# Patient Record
Sex: MALE | Race: WHITE | Employment: OTHER | URBAN - METROPOLITAN AREA
[De-identification: names, ages, dates, MRNs, and addresses within clinical notes are randomized per-mention and may not be internally consistent; named-entity substitution may affect disease eponyms.]

---

## 2020-01-19 ENCOUNTER — APPOINTMENT (OUTPATIENT)
Dept: GENERAL RADIOLOGY | Age: 30
End: 2020-01-19
Payer: MEDICAID

## 2020-01-19 ENCOUNTER — APPOINTMENT (OUTPATIENT)
Dept: CT IMAGING | Age: 30
End: 2020-01-19
Payer: MEDICAID

## 2020-01-19 ENCOUNTER — HOSPITAL ENCOUNTER (EMERGENCY)
Age: 30
Discharge: ANOTHER HOSPITAL | End: 2020-01-20
Attending: EMERGENCY MEDICINE
Payer: MEDICAID

## 2020-01-19 LAB
A/G RATIO: 1.3 (ref 1.1–2.2)
ALBUMIN SERPL-MCNC: 4.5 G/DL (ref 3.4–5)
ALP BLD-CCNC: 91 U/L (ref 40–129)
ALT SERPL-CCNC: 69 U/L (ref 10–40)
AMMONIA: 58 UMOL/L (ref 16–60)
AMORPHOUS: ABNORMAL /HPF
AMPHETAMINE SCREEN, URINE: ABNORMAL
ANION GAP SERPL CALCULATED.3IONS-SCNC: 21 MMOL/L (ref 3–16)
ANISOCYTOSIS: ABNORMAL
APPEARANCE CSF: CLEAR
APPEARANCE CSF: CLEAR
AST SERPL-CCNC: 41 U/L (ref 15–37)
BACTERIA: ABNORMAL /HPF
BANDED NEUTROPHILS RELATIVE PERCENT: 1 % (ref 0–7)
BARBITURATE SCREEN URINE: ABNORMAL
BASE EXCESS VENOUS: -13.2 MMOL/L (ref -3–3)
BASOPHILS ABSOLUTE: 0.2 K/UL (ref 0–0.2)
BASOPHILS RELATIVE PERCENT: 1 %
BENZODIAZEPINE SCREEN, URINE: ABNORMAL
BILIRUB SERPL-MCNC: <0.2 MG/DL (ref 0–1)
BILIRUBIN URINE: NEGATIVE
BLOOD, URINE: ABNORMAL
BUN BLDV-MCNC: 14 MG/DL (ref 7–20)
CALCIUM SERPL-MCNC: 9.1 MG/DL (ref 8.3–10.6)
CANNABINOID SCREEN URINE: POSITIVE
CARBOXYHEMOGLOBIN: 8.1 % (ref 0–1.5)
CHLORIDE BLD-SCNC: 99 MMOL/L (ref 99–110)
CLARITY: CLEAR
CLOT EVALUATION CSF: ABNORMAL
CLOT EVALUATION CSF: ABNORMAL
CO2: 17 MMOL/L (ref 21–32)
COCAINE METABOLITE SCREEN URINE: ABNORMAL
COLOR CSF: COLORLESS
COLOR CSF: COLORLESS
COLOR: ABNORMAL
CREAT SERPL-MCNC: 0.7 MG/DL (ref 0.9–1.3)
EKG ATRIAL RATE: 71 BPM
EKG DIAGNOSIS: NORMAL
EKG P AXIS: 60 DEGREES
EKG P-R INTERVAL: 162 MS
EKG Q-T INTERVAL: 402 MS
EKG QRS DURATION: 92 MS
EKG QTC CALCULATION (BAZETT): 436 MS
EKG R AXIS: 73 DEGREES
EKG T AXIS: 79 DEGREES
EKG VENTRICULAR RATE: 71 BPM
EOSINOPHILS ABSOLUTE: 0.8 K/UL (ref 0–0.6)
EOSINOPHILS RELATIVE PERCENT: 4 %
EPITHELIAL CELLS, UA: ABNORMAL /HPF
GFR AFRICAN AMERICAN: >60
GFR NON-AFRICAN AMERICAN: >60
GLOBULIN: 3.5 G/DL
GLUCOSE BLD-MCNC: 113 MG/DL (ref 70–99)
GLUCOSE URINE: NEGATIVE MG/DL
GLUCOSE, CSF: 77 MG/DL (ref 40–80)
HCO3 VENOUS: 15.8 MMOL/L (ref 23–29)
HCT VFR BLD CALC: 47.5 % (ref 40.5–52.5)
HEMATOLOGY PATH CONSULT: YES
HEMOGLOBIN: 15.7 G/DL (ref 13.5–17.5)
HYPOCHROMIA: ABNORMAL
KETONES, URINE: NEGATIVE MG/DL
LACTIC ACID, SEPSIS: 0.9 MMOL/L (ref 0.4–1.9)
LACTIC ACID, SEPSIS: 3.7 MMOL/L (ref 0.4–1.9)
LEUKOCYTE ESTERASE, URINE: NEGATIVE
LYMPHOCYTES ABSOLUTE: 13 K/UL (ref 1–5.1)
LYMPHOCYTES RELATIVE PERCENT: 62 %
Lab: ABNORMAL
MCH RBC QN AUTO: 30.8 PG (ref 26–34)
MCHC RBC AUTO-ENTMCNC: 33.1 G/DL (ref 31–36)
MCV RBC AUTO: 92.9 FL (ref 80–100)
METHADONE SCREEN, URINE: POSITIVE
METHEMOGLOBIN VENOUS: 0.3 %
MICROCYTES: ABNORMAL
MICROSCOPIC EXAMINATION: YES
MONOCYTES ABSOLUTE: 1.7 K/UL (ref 0–1.3)
MONOCYTES RELATIVE PERCENT: 8 %
MUCUS: ABNORMAL /LPF
NEUTROPHILS ABSOLUTE: 5.2 K/UL (ref 1.7–7.7)
NEUTROPHILS RELATIVE PERCENT: 24 %
NITRITE, URINE: NEGATIVE
NO DIFFERENTIAL CSF: ABNORMAL
NO DIFFERENTIAL CSF: ABNORMAL
O2 CONTENT, VEN: 20 VOL %
O2 SAT, VEN: 91 %
O2 THERAPY: ABNORMAL
OPIATE SCREEN URINE: ABNORMAL
OXYCODONE URINE: ABNORMAL
PCO2, VEN: 47.8 MMHG (ref 40–50)
PDW BLD-RTO: 14.2 % (ref 12.4–15.4)
PH UA: 6
PH UA: 6 (ref 5–8)
PH VENOUS: 7.14 (ref 7.35–7.45)
PHENCYCLIDINE SCREEN URINE: ABNORMAL
PLATELET # BLD: 280 K/UL (ref 135–450)
PLATELET SLIDE REVIEW: ADEQUATE
PMV BLD AUTO: 7 FL (ref 5–10.5)
PO2, VEN: 77.9 MMHG (ref 25–40)
POTASSIUM REFLEX MAGNESIUM: 3.9 MMOL/L (ref 3.5–5.1)
PROPOXYPHENE SCREEN: ABNORMAL
PROTEIN CSF: 28 MG/DL (ref 15–45)
PROTEIN UA: 30 MG/DL
RBC # BLD: 5.11 M/UL (ref 4.2–5.9)
RBC CSF: 1 /CUMM
RBC CSF: 2 /CUMM
RBC UA: ABNORMAL /HPF (ref 0–2)
SLIDE REVIEW: ABNORMAL
SMUDGE CELLS: PRESENT
SODIUM BLD-SCNC: 137 MMOL/L (ref 136–145)
SPECIFIC GRAVITY UA: 1.02 (ref 1–1.03)
T4 FREE: 1.1 NG/DL (ref 0.9–1.8)
TCO2 CALC VENOUS: 17 MMOL/L
TOTAL CK: 106 U/L (ref 39–308)
TOTAL PROTEIN: 8 G/DL (ref 6.4–8.2)
TROPONIN: <0.01 NG/ML
TSH REFLEX: 10.25 UIU/ML (ref 0.27–4.2)
TUBE NUMBER CSF: ABNORMAL
TUBE NUMBER CSF: ABNORMAL
URINE REFLEX TO CULTURE: ABNORMAL
URINE TYPE: ABNORMAL
UROBILINOGEN, URINE: 0.2 E.U./DL
WBC # BLD: 20.9 K/UL (ref 4–11)
WBC CSF: 0 /CUMM (ref 0–5)
WBC CSF: 0 /CUMM (ref 0–5)
WBC UA: ABNORMAL /HPF (ref 0–5)

## 2020-01-19 PROCEDURE — 2500000003 HC RX 250 WO HCPCS: Performed by: EMERGENCY MEDICINE

## 2020-01-19 PROCEDURE — 87529 HSV DNA AMP PROBE: CPT

## 2020-01-19 PROCEDURE — 83605 ASSAY OF LACTIC ACID: CPT

## 2020-01-19 PROCEDURE — 96366 THER/PROPH/DIAG IV INF ADDON: CPT

## 2020-01-19 PROCEDURE — 80053 COMPREHEN METABOLIC PANEL: CPT

## 2020-01-19 PROCEDURE — 99285 EMERGENCY DEPT VISIT HI MDM: CPT

## 2020-01-19 PROCEDURE — 70450 CT HEAD/BRAIN W/O DYE: CPT

## 2020-01-19 PROCEDURE — 82550 ASSAY OF CK (CPK): CPT

## 2020-01-19 PROCEDURE — 84443 ASSAY THYROID STIM HORMONE: CPT

## 2020-01-19 PROCEDURE — 93010 ELECTROCARDIOGRAM REPORT: CPT | Performed by: INTERNAL MEDICINE

## 2020-01-19 PROCEDURE — 6360000002 HC RX W HCPCS: Performed by: EMERGENCY MEDICINE

## 2020-01-19 PROCEDURE — 96365 THER/PROPH/DIAG IV INF INIT: CPT

## 2020-01-19 PROCEDURE — 2580000003 HC RX 258: Performed by: EMERGENCY MEDICINE

## 2020-01-19 PROCEDURE — 6360000002 HC RX W HCPCS

## 2020-01-19 PROCEDURE — 96368 THER/DIAG CONCURRENT INF: CPT

## 2020-01-19 PROCEDURE — 84484 ASSAY OF TROPONIN QUANT: CPT

## 2020-01-19 PROCEDURE — 86592 SYPHILIS TEST NON-TREP QUAL: CPT

## 2020-01-19 PROCEDURE — 96367 TX/PROPH/DG ADDL SEQ IV INF: CPT

## 2020-01-19 PROCEDURE — 86618 LYME DISEASE ANTIBODY: CPT

## 2020-01-19 PROCEDURE — 87070 CULTURE OTHR SPECIMN AEROBIC: CPT

## 2020-01-19 PROCEDURE — 96375 TX/PRO/DX INJ NEW DRUG ADDON: CPT

## 2020-01-19 PROCEDURE — 80307 DRUG TEST PRSMV CHEM ANLYZR: CPT

## 2020-01-19 PROCEDURE — 82945 GLUCOSE OTHER FLUID: CPT

## 2020-01-19 PROCEDURE — 87040 BLOOD CULTURE FOR BACTERIA: CPT

## 2020-01-19 PROCEDURE — 85025 COMPLETE CBC W/AUTO DIFF WBC: CPT

## 2020-01-19 PROCEDURE — 84439 ASSAY OF FREE THYROXINE: CPT

## 2020-01-19 PROCEDURE — 89050 BODY FLUID CELL COUNT: CPT

## 2020-01-19 PROCEDURE — 82803 BLOOD GASES ANY COMBINATION: CPT

## 2020-01-19 PROCEDURE — 62270 DX LMBR SPI PNXR: CPT

## 2020-01-19 PROCEDURE — 82140 ASSAY OF AMMONIA: CPT

## 2020-01-19 PROCEDURE — 71045 X-RAY EXAM CHEST 1 VIEW: CPT

## 2020-01-19 PROCEDURE — 86788 WEST NILE VIRUS AB IGM: CPT

## 2020-01-19 PROCEDURE — 81001 URINALYSIS AUTO W/SCOPE: CPT

## 2020-01-19 PROCEDURE — 86789 WEST NILE VIRUS ANTIBODY: CPT

## 2020-01-19 PROCEDURE — 93005 ELECTROCARDIOGRAM TRACING: CPT | Performed by: EMERGENCY MEDICINE

## 2020-01-19 PROCEDURE — 87205 SMEAR GRAM STAIN: CPT

## 2020-01-19 PROCEDURE — 84157 ASSAY OF PROTEIN OTHER: CPT

## 2020-01-19 RX ORDER — DEXTROSE, SODIUM CHLORIDE, AND POTASSIUM CHLORIDE 5; .45; .15 G/100ML; G/100ML; G/100ML
1000 INJECTION INTRAVENOUS CONTINUOUS
Status: DISCONTINUED | OUTPATIENT
Start: 2020-01-19 | End: 2020-01-20 | Stop reason: HOSPADM

## 2020-01-19 RX ORDER — 0.9 % SODIUM CHLORIDE 0.9 %
1000 INTRAVENOUS SOLUTION INTRAVENOUS ONCE
Status: COMPLETED | OUTPATIENT
Start: 2020-01-19 | End: 2020-01-19

## 2020-01-19 RX ORDER — ONDANSETRON 2 MG/ML
INJECTION INTRAMUSCULAR; INTRAVENOUS
Status: DISCONTINUED
Start: 2020-01-19 | End: 2020-01-20 | Stop reason: HOSPADM

## 2020-01-19 RX ORDER — METOCLOPRAMIDE HYDROCHLORIDE 5 MG/ML
10 INJECTION INTRAMUSCULAR; INTRAVENOUS ONCE
Status: COMPLETED | OUTPATIENT
Start: 2020-01-19 | End: 2020-01-19

## 2020-01-19 RX ORDER — DIPHENHYDRAMINE HYDROCHLORIDE 50 MG/ML
25 INJECTION INTRAMUSCULAR; INTRAVENOUS ONCE
Status: DISCONTINUED | OUTPATIENT
Start: 2020-01-19 | End: 2020-01-20 | Stop reason: HOSPADM

## 2020-01-19 RX ORDER — PROMETHAZINE HYDROCHLORIDE 25 MG/ML
12.5 INJECTION, SOLUTION INTRAMUSCULAR; INTRAVENOUS ONCE
Status: COMPLETED | OUTPATIENT
Start: 2020-01-19 | End: 2020-01-19

## 2020-01-19 RX ORDER — DIPHENHYDRAMINE HYDROCHLORIDE 50 MG/ML
25 INJECTION INTRAMUSCULAR; INTRAVENOUS ONCE
Status: COMPLETED | OUTPATIENT
Start: 2020-01-19 | End: 2020-01-19

## 2020-01-19 RX ORDER — MIDAZOLAM HYDROCHLORIDE 5 MG/ML
INJECTION INTRAMUSCULAR; INTRAVENOUS
Status: DISPENSED
Start: 2020-01-19 | End: 2020-01-19

## 2020-01-19 RX ORDER — DIPHENHYDRAMINE HYDROCHLORIDE 50 MG/ML
INJECTION INTRAMUSCULAR; INTRAVENOUS
Status: COMPLETED
Start: 2020-01-19 | End: 2020-01-19

## 2020-01-19 RX ORDER — METOCLOPRAMIDE HYDROCHLORIDE 5 MG/ML
INJECTION INTRAMUSCULAR; INTRAVENOUS
Status: COMPLETED
Start: 2020-01-19 | End: 2020-01-19

## 2020-01-19 RX ORDER — SODIUM CHLORIDE 0.9 % (FLUSH) 0.9 %
10 SYRINGE (ML) INJECTION PRN
Status: DISCONTINUED | OUTPATIENT
Start: 2020-01-19 | End: 2020-01-20 | Stop reason: HOSPADM

## 2020-01-19 RX ORDER — HALOPERIDOL 5 MG/ML
5 INJECTION INTRAMUSCULAR ONCE
Status: DISCONTINUED | OUTPATIENT
Start: 2020-01-19 | End: 2020-01-20 | Stop reason: HOSPADM

## 2020-01-19 RX ORDER — SODIUM CHLORIDE 0.9 % (FLUSH) 0.9 %
10 SYRINGE (ML) INJECTION EVERY 12 HOURS SCHEDULED
Status: DISCONTINUED | OUTPATIENT
Start: 2020-01-19 | End: 2020-01-20 | Stop reason: HOSPADM

## 2020-01-19 RX ORDER — LORAZEPAM 2 MG/ML
INJECTION INTRAMUSCULAR
Status: COMPLETED
Start: 2020-01-19 | End: 2020-01-19

## 2020-01-19 RX ORDER — ONDANSETRON 2 MG/ML
INJECTION INTRAMUSCULAR; INTRAVENOUS
Status: COMPLETED
Start: 2020-01-19 | End: 2020-01-19

## 2020-01-19 RX ADMIN — POTASSIUM CHLORIDE, DEXTROSE MONOHYDRATE AND SODIUM CHLORIDE 1000 ML: 150; 5; 450 INJECTION, SOLUTION INTRAVENOUS at 15:02

## 2020-01-19 RX ADMIN — CEFTRIAXONE 2 G: 2 INJECTION, POWDER, FOR SOLUTION INTRAMUSCULAR; INTRAVENOUS at 12:08

## 2020-01-19 RX ADMIN — DIPHENHYDRAMINE HYDROCHLORIDE 25 MG: 50 INJECTION, SOLUTION INTRAMUSCULAR; INTRAVENOUS at 20:46

## 2020-01-19 RX ADMIN — ONDANSETRON HYDROCHLORIDE 8 MG: 2 INJECTION, SOLUTION INTRAMUSCULAR; INTRAVENOUS at 15:22

## 2020-01-19 RX ADMIN — Medication 10 ML: at 12:19

## 2020-01-19 RX ADMIN — VANCOMYCIN HYDROCHLORIDE 1.5 G: 1 INJECTION, POWDER, LYOPHILIZED, FOR SOLUTION INTRAVENOUS at 12:35

## 2020-01-19 RX ADMIN — LORAZEPAM 4 MG: 2 INJECTION INTRAMUSCULAR; INTRAVENOUS at 09:38

## 2020-01-19 RX ADMIN — PROMETHAZINE HYDROCHLORIDE 12.5 MG: 25 INJECTION INTRAMUSCULAR; INTRAVENOUS at 18:53

## 2020-01-19 RX ADMIN — DIPHENHYDRAMINE HYDROCHLORIDE 25 MG: 50 INJECTION INTRAMUSCULAR; INTRAVENOUS at 20:46

## 2020-01-19 RX ADMIN — LEVETIRACETAM 2500 MG: 100 INJECTION, SOLUTION INTRAVENOUS at 12:14

## 2020-01-19 RX ADMIN — METOCLOPRAMIDE HYDROCHLORIDE 10 MG: 5 INJECTION INTRAMUSCULAR; INTRAVENOUS at 20:46

## 2020-01-19 RX ADMIN — ACYCLOVIR SODIUM 590 MG: 50 INJECTION, SOLUTION INTRAVENOUS at 12:35

## 2020-01-19 RX ADMIN — SODIUM CHLORIDE 1000 ML: 9 INJECTION, SOLUTION INTRAVENOUS at 10:21

## 2020-01-19 RX ADMIN — SODIUM CHLORIDE 1000 ML: 9 INJECTION, SOLUTION INTRAVENOUS at 12:14

## 2020-01-19 RX ADMIN — METOCLOPRAMIDE 10 MG: 5 INJECTION, SOLUTION INTRAMUSCULAR; INTRAVENOUS at 20:46

## 2020-01-19 NOTE — ED NOTES
Patient alert and verbal at this time, oriented to self, unable to appropriately identify family members, year or location. Patient states \"im feeling so much better\". Currently laying in bed, family remains at bedside. Call light is within reach.       Azael Johnson RN  01/19/20 0292

## 2020-01-19 NOTE — ED PROVIDER NOTES
Physical activity:     Days per week: Not on file     Minutes per session: Not on file    Stress: Not on file   Relationships    Social connections:     Talks on phone: Not on file     Gets together: Not on file     Attends Mosque service: Not on file     Active member of club or organization: Not on file     Attends meetings of clubs or organizations: Not on file     Relationship status: Not on file    Intimate partner violence:     Fear of current or ex partner: Not on file     Emotionally abused: Not on file     Physically abused: Not on file     Forced sexual activity: Not on file   Other Topics Concern    Not on file   Social History Narrative    Not on file     Current Facility-Administered Medications   Medication Dose Route Frequency Provider Last Rate Last Dose    midazolam (VERSED) 5 MG/ML injection             vancomycin 1.5 g in dextrose 5% 300 mL IVPB  1,500 mg Intravenous Once Lillian Horvath  mL/hr at 01/19/20 1235 1.5 g at 01/19/20 1235    acyclovir (ZOVIRAX) 590 mg in dextrose 5 % 100 mL IVPB  10 mg/kg Intravenous Once Lillian Horvath  mL/hr at 01/19/20 1235 590 mg at 01/19/20 1235    sodium chloride flush 0.9 % injection 10 mL  10 mL Intravenous 2 times per day Lillian Horvath MD   10 mL at 01/19/20 1219    sodium chloride flush 0.9 % injection 10 mL  10 mL Intravenous PRN Lillian Horvath MD         Current Outpatient Medications   Medication Sig Dispense Refill    buprenorphine-naloxone (SUBOXONE) 8-2 MG FILM SL film Place 1 Film under the tongue daily       No Known Allergies    REVIEW OF SYSTEMS  Unable to obtain ROS due to patient's serious medical condition. PHYSICAL EXAM  Vitals:    01/19/20 1238 01/19/20 1239 01/19/20 1251 01/19/20 1306   BP: (!) 146/91  (!) 146/100 (!) 131/94   Pulse: 108 108 105 104   Resp: 26 22 26 27   Temp:       TempSrc:       SpO2: 100% 100% 99% 100%   Weight:          General appearance: Unresponsive. HENT: Normocephalic. Atraumatic. Mucous membranes are moist.  Neck: Supple. Eyes: Pupils 4mm to 3mm, equal, reactive. Eyes deviated up and alternatively to right and left. Heart/Chest: RRR. No murmurs. Lungs: Respirations unlabored. CTAB. Abdomen: Soft. Non-distended. No masses. No organomegaly. No guarding or rebound. No apparent tenderness. Musculoskeletal: No extremity edema. No deformity. No tenderness in the extremities. All extremities neurovascularly intact. Skin: Warm and dry. No acute rashes. Neurological: Localizes to noxious stimulus. Moves all four extremities. Moans to noxious stimulus. Eyes closed. Psychiatric: Unable to assess given altered    LABS  I have reviewed all labs for this visit.    Results for orders placed or performed during the hospital encounter of 01/19/20   Herpes simplex virus PCR   Result Value Ref Range    HSV Source CSF    CBC Auto Differential   Result Value Ref Range    WBC 20.9 (H) 4.0 - 11.0 K/uL    RBC 5.11 4.20 - 5.90 M/uL    Hemoglobin 15.7 13.5 - 17.5 g/dL    Hematocrit 47.5 40.5 - 52.5 %    MCV 92.9 80.0 - 100.0 fL    MCH 30.8 26.0 - 34.0 pg    MCHC 33.1 31.0 - 36.0 g/dL    RDW 14.2 12.4 - 15.4 %    Platelets 347 514 - 700 K/uL    MPV 7.0 5.0 - 10.5 fL    PLATELET SLIDE REVIEW Adequate     SLIDE REVIEW see below     Path Consult Yes     Neutrophils % 24.0 %    Lymphocytes % 62.0 %    Monocytes % 8.0 %    Eosinophils % 4.0 %    Basophils % 1.0 %    Neutrophils Absolute 5.2 1.7 - 7.7 K/uL    Lymphocytes Absolute 13.0 (H) 1.0 - 5.1 K/uL    Monocytes Absolute 1.7 (H) 0.0 - 1.3 K/uL    Eosinophils Absolute 0.8 (H) 0.0 - 0.6 K/uL    Basophils Absolute 0.2 0.0 - 0.2 K/uL    Bands Relative 1 0 - 7 %    Smudge Cells Present (A)     Anisocytosis Occasional (A)     Microcytes Occasional (A)     Hypochromia Occasional (A)    Comprehensive Metabolic Panel w/ Reflex to MG   Result Value Ref Range    Sodium 137 136 - 145 mmol/L    Potassium reflex Magnesium 3.9 3.5 - 5.1 mmol/L    Chloride 99 99 - 110 mmol/L    CO2 17 (L) 21 - 32 mmol/L    Anion Gap 21 (H) 3 - 16    Glucose 113 (H) 70 - 99 mg/dL    BUN 14 7 - 20 mg/dL    CREATININE 0.7 (L) 0.9 - 1.3 mg/dL    GFR Non-African American >60 >60    GFR African American >60 >60    Calcium 9.1 8.3 - 10.6 mg/dL    Total Protein 8.0 6.4 - 8.2 g/dL    Alb 4.5 3.4 - 5.0 g/dL    Albumin/Globulin Ratio 1.3 1.1 - 2.2    Total Bilirubin <0.2 0.0 - 1.0 mg/dL    Alkaline Phosphatase 91 40 - 129 U/L    ALT 69 (H) 10 - 40 U/L    AST 41 (H) 15 - 37 U/L    Globulin 3.5 g/dL   Troponin   Result Value Ref Range    Troponin <0.01 <0.01 ng/mL   TSH with Reflex   Result Value Ref Range    TSH 10.25 (H) 0.27 - 4.20 uIU/mL   Ammonia   Result Value Ref Range    Ammonia 58 16 - 60 umol/L   Blood gas, venous   Result Value Ref Range    pH, Rico 7.138 (LL) 7.350 - 7.450    pCO2, Rico 47.8 40.0 - 50.0 mmHg    pO2, Rico 77.9 (H) 25.0 - 40.0 mmHg    HCO3, Venous 15.8 (L) 23.0 - 29.0 mmol/L    Base Excess, Rico -13.2 (L) -3.0 - 3.0 mmol/L    O2 Sat, Rico 91 Not Established %    Carboxyhemoglobin 8.1 (H) 0.0 - 1.5 %    MetHgb, Rico 0.3 <1.5 %    TC02 (Calc), Rico 17 Not Established mmol/L    O2 Content, Rico 20 Not Established VOL %    O2 Therapy Unknown    Urinalysis Reflex to Culture   Result Value Ref Range    Color, UA Straw Straw/Yellow    Clarity, UA Clear Clear    Glucose, Ur Negative Negative mg/dL    Bilirubin Urine Negative Negative    Ketones, Urine Negative Negative mg/dL    Specific Gravity, UA 1.025 1.005 - 1.030    Blood, Urine SMALL (A) Negative    pH, UA 6.0 5.0 - 8.0    Protein, UA 30 (A) Negative mg/dL    Urobilinogen, Urine 0.2 <2.0 E.U./dL    Nitrite, Urine Negative Negative    Leukocyte Esterase, Urine Negative Negative    Microscopic Examination YES     Urine Type NotGiven     Urine Reflex to Culture Not Indicated    Urine Drug Screen   Result Value Ref Range    Amphetamine Screen, Urine Neg Negative <1000ng/mL    Barbiturate Screen, Ur Neg Negative <200 ng/mL    Benzodiazepine Screen, Urine Neg Negative <200 ng/mL    Cannabinoid Scrn, Ur POSITIVE (A) Negative <50 ng/mL    Cocaine Metabolite Screen, Urine Neg Negative <300 ng/mL    Opiate Scrn, Ur Neg Negative <300 ng/mL    PCP Screen, Urine Neg Negative <25 ng/mL    Methadone Screen, Urine POSITIVE (A) Negative <300 ng/mL    Propoxyphene Scrn, Ur Neg Negative <300 ng/mL    Oxycodone Urine Neg Negative <100 ng/ml    pH, UA 6.0     Drug Screen Comment: see below    Microscopic Urinalysis   Result Value Ref Range    Mucus, UA Rare (A) /LPF    WBC, UA 3-5 0 - 5 /HPF    RBC, UA 3-5 (A) 0 - 2 /HPF    Epi Cells 5-10 /HPF    Bacteria, UA Rare (A) /HPF    Amorphous, UA Rare (A) /HPF   CK   Result Value Ref Range    Total  39 - 308 U/L   Lactate, Sepsis   Result Value Ref Range    Lactic Acid, Sepsis 3.7 (H) 0.4 - 1.9 mmol/L   Lactate, Sepsis   Result Value Ref Range    Lactic Acid, Sepsis 0.9 0.4 - 1.9 mmol/L   Glucose CSF   Result Value Ref Range    Glucose, CSF 77 40 - 80 mg/dL    Appearance, CSF Clear    Protein CSF   Result Value Ref Range    Protein, CSF 28 15 - 45 mg/dL   CSF cell count with differential   Result Value Ref Range    Color, CSF Colorless Colorless    Tube Number + CELL CT + DIFF-CSF Tube 1     Clot Evaluation CSF see below     WBC, CSF 0 0 - 5 /cumm    RBC, CSF 2 (A) 0 /cumm    No differential CSF see below    CSF cell count with differential   Result Value Ref Range    Color, CSF Colorless Colorless    Appearance, CSF Clear Clear    Tube Number + CELL CT + DIFF-CSF Tube 4     Clot Evaluation CSF see below     WBC, CSF 0 0 - 5 /cumm    RBC, CSF 1 (A) 0 /cumm    No differential CSF see below    EKG 12 Lead   Result Value Ref Range    Ventricular Rate 71 BPM    Atrial Rate 71 BPM    P-R Interval 162 ms    QRS Duration 92 ms    Q-T Interval 402 ms    QTc Calculation (Bazett) 436 ms    P Axis 60 degrees    R Axis 73 degrees    T Axis 79 degrees    Diagnosis       Normal sinus rhythm with sinus arrhythmiaNormal ECGNo previous ECGs available       RADIOLOGY  I have reviewed all radiographic studies for this visit. XR CHEST PORTABLE   Final Result   No acute pulmonary abnormality. CT Head WO Contrast   Final Result   No acute intracranial abnormality. ECG  EKG interpreted by myself. Rate: normal  Rhythm: NSR with sinus arrhythmia  Axis: normal  Intervals: within normal limits  ST Segments: no acute abnormality  T waves: no acute abnormality  Comparison: No prior for comparison  Impression: NSR with sinus arrhythmia       ED COURSE/[facility]  Patient seen and evaluated. Old records reviewed. Labs and imaging reviewed and results discussed with patient.      27 y.o. male presented with altered mental status. ED Course as of Jan 19 1329   Olvin Taveras   4490 This is a 51-year-old male who presents with altered mental status. On arrival, the patient was taken to trauma bay 1. He was noted to be shaking his bilateral upper extremities and clenching his teeth. His eyes were deviated first to the right and then to the left. There was concern for seizure and the patient was administered 4 mg of Ativan intravenously with improvement in these symptoms. With placement of a Wu catheter he was noted to localize to that noxious stimulus. His pupils are 4 mm to 3 mm, equal, and reactive. His respiratory status is intact. No indication for naloxone. Initial blood glucose reassuring. I placed a nasopharyngeal airway in the left nostril with no complication. Patient taken to CT scanner and my wet read of the noncontrast head CT reveals no acute hemorrhage. [JL]   [id number] The patient was initially noted to be hypertensive with systolics in the 030K. This improved to the 150s after administration of Ativan. VBG reveals metabolic acidosis. The patient is tachypneic, likely compensating for this acidosis. [JL]   6155 I note leukocytosis and lactic acidosis.   Leukocytosis nonspecific in the setting of possible time provided today was at least 33 minutes. This excludes seperately billable procedures. Critical care time was provided for altered mental status that required close evaluation and/or intervention with concern for potential patient decompensation. All questions were answered and the patient/family expressed understanding and agreement with the plan. During the patient's ED course, the patient was given:  Medications   midazolam (VERSED) 5 MG/ML injection (  Not Given 1/19/20 1041)   vancomycin 1.5 g in dextrose 5% 300 mL IVPB (1.5 g Intravenous New Bag 1/19/20 1235)   acyclovir (ZOVIRAX) 590 mg in dextrose 5 % 100 mL IVPB (590 mg Intravenous New Bag 1/19/20 1235)   sodium chloride flush 0.9 % injection 10 mL (10 mLs Intravenous Given 1/19/20 1219)   sodium chloride flush 0.9 % injection 10 mL (has no administration in time range)   LORazepam (ATIVAN) 2 MG/ML injection (4 mg  Given 1/19/20 0938)   0.9 % sodium chloride bolus (0 mLs Intravenous Stopped 1/19/20 1240)   0.9 % sodium chloride bolus (0 mLs Intravenous Stopped 1/19/20 1239)   cefTRIAXone (ROCEPHIN) 2 g IVPB in D5W 50ml minibag (0 g Intravenous Stopped 1/19/20 1239)   levETIRAcetam (KEPPRA) 2,500 mg in sodium chloride 0.9 % 100 mL IVPB (0 mg Intravenous Stopped 1/19/20 1239)        CLINICAL IMPRESSION  1. Seizure ([facility] [address]    2. Altered mental status, unspecified altered mental status type    3. Opiate abuse, continuous ([facility] [address]        DISPOSITION   Decision To Transfer 01/19/2020 11:57:51 AM        Zahida Velasquez MD    Note: This chart was created using voice recognition dictation software. Efforts were made by me to ensure accuracy, however some errors may be present due to limitations of this technology and occasionally words are not transcribed correctly.        Zahida Velasquez MD  01/19/20 8673

## 2020-01-20 VITALS
HEART RATE: 89 BPM | RESPIRATION RATE: 13 BRPM | DIASTOLIC BLOOD PRESSURE: 75 MMHG | WEIGHT: 130 LBS | BODY MASS INDEX: 18.65 KG/M2 | SYSTOLIC BLOOD PRESSURE: 118 MMHG | TEMPERATURE: 98.6 F | OXYGEN SATURATION: 98 %

## 2020-01-20 LAB — HEMATOLOGY PATH CONSULT: NORMAL

## 2020-01-20 PROCEDURE — 6370000000 HC RX 637 (ALT 250 FOR IP): Performed by: FAMILY MEDICINE

## 2020-01-20 RX ORDER — METHADONE HYDROCHLORIDE 10 MG/1
20 TABLET ORAL ONCE
Status: COMPLETED | OUTPATIENT
Start: 2020-01-20 | End: 2020-01-20

## 2020-01-20 RX ADMIN — METHADONE HYDROCHLORIDE 20 MG: 10 TABLET ORAL at 01:18

## 2020-01-20 ASSESSMENT — PAIN SCALES - GENERAL: PAINLEVEL_OUTOF10: 6

## 2020-01-20 NOTE — ED NOTES
Report to [facility]. Pt. Alert and VSS upon departure.       Raghu Alberto RN  01/20/20 5623

## 2020-01-20 NOTE — ED PROVIDER NOTES
Emergency Department Encounter  Location: [place] [address] ED    Patient: Flo Mcgowan  MRN: [id number]  : 1990  Date of evaluation: 2020  ED Provider: Lyndsey Alberto MD      Flo Mcgowan was checked out to me by Dr. Alicja Krishna. Please see his/her initial documentation for details of the patient's initial ED presentation, physical exam and completed studies.     In brief, Flo Mcgowan is a 27 y.o. male that presented to the emergency department with altered mental status and new onset seizures    I have reviewed and interpreted all of the currently available lab results and diagnostics from this visit:  Results for orders placed or performed during the hospital encounter of 20   Spinal fluid culture   Result Value Ref Range    Gram Stain Result No WBCs or organisms seen    Herpes simplex virus PCR   Result Value Ref Range    HSV Source CSF    CBC Auto Differential   Result Value Ref Range    WBC 20.9 (H) 4.0 - 11.0 K/uL    RBC 5.11 4.20 - 5.90 M/uL    Hemoglobin 15.7 13.5 - 17.5 g/dL    Hematocrit 47.5 40.5 - 52.5 %    MCV 92.9 80.0 - 100.0 fL    MCH 30.8 26.0 - 34.0 pg    MCHC 33.1 31.0 - 36.0 g/dL    RDW 14.2 12.4 - 15.4 %    Platelets 570 378 - 201 K/uL    MPV 7.0 5.0 - 10.5 fL    PLATELET SLIDE REVIEW Adequate     SLIDE REVIEW see below     Path Consult Yes     Neutrophils % 24.0 %    Lymphocytes % 62.0 %    Monocytes % 8.0 %    Eosinophils % 4.0 %    Basophils % 1.0 %    Neutrophils Absolute 5.2 1.7 - 7.7 K/uL    Lymphocytes Absolute 13.0 (H) 1.0 - 5.1 K/uL    Monocytes Absolute 1.7 (H) 0.0 - 1.3 K/uL    Eosinophils Absolute 0.8 (H) 0.0 - 0.6 K/uL    Basophils Absolute 0.2 0.0 - 0.2 K/uL    Bands Relative 1 0 - 7 %    Smudge Cells Present (A)     Anisocytosis Occasional (A)     Microcytes Occasional (A)     Hypochromia Occasional (A)    Comprehensive Metabolic Panel w/ Reflex to MG   Result Value Ref Range    Sodium 137 136 - 145 mmol/L    Potassium reflex Magnesium 3.9 3.5 - 5.1 ng/mL    Benzodiazepine Screen, Urine Neg Negative <200 ng/mL    Cannabinoid Scrn, Ur POSITIVE (A) Negative <50 ng/mL    Cocaine Metabolite Screen, Urine Neg Negative <300 ng/mL    Opiate Scrn, Ur Neg Negative <300 ng/mL    PCP Screen, Urine Neg Negative <25 ng/mL    Methadone Screen, Urine POSITIVE (A) Negative <300 ng/mL    Propoxyphene Scrn, Ur Neg Negative <300 ng/mL    Oxycodone Urine Neg Negative <100 ng/ml    pH, UA 6.0     Drug Screen Comment: see below    Microscopic Urinalysis   Result Value Ref Range    Mucus, UA Rare (A) /LPF    WBC, UA 3-5 0 - 5 /HPF    RBC, UA 3-5 (A) 0 - 2 /HPF    Epi Cells 5-10 /HPF    Bacteria, UA Rare (A) /HPF    Amorphous, UA Rare (A) /HPF   CK   Result Value Ref Range    Total  39 - 308 U/L   Lactate, Sepsis   Result Value Ref Range    Lactic Acid, Sepsis 3.7 (H) 0.4 - 1.9 mmol/L   Lactate, Sepsis   Result Value Ref Range    Lactic Acid, Sepsis 0.9 0.4 - 1.9 mmol/L   T4, Free   Result Value Ref Range    T4 Free 1.1 0.9 - 1.8 ng/dL   Glucose CSF   Result Value Ref Range    Glucose, CSF 77 40 - 80 mg/dL    Appearance, CSF Clear    Protein CSF   Result Value Ref Range    Protein, CSF 28 15 - 45 mg/dL   CSF cell count with differential   Result Value Ref Range    Color, CSF Colorless Colorless    Tube Number + CELL CT + DIFF-CSF Tube 1     Clot Evaluation CSF see below     WBC, CSF 0 0 - 5 /cumm    RBC, CSF 2 (A) 0 /cumm    No differential CSF see below    CSF cell count with differential   Result Value Ref Range    Color, CSF Colorless Colorless    Appearance, CSF Clear Clear    Tube Number + CELL CT + DIFF-CSF Tube 4     Clot Evaluation CSF see below     WBC, CSF 0 0 - 5 /cumm    RBC, CSF 1 (A) 0 /cumm    No differential CSF see below    EKG 12 Lead   Result Value Ref Range    Ventricular Rate 71 BPM    Atrial Rate 71 BPM    P-R Interval 162 ms    QRS Duration 92 ms    Q-T Interval 402 ms    QTc Calculation (Bazett) 436 ms    P Axis 60 degrees    R Axis 73 degrees    T Axis 79 degrees    Diagnosis       Normal sinus rhythm with sinus arrhythmiaConfirmed by [facility] Kahlil JESUS ([id number]) on 1/19/2020 3:39:13 PM     Ct Head Wo Contrast    Result Date: 1/19/2020  EXAMINATION: CT OF THE HEAD WITHOUT CONTRAST 1/19/2020 9:53 am TECHNIQUE: CT of the head was performed without the administration of intravenous contrast. Dose modulation, iterative reconstruction, and/or weight based adjustment of the mA/kV was utilized to reduce the radiation dose to as low as reasonably achievable. COMPARISON: None. HISTORY: ORDERING SYSTEM PROVIDED HISTORY: AMS TECHNOLOGIST PROVIDED HISTORY: Has a \"code stroke\" or \"stroke alert\" been called? ->No Reason for exam:->AMS Reason for Exam: ams Acuity: Acute Type of Exam: Initial FINDINGS: No acute intracranial hemorrhage. No mass effect. No midline shift. Ventricles and sulci are within normal limits. No acute soft tissue abnormality. No orbital abnormality. Endotracheal tube is incompletely evaluated. No acute skull fracture. Mastoid air cells are clear. Opacity within the left frontal sinus. No acute intracranial abnormality. Xr Chest Portable    Result Date: 1/19/2020  EXAMINATION: ONE X-RAY VIEW OF THE CHEST 1/19/2020 10:43 am COMPARISON: Chest radiograph 09/22/2005. HISTORY: ORDERING SYSTEM PROVIDED HISTORY: AMS TECHNOLOGIST PROVIDED HISTORY: Reason for exam:->AMS Reason for Exam: AMS Acuity: Unknown Type of Exam: Unknown FINDINGS: Single view provided. The mediastinal, cardiac, and hilar silhouettes are normal.  Normal lung volumes with no acute consolidation or effusion. No interstitial edema. No pneumothorax or mass. No free subdiaphragmatic air. The bowel gas pattern is normal.     No acute pulmonary abnormality. Final ED Course and MDM: In brief, Jolynn Willett is a 27 y.o. male whose care was signed out to me by the outgoing provider. In brief,  The patient was brought in by family. He was apparently found on his couch unresponsive. 01/19/20 0945 01/19/20 0938  0.9 % sodium chloride bolus  ONCE      Last MAR action:  Stopped - by Stephania Ballesteros on 01/19/20 at [address] [facility], JUNIOR    01/19/20 0944 01/19/20 0944  LORazepam (ATIVAN) 2 MG/ML injection     Note to Pharmacy:  Jemal Comer: cabinet override    Last MAR action:  Given - by Sarmad Solis on 01/19/20 at 3830     01/19/20 0942 01/19/20 0942  midazolam (VERSED) 5 MG/ML injection     Note to Pharmacy:  Georgette De La Fuente: cabinet override    Last MAR action:  Not Given - by Stephania Ballesteros on 01/19/20 at 1041           Final Impression      1. Seizure ([facility] [address]    2. Altered mental status, unspecified altered mental status type    3.  Opiate abuse, continuous ([facility] [address]        DISPOSITION Decision To Transfer 01/19/2020 11:57:51 AM     (Please note that portions of this note may have been completed with a voice recognition program. Efforts were made to edit the dictations but occasionally words are mis-transcribed.)    Norma Hi MD  [address] MD  01/20/20 9096

## 2020-01-20 NOTE — ED NOTES
1940 Transfer center called with bed assignement,    Pt is going to [facility]     Bed#4420  Report# [phone]    Awaiting Transport at this time.      Kelly Lawson  01/19/20 1947

## 2020-01-20 NOTE — ED NOTES
Patient sleeping comfortably in bed at this time. Patient mother remains at bedside. Will continue to monitor.       Nahun Martinez RN  01/19/20 1957

## 2020-01-21 LAB
B BURGDORFERI AB,CSF: 0.18 LIV
HERPES SIMPLEX VIRUS BY PCR: NOT DETECTED
HSV SOURCE: NORMAL
VDRL CSF SCREEN: NON REACTIVE

## 2020-01-22 LAB
GLUCOSE BLD-MCNC: 125 MG/DL (ref 70–99)
PERFORMED ON: ABNORMAL
WEST NILE IGG, CSF: 0.15 IV
WEST NILE IGM ANTIBODY CSF: 0 IV

## 2020-01-23 LAB
BLOOD CULTURE, ROUTINE: NORMAL
CULTURE, BLOOD 2: NORMAL

## 2020-01-26 LAB
CSF CULTURE: NORMAL
GRAM STAIN RESULT: NORMAL

## 2020-09-23 ENCOUNTER — APPOINTMENT (OUTPATIENT)
Dept: CT IMAGING | Age: 30
End: 2020-09-23
Payer: MEDICARE

## 2020-09-23 ENCOUNTER — HOSPITAL ENCOUNTER (EMERGENCY)
Age: 30
Discharge: ANOTHER HOSPITAL | End: 2020-09-23
Attending: EMERGENCY MEDICINE
Payer: MEDICARE

## 2020-09-23 VITALS
BODY MASS INDEX: 17.5 KG/M2 | RESPIRATION RATE: 17 BRPM | SYSTOLIC BLOOD PRESSURE: 104 MMHG | OXYGEN SATURATION: 94 % | DIASTOLIC BLOOD PRESSURE: 77 MMHG | TEMPERATURE: 99 F | WEIGHT: 125 LBS | HEIGHT: 71 IN | HEART RATE: 99 BPM

## 2020-09-23 LAB
A/G RATIO: 1.6 (ref 1.1–2.2)
ALBUMIN SERPL-MCNC: 4.5 G/DL (ref 3.4–5)
ALP BLD-CCNC: 107 U/L (ref 40–129)
ALT SERPL-CCNC: 32 U/L (ref 10–40)
ANION GAP SERPL CALCULATED.3IONS-SCNC: 24 MMOL/L (ref 3–16)
AST SERPL-CCNC: 34 U/L (ref 15–37)
BANDED NEUTROPHILS RELATIVE PERCENT: 1 % (ref 0–7)
BASOPHILS ABSOLUTE: 0 K/UL (ref 0–0.2)
BASOPHILS RELATIVE PERCENT: 0 %
BILIRUB SERPL-MCNC: <0.2 MG/DL (ref 0–1)
BUN BLDV-MCNC: 12 MG/DL (ref 7–20)
CALCIUM SERPL-MCNC: 9.7 MG/DL (ref 8.3–10.6)
CHLORIDE BLD-SCNC: 100 MMOL/L (ref 99–110)
CO2: 13 MMOL/L (ref 21–32)
CREAT SERPL-MCNC: 1 MG/DL (ref 0.9–1.3)
EKG ATRIAL RATE: 92 BPM
EKG DIAGNOSIS: NORMAL
EKG P AXIS: 58 DEGREES
EKG P-R INTERVAL: 160 MS
EKG Q-T INTERVAL: 376 MS
EKG QRS DURATION: 84 MS
EKG QTC CALCULATION (BAZETT): 464 MS
EKG R AXIS: 53 DEGREES
EKG T AXIS: 81 DEGREES
EKG VENTRICULAR RATE: 92 BPM
EOSINOPHILS ABSOLUTE: 0.5 K/UL (ref 0–0.6)
EOSINOPHILS RELATIVE PERCENT: 3 %
GFR AFRICAN AMERICAN: >60
GFR NON-AFRICAN AMERICAN: >60
GLOBULIN: 2.8 G/DL
GLUCOSE BLD-MCNC: 183 MG/DL (ref 70–99)
GLUCOSE BLD-MCNC: 225 MG/DL (ref 70–99)
HCT VFR BLD CALC: 43.3 % (ref 40.5–52.5)
HEMATOLOGY PATH CONSULT: YES
HEMOGLOBIN: 14.4 G/DL (ref 13.5–17.5)
LACTIC ACID, SEPSIS: 12.6 MMOL/L (ref 0.4–1.9)
LACTIC ACID, SEPSIS: 2.2 MMOL/L (ref 0.4–1.9)
LACTIC ACID: 1.2 MMOL/L (ref 0.4–2)
LYMPHOCYTES ABSOLUTE: 9 K/UL (ref 1–5.1)
LYMPHOCYTES RELATIVE PERCENT: 57 %
MCH RBC QN AUTO: 31.3 PG (ref 26–34)
MCHC RBC AUTO-ENTMCNC: 33.3 G/DL (ref 31–36)
MCV RBC AUTO: 94 FL (ref 80–100)
MONOCYTES ABSOLUTE: 0.3 K/UL (ref 0–1.3)
MONOCYTES RELATIVE PERCENT: 2 %
NEUTROPHILS ABSOLUTE: 6 K/UL (ref 1.7–7.7)
NEUTROPHILS RELATIVE PERCENT: 37 %
PDW BLD-RTO: 13.7 % (ref 12.4–15.4)
PERFORMED ON: ABNORMAL
PLATELET # BLD: 245 K/UL (ref 135–450)
PMV BLD AUTO: 7.8 FL (ref 5–10.5)
POTASSIUM SERPL-SCNC: 4.5 MMOL/L (ref 3.5–5.1)
RBC # BLD: 4.61 M/UL (ref 4.2–5.9)
SLIDE REVIEW: ABNORMAL
SODIUM BLD-SCNC: 137 MMOL/L (ref 136–145)
TOTAL PROTEIN: 7.3 G/DL (ref 6.4–8.2)
TROPONIN: 0.01 NG/ML
WBC # BLD: 15.8 K/UL (ref 4–11)

## 2020-09-23 PROCEDURE — 6360000002 HC RX W HCPCS

## 2020-09-23 PROCEDURE — 96375 TX/PRO/DX INJ NEW DRUG ADDON: CPT

## 2020-09-23 PROCEDURE — 80183 DRUG SCRN QUANT OXCARBAZEPIN: CPT

## 2020-09-23 PROCEDURE — 2580000003 HC RX 258: Performed by: EMERGENCY MEDICINE

## 2020-09-23 PROCEDURE — 6370000000 HC RX 637 (ALT 250 FOR IP): Performed by: EMERGENCY MEDICINE

## 2020-09-23 PROCEDURE — 70486 CT MAXILLOFACIAL W/O DYE: CPT

## 2020-09-23 PROCEDURE — 96374 THER/PROPH/DIAG INJ IV PUSH: CPT

## 2020-09-23 PROCEDURE — 83605 ASSAY OF LACTIC ACID: CPT

## 2020-09-23 PROCEDURE — 93005 ELECTROCARDIOGRAM TRACING: CPT | Performed by: EMERGENCY MEDICINE

## 2020-09-23 PROCEDURE — 72125 CT NECK SPINE W/O DYE: CPT

## 2020-09-23 PROCEDURE — 96376 TX/PRO/DX INJ SAME DRUG ADON: CPT

## 2020-09-23 PROCEDURE — 93010 ELECTROCARDIOGRAM REPORT: CPT | Performed by: INTERNAL MEDICINE

## 2020-09-23 PROCEDURE — 99285 EMERGENCY DEPT VISIT HI MDM: CPT

## 2020-09-23 PROCEDURE — 80053 COMPREHEN METABOLIC PANEL: CPT

## 2020-09-23 PROCEDURE — 85025 COMPLETE CBC W/AUTO DIFF WBC: CPT

## 2020-09-23 PROCEDURE — 84484 ASSAY OF TROPONIN QUANT: CPT

## 2020-09-23 PROCEDURE — 70450 CT HEAD/BRAIN W/O DYE: CPT

## 2020-09-23 PROCEDURE — 6360000002 HC RX W HCPCS: Performed by: EMERGENCY MEDICINE

## 2020-09-23 RX ORDER — ONDANSETRON 2 MG/ML
4 INJECTION INTRAMUSCULAR; INTRAVENOUS ONCE
Status: COMPLETED | OUTPATIENT
Start: 2020-09-23 | End: 2020-09-23

## 2020-09-23 RX ORDER — 0.9 % SODIUM CHLORIDE 0.9 %
1000 INTRAVENOUS SOLUTION INTRAVENOUS ONCE
Status: COMPLETED | OUTPATIENT
Start: 2020-09-23 | End: 2020-09-23

## 2020-09-23 RX ORDER — LORAZEPAM 2 MG/ML
1 INJECTION INTRAMUSCULAR ONCE
Status: COMPLETED | OUTPATIENT
Start: 2020-09-23 | End: 2020-09-23

## 2020-09-23 RX ORDER — MIDAZOLAM HYDROCHLORIDE 1 MG/ML
INJECTION INTRAMUSCULAR; INTRAVENOUS
Status: COMPLETED
Start: 2020-09-23 | End: 2020-09-23

## 2020-09-23 RX ORDER — METHADONE HYDROCHLORIDE 10 MG/ML
105 CONCENTRATE ORAL DAILY
COMMUNITY

## 2020-09-23 RX ORDER — ASPIRIN 81 MG/1
81 TABLET, CHEWABLE ORAL
COMMUNITY
Start: 2020-01-21

## 2020-09-23 RX ORDER — MIDAZOLAM HYDROCHLORIDE 1 MG/ML
2 INJECTION INTRAMUSCULAR; INTRAVENOUS ONCE
Status: COMPLETED | OUTPATIENT
Start: 2020-09-23 | End: 2020-09-23

## 2020-09-23 RX ORDER — OXCARBAZEPINE 300 MG/1
600 TABLET, FILM COATED ORAL 2 TIMES DAILY
COMMUNITY
Start: 2020-08-05 | End: 2021-03-29

## 2020-09-23 RX ORDER — LORAZEPAM 0.5 MG/1
0.5 TABLET ORAL 2 TIMES DAILY
COMMUNITY
Start: 2020-09-17 | End: 2021-03-29

## 2020-09-23 RX ORDER — ACETAMINOPHEN 325 MG/1
650 TABLET ORAL ONCE
Status: COMPLETED | OUTPATIENT
Start: 2020-09-23 | End: 2020-09-23

## 2020-09-23 RX ADMIN — SODIUM CHLORIDE 1000 ML: 9 INJECTION, SOLUTION INTRAVENOUS at 12:06

## 2020-09-23 RX ADMIN — LORAZEPAM 1 MG: 2 INJECTION, SOLUTION INTRAMUSCULAR; INTRAVENOUS at 13:08

## 2020-09-23 RX ADMIN — MIDAZOLAM HYDROCHLORIDE 2 MG: 1 INJECTION INTRAMUSCULAR; INTRAVENOUS at 13:47

## 2020-09-23 RX ADMIN — ONDANSETRON 4 MG: 2 INJECTION INTRAMUSCULAR; INTRAVENOUS at 17:16

## 2020-09-23 RX ADMIN — SODIUM CHLORIDE 1000 ML: 9 INJECTION, SOLUTION INTRAVENOUS at 17:16

## 2020-09-23 RX ADMIN — ACETAMINOPHEN 650 MG: 325 TABLET ORAL at 17:16

## 2020-09-23 RX ADMIN — MIDAZOLAM 2 MG: 1 INJECTION INTRAMUSCULAR; INTRAVENOUS at 13:47

## 2020-09-23 RX ADMIN — ONDANSETRON 4 MG: 2 INJECTION INTRAMUSCULAR; INTRAVENOUS at 13:07

## 2020-09-23 ASSESSMENT — PAIN SCALES - GENERAL: PAINLEVEL_OUTOF10: 10

## 2020-09-23 NOTE — ED NOTES
Patient becoming restless while in CT. Patient beginning to moan and trying to sit up while on CT table. Dr. Kristen Sage notified, see KRISTEL Briones RN  09/23/20 9294

## 2020-09-23 NOTE — ED NOTES
[facility] center @2957, [phone]  Re: transfer to [facility] neurology for seizures, existing pt of  neuro & family preference per   @5448 called [facility] to see if we could admit the pt to the hospitalist/intensivist @, they stated  protocol was for neuro to be re-paged @this time  @1625 called [facility] again for update on  neuro  @1630  ( neuro) returned call & spoke to   @[phone] Sudheer Watts from [facility] bed booking called with a room #4320, call report to [id number]  09/23/20 6541

## 2020-09-23 NOTE — ED NOTES
Patient O2 noted to be 85% on 5L NC. Patient placed on high flow nasal cannula with humidifier at 13L, O2 and is now at 92%. . Patient mother remains at bedside with patient. Patient is resting in bed with eyes closed.       Dre Trivedi RN  09/23/20 0355

## 2020-09-23 NOTE — ED PROVIDER NOTES
[facility]  ED  EMERGENCYDEPARTMENT ENCOUNTER      Pt Name: Ines Koroma  MRN: [id number]  Bell 1990  Date of evaluation: 9/23/2020  Ender Jimenez MD    [address] [place] [address]       Chief Complaint   Patient presents with    Seizures     found down having grand mal seizure upon ems arrival. per ems just had a seizure study this morning. 5mg versed IN at 1038, 2.5mg versed IV at 1057, and 2.5mg IV versed at 1109 per EMS. pt arrived to ED postictal.          HISTORY OF PRESENT ILLNESS   (Location/Symptom, Timing/Onset,Context/Setting, Quality, Duration, Modifying Factors, Severity)  Note limiting factors. Ines Koroma is a 27 y.o. male who presents to the emergency department for seizures. Per mom patient has had seizures since 2019 though he has not really been evaluated until recently for it. Patient had a procedure done to check his seizures last week with a took him off his medications and induced seizure down at [facility]. He was restarted on his medication, today however patient started to have a seizure this morning per witness patient sees back to back 5-6 times. Carlos A Sweeney and hit his face against the furniture. -EMS patient required multiple doses of Versed, 5 mg intranasally, 2.5 mg IV and 2.5 mg IV Versed. Per the report patient was seizing at least 31 minutes. HPI    Nursing Notes were reviewed. REVIEW OF SYSTEMS    (2-9 systems for level 4, 10 or more for level 5)     Review of Systems   Unable to perform ROS: Mental status change       Except as noted above the remainder of the review of systems was reviewedand negative.        PAST MEDICAL HISTORY     Past Medical History:   Diagnosis Date    Drug abuse ([facility]     NSTEMI (non-ST elevated myocardial infarction) ([facility]     Seizures ([facility]          SURGICAL HISTORY       Past Surgical History:   Procedure Laterality Date    HAND SURGERY      RIGHT HAND FINGERS INJURY, PARTIAL AMPUTATIONS         CURRENT MEDICATIONS on file       SCREENINGS             PHYSICAL EXAM    (up to 7 for level 4, 8 ormore for level 5)     ED Triage Vitals   BP Temp Temp src Pulse Resp SpO2 Height Weight   09/23/20 1131 09/23/20 1150 -- 09/23/20 1131 09/23/20 1131 09/23/20 1131 09/23/20 1150 09/23/20 1150   107/70 99 °F (37.2 °C)  88 28 94 % 5' 11\" (1.803 m) 125 lb (56.7 kg)       Physical Exam  Constitutional:       General: He is not in acute distress. Appearance: Normal appearance. He is well-developed. He is not ill-appearing or toxic-appearing. Comments: Sitting in bed comfortably, speaking in full sentences, following verbal commands appropriately. Not in acute distress     HENT:      Head: Normocephalic and atraumatic. Comments: Dried blood around teeth  Eyes:      Conjunctiva/sclera: Conjunctivae normal.      Pupils: Pupils are equal, round, and reactive to light. Neck:      Musculoskeletal: Normal range of motion and neck supple. Cardiovascular:      Rate and Rhythm: Regular rhythm. Tachycardia present. Heart sounds: Normal heart sounds. No murmur. No friction rub. No gallop. Pulmonary:      Effort: Pulmonary effort is normal. No respiratory distress. Breath sounds: Normal breath sounds. No decreased breath sounds, wheezing, rhonchi or rales. Abdominal:      General: Bowel sounds are normal. There is no distension. Palpations: Abdomen is soft. Tenderness: There is no abdominal tenderness. There is no guarding or rebound. Musculoskeletal: Normal range of motion. General: No tenderness or deformity. Skin:     General: Skin is warm and dry. Findings: No rash. Neurological:      Mental Status: He is alert. GCS: GCS eye subscore is 4. GCS verbal subscore is 5. GCS motor subscore is 6. Psychiatric:         Behavior: Behavior is cooperative.          DIAGNOSTIC RESULTS     EKG: All EKG's are interpreted by the Emergency Department Physicianwho either signs or Co-signs this chart in the absence of a cardiologist.    The Ekg interpreted by me shows  normal sinus rhythm with a rate of 92  Axis is   Normal  QTc is  464  Intervals and Durations are unremarkable. ST Segments: nonspecific changes  No significant change from prior EKG dated 1/19/20    RADIOLOGY:   Non-plain film images such as CT, Ultrasound and MRI are read by the radiologist. Plain radiographic images are visualized and preliminarily interpreted by the emergency physician with the below findings:      Interpretation per the Radiologist below, if available at the time of this note:    CT Cervical Spine WO Contrast   Final Result   No acute intracranial abnormality. No acute fracture or subluxation of the cervical spine. No acute facial fractures. CT Head WO Contrast   Final Result   No acute intracranial abnormality. No acute fracture or subluxation of the cervical spine. No acute facial fractures. CT FACIAL BONES WO CONTRAST   Final Result   No acute intracranial abnormality. No acute fracture or subluxation of the cervical spine. No acute facial fractures.                ED BEDSIDE ULTRASOUND:   Performed by ED Physician - none    LABS:  Labs Reviewed   CBC WITH AUTO DIFFERENTIAL - Abnormal; Notable for the following components:       Result Value    WBC 15.8 (*)     Lymphocytes Absolute 9.0 (*)     All other components within normal limits    Narrative:     Performed at:  [address]   Phone [phone]   COMPREHENSIVE METABOLIC PANEL - Abnormal; Notable for the following components:    CO2 13 (*)     Anion Gap 24 (*)     Glucose 225 (*)     All other components within normal limits    Narrative:     Billy Vazquez tel. [phone],  Chemistry results called to and read back by JESSICA Barraza, 09/23/2020  12:20, by Brenda Best  Performed at:  [facility]  [address] [place] [address], [place], [address] [address]   Phone [phone]   LACTATE, SEPSIS - Abnormal; Notable for the following components:    Lactic Acid, Sepsis 12.6 (*)     All other components within normal limits    Narrative:     Elina Beatty tel. [phone],  Chemistry results called to and read back by JESSICA Leyva, 09/23/2020  12:21, by Gaurang Soto  Performed at:  [address],  [place], [address]   Phone [phone]   LACTATE, SEPSIS - Abnormal; Notable for the following components:    Lactic Acid, Sepsis 2.2 (*)     All other components within normal limits    Narrative:     Performed at:  [address],  [place], [address] [address]   Phone [phone]   POCT GLUCOSE - Abnormal; Notable for the following components:    POC Glucose 183 (*)     All other components within normal limits    Narrative:     Performed at:  [address],  [place], [address] [address]   Phone [phone]   TROPONIN    Narrative:     Elina Beatty tel. [phone],  Chemistry results called to and read back by JESSICA Leyva, 09/23/2020  12:20, by Gaurang Soto  Performed at:  [address],  [place], [address] [address]   Phone [phone]   OXCARBAZEPINE LEVEL   LACTIC ACID, PLASMA   URINE DRUG SCREEN   URINALYSIS       All other labs were within normal range ornot returned as of this dictation. EMERGENCY DEPARTMENT COURSE and DIFFERENTIAL DIAGNOSIS/MDM:   Vitals:    Vitals:    09/23/20 1241 09/23/20 1310 09/23/20 1336 09/23/20 1405   BP: 92/63 96/72 98/69 100/71   Pulse: 77 67 77 79   Resp: (!) 32 16 20 20   Temp:       SpO2: 93% 99% 100% 100%   Weight:       Height:             MDM    ED COURSE/MDM    -Barrington Beasley is a 27 y.o. male with a history of an STEMI, seizure disorder, drug abuse who presents ED for multiple seizures.   Per EMS patient had to be given total of 10 mg Versed over a span of 30 minutes before seizure stopped. Mom states that it was witnessed seizure and he had several about 5-6 back-to-back. Upon arrival patient was on 15 L nonrebreather and unresponsive.  -Lab work was significant for elevated anion gap, increased lactic acidosis of 12.6, leukocytosis of 15.8.  1 L IV fluid bolus, repeat lactic was 2.2.  -CT head shows no acute intracranial abnormality. -CT C-spine shows no fracture or subluxation of cervical spine. -CT face shows  no acute facial fractures.  -Plan patient was taken back to the [facility], he did come around however was still not following commands and moving around in the CT he was given 1 mg of Ativan. However he was still moving around and was given a dose of 2 mg Versed. -After talking to mom, she states that she would rather patient be admitted to [facility] as well as neurologist is.   -Consulted  neurology however it took some time to get in touch with the neurology service. Attempted to call hospitalist however they state that the protocol was to called neurologist.    -Neurology service called, spoke to Dr. Kiko Sapp who agreed to except patient, requested that the scans be pushed over to . Asked about his respiratory status, informed him that patient was still on 6 L at the time, we were slowly trying to de-escalate him from the 15 L nonrebreather. Could not assess neurologic status as patient was still sedated. -He finally started to come to awakening to name calling and around 1630, patient open his eyes. He drank some water. He states that he \"feels like shit\" and is nauseous.  -Was given 4 mg of Zofran and taken off supplemental oxygen maintaining airway.  -Plan for admission transferred to  for further workup and treatment discussed with patient and family. Patient is unhappy that he is in the hospital but is agreeable to getting transferred.   Patient and family in agreement with plan and have nofurther

## 2020-09-23 NOTE — ED NOTES
Patient being brought back to room by CT. Radiology stated that they were unable to obtain CT's because patient awoke and was sitting up in bed and would not sit still for CT. Upon arrival back into room patient was leaning onto right side and was not following commands or responding to questions. Dr. Kalyn Lau notified of patients current state and new orders were received. See MAR. Pt now resting in bed with eyes closed. Patients mother remains at bedside. CT notified that patient is ready for scans.       Enriqueta Milligan RN  09/23/20 0497

## 2020-09-23 NOTE — ED NOTES
Patient complaining of headache 10/10 on pain scale. Received VO from  for tylenol 650mg PO.      Elisabet Mcmahon RN  09/23/20 9456

## 2020-09-23 NOTE — ED NOTES
Report called to  and spoke to [place], RN who will be taking over care for patient.  Informed Cindy that [employer] ETA is Michelle Diaz 67., RN  09/23/20 3232

## 2020-09-23 NOTE — ED NOTES
Pt resting comfortably in bed at this time with eyes closed. No distress noted. Called CT dept and informed that pt is ready for CT.       Amber Montelongo RN  09/23/20 1178

## 2020-09-23 NOTE — ED NOTES
Report given to [facility]. Patient left unit @ [phone].      Gurwinder Cox RN  09/23/20 5721

## 2020-09-24 LAB
HEMATOLOGY PATH CONSULT: NORMAL
OXCARBAZEPINE: 5 UG/ML (ref 3–35)

## 2021-01-24 ENCOUNTER — HOSPITAL ENCOUNTER (EMERGENCY)
Age: 31
Discharge: HOME | End: 2021-01-24
Payer: MEDICARE

## 2021-01-24 ENCOUNTER — APPOINTMENT (OUTPATIENT)
Dept: CT IMAGING | Age: 31
End: 2021-01-24
Payer: MEDICARE

## 2021-01-24 ENCOUNTER — APPOINTMENT (OUTPATIENT)
Dept: GENERAL RADIOLOGY | Age: 31
End: 2021-01-24
Payer: MEDICARE

## 2021-01-24 VITALS
SYSTOLIC BLOOD PRESSURE: 136 MMHG | OXYGEN SATURATION: 98 % | TEMPERATURE: 98 F | BODY MASS INDEX: 18.2 KG/M2 | HEIGHT: 71 IN | WEIGHT: 130 LBS | DIASTOLIC BLOOD PRESSURE: 89 MMHG | RESPIRATION RATE: 16 BRPM | HEART RATE: 78 BPM

## 2021-01-24 DIAGNOSIS — J18.9 PNEUMONIA DUE TO ORGANISM: Primary | ICD-10-CM

## 2021-01-24 LAB
A/G RATIO: 1.5 (ref 1.1–2.2)
ALBUMIN SERPL-MCNC: 4.8 G/DL (ref 3.4–5)
ALP BLD-CCNC: 115 U/L (ref 40–129)
ALT SERPL-CCNC: 23 U/L (ref 10–40)
ANION GAP SERPL CALCULATED.3IONS-SCNC: 8 MMOL/L (ref 3–16)
AST SERPL-CCNC: 28 U/L (ref 15–37)
BASOPHILS ABSOLUTE: 0.1 K/UL (ref 0–0.2)
BASOPHILS RELATIVE PERCENT: 0.5 %
BILIRUB SERPL-MCNC: 0.4 MG/DL (ref 0–1)
BUN BLDV-MCNC: 14 MG/DL (ref 7–20)
CALCIUM SERPL-MCNC: 9.1 MG/DL (ref 8.3–10.6)
CHLORIDE BLD-SCNC: 96 MMOL/L (ref 99–110)
CO2: 25 MMOL/L (ref 21–32)
CREAT SERPL-MCNC: 0.8 MG/DL (ref 0.9–1.3)
EKG ATRIAL RATE: 73 BPM
EKG DIAGNOSIS: NORMAL
EKG P AXIS: 61 DEGREES
EKG P-R INTERVAL: 148 MS
EKG Q-T INTERVAL: 388 MS
EKG QRS DURATION: 72 MS
EKG QTC CALCULATION (BAZETT): 427 MS
EKG R AXIS: 71 DEGREES
EKG T AXIS: 64 DEGREES
EKG VENTRICULAR RATE: 73 BPM
EOSINOPHILS ABSOLUTE: 0.2 K/UL (ref 0–0.6)
EOSINOPHILS RELATIVE PERCENT: 0.7 %
GFR AFRICAN AMERICAN: >60
GFR NON-AFRICAN AMERICAN: >60
GLOBULIN: 3.1 G/DL
GLUCOSE BLD-MCNC: 114 MG/DL (ref 70–99)
HCT VFR BLD CALC: 42.3 % (ref 40.5–52.5)
HEMOGLOBIN: 14.3 G/DL (ref 13.5–17.5)
LACTIC ACID: 0.7 MMOL/L (ref 0.4–2)
LYMPHOCYTES ABSOLUTE: 2.8 K/UL (ref 1–5.1)
LYMPHOCYTES RELATIVE PERCENT: 11.3 %
MCH RBC QN AUTO: 30.9 PG (ref 26–34)
MCHC RBC AUTO-ENTMCNC: 33.8 G/DL (ref 31–36)
MCV RBC AUTO: 91.6 FL (ref 80–100)
MONOCYTES ABSOLUTE: 1.7 K/UL (ref 0–1.3)
MONOCYTES RELATIVE PERCENT: 6.9 %
NEUTROPHILS ABSOLUTE: 19.8 K/UL (ref 1.7–7.7)
NEUTROPHILS RELATIVE PERCENT: 80.6 %
PDW BLD-RTO: 13.2 % (ref 12.4–15.4)
PLATELET # BLD: 224 K/UL (ref 135–450)
PMV BLD AUTO: 7.1 FL (ref 5–10.5)
POTASSIUM SERPL-SCNC: 4.5 MMOL/L (ref 3.5–5.1)
RBC # BLD: 4.62 M/UL (ref 4.2–5.9)
SARS-COV-2, NAAT: NOT DETECTED
SODIUM BLD-SCNC: 129 MMOL/L (ref 136–145)
TOTAL PROTEIN: 7.9 G/DL (ref 6.4–8.2)
TROPONIN: <0.01 NG/ML
WBC # BLD: 24.6 K/UL (ref 4–11)

## 2021-01-24 PROCEDURE — 2580000003 HC RX 258: Performed by: NURSE PRACTITIONER

## 2021-01-24 PROCEDURE — 93005 ELECTROCARDIOGRAM TRACING: CPT

## 2021-01-24 PROCEDURE — 6370000000 HC RX 637 (ALT 250 FOR IP): Performed by: NURSE PRACTITIONER

## 2021-01-24 PROCEDURE — U0002 COVID-19 LAB TEST NON-CDC: HCPCS

## 2021-01-24 PROCEDURE — 83605 ASSAY OF LACTIC ACID: CPT

## 2021-01-24 PROCEDURE — 71045 X-RAY EXAM CHEST 1 VIEW: CPT

## 2021-01-24 PROCEDURE — 99284 EMERGENCY DEPT VISIT MOD MDM: CPT

## 2021-01-24 PROCEDURE — 80053 COMPREHEN METABOLIC PANEL: CPT

## 2021-01-24 PROCEDURE — 93010 ELECTROCARDIOGRAM REPORT: CPT | Performed by: INTERNAL MEDICINE

## 2021-01-24 PROCEDURE — 71260 CT THORAX DX C+: CPT

## 2021-01-24 PROCEDURE — 84484 ASSAY OF TROPONIN QUANT: CPT

## 2021-01-24 PROCEDURE — 85025 COMPLETE CBC W/AUTO DIFF WBC: CPT

## 2021-01-24 PROCEDURE — 94640 AIRWAY INHALATION TREATMENT: CPT

## 2021-01-24 PROCEDURE — 6360000004 HC RX CONTRAST MEDICATION: Performed by: NURSE PRACTITIONER

## 2021-01-24 PROCEDURE — 87040 BLOOD CULTURE FOR BACTERIA: CPT

## 2021-01-24 PROCEDURE — 96374 THER/PROPH/DIAG INJ IV PUSH: CPT

## 2021-01-24 PROCEDURE — 6360000002 HC RX W HCPCS: Performed by: NURSE PRACTITIONER

## 2021-01-24 RX ORDER — IBUPROFEN 600 MG/1
600 TABLET ORAL 3 TIMES DAILY PRN
Qty: 30 TABLET | Refills: 0 | Status: ON HOLD | OUTPATIENT
Start: 2021-01-24 | End: 2021-02-27 | Stop reason: HOSPADM

## 2021-01-24 RX ORDER — IPRATROPIUM BROMIDE AND ALBUTEROL SULFATE 2.5; .5 MG/3ML; MG/3ML
1 SOLUTION RESPIRATORY (INHALATION) ONCE
Status: COMPLETED | OUTPATIENT
Start: 2021-01-24 | End: 2021-01-24

## 2021-01-24 RX ORDER — BENZONATATE 100 MG/1
100 CAPSULE ORAL 3 TIMES DAILY PRN
Qty: 30 CAPSULE | Refills: 0 | Status: SHIPPED | OUTPATIENT
Start: 2021-01-24 | End: 2021-01-31

## 2021-01-24 RX ORDER — ALBUTEROL SULFATE 90 UG/1
2 AEROSOL, METERED RESPIRATORY (INHALATION) 4 TIMES DAILY PRN
Qty: 1 INHALER | Refills: 0 | Status: SHIPPED | OUTPATIENT
Start: 2021-01-24 | End: 2021-03-11

## 2021-01-24 RX ORDER — DOXYCYCLINE 100 MG/1
100 TABLET ORAL 2 TIMES DAILY
Qty: 20 TABLET | Refills: 0 | Status: SHIPPED | OUTPATIENT
Start: 2021-01-24 | End: 2021-02-03

## 2021-01-24 RX ORDER — DOXYCYCLINE HYCLATE 100 MG
100 TABLET ORAL ONCE
Status: COMPLETED | OUTPATIENT
Start: 2021-01-24 | End: 2021-01-24

## 2021-01-24 RX ORDER — 0.9 % SODIUM CHLORIDE 0.9 %
1000 INTRAVENOUS SOLUTION INTRAVENOUS ONCE
Status: COMPLETED | OUTPATIENT
Start: 2021-01-24 | End: 2021-01-24

## 2021-01-24 RX ORDER — KETOROLAC TROMETHAMINE 30 MG/ML
30 INJECTION, SOLUTION INTRAMUSCULAR; INTRAVENOUS ONCE
Status: COMPLETED | OUTPATIENT
Start: 2021-01-24 | End: 2021-01-24

## 2021-01-24 RX ADMIN — SODIUM CHLORIDE 1000 ML: 9 INJECTION, SOLUTION INTRAVENOUS at 11:43

## 2021-01-24 RX ADMIN — IOPAMIDOL 75 ML: 755 INJECTION, SOLUTION INTRAVENOUS at 11:05

## 2021-01-24 RX ADMIN — IPRATROPIUM BROMIDE AND ALBUTEROL SULFATE 1 AMPULE: .5; 3 SOLUTION RESPIRATORY (INHALATION) at 12:32

## 2021-01-24 RX ADMIN — KETOROLAC TROMETHAMINE 30 MG: 30 INJECTION, SOLUTION INTRAMUSCULAR at 11:43

## 2021-01-24 RX ADMIN — DOXYCYCLINE HYCLATE 100 MG: 100 TABLET, COATED ORAL at 13:53

## 2021-01-24 ASSESSMENT — PAIN DESCRIPTION - PAIN TYPE: TYPE: ACUTE PAIN

## 2021-01-24 ASSESSMENT — PAIN SCALES - GENERAL
PAINLEVEL_OUTOF10: 4
PAINLEVEL_OUTOF10: 8
PAINLEVEL_OUTOF10: 8

## 2021-01-24 NOTE — ED NOTES
Ambulated pt with portable SpO2 monitor. While resting pt had HR of 73 and SpO2 at 100%. While ambulating pt had HR of 85 and SpO2 at 100%. Pt was able to ambulate effectively without requiring any assistance. Pt did not have any complaints while ambulating.       Robby Leyden  01/24/21 7990

## 2021-01-24 NOTE — ED PROVIDER NOTES
[address] [facility] Emergency Department    CHIEF COMPLAINT  Chest Pain (right sided started yesterday 1500, hx of seizures and mi )      HISTORY OF PRESENT ILLNESS  Sabrina Almaraz is a 32 y.o. male who presents to the ED complaining of right-sided chest wall pain that started yesterday. Patient denies known injury or trauma. Patient reports that he was at rest when chest pain started. Patient reports chest pain has been constant since yesterday. Patient denies associated shortness of breath or pain with inspiration. Patient is a heavy cigarette smoker and reports he \"always has a cough\" this is unchanged. Patient denies hemoptysis. Patient denies headache, fever, chills, body aches, nasal congestion, dizziness, syncope, abdominal pain, nausea, vomiting, diarrhea, urinary complaints, leg swelling, calf pain, palpitations. Patient is a former IV drug user he is currently clean denies any drug or alcohol abuse other than methadone. Patient has a history of seizures and had an NSTEMI due to a seizure. No coronary artery disease or blockage found at that time. Patient reports his seizures are currently under control with medication. Patient denies recent travel, known sick contacts, known COVID-19 exposure. No other complaints, modifying factors or associated symptoms. Nursing notes reviewed. Past Medical History:   Diagnosis Date    Drug abuse ([address]     NSTEMI (non-ST elevated [facility] infarction) [facility]     Seizures ([facility]      Past Surgical History:   Procedure Laterality Date    HAND SURGERY      RIGHT HAND FINGERS INJURY, PARTIAL AMPUTATIONS     History reviewed. No pertinent family history.   Social History     Socioeconomic History    Marital status: Single     Spouse name: Not on file    Number of children: Not on file    Years of education: Not on file    Highest education level: Not on file   Occupational History    Not on file   Social Needs    Financial resource strain: Not on file    Food insecurity     Worry: Not on file     Inability: Not on file    Transportation needs     Medical: Not on file     Non-medical: Not on file   Tobacco Use    Smoking status: Current Every Day Smoker     Packs/day: 2.00     Types: Cigarettes    Smokeless tobacco: Never Used   Substance and Sexual Activity    Alcohol use: No    Drug use: Not Currently     Types: IV, Opiates      Comment: prior heroin user; last use 4 years ago per mom as of 9/23/20    Sexual activity: Not on file   Lifestyle    Physical activity     Days per week: Not on file     Minutes per session: Not on file    Stress: Not on file   Relationships    Social connections     Talks on phone: Not on file     Gets together: Not on file     Attends Orthodoxy service: Not on file     Active member of club or organization: Not on file     Attends meetings of clubs or organizations: Not on file     Relationship status: Not on file    Intimate partner violence     Fear of current or ex partner: Not on file     Emotionally abused: Not on file     Physically abused: Not on file     Forced sexual activity: Not on file   Other Topics Concern    Not on file   Social History Narrative    Not on file     No current facility-administered medications for this encounter. Current Outpatient Medications   Medication Sig Dispense Refill    doxycycline monohydrate (ADOXA) 100 MG tablet Take 1 tablet by mouth 2 times daily for 10 days May substitute another form of Doxycycline if insurance requires.  20 tablet 0    albuterol sulfate HFA (VENTOLIN HFA) 108 (90 Base) MCG/ACT inhaler Inhale 2 puffs into the lungs 4 times daily as needed for Wheezing 1 Inhaler 0    benzonatate (TESSALON) 100 MG capsule Take 1 capsule by mouth 3 times daily as needed for Cough 30 capsule 0    ibuprofen (ADVIL;MOTRIN) 600 MG tablet Take 1 tablet by mouth 3 times daily as needed for Pain 30 tablet 0    OXcarbazepine (TRILEPTAL) 300 MG tablet Take 600 mg by mouth 2 times daily      methadone (DOLOPHINE) 10 MG/ML solution Take 85 mg by mouth daily.  aspirin 81 MG chewable tablet Take 81 mg by mouth daily (with breakfast)      LORazepam (ATIVAN) 0.5 MG tablet Take 0.5 mg by mouth 2 times daily. No Known Allergies    REVIEW OF SYSTEMS  10 systems reviewed, pertinent positives per HPI otherwise noted to be negative    PHYSICAL EXAM  /89   Pulse 78   Temp 98 °F (36.7 °C) (Oral)   Resp 16   Ht 5' 11\" (1.803 m)   Wt 130 lb (59 kg)   SpO2 98%   BMI 18.13 kg/m²   GENERAL APPEARANCE: Awake and alert. Cooperative. No acute distress. Vital signs are stable. Well appearing and non toxic. HEAD: Normocephalic. Atraumatic. EYES: PERRL. EOM's grossly intact. ENT: Mucous membranes are moist.   NECK: Supple. Normal ROM. HEART: RRR. Distal pulses are equal and intact. Cap refill less than 2 seconds. LUNGS: Respirations unlabored. Good air exchange. Speaking comfortably in full sentences. Lung sounds are diminished with rhonchi and wheezing. No stridor or rales. Patient is not requiring supplemental oxygen. CHEST: Nontender  ABDOMEN: Soft. Non-distended. Non-tender. No guarding or rebound. No masses. No organomegaly. No rigidity. Bowel sounds are present. EXTREMITIES: No peripheral edema. Moves all extremities equally. All extremities neurovascularly intact. SKIN: Warm and dry. No acute rashes. NEUROLOGICAL: Alert and oriented. No gross facial drooping. Strength 5/5, sensation intact. PSYCHIATRIC: Normal mood and affect.     SCREENINGS    Xr Chest Portable    Result Date: 1/24/2021  EXAMINATION: ONE XRAY VIEW OF THE CHEST 1/24/2021 10:02 am COMPARISON: 01/19/2020 HISTORY: ORDERING SYSTEM PROVIDED HISTORY: cp TECHNOLOGIST PROVIDED HISTORY: Reason for exam:->cp Reason for Exam: Chest Pain (right sided started yesterday 1500, hx of seizures and mi ) Initial encounter FINDINGS: No focal consolidation, pleural effusion or pneumothorax. The cardiomediastinal silhouette is stable. No overt pulmonary edema. The osseous structures are stable. No acute cardiopulmonary findings. Ct Chest Pulmonary Embolism W Contrast    Result Date: 1/24/2021  EXAMINATION: CTA OF THE CHEST 1/24/2021 10:55 am TECHNIQUE: CTA of the chest was performed after the administration of intravenous contrast.  Multiplanar reformatted images are provided for review. MIP images are provided for review. Dose modulation, iterative reconstruction, and/or weight based adjustment of the mA/kV was utilized to reduce the radiation dose to as low as reasonably achievable. COMPARISON: None HISTORY: ORDERING SYSTEM PROVIDED HISTORY: chest pain, elevated wbc, abnormal lung sounds, history of ivdu currently sober TECHNOLOGIST PROVIDED HISTORY: Reason for exam:->chest pain, elevated wbc, abnormal lung sounds, history of ivdu currently sober Decision Support Exception->Emergency Medical Condition (MA) Reason for Exam: severe rt side chest pain since yesterday Acuity: Acute Type of Exam: Initial Relevant Medical/Surgical History: smoker x 15 yrs FINDINGS: Pulmonary Arteries: Pulmonary arteries are adequately opacified for evaluation. No evidence of intraluminal filling defect to suggest pulmonary embolism. Main pulmonary artery is normal in caliber. Mediastinum: The thoracic aorta is normal in course and caliber. The heart is not enlarged with no pericardial effusion. No pathologic hilar or mediastinal adenopathy. Lungs/pleura: There is a focal infiltrate in the medial aspect of the superior segment of the right lower lobe. No other infiltrate, consolidation or edema. No pleural fluid or pneumothorax. Inspissated secretions in left lower lobe bronchial tree. Upper Abdomen: Limited images of the upper abdomen are unremarkable. Soft Tissues/Bones: No acute bone or soft tissue abnormality.      1. Focal infiltrate in the superior segment right lower lobe medially consistent with pneumonia. 2. No evidence of pulmonary embolic disease. 3. Inspissated secretions in the left lower lobe bronchial tree. RADIOLOGY  Xr Chest Portable    Result Date: 1/24/2021  EXAMINATION: ONE XRAY VIEW OF THE CHEST 1/24/2021 10:02 am COMPARISON: 01/19/2020 HISTORY: ORDERING SYSTEM PROVIDED HISTORY: cp TECHNOLOGIST PROVIDED HISTORY: Reason for exam:->cp Reason for Exam: Chest Pain (right sided started yesterday 1500, hx of seizures and mi ) Initial encounter FINDINGS: No focal consolidation, pleural effusion or pneumothorax. The cardiomediastinal silhouette is stable. No overt pulmonary edema. The osseous structures are stable. No acute cardiopulmonary findings. Ct Chest Pulmonary Embolism W Contrast    Result Date: 1/24/2021  EXAMINATION: CTA OF THE CHEST 1/24/2021 10:55 am TECHNIQUE: CTA of the chest was performed after the administration of intravenous contrast.  Multiplanar reformatted images are provided for review. MIP images are provided for review. Dose modulation, iterative reconstruction, and/or weight based adjustment of the mA/kV was utilized to reduce the radiation dose to as low as reasonably achievable. COMPARISON: None HISTORY: ORDERING SYSTEM PROVIDED HISTORY: chest pain, elevated wbc, abnormal lung sounds, history of ivdu currently sober TECHNOLOGIST PROVIDED HISTORY: Reason for exam:->chest pain, elevated wbc, abnormal lung sounds, history of ivdu currently sober Decision Support Exception->Emergency Medical Condition (MA) Reason for Exam: severe rt side chest pain since yesterday Acuity: Acute Type of Exam: Initial Relevant Medical/Surgical History: smoker x 15 yrs FINDINGS: Pulmonary Arteries: Pulmonary arteries are adequately opacified for evaluation. No evidence of intraluminal filling defect to suggest pulmonary embolism. Main pulmonary artery is normal in caliber. Mediastinum: The thoracic aorta is normal in course and caliber.   The heart is not enlarged with no pericardial effusion. No pathologic hilar or mediastinal adenopathy. Lungs/pleura: There is a focal infiltrate in the medial aspect of the superior segment of the right lower lobe. No other infiltrate, consolidation or edema. No pleural fluid or pneumothorax. Inspissated secretions in left lower lobe bronchial tree. Upper Abdomen: Limited images of the upper abdomen are unremarkable. Soft Tissues/Bones: No acute bone or soft tissue abnormality. 1. Focal infiltrate in the superior segment right lower lobe medially consistent with pneumonia. 2. No evidence of pulmonary embolic disease. 3. Inspissated secretions in the left lower lobe bronchial tree. ED COURSE/MDM  Patient seen and evaluated. Old records reviewed. Diagnostic testing reviewed and results discussed. I have independently evaluated this patient based upon my scope of practice. Supervising physician was in the department for consultation as needed. Terrance Ng presented to the ED today with above noted complaints. Initial emergency department course CBC notable for leukocytosis of 24.6, no bandemia, no anemia. CMP shows a hyponatremia of 129 no other electrolyte abnormality. No acute kidney injury. LFTs unremarkable. Troponin less than 0.01. Lactic acid 0.7. Chest x-ray shows no acute cardiopulmonary findings. EKG interpreted by my attending physician, no ST elevation. CT of the chest is notable for focal infiltrate in the superior segment right lower lobe medially consistent with pneumonia. No evidence of pulmonary embolic disease. COVID-19 negative. Reevaluation patient symptoms improved. Patient ambulatory in the emergency department with an oxygen saturation above 92%. No respiratory distress. Patient initiated on antibiotic therapy for pneumonia. Strict return precautions discussed. Patient agreeable with this plan of care. Patient received toradol for pain, with good relief.      While in ED patient received   Medications   iopamidol (ISOVUE-370) 76 % injection 75 mL (75 mLs Intravenous Given 1/24/21 1105)   ipratropium-albuterol (DUONEB) nebulizer solution 1 ampule (1 ampule Inhalation Given 1/24/21 1232)   0.9 % sodium chloride bolus (0 mLs Intravenous Stopped 1/24/21 1343)   ketorolac (TORADOL) injection 30 mg (30 mg Intravenous Given 1/24/21 1143)   doxycycline hyclate (VIBRA-TABS) tablet 100 mg (100 mg Oral Given 1/24/21 1353)             At this point I do not feel the patient requires further work up and it is reasonable to discharge the patient. A discussion was had with the patient and/or their surrogate regarding diagnosis, diagnostic testing results, treatment/ plan of care, and follow up. There was shared decision-making between myself as well as the patient and/or their surrogate and we are all in agreement with discharge home. There was an opportunity for questions and all questions were answered to the best of my ability and to the satisfaction of the patient and/or patient family. Patient will follow up with pcp for further evaluation/treatment. The patient was given strict return precautions as we discussed symptoms that would necessitate return to the ED. Patient will return to ED for new/worsening symptoms. The patient verbalized their understanding and agreement with the above plan. Please refer to AVS for further details regarding discharge instructions.       Results for orders placed or performed during the hospital encounter of 01/24/21   CBC auto differential   Result Value Ref Range    WBC 24.6 (H) 4.0 - 11.0 K/uL    RBC 4.62 4.20 - 5.90 M/uL    Hemoglobin 14.3 13.5 - 17.5 g/dL    Hematocrit 42.3 40.5 - 52.5 %    MCV 91.6 80.0 - 100.0 fL    MCH 30.9 26.0 - 34.0 pg    MCHC 33.8 31.0 - 36.0 g/dL    RDW 13.2 12.4 - 15.4 %    Platelets 355 039 - 660 K/uL    MPV 7.1 5.0 - 10.5 fL    Neutrophils % 80.6 %    Lymphocytes % 11.3 %    Monocytes % 6.9 %    Eosinophils % 0.7 % Basophils % 0.5 %    Neutrophils Absolute 19.8 (H) 1.7 - 7.7 K/uL    Lymphocytes Absolute 2.8 1.0 - 5.1 K/uL    Monocytes Absolute 1.7 (H) 0.0 - 1.3 K/uL    Eosinophils Absolute 0.2 0.0 - 0.6 K/uL    Basophils Absolute 0.1 0.0 - 0.2 K/uL   Comprehensive metabolic panel   Result Value Ref Range    Sodium 129 (L) 136 - 145 mmol/L    Potassium 4.5 3.5 - 5.1 mmol/L    Chloride 96 (L) 99 - 110 mmol/L    CO2 25 21 - 32 mmol/L    Anion Gap 8 3 - 16    Glucose 114 (H) 70 - 99 mg/dL    BUN 14 7 - 20 mg/dL    CREATININE 0.8 (L) 0.9 - 1.3 mg/dL    GFR Non-African American >60 >60    GFR African American >60 >60    Calcium 9.1 8.3 - 10.6 mg/dL    Total Protein 7.9 6.4 - 8.2 g/dL    Alb 4.8 3.4 - 5.0 g/dL    Albumin/Globulin Ratio 1.5 1.1 - 2.2    Total Bilirubin 0.4 0.0 - 1.0 mg/dL    Alkaline Phosphatase 115 40 - 129 U/L    ALT 23 10 - 40 U/L    AST 28 15 - 37 U/L    Globulin 3.1 g/dL   Troponin   Result Value Ref Range    Troponin <0.01 <0.01 ng/mL   Lactic acid, plasma   Result Value Ref Range    Lactic Acid 0.7 0.4 - 2.0 mmol/L   COVID-19   Result Value Ref Range    SARS-CoV-2, NAAT Not Detected Not Detected   EKG 12 Lead   Result Value Ref Range    Ventricular Rate 73 BPM    Atrial Rate 73 BPM    P-R Interval 148 ms    QRS Duration 72 ms    Q-T Interval 388 ms    QTc Calculation (Bazett) 427 ms    P Axis 61 degrees    R Axis 71 degrees    T Axis 64 degrees    Diagnosis       Normal sinus rhythmPossible Left atrial enlargementBorderline ECGWhen compared with ECG of 23-SEP-2020 11:29,Inverted T waves have replaced nonspecific T wave abnormality in Anterior leadsT wave inversion no longer evident in Lateral leadsConfirmed by Uli Villanueva MD, [address] (1986) on 1/24/2021 2:23:05 PM       I estimate there is LOW risk for PULMONARY EMBOLISM, ACUTE CORONARY SYNDROME, OR THORACIC AORTIC DISSECTION, thus I consider the discharge disposition reasonable.  Mami Kang and I have discussed the diagnosis and risks, and we agree with discharging home to follow-up with their primary doctor. We also discussed returning to the Emergency Department immediately if new or worsening symptoms occur. We have discussed the symptoms which are most concerning (e.g., bloody sputum, fever, worsening pain or shortness of breath, vomiting) that necessitate immediate return. FINAL Impression    1. Pneumonia due to organism        Blood pressure 136/89, pulse 78, temperature 98 °F (36.7 °C), temperature source Oral, resp. rate 16, height 5' 11\" (1.803 m), weight 130 lb (59 kg), SpO2 98 %.mdm    Patient was sent home with a prescription for below medication/s. I did [place] patient on appropriate use of these medication. Discharge Medication List as of 1/24/2021  1:43 PM      START taking these medications    Details   doxycycline monohydrate (ADOXA) 100 MG tablet Take 1 tablet by mouth 2 times daily for 10 days May substitute another form of Doxycycline if insurance requires. , Disp-20 tablet, R-0Print      albuterol sulfate HFA (VENTOLIN HFA) 108 (90 Base) MCG/ACT inhaler Inhale 2 puffs into the lungs 4 times daily as needed for Wheezing, Disp-1 Inhaler, R-0Print      benzonatate (TESSALON) 100 MG capsule Take 1 capsule by mouth 3 times daily as needed for Cough, Disp-30 capsule, R-0Print      ibuprofen (ADVIL;MOTRIN) 600 MG tablet Take 1 tablet by mouth 3 times daily as needed for Pain, Disp-30 tablet, R-0Print                 FOLLOW UP  [facility]) Pre-Services  [address]  ED  [address]          DISPOSITION  Patient was discharged to home in good condition. Comment: Please note this report has been produced using speech recognition software and may contain errors related to that system including errors in grammar, punctuation, and spelling, as well as words and phrases that may be inappropriate.  If there are any questions or concerns please feel free to contact the dictating provider for clarification.             Kika Ramirez, APRN - CNP  01/24/21 7459

## 2021-01-24 NOTE — ED PROVIDER NOTES
I was not asked to see this patient. I was available for consultation if deemed necessary. I was only asked to interpret the EKG. Please see NP Jaida's note for care of the patient while in the emergency department and for final disposition. The Ekg interpreted by me shows  normal sinus rhythm with a rate of 73  Axis is   Normal  QTc is  normal  Intervals and Durations are unremarkable.       ST Segments: no acute change and nonspecific changes  No significant change from prior EKG dated - 9/23/20  No STEMI               Ericka Dumont MD  01/24/21 3623

## 2021-01-28 LAB
BLOOD CULTURE, ROUTINE: NORMAL
CULTURE, BLOOD 2: NORMAL

## 2021-02-21 ENCOUNTER — APPOINTMENT (OUTPATIENT)
Dept: CT IMAGING | Age: 31
DRG: 720 | End: 2021-02-21
Payer: MEDICARE

## 2021-02-21 ENCOUNTER — HOSPITAL ENCOUNTER (INPATIENT)
Age: 31
LOS: 5 days | Discharge: HOME | DRG: 720 | End: 2021-02-27
Attending: EMERGENCY MEDICINE | Admitting: INTERNAL MEDICINE
Payer: MEDICARE

## 2021-02-21 ENCOUNTER — APPOINTMENT (OUTPATIENT)
Dept: GENERAL RADIOLOGY | Age: 31
DRG: 720 | End: 2021-02-21
Payer: MEDICARE

## 2021-02-21 DIAGNOSIS — J96.01 ACUTE RESPIRATORY FAILURE WITH HYPOXIA (HCC): ICD-10-CM

## 2021-02-21 DIAGNOSIS — R41.82 ALTERED MENTAL STATUS, UNSPECIFIED ALTERED MENTAL STATUS TYPE: ICD-10-CM

## 2021-02-21 DIAGNOSIS — A41.9 SEPTICEMIA (HCC): ICD-10-CM

## 2021-02-21 DIAGNOSIS — I44.7 NEW ONSET LEFT BUNDLE BRANCH BLOCK (LBBB): ICD-10-CM

## 2021-02-21 DIAGNOSIS — G40.901 STATUS EPILEPTICUS (HCC): Primary | ICD-10-CM

## 2021-02-21 DIAGNOSIS — J69.0 ASPIRATION PNEUMONIA OF BOTH LUNGS, UNSPECIFIED ASPIRATION PNEUMONIA TYPE, UNSPECIFIED PART OF LUNG (HCC): ICD-10-CM

## 2021-02-21 LAB
A/G RATIO: 1.3 (ref 1.1–2.2)
ALBUMIN SERPL-MCNC: 4.9 G/DL (ref 3.4–5)
ALP BLD-CCNC: 139 U/L (ref 40–129)
ALT SERPL-CCNC: 44 U/L (ref 10–40)
AMPHETAMINE SCREEN, URINE: POSITIVE
ANION GAP SERPL CALCULATED.3IONS-SCNC: 30 MMOL/L (ref 3–16)
APTT: 21.4 SEC (ref 24.2–36.2)
AST SERPL-CCNC: 48 U/L (ref 15–37)
BARBITURATE SCREEN URINE: ABNORMAL
BASE EXCESS ARTERIAL: -13.6 MMOL/L (ref -3–3)
BENZODIAZEPINE SCREEN, URINE: POSITIVE
BILIRUB SERPL-MCNC: <0.2 MG/DL (ref 0–1)
BILIRUBIN URINE: NEGATIVE
BLOOD, URINE: ABNORMAL
BUN BLDV-MCNC: 10 MG/DL (ref 7–20)
CALCIUM SERPL-MCNC: 10.4 MG/DL (ref 8.3–10.6)
CANNABINOID SCREEN URINE: POSITIVE
CARBOXYHEMOGLOBIN ARTERIAL: 5.3 % (ref 0–1.5)
CHLORIDE BLD-SCNC: 93 MMOL/L (ref 99–110)
CLARITY: ABNORMAL
CO2: 12 MMOL/L (ref 21–32)
COCAINE METABOLITE SCREEN URINE: ABNORMAL
COLOR: YELLOW
CREAT SERPL-MCNC: 1 MG/DL (ref 0.9–1.3)
EPITHELIAL CELLS, UA: ABNORMAL /HPF (ref 0–5)
ETHANOL: NORMAL MG/DL (ref 0–0.08)
GFR AFRICAN AMERICAN: >60
GFR NON-AFRICAN AMERICAN: >60
GLOBULIN: 3.8 G/DL
GLUCOSE BLD-MCNC: 216 MG/DL (ref 70–99)
GLUCOSE URINE: NEGATIVE MG/DL
HCO3 ARTERIAL: 15.2 MMOL/L (ref 21–29)
HEMOGLOBIN, ART, EXTENDED: 14.9 G/DL (ref 13.5–17.5)
INR BLD: 1 (ref 0.86–1.14)
KETONES, URINE: NEGATIVE MG/DL
LACTIC ACID, SEPSIS: 18.3 MMOL/L (ref 0.4–1.9)
LEUKOCYTE ESTERASE, URINE: NEGATIVE
Lab: ABNORMAL
METHADONE SCREEN, URINE: POSITIVE
METHEMOGLOBIN ARTERIAL: 0.5 %
MICROSCOPIC EXAMINATION: YES
NITRITE, URINE: NEGATIVE
O2 CONTENT ARTERIAL: 21 ML/DL
O2 SAT, ARTERIAL: 99 %
O2 THERAPY: ABNORMAL
OPIATE SCREEN URINE: POSITIVE
OTHER OBSERVATIONS UA: ABNORMAL
OXYCODONE URINE: ABNORMAL
PCO2 ARTERIAL: 46 MMHG (ref 35–45)
PH ARTERIAL: 7.14 (ref 7.35–7.45)
PH UA: 7
PH UA: 7 (ref 5–8)
PHENCYCLIDINE SCREEN URINE: ABNORMAL
PO2 ARTERIAL: 487.4 MMHG (ref 75–108)
POTASSIUM REFLEX MAGNESIUM: 4.8 MMOL/L (ref 3.5–5.1)
PROPOXYPHENE SCREEN: ABNORMAL
PROTEIN UA: 100 MG/DL
PROTHROMBIN TIME: 11.6 SEC (ref 10–13.2)
RBC UA: ABNORMAL /HPF (ref 0–4)
REASON FOR REJECTION: NORMAL
REJECTED TEST: NORMAL
SODIUM BLD-SCNC: 135 MMOL/L (ref 136–145)
SPECIFIC GRAVITY UA: >=1.03 (ref 1–1.03)
TCO2 ARTERIAL: 16.6 MMOL/L
TOTAL PROTEIN: 8.7 G/DL (ref 6.4–8.2)
TROPONIN: <0.01 NG/ML
URINE REFLEX TO CULTURE: ABNORMAL
URINE TYPE: ABNORMAL
UROBILINOGEN, URINE: 0.2 E.U./DL
WBC UA: ABNORMAL /HPF (ref 0–5)

## 2021-02-21 PROCEDURE — 36556 INSERT NON-TUNNEL CV CATH: CPT

## 2021-02-21 PROCEDURE — 85610 PROTHROMBIN TIME: CPT

## 2021-02-21 PROCEDURE — 82803 BLOOD GASES ANY COMBINATION: CPT

## 2021-02-21 PROCEDURE — 84100 ASSAY OF PHOSPHORUS: CPT

## 2021-02-21 PROCEDURE — 83605 ASSAY OF LACTIC ACID: CPT

## 2021-02-21 PROCEDURE — 81001 URINALYSIS AUTO W/SCOPE: CPT

## 2021-02-21 PROCEDURE — 99291 CRITICAL CARE FIRST HOUR: CPT

## 2021-02-21 PROCEDURE — 96368 THER/DIAG CONCURRENT INF: CPT

## 2021-02-21 PROCEDURE — 31500 INSERT EMERGENCY AIRWAY: CPT

## 2021-02-21 PROCEDURE — 87635 SARS-COV-2 COVID-19 AMP PRB: CPT

## 2021-02-21 PROCEDURE — 71045 X-RAY EXAM CHEST 1 VIEW: CPT

## 2021-02-21 PROCEDURE — 70450 CT HEAD/BRAIN W/O DYE: CPT

## 2021-02-21 PROCEDURE — 80053 COMPREHEN METABOLIC PANEL: CPT

## 2021-02-21 PROCEDURE — 5A1945Z RESPIRATORY VENTILATION, 24-96 CONSECUTIVE HOURS: ICD-10-PCS | Performed by: EMERGENCY MEDICINE

## 2021-02-21 PROCEDURE — 6360000002 HC RX W HCPCS: Performed by: EMERGENCY MEDICINE

## 2021-02-21 PROCEDURE — 82077 ASSAY SPEC XCP UR&BREATH IA: CPT

## 2021-02-21 PROCEDURE — 2500000003 HC RX 250 WO HCPCS: Performed by: EMERGENCY MEDICINE

## 2021-02-21 PROCEDURE — 85730 THROMBOPLASTIN TIME PARTIAL: CPT

## 2021-02-21 PROCEDURE — 83735 ASSAY OF MAGNESIUM: CPT

## 2021-02-21 PROCEDURE — 93005 ELECTROCARDIOGRAM TRACING: CPT | Performed by: EMERGENCY MEDICINE

## 2021-02-21 PROCEDURE — 6360000004 HC RX CONTRAST MEDICATION: Performed by: EMERGENCY MEDICINE

## 2021-02-21 PROCEDURE — 85025 COMPLETE CBC W/AUTO DIFF WBC: CPT

## 2021-02-21 PROCEDURE — 71260 CT THORAX DX C+: CPT

## 2021-02-21 PROCEDURE — 0BH17EZ INSERTION OF ENDOTRACHEAL AIRWAY INTO TRACHEA, VIA NATURAL OR ARTIFICIAL OPENING: ICD-10-PCS | Performed by: EMERGENCY MEDICINE

## 2021-02-21 PROCEDURE — 84484 ASSAY OF TROPONIN QUANT: CPT

## 2021-02-21 PROCEDURE — 80307 DRUG TEST PRSMV CHEM ANLYZR: CPT

## 2021-02-21 PROCEDURE — 87040 BLOOD CULTURE FOR BACTERIA: CPT

## 2021-02-21 PROCEDURE — 2580000003 HC RX 258: Performed by: EMERGENCY MEDICINE

## 2021-02-21 PROCEDURE — 36415 COLL VENOUS BLD VENIPUNCTURE: CPT

## 2021-02-21 PROCEDURE — 96375 TX/PRO/DX INJ NEW DRUG ADDON: CPT

## 2021-02-21 PROCEDURE — 96365 THER/PROPH/DIAG IV INF INIT: CPT

## 2021-02-21 PROCEDURE — 96376 TX/PRO/DX INJ SAME DRUG ADON: CPT

## 2021-02-21 PROCEDURE — 96367 TX/PROPH/DG ADDL SEQ IV INF: CPT

## 2021-02-21 RX ORDER — METOPROLOL TARTRATE 5 MG/5ML
5 INJECTION INTRAVENOUS ONCE
Status: COMPLETED | OUTPATIENT
Start: 2021-02-21 | End: 2021-02-21

## 2021-02-21 RX ORDER — ETOMIDATE 2 MG/ML
20 INJECTION INTRAVENOUS ONCE
Status: COMPLETED | OUTPATIENT
Start: 2021-02-21 | End: 2021-02-21

## 2021-02-21 RX ORDER — FENTANYL CITRATE 50 UG/ML
100 INJECTION, SOLUTION INTRAMUSCULAR; INTRAVENOUS ONCE
Status: COMPLETED | OUTPATIENT
Start: 2021-02-21 | End: 2021-02-21

## 2021-02-21 RX ORDER — NALOXONE HYDROCHLORIDE 1 MG/ML
2 INJECTION INTRAMUSCULAR; INTRAVENOUS; SUBCUTANEOUS ONCE
Status: COMPLETED | OUTPATIENT
Start: 2021-02-21 | End: 2021-02-21

## 2021-02-21 RX ORDER — ROCURONIUM BROMIDE 10 MG/ML
60 INJECTION, SOLUTION INTRAVENOUS ONCE
Status: COMPLETED | OUTPATIENT
Start: 2021-02-21 | End: 2021-02-21

## 2021-02-21 RX ORDER — MIDAZOLAM HYDROCHLORIDE 1 MG/ML
5 INJECTION INTRAMUSCULAR; INTRAVENOUS ONCE
Status: COMPLETED | OUTPATIENT
Start: 2021-02-21 | End: 2021-02-21

## 2021-02-21 RX ORDER — DEXMEDETOMIDINE HYDROCHLORIDE 4 UG/ML
.2-1.4 INJECTION, SOLUTION INTRAVENOUS CONTINUOUS
Status: DISCONTINUED | OUTPATIENT
Start: 2021-02-21 | End: 2021-02-23

## 2021-02-21 RX ORDER — PROPOFOL 10 MG/ML
50 INJECTION, EMULSION INTRAVENOUS ONCE
Status: COMPLETED | OUTPATIENT
Start: 2021-02-21 | End: 2021-02-21

## 2021-02-21 RX ORDER — 0.9 % SODIUM CHLORIDE 0.9 %
30 INTRAVENOUS SOLUTION INTRAVENOUS ONCE
Status: COMPLETED | OUTPATIENT
Start: 2021-02-21 | End: 2021-02-21

## 2021-02-21 RX ORDER — PROPOFOL 10 MG/ML
5-50 INJECTION, EMULSION INTRAVENOUS
Status: DISCONTINUED | OUTPATIENT
Start: 2021-02-21 | End: 2021-02-25

## 2021-02-21 RX ORDER — SUCCINYLCHOLINE CHLORIDE 20 MG/ML
120 INJECTION INTRAMUSCULAR; INTRAVENOUS ONCE
Status: COMPLETED | OUTPATIENT
Start: 2021-02-21 | End: 2021-02-21

## 2021-02-21 RX ADMIN — FENTANYL CITRATE 50 MCG/HR: 50 INJECTION, SOLUTION INTRAMUSCULAR; INTRAVENOUS at 23:18

## 2021-02-21 RX ADMIN — Medication 1.4 MCG/KG/HR: at 23:41

## 2021-02-21 RX ADMIN — IOPAMIDOL 75 ML: 755 INJECTION, SOLUTION INTRAVENOUS at 23:20

## 2021-02-21 RX ADMIN — PROPOFOL 15 MCG/KG/MIN: 10 INJECTION, EMULSION INTRAVENOUS at 21:57

## 2021-02-21 RX ADMIN — FENTANYL CITRATE 100 MCG: 50 INJECTION INTRAMUSCULAR; INTRAVENOUS at 23:21

## 2021-02-21 RX ADMIN — PROPOFOL 50 MG: 10 INJECTION, EMULSION INTRAVENOUS at 22:03

## 2021-02-21 RX ADMIN — SODIUM CHLORIDE 1770 ML: 9 INJECTION, SOLUTION INTRAVENOUS at 22:06

## 2021-02-21 RX ADMIN — METOPROLOL TARTRATE 5 MG: 5 INJECTION INTRAVENOUS at 23:49

## 2021-02-21 RX ADMIN — MIDAZOLAM HYDROCHLORIDE 5 MG: 2 INJECTION, SOLUTION INTRAMUSCULAR; INTRAVENOUS at 22:30

## 2021-02-21 RX ADMIN — ETOMIDATE 20 MG: 2 INJECTION INTRAVENOUS at 21:46

## 2021-02-21 RX ADMIN — LEVETIRACETAM 1000 MG: 100 INJECTION, SOLUTION INTRAVENOUS at 22:39

## 2021-02-21 RX ADMIN — MIDAZOLAM HYDROCHLORIDE 2 MG/HR: 5 INJECTION, SOLUTION INTRAMUSCULAR; INTRAVENOUS at 23:20

## 2021-02-21 RX ADMIN — FENTANYL CITRATE 100 MCG: 50 INJECTION, SOLUTION INTRAMUSCULAR; INTRAVENOUS at 22:16

## 2021-02-21 RX ADMIN — SUCCINYLCHOLINE CHLORIDE 120 MG: 20 INJECTION, SOLUTION INTRAMUSCULAR; INTRAVENOUS at 21:46

## 2021-02-21 RX ADMIN — ROCURONIUM BROMIDE 60 MG: 10 SOLUTION INTRAVENOUS at 22:39

## 2021-02-21 RX ADMIN — NALOXONE HYDROCHLORIDE 2 MG: 1 INJECTION PARENTERAL at 21:46

## 2021-02-21 RX ADMIN — MIDAZOLAM HYDROCHLORIDE 5 MG: 2 INJECTION, SOLUTION INTRAMUSCULAR; INTRAVENOUS at 22:07

## 2021-02-21 RX ADMIN — SODIUM BICARBONATE 50 MEQ: 84 INJECTION INTRAVENOUS at 22:52

## 2021-02-21 RX ADMIN — ROCURONIUM BROMIDE 60 MG: 10 SOLUTION INTRAVENOUS at 23:40

## 2021-02-22 ENCOUNTER — APPOINTMENT (OUTPATIENT)
Dept: GENERAL RADIOLOGY | Age: 31
DRG: 720 | End: 2021-02-22
Payer: MEDICARE

## 2021-02-22 PROBLEM — R56.9 SEIZURE (HCC): Status: ACTIVE | Noted: 2021-02-22

## 2021-02-22 LAB
A/G RATIO: 1.4 (ref 1.1–2.2)
ALBUMIN SERPL-MCNC: 3.7 G/DL (ref 3.4–5)
ALP BLD-CCNC: 106 U/L (ref 40–129)
ALT SERPL-CCNC: 27 U/L (ref 10–40)
ANION GAP SERPL CALCULATED.3IONS-SCNC: 7 MMOL/L (ref 3–16)
APTT: 80.6 SEC (ref 24.2–36.2)
AST SERPL-CCNC: 40 U/L (ref 15–37)
BANDED NEUTROPHILS RELATIVE PERCENT: 3 % (ref 0–7)
BASE EXCESS ARTERIAL: -4 (ref -3–3)
BASE EXCESS ARTERIAL: -8.7 MMOL/L (ref -3–3)
BASOPHILS ABSOLUTE: 0 K/UL (ref 0–0.2)
BASOPHILS ABSOLUTE: 0.2 K/UL (ref 0–0.2)
BASOPHILS RELATIVE PERCENT: 0 %
BASOPHILS RELATIVE PERCENT: 0.5 %
BILIRUB SERPL-MCNC: 0.3 MG/DL (ref 0–1)
BUN BLDV-MCNC: 12 MG/DL (ref 7–20)
CALCIUM IONIZED: 1.07 MMOL/L (ref 1.12–1.32)
CALCIUM SERPL-MCNC: 7.8 MG/DL (ref 8.3–10.6)
CARBOXYHEMOGLOBIN ARTERIAL: 0.5 % (ref 0–1.5)
CHLORIDE BLD-SCNC: 103 MMOL/L (ref 99–110)
CO2: 23 MMOL/L (ref 21–32)
CREAT SERPL-MCNC: 0.7 MG/DL (ref 0.9–1.3)
EKG ATRIAL RATE: 122 BPM
EKG ATRIAL RATE: 128 BPM
EKG ATRIAL RATE: 168 BPM
EKG ATRIAL RATE: 87 BPM
EKG DIAGNOSIS: NORMAL
EKG P AXIS: 43 DEGREES
EKG P AXIS: 53 DEGREES
EKG P AXIS: 69 DEGREES
EKG P AXIS: 79 DEGREES
EKG P-R INTERVAL: 112 MS
EKG P-R INTERVAL: 136 MS
EKG P-R INTERVAL: 156 MS
EKG P-R INTERVAL: 164 MS
EKG Q-T INTERVAL: 256 MS
EKG Q-T INTERVAL: 318 MS
EKG Q-T INTERVAL: 332 MS
EKG Q-T INTERVAL: 368 MS
EKG QRS DURATION: 128 MS
EKG QRS DURATION: 142 MS
EKG QRS DURATION: 88 MS
EKG QRS DURATION: 90 MS
EKG QTC CALCULATION (BAZETT): 427 MS
EKG QTC CALCULATION (BAZETT): 442 MS
EKG QTC CALCULATION (BAZETT): 453 MS
EKG QTC CALCULATION (BAZETT): 484 MS
EKG R AXIS: -59 DEGREES
EKG R AXIS: -65 DEGREES
EKG R AXIS: 63 DEGREES
EKG R AXIS: 79 DEGREES
EKG T AXIS: 100 DEGREES
EKG T AXIS: 109 DEGREES
EKG T AXIS: 63 DEGREES
EKG T AXIS: 92 DEGREES
EKG VENTRICULAR RATE: 122 BPM
EKG VENTRICULAR RATE: 128 BPM
EKG VENTRICULAR RATE: 168 BPM
EKG VENTRICULAR RATE: 87 BPM
EOSINOPHILS ABSOLUTE: 0 K/UL (ref 0–0.6)
EOSINOPHILS ABSOLUTE: 1.6 K/UL (ref 0–0.6)
EOSINOPHILS RELATIVE PERCENT: 0.1 %
EOSINOPHILS RELATIVE PERCENT: 5 %
GFR AFRICAN AMERICAN: >60
GFR NON-AFRICAN AMERICAN: >60
GLOBULIN: 2.7 G/DL
GLUCOSE BLD-MCNC: 101 MG/DL (ref 70–99)
GLUCOSE BLD-MCNC: 146 MG/DL (ref 70–99)
GLUCOSE BLD-MCNC: 182 MG/DL (ref 70–99)
HCO3 ARTERIAL: 18.5 MMOL/L (ref 21–29)
HCO3 ARTERIAL: 22.4 MMOL/L (ref 21–29)
HCT VFR BLD CALC: 42.8 % (ref 40.5–52.5)
HCT VFR BLD CALC: 47.1 % (ref 40.5–52.5)
HEMOGLOBIN, ART, EXTENDED: 15.5 G/DL (ref 13.5–17.5)
HEMOGLOBIN: 13.7 GM/DL (ref 13.5–17.5)
HEMOGLOBIN: 14.2 G/DL (ref 13.5–17.5)
HEMOGLOBIN: 14.7 G/DL (ref 13.5–17.5)
INR BLD: 1.09 (ref 0.86–1.14)
INR BLD: ABNORMAL (ref 0.86–1.14)
L. PNEUMOPHILA SEROGP 1 UR AG: NORMAL
LACTATE: 2.45 MMOL/L (ref 0.4–2)
LACTIC ACID, SEPSIS: 1.5 MMOL/L (ref 0.4–1.9)
LV EF: 23 %
LVEF MODALITY: NORMAL
LYMPHOCYTES ABSOLUTE: 19 K/UL (ref 1–5.1)
LYMPHOCYTES ABSOLUTE: 3.9 K/UL (ref 1–5.1)
LYMPHOCYTES RELATIVE PERCENT: 12.7 %
LYMPHOCYTES RELATIVE PERCENT: 61 %
MAGNESIUM: 2.5 MG/DL (ref 1.8–2.4)
MCH RBC QN AUTO: 30.6 PG (ref 26–34)
MCH RBC QN AUTO: 31.3 PG (ref 26–34)
MCHC RBC AUTO-ENTMCNC: 31.3 G/DL (ref 31–36)
MCHC RBC AUTO-ENTMCNC: 33.1 G/DL (ref 31–36)
MCV RBC AUTO: 100 FL (ref 80–100)
MCV RBC AUTO: 92.6 FL (ref 80–100)
METHEMOGLOBIN ARTERIAL: 0.6 %
MONOCYTES ABSOLUTE: 0.9 K/UL (ref 0–1.3)
MONOCYTES ABSOLUTE: 2.5 K/UL (ref 0–1.3)
MONOCYTES RELATIVE PERCENT: 3 %
MONOCYTES RELATIVE PERCENT: 8.2 %
NEUTROPHILS ABSOLUTE: 24.2 K/UL (ref 1.7–7.7)
NEUTROPHILS ABSOLUTE: 9.7 K/UL (ref 1.7–7.7)
NEUTROPHILS RELATIVE PERCENT: 28 %
NEUTROPHILS RELATIVE PERCENT: 78.5 %
O2 CONTENT ARTERIAL: 21 ML/DL
O2 SAT, ARTERIAL: 93 % (ref 93–100)
O2 SAT, ARTERIAL: 97.8 %
O2 THERAPY: ABNORMAL
PCO2 ARTERIAL: 42.8 MM HG (ref 35–45)
PCO2 ARTERIAL: 44.2 MMHG (ref 35–45)
PDW BLD-RTO: 13.7 % (ref 12.4–15.4)
PDW BLD-RTO: 14.4 % (ref 12.4–15.4)
PERFORMED ON: ABNORMAL
PERFORMED ON: ABNORMAL
PH ARTERIAL: 7.24 (ref 7.35–7.45)
PH ARTERIAL: 7.33 (ref 7.35–7.45)
PHOSPHORUS: 8.2 MG/DL (ref 2.5–4.9)
PLATELET # BLD: 265 K/UL (ref 135–450)
PLATELET # BLD: 268 K/UL (ref 135–450)
PMV BLD AUTO: 7.7 FL (ref 5–10.5)
PMV BLD AUTO: 8.2 FL (ref 5–10.5)
PO2 ARTERIAL: 125.4 MMHG (ref 75–108)
PO2 ARTERIAL: 71 MM HG (ref 75–108)
POC HEMATOCRIT: 40 % (ref 40.5–52.5)
POC POTASSIUM: 3.8 MMOL/L (ref 3.5–5.1)
POC SAMPLE TYPE: ABNORMAL
POC SODIUM: 141 MMOL/L (ref 136–145)
POTASSIUM REFLEX MAGNESIUM: 5.7 MMOL/L (ref 3.5–5.1)
POTASSIUM SERPL-SCNC: 4.6 MMOL/L (ref 3.5–5.1)
PROTHROMBIN TIME: 12.6 SEC (ref 10–13.2)
PROTHROMBIN TIME: ABNORMAL SEC (ref 10–13.2)
RBC # BLD: 4.62 M/UL (ref 4.2–5.9)
RBC # BLD: 4.71 M/UL (ref 4.2–5.9)
REASON FOR REJECTION: NORMAL
REJECTED TEST: NORMAL
SARS-COV-2, NAAT: NOT DETECTED
SODIUM BLD-SCNC: 133 MMOL/L (ref 136–145)
STREP PNEUMONIAE ANTIGEN, URINE: NORMAL
TCO2 ARTERIAL: 19.8 MMOL/L
TCO2 ARTERIAL: 24 MMOL/L
TOTAL CK: 273 U/L (ref 39–308)
TOTAL PROTEIN: 6.4 G/DL (ref 6.4–8.2)
TROPONIN: 0.31 NG/ML
TROPONIN: 0.7 NG/ML
WBC # BLD: 30.9 K/UL (ref 4–11)
WBC # BLD: 31.2 K/UL (ref 4–11)

## 2021-02-22 PROCEDURE — 51702 INSERT TEMP BLADDER CATH: CPT

## 2021-02-22 PROCEDURE — 0BC68ZZ EXTIRPATION OF MATTER FROM RIGHT LOWER LOBE BRONCHUS, VIA NATURAL OR ARTIFICIAL OPENING ENDOSCOPIC: ICD-10-PCS | Performed by: INTERNAL MEDICINE

## 2021-02-22 PROCEDURE — 2580000003 HC RX 258: Performed by: INTERNAL MEDICINE

## 2021-02-22 PROCEDURE — 2000000000 HC ICU R&B

## 2021-02-22 PROCEDURE — 93306 TTE W/DOPPLER COMPLETE: CPT

## 2021-02-22 PROCEDURE — 3609010900 HC BRONCHOSCOPY THERAPUTIC ASPIRATION INITIAL: Performed by: INTERNAL MEDICINE

## 2021-02-22 PROCEDURE — 82550 ASSAY OF CK (CPK): CPT

## 2021-02-22 PROCEDURE — 0BCB8ZZ EXTIRPATION OF MATTER FROM LEFT LOWER LOBE BRONCHUS, VIA NATURAL OR ARTIFICIAL OPENING ENDOSCOPIC: ICD-10-PCS | Performed by: INTERNAL MEDICINE

## 2021-02-22 PROCEDURE — 2580000003 HC RX 258: Performed by: EMERGENCY MEDICINE

## 2021-02-22 PROCEDURE — 36556 INSERT NON-TUNNEL CV CATH: CPT

## 2021-02-22 PROCEDURE — 84132 ASSAY OF SERUM POTASSIUM: CPT

## 2021-02-22 PROCEDURE — 6360000002 HC RX W HCPCS: Performed by: PSYCHIATRY & NEUROLOGY

## 2021-02-22 PROCEDURE — 02HV33Z INSERTION OF INFUSION DEVICE INTO SUPERIOR VENA CAVA, PERCUTANEOUS APPROACH: ICD-10-PCS | Performed by: EMERGENCY MEDICINE

## 2021-02-22 PROCEDURE — 84484 ASSAY OF TROPONIN QUANT: CPT

## 2021-02-22 PROCEDURE — 2500000003 HC RX 250 WO HCPCS: Performed by: INTERNAL MEDICINE

## 2021-02-22 PROCEDURE — 6360000002 HC RX W HCPCS: Performed by: INTERNAL MEDICINE

## 2021-02-22 PROCEDURE — 93005 ELECTROCARDIOGRAM TRACING: CPT | Performed by: INTERNAL MEDICINE

## 2021-02-22 PROCEDURE — 99223 1ST HOSP IP/OBS HIGH 75: CPT | Performed by: PSYCHIATRY & NEUROLOGY

## 2021-02-22 PROCEDURE — 82947 ASSAY GLUCOSE BLOOD QUANT: CPT

## 2021-02-22 PROCEDURE — 99291 CRITICAL CARE FIRST HOUR: CPT | Performed by: INTERNAL MEDICINE

## 2021-02-22 PROCEDURE — 87015 SPECIMEN INFECT AGNT CONCNTJ: CPT

## 2021-02-22 PROCEDURE — 99255 IP/OBS CONSLTJ NEW/EST HI 80: CPT | Performed by: INTERNAL MEDICINE

## 2021-02-22 PROCEDURE — 93010 ELECTROCARDIOGRAM REPORT: CPT | Performed by: INTERNAL MEDICINE

## 2021-02-22 PROCEDURE — 94761 N-INVAS EAR/PLS OXIMETRY MLT: CPT

## 2021-02-22 PROCEDURE — 2580000003 HC RX 258: Performed by: PSYCHIATRY & NEUROLOGY

## 2021-02-22 PROCEDURE — 80053 COMPREHEN METABOLIC PANEL: CPT

## 2021-02-22 PROCEDURE — 87205 SMEAR GRAM STAIN: CPT

## 2021-02-22 PROCEDURE — 82330 ASSAY OF CALCIUM: CPT

## 2021-02-22 PROCEDURE — 6370000000 HC RX 637 (ALT 250 FOR IP): Performed by: INTERNAL MEDICINE

## 2021-02-22 PROCEDURE — 36415 COLL VENOUS BLD VENIPUNCTURE: CPT

## 2021-02-22 PROCEDURE — 94002 VENT MGMT INPAT INIT DAY: CPT

## 2021-02-22 PROCEDURE — 87070 CULTURE OTHR SPECIMN AEROBIC: CPT

## 2021-02-22 PROCEDURE — 88305 TISSUE EXAM BY PATHOLOGIST: CPT

## 2021-02-22 PROCEDURE — 88112 CYTOPATH CELL ENHANCE TECH: CPT

## 2021-02-22 PROCEDURE — 2700000000 HC OXYGEN THERAPY PER DAY

## 2021-02-22 PROCEDURE — 83036 HEMOGLOBIN GLYCOSYLATED A1C: CPT

## 2021-02-22 PROCEDURE — 87040 BLOOD CULTURE FOR BACTERIA: CPT

## 2021-02-22 PROCEDURE — 71045 X-RAY EXAM CHEST 1 VIEW: CPT

## 2021-02-22 PROCEDURE — 6360000002 HC RX W HCPCS: Performed by: NURSE PRACTITIONER

## 2021-02-22 PROCEDURE — 6360000002 HC RX W HCPCS: Performed by: EMERGENCY MEDICINE

## 2021-02-22 PROCEDURE — 31645 BRNCHSC W/THER ASPIR 1ST: CPT | Performed by: INTERNAL MEDICINE

## 2021-02-22 PROCEDURE — 87081 CULTURE SCREEN ONLY: CPT

## 2021-02-22 PROCEDURE — 3609027000 HC BRONCHOSCOPY: Performed by: INTERNAL MEDICINE

## 2021-02-22 PROCEDURE — 85610 PROTHROMBIN TIME: CPT

## 2021-02-22 PROCEDURE — 95822 EEG COMA OR SLEEP ONLY: CPT | Performed by: PSYCHIATRY & NEUROLOGY

## 2021-02-22 PROCEDURE — 36620 INSERTION CATHETER ARTERY: CPT

## 2021-02-22 PROCEDURE — 85025 COMPLETE CBC W/AUTO DIFF WBC: CPT

## 2021-02-22 PROCEDURE — 85730 THROMBOPLASTIN TIME PARTIAL: CPT

## 2021-02-22 PROCEDURE — 82803 BLOOD GASES ANY COMBINATION: CPT

## 2021-02-22 PROCEDURE — 95822 EEG COMA OR SLEEP ONLY: CPT

## 2021-02-22 PROCEDURE — 2709999900 HC NON-CHARGEABLE SUPPLY: Performed by: INTERNAL MEDICINE

## 2021-02-22 PROCEDURE — 83605 ASSAY OF LACTIC ACID: CPT

## 2021-02-22 PROCEDURE — 87206 SMEAR FLUORESCENT/ACID STAI: CPT

## 2021-02-22 PROCEDURE — 85014 HEMATOCRIT: CPT

## 2021-02-22 PROCEDURE — 87116 MYCOBACTERIA CULTURE: CPT

## 2021-02-22 PROCEDURE — 84295 ASSAY OF SERUM SODIUM: CPT

## 2021-02-22 PROCEDURE — 87449 NOS EACH ORGANISM AG IA: CPT

## 2021-02-22 PROCEDURE — 2500000003 HC RX 250 WO HCPCS: Performed by: EMERGENCY MEDICINE

## 2021-02-22 RX ORDER — ACETAMINOPHEN 650 MG/1
650 SUPPOSITORY RECTAL EVERY 6 HOURS PRN
Status: DISCONTINUED | OUTPATIENT
Start: 2021-02-21 | End: 2021-02-27 | Stop reason: HOSPADM

## 2021-02-22 RX ORDER — DEXTROSE MONOHYDRATE 25 G/50ML
12.5 INJECTION, SOLUTION INTRAVENOUS PRN
Status: DISCONTINUED | OUTPATIENT
Start: 2021-02-22 | End: 2021-02-26

## 2021-02-22 RX ORDER — LIDOCAINE HYDROCHLORIDE 20 MG/ML
INJECTION, SOLUTION EPIDURAL; INFILTRATION; INTRACAUDAL; PERINEURAL PRN
Status: DISCONTINUED | OUTPATIENT
Start: 2021-02-22 | End: 2021-02-22 | Stop reason: ALTCHOICE

## 2021-02-22 RX ORDER — PROMETHAZINE HYDROCHLORIDE 25 MG/1
12.5 TABLET ORAL EVERY 6 HOURS PRN
Status: DISCONTINUED | OUTPATIENT
Start: 2021-02-21 | End: 2021-02-27 | Stop reason: HOSPADM

## 2021-02-22 RX ORDER — ACETAMINOPHEN 325 MG/1
650 TABLET ORAL EVERY 6 HOURS PRN
Status: DISCONTINUED | OUTPATIENT
Start: 2021-02-21 | End: 2021-02-27 | Stop reason: HOSPADM

## 2021-02-22 RX ORDER — ONDANSETRON 2 MG/ML
4 INJECTION INTRAMUSCULAR; INTRAVENOUS EVERY 6 HOURS PRN
Status: DISCONTINUED | OUTPATIENT
Start: 2021-02-21 | End: 2021-02-23

## 2021-02-22 RX ORDER — NICOTINE POLACRILEX 4 MG
15 LOZENGE BUCCAL PRN
Status: DISCONTINUED | OUTPATIENT
Start: 2021-02-22 | End: 2021-02-26

## 2021-02-22 RX ORDER — POLYETHYLENE GLYCOL 3350 17 G/17G
17 POWDER, FOR SOLUTION ORAL DAILY PRN
Status: DISCONTINUED | OUTPATIENT
Start: 2021-02-21 | End: 2021-02-27 | Stop reason: HOSPADM

## 2021-02-22 RX ORDER — DOBUTAMINE HYDROCHLORIDE 200 MG/100ML
2.5 INJECTION INTRAVENOUS CONTINUOUS
Status: DISCONTINUED | OUTPATIENT
Start: 2021-02-22 | End: 2021-02-24

## 2021-02-22 RX ORDER — HEPARIN SODIUM 1000 [USP'U]/ML
60 INJECTION, SOLUTION INTRAVENOUS; SUBCUTANEOUS PRN
Status: DISCONTINUED | OUTPATIENT
Start: 2021-02-22 | End: 2021-02-22

## 2021-02-22 RX ORDER — SODIUM CHLORIDE 0.9 % (FLUSH) 0.9 %
10 SYRINGE (ML) INJECTION PRN
Status: DISCONTINUED | OUTPATIENT
Start: 2021-02-21 | End: 2021-02-27 | Stop reason: HOSPADM

## 2021-02-22 RX ORDER — HEPARIN SODIUM 10000 [USP'U]/100ML
690 INJECTION, SOLUTION INTRAVENOUS CONTINUOUS
Status: DISCONTINUED | OUTPATIENT
Start: 2021-02-22 | End: 2021-02-22

## 2021-02-22 RX ORDER — CARBOXYMETHYLCELLULOSE SODIUM 10 MG/ML
1 GEL OPHTHALMIC
Status: DISCONTINUED | OUTPATIENT
Start: 2021-02-22 | End: 2021-02-25

## 2021-02-22 RX ORDER — MAGNESIUM HYDROXIDE 1200 MG/15ML
LIQUID ORAL CONTINUOUS PRN
Status: COMPLETED | OUTPATIENT
Start: 2021-02-22 | End: 2021-02-22

## 2021-02-22 RX ORDER — HEPARIN SODIUM 1000 [USP'U]/ML
60 INJECTION, SOLUTION INTRAVENOUS; SUBCUTANEOUS ONCE
Status: COMPLETED | OUTPATIENT
Start: 2021-02-22 | End: 2021-02-22

## 2021-02-22 RX ORDER — 0.9 % SODIUM CHLORIDE 0.9 %
1000 INTRAVENOUS SOLUTION INTRAVENOUS ONCE
Status: COMPLETED | OUTPATIENT
Start: 2021-02-22 | End: 2021-02-22

## 2021-02-22 RX ORDER — CHLORHEXIDINE GLUCONATE 0.12 MG/ML
15 RINSE ORAL 2 TIMES DAILY
Status: DISCONTINUED | OUTPATIENT
Start: 2021-02-22 | End: 2021-02-25

## 2021-02-22 RX ORDER — SODIUM CHLORIDE 0.9 % (FLUSH) 0.9 %
10 SYRINGE (ML) INJECTION EVERY 12 HOURS SCHEDULED
Status: DISCONTINUED | OUTPATIENT
Start: 2021-02-22 | End: 2021-02-27 | Stop reason: HOSPADM

## 2021-02-22 RX ORDER — HEPARIN SODIUM 1000 [USP'U]/ML
30 INJECTION, SOLUTION INTRAVENOUS; SUBCUTANEOUS PRN
Status: DISCONTINUED | OUTPATIENT
Start: 2021-02-22 | End: 2021-02-22

## 2021-02-22 RX ORDER — DEXTROSE MONOHYDRATE 50 MG/ML
100 INJECTION, SOLUTION INTRAVENOUS PRN
Status: DISCONTINUED | OUTPATIENT
Start: 2021-02-22 | End: 2021-02-26

## 2021-02-22 RX ADMIN — ENOXAPARIN SODIUM 40 MG: 40 INJECTION SUBCUTANEOUS at 14:51

## 2021-02-22 RX ADMIN — DEXTROSE MONOHYDRATE 4 MCG/MIN: 50 INJECTION, SOLUTION INTRAVENOUS at 01:24

## 2021-02-22 RX ADMIN — CEFEPIME HYDROCHLORIDE 2000 MG: 2 INJECTION, POWDER, FOR SOLUTION INTRAVENOUS at 11:29

## 2021-02-22 RX ADMIN — HEPARIN SODIUM 690 UNITS/HR: 10000 INJECTION, SOLUTION INTRAVENOUS at 06:30

## 2021-02-22 RX ADMIN — PROPOFOL 50 MCG/KG/MIN: 10 INJECTION, EMULSION INTRAVENOUS at 21:47

## 2021-02-22 RX ADMIN — VASOPRESSIN 0.02 UNITS/MIN: 20 INJECTION INTRAVENOUS at 02:46

## 2021-02-22 RX ADMIN — SODIUM ZIRCONIUM CYCLOSILICATE 5 G: 5 POWDER, FOR SUSPENSION ORAL at 14:51

## 2021-02-22 RX ADMIN — SODIUM CHLORIDE 1000 ML: 9 INJECTION, SOLUTION INTRAVENOUS at 00:56

## 2021-02-22 RX ADMIN — HEPARIN SODIUM 3440 UNITS: 1000 INJECTION, SOLUTION INTRAVENOUS; SUBCUTANEOUS at 06:39

## 2021-02-22 RX ADMIN — Medication 15 ML: at 20:18

## 2021-02-22 RX ADMIN — FENTANYL CITRATE 200 MCG/HR: 50 INJECTION, SOLUTION INTRAMUSCULAR; INTRAVENOUS at 14:57

## 2021-02-22 RX ADMIN — PROPOFOL 50 MCG/KG/MIN: 10 INJECTION, EMULSION INTRAVENOUS at 15:52

## 2021-02-22 RX ADMIN — FENTANYL CITRATE 200 MCG/HR: 50 INJECTION, SOLUTION INTRAMUSCULAR; INTRAVENOUS at 08:32

## 2021-02-22 RX ADMIN — FAMOTIDINE 20 MG: 10 INJECTION, SOLUTION INTRAVENOUS at 20:19

## 2021-02-22 RX ADMIN — MIDAZOLAM HYDROCHLORIDE 10 MG/HR: 5 INJECTION, SOLUTION INTRAMUSCULAR; INTRAVENOUS at 23:33

## 2021-02-22 RX ADMIN — FENTANYL CITRATE 350 MCG/HR: 50 INJECTION, SOLUTION INTRAMUSCULAR; INTRAVENOUS at 05:44

## 2021-02-22 RX ADMIN — FENTANYL CITRATE 200 MCG/HR: 50 INJECTION, SOLUTION INTRAMUSCULAR; INTRAVENOUS at 19:55

## 2021-02-22 RX ADMIN — CARBOXYMETHYLCELLULOSE SODIUM 1 DROP: 10 GEL OPHTHALMIC at 11:30

## 2021-02-22 RX ADMIN — CARBOXYMETHYLCELLULOSE SODIUM 1 DROP: 10 GEL OPHTHALMIC at 16:44

## 2021-02-22 RX ADMIN — Medication 10 ML: at 10:06

## 2021-02-22 RX ADMIN — FAMOTIDINE 20 MG: 10 INJECTION, SOLUTION INTRAVENOUS at 14:51

## 2021-02-22 RX ADMIN — MIDAZOLAM HYDROCHLORIDE 8 MG/HR: 5 INJECTION, SOLUTION INTRAMUSCULAR; INTRAVENOUS at 10:01

## 2021-02-22 RX ADMIN — FENTANYL CITRATE 350 MCG/HR: 50 INJECTION, SOLUTION INTRAMUSCULAR; INTRAVENOUS at 02:41

## 2021-02-22 RX ADMIN — VASOPRESSIN 0.02 UNITS/MIN: 20 INJECTION INTRAVENOUS at 04:33

## 2021-02-22 RX ADMIN — DOBUTAMINE HYDROCHLORIDE 2.5 MCG/KG/MIN: 200 INJECTION INTRAVENOUS at 11:45

## 2021-02-22 RX ADMIN — LEVETIRACETAM 1000 MG: 100 INJECTION, SOLUTION INTRAVENOUS at 20:30

## 2021-02-22 RX ADMIN — LEVETIRACETAM 500 MG: 100 INJECTION, SOLUTION INTRAVENOUS at 10:30

## 2021-02-22 RX ADMIN — CARBOXYMETHYLCELLULOSE SODIUM 1 DROP: 10 GEL OPHTHALMIC at 19:55

## 2021-02-22 RX ADMIN — PROPOFOL 50 MCG/KG/MIN: 10 INJECTION, EMULSION INTRAVENOUS at 10:02

## 2021-02-22 RX ADMIN — Medication 15 ML: at 14:51

## 2021-02-22 RX ADMIN — Medication 10 ML: at 20:19

## 2021-02-22 RX ADMIN — Medication 1.4 MCG/KG/HR: at 05:02

## 2021-02-22 RX ADMIN — VANCOMYCIN HYDROCHLORIDE 1000 MG: 1 INJECTION, POWDER, LYOPHILIZED, FOR SOLUTION INTRAVENOUS at 01:31

## 2021-02-22 RX ADMIN — PROPOFOL 50 MCG/KG/MIN: 10 INJECTION, EMULSION INTRAVENOUS at 03:56

## 2021-02-22 RX ADMIN — CEFEPIME HYDROCHLORIDE 2000 MG: 2 INJECTION, POWDER, FOR SOLUTION INTRAVENOUS at 01:30

## 2021-02-22 RX ADMIN — Medication 1.3 MCG/KG/HR: at 10:02

## 2021-02-22 RX ADMIN — Medication 0.3 MCG/KG/HR: at 19:55

## 2021-02-22 RX ADMIN — VANCOMYCIN HYDROCHLORIDE 1000 MG: 1 INJECTION, POWDER, LYOPHILIZED, FOR SOLUTION INTRAVENOUS at 12:00

## 2021-02-22 ASSESSMENT — PULMONARY FUNCTION TESTS
PIF_VALUE: 29
PIF_VALUE: 29
PIF_VALUE: 27
PIF_VALUE: 30
PIF_VALUE: 34
PIF_VALUE: 30
PIF_VALUE: 34
PIF_VALUE: 28
PIF_VALUE: 30
PIF_VALUE: 30
PIF_VALUE: 35
PIF_VALUE: 27
PIF_VALUE: 31
PIF_VALUE: 27
PIF_VALUE: 30
PIF_VALUE: 30
PIF_VALUE: 26
PIF_VALUE: 32
PIF_VALUE: 30
PIF_VALUE: 34
PIF_VALUE: 28
PIF_VALUE: 30

## 2021-02-22 ASSESSMENT — PAIN SCALES - GENERAL
PAINLEVEL_OUTOF10: 0
PAINLEVEL_OUTOF10: 0

## 2021-02-22 NOTE — PROCEDURES
Radial Arterial Line Procedure Note              INDICATION: Shock  PROCEDURE :   ATTENDING PHYSICIAN:          CONSENT:     Consent was obtained prior to the procedure. Indications, risks, and benefits were explained at length. PROCEDURE SUMMARY:     A time out was performed. My hands were washed immediately prior to the procedure. I wore a surgical cap, mask with protective eyewear, sterile gown and sterile gloves throughout the procedure. After US guided test was performed to ensure adequate perfusion, the right femoral artery was prepped using chlorhexidine scrub and draped in sterile fashion using a three quarter sheet drape and sterile towels. The pulse was identified and the site was positioned in the usual fashion. Anesthesia was achieved using 1% lidocaine. Using the Arrow Arterial Line Kit, a needle was inserted into the artery. Arterial blood was seen to pulsate in the flash chamber. The internal guidewire was advanced easily into the artery. The catheter was then advanced over the wire and the needle and wire were withdrawn. The catheter was sutured in place. A sterile opsite was placed over the catheter at the insertion site. At the time of procedure completion, the catheter was connected to the cardiac monitor and calibrated. Appropriate waveform and blood pressure tracing was observed.    Estimated blood loss is 10cc

## 2021-02-22 NOTE — PROGRESS NOTES
Comprehensive Nutrition Assessment    Type and Reason for Visit:  Initial    Nutrition Recommendations/Plan:   1. NPO for now. 2. When appropriate to start TF, Recommend order: \"Diet: Tube feed continuous/ NPO\". Initiate Jevity 1.5 (1.5 calorie with fiber formula) at 10 mL/hr and as tolerated, increase by 10 mL/hr q 4 hours until goal of 55 mL/hr is met via N/G  3. IF IVF off, and Na WNL, Recommend 125 mL H20 flush q 4 hours. Monitor IVF infusion, Na labs and need for adjustments in water flush   4. Monitor TF tolerance (abd distention, bowel habits, N/V, cramping)  5. Check TG while on diprivan infusion  6. Monitor nutrition adequacy, pertinent labs, bowel habits, wt changes, and clinical progress      Nutrition Assessment:  New Vent: Pt is a 33 y/o male admitted after seizure in parking lot. Hx of narcotic use. Currently intubated, sedated on precedex, fentanyl, versed and propofol at 17.7 mL/hr (467 kcals from lipids). Hypotensive on levo at 15 mcg. Off vaso. Plan for bronch today. +NG in place. Hold nutrition today per pulm. Malnutrition Assessment:  Malnutrition Status: At risk for malnutrition (Comment)    Context:  Acute Illness       Estimated Daily Nutrient Needs:  Energy (kcal):  0082-8496; Weight Used for Energy Requirements:  Current(57 kg)   Protein (g):   g; Weight Used for Protein Requirements:  Current(1.2-2)        Fluid (ml/day):  1 mL/kcal; Method Used for Fluid Requirements:  1 ml/kcal      Nutrition Related Findings:  NG in place. Labs reviewed: K 5.7. Current Nutrition Therapies:    Diet NPO Effective Now  Current Tube Feeding (TF) Orders:  · Feeding Route: Nasogastric  · Formula: 1.5 Calorie with Fiber  · Schedule: Continuous  · Goal TF & Flush Orders Provides: Jevity 1.5 @ 55 mL/hr x 20 hrs to provide 1100 mL TV, 1650 kcals, 70 g protein, and 836 mL free water. + 125 mL free water flush q 4 hrs for added 750 mL free water.       Anthropometric Measures:  · Height: 5'

## 2021-02-22 NOTE — PROGRESS NOTES
02/22/21 0313   Vent Information   Vent Type 980   Vent Mode AC/VC   Vt Ordered 500 mL   Rate Set 18 bmp   Peak Flow 60 L/min   Pressure Support 0 cmH20   FiO2  100 %   Sensitivity 3   PEEP/CPAP 20   Humidification Source HME   Vent Patient Data   Peak Inspiratory Pressure 27 cmH2O   Mean Airway Pressure 16 cmH20   Rate Measured 24 br/min   Vt Exhaled 568 mL   Minute Volume 12.9 Liters   I:E Ratio 1:1.80   Spontaneous Breathing Trial (SBT) RT Doc   Pulse 92   Breath Sounds   Right Upper Lobe Rhonchi   Right Middle Lobe Diminished   Right Lower Lobe Diminished   Left Upper Lobe Rhonchi   Left Lower Lobe Diminished   Additional Respiratory  Assessments   Resp 27   Alarm Settings   High Pressure Alarm 53 cmH2O   Low Minute Volume Alarm 2 L/min   High Respiratory Rate 47 br/min   Low Exhaled Vt  200 mL   Non-Surgical Airway Endo Tracheal Tube   Placement Date/Time: 02/21/21 2146   Mask Ventilation: Nasal airway  Placed By: In ED  Inserted by: Dr. Yahir Valladares  Insertion attempts: 1  Airway Device: Endo Tracheal Tube  Size: 8  Placement Verified By[de-[email or web address]] Auscultation;Colorimetric EtCO2 device   Secured at 25 cm   Measured From Lips   Secured Location Left   Secured By Commercial tube george   Site Condition Dry

## 2021-02-22 NOTE — PROGRESS NOTES
Hospitalist Progress Note      PCP: No primary care provider on file. Date of Admission: 2/21/2021      Hospital Course:   32 y.o. male who presented to [facility] with past medical history of drug abuse, NSTEMI, seizure disorder, chronic hepatitis C, mood disorder, cigarette smoking presented to the ED for right-sided chest wall pain. Subjective:    Patient seen and examined.   Intubated, sedated    Medications:  Reviewed    Infusion Medications    norepinephrine 20 mcg/min (02/22/21 0805)    vasopressin (Septic Shock) infusion 0.02 Units/min (02/22/21 0643)    heparin (PORCINE) Infusion 690 Units/hr (02/22/21 0630)    propofol 50 mcg/kg/min (02/22/21 0356)    midazolam 8 mg/hr (02/22/21 0200)    fentaNYL (SUBLIMAZE) infusion 350 mcg/hr (02/22/21 0544)    dexmedetomidine 1.4 mcg/kg/hr (02/22/21 0502)     Scheduled Medications    sodium chloride flush  10 mL Intravenous 2 times per day    cefepime  2,000 mg Intravenous Q12H    vancomycin  1,000 mg Intravenous Q12H    famotidine (PEPCID) injection  20 mg Intravenous BID    chlorhexidine  15 mL Mouth/Throat BID    carboxymethylcellulose PF  1 drop Both Eyes 6 times per day    sodium zirconium cyclosilicate  5 g Oral Once    levetiracetam  500 mg Intravenous Q12H     PRN Meds: sodium chloride flush, promethazine **OR** ondansetron, polyethylene glycol, acetaminophen **OR** acetaminophen, perflutren lipid microspheres, heparin (porcine), heparin (porcine)      Intake/Output Summary (Last 24 hours) at 2/22/2021 0955  Last data filed at 2/21/2021 2333  Gross per 24 hour   Intake 2100 ml   Output --   Net 2100 ml       Physical Exam Performed:    /77   Pulse 81   Temp 101.1 °F (38.4 °C) (Bladder)   Resp 18   Ht 5' 11\" (1.803 m)   Wt 126 lb 5.2 oz (57.3 kg)   SpO2 97%   BMI 17.62 kg/m²     General appearance: No apparent distress, sedated  HEENT: Conjunctivae/corneas clear, neck supple w/ full ROM  Respiratory:  Intubated,  Clear to auscultation  Cardiovascular: Regular rate and rhythm  Abdomen: Soft, non-tender, non-distended with normal bowel sounds. Musculoskeletal: No edema bilaterally  Neurologic:  Sedated  Psychiatric: n/a  Peripheral Pulses: +2 palpable, equal bilaterally       Labs:   Recent Labs     02/21/21 2141 02/22/21 0218 02/22/21  0815   WBC 31.2*  --  30.9*   HGB 14.7 13.7 14.2   HCT 47.1  --  42.8     --  265     Recent Labs     02/21/21 2141 02/22/21  0815   * 133*   K 4.8 5.7*   CL 93* 103   CO2 12* 23   BUN 10 12   CREATININE 1.0 0.7*   CALCIUM 10.4 7.8*   PHOS 8.2*  --      Recent Labs     02/21/21 2141 02/22/21  0815   AST 48* 40*   ALT 44* 27   BILITOT <0.2 0.3   ALKPHOS 139* 106     Recent Labs     02/21/21  2253 02/22/21  0700 02/22/21  0815   INR 1.00 NA* 1.09     Recent Labs     02/21/21 2141 02/22/21  0042 02/22/21  0400   CKTOTAL  --  273  --    TROPONINI <0.01 0.31* 0.70*       Urinalysis:      Lab Results   Component Value Date    NITRU Negative 02/21/2021    WBCUA 0-2 02/21/2021    BACTERIA Rare 01/19/2020    RBCUA 11-20 02/21/2021    BLOODU MODERATE 02/21/2021    SPECGRAV >=1.030 02/21/2021    GLUCOSEU Negative 02/21/2021       Radiology:  XR CHEST PORTABLE   Final Result   1. Interval placement of a right IJ central line, tip at the junction of the   SVC and right atrium   2. No evidence of a pneumothorax   3. Marked interval worsening of diffuse airspace disease, which may be on the   basis of aspiration or edema. 4. NG tube in place. Proximal port is within the distal esophagus, proximal   to the GE junction         CT CHEST PULMONARY EMBOLISM W CONTRAST   Final Result   1. No evidence of pulmonary embolism   2. Extensive airspace disease within the lower lobes bilaterally, and   posterior segment of the upper lobes, right greater than left. Given the   clinical history, changes are highly suspicious for aspiration pneumonia.    3. ET tube and NG tube in place   4. 4 mm nodule, left upper lobe. 5. No evidence of a pneumothorax      RECOMMENDATIONS:   Fleischner Society guidelines for follow-up and management of incidentally   detected pulmonary nodules:      Single Solid Nodule:      Nodule size less than 6 mm: In a low-risk patient, no routine follow-up. - Low risk patients include individuals with minimal or absent history of   smoking and other known risk factors. - High risk patients include individuals with a history or smoking or known   risk factors. Reference: Radiology 2017   http://pubs. rsna.org/doi/full/10.1148/radiol. 7374378997         CT Head WO Contrast   Final Result   No acute intracranial abnormality. Chronic pansinusitis         XR CHEST PORTABLE   Final Result   1. No acute cardiopulmonary disease. 2. The endotracheal tube tip is located 6.7 cm above the lelo. 3. Incomplete visualization of the orogastric tube.                  Assessment/Plan:    Active Hospital Problems    Diagnosis    Seizure ([facility] [address] [R56.9]     Seizure, status epilepticus  - intubated, sedated, start on keppra  - EEG - severe diffuse encephalopathy, no epileptiform discharges  - neurology consulted - increase keppra,  if witnessed recurrent seizures, recommends transfer to  for continuous EEG monitoring     Acute hypoxemic, hypercarbic respiratory failure  - intubated  - mgmt per pulmonology     Bilateral Lower lobe aspiration PNA  - on cefepime, vanc  - urine antigens negtive  - COVID19 negative rapid  - bronch today per pulmonology     Spetic shock  - tachycardia/leukocytosis, febrile 101.4  - on pressor support, dobutamine per cardiology  - bl cx pending  - UA negative  - neuro - low threshold for LP if no improvement on leukocytosis or persistent fever  - c/w abx    Lactic acidosis, AGMA  - IVF, monitor - improving     Hyperkalemia - improved s/p lokelma    NSTEMI  - heparin gtt - stopped per cardiology  - trop negative, 0.31, 0.70 - trend  - ECHO - EF 25%, only basal wall segments move well, rest are skinesis consistent with takotsubo syndrome  -  cardiology c/s    Hyperglycemia  - monitor, ssi  - a1c ordered    Abn LFTs  - in setting septic shock   - hepatitis panel ordered  - improving    HARINI nodule - incidental    Hx drug abuse  - UDS + amphetamine, benzodiazepine, cannabinoids, methadone, opitae - he is prescribed methadone and ativan at home    Tobacco abuse    DVT Prophylaxis: lovenox  Diet: Diet NPO Effective Now  Code Status: Full Code      Dispo - in ICU    Racheal Quesada MD

## 2021-02-22 NOTE — PRE SEDATION
Sedation Pre-Procedure Note    Patient Name: Terrance Ng   YOB: 1990  Room/Bed: 0229/0229-01  Medical Record Number: [id number]  Date: 2/22/2021   Time: 11:08 AM       Indication: Acute respiratory failure, possible aspiration pneumonia, pulmonary edema versus ARDS    Consent: I have discussed with the patient and/or the patient representative the indication, alternatives, and the possible risks and/or complications of the planned procedure and the anesthesia methods. The patient and/or patient representative appear to understand and agree to proceed. Vital Signs:   Vitals:    02/22/21 0900   BP: 110/77   Pulse: 81   Resp: 18   Temp:    SpO2:        Past Medical History:   has a past medical history of Drug abuse ([facility] [address] NSTEMI (non-ST elevated myocardial infarction) ([facility] [address] and Seizures ([facility] [address] Past Surgical History:   has a past surgical history that includes Hand surgery.     Medications:   Scheduled Meds:    sodium chloride flush  10 mL Intravenous 2 times per day    cefepime  2,000 mg Intravenous Q12H    vancomycin  1,000 mg Intravenous Q12H    famotidine (PEPCID) injection  20 mg Intravenous BID    chlorhexidine  15 mL Mouth/Throat BID    carboxymethylcellulose PF  1 drop Both Eyes 6 times per day    sodium zirconium cyclosilicate  5 g Oral Once    levetiracetam  500 mg Intravenous Q12H    enoxaparin  40 mg Subcutaneous Daily     Continuous Infusions:    norepinephrine 10 mcg/min (02/22/21 1004)    vasopressin (Septic Shock) infusion Stopped (02/22/21 0835)    DOBUTamine      propofol 50 mcg/kg/min (02/22/21 1002)    midazolam 8 mg/hr (02/22/21 1001)    fentaNYL (SUBLIMAZE) infusion 200 mcg/hr (02/22/21 4406)    dexmedetomidine 1.3 mcg/kg/hr (02/22/21 1002)     PRN Meds: sodium chloride flush, promethazine **OR** ondansetron, polyethylene glycol, acetaminophen **OR** acetaminophen, perflutren lipid microspheres  Home Meds:   Prior to Admission medications    Medication Sig Start Date End Date Taking? Authorizing Provider   albuterol sulfate HFA (VENTOLIN HFA) 108 (90 Base) MCG/ACT inhaler Inhale 2 puffs into the lungs 4 times daily as needed for Wheezing 1/24/21   LEIGHTON Cooper CNP   ibuprofen (ADVIL;MOTRIN) 600 MG tablet Take 1 tablet by mouth 3 times daily as needed for Pain 1/24/21   LEIGHTON Cooper CNP   OXcarbazepine (TRILEPTAL) 300 MG tablet Take 600 mg by mouth 2 times daily 8/5/20   Historical Provider, MD   methadone (DOLOPHINE) 10 MG/ML solution Take 85 mg by mouth daily. Historical Provider, MD   aspirin 81 MG chewable tablet Take 81 mg by mouth daily (with breakfast) 1/21/20   Historical Provider, MD   LORazepam (ATIVAN) 0.5 MG tablet Take 0.5 mg by mouth 2 times daily. 9/17/20   Historical Provider, MD     Coumadin Use Last 7 Days:  no  Antiplatelet drug therapy use last 7 days: no  Other anticoagulant use last 7 days: yes -heparin drip  Additional Medication Information: Medications reviewed     Pre-Sedation Documentation and Exam:   I have personally completed a history, physical exam & review of systems for this patient (see notes).     Mallampati Airway Assessment:  the patient is currently intubated    Prior History of Anesthesia Complications:   none    ASA Classification:  Class 4 - A patient with an incapacitating systemic disease that is a constant threat to life    Sedation/ Anesthesia Plan:   The patient is presently being sedated as he is on mechanical ventilation, doses will be adjusted accordingly as needed    Medications Planned:   midazolam (Versed) intravenously, fentanyl intravenously, propofol intravenously and Precedex    Patient is an appropriate candidate for plan of sedation: Yes, though at increased risk    Electronically signed by Ashwin Blanco MD on 2/22/2021 at 11:08 AM

## 2021-02-22 NOTE — ED NOTES
Pt returned to room 3 from CT. Dr. Shasta Murphy updated patient family-mother, girlfriend and brother via phone call.      Kg Luna RN  02/21/21 2033

## 2021-02-22 NOTE — PROGRESS NOTES
02/21/21 2210   Vent Information   Vent Type 980   Vent Mode AC/VC   Vt Ordered 500 mL   Rate Set 16 bmp   FiO2  100 %   SpO2 100 %   SpO2/FiO2 ratio 100   Sensitivity 3   PEEP/CPAP 5   Humidification Source HME   Vent Patient Data   Peak Inspiratory Pressure 38 cmH2O   Mean Airway Pressure 17 cmH20   Rate Measured 45 br/min   Vt Exhaled 397 mL   Minute Volume 19 Liters   I:E Ratio 2:1   Cough/Sputum   Sputum How Obtained Endotracheal;Oral   Cough Strong;Productive   Sputum Amount Large   Sputum Color Creamy;Clear   Tenacity Thin;Thick   Spontaneous Breathing Trial (SBT) RT Doc   Pulse 160   Breath Sounds   Right Upper Lobe Diminished   Right Middle Lobe Diminished   Right Lower Lobe Diminished   Left Upper Lobe Diminished   Left Lower Lobe Diminished   Additional Respiratory  Assessments   Resp (!) 42   Position Semi-Saldivar's   Oral Care Completed?  Yes   Oral Care Mouth suctioned   Cuff Pressure (cm H2O)   (MOV)   Alarm Settings   High Pressure Alarm 53 cmH2O   Low Minute Volume Alarm 2 L/min   Apnea (secs) 20 secs   High Respiratory Rate 47 br/min   Low Exhaled Vt  200 mL   Patient Observation   Observations pt started to vomit, NG placed at this time    Non-Surgical Airway Endo Tracheal Tube   Placement Date/Time: 02/21/21 2146   Mask Ventilation: Nasal airway  Placed By: In ED  Inserted by: Dr. Umesh Alberto  Insertion attempts: 1  Airway Device: Endo Tracheal Tube  Size: 8  Placement Verified By[de-identified] Auscultation;Colorimetric EtCO2 device   Secured at 25 cm   Measured From [address] By [employer] tube george   Site Condition Dry

## 2021-02-22 NOTE — H&P
Hospital Medicine History & Physical      PCP: No primary care provider on file. Date of Admission: 2/21/2021    Date of Service: Pt seen/examined on 02/22/2021  Pt seen/examined face to face on and admitted as inpatient with expected LOS greater than two midnights due to medical therapy    Chief Complaint:    Chief Complaint   Patient presents with    Seizures     Patient comes into ED via [facility] with c/o by EMS of seizure like activity. EMS states once they were getting him into the truck he started to have tonic clonic like activity and 5mg of intranasal Versed was given. History Of Present Illness:      32 y.o. male who presented to [facility] with past medical history of drug abuse, NSTEMI, seizure disorder, chronic hepatitis C, mood disorder, cigarette smoking presented to the ED for right-sided chest wall pain. Patient was intubated sedated thus could not obtain history. History is obtained  from chart review. Patient started having some right-sided chest pain while started yesterday with no known injury or trauma and the chest pain continued to progress despite being arrest constant without association of shortness of breath pain with inspiration. Patient continues to smoke heavily with chronic cough without hemoptysis headache fever chills body aches dizziness syncope abdominal pain nausea vomiting diarrhea leg swelling palpitations. Patient reports history of epilepsy and drug disorder where he is on methadone in addition to NSTEMI. No Covid sick contacts. Past Medical History:          Diagnosis Date    Drug abuse ([address]     [facility]STEMI (non-ST elevated myocardial infarction) ([facility] [address]     Seizures ([facility] [address]        Past Surgical History:          Procedure Laterality Date    HAND SURGERY      RIGHT HAND FINGERS INJURY, PARTIAL AMPUTATIONS       Medications Prior to Admission:      Prior to Admission medications    Medication Sig Start Date End Date Taking?  Authorizing Provider albuterol sulfate HFA (VENTOLIN HFA) 108 (90 Base) MCG/ACT inhaler Inhale 2 puffs into the lungs 4 times daily as needed for Wheezing 1/24/21   [place], APRN - CNP   ibuprofen (ADVIL;MOTRIN) 600 MG tablet Take 1 tablet by mouth 3 times daily as needed for Pain 1/24/21   [place], APRN - CNP   OXcarbazepine (TRILEPTAL) 300 MG tablet Take 600 mg by mouth 2 times daily 8/5/20   Historical Provider, MD   methadone (DOLOPHINE) 10 MG/ML solution Take 85 mg by mouth daily. Historical Provider, MD   aspirin 81 MG chewable tablet Take 81 mg by mouth daily (with breakfast) 1/21/20   Historical Provider, MD   LORazepam (ATIVAN) 0.5 MG tablet Take 0.5 mg by mouth 2 times daily. 9/17/20   Historical Provider, MD       Allergies:  Patient has no known allergies. Social History:          TOBACCO:   reports that he has been smoking cigarettes. He has been smoking about 2.00 packs per day. He has never used smokeless tobacco.  ETOH:   reports no history of alcohol use. E-Cigarettes/Vaping Use     Questions Responses    E-Cigarette/Vaping Use     Start Date     Passive Exposure     Quit Date     Counseling Given     Comments             Family History:      Reviewed in detail, negative family history of seizures    History reviewed. No pertinent family history. REVIEW OF SYSTEMS:     Unable to obtain patient sedated vented    PHYSICAL EXAM PERFORMED:    /84   Pulse 108   Temp 99.3 °F (37.4 °C) (Core)   Resp 19   Ht 5' 11\" (1.803 m)   Wt 130 lb (59 kg)   SpO2 94%   BMI 18.13 kg/m²     General appearance:  mild acute distress, appears older than stated age  HEENT:   atraumatic, sclera anicteric, Conjunctivae clear. Neck: Supple,Trachea midline, no goiter  Respiratory:minimal accessory muscle usage, Normal respiratory effort.  Clear to auscultation, mechanically vented  Cardiovascular: Tachycardia, capillary refill 2 seconds  Abdomen: Soft, non-tender, non-distended with normal bowel sounds. Musculoskeletal:  No clubbing, cyanosis. trace edema LE bilaterally. Skin: turgor normal.  No new rashes or lesions. Neurologic: Agitated, despite multiple sedation, on the vent dyssynchronous with LE clonus present     labs:     Recent Labs     02/21/21 2141   WBC 31.2*   HGB 14.7   HCT 47.1        Recent Labs     02/21/21 2141   *   K 4.8   CL 93*   CO2 12*   BUN 10   CREATININE 1.0   CALCIUM 10.4   PHOS 8.2*     Recent Labs     02/21/21 2141   AST 48*   ALT 44*   BILITOT <0.2   ALKPHOS 139*     Recent Labs     02/21/21 2253   INR 1.00     Recent Labs     02/21/21 2141   Wilman Dilling <0.01       Urinalysis:      Lab Results   Component Value Date    NITRU Negative 02/21/2021    WBCUA 0-2 02/21/2021    BACTERIA Rare 01/19/2020    RBCUA 11-20 02/21/2021    BLOODU MODERATE 02/21/2021    SPECGRAV >=1.030 02/21/2021    GLUCOSEU Negative 02/21/2021       Radiology:     CXR: I have reviewed the CXR with the following interpretation:   Worsening diffuse airspace disease  EKG:  I have reviewed the EKG with the following interpretation:   Sinus bradycardia with LVH and   XR CHEST PORTABLE   Final Result   1. Interval placement of a right IJ central line, tip at the junction of the   SVC and right atrium   2. No evidence of a pneumothorax   3. Marked interval worsening of diffuse airspace disease, which may be on the   basis of aspiration or edema. 4. NG tube in place. Proximal port is within the distal esophagus, proximal   to the GE junction         CT CHEST PULMONARY EMBOLISM W CONTRAST   Final Result   1. No evidence of pulmonary embolism   2. Extensive airspace disease within the lower lobes bilaterally, and   posterior segment of the upper lobes, right greater than left. Given the   clinical history, changes are highly suspicious for aspiration pneumonia. 3. ET tube and NG tube in place   4. 4 mm nodule, left upper lobe.    5. No evidence of a pneumothorax      RECOMMENDATIONS: Fleischner Society guidelines for follow-up and management of incidentally   detected pulmonary nodules:      Single Solid Nodule:      Nodule size less than 6 mm: In a low-risk patient, no routine follow-up. - Low risk patients include individuals with minimal or absent history of   smoking and other known risk factors. - High risk patients include individuals with a history or smoking or known   risk factors. Reference: Radiology 2017   http://pubs. rsna.org/doi/full/10.1148/radiol. 2078475990         CT Head WO Contrast   Final Result   No acute intracranial abnormality. Chronic pansinusitis         XR CHEST PORTABLE   Final Result   1. No acute cardiopulmonary disease. 2. The endotracheal tube tip is located 6.7 cm above the lelo. 3. Incomplete visualization of the orogastric tube. ASSESSMENT AND PLAN:    Active Hospital Problems    Diagnosis Date Noted    Seizure [facility]) [R56.9] 02/22/2021     Acute encephalopathy: Multifactorial toxic, metabolic  with suspected status epilepticus  Despite maxed on propofol, multiple Versed pushes Precedex patient continues to have clonus agitated continues to move. EEG, neurology consulted    Acute respiratory failure hypoxic:  Secondary to seizure disorder, pneumonia  CTA negative PE  Intubated sedated     Septic shock:  Unclear etiology given drug use tachycardia and leukocytosis  We will obtain 3 blood cultures and echocardiogram to rule out endocarditis. Unresponsive to 30 mL/kg, continues to require Levophed and vasopressin. IV Vanco, cefepime    Pneumonia: likely aspiration  Evident on CT  Broad-spectrum antibiotic initiated  Respiratory culture, MRSA, blood cultures and echo pending    Suspected status epilepticus: patient sedation was gradually continued to work on throughout the night gradually increasing. Currently on Versed, propofol, precedex, and fentanyl drips-patient still breathing over the vent.   EEG, neurology consulted    Severe acidosis:  Secondary to lactic acidosis and sepsis  IVF and recheck    NSTEMI: Unclear etiology at this time  Reported to me in the ED was having narrow complex tachycardia that progressed to wide-complex tachycardia significantly improved to sinus tachycardia. Cardiology was contacted recommended metoprolol. Diet: NPO except meds ordered    DVT Prophylaxis: Heparin drip    Dispo:   Expected LOS greater than two midnights     Abigail Leyva DO     Due to the immediate potential for life-threatening deterioration due to shock, acute respiratory failure asynchronous vent, status epilepticus, severe acidosis, I spent 79 minutes providing critical care. This time is excluding time spent performing procedures.

## 2021-02-22 NOTE — ED NOTES
Spoke with Dr. Gladys Manjarrez about decreasing blood pressure. Orders placed for fluid bolus, Vasopressin and Levophed and okay with stopping Propfol momentarily until blood pressure is adjusted.       Naomi Roach RN  02/22/21 0568

## 2021-02-22 NOTE — CONSULTS
In patient Neurology consult        [facility] Neurology      Celina Rosario MD      Jessica Solis  1990    Date of Service: 2/22/2021    Referring Physician: Nikolas Feldman MD      Reason for the consult and CC: New onset seizure and acute encephalopathy    HPI:   Most of the history was obtained from chart reviewing and discussion with the patient's nurse and his mother as the patient is currently intubated and sedated. The patient is a 32y.o. years old male with history of drug abuse, seizure disorder, hepatitis C who was admitted to the hospital yesterday with chest pain and possible seizure. Symptoms started few hours prior to admission. According to report, he was with his girlfriend when he had a witnessed seizure. Description was generalized tonic-clonic seizure for few minutes. Degree was severe. No aura or warning. ? Focal onset. No other associated symptoms, relieving or aggravating factors when paramedics arrived he had a second seizure and he was given Versed. Patient became obtunded through his way to the ED and he was eventually intubated for airway protection admitted to the ICU. He is currently intubated and sedated. Initial work-up with blood test revealed elevated troponin with hyponatremia and hyperglycemia. CK was normal.  UDS was positive for amphetamine, benzodiazepine, cannabinoids, methadone and opiate. He had significant leukocytosis with white count of 30. LP was not performed in the ED for unclear reason. He is currently on antibiotics. CT head showed no acute findings. No more seizure overnight. He is currently sedated. He is currently sedated on propofol and Versed in addition to Keppra 500 mg twice daily. He is on antibiotics. Other review of system was limited.     Family history: Uncle with history of epilepsy    Past Medical History:   Diagnosis Date    [facility] ([facility] [address]     NSTEMI (non-ST elevated myocardial infarction) ([facility] [address]     Seizures ([facility] [address] Past Surgical History:   Procedure Laterality Date    HAND SURGERY      RIGHT HAND FINGERS INJURY, PARTIAL AMPUTATIONS     Social History     Tobacco Use    Smoking status: Current Every Day Smoker     Packs/day: 2.00     Types: Cigarettes    Smokeless tobacco: Never Used   Substance Use Topics    Alcohol use: No    Drug use: Not Currently     Types: IV, Opiates      Comment: prior heroin user; last use 4 years ago per mom as of 9/23/20     No Known Allergies  Current Facility-Administered Medications   Medication Dose Route Frequency Provider Last Rate Last Admin    sodium chloride flush 0.9 % injection 10 mL  10 mL Intravenous 2 times per day Kirsten Leyva, DO        sodium chloride flush 0.9 % injection 10 mL  10 mL Intravenous PRN Kirsten Leyva, DO        promethazine (PHENERGAN) tablet 12.5 mg  12.5 mg Oral Q6H PRN Vanessad APRIL Leyva, DO        Or    ondansetron (ZOFRAN) injection 4 mg  4 mg Intravenous Q6H PRN Vanessad APRIL Leyva, DO        polyethylene glycol (GLYCOLAX) packet 17 g  17 g Oral Daily PRN Jeimy Leyva, DO        acetaminophen (TYLENOL) tablet 650 mg  650 mg Oral Q6H PRN Vanessad APRIL Leyva, DO        Or    acetaminophen (TYLENOL) suppository 650 mg  650 mg Rectal Q6H PRN Kirsten Leyva, DO        cefepime (MAXIPIME) 2000 mg IVPB minibag  2,000 mg Intravenous Q12H Ahmad APRIL Leyva, DO   Stopped at 02/22/21 0333    perflutren lipid microspheres (DEFINITY) injection 1.65 mg  1.5 mL Intravenous ONCE PRN Abigail Leyva, DO        vancomycin 1000 mg IVPB in 250 mL D5W addavial  1,000 mg Intravenous Q12H Ahmad A Autumn, DO   Stopped at 02/22/21 0432    norepinephrine (LEVOPHED) 16 mg in dextrose 5 % 250 mL infusion  2-100 mcg/min Intravenous Continuous Marahmad A Autumn, DO 18.8 mL/hr at 02/22/21 0805 20 mcg/min at 02/22/21 0805    vasopressin 20 Units in dextrose 5 % 100 mL infusion  0.01-0.03 Units/min Intravenous Continuous Ahmad A Autumn, DO 6 mL/hr at 02/22/21 0643 0.02 Units/min at 02/22/21 5197    heparin (porcine) injection 3,440 Units  60 Units/kg Intravenous PRN Wesly Lucina A Autumn, DO        heparin (porcine) injection 1,720 Units  30 Units/kg Intravenous PRN Ahmad A Autumn, DO        heparin 25,000 unit in sodium chloride 0.45% 250 mL (premix) infusion  690 Units/hr Intravenous Continuous Ahmad A Autumn, DO 6.9 mL/hr at 02/22/21 0630 690 Units/hr at 02/22/21 0630    famotidine (PEPCID) injection 20 mg  20 mg Intravenous BID Martine Pizarro MD        chlorhexidine (PERIDEX) 0.12 % solution 15 mL  15 mL Mouth/Throat BID Martine Pizarro MD        carboxymethylcellulose PF (THERATEARS) 1 % ophthalmic gel 1 drop  1 drop Both Eyes 6 times per day Martine Pizarro MD        sodium zirconium cyclosilicate (LOKELMA) oral suspension 5 g  5 g Oral Once Martine Pizarro MD        levETIRAcetam (KEPPRA) 500 mg in sodium chloride 0.9 % 100 mL IVPB  500 mg Intravenous Q12H Martine Pizarro MD        propofol injection  5-50 mcg/kg/min Intravenous Titrated Presther Verdugo MD 17.7 mL/hr at 02/22/21 0356 50 mcg/kg/min at 02/22/21 0356    midazolam (VERSED) 100 mg in dextrose 5 % 100 mL infusion  1-10 mg/hr Intravenous Continuous Presther Verdugo MD 8 mL/hr at 02/22/21 0200 8 mg/hr at 02/22/21 0200    fentaNYL (SUBLIMAZE) 1,000 mcg in sodium chloride 0.9 % 100 mL infusion  12.5-200 mcg/hr Intravenous Continuous Presther Verdugo MD 35 mL/hr at 02/22/21 0544 350 mcg/hr at 02/22/21 0544    dexmedetomidine (PRECEDEX) 400 mcg in sodium chloride 0.9 % 100 mL infusion  0.2-1.4 mcg/kg/hr Intravenous Continuous Presther Verdugo MD 20.7 mL/hr at 02/22/21 0502 1.4 mcg/kg/hr at 02/22/21 0502       ROS: 10-12 ROS was Limited to obtain secondary to intubation and encephalopathy.   Otherwise rest per HPI     Exam:   Vitals:    02/22/21 0800 02/22/21 0809 02/22/21 0830 02/22/21 0900   BP: (!) 109/94   110/77   Pulse: 80 81  81   Resp: 18 17 18 18   Temp: 101.1 °F (38.4 °C)      TempSrc: Bladder      SpO2:       Weight: Height:         General appearance: intubated and sedated  Eye: No icterus  Fundus: Funduscopic examination could not be performed due to intubation and COVID-19 restrictions. Neck: supple  Cardiovascular: No lower leg edema with good pulsation. No carotid bruit. No abnormal heart sounds  Mental Status: intubated. No motor response to painful stimulation. Unable to assess rest of mental status exam due to intubation and sedation. Cranial Nerves:   Pupil: RRR  No gaze preference  OCR: present  Corneal: No  Cough: Yes  Gag: Yes  Spontaneous breathing: Yes  II: Pupils: equal, round, reactive to light  III,IV,VI: No gaze preference. VII: Face is symmetric   CN from VIII to XII: Cannot be examined due to intubation  Musculoskeletal: no motor response  Tone: normal  Reflexes: diminished but symmetric   Planters: mute  Coordination: Sensation and Gait/Posture: Cannot be examined due to intubation. Data:  LABS:   Lab Results   Component Value Date     02/22/2021    K 5.7 02/22/2021     02/22/2021    CO2 23 02/22/2021    BUN 12 02/22/2021    CREATININE 0.7 02/22/2021    GFRAA >60 02/22/2021    LABGLOM >60 02/22/2021    GLUCOSE 146 02/22/2021    PHOS 8.2 02/21/2021    MG 2.50 02/21/2021    CALCIUM 7.8 02/22/2021     Lab Results   Component Value Date    WBC 30.9 02/22/2021    RBC 4.62 02/22/2021    HGB 14.2 02/22/2021    HCT 42.8 02/22/2021    MCV 92.6 02/22/2021    RDW 13.7 02/22/2021     02/22/2021     Lab Results   Component Value Date    INR 1.09 02/22/2021    PROTIME 12.6 02/22/2021       Neuroimaging were independently reviewed by me  Reviewed notes from different physicians  Reviewed lab and blood testing  Reviewed outside records from . He was seen by  just recently. History of medical refractory epilepsy since 2019. Questionable related to traumatic brain injury. Prior EMU evaluation showed right temporal epileptiform discharges.   He was admitted to  in the past with status epilepticus. Impression:  Acute encephalopathy and breakthrough seizure, severe. Patient has significant medical comorbid illnesses with history of medical refractory epilepsy and status epilepticus in the past.  Significant leukocytosis on admission and LP was not performed for unclear reason. He is now intubated and sedated. He is currently on Keppra in addition to other sedatives. Now with elevated troponin. Routine EEG from today showed diffuse encephalopathy and no evidence of EEG seizures. CNS infection cannot be ruled out or other causes  for sepsis. IVDA  Medical refractory epilepsy  Acute respiratory failure with hypoxia  Sepsis      Recommendation:  Will increase Keppra to 1000 mg twice a day  ID work-up with panculture  Consider COVID-19 PCR  Respiratory support  Low threshold for for LP if if no improvement on leukocytosis or persistent fever in the face medically stable for such procedure. Continue antibiotics  Follow troponin  Follow electrolytes  Blood sugar monitor  Follow CBC  If there is any witnessed clinical seizure or concerns for seizure-like activity, the patient should be transferred to  where he was seen in the past for continuous EEG recording and concern for status epilepticus  Discussed with ICU nurse and his mother  MDM: High complexity due to concern for persistent encephalopathy, status epilepticus, decompensation or even multiorgan failure. Thank you for referring such patient. If you have any questions regarding my consult note, please don't hesitate to call me. Naomi Vazquez MD  [phone]    This dictation was generated by voice recognition computer software.  Although all attempts are made to edit the dictation for accuracy, there may be errors in the  transcription that are not intended

## 2021-02-22 NOTE — PROGRESS NOTES
02/22/21 0221   Vent Information   Vent Type 980   Vent Mode AC/VC   Vt Ordered 500 mL   Rate Set 18 bmp   Peak Flow 60 L/min   Pressure Support 0 cmH20   FiO2  100 %   Sensitivity 3   PEEP/CPAP 20   Vent Patient Data   Peak Inspiratory Pressure 32 cmH2O   Mean Airway Pressure 24 cmH20   Rate Measured 26 br/min   Vt Exhaled 518 mL   Minute Volume 13.3 Liters   I:E Ratio 1:1.50   Cough/Sputum   Sputum How Obtained Endotracheal;Suctioned   Cough Productive   Sputum Amount Large   Sputum Color Frothy;Red; White   Tenacity Frothy   Spontaneous Breathing Trial (SBT) RT Doc   Pulse 105   Additional Respiratory  Assessments   Resp 18   Alarm Settings   High Pressure Alarm 53 cmH2O   Low Minute Volume Alarm 2 L/min   High Respiratory Rate 47 br/min

## 2021-02-22 NOTE — PROCEDURES
Procedure: Bronchoscopy with therapeutic aspiration    Indication: Acute respiratory failure, bilateral diffuse infiltrates with CT showing typical distribution for aspiration pneumonia. Status epilepticus. Procedure details: Consent was obtained from the patient's mother. The patient was placed on the ventilator earlier, FiO2 was turned up to 100%. A timeout was performed. The patient was already on sedation, no supplemental sedation was used. An adapter was attached to the endotracheal tube and a lubricated bronchoscope passed through it. The visualized trachea was normal, the lelo sharp and central.  Mucopurulent plugs were present in the lower lobes bilaterally, however there was no aspirated food particle or purulence noted. There were no endobronchial lesions or anatomical abnormality. Using large volumes of saline, therapeutic aspiration was performed and the airway cleared secretions. At this point, confirming hemostasis and suctioning on the way out, the bronchoscope was gradually withdrawn and the procedure terminated. The patient tolerated the procedure well. Blood loss 0 mL. I discussed findings with the patient's mother. Await results of cultures.

## 2021-02-22 NOTE — PROGRESS NOTES
02/22/21 0809   Vent Information   Vent Type 980   Vent Mode AC/VC   Vt Ordered 500 mL   Rate Set 18 bmp   Peak Flow 60 L/min   Pressure Support 0 cmH20   FiO2  80 %   Sensitivity 3   PEEP/CPAP 15   Humidification Source HME  (changed)   Vent Patient Data   Peak Inspiratory Pressure 29 cmH2O   Mean Airway Pressure 20 cmH20   Rate Measured 18 br/min   Vt Exhaled 561 mL   Minute Volume 9.69 Liters   I:E Ratio 1:2.70   Cough/Sputum   Sputum How Obtained Suctioned;Endotracheal   Cough Non-productive   Spontaneous Breathing Trial (SBT) RT Doc   Pulse 81   Breath Sounds   Right Upper Lobe Rhonchi   Right Middle Lobe Diminished   Right Lower Lobe Diminished   Left Upper Lobe Rhonchi   Left Lower Lobe Diminished   Additional Respiratory  Assessments   Resp 17   Position Semi-Saldivar's   Alarm Settings   High Pressure Alarm 53 cmH2O   Low Minute Volume Alarm 2 L/min   Apnea (secs) 20 secs   High Respiratory Rate 47 br/min   Low Exhaled Vt  200 mL   Non-Surgical Airway Endo Tracheal Tube   Placement Date/Time: 02/21/21 2146   Mask Ventilation: Nasal airway  Placed By: In ED  Inserted by: Dr. Cindy Clark  Insertion attempts: 1  Airway Device: Endo Tracheal Tube  Size: 8  Placement Verified By[de-identified] Auscultation;Colorimetric EtCO2 device   Secured at 25 cm   Measured From [facility] By [employer] tube george   Site Condition Dry   Cuff Pressure 30 cm H2O

## 2021-02-22 NOTE — PROCEDURES
2/22/2021     Patient: Riana Kohler    MR Number: [id number]  YOB: 1990  Date of Visit: 2/22/2021    Clinical History:    The patient is a 32y.o. years old male with acute encephalopathy and recurrent seizures. Medications reviewed. Method: The EEG was performed utilizing the international 10/20 of electrode placements of both referential and bipolar montages. The patient is in coma through out the recording. Findings: The background of the EEG showed generalized diffuse slowing throughout the recording with burst suppression for 3-4 seconds seperated by diffuse slowing which was non rhythmical and symmetric. No EEG seizures, or spike or sharp waves. Impression: This EEG is abnormal. The generalized diffuse slowing is suggestive of severe diffuse encephalopathy. There is no evidence of epileptiform discharges, focal, or lateralizing abnormalities.         Maranda Todd MD      Board certified in clinical neurophysiology

## 2021-02-22 NOTE — CONSULTS
PULMONARY AND CRITICAL CARE INPATIENT CONSULTATION      2/22/2021    Patient Name:  Jessica Solis       1990       Evaluation was requested by Dr. Av Niño regarding ICU admission and ventilator management. HPI: Patient is a 32 y.o. male who was admitted through the ER on 2/21/2021 with diagnoses of status epilepticus, bilateral aspiration pneumonia, septicemia, altered mental status, acute hypoxemic respiratory failure and new onset LBBB. History is obtained from the chart, the patient is sedated and intubated. No family at bedside. The patient is a 32year-old-year-old with prior history of drug abuse, seizure disorder, NSTEMI. The patient was reportedly with his girlfriend in a parking lot when he developed partial complex seizure, similar to his prior seizures and then developed a generalized tonic tonic seizure. EMS was called and patient was postictal.  He then had a second witnessed tonic-clonic seizure and was given Versed 5 mg. He did not return to his baseline and had been unresponsive since then. The patient had hypoxemia, was having low-grade fever. He underwent CT chest with PE protocol that showed bilateral extensive pneumonia and 4 mm left upper lobe nodule, CT head that showed chronic pansinusitis. He was then admitted to the ICU, placed on multiple medications for sedation including Versed and propofol fentanyl and Precedex due to agitation. He had leukocytosis and a temperature of 101 °F.  He developed hypotension and was placed on norepinephrine and vasopressin, vasopressin was weaned off this morning. Due to elevated troponin he was placed on heparin, cardiology consulted. He had lactic acidosis that has since improved. He had mild elevation of CK. The patient was placed on vancomycin and cefepime. His mother later provided history that he had pneumonia and was seen at another facility a couple of weeks ago, was not admitted.   He reportedly is a heavy smoker with chronic cough. Reportedly the patient is on methadone. There were no sick contacts. Invasive Lines:   CVC RIJ 2/22/2021    Art Line right radial 2/22/2021        MV: 2/22/2021    Recent Labs     02/21/21 2215 02/22/21 0218   PHART 7.137* 7.327*   PRP0YYY 46.0* 42.8   PO2ART 487.4* 71.0*       MV Settings:  Vent Mode: AC/VC Rate Set: 18 bmp/Vt Ordered: 500 mL/ [email or web address])    IV:   norepinephrine 20 mcg/min (02/22/21 0251)    vasopressin (Septic Shock) infusion 0.02 Units/min (02/22/21 0643)    cisatracurium (NIMBEX) infusion Stopped (02/22/21 0435)    heparin (PORCINE) Infusion 690 Units/hr (02/22/21 0630)    propofol 50 mcg/kg/min (02/22/21 0356)    midazolam 8 mg/hr (02/22/21 0200)    fentaNYL (SUBLIMAZE) infusion 350 mcg/hr (02/22/21 0544)    dexmedetomidine 1.4 mcg/kg/hr (02/22/21 0502)         ROS: Not obtainable as the patient is sedated and intubated      Patient Active Problem List    Diagnosis Date Noted    Seizure ([facility] [address] 02/22/2021       Past Medical History:   Diagnosis Date    Drug abuse ([facility] [address]     NSTEMI (non-ST elevated myocardial infarction) ([facility] [address]     Seizures ([facility] [address]         Past Surgical History:   Procedure Laterality Date    HAND SURGERY      RIGHT HAND FINGERS INJURY, PARTIAL AMPUTATIONS        History reviewed. No pertinent family history. Social History     Tobacco Use    Smoking status: Current Every Day Smoker     Packs/day: 2.00     Types: Cigarettes    Smokeless tobacco: Never Used   Substance Use Topics    Alcohol use: No        No Known Allergies      Vital Signs:  Blood pressure 115/73, pulse 81, temperature 100.8 °F (38.2 °C), temperature source Core, resp. rate 18, height 5' 11\" (1.803 m), weight 126 lb 5.2 oz (57.3 kg), SpO2 97 %.' on RA  Body mass index is 17.62 kg/m².   CVP:      Intake/Output Summary (Last 24 hours) at 2/22/2021 0738  Last data filed at 2/21/2021 2333  Gross per 24 hour   Intake 2100 ml   Output --   Net 2100 ml         PHYSICAL EXAM:  General: Tall, averagely nourished, sedated and intubated. Well developed, in no respiratory distress. Eyes:  No scleral icterus or periorbital edema. PERRL  Head: Atraumatic, normocephalic. ENMT: # 8  ETT, 25 cm at incisor level, OG/NG intact. No bleeding of lips or gums. Mucosa pink and dry. No ulceration. No oral candidiasis. Neck: No palpable goiter, no lymphadenopathy, no JVD. RIJ CVC. No tracheal deviation. No S/Q emphysema. Lymphadenopathy: No cervical, supraclavicular adenopathy. CV: S1, S2, no murmurs, gallops, clicks or rubs. Pulses palpable and symmetrical, strong. Capillary refills in nail beds are brisk. Respiratory: Clear to auscultation  Chest: Equal rise and fall of chest, mild pectus deformity. GI: Abdomen soft. No organomegaly. Bowel sounds present. : Wu intact. Skin: No rashes, lesions, bruises, induration, discharge. Color, texture, turgor normal.  Musculoskeletal: Supple, no contractures or muscle atrophy. No clubbing or cyanosis of nailbeds. No joint swelling, redness. Edema: None  Neuro: Detailed exam not performed, moves all extremities. Results:  CBC:   Recent Labs     02/21/21 2141 02/22/21  0218   WBC 31.2*  --    HGB 14.7 13.7   HCT 47.1  --    .0  --      --      BMP:   Recent Labs     02/21/21 2141   *   K 4.8   CL 93*   CO2 12*   PHOS 8.2*   BUN 10   CREATININE 1.0       Imaging:  I have reviewed radiology images personally. Ct Head Wo Contrast    Result Date: 2/21/2021  No acute intracranial abnormality. Chronic pansinusitis     Xr Chest Portable    Result Date: 2/22/2021  1. Interval placement of a right IJ central line, tip at the junction of the SVC and right atrium 2. No evidence of a pneumothorax 3. Marked interval worsening of diffuse airspace disease, which may be on the basis of aspiration or edema. 4. NG tube in place.   Proximal port is within the distal esophagus, proximal to the GE junction     Xr Chest Portable    Result Date: 2/21/2021  1. No acute cardiopulmonary disease. 2. The endotracheal tube tip is located 6.7 cm above the lelo. 3. Incomplete visualization of the orogastric tube. Ct Chest Pulmonary Embolism W Contrast    Result Date: 2/21/2021  1. No evidence of pulmonary embolism 2. Extensive airspace disease within the lower lobes bilaterally, and posterior segment of the upper lobes, right greater than left. Given the clinical history, changes are highly suspicious for aspiration pneumonia. 3. ET tube and NG tube in place 4. 4 mm nodule, left upper lobe. 5. No evidence of a pneumothorax RECOMMENDATIONS: Fleischner Society guidelines for follow-up and management of incidentally detected pulmonary nodules: Single Solid Nodule: Nodule size less than 6 mm: In a low-risk patient, no routine follow-up. - Low risk patients include individuals with minimal or absent history of smoking and other known risk factors. - High risk patients include individuals with a history or smoking or known risk factors. Reference: Radiology 2017 http://pubs. rsna.org/doi/full/10.1148/radiol. 2396190351        ASSESSMENT AND PLAN:  Acute respiratory failure, hypoxemic and hypercarbic. PCO2 improved. Pneumonia versus pulmonary edema, possible ARDS. He has had fever, leukocytosis. CT suggests aspiration pneumonia. Recommend diagnostic/therapeutic bronchoscopy. Will obtain consent from his family. On cefepime and vancomycin. Septic shock, elevated lactic acid. Vasopressor requirement has decreased, lactic acid has improved, could be contributed to by the seizure. Continue vasopressors to maintain MAP >84 mmHg  Metabolic acidosis. Probably from lactic acidosis  Seizure. On Keppra, neurology consulted  Hyperglycemia. Sliding scale if persistently high  Abnormal LFT. Could be part of septic shock, he could have hepatitis C  Leukocytosis. Probably due to sepsis, follow profile. Left upper lung nodule.   Probably incidental finding. Past drug abuse, NSTEMI, chronic hepatitis C, mood disorder. Per IM  Smoker. Address once extubated, reportedly he is a heavy smoker  DVT prophylaxis. On heparin infusion  PUD prophylaxis. Pepcid      Echocardiogram: Pending  PFT: None in EMR    I have examined this patient and available medical records, including all the radiographic and all relevant lab data/studies personally on this date and have made the above observations, conclusions and recommendations. Multidisciplinary rounds were completed at the bedside with participation from the intensivist, pharmacy, nursing, nutrition. All labs and imaging studies reviewed, discussed. Due to the immediate potential for life-threatening deterioration due to respiratory failure, status epilepticus, septic shock, I spent 36 minutes providing critical care. This time is excluding time spent performing procedures. Thank you for the consultation. We will continue to follow with you.       Electronically signed by:  Chuck Huerta MD    2/22/2021    7:38 AM.

## 2021-02-22 NOTE — ED NOTES
[facility] [place] @7400  Per Dr.Conine RADHA Mireles @1935       [facility]  02/21/21 3505

## 2021-02-22 NOTE — ED PROVIDER NOTES
 HAND SURGERY      RIGHT HAND FINGERS INJURY, PARTIAL AMPUTATIONS         CURRENT MEDICATIONS       Previous Medications    ALBUTEROL SULFATE HFA (VENTOLIN HFA) 108 (90 BASE) MCG/ACT INHALER    Inhale 2 puffs into the lungs 4 times daily as needed for Wheezing    ASPIRIN 81 MG CHEWABLE TABLET    Take 81 mg by mouth daily (with breakfast)    IBUPROFEN (ADVIL;MOTRIN) 600 MG TABLET    Take 1 tablet by mouth 3 times daily as needed for Pain    LORAZEPAM (ATIVAN) 0.5 MG TABLET    Take 0.5 mg by mouth 2 times daily. METHADONE (DOLOPHINE) 10 MG/ML SOLUTION    Take 85 mg by mouth daily. OXCARBAZEPINE (TRILEPTAL) 300 MG TABLET    Take 600 mg by mouth 2 times daily       ALLERGIES     Patient has no known allergies. FAMILY HISTORY     History reviewed. No pertinent family history.        SOCIAL HISTORY       Social History     Socioeconomic History    Marital status: Single     Spouse name: None    Number of children: None    Years of education: None    Highest education level: None   Occupational History    None   Social Needs    Financial resource strain: None    Food insecurity     Worry: None     Inability: None    Transportation needs     Medical: None     Non-medical: None   Tobacco Use    Smoking status: Current Every Day Smoker     Packs/day: 2.00     Types: Cigarettes    Smokeless tobacco: Never Used   Substance and Sexual Activity    Alcohol use: No    Drug use: Not Currently     Types: IV, Opiates      Comment: prior heroin user; last use 4 years ago per mom as of 9/23/20    Sexual activity: None   Lifestyle    Physical activity     Days per week: None     Minutes per session: None    Stress: None   Relationships    Social connections     Talks on phone: None     Gets together: None     Attends Nondenominational service: None     Active member of club or organization: None     Attends meetings of clubs or organizations: None     Relationship status: None    Intimate partner violence     Fear of current or ex partner: None     Emotionally abused: None     Physically abused: None     Forced sexual activity: None   Other Topics Concern    None   Social History Narrative    None       SCREENINGS               PHYSICAL EXAM    (up to 7 for level 4, 8 or more for level 5)     ED Triage Vitals [02/21/21 2134]   BP Temp Temp src Pulse Resp SpO2 Height Weight   (!) 166/119 -- -- 125 30 (!) 88 % 5' 11\" (1.803 m) 130 lb (59 kg)       Physical Exam    General appearance: Unresponsive  Skin:  Warm. Dry. Eye:  Extraocular movements intact. Pupils are equally round and reactive to light and accommodation. Extraocular motions are intact. Ears, nose, mouth and throat:  Oral mucosa moist,  Neck:  Trachea midline. Heart: Tachycardic but regular  Perfusion:  intact  Respiratory: Increased work of breathing with sonorous respirations and rhonchorous breath sounds throughout. Abdominal:   Non distended. Nontender  Neurological: Unresponsive. Does not follow commands. Does spontaneously move all 4 extremities however.   Musculoskeletal:   Normal ROM, no deformities        DIAGNOSTIC RESULTS       Labs Reviewed   CBC WITH AUTO DIFFERENTIAL - Abnormal; Notable for the following components:       Result Value    WBC 31.2 (*)     All other components within normal limits    Narrative:     CALL  [place]  Marian Bernabe [phone],  Rejected Test Name/Called to:pt,ptt/Dina Erickson RN, 02/21/2021 22:11, by  Dharmesh Hernandez  Performed at:  [address],  [place], [address]   Phone [phone]   COMPREHENSIVE METABOLIC PANEL W/ REFLEX TO MG FOR LOW K - Abnormal; Notable for the following components:    Sodium 135 (*)     Chloride 93 (*)     CO2 12 (*)     Anion Gap 30 (*)     Glucose 216 (*)     Total Protein 8.7 (*)     Alkaline Phosphatase 139 (*)     ALT 44 (*)     AST 48 (*)     All other components within normal limits    Narrative:     CALL  [place]  BENITEZ tel. [phone],  Rejected Test Name/Called to:pt,ptt/Dina Foreman, 02/21/2021 22:11, by  [facility] Lorri Zelaya [phone],  Chemistry results called to and read back by Blas Swanson RN, 02/21/2021  22:25, by [facility]  Performed at:  [address],  [place], [address]   Phone [phone]   URINE RT REFLEX TO CULTURE - Abnormal; Notable for the following components:    Clarity, UA SL CLOUDY (*)     Blood, Urine MODERATE (*)     Protein,  (*)     All other components within normal limits    Narrative:     Performed at:  [address],  [place], [address]   Phone [phone]   [address] - Abnormal; Notable for the following components:    Amphetamine Screen, Urine POSITIVE (*)     Benzodiazepine Screen, Urine POSITIVE (*)     Cannabinoid Scrn, Ur POSITIVE (*)     Opiate Scrn, Ur POSITIVE (*)     Methadone Screen, Urine POSITIVE (*)     All other components within normal limits    Narrative:     Performed at:  [address],  [place], [address]   Phone [phone]   LACTATE, SEPSIS - Abnormal; Notable for the following components:    Lactic Acid, Sepsis 18.3 (*)     All other components within normal limits    Narrative:     George Alvarenga tel. [phone],  Rejected Test Name/Called to:pt,ptt/Dina Kumar RN, 02/21/2021 22:11, by  [facility] tel. [phone],  Chemistry results called to and read back by Blas Swanson RN, 02/21/2021  22:25, by [facility]  Performed at:  [address],  [place], [address]   Phone [phone]   BLOOD GAS, ARTERIAL - Abnormal; Notable for the following components:    pH, Arterial 7.137 (*)     pCO2, Arterial 46.0 (*)     pO2, Arterial 487.4 (*)     HCO3, Arterial 15.2 (*)     Base Excess, Arterial -13.6 (*)     Carboxyhgb, Arterial 5.3 (*)     All other components within normal limits [phone]   PROTIME-INR    Narrative:     Performed at:  [facility]) [facility]  [address] [place] [address],  [place], [address] [place] [address]   Phone [phone]   LACTATE, SEPSIS       Interpretation per the Radiologist below, if obtained/available at the time of this note:    CT CHEST PULMONARY EMBOLISM W CONTRAST   Final Result   1. No evidence of pulmonary embolism   2. Extensive airspace disease within the lower lobes bilaterally, and   posterior segment of the upper lobes, right greater than left. Given the   clinical history, changes are highly suspicious for aspiration pneumonia. 3. ET tube and NG tube in place   4. 4 mm nodule, left upper lobe. 5. No evidence of a pneumothorax      RECOMMENDATIONS:   Fleischner Society guidelines for follow-up and management of incidentally   detected pulmonary nodules:      Single Solid Nodule:      Nodule size less than 6 mm: In a low-risk patient, no routine follow-up. - Low risk patients include individuals with minimal or absent history of   smoking and other known risk factors. - High risk patients include individuals with a history or smoking or known   risk factors. Reference: Radiology 2017   http://pubs. rsna.org/doi/full/10.1148/radiol. 1252443923         CT Head WO Contrast   Final Result   No acute intracranial abnormality. Chronic pansinusitis         XR CHEST PORTABLE   Preliminary Result   1. No acute cardiopulmonary disease. 2. The endotracheal tube tip is located 6.7 cm above the lelo. 3. Incomplete visualization of the orogastric tube. All other labs/imaging were within normal range or not returned as of this dictation.     EMERGENCY DEPARTMENT COURSE and DIFFERENTIAL DIAGNOSIS/MDM:   Vitals:    Vitals:    02/21/21 2246 02/21/21 2300 02/21/21 2311 02/21/21 2316   BP: (!) 171/130  (!) 164/134 (!) 160/125   Pulse: 169 158 163 157   Resp: 18 15 17 (!) 31   SpO2: 95% 99% 97% 97%   Weight:       Height: EKG #1: Sinus tachycardia at a rate of 122 bpm.  Left ventricular hypertrophy with repolarization abnormality seen laterally. EKG #2: Sinus tachycardia rate of 128 bpm with left atrial enlargement left axis deviation left bundle branch block. Left bundle branch block appears new. EKG #3 shows sinus tachycardia rate of 168 bpm with left bundle branch block. Patient presented to the emergency department today after multiple witnessed tonic-clonic seizures. Was not seizing upon arrival but did meet criteria for status epilepticus due to multiple seizures without returning to baseline in between. Received Versed per EMS. Was initially hypoxic with oxygen saturations in the high 80s on room air requiring nasal cannula oxygen. Patient with a very poor shallow respirations and rhonchorous breath sounds. He is on methadone has a history of IV drug abuse and was given a dose of Narcan. Fortunately, patient became more agitated after this but certainly not more awake. He became much more tachycardic much more agitated but was not following commands. He remained hypoxic. While he initially presented with sinus tachycardia and a narrow complex rhythm after receiving Narcan, he converted to a left bundle branch block that was seen to remain in sinus tachycardia. Patient continued to have this wide-complex tachycardia that appeared consistent with sinus tachycardia with a left bundle. I did discuss this with cardiology who recommended giving 1 dose of IV metoprolol. After administration of this the patient converted to a narrow complex sinus rhythm between 90 and 100. Blood pressure held steady patient had no further arrhythmia here. Due to his continued hypoxia with failure to protect his airway I did elect to intubate the patient. This was performed without difficulty.   He remained quite agitated here and restless and required multiple different sedative medications including Versed, fentanyl, propofol, Precedex. These were all fairly ineffective and sedating the patient he did ultimately need to be paralyzed with rocuronium. Patient was found to have metabolic acidosis and lactic acidosis likely due to his seizures. Still has significant leukocytosis which could be due to the seizures but also is concerning for possible underlying infectious source. Chest x-ray was clear. Head CT did show signs of sinusitis. CT angiogram of the chest was performed due to continued hypoxia and ultimately showed likely aspiration pneumonia. Was started on antibiotics appropriate for this. Received IV fluids and treatment for possible underlying sepsis. Central line was placed. Patient was ultimately admitted to the hospital for continued monitoring and treatment. MDM    CONSULTS     IP CONSULT TO CARDIOLOGY  IP CONSULT TO HOSPITALIST    Critical Care:     CRITICAL CARE TIME   Total Critical Care time was 30 minutes, excluding separately reportable procedures. There was a high probability of clinically significant/life threatening deterioration in the patient's condition which required my urgent intervention. This time was spent reviewing the patient chart, interpreting diagnostic/laboratory data, administration of IV antibiotics, management of multiple different sedative medications, transfusion of large-volume IV fluids, consulting with multiple physicians treatment of the patient's status epilepticus      REASSESSMENT          PROCEDURE     Unless otherwise noted below, none     Intubation    Date/Time: 2/22/2021 6:43 AM  Performed by: Irwin Munson MD  Authorized by: Carly Brito MD     Consent:     Consent obtained:  Emergent situation  Pre-procedure details:     Patient status:  Unresponsive    Mallampati score: I    Pretreatment meds: Etomidate.     Paralytics:  Succinylcholine  Procedure details:     Preoxygenation:  Bag valve mask    CPR in progress: no      Intubation method:  Oral Oral intubation technique:  Direct    Laryngoscope blade: Mac 4    Tube size (mm):  8.0    Tube type:  Cuffed    Number of attempts:  1    Ventilation between attempts: no      Cricoid pressure: no      Tube visualized through cords: yes    Placement assessment:     ETT to lip:  25    Tube secured with:  ETT george    Breath sounds:  Equal and absent over the epigastrium    Placement verification: chest rise, condensation, CXR verification, equal breath sounds, ETCO2 detector and tube exhalation      CXR findings:  ETT in proper place  Post-procedure details:     Patient tolerance of procedure: Tolerated well, no immediate complications  Central Line    Date/Time: 2/22/2021 6:46 AM  Performed by: Irwin Munson MD  Authorized by: Carly Brito MD     Consent:     Consent obtained:  Emergent situation  Pre-procedure details:     Hand hygiene: Hand hygiene performed prior to insertion      Sterile barrier technique: All elements of maximal sterile technique followed      Skin preparation:  2% chlorhexidine    Skin preparation agent: Skin preparation agent completely dried prior to procedure    Anesthesia (see MAR for exact dosages): Anesthesia method:  Local infiltration    Local anesthetic:  Lidocaine 1% w/o epi  Procedure details:     Location:  R internal jugular    Patient position:  Trendelenburg    Procedural supplies:  Triple lumen    Catheter size:  7.5 Fr    Landmarks identified: yes      Ultrasound guidance: yes      Sterile ultrasound techniques: Sterile gel and sterile probe covers were used      Number of attempts:  1    Successful placement: yes    Post-procedure details:     Post-procedure:  Dressing applied and line sutured    Assessment:  Blood return through all ports, free fluid flow and no pneumothorax on x-ray    Patient tolerance of procedure: Tolerated well, no immediate complications          FINAL IMPRESSION      1. Status epilepticus ([address]    2. Septicemia ([address]    3.  Aspiration pneumonia of both lungs, unspecified aspiration pneumonia type, unspecified part of lung ([facility]    4. Altered mental status, unspecified altered mental status type    5. Acute respiratory failure with hypoxia (HCC)    6. New onset left bundle branch block (LBBB)            DISPOSITION/PLAN   DISPOSITION Decision To Admit 02/21/2021 11:41:09 PM        PATIENT REFERRED TO:  No follow-up provider specified. DISCHARGE MEDICATIONS:  New Prescriptions    No medications on file     Controlled Substances Monitoring:     No flowsheet data found.     (Please note that portions of this note were completed with a voice recognition program.  Efforts were made to edit the dictations but occasionally words are mis-transcribed.)    Lm Lopez MD (electronically signed)  Attending Emergency Physician            Rashel Martinez MD  02/22/21 7730

## 2021-02-22 NOTE — CONSULTS
Pharmacy to Dose Vancomycin    Dx: Sepsis  Goal trough = 15-20  Pt wt = 59 kg  Recent Labs     02/21/21 2141   CREATININE 1.0     Recent Labs     02/21/21 2141   WBC 31.2*     Vanc 1000 mg q12h  Vancomycin trough: 2/23 1200.   Maria Esther Vann 2/22/2021 12:21 AM

## 2021-02-22 NOTE — ED NOTES
Bed: 03  Expected date:   Expected time:   Means of arrival:   Comments:  [address], RN  02/21/21 0522

## 2021-02-22 NOTE — CONSULTS
Pharmacy to Manage Heparin Infusion per [facility]    Dx: NSTEMI  Pt wt = 57.3 kg  Baseline aPTT = 21.4 sec    Low Dose Heparin Infusion  Heparin 60 units/kg IVP bolus followed by Heparin infusion at 12 units/kg/hr. Recheck aPTT in 6 hours. Goal aPTT = 49-76 seconds.   Freddy Lauren, Pharm D.2/22/2021 5:25 AM

## 2021-02-22 NOTE — PROGRESS NOTES
02/22/21 1613   Vent Information   Vent Type 980   Vent Mode AC/VC   Vt Ordered 500 mL   Rate Set 18 bmp   Peak Flow 60 L/min   Pressure Support 0 cmH20   FiO2  50 %   Sensitivity 3   PEEP/CPAP 15   Vent Patient Data   Peak Inspiratory Pressure 30 cmH2O   Mean Airway Pressure 18 cmH20   Rate Measured 18 br/min   Vt Exhaled 523 mL   Minute Volume 9.38 Liters   I:E Ratio 1:2.70   Cough/Sputum   Sputum How Obtained None   Spontaneous Breathing Trial (SBT) RT Doc   Pulse 87   Breath Sounds   Right Upper Lobe Rhonchi   Right Middle Lobe Diminished   Right Lower Lobe Diminished   Left Upper Lobe Rhonchi   Left Lower Lobe Diminished   Additional Respiratory  Assessments   Resp 18   Alarm Settings   High Pressure Alarm 40 cmH2O   Low Minute Volume Alarm 3 L/min   Apnea (secs) 20 secs   High Respiratory Rate 40 br/min   Low Exhaled Vt  300 mL   Patient Observation   Observations ambu @ bedside   Non-Surgical Airway Endo Tracheal Tube   Placement Date/Time: 02/21/21 2146   Mask Ventilation: Nasal airway  Placed By: In ED  Inserted by: Dr. Jessee Garner  Insertion attempts: 1  Airway Device: Endo Tracheal Tube  Size: 8  Placement Verified By[de-identified] Auscultation;Colorimetric EtCO2 device   Secured at 25 cm   Measured From Lips   Secured Location Left  (moved to the right at this time.)   Secured By Commercial tube george   Site Condition Dry

## 2021-02-22 NOTE — CONSULTS
[facility]  [phone]      Attending Physician: Bita Hernandez MD  Reason for Consultation/Chief Complaint: elevated trops    Subjective   History of Present Illness:  Elizabeth Maldonado is a 32 y.o. patient who presented to the hospital with complaints of Seizure. Pt with  Hx of seizure since 2019 after tree branch fell on head. Pt not able to gvie hx as intubated and sedated. Mom give hx. Followed by  neuro and seizure meds being changed. Was on kepra and then [address] but reporting continued seizures and _ side effects. Moved to [id number] qday and plan to try Xcopri on 2/9/2021 visit. Mom states pt frequently has \"elevated trops\" following seizures      Past Medical History:   has a past medical history of Drug abuse ([facility] NSTEMI (non-ST elevated myocardial infarction) ([facility] [address] [facility] and Seizures ([facility] [address] Surgical History:   has a past surgical history that includes Hand surgery. Social History:   reports that he has been smoking cigarettes. He has been smoking about 2.00 packs per day. He has never used smokeless tobacco. He reports previous drug use. Drugs: IV and Opiates . He reports that he does not drink alcohol. Family History:  family history is not on file. Home Medications:  Were reviewed and are listed in nursing record and/or below  Prior to Admission medications    Medication Sig Start Date End Date Taking? Authorizing Provider   albuterol sulfate HFA (VENTOLIN HFA) 108 (90 Base) MCG/ACT inhaler Inhale 2 puffs into the lungs 4 times daily as needed for Wheezing 1/24/21   LEIGHTON Garcia CNP   ibuprofen (ADVIL;MOTRIN) 600 MG tablet Take 1 tablet by mouth 3 times daily as needed for Pain 1/24/21   LEIGHTON Garcia CNP   OXcarbazepine (TRILEPTAL) 300 MG tablet Take 600 mg by mouth 2 times daily 8/5/20   Historical Provider, MD   methadone (DOLOPHINE) 10 MG/ML solution Take 85 mg by mouth daily.     Historical Provider, MD   aspirin 81 MG chewable tablet Take 81 mg by mouth daily (with breakfast) 1/21/20   Historical Provider, MD   LORazepam (ATIVAN) 0.5 MG tablet Take 0.5 mg by mouth 2 times daily. 9/17/20   Historical Provider, MD        CURRENT Medications:      sodium chloride flush 0.9 % injection 10 mL, 2 times per day      sodium chloride flush 0.9 % injection 10 mL, PRN      promethazine (PHENERGAN) tablet 12.5 mg, Q6H PRN    Or      ondansetron (ZOFRAN) injection 4 mg, Q6H PRN      polyethylene glycol (GLYCOLAX) packet 17 g, Daily PRN      acetaminophen (TYLENOL) tablet 650 mg, Q6H PRN    Or      acetaminophen (TYLENOL) suppository 650 mg, Q6H PRN      cefepime (MAXIPIME) 2000 mg IVPB minibag, Q12H      perflutren lipid microspheres (DEFINITY) injection 1.65 mg, ONCE PRN      vancomycin 1000 mg IVPB in 250 mL D5W addavial, Q12H      norepinephrine (LEVOPHED) 16 mg in dextrose 5 % 250 mL infusion, Continuous      vasopressin 20 Units in dextrose 5 % 100 mL infusion, Continuous      heparin (porcine) injection 3,440 Units, PRN      heparin (porcine) injection 1,720 Units, PRN      heparin 25,000 unit in sodium chloride 0.45% 250 mL (premix) infusion, Continuous      famotidine (PEPCID) injection 20 mg, BID      chlorhexidine (PERIDEX) 0.12 % solution 15 mL, BID      carboxymethylcellulose PF (THERATEARS) 1 % ophthalmic gel 1 drop, 6 times per day      sodium zirconium cyclosilicate (LOKELMA) oral suspension 5 g, Once      levETIRAcetam (KEPPRA) 500 mg in sodium chloride 0.9 % 100 mL IVPB, Q12H      propofol injection, Titrated      midazolam (VERSED) 100 mg in dextrose 5 % 100 mL infusion, Continuous      fentaNYL (SUBLIMAZE) 1,000 mcg in sodium chloride 0.9 % 100 mL infusion, Continuous      dexmedetomidine (PRECEDEX) 400 mcg in sodium chloride 0.9 % 100 mL infusion, Continuous        Allergies:  Patient has no known allergies. Review of Systems:   A 14 point review of symptoms completed.  Pertinent positives identified in the HPI, all other review of symptoms negative as below. Objective   PHYSICAL EXAM:    Vitals:    02/22/21 0900   BP: 110/77   Pulse: 81   Resp: 18   Temp:    SpO2:     Weight: 126 lb 5.2 oz (57.3 kg)         General Appearance:  sedated, cooperative, no distress, appears stated age   Head:  Normocephalic, without obvious abnormality, atraumatic   Eyes:  PERRL, conjunctiva/corneas clear   Nose: Nares normal, no drainage or sinus tenderness   Throat: Lips, mucosa, and tongue normal   Neck: Supple, symmetrical, trachea midline, no adenopathy, thyroid: not enlarged, symmetric, no tenderness/mass/nodules, no carotid bruit or JVD   Lungs:   Clear to auscultation bilaterally, respirations unlabored   Chest Wall:  No deformity or tenderness   Heart:  Regular rate and rhythm, S1, S2 normal, no murmur, rub or gallop   Abdomen:   Soft, non-tender, bowel sounds active all four quadrants,  no masses, no organomegaly   Extremities:  coolers extremities.     Pulses: 2+ and symmetric   Skin: Skin color, texture, turgor normal, no rashes or lesions   Pysch: Normal mood and affect   Neurologic: Normal gross motor and sensory exam.         Labs   CBC:   Lab Results   Component Value Date    WBC 30.9 02/22/2021    RBC 4.62 02/22/2021    HGB 14.2 02/22/2021    HCT 42.8 02/22/2021    MCV 92.6 02/22/2021    RDW 13.7 02/22/2021     02/22/2021     CMP:  Lab Results   Component Value Date     02/22/2021    K 5.7 02/22/2021     02/22/2021    CO2 23 02/22/2021    BUN 12 02/22/2021    CREATININE 0.7 02/22/2021    GFRAA >60 02/22/2021    AGRATIO 1.4 02/22/2021    LABGLOM >60 02/22/2021    GLUCOSE 146 02/22/2021    PROT 6.4 02/22/2021    CALCIUM 7.8 02/22/2021    BILITOT 0.3 02/22/2021    ALKPHOS 106 02/22/2021    AST 40 02/22/2021    ALT 27 02/22/2021     PT/INR:  No results found for: PTINR  HgBA1c:No results found for: LABA1C  Lab Results   Component Value Date    CKTOTAL 273 02/22/2021    TROPONINI 0.70 (Scott Hawley) 02/22/2021 Cardiac Data     Last EK2021 2138 Sinus tach with Tall T waved, no ischemia/NSST changes  2021 2147 likely sinus tach, LBBB with + scarbosa  2021 1021 NSR with diffuse ST elevation c/w early repol but anterior leads can't r/o pericarditis    BEDSIDE ECHO:   LVEF\" ~25%, apical ballooning with preservation of basal segements. C/w Stress cardiomyopathy      Echo: 2020 [facility]  - Left ventricle: The cavity size is normal. Wall thickness is normal. Systolic function was normal.    The estimated ejection fraction was in the range of 55% to 60%. Wall motion was normal; there were    no regional wall motion abnormalities. Left ventricular diastolic function parameters were normal.  - Right ventricle: Systolic function was normal by objective interpretation. TAPSE: 2.7cm. Tricuspid    annular systolic velocity: 70FD/Y.  - Pericardium, extracardiac: There is a left pleural effusion. Stress Test:    Cath:    CARDIAC CTA: 3/10/2020    FINDINGS:  The left ventricle appears normal in size.  The right ventricle appears normal in size.  The atria are normal in size.  The left atrium receives two right and two left pulmonary veins without evidence of stenosis.  The left atrial appendage is chicken wing shaped without evidence of thrombus.  The aorta is normal in size.  The pulmonary artery is incompletely visualized.  The IVC is normal in size.  The coronary sinus is not well visualized. CORONARY ARTERIES:  The coronary arteries have normal origins and proximal courses.  The coronary system is right dominant.  The SA node artery originates from the right coronary artery. LEFT:    The left main artery is a moderate size vessel.  The left main artery has no significant disease.      The left anterior descending artery is a moderate size vessel.  The LAD has no significant disease. Angel[place] is a small, area of shallow bridging in the mid LAD.  There are three small and patent diagonal arteries.      The left circumflex artery is a moderate size vessel.  The LCx has no significant disease.  The first obtuse marginal artery is a moderate size, branching vessel that supplies a significant portion of the lateral wall.  The first obtuse marginal artery has no significant disease.  The second obtuse marginal artery has no significant disease.  The distal LCx is a small vessel that terminates in a small and patent OM. RIGHT:    The right coronary artery is a moderate size vessel.  The RCA has no significant disease.  The PDA is patent without significant disease in the visualized portion.  The PLV branch is small and patent.  The conus artery has an independent origin. PERICARDIUM:    There is no abnormal pericardial calcification. There is no pericardial effusion. Studies:   CXR:    1. Interval placement of a right IJ central line, tip at the junction of the   SVC and right atrium   2. No evidence of a pneumothorax   3. Marked interval worsening of diffuse airspace disease, which may be on the   basis of aspiration or edema. 4. NG tube in place.  Proximal port is within the distal esophagus, proximal   to the GE junction     CTPA  1. No evidence of pulmonary embolism   2. Extensive airspace disease within the lower lobes bilaterally, and   posterior segment of the upper lobes, right greater than left.  Given the   clinical history, changes are highly suspicious for aspiration pneumonia. 3. ET tube and NG tube in place   4. 4 mm nodule, left upper lobe. 5. No evidence of a pneumothorax         Assessment and Plan      1. Elevated trop/NSTEMI  2. Abnormal ECG  3. Acute systolic Cardimyopathy: concerning for Takosubo/Stress cardiomyopathy  4. Hx of tobacco abuse  5. Hx of TBI and subsequent Rt temporal seizures  6. Hep C,   7. Mood disorder  8. IVDU: on methadone    + Amphetamines, benzo, THC, opiates   ? Adderal-like \"phenermaine\"     PLAN  1. STAT ECG for ischemic changes.  Given age/hx,

## 2021-02-23 ENCOUNTER — APPOINTMENT (OUTPATIENT)
Dept: GENERAL RADIOLOGY | Age: 31
DRG: 720 | End: 2021-02-23
Payer: MEDICARE

## 2021-02-23 LAB
A/G RATIO: 1.1 (ref 1.1–2.2)
ALBUMIN SERPL-MCNC: 3.1 G/DL (ref 3.4–5)
ALP BLD-CCNC: 86 U/L (ref 40–129)
ALT SERPL-CCNC: 27 U/L (ref 10–40)
ANION GAP SERPL CALCULATED.3IONS-SCNC: 7 MMOL/L (ref 3–16)
APTT: 32 SEC (ref 24.2–36.2)
AST SERPL-CCNC: 34 U/L (ref 15–37)
BASE EXCESS ARTERIAL: -1.7 MMOL/L (ref -3–3)
BILIRUB SERPL-MCNC: 0.4 MG/DL (ref 0–1)
BUN BLDV-MCNC: 9 MG/DL (ref 7–20)
CALCIUM IONIZED: 0.96 MMOL/L (ref 1.12–1.32)
CALCIUM SERPL-MCNC: 8.3 MG/DL (ref 8.3–10.6)
CARBOXYHEMOGLOBIN ARTERIAL: 0.5 % (ref 0–1.5)
CHLORIDE BLD-SCNC: 102 MMOL/L (ref 99–110)
CO2: 25 MMOL/L (ref 21–32)
CREAT SERPL-MCNC: <0.5 MG/DL (ref 0.9–1.3)
EKG ATRIAL RATE: 81 BPM
EKG DIAGNOSIS: NORMAL
EKG P AXIS: 64 DEGREES
EKG P-R INTERVAL: 138 MS
EKG Q-T INTERVAL: 458 MS
EKG QRS DURATION: 94 MS
EKG QTC CALCULATION (BAZETT): 532 MS
EKG R AXIS: 78 DEGREES
EKG T AXIS: 264 DEGREES
EKG VENTRICULAR RATE: 81 BPM
ESTIMATED AVERAGE GLUCOSE: 105.4 MG/DL
GFR AFRICAN AMERICAN: >60
GFR NON-AFRICAN AMERICAN: >60
GLOBULIN: 2.7 G/DL
GLUCOSE BLD-MCNC: 102 MG/DL (ref 70–99)
GLUCOSE BLD-MCNC: 108 MG/DL (ref 70–99)
GLUCOSE BLD-MCNC: 112 MG/DL (ref 70–99)
GLUCOSE BLD-MCNC: 117 MG/DL (ref 70–99)
GLUCOSE BLD-MCNC: 138 MG/DL (ref 70–99)
GLUCOSE BLD-MCNC: 76 MG/DL (ref 70–99)
HBA1C MFR BLD: 5.3 %
HCO3 ARTERIAL: 23 MMOL/L (ref 21–29)
HCT VFR BLD CALC: 36.3 % (ref 40.5–52.5)
HEMOGLOBIN, ART, EXTENDED: 13.2 G/DL (ref 13.5–17.5)
HEMOGLOBIN: 12.2 G/DL (ref 13.5–17.5)
INR BLD: 1.22 (ref 0.86–1.14)
LACTIC ACID: 0.5 MMOL/L (ref 0.4–2)
MAGNESIUM: 1.8 MG/DL (ref 1.8–2.4)
MCH RBC QN AUTO: 30.9 PG (ref 26–34)
MCHC RBC AUTO-ENTMCNC: 33.7 G/DL (ref 31–36)
MCV RBC AUTO: 91.9 FL (ref 80–100)
METHEMOGLOBIN ARTERIAL: 0.5 %
O2 CONTENT ARTERIAL: 18 ML/DL
O2 SAT, ARTERIAL: 96.8 %
O2 THERAPY: ABNORMAL
PCO2 ARTERIAL: 38.9 MMHG (ref 35–45)
PDW BLD-RTO: 13.8 % (ref 12.4–15.4)
PERFORMED ON: ABNORMAL
PERFORMED ON: NORMAL
PH ARTERIAL: 7.39 (ref 7.35–7.45)
PH VENOUS: 7.41 (ref 7.35–7.45)
PHOSPHORUS: 2.3 MG/DL (ref 2.5–4.9)
PLATELET # BLD: 144 K/UL (ref 135–450)
PMV BLD AUTO: 7.7 FL (ref 5–10.5)
PO2 ARTERIAL: 91.1 MMHG (ref 75–108)
POTASSIUM REFLEX MAGNESIUM: 4 MMOL/L (ref 3.5–5.1)
PROTHROMBIN TIME: 14.2 SEC (ref 10–13.2)
RBC # BLD: 3.95 M/UL (ref 4.2–5.9)
SODIUM BLD-SCNC: 134 MMOL/L (ref 136–145)
TCO2 ARTERIAL: 24.2 MMOL/L
TOTAL CK: 342 U/L (ref 39–308)
TOTAL PROTEIN: 5.8 G/DL (ref 6.4–8.2)
TRIGL SERPL-MCNC: 104 MG/DL (ref 0–150)
TROPONIN: 0.13 NG/ML
WBC # BLD: 14.2 K/UL (ref 4–11)

## 2021-02-23 PROCEDURE — 4A023N7 MEASUREMENT OF CARDIAC SAMPLING AND PRESSURE, LEFT HEART, PERCUTANEOUS APPROACH: ICD-10-PCS | Performed by: INTERNAL MEDICINE

## 2021-02-23 PROCEDURE — 82330 ASSAY OF CALCIUM: CPT

## 2021-02-23 PROCEDURE — 71045 X-RAY EXAM CHEST 1 VIEW: CPT

## 2021-02-23 PROCEDURE — 93010 ELECTROCARDIOGRAM REPORT: CPT | Performed by: INTERNAL MEDICINE

## 2021-02-23 PROCEDURE — 2580000003 HC RX 258: Performed by: INTERNAL MEDICINE

## 2021-02-23 PROCEDURE — 84100 ASSAY OF PHOSPHORUS: CPT

## 2021-02-23 PROCEDURE — 6360000004 HC RX CONTRAST MEDICATION

## 2021-02-23 PROCEDURE — 94003 VENT MGMT INPAT SUBQ DAY: CPT

## 2021-02-23 PROCEDURE — 85610 PROTHROMBIN TIME: CPT

## 2021-02-23 PROCEDURE — 94761 N-INVAS EAR/PLS OXIMETRY MLT: CPT

## 2021-02-23 PROCEDURE — 6360000002 HC RX W HCPCS

## 2021-02-23 PROCEDURE — 84484 ASSAY OF TROPONIN QUANT: CPT

## 2021-02-23 PROCEDURE — 83605 ASSAY OF LACTIC ACID: CPT

## 2021-02-23 PROCEDURE — 82550 ASSAY OF CK (CPK): CPT

## 2021-02-23 PROCEDURE — 93458 L HRT ARTERY/VENTRICLE ANGIO: CPT | Performed by: INTERNAL MEDICINE

## 2021-02-23 PROCEDURE — 99291 CRITICAL CARE FIRST HOUR: CPT | Performed by: NURSE PRACTITIONER

## 2021-02-23 PROCEDURE — 83735 ASSAY OF MAGNESIUM: CPT

## 2021-02-23 PROCEDURE — 80053 COMPREHEN METABOLIC PANEL: CPT

## 2021-02-23 PROCEDURE — 82803 BLOOD GASES ANY COMBINATION: CPT

## 2021-02-23 PROCEDURE — 6360000002 HC RX W HCPCS: Performed by: INTERNAL MEDICINE

## 2021-02-23 PROCEDURE — 85730 THROMBOPLASTIN TIME PARTIAL: CPT

## 2021-02-23 PROCEDURE — 99233 SBSQ HOSP IP/OBS HIGH 50: CPT | Performed by: INTERNAL MEDICINE

## 2021-02-23 PROCEDURE — 99152 MOD SED SAME PHYS/QHP 5/>YRS: CPT | Performed by: INTERNAL MEDICINE

## 2021-02-23 PROCEDURE — 93005 ELECTROCARDIOGRAM TRACING: CPT | Performed by: NURSE PRACTITIONER

## 2021-02-23 PROCEDURE — 6360000002 HC RX W HCPCS: Performed by: EMERGENCY MEDICINE

## 2021-02-23 PROCEDURE — C1769 GUIDE WIRE: HCPCS

## 2021-02-23 PROCEDURE — 2000000000 HC ICU R&B

## 2021-02-23 PROCEDURE — 80074 ACUTE HEPATITIS PANEL: CPT

## 2021-02-23 PROCEDURE — 6360000002 HC RX W HCPCS: Performed by: NURSE PRACTITIONER

## 2021-02-23 PROCEDURE — C1894 INTRO/SHEATH, NON-LASER: HCPCS

## 2021-02-23 PROCEDURE — 2709999900 HC NON-CHARGEABLE SUPPLY

## 2021-02-23 PROCEDURE — 84478 ASSAY OF TRIGLYCERIDES: CPT

## 2021-02-23 PROCEDURE — 6370000000 HC RX 637 (ALT 250 FOR IP): Performed by: INTERNAL MEDICINE

## 2021-02-23 PROCEDURE — 36620 INSERTION CATHETER ARTERY: CPT

## 2021-02-23 PROCEDURE — B2111ZZ FLUOROSCOPY OF MULTIPLE CORONARY ARTERIES USING LOW OSMOLAR CONTRAST: ICD-10-PCS | Performed by: INTERNAL MEDICINE

## 2021-02-23 PROCEDURE — 2500000003 HC RX 250 WO HCPCS

## 2021-02-23 PROCEDURE — 99233 SBSQ HOSP IP/OBS HIGH 50: CPT | Performed by: PSYCHIATRY & NEUROLOGY

## 2021-02-23 PROCEDURE — 2580000003 HC RX 258: Performed by: EMERGENCY MEDICINE

## 2021-02-23 PROCEDURE — 6360000002 HC RX W HCPCS: Performed by: PSYCHIATRY & NEUROLOGY

## 2021-02-23 PROCEDURE — 2500000003 HC RX 250 WO HCPCS: Performed by: INTERNAL MEDICINE

## 2021-02-23 PROCEDURE — 85027 COMPLETE CBC AUTOMATED: CPT

## 2021-02-23 PROCEDURE — 93458 L HRT ARTERY/VENTRICLE ANGIO: CPT

## 2021-02-23 PROCEDURE — 2580000003 HC RX 258: Performed by: PSYCHIATRY & NEUROLOGY

## 2021-02-23 PROCEDURE — 2700000000 HC OXYGEN THERAPY PER DAY

## 2021-02-23 RX ORDER — SODIUM CHLORIDE 0.9 % (FLUSH) 0.9 %
10 SYRINGE (ML) INJECTION PRN
Status: DISCONTINUED | OUTPATIENT
Start: 2021-02-23 | End: 2021-02-24

## 2021-02-23 RX ORDER — LACTOBACILLUS RHAMNOSUS GG 10B CELL
1 CAPSULE ORAL
Status: DISCONTINUED | OUTPATIENT
Start: 2021-02-23 | End: 2021-02-27 | Stop reason: HOSPADM

## 2021-02-23 RX ORDER — METHADONE HYDROCHLORIDE 10 MG/1
40 TABLET ORAL DAILY
Status: DISCONTINUED | OUTPATIENT
Start: 2021-02-23 | End: 2021-02-26

## 2021-02-23 RX ORDER — SODIUM CHLORIDE 0.9 % (FLUSH) 0.9 %
10 SYRINGE (ML) INJECTION EVERY 12 HOURS SCHEDULED
Status: DISCONTINUED | OUTPATIENT
Start: 2021-02-23 | End: 2021-02-24

## 2021-02-23 RX ADMIN — PROPOFOL 50 MCG/KG/MIN: 10 INJECTION, EMULSION INTRAVENOUS at 06:48

## 2021-02-23 RX ADMIN — PROPOFOL 50 MCG/KG/MIN: 10 INJECTION, EMULSION INTRAVENOUS at 02:33

## 2021-02-23 RX ADMIN — MIDAZOLAM HYDROCHLORIDE 10 MG/HR: 5 INJECTION, SOLUTION INTRAMUSCULAR; INTRAVENOUS at 10:48

## 2021-02-23 RX ADMIN — CARBOXYMETHYLCELLULOSE SODIUM 1 DROP: 10 GEL OPHTHALMIC at 07:30

## 2021-02-23 RX ADMIN — Medication 15 ML: at 08:38

## 2021-02-23 RX ADMIN — LEVETIRACETAM 1000 MG: 100 INJECTION, SOLUTION INTRAVENOUS at 08:39

## 2021-02-23 RX ADMIN — AMPICILLIN SODIUM AND SULBACTAM SODIUM 3000 MG: 2; 1 INJECTION, POWDER, FOR SOLUTION INTRAMUSCULAR; INTRAVENOUS at 12:28

## 2021-02-23 RX ADMIN — FAMOTIDINE 20 MG: 10 INJECTION, SOLUTION INTRAVENOUS at 08:44

## 2021-02-23 RX ADMIN — PROPOFOL 50 MCG/KG/MIN: 10 INJECTION, EMULSION INTRAVENOUS at 18:18

## 2021-02-23 RX ADMIN — FENTANYL CITRATE 275 MCG/HR: 50 INJECTION, SOLUTION INTRAMUSCULAR; INTRAVENOUS at 08:07

## 2021-02-23 RX ADMIN — FAMOTIDINE 20 MG: 10 INJECTION, SOLUTION INTRAVENOUS at 20:43

## 2021-02-23 RX ADMIN — CARBOXYMETHYLCELLULOSE SODIUM 1 DROP: 10 GEL OPHTHALMIC at 04:22

## 2021-02-23 RX ADMIN — AMPICILLIN SODIUM AND SULBACTAM SODIUM 3000 MG: 2; 1 INJECTION, POWDER, FOR SOLUTION INTRAMUSCULAR; INTRAVENOUS at 18:11

## 2021-02-23 RX ADMIN — Medication 1 CAPSULE: at 12:30

## 2021-02-23 RX ADMIN — CARBOXYMETHYLCELLULOSE SODIUM 1 DROP: 10 GEL OPHTHALMIC at 18:11

## 2021-02-23 RX ADMIN — METHADONE HYDROCHLORIDE 40 MG: 10 TABLET ORAL at 12:30

## 2021-02-23 RX ADMIN — MIDAZOLAM HYDROCHLORIDE 10 MG/HR: 5 INJECTION, SOLUTION INTRAMUSCULAR; INTRAVENOUS at 19:50

## 2021-02-23 RX ADMIN — FENTANYL CITRATE 150 MCG/HR: 50 INJECTION, SOLUTION INTRAMUSCULAR; INTRAVENOUS at 19:48

## 2021-02-23 RX ADMIN — CARBOXYMETHYLCELLULOSE SODIUM 1 DROP: 10 GEL OPHTHALMIC at 11:37

## 2021-02-23 RX ADMIN — PROPOFOL 50 MCG/KG/MIN: 10 INJECTION, EMULSION INTRAVENOUS at 22:49

## 2021-02-23 RX ADMIN — CARBOXYMETHYLCELLULOSE SODIUM 1 DROP: 10 GEL OPHTHALMIC at 20:36

## 2021-02-23 RX ADMIN — FENTANYL CITRATE 300 MCG/HR: 50 INJECTION, SOLUTION INTRAMUSCULAR; INTRAVENOUS at 04:04

## 2021-02-23 RX ADMIN — Medication 10 ML: at 08:44

## 2021-02-23 RX ADMIN — VANCOMYCIN HYDROCHLORIDE 1000 MG: 1 INJECTION, POWDER, LYOPHILIZED, FOR SOLUTION INTRAVENOUS at 00:31

## 2021-02-23 RX ADMIN — Medication 15 ML: at 20:36

## 2021-02-23 RX ADMIN — FENTANYL CITRATE 150 MCG/HR: 50 INJECTION, SOLUTION INTRAMUSCULAR; INTRAVENOUS at 13:27

## 2021-02-23 RX ADMIN — FENTANYL CITRATE 225 MCG/HR: 50 INJECTION, SOLUTION INTRAMUSCULAR; INTRAVENOUS at 00:28

## 2021-02-23 RX ADMIN — AMPICILLIN SODIUM AND SULBACTAM SODIUM 3000 MG: 2; 1 INJECTION, POWDER, FOR SOLUTION INTRAMUSCULAR; INTRAVENOUS at 21:36

## 2021-02-23 RX ADMIN — CARBOXYMETHYLCELLULOSE SODIUM 1 DROP: 10 GEL OPHTHALMIC at 00:30

## 2021-02-23 RX ADMIN — ENOXAPARIN SODIUM 40 MG: 40 INJECTION SUBCUTANEOUS at 08:44

## 2021-02-23 RX ADMIN — PROPOFOL 50 MCG/KG/MIN: 10 INJECTION, EMULSION INTRAVENOUS at 13:20

## 2021-02-23 RX ADMIN — LEVETIRACETAM 1000 MG: 100 INJECTION, SOLUTION INTRAVENOUS at 20:46

## 2021-02-23 RX ADMIN — CEFEPIME HYDROCHLORIDE 2000 MG: 2 INJECTION, POWDER, FOR SOLUTION INTRAVENOUS at 00:32

## 2021-02-23 ASSESSMENT — PULMONARY FUNCTION TESTS
PIF_VALUE: 24
PIF_VALUE: 29
PIF_VALUE: 25
PIF_VALUE: 21
PIF_VALUE: 20
PIF_VALUE: 16
PIF_VALUE: 26
PIF_VALUE: 19
PIF_VALUE: 24
PIF_VALUE: 18
PIF_VALUE: 40
PIF_VALUE: 24
PIF_VALUE: 24
PIF_VALUE: 23
PIF_VALUE: 23

## 2021-02-23 ASSESSMENT — PAIN SCALES - GENERAL: PAINLEVEL_OUTOF10: 0

## 2021-02-23 NOTE — PROGRESS NOTES
Eloy Lucero  Neurology Follow-up  [facility] Neurology    Date of Service: 2/23/2021    Subjective:   CC: Follow up today regarding: New onset seizure and acute encephalopathy. Events noted. Chart and lab reviewed. The patient is intubated and sedated. Off sedation he gets very agitated according to his nurse. EEG showed diffuse slowing. Low-grade fever but no high-grade fever. White count today is 14. No other new symptoms. Other review of system was unremarkable.       ROS: limited due to intubation      Family history: Noncontributory    Past Medical History:   Diagnosis Date    Drug abuse ([facility] [address]     NSTEMI (non-ST elevated myocardial infarction) ([facility] [address] [facility]     Seizures ([address]      Current Facility-Administered Medications   Medication Dose Route Frequency Provider Last Rate Last Admin    insulin lispro (HUMALOG) injection vial 0-6 Units  0-6 Units Subcutaneous Q4H Nila Santos MD   Stopped at 02/23/21 0746    methadone (DOLOPHINE) tablet 40 mg  40 mg Per NG tube Daily Nila Santos MD   40 mg at 02/23/21 1230    ampicillin-sulbactam (UNASYN) 3000 mg ivpb minibag  3,000 mg Intravenous Q6H Nila Santos MD   Stopped at 02/23/21 1303    lactobacillus (CULTURELLE) capsule 1 capsule  1 capsule Per NG tube Daily with breakfast Nila Santos MD   1 capsule at 02/23/21 1230    sodium chloride flush 0.9 % injection 10 mL  10 mL Intravenous 2 times per day Verdis Leyden A Autumn, DO   10 mL at 02/23/21 0844    sodium chloride flush 0.9 % injection 10 mL  10 mL Intravenous PRN Verdis Leyden A Autumn, DO        promethazine (PHENERGAN) tablet 12.5 mg  12.5 mg Oral Q6H PRN Ahmad A Autumn, DO        Or    ondansetron (ZOFRAN) injection 4 mg  4 mg Intravenous Q6H PRN Ahmad A Autumn, DO        polyethylene glycol (GLYCOLAX) packet 17 g  17 g Oral Daily PRN Leobardo Patton Autumn, DO        acetaminophen (TYLENOL) tablet 650 mg  650 mg Oral Q6H PRN Ahmad A Autumn, DO        Or    acetaminophen (TYLENOL) suppository 650 mg 650 mg Rectal Q6H PRN Planobety Leyva, DO        perflutren lipid microspheres (DEFINITY) injection 1.65 mg  1.5 mL Intravenous ONCE PRN Abigail Leyva DO        norepinephrine (LEVOPHED) 16 mg in dextrose 5 % 250 mL infusion  2-100 mcg/min Intravenous Continuous Ahmad APRIL Leyva DO   Stopped at 02/23/21 0920    famotidine (PEPCID) injection 20 mg  20 mg Intravenous BID Janneth Buchanan MD   20 mg at 02/23/21 0844    chlorhexidine (PERIDEX) 0.12 % solution 15 mL  15 mL Mouth/Throat BID Janneth Buchanan MD   15 mL at 02/23/21 0838    carboxymethylcellulose PF (THERATEARS) 1 % ophthalmic gel 1 drop  1 drop Both Eyes 6 times per day Janneth Buchanan MD   1 drop at 02/23/21 1137    enoxaparin (LOVENOX) injection 40 mg  40 mg Subcutaneous Daily LEIGHTON Jhaveri CNP   40 mg at 02/23/21 0844    DOBUTamine (DOBUTREX) 500 mg in dextrose 5 % 250 mL infusion  2.5 mcg/kg/min Intravenous Continuous Rea LEIGHTON Lee CNP 4.3 mL/hr at 02/22/21 1145 2.5 mcg/kg/min at 02/22/21 1145    levETIRAcetam (KEPPRA) 1,000 mg in sodium chloride 0.9 % 100 mL IVPB  1,000 mg Intravenous Q12H Efrain Motta MD   Stopped at 02/23/21 0855    glucose (GLUTOSE) 40 % oral gel 15 g  15 g Oral PRN Jono Baird MD        dextrose 50 % IV solution  12.5 g Intravenous PRN Jono Baird MD        glucagon (rDNA) injection 1 mg  1 mg Intramuscular PRN Jono Baird MD        dextrose 5 % solution  100 mL/hr Intravenous PRN Jono Baird MD        propofol injection  5-50 mcg/kg/min Intravenous Titrated Camilo Palmer MD 17.7 mL/hr at 02/23/21 1320 50 mcg/kg/min at 02/23/21 1320    midazolam (VERSED) 100 mg in dextrose 5 % 100 mL infusion  1-10 mg/hr Intravenous Continuous Camilo Palmer MD 10 mL/hr at 02/22/21 2333 10 mg/hr at 02/22/21 2330    fentaNYL (SUBLIMAZE) 1,000 mcg in sodium chloride 0.9 % 100 mL infusion  12.5-200 mcg/hr Intravenous Continuous Belén Oscar MD 15 mL/hr at 02/23/21 3540 150 mcg/hr at 02/23/21 1327     No Known Allergies   reports that he has been smoking cigarettes. He has been smoking about 2.00 packs per day. He has never used smokeless tobacco. He reports previous drug use. Drugs: IV and Opiates . He reports that he does not drink alcohol. Objective:  Exam:  Constitutional:   Vitals:    02/23/21 0927 02/23/21 1000 02/23/21 1026 02/23/21 1100   BP:       Pulse: 80 78 79 77   Resp: 18 18 16 15   Temp:       TempSrc:       SpO2:  96%  94%   Weight:       Height:           General appearance: intubated and sedated  Eye: No icterus. Neck: supple  Cardiovascular:    No lower leg edema with good pulsation. Mental Status: intubated  Cranial Nerves:   Pupil: RRR  No gaze preference  OCR: present  Corneal: No  Cough: Yes  Gag: Yes  Spontaneous breathing: Yes  II: Pupils: equal, round, reactive to light  III,IV,VI: No gaze preference. VII: Face is symmetric   CN from VIII to XII: Cannot be tested due to intubation. Musculoskeletal: no motor response  Tone: normal  Reflexes: diminished but symmetric   Planters: mute  Coordination: sensation and Gait/Posture: Cannot be tested due to intubation. Data:  LABS:   Lab Results   Component Value Date     02/23/2021    K 4.0 02/23/2021     02/23/2021    CO2 25 02/23/2021    BUN 9 02/23/2021    CREATININE <0.5 02/23/2021    GFRAA >60 02/23/2021    LABGLOM >60 02/23/2021    GLUCOSE 112 02/23/2021    PHOS 2.3 02/23/2021    MG 1.80 02/23/2021    CALCIUM 8.3 02/23/2021     Lab Results   Component Value Date    WBC 14.2 02/23/2021    RBC 3.95 02/23/2021    HGB 12.2 02/23/2021    HCT 36.3 02/23/2021    MCV 91.9 02/23/2021    RDW 13.8 02/23/2021     02/23/2021     Lab Results   Component Value Date    INR 1.22 (H) 02/23/2021    PROTIME 14.2 (H) 02/23/2021       Neuroimaging and labs were independently reviewed by me  Reviewed notes from different physicians.     Impression:  Acute encephalopathy and breakthrough

## 2021-02-23 NOTE — PROGRESS NOTES
02/23/21 0037   Vent Information   Vent Type 980   Vent Mode AC/VC   Vt Ordered 500 mL   Rate Set 18 bmp   Peak Flow 50 L/min   Pressure Support 0 cmH20   FiO2  40 %   Sensitivity 3   PEEP/CPAP 12   Humidification Source HME   Vent Patient Data   Peak Inspiratory Pressure 23 cmH2O   Mean Airway Pressure 16 cmH20   Rate Measured 19 br/min   Vt Exhaled 518 mL   Minute Volume 10 Liters   I:E Ratio 1:2.00   Cough/Sputum   Sputum How Obtained Endotracheal;Suctioned   Cough Strong;Non-productive   Sputum Amount None   Sputum Color None   Tenacity None   Spontaneous Breathing Trial (SBT) RT Doc   Pulse 75   Breath Sounds   Right Upper Lobe Clear   Right Middle Lobe Clear   Right Lower Lobe Diminished   Left Upper Lobe Clear   Left Lower Lobe Diminished   Additional Respiratory  Assessments   Resp 18   Position Semi-Saldivar's   Subglottic Suction Done?  Yes   Alarm Settings   High Pressure Alarm 40 cmH2O   Low Minute Volume Alarm 2 L/min   Apnea (secs) 20 secs   High Respiratory Rate 40 br/min   Low Exhaled Vt  200 mL   Patient Observation   Observations ambu bag at bedside    Non-Surgical Airway Endo Tracheal Tube   Placement Date/Time: 02/21/21 2146   Mask Ventilation: Nasal airway  Placed By: In ED  Inserted by: Dr. Jaylyn Jiang  Insertion attempts: 1  Airway Device: Endo Tracheal Tube  Size: 8  Placement Verified By[de-identified] Auscultation;Colorimetric EtCO2 device   Secured at 24 cm   Measured From [address] By [employer] tube george   Site Condition Dry

## 2021-02-23 NOTE — PROGRESS NOTES
Dr. Maritza Jung changed the RR on the vent from 18 to 14 and states when pt starts breathing 18 or 19 to place on SBT at 8/5. Will implement and continue to closely monitor, call light within reach.

## 2021-02-23 NOTE — FLOWSHEET NOTE
Bedside report given by Mecca Sotelo RN, Drips verified and Skin assessment completed. Increased agitation and Overnight MD increased Fentanyl Max dose to 300mcg/hr. Shift assessment completed and documented; see flowsheets for details and vent settings. Pt resting in bed, VSS, Lines checked and verified, alarms on and audible. Repositioned patient, sacral Mepilex in place, call light within reach, will continue to closely monitor. Recent Labs     02/23/21  0410   WBC 14.2*   HGB 12.2*   HCT 36.3*   MCV 91.9        Recent Labs     02/23/21  0410   *   K 4.0      CO2 25   GLUCOSE 112*   PHOS 2.3*   MG 1.80   BUN 9   CREATININE <0.5*   CALCIUM 8.3   LABGLOM >60   GFRAA >60     [place] Francisca 2/23/2021 8:23 AM  CrCl cannot be calculated (This lab value cannot be used to calculate CrCl because it is not a number: <0.5). DVT Prophylaxis: enoxaparin (Lovenox) 40 mg SQ Once daily.     GI Prophylaxis: famotidine (Pepcid); per order

## 2021-02-23 NOTE — PROGRESS NOTES
Called patient's mother Erick Hill at home number [phone]  DIscussed EKG with Dr. Víctor Oliva, can not rule out CAD  Discussed plan of care and EKG changes with patient's mother. REcommending cardiac cath to rule out obstructive CAD. Discussed risk vs benefit with POA and agreeable to proceed. Discussed with crit care team  Discussed with nursing     Cath lab notified of add on case.    Nazario Campoverde CNP, 2/23/2021, 2:44 PM

## 2021-02-23 NOTE — PROGRESS NOTES
02/22/21 1912   Vent Information   Skin Assessment Clean, dry, & intact   Vent Type 980   Vent Mode AC/VC   Vt Ordered 500 mL   Rate Set 18 bmp   Peak Flow 60 L/min   Pressure Support 0 cmH20   FiO2  50 %   SpO2 100 %   SpO2/FiO2 ratio 200   Sensitivity 3   PEEP/CPAP 15   Vent Patient Data   Peak Inspiratory Pressure 30 cmH2O   Mean Airway Pressure 18 cmH20   Rate Measured 18 br/min   Vt Exhaled 521 mL   Minute Volume 9.3 Liters   I:E Ratio 1:2.70   Cough/Sputum   Sputum How Obtained None   Spontaneous Breathing Trial (SBT) RT Doc   Pulse 89   Breath Sounds   Right Upper Lobe Diminished   Right Middle Lobe Diminished   Right Lower Lobe Diminished   Left Upper Lobe Diminished   Left Lower Lobe Diminished   Additional Respiratory  Assessments   Resp 18   Alarm Settings   High Pressure Alarm 40 cmH2O   Low Minute Volume Alarm 3 L/min   Apnea (secs) 20 secs   High Respiratory Rate 40 br/min   Low Exhaled Vt  200 mL   Non-Surgical Airway Endo Tracheal Tube   Placement Date/Time: 02/21/21 2146   Mask Ventilation: Nasal airway  Placed By: In ED  Inserted by: Dr. Edson Vaughn  Insertion attempts: 1  Airway Device: Endo Tracheal Tube  Size: 8  Placement Verified By[de-identified] Auscultation;Colorimetric EtCO2 device   Secured at 25 cm   Measured From [facility] By [employer] tube george   Site Condition Dry   Cuff Pressure 30 cm H2O

## 2021-02-23 NOTE — FLOWSHEET NOTE
Bedside report given by Juan David LOPEZ, Drips verified and Skin assessment completed. Shift assessment completed and documented; see flowsheets for details and vent settings. Pt resting in bed, VSS, Lines checked and verified, alarms on and audible. Repositioned patient, sacral Mepilex in place, call light within reach, will continue to closely monitor. Recent Labs     02/22/21  0815   WBC 30.9*   HGB 14.2   HCT 42.8   MCV 92.6        Recent Labs     02/21/21  2141 02/22/21  0815 02/22/21  1749   * 133*  --    K 4.8 5.7* 4.6   CL 93* 103  --    CO2 12* 23  --    GLUCOSE 216* 146*  --    PHOS 8.2*  --   --    MG 2.50*  --   --    BUN 10 12  --    CREATININE 1.0 0.7*  --    CALCIUM 10.4 7.8*  --    LABGLOM >60 >60  --    GFRAA >60 >60  --      Jose J Bustos 2/22/2021 7:37 PM  Estimated Creatinine Clearance: 124 mL/min (A) (based on SCr of 0.7 mg/dL (L)).     DVT Prophylaxis: unfractionated heparin (Porcine) infusion,  See MAR    GI Prophylaxis: famotidine (Pepcid); per order

## 2021-02-23 NOTE — PLAN OF CARE
Problem: Non-Violent Restraints  Goal: Removal from restraints as soon as assessed to be safe  Outcome: Ongoing  Goal: No harm/injury to patient while restraints in use  Outcome: Ongoing  Goal: Patient's dignity will be maintained  Outcome: Ongoing

## 2021-02-23 NOTE — PROGRESS NOTES
02/23/21 1701   Vent Information   Skin Assessment Clean, dry, & intact   Vent Type 980   Vent Mode AC/VC   Vt Ordered 500 mL   Rate Set 14 bmp   Peak Flow 60 L/min   FiO2  100 %  (pt in cath lab)   SpO2 100 %   SpO2/FiO2 ratio 100   Sensitivity 3   PEEP/CPAP 5   Humidification Source HME   Vent Patient Data   Peak Inspiratory Pressure 40 cmH2O   Mean Airway Pressure 14 cmH20   Rate Measured 26 br/min   Vt Exhaled 624 mL   Minute Volume 11.3 Liters   I:E Ratio 1:2.3   Patient Transport   Time spent transporting 60-75   Transport ventillation type Other  (980 vent)   Transport from ICU   Transport destination Other  (cath lab)   Transport destination ICU   Emergency equipment included Yes   Cough/Sputum   Sputum How Obtained Endotracheal;Suctioned   Cough Productive   Sputum Amount Large   Sputum Color Cloudy   Tenacity Thick   Spontaneous Breathing Trial (SBT) RT Doc   Pulse 86   Breath Sounds   Right Upper Lobe Rhonchi   Right Middle Lobe Diminished   Right Lower Lobe Diminished   Left Upper Lobe Rhonchi   Left Lower Lobe Diminished   Additional Respiratory  Assessments   Resp 14   Position Supine   Alarm Settings   High Pressure Alarm 50 cmH2O   Low Minute Volume Alarm 2 L/min   High Respiratory Rate 40 br/min   Low Exhaled Vt  200 mL   Patient Observation   Observations pt transported to cath lab on 980 vent, pt increased to 100% for procedure   Non-Surgical Airway Endo Tracheal Tube   Placement Date/Time: 02/21/21 2146   Mask Ventilation: Nasal airway  Placed By: In ED  Inserted by: Dr. Jaylyn Jiang  Insertion attempts: 1  Airway Device: Endo Tracheal Tube  Size: 8  Placement Verified By[de-identified] Auscultation;Colorimetric EtCO2 device   Secured at 24 cm   Measured From Lips   Secured Location Right   Secured By Commercial tube george   Site Condition Dry

## 2021-02-23 NOTE — PROGRESS NOTES
Gave Bedside report to: Radha LOPEZ    4 Eyes Skin Assessment     The patient is being assess for   Shift Handoff    I agree that 2 RN's have performed a thorough Head to Toe Skin Assessment on the patient. ALL assessment sites listed below have been assessed. Areas assessed by both nurses:   [x]   Head, Face, and Ears   [x]   Shoulders, Back, and Chest, Abdomen  [x]   Arms, Elbows, and Hands   [x]   Coccyx, Sacrum, and Ischium  [x]   Legs, Feet, and Heels        Does the Patient have Skin Breakdown?   No.         Luke Prevention initiated:  Yes   Wound Care Orders initiated:  No      WOC nurse consulted for Pressure Injury (Stage 3,4, Unstageable, DTI, NWPT, Complex wounds)and New or Established Ostomies:  NA      Primary Nurse eSignature: Electronically signed by Valeriy Anderson RN on 2/22/21 at 7:41 PM EST    **SHARE this note so that the co-signing nurse is able to place an eSignature**    Co-signer eSignature: {Esignature:[id number]}

## 2021-02-23 NOTE — PROGRESS NOTES
02/23/21 0346   Vent Information   Vent Type 980   Vent Mode AC/VC   Vt Ordered 500 mL   Rate Set 18 bmp   Peak Flow 50 L/min   Pressure Support 0 cmH20   FiO2  40 %   Sensitivity 3   PEEP/CPAP 12   Humidification Source HME   Vent Patient Data   Peak Inspiratory Pressure 24 cmH2O   Mean Airway Pressure 16 cmH20   Rate Measured 18 br/min   Vt Exhaled 513 mL   Minute Volume 9.19 Liters   I:E Ratio 1:2.00   Cough/Sputum   Sputum How Obtained Suctioned   Cough Non-productive   Sputum Amount None   Sputum Color None   Tenacity None   Spontaneous Breathing Trial (SBT) RT Doc   Pulse 67   Breath Sounds   Right Upper Lobe Diminished   Right Middle Lobe Diminished   Right Lower Lobe Diminished   Left Upper Lobe Diminished   Left Lower Lobe Diminished   Additional Respiratory  Assessments   Resp 18   Position Semi-Saldivar's   Subglottic Suction Done?  Yes   Alarm Settings   High Pressure Alarm 40 cmH2O   Low Minute Volume Alarm 2 L/min   Apnea (secs) 20 secs   High Respiratory Rate 40 br/min   Low Exhaled Vt  200 mL   Patient Observation   Observations ambu bag on back of bed    Non-Surgical Airway Endo Tracheal Tube   Placement Date/Time: 02/21/21 2146   Mask Ventilation: Nasal airway  Placed By: In ED  Inserted by: Dr. Tracie Arora  Insertion attempts: 1  Airway Device: Endo Tracheal Tube  Size: 8  Placement Verified By[de-identified] Auscultation;Colorimetric EtCO2 device   Secured at 24 cm   Measured From [address] By [employer] tube george   Site Condition Dry

## 2021-02-23 NOTE — PROGRESS NOTES
02/23/21 1614   Vent Information   Skin Assessment Clean, dry, & intact   Equipment Changed HME   Vent Type 980   Vent Mode AC/VC   Vt Ordered 500 mL   Rate Set 14 bmp   Peak Flow 50 L/min   Pressure Support 0 cmH20   FiO2  40 %   Sensitivity 3   PEEP/CPAP 5   Humidification Source HME   Vent Patient Data   Peak Inspiratory Pressure 23 cmH2O   Mean Airway Pressure 9.1 cmH20   Rate Measured 15 br/min   Vt Exhaled 586 mL   Minute Volume 7.73 Liters   I:E Ratio 1:2.90   Cough/Sputum   Sputum How Obtained Endotracheal;Suctioned;Oral   Cough Non-productive  (CLOUDY ORAL)   Sputum Amount None   Spontaneous Breathing Trial (SBT) RT Doc   Pulse 88   Breath Sounds   Right Upper Lobe Rhonchi   Right Middle Lobe Diminished   Right Lower Lobe Diminished   Left Upper Lobe Rhonchi   Left Lower Lobe Diminished   Additional Respiratory  Assessments   Resp 15   Oral Care Mouth suctioned   Alarm Settings   High Pressure Alarm 40 cmH2O   Low Minute Volume Alarm 2 L/min   High Respiratory Rate 40 br/min   Low Exhaled Vt  200 mL   Non-Surgical Airway Endo Tracheal Tube   Placement Date/Time: 02/21/21 2146   Mask Ventilation: Nasal airway  Placed By: In ED  Inserted by: Dr. Sergio German  Insertion attempts: 1  Airway Device: Endo Tracheal Tube  Size: 8  Placement Verified By[de-identified] Auscultation;Colorimetric EtCO2 device   Secured at 24 cm   Measured From [address] By [employer] tube george   Site Condition Dry   Cuff Pressure 32 cm H2O

## 2021-02-23 NOTE — PROGRESS NOTES
Hospitalist Progress Note      PCP: No primary care provider on file. Date of Admission: 2/21/2021      Hospital Course:   32 y.o. male who [facility] to [facility] with past medical history of drug abuse, NSTEMI, seizure disorder, chronic hepatitis C, mood disorder, cigarette smoking presented to the ED for right-sided chest wall pain. Subjective:    Patient seen and examined.    Remains intubated and sedated  Off levophed    Medications:  Reviewed    Infusion Medications    norepinephrine Stopped (02/23/21 0920)    DOBUTamine 2.5 mcg/kg/min (02/22/21 1145)    dextrose      propofol 50 mcg/kg/min (02/23/21 0648)    midazolam 10 mg/hr (02/22/21 2333)    fentaNYL (SUBLIMAZE) infusion 250 mcg/hr (02/23/21 0835)    dexmedetomidine Stopped (02/23/21 1000)     Scheduled Medications    insulin lispro  0-6 Units Subcutaneous Q4H    methadone  40 mg Per NG tube Daily    ampicillin-sulbactam  3,000 mg Intravenous Q6H    lactobacillus  1 capsule Per NG tube Daily with breakfast    sodium chloride flush  10 mL Intravenous 2 times per day    famotidine (PEPCID) injection  20 mg Intravenous BID    chlorhexidine  15 mL Mouth/Throat BID    carboxymethylcellulose PF  1 drop Both Eyes 6 times per day    enoxaparin  40 mg Subcutaneous Daily    levetiracetam  1,000 mg Intravenous Q12H     PRN Meds: sodium chloride flush, promethazine **OR** ondansetron, polyethylene glycol, acetaminophen **OR** acetaminophen, perflutren lipid microspheres, glucose, dextrose, glucagon (rDNA), dextrose      Intake/Output Summary (Last 24 hours) at 2/23/2021 1108  Last data filed at 2/23/2021 0800  Gross per 24 hour   Intake 2691 ml   Output 3500 ml   Net -809 ml       Physical Exam Performed:    BP (!) 90/57   Pulse 80   Temp 99.2 °F (37.3 °C) (Bladder)   Resp 18   Ht 5' 11\" (1.803 m)   Wt 132 lb 0.9 oz (59.9 kg)   SpO2 96%   BMI 18.42 kg/m²     General appearance: No apparent distress, sedated  HEENT: Conjunctivae/corneas clear, neck supple w/ full ROM  Respiratory:  Intubated,  Clear to auscultation  Cardiovascular: Regular rate and rhythm  Abdomen: Soft, non-tender, non-distended with normal bowel sounds. Musculoskeletal: No edema bilaterally  Neurologic:  Sedated  Psychiatric: n/a  Peripheral Pulses: +2 palpable, equal bilaterally       Labs:   Recent Labs     02/21/21 2141 02/22/21  0218 02/22/21  0815 02/23/21  0410   WBC 31.2*  --  30.9* 14.2*   HGB 14.7 13.7 14.2 12.2*   HCT 47.1  --  42.8 36.3*     --  265 144     Recent Labs     02/21/21  2141 02/22/21  0815 02/22/21  1749 02/23/21  0410   * 133*  --  134*   K 4.8 5.7* 4.6 4.0   CL 93* 103  --  102   CO2 12* 23  --  25   BUN 10 12  --  9   CREATININE 1.0 0.7*  --  <0.5*   CALCIUM 10.4 7.8*  --  8.3   PHOS 8.2*  --   --  2.3*     Recent Labs     02/21/21 2141 02/22/21  0815 02/23/21  0410   AST 48* 40* 34   ALT 44* 27 27   BILITOT <0.2 0.3 0.4   ALKPHOS 139* 106 86     Recent Labs     02/22/21  0700 02/22/21  0815 02/23/21  0410   INR NA* 1.09 1.22*     Recent Labs     02/22/21  0042 02/22/21  0400 02/23/21  0410   CKTOTAL 273  --  342*   TROPONINI 0.31* 0.70* 0.13*       Urinalysis:      Lab Results   Component Value Date    NITRU Negative 02/21/2021    WBCUA 0-2 02/21/2021    BACTERIA Rare 01/19/2020    RBCUA 11-20 02/21/2021    BLOODU MODERATE 02/21/2021    SPECGRAV >=1.030 02/21/2021    GLUCOSEU Negative 02/21/2021       Radiology:  XR CHEST PORTABLE   Final Result   Clearing of the bilateral airspace disease. Tubes and lines are satisfactory position         XR CHEST PORTABLE   Final Result   1. Interval placement of a right IJ central line, tip at the junction of the   SVC and right atrium   2. No evidence of a pneumothorax   3. Marked interval worsening of diffuse airspace disease, which may be on the   basis of aspiration or edema. 4. NG tube in place.   Proximal port is within the distal esophagus, proximal   to the GE junction         CT CHEST PULMONARY EMBOLISM W CONTRAST   Final Result   1. No evidence of pulmonary embolism   2. Extensive airspace disease within the lower lobes bilaterally, and   posterior segment of the upper lobes, right greater than left. Given the   clinical history, changes are highly suspicious for aspiration pneumonia. 3. ET tube and NG tube in place   4. 4 mm nodule, left upper lobe. 5. No evidence of a pneumothorax      RECOMMENDATIONS:   Fleischner Society guidelines for follow-up and management of incidentally   detected pulmonary nodules:      Single Solid Nodule:      Nodule size less than 6 mm: In a low-risk patient, no routine follow-up. - Low risk patients include individuals with minimal or absent history of   smoking and other known risk factors. - High risk patients include individuals with a history or smoking or known   risk factors. Reference: Radiology 2017   http://pubs. rsna.org/doi/full/10.1148/radiol. 5513241054         CT Head WO Contrast   Final Result   No acute intracranial abnormality. Chronic pansinusitis         XR CHEST PORTABLE   Final Result   1. No acute cardiopulmonary disease. 2. The endotracheal tube tip is located 6.7 cm above the lelo. 3. Incomplete visualization of the orogastric tube.                  Assessment/Plan:    Active Hospital Problems    Diagnosis    Seizure ([address] [R56.9]    Status epilepticus ([address] [G40.901]    NSTEMI (non-ST elevated myocardial infarction) ([address] [I21.4]    Abnormal ECG [R94.31]    Takotsubo cardiomyopathy [I51.81]    Tobacco abuse [Z72.0]    History of drug use [Z87.898]    Acute respiratory failure with hypoxia (HCC) [J96.01]    Aspiration pneumonia of both lower lobes ([address] [J69.0]    Septic shock (HCC) [A41.9, R65.21]    Polydrug abuse ([address] [F19.10]    Elevated troponin [R77.8]    Acute encephalopathy [G93.40]    Partial idiopathic epilepsy with seizures of localized onset, intractable, with status epilepticus ([facility] [G40.011]     Seizure, acute encephalopathy   - intubated, sedated,   - c/w keppra  - EEG - severe diffuse encephalopathy, no epileptiform discharges - no evidence of status epilepticus  - neurology consulted - likely metabolic encephalopathy and toxic from substance abuse  - seizure precuations    Acute hypoxemic, hypercarbic respiratory failure  - intubated  - mgmt per pulmonology     Bilateral Lower lobe aspiration PNA  - abx to unasyn  - urine antigens negtive  - COVID19 negative rapid  - s/p bronch 2/22 -      Spetic shock  - leukocytosis and fever trend improving  - off levophed  - c/w dobutamine per cardiology   - bl cx ngtd  - UA negative  - neuro - low threshold for LP if no improvement on leukocytosis or persistent fever  - c/w abx    Lactic acidosis, AGMA  - resolved    Hyperkalemia - resolved    NSTEMI  - trop negative, 0.31, 0.70 - trend  - ECHO - EF 25%, only basal wall segments move well, rest are skinesis consistent with takotsubo syndrome  -  cardiology c/s  - EKG today with T wave changes  - patient to go for CATH per cardiology    Hyperglycemia  - monitor, ssi  - a1c 5.3    Abn LFTs  - in setting septic shock   - hepatitis panel pending  - improved    HARINI nodule - incidental    Hx drug abuse  - UDS + amphetamine, benzodiazepine, cannabinoids, methadone, opitae  - methadone resumed    Tobacco abuse    DVT Prophylaxis: lovenox  Diet: DIET TUBE FEED CONTINUOUS/CYCLIC NPO; 1.5 Calorie with Fiber; Nasogastric; Continuous; 10; 55  Code Status: Full Code      Dispo - in ICU    Racheal Quesada MD

## 2021-02-23 NOTE — PROCEDURES
Brief Pre-Op Note/Sedation Assessment      Bertin Sharp  1990  [id number]      [id number]  5:06 PM    Planned Procedure: Cardiac Catheterization Procedure    Post Procedure Plan: Return to same level of care    Consent: Consent was unable to be obtained due to patient's condition.  Obtained from mother    Chief Complaint: NSTEMI      Indications for Cath Procedure:  ACS > 24 hrs, Cardiomyopathy and LV Dysfunction  Anginal Classification within 2 weeks:  No symptoms Pt intubated and sedated- unable to answer  NYHA Heart Failure Class within 2 weeks: No symptoms  Pt intubated and sedated- unable to answer   NEW severe cardiomyoapthy  Is Cath Lab Visit Valve-related?: No  Surgical Risk: N/A  Functional Type: N/A    Anti- Anginal Meds within 2 weeks:   Cardiogenic shock    4  Stress or Imaging Studies Performed (within 6 months):  None     Vital Signs:  BP (!) 90/57   Pulse 88   Temp 99.2 °F (37.3 °C) (Bladder)   Resp 15   Ht 5' 11\" (1.803 m)   Wt 132 lb 0.9 oz (59.9 kg)   SpO2 94%   BMI 18.42 kg/m²     Allergies:  No Known Allergies    Past Medical History:  Past Medical History:   Diagnosis Date    Drug abuse ([facility]     NSTEMI (non-ST elevated myocardial infarction) ([facility]     Seizures ([facility]          Surgical History:  Past Surgical History:   Procedure Laterality Date    HAND SURGERY      RIGHT HAND FINGERS INJURY, PARTIAL AMPUTATIONS         Medications:  Current Facility-Administered Medications   Medication Dose Route Frequency Provider Last Rate Last Admin    insulin lispro (HUMALOG) injection vial 0-6 Units  0-6 Units Subcutaneous Q4H Lesly Clarke MD   Stopped at 02/23/21 0746    methadone (DOLOPHINE) tablet 40 mg  40 mg Per NG tube Daily Lesly Clarke MD   40 mg at 02/23/21 1230    ampicillin-sulbactam (UNASYN) 3000 mg ivpb minibag  3,000 mg Intravenous Q6H Lesly Clarke MD   Stopped at 02/23/21 1303    lactobacillus (CULTURELLE) capsule 1 capsule  1 capsule Per NG tube Daily with solution  12.5 g Intravenous PRN Jurgen Underwood MD        glucagon (rDNA) injection 1 mg  1 mg Intramuscular PRN Jurgen Underwood MD        dextrose 5 % solution  100 mL/hr Intravenous PRN Jurgen Underwood MD        propofol injection  5-50 mcg/kg/min Intravenous Titrated Marc Hoyt MD 17.7 mL/hr at 02/23/21 1320 50 mcg/kg/min at 02/23/21 1320    midazolam (VERSED) 100 mg in dextrose 5 % 100 mL infusion  1-10 mg/hr Intravenous Continuous [place] Hoyt MD 10 mL/hr at 02/22/21 2333 10 mg/hr at 02/22/21 2333    fentaNYL (SUBLIMAZE) 1,000 mcg in sodium chloride 0.9 % 100 mL infusion  12.5-200 mcg/hr Intravenous Continuous Uche Morris MD 15 mL/hr at 02/23/21 1327 150 mcg/hr at 02/23/21 1327           Pre-Sedation:    Pre-Sedation Documentation and Exam:  I have assessed the patient and agree with the H&P present on the chart. Prior History of Anesthesia Complications:   none    Modified Mallampati:pt already intubated      ASA Classification:  Class 4 - A patient with an incapacitating systemic disease that is a constant threat to life and E Status - the procedure is performed on an Emergency basis      Jessica Scale: Activity:  1 - Able to move 2 extremities voluntarily on command  Respiration:  1 - Dyspnic, shallow, or limited breathing  Circulation:  1 - BP+/- 20-50mmHg of normal  Consciousness:  1 - Arousable on calling  Oxygen Saturation (color):  2 - Able to maintain oxygen saturation >92% on room air    Sedation/Anesthesia Plan:  Guard the patient's safety and welfare. Minimize physical discomfort and pain. Minimize negative psychological responses to treatment by providing sedation and analgesia and maximize the potential amnesia. Patient to meet pre-procedure discharge plan.     Medication Planned:  midazolam intravenously, fentanyl intravenously and propofol intravenously    Patient is an appropriate candidate for plan of sedation: yes      Electronically signed by Christelle Carrillo Andreia Vargas MD on 2/23/2021 at 5:06 PM

## 2021-02-23 NOTE — PROGRESS NOTES
Spoke with Dr Wendie Esteban concerning the pt waking up maxed out on almost all sedation.  put in orders to up the Fentanyl max dose to 300.

## 2021-02-23 NOTE — CARE COORDINATION
Writer aware pt on vent. Call placed to [facility] at [phone] and it states the number is temporarily not in service. Call placed to alternate contact Kam Buchanan [phone] without answer. Generic VM left and awaiting returned call.  Babak Owens, RN

## 2021-02-23 NOTE — BRIEF OP NOTE
Brief Postoperative Note  1. Pre-operative Diagnosis: NSTEMI        Post-operative Diagnosis: Same  2. Surgeons/Assistants: Naomi Luo MD, [facility]  3. Complications: None  4. Estimated Blood Loss: less than 50   5. Specimens: Were Not Obtained  6. Anesthesia: Local, Moderate Sedation and Deep Sedation  For sedation: Moderated sedation was achieved with Versed (10 drip) and Fentanyl (150), 50 propofol. Monitoring of the patient's vital signs and respiratory status was provided by a trained independent observer nurse during the entire course of the procedure(s) and under my direct supervision and recorded in patient's medical record. The duration of sedation was 1700 to 1741.    7. Procedure(s) performed: Moderate/deep sedation  [facility]  LVG  [place]      Procedure Details:  Consent Access  site Bleed Risk Sedat US   Used*? Contrast Flouro TIme Fluoro  mGy   Yes Lt femoral artery Low MCSFC Yes 75 1.8 110   *CPT 54263: If stated \"yes\", Ultrasound guidance was used to access L:eft femoral using Seldinger technique after local infiltration of 1% lidocaine. Ultrasound(US) documented/visualized size, patency, pulsatility and exact location for puncture and determined ok to proceed. Real-time imaging used for needle entry into the vessel(s). Image was printed off [employer] and sent to medical records on a progress note. *Sedation Note: MCSFC = minimal conscious sedation for comfort      Left Heart Cath:   Findings   LVEDP 15   LVEF  20%   LV wall motion  apical and mid ballooning with basal hypokinesis consistent with Takotsubo cardiomyopathy   Gradient across AV  none   Mitral regurg  no significant     Coronary Angiogram:  Artery Findings/Result   LM  no angiographic CAD   LAD  no angiographic CAD   LCx  no angiographic CAD       RCA  dominant, no angiographic CAD       Assessment/Plan  1. Normal coronary arteries,  2. Takotsubo/stress cardiomyopathy  3.   Severe QTC prolongation will need to watch medications and follow EKGs

## 2021-02-24 LAB
A/G RATIO: 1.1 (ref 1.1–2.2)
ALBUMIN SERPL-MCNC: 3.3 G/DL (ref 3.4–5)
ALP BLD-CCNC: 89 U/L (ref 40–129)
ALT SERPL-CCNC: 30 U/L (ref 10–40)
ANION GAP SERPL CALCULATED.3IONS-SCNC: 8 MMOL/L (ref 3–16)
APTT: 30 SEC (ref 24.2–36.2)
AST SERPL-CCNC: 39 U/L (ref 15–37)
BASE EXCESS ARTERIAL: 2.2 MMOL/L (ref -3–3)
BASE EXCESS ARTERIAL: 2.4 MMOL/L (ref -3–3)
BILIRUB SERPL-MCNC: 0.5 MG/DL (ref 0–1)
BUN BLDV-MCNC: 5 MG/DL (ref 7–20)
CALCIUM IONIZED: 1.1 MMOL/L (ref 1.12–1.32)
CALCIUM SERPL-MCNC: 8.8 MG/DL (ref 8.3–10.6)
CARBOXYHEMOGLOBIN ARTERIAL: 0.5 % (ref 0–1.5)
CARBOXYHEMOGLOBIN ARTERIAL: 0.6 % (ref 0–1.5)
CHLORIDE BLD-SCNC: 99 MMOL/L (ref 99–110)
CHOLESTEROL, TOTAL: 132 MG/DL (ref 0–199)
CO2: 29 MMOL/L (ref 21–32)
CREAT SERPL-MCNC: <0.5 MG/DL (ref 0.9–1.3)
CULTURE, RESPIRATORY: NORMAL
EKG ATRIAL RATE: 92 BPM
EKG DIAGNOSIS: NORMAL
EKG P AXIS: 70 DEGREES
EKG P-R INTERVAL: 132 MS
EKG Q-T INTERVAL: 410 MS
EKG QRS DURATION: 90 MS
EKG QTC CALCULATION (BAZETT): 507 MS
EKG R AXIS: 61 DEGREES
EKG T AXIS: 260 DEGREES
EKG VENTRICULAR RATE: 92 BPM
GFR AFRICAN AMERICAN: >60
GFR NON-AFRICAN AMERICAN: >60
GLOBULIN: 3.1 G/DL
GLUCOSE BLD-MCNC: 110 MG/DL (ref 70–99)
GLUCOSE BLD-MCNC: 111 MG/DL (ref 70–99)
GLUCOSE BLD-MCNC: 113 MG/DL (ref 70–99)
GLUCOSE BLD-MCNC: 114 MG/DL (ref 70–99)
GLUCOSE BLD-MCNC: 96 MG/DL (ref 70–99)
GRAM STAIN RESULT: NORMAL
HAV IGM SER IA-ACNC: ABNORMAL
HCO3 ARTERIAL: 26.2 MMOL/L (ref 21–29)
HCO3 ARTERIAL: 27.3 MMOL/L (ref 21–29)
HCT VFR BLD CALC: 35.4 % (ref 40.5–52.5)
HDLC SERPL-MCNC: 36 MG/DL (ref 40–60)
HEMOGLOBIN, ART, EXTENDED: 13.2 G/DL (ref 13.5–17.5)
HEMOGLOBIN, ART, EXTENDED: 13.5 G/DL (ref 13.5–17.5)
HEMOGLOBIN: 11.9 G/DL (ref 13.5–17.5)
HEPATITIS B CORE IGM ANTIBODY: ABNORMAL
HEPATITIS B SURFACE ANTIGEN INTERPRETATION: ABNORMAL
HEPATITIS C ANTIBODY INTERPRETATION: REACTIVE
INR BLD: 1.15 (ref 0.86–1.14)
LDL CHOLESTEROL CALCULATED: 68 MG/DL
MAGNESIUM: 1.4 MG/DL (ref 1.8–2.4)
MCH RBC QN AUTO: 30.7 PG (ref 26–34)
MCHC RBC AUTO-ENTMCNC: 33.7 G/DL (ref 31–36)
MCV RBC AUTO: 90.9 FL (ref 80–100)
METHEMOGLOBIN ARTERIAL: 0.4 %
METHEMOGLOBIN ARTERIAL: 0.7 %
MRSA CULTURE ONLY: NORMAL
O2 CONTENT ARTERIAL: 16 ML/DL
O2 CONTENT ARTERIAL: 17 ML/DL
O2 SAT, ARTERIAL: 87.2 %
O2 SAT, ARTERIAL: 92.1 %
O2 THERAPY: ABNORMAL
O2 THERAPY: ABNORMAL
PCO2 ARTERIAL: 38 MMHG (ref 35–45)
PCO2 ARTERIAL: 44 MMHG (ref 35–45)
PDW BLD-RTO: 13.8 % (ref 12.4–15.4)
PERFORMED ON: ABNORMAL
PERFORMED ON: NORMAL
PH ARTERIAL: 7.41 (ref 7.35–7.45)
PH ARTERIAL: 7.46 (ref 7.35–7.45)
PH VENOUS: 7.39 (ref 7.35–7.45)
PHOSPHORUS: 3.3 MG/DL (ref 2.5–4.9)
PLATELET # BLD: 134 K/UL (ref 135–450)
PMV BLD AUTO: 7.6 FL (ref 5–10.5)
PO2 ARTERIAL: 50.5 MMHG (ref 75–108)
PO2 ARTERIAL: 60.7 MMHG (ref 75–108)
POTASSIUM REFLEX MAGNESIUM: 3.8 MMOL/L (ref 3.5–5.1)
POTASSIUM SERPL-SCNC: 3.8 MMOL/L (ref 3.5–5.1)
PROTHROMBIN TIME: 13.4 SEC (ref 10–13.2)
RBC # BLD: 3.89 M/UL (ref 4.2–5.9)
SODIUM BLD-SCNC: 136 MMOL/L (ref 136–145)
TCO2 ARTERIAL: 27.4 MMOL/L
TCO2 ARTERIAL: 28.6 MMOL/L
TOTAL CK: 623 U/L (ref 39–308)
TOTAL PROTEIN: 6.4 G/DL (ref 6.4–8.2)
TRIGL SERPL-MCNC: 138 MG/DL (ref 0–150)
VLDLC SERPL CALC-MCNC: 28 MG/DL
WBC # BLD: 12.5 K/UL (ref 4–11)

## 2021-02-24 PROCEDURE — 2580000003 HC RX 258: Performed by: INTERNAL MEDICINE

## 2021-02-24 PROCEDURE — 85610 PROTHROMBIN TIME: CPT

## 2021-02-24 PROCEDURE — 82550 ASSAY OF CK (CPK): CPT

## 2021-02-24 PROCEDURE — 85027 COMPLETE CBC AUTOMATED: CPT

## 2021-02-24 PROCEDURE — 94003 VENT MGMT INPAT SUBQ DAY: CPT

## 2021-02-24 PROCEDURE — 93005 ELECTROCARDIOGRAM TRACING: CPT | Performed by: INTERNAL MEDICINE

## 2021-02-24 PROCEDURE — 80061 LIPID PANEL: CPT

## 2021-02-24 PROCEDURE — 2580000003 HC RX 258: Performed by: PSYCHIATRY & NEUROLOGY

## 2021-02-24 PROCEDURE — 6360000002 HC RX W HCPCS: Performed by: NURSE PRACTITIONER

## 2021-02-24 PROCEDURE — 2700000000 HC OXYGEN THERAPY PER DAY

## 2021-02-24 PROCEDURE — 6370000000 HC RX 637 (ALT 250 FOR IP): Performed by: INTERNAL MEDICINE

## 2021-02-24 PROCEDURE — 93010 ELECTROCARDIOGRAM REPORT: CPT | Performed by: INTERNAL MEDICINE

## 2021-02-24 PROCEDURE — 2000000000 HC ICU R&B

## 2021-02-24 PROCEDURE — 99233 SBSQ HOSP IP/OBS HIGH 50: CPT | Performed by: INTERNAL MEDICINE

## 2021-02-24 PROCEDURE — 2500000003 HC RX 250 WO HCPCS: Performed by: INTERNAL MEDICINE

## 2021-02-24 PROCEDURE — 84100 ASSAY OF PHOSPHORUS: CPT

## 2021-02-24 PROCEDURE — 6370000000 HC RX 637 (ALT 250 FOR IP): Performed by: NURSE PRACTITIONER

## 2021-02-24 PROCEDURE — 99233 SBSQ HOSP IP/OBS HIGH 50: CPT | Performed by: PSYCHIATRY & NEUROLOGY

## 2021-02-24 PROCEDURE — 2580000003 HC RX 258: Performed by: EMERGENCY MEDICINE

## 2021-02-24 PROCEDURE — 6360000002 HC RX W HCPCS: Performed by: PSYCHIATRY & NEUROLOGY

## 2021-02-24 PROCEDURE — 82330 ASSAY OF CALCIUM: CPT

## 2021-02-24 PROCEDURE — 6360000002 HC RX W HCPCS: Performed by: INTERNAL MEDICINE

## 2021-02-24 PROCEDURE — 85730 THROMBOPLASTIN TIME PARTIAL: CPT

## 2021-02-24 PROCEDURE — 99233 SBSQ HOSP IP/OBS HIGH 50: CPT | Performed by: NURSE PRACTITIONER

## 2021-02-24 PROCEDURE — 82803 BLOOD GASES ANY COMBINATION: CPT

## 2021-02-24 PROCEDURE — 6360000002 HC RX W HCPCS: Performed by: EMERGENCY MEDICINE

## 2021-02-24 PROCEDURE — 83735 ASSAY OF MAGNESIUM: CPT

## 2021-02-24 PROCEDURE — 80053 COMPREHEN METABOLIC PANEL: CPT

## 2021-02-24 PROCEDURE — 94761 N-INVAS EAR/PLS OXIMETRY MLT: CPT

## 2021-02-24 RX ORDER — CARVEDILOL 6.25 MG/1
6.25 TABLET ORAL 2 TIMES DAILY WITH MEALS
Status: DISCONTINUED | OUTPATIENT
Start: 2021-02-24 | End: 2021-02-27 | Stop reason: HOSPADM

## 2021-02-24 RX ORDER — HYDRALAZINE HYDROCHLORIDE 20 MG/ML
5 INJECTION INTRAMUSCULAR; INTRAVENOUS EVERY 4 HOURS PRN
Status: DISCONTINUED | OUTPATIENT
Start: 2021-02-24 | End: 2021-02-26

## 2021-02-24 RX ADMIN — FENTANYL CITRATE 150 MCG/HR: 50 INJECTION, SOLUTION INTRAMUSCULAR; INTRAVENOUS at 10:14

## 2021-02-24 RX ADMIN — ACETAMINOPHEN 650 MG: 325 TABLET ORAL at 22:33

## 2021-02-24 RX ADMIN — ENOXAPARIN SODIUM 40 MG: 40 INJECTION SUBCUTANEOUS at 08:56

## 2021-02-24 RX ADMIN — Medication 10 ML: at 22:33

## 2021-02-24 RX ADMIN — PROMETHAZINE HYDROCHLORIDE 12.5 MG: 25 TABLET ORAL at 12:50

## 2021-02-24 RX ADMIN — CARVEDILOL 6.25 MG: 6.25 TABLET, FILM COATED ORAL at 19:01

## 2021-02-24 RX ADMIN — AMPICILLIN SODIUM AND SULBACTAM SODIUM 3000 MG: 2; 1 INJECTION, POWDER, FOR SOLUTION INTRAMUSCULAR; INTRAVENOUS at 03:26

## 2021-02-24 RX ADMIN — AMPICILLIN SODIUM AND SULBACTAM SODIUM 3000 MG: 2; 1 INJECTION, POWDER, FOR SOLUTION INTRAMUSCULAR; INTRAVENOUS at 08:57

## 2021-02-24 RX ADMIN — MIDAZOLAM HYDROCHLORIDE 10 MG/HR: 5 INJECTION, SOLUTION INTRAMUSCULAR; INTRAVENOUS at 06:05

## 2021-02-24 RX ADMIN — LEVETIRACETAM 1000 MG: 100 INJECTION, SOLUTION INTRAVENOUS at 09:06

## 2021-02-24 RX ADMIN — Medication 15 ML: at 08:56

## 2021-02-24 RX ADMIN — AMPICILLIN SODIUM AND SULBACTAM SODIUM 3000 MG: 2; 1 INJECTION, POWDER, FOR SOLUTION INTRAMUSCULAR; INTRAVENOUS at 22:32

## 2021-02-24 RX ADMIN — ACETAMINOPHEN 650 MG: 325 TABLET ORAL at 13:21

## 2021-02-24 RX ADMIN — LEVETIRACETAM 1000 MG: 100 INJECTION, SOLUTION INTRAVENOUS at 22:32

## 2021-02-24 RX ADMIN — CARBOXYMETHYLCELLULOSE SODIUM 1 DROP: 10 GEL OPHTHALMIC at 00:04

## 2021-02-24 RX ADMIN — CARBOXYMETHYLCELLULOSE SODIUM 1 DROP: 10 GEL OPHTHALMIC at 08:56

## 2021-02-24 RX ADMIN — FENTANYL CITRATE 150 MCG/HR: 50 INJECTION, SOLUTION INTRAMUSCULAR; INTRAVENOUS at 02:11

## 2021-02-24 RX ADMIN — AMPICILLIN SODIUM AND SULBACTAM SODIUM 3000 MG: 2; 1 INJECTION, POWDER, FOR SOLUTION INTRAMUSCULAR; INTRAVENOUS at 15:30

## 2021-02-24 RX ADMIN — PHENOL 1 SPRAY: 1.4 SPRAY ORAL at 15:18

## 2021-02-24 RX ADMIN — FAMOTIDINE 20 MG: 10 INJECTION, SOLUTION INTRAVENOUS at 08:57

## 2021-02-24 RX ADMIN — CARBOXYMETHYLCELLULOSE SODIUM 1 DROP: 10 GEL OPHTHALMIC at 11:54

## 2021-02-24 RX ADMIN — CARBOXYMETHYLCELLULOSE SODIUM 1 DROP: 10 GEL OPHTHALMIC at 15:21

## 2021-02-24 RX ADMIN — CARBOXYMETHYLCELLULOSE SODIUM 1 DROP: 10 GEL OPHTHALMIC at 03:26

## 2021-02-24 RX ADMIN — FAMOTIDINE 20 MG: 10 INJECTION, SOLUTION INTRAVENOUS at 22:33

## 2021-02-24 RX ADMIN — CARVEDILOL 6.25 MG: 6.25 TABLET, FILM COATED ORAL at 10:28

## 2021-02-24 RX ADMIN — PROPOFOL 50 MCG/KG/MIN: 10 INJECTION, EMULSION INTRAVENOUS at 02:49

## 2021-02-24 RX ADMIN — METHADONE HYDROCHLORIDE 40 MG: 10 TABLET ORAL at 08:58

## 2021-02-24 RX ADMIN — PROPOFOL 40 MCG/KG/MIN: 10 INJECTION, EMULSION INTRAVENOUS at 09:04

## 2021-02-24 RX ADMIN — Medication 1 CAPSULE: at 08:56

## 2021-02-24 RX ADMIN — Medication 10 ML: at 08:57

## 2021-02-24 ASSESSMENT — PULMONARY FUNCTION TESTS
PIF_VALUE: 18
PIF_VALUE: 21
PIF_VALUE: 20
PIF_VALUE: 14
PIF_VALUE: 19
PIF_VALUE: 20

## 2021-02-24 ASSESSMENT — PAIN SCALES - GENERAL
PAINLEVEL_OUTOF10: 6
PAINLEVEL_OUTOF10: 6

## 2021-02-24 ASSESSMENT — PAIN DESCRIPTION - PAIN TYPE: TYPE: ACUTE PAIN

## 2021-02-24 ASSESSMENT — PAIN DESCRIPTION - FREQUENCY: FREQUENCY: CONTINUOUS

## 2021-02-24 ASSESSMENT — PAIN DESCRIPTION - DESCRIPTORS: DESCRIPTORS: HEADACHE;THROBBING

## 2021-02-24 NOTE — PROGRESS NOTES
Pulmonary & Critical Care Medicine ICU Progress Note      Events of Last 24 hours: The patient has remained essentially afebrile and hemodynamically stable. Echocardiogram suggested Takotsubo physiology, cardiology following. He had been placed on dobutamine. The patient had been on high-dose methadone at home. He has had significant improvement in his FiO2, down to 40% with SaO2 98%. On sedation with propofol 50, Versed 10, Precedex 0.4. Appears fairly deeply sedated      Invasive Lines:   CVC RIJ 2/22/2021                   Art Line right radial 2/22/2021                    MV:    2/22/2021    Recent Labs     02/22/21  0842 02/23/21  0508   PHART 7.239* 7.390   [id number] 44.2 38.9   PO2ART 125.4* 91.1       MV Settings:  Vent Mode: AC/VC Rate Set: 14 bmp/Vt Ordered: 500 mL/ [email or web address])    IV:   norepinephrine Stopped (02/23/21 0920)    DOBUTamine Stopped (02/23/21 1720)    dextrose      propofol 50 mcg/kg/min (02/23/21 1818)    midazolam 10 mg/hr (02/23/21 1950)    fentaNYL (SUBLIMAZE) infusion 150 mcg/hr (02/23/21 1948)       Vitals:  BP (!) 144/101   Pulse 89   Temp 98.7 °F (37.1 °C) (Axillary)   Resp 16   Ht 5' 11\" (1.803 m)   Wt 132 lb 0.9 oz (59.9 kg)   SpO2 96%   BMI 18.42 kg/m²          Intake/Output Summary (Last 24 hours) at 2/23/2021 2051  Last data filed at 2/23/2021 1700  Gross per 24 hour   Intake 2644 ml   Output 2100 ml   Net 544 ml       General: Tall, averagely nourished, sedated and intubated. Well developed, in no respiratory distress. Eyes:  No scleral icterus or periorbital edema. PERRL  Head: Atraumatic, normocephalic. ENMT: # 8  ETT, 25 cm at incisor level, OG/NG intact. No bleeding of lips or gums. Mucosa pink and dry. No ulceration. No oral candidiasis. Neck: No JVD. RIJ CVC. Lymphadenopathy:  Deferred  CV: S1, S2, no murmurs, gallops  Respiratory: Clear to auscultation  Chest: Equal rise and fall of chest  GI:  Deferred  : Wu intact.    Skin: No rashes, lesions, bruises, induration, discharge. Color, texture, turgor normal.  Musculoskeletal: Supple, no contractures or muscle atrophy. No clubbing or cyanosis of nailbeds. No joint swelling, redness.  Edema: None  Neuro: Detailed exam not performed, moves all extremities.        Medications:  Scheduled Meds:   insulin lispro  0-6 Units Subcutaneous Q4H    methadone  40 mg Per NG tube Daily    ampicillin-sulbactam  3,000 mg Intravenous Q6H    lactobacillus  1 capsule Per NG tube Daily with breakfast    sodium chloride flush  10 mL Intravenous 2 times per day    sodium chloride flush  10 mL Intravenous 2 times per day    famotidine (PEPCID) injection  20 mg Intravenous BID    chlorhexidine  15 mL Mouth/Throat BID    carboxymethylcellulose PF  1 drop Both Eyes 6 times per day    enoxaparin  40 mg Subcutaneous Daily    levetiracetam  1,000 mg Intravenous Q12H       PRN Meds:  sodium chloride flush, sodium chloride flush, promethazine **OR** [DISCONTINUED] ondansetron, polyethylene glycol, acetaminophen **OR** acetaminophen, perflutren lipid microspheres, glucose, dextrose, glucagon (rDNA), dextrose    Results:  CBC:   Recent Labs     02/21/21 2141 02/22/21 0218 02/22/21  0815 02/23/21  0410   WBC 31.2*  --  30.9* 14.2*   HGB 14.7 13.7 14.2 12.2*   HCT 47.1  --  42.8 36.3*   .0  --  92.6 91.9     --  265 144     BMP:   Recent Labs     02/21/21 2141 02/22/21  0815 02/22/21  1749 02/23/21  0410   * 133*  --  134*   K 4.8 5.7* 4.6 4.0   CL 93* 103  --  102   CO2 12* 23  --  25   PHOS 8.2*  --   --  2.3*   BUN 10 12  --  9   CREATININE 1.0 0.7*  --  <0.5*     LIVER PROFILE:   Recent Labs     02/21/21 2141 02/22/21  0815 02/23/21  0410   AST 48* 40* 34   ALT 44* 27 27   BILITOT <0.2 0.3 0.4   ALKPHOS 139* 106 86     PT/INR:   Recent Labs     02/22/21  0700 02/22/21  0815 02/23/21  0410   PROTIME NA* 12.6 14.2*   INR NA* 1.09 1.22*     APTT:   Recent Labs     02/21/21  5302 02/22/21  0815 02/23/21  0410   APTT 21.4* 80.6* 32.0     UA:  Recent Labs     02/21/21  2159   COLORU Yellow   PHUR 7.0  7.0   WBCUA 0-2   RBCUA 11-20*   CLARITYU SL CLOUDY*   SPECGRAV >=1.030   LEUKOCYTESUR Negative   UROBILINOGEN 0.2   BILIRUBINUR Negative   BLOODU MODERATE*   GLUCOSEU Negative       Cultures:      Films:  CXR reviewed by me       Assessment and plan:  Acute respiratory failure, hypoxemic and hypercarbic. Significant improvement in oxygenation, continue to wean sedation with attempt at SBT and extubation   Pneumonia versus pulmonary edema, possible ARDS. He has had fever, leukocytosis. CT suggests aspiration pneumonia. Completed diagnostic/therapeutic bronchoscopy, appeared to have mucoid secretions. Cultures negative so far. Rapid improvement in imaging suggests more of pulmonary edema. On cefepime and vancomycin, will discontinue vancomycin. Septic shock, elevated lactic acid. Weaned off vasopressor  Metabolic acidosis. Probably from lactic acidosis  Seizure. On Keppra, neurology following  Hyperglycemia. Sliding scale if persistently high  Abnormal LFT. Could be part of septic shock, he could have hepatitis C  Leukocytosis. Probably due to sepsis, significant improvement. Left upper lung nodule. Probably incidental finding. Past drug abuse, NSTEMI, chronic hepatitis C, mood disorder. Per IM. Will add methadone at the lower dose to assist with extubation and reducing sedation  Smoker. Address once extubated, reportedly he is a heavy smoker  DVT prophylaxis. On heparin infusion  PUD prophylaxis. Pepcid       Multidisciplinary rounds were completed at the bedside with participation from the intensivist, pharmacy, nursing, nutrition. All labs and imaging studies reviewed, discussed.         Electronically signed by:  Lesly Clarke MD    2/23/2021    8:51 PM.
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
## 2021-02-24 NOTE — PROGRESS NOTES
[facility]   Daily Progress Note    Admit Date:  2/21/2021  HPI:    Chief Complaint   Patient presents with    Seizures     Patient comes into ED via [facility] with c/o by EMS of seizure like activity. EMS states once they were getting him into the truck he started to have tonic clonic like activity and 5mg of intranasal Versed was given. Interval history: Meghan Muhammad is being followed for elevated troponin. Admitted after multiple seizures in post-ictal state, required intubation for mental status and respiratory failure. Echo showed EF 20-25%. Subjective:  Mr. Jazmine Remy remains intubated and on heavy sedation.    Weaned off of levophed this am.     Objective:   BP (!) 155/98   Pulse 100   Temp 100 °F (37.8 °C) (Bladder)   Resp 19   Ht 5' 11\" (1.803 m)   Wt 131 lb 6.3 oz (59.6 kg)   SpO2 94%   BMI 18.33 kg/m²       Intake/Output Summary (Last 24 hours) at 2/24/2021 4373  Last data filed at 2/24/2021 0600  Gross per 24 hour   Intake 1442.1 ml   Output 2550 ml   Net -1107.9 ml       NYHA: IV    Physical Exam:  General:  Intubated and sedated   Skin:  Warm and dry  Neck:  Line in place  Chest:  Clear to auscultation, no wheezes/rhonchi/rales  Telemetry: NSR rate 70-80's  Cardiovascular:  RRR S1S2, no m/r/g   Abdomen:  Soft, nontender, +bowel sounds  Extremities:  No  bilateral lower extremity edema, hands and feet warm     Medications:    insulin lispro  0-6 Units Subcutaneous Q4H    methadone  40 mg Per NG tube Daily    ampicillin-sulbactam  3,000 mg Intravenous Q6H    lactobacillus  1 capsule Per NG tube Daily with breakfast    sodium chloride flush  10 mL Intravenous 2 times per day    famotidine (PEPCID) injection  20 mg Intravenous BID    chlorhexidine  15 mL Mouth/Throat BID    carboxymethylcellulose PF  1 drop Both Eyes 6 times per day    enoxaparin  40 mg Subcutaneous Daily    levetiracetam  1,000 mg Intravenous Q12H      DOBUTamine Stopped (02/23/21 1720)    dextrose      propofol 30 mcg/kg/min (02/24/21 0935)    midazolam 10 mg/hr (02/24/21 0605)    fentaNYL (SUBLIMAZE) infusion 150 mcg/hr (02/24/21 0211)       Lab Data:  CBC:   Recent Labs     02/22/21  0815 02/23/21 0410 02/24/21  0350   WBC 30.9* 14.2* 12.5*   HGB 14.2 12.2* 11.9*    144 134*     BMP:    Recent Labs     02/22/21  0815 02/22/21  0815 02/23/21 0410 02/24/21  0350   *  --  134* 136   K 5.7*   < > 4.0 3.8  3.8   CO2 23  --  25 29   BUN 12  --  9 5*   CREATININE 0.7*  --  <0.5* <0.5*    < > = values in this interval not displayed. INR:    Recent Labs     02/22/21 0815 02/23/21 0410 02/24/21  0350   INR 1.09 1.22* 1.15*     BNP:  No results for input(s): PROBNP in the last 72 hours. No results found for: LVEF, LVEFMODE    Testing:  Echo: 1/31/2020 [facility]  - Left ventricle: The cavity size is normal. Wall thickness is normal. Systolic function was normal.    The estimated ejection fraction was in the range of 55% to 60%. Wall motion was normal; there were no regional wall motion abnormalities. Left ventricular diastolic function parameters were normal.  - Right ventricle: Systolic function was normal by objective interpretation. TAPSE: 2.7cm. Tricuspid    annular systolic velocity: 79JN/V.  - Pericardium, extracardiac: There is a left pleural effusion. CARDIAC CTA: 3/10/2020  FINDINGS:  The left ventricle appears normal in size.  The right ventricle appears normal in size.  The atria are normal in size.  The left atrium receives two right and two left pulmonary veins without evidence of stenosis.  The left atrial appendage is chicken wing shaped without evidence of thrombus.  The aorta is normal in size.  The pulmonary artery is incompletely visualized.  The IVC is normal in size.  The coronary sinus is not well visualized.   CORONARY ARTERIES:  The coronary arteries have normal origins and proximal courses.  The coronary system is right dominant.  The SA node artery originates from the right coronary artery. LEFT:  The left main artery is a moderate size vessel.  The left main artery has no significant disease. The left anterior descending artery is a moderate size vessel.  The LAD has no significant disease. Herchel Malik is a small, area of shallow bridging in the mid LAD.  There are three small and patent diagonal arteries.    The left circumflex artery is a moderate size vessel.  The LCx has no significant disease.  The first obtuse marginal artery is a moderate size, branching vessel that supplies a significant portion of the lateral wall.  The first obtuse marginal artery has no significant disease.  The second obtuse marginal artery has no significant disease.  The distal LCx is a small vessel that terminates in a small and patent OM. RIGHT:  The right coronary artery is a moderate size vessel.  The RCA has no significant disease.  The PDA is patent without significant disease in the visualized portion.  The PLV branch is small and patent.  The conus artery has an independent origin. PERICARDIUM:  There is no abnormal pericardial calcification. There is no pericardial effusion. Echo 2/21/21  Summary   -- Left ventricular systolic function is severely reduced with a visually   estimated ejection fraction of 20-25%. EF estimated by Dooley's method at   25%. The left ventricle is normal in size with normal wall thickness. Only   all basal wall segments moves well, while the rest wall segments akinesis,   consistent with takotsubo syndrome. Normal diastolic function. -- Right ventricular systolic function is reduced. -- Mitral valve leaflets appear mildly myxomatous but opens adequately. Mild   mitral regurgitation. Inadequate tricuspid valve regurgitation to estimate systolic pulmonary   artery pressure.       Left Heart Cath: 2/23/21    Findings   LVEDP 15   LVEF  20%   LV wall motion  apical and mid ballooning with basal hypokinesis consistent with Takotsubo cardiomyopathy Gradient across AV  none   Mitral regurg  no significant      Coronary Angiogram:  Artery Findings/Result   LM  no angiographic CAD   LAD  no angiographic CAD   LCx  no angiographic CAD         RCA  dominant, no angiographic CAD      Assessment/Plan  1. Normal coronary arteries,  2. Takotsubo/stress cardiomyopathy  3. Severe QTC prolongation will need to watch medications and follow EKGs     Active Problems:    Seizure ([address]    Status epilepticus (HCC)    NSTEMI (non-ST elevated myocardial infarction) (HCC)    Abnormal ECG    Takotsubo cardiomyopathy    Tobacco abuse    History of drug use    Acute respiratory failure with hypoxia (HCC)    Aspiration pneumonia of both lower lobes (HCC)    Septic shock (HCC)    Polydrug abuse (HCC)    Elevated troponin    Acute encephalopathy    Partial idiopathic epilepsy with seizures of localized onset, intractable, with status epilepticus ([address]  Resolved Problems:    * No resolved hospital problems. *      Assessment:  Elevated troponin/NSTEMI due to stress cardiomyopathy; s/p[ [facility] 2/23/21 normal cors  Abnormal EKG- SVT with LBBB  Cardiomyopathy stress induced.  LVEF 20-25%  Septic shock- Fever Tmax 101.4  Hx of tobacco abuse  Hx of TBI, Right temporal seizures since 2019; followed by  neuro  Hep C  Mood Disorder   IVDU- on methadone  Anemia  Hypertension   Prolong Qtc     Plan:  Continue supportive care   Off of dobutamine   Off of levophed   Add coreg 6.25mg PO BID  Will plan to add entresto tomorrow   PRN hydralazine   Limit prolong qtc meds- consider reducing methadone dose   Daily weights, daily BMP   Discussed with critical care team     Discussed with mother at the bedside   Macey Cho CNP, 2/24/2021, 9:48 AM

## 2021-02-24 NOTE — FLOWSHEET NOTE
Bedside report given by [address] [place] [address] RN, Shaneka verified and Skin assessment completed. Shift assessment completed and documented; see flowsheets for details and vent settings. Pt resting in bed, VSS, Lines checked and verified, alarms on and audible. Repositioned patient, sacral Mepilex in place, call light within reach, will continue to closely monitor. Recent Labs     02/24/21  0350   WBC 12.5*   HGB 11.9*   HCT 35.4*   MCV 90.9   *     Recent Labs     02/24/21  0350      K 3.8  3.8   CL 99   CO2 29   GLUCOSE 114*   PHOS 3.3   MG 1.40*   BUN 5*   CREATININE <0.5*   CALCIUM 8.8   LABGLOM >60   GFRAA >60     [place] 2/24/2021 9:42 AM  CrCl cannot be calculated (This lab value cannot be used to calculate CrCl because it is not a number: <0.5). DVT Prophylaxis: enoxaparin (Lovenox) 40 mg SQ Once daily.     GI Prophylaxis: famotidine (Pepcid); per order

## 2021-02-24 NOTE — PROGRESS NOTES
Fleming Tomasz  Neurology Follow-up  [facility] Neurology    Date of Service: 2/24/2021    Subjective:   CC: Follow up today regarding: New onset seizure and acute encephalopathy. Events noted. Chart and lab reviewed. He was extubated. Awake but waxing and waning. Can follow directions. No sz over night. Other ROS was negative. ROS: He denies any headache or chest pain or focal weakness. No visual changes. Other review of system was unremarkable.     Family history: Noncontributory    Past Medical History:   Diagnosis Date    Drug abuse ([facility]     NSTEMI (non-ST elevated myocardial infarction) ([facility]     [facility]      Current Facility-Administered Medications   Medication Dose Route Frequency Provider Last Rate Last Admin    carvedilol (COREG) tablet 6.25 mg  6.25 mg Oral BID  LEIGHTON Pruett CNP   6.25 mg at 02/24/21 1028    hydrALAZINE (APRESOLINE) injection 5 mg  5 mg Intravenous Q4H PRN LEIGHTON Pruett CNP        insulin lispro (HUMALOG) injection vial 0-6 Units  0-6 Units Subcutaneous Q4H Juanito Aldana MD   Stopped at 02/23/21 0746    methadone (DOLOPHINE) tablet 40 mg  40 mg Per NG tube Daily Juanito Aldana MD   40 mg at 02/24/21 0858    ampicillin-sulbactam (UNASYN) 3000 mg ivpb minibag  3,000 mg Intravenous Q6H Juanito Aldana MD   Stopped at 02/24/21 0930    lactobacillus (CULTURELLE) capsule 1 capsule  1 capsule Per NG tube Daily with breakfast Juanito Aldana MD   1 capsule at 02/24/21 0856    sodium chloride flush 0.9 % injection 10 mL  10 mL Intravenous 2 times per day Cherylle Furlong A Autumn, DO   10 mL at 02/24/21 0857    sodium chloride flush 0.9 % injection 10 mL  10 mL Intravenous PRN Cherylle Furlong A Autumn, DO        promethazine (PHENERGAN) tablet 12.5 mg  12.5 mg Oral Q6H PRN Ahmad A Autumn, DO        polyethylene glycol (GLYCOLAX) packet 17 g  17 g Oral Daily PRN Kylah Pizanojazi, DO        acetaminophen (TYLENOL) tablet 650 mg  650 mg Oral Q6H PRN Kylah Dejesus DO Autumn        Or    acetaminophen (TYLENOL) suppository 650 mg  650 mg Rectal Q6H PRN Kirsten Leyva DO        perflutren lipid microspheres (DEFINITY) injection 1.65 mg  1.5 mL Intravenous ONCE PRN Abigail Leyva DO        famotidine (PEPCID) injection 20 mg  20 mg Intravenous BID Raven Lauren MD   20 mg at 02/24/21 0857    chlorhexidine (PERIDEX) 0.12 % solution 15 mL  15 mL Mouth/Throat BID Raven Lauren MD   15 mL at 02/24/21 0856    carboxymethylcellulose PF (THERATEARS) 1 % ophthalmic gel 1 drop  1 drop Both Eyes 6 times per day Raven Lauren MD   1 drop at 02/24/21 0856    enoxaparin (LOVENOX) injection 40 mg  40 mg Subcutaneous Daily Vesta Salk, APRN - CNP   40 mg at 02/24/21 0856    levETIRAcetam (KEPPRA) 1,000 mg in sodium chloride 0.9 % 100 mL IVPB  1,000 mg Intravenous Q12H Richard Mcintyre MD   Stopped at 02/24/21 0925    glucose (GLUTOSE) 40 % oral gel 15 g  15 g Oral PRN Denisse Jimenez MD        dextrose 50 % IV solution  12.5 g Intravenous PRN Denisse Jimenez MD        glucagon (rDNA) injection 1 mg  1 mg Intramuscular PRN Denisse Jimenez MD        dextrose 5 % solution  100 mL/hr Intravenous PRN Denisse Jimenez MD        propofol injection  5-50 mcg/kg/min Intravenous Titrated Arlen Fajardo MD 10.6 mL/hr at 02/24/21 0935 30 mcg/kg/min at 02/24/21 0935    midazolam (VERSED) 100 mg in dextrose 5 % 100 mL infusion  1-10 mg/hr Intravenous Continuous Arlen Fajardo MD 10 mL/hr at 02/24/21 0605 10 mg/hr at 02/24/21 0605    fentaNYL (SUBLIMAZE) 1,000 mcg in sodium chloride 0.9 % 100 mL infusion  12.5-200 mcg/hr Intravenous Continuous Jamie West MD 15 mL/hr at 02/24/21 1014 150 mcg/hr at 02/24/21 1014     No Known Allergies   reports that he has been smoking cigarettes. He has been smoking about 2.00 packs per day. He has never used smokeless tobacco. He reports previous drug use. Drugs: IV and Opiates .  He reports that he does not drink epilepsy  Substance abuse  Sepsis        Recommendation    Long discussion with his mother today  Apparently he had issues with Keppra in the past with  behavior changes. We will keep him on  Keppra until he is able to take his oral AED  Continue seizure precautions  Respiratory support  Aspiration precautions  Insulin sliding scale  Antibiotics  Continue current blood pressure medications  Discussed with his mother the risk of polypharmacy and substance abuse. He was taking Oxtellar before such admission. Would restart when he is more awake and alert and after speech clears him. Follow CBC  Neurochecks  We will follow    MDM: High complexity due to refractory epilepsy, high risk for status epilepticus, respiratory failure and decompensation. Nani Ramsey MD   [phone]      This dictation was generated by voice recognition computer software. Although all attempts are made to edit the dictation for accuracy, there may be errors in the transcription that are not intended.

## 2021-02-24 NOTE — PLAN OF CARE
Problem: Non-Violent Restraints  Goal: Removal from restraints as soon as assessed to be safe  Outcome: Ongoing     Problem: Non-Violent Restraints  Goal: Patient's dignity will be maintained  Outcome: Ongoing     Problem: Skin Integrity:  Goal: Will show no infection signs and symptoms  Description: Will show no infection signs and symptoms  Outcome: Ongoing

## 2021-02-24 NOTE — PROGRESS NOTES
Pt extubated per order from Dr. Tico Rosario to NRB at 4321. Hayden Reyes RN and Writer at bedside. Pt alert and following commands at time of extubation. SpO2 94-97% on NRB. Mother at bedside. Call light within reach, will continue to closely monitor.

## 2021-02-24 NOTE — PROGRESS NOTES
02/24/21 1104   Vent Information   Vent Type 980   Vent Mode CPAP   Pressure Support 8 cmH20   FiO2  40 %   Sensitivity 3   PEEP/CPAP 5   Vent Patient Data   Peak Inspiratory Pressure 13 cmH2O   Mean Airway Pressure 8 cmH20   Rate Measured 18 br/min   Vt Exhaled 732 mL   Minute Volume 10.8 Liters   I:E Ratio 1:1.30   Cough/Sputum   Sputum How Obtained Endotracheal   Cough Congested;Productive   Sputum Amount Moderate   Sputum Color Cloudy   Spontaneous Breathing Trial (SBT) RT Doc   Pulse 86   Additional Respiratory  Assessments   Resp (!) 34   Alarm Settings   High Pressure Alarm 40 cmH2O   Low Minute Volume Alarm 2 L/min   High Respiratory Rate 40 br/min

## 2021-02-24 NOTE — PROGRESS NOTES
02/24/21 0712   Vent Information   Vent Type 980   Vent Mode AC/VC   Vt Ordered 500 mL   Rate Set 14 bmp   Peak Flow 50 L/min   Pressure Support 0 cmH20   FiO2  40 %   Sensitivity 3   PEEP/CPAP 5   Humidification Source HME   Vent Patient Data   Peak Inspiratory Pressure 20 cmH2O   Mean Airway Pressure 8.6 cmH20   Rate Measured 15 br/min   Vt Exhaled 503 mL   Minute Volume 7.43 Liters   I:E Ratio 1:2.90   Spontaneous Breathing Trial (SBT) RT Doc   Pulse 94   Additional Respiratory  Assessments   Resp 15   Alarm Settings   High Pressure Alarm 40 cmH2O   Low Minute Volume Alarm 2 L/min   High Respiratory Rate 40 br/min   Low Exhaled Vt  200 mL   Non-Surgical Airway Endo Tracheal Tube   Placement Date/Time: 02/21/21 2146   Mask Ventilation: Nasal airway  Placed By: In ED  Inserted by: Dr. Edson Vaughn secured @ 24cm  Insertion attempts: 1  Airway Device: Endo Tracheal Tube  Size: 8  Placement Verified By[de-identified] Auscultation;Colorimetric EtCO2 d. ..    Secured at 24 cm   Measured From Lips   Secured Location Left   Secured By Commercial tube george   Site Condition Dry   Cuff Pressure 30 cm H2O   ambu/sx hob

## 2021-02-24 NOTE — PROGRESS NOTES
02/24/21 0420   Vent Information   Equipment Changed HME   Vent Type 980   Vent Mode AC/VC   Vt Ordered 500 mL   Rate Set 14 bmp   Peak Flow 50 L/min   Pressure Support 0 cmH20   FiO2  40 %   Sensitivity 3   PEEP/CPAP 5   Humidification Source HME   Vent Patient Data   Peak Inspiratory Pressure 20 cmH2O   Mean Airway Pressure 8.1 cmH20   Rate Measured 15 br/min   Vt Exhaled 505 mL   Minute Volume 7.55 Liters   I:E Ratio 1:2.50   Cough/Sputum   Sputum How Obtained Endotracheal;Suctioned   Cough Productive   Sputum Amount Small   Sputum Color Cloudy   Tenacity Thin   Spontaneous Breathing Trial (SBT) RT Doc   Pulse 93   Breath Sounds   Right Upper Lobe Rhonchi   Right Middle Lobe Rhonchi;Diminished   Right Lower Lobe Diminished   Left Upper Lobe Rhonchi;Diminished   Left Lower Lobe Diminished   Additional Respiratory  Assessments   Resp 15   Cuff Pressure (cm H2O) 30 cm H2O   Alarm Settings   High Pressure Alarm 42 cmH2O   Low Minute Volume Alarm 2 L/min   Apnea (secs) 20 secs   High Respiratory Rate 40 br/min   Low Exhaled Vt  200 mL   Patient Observation   Observations AMBU BAG/MASK AT BEDSIDE   Non-Surgical Airway Endo Tracheal Tube   Placement Date/Time: 02/21/21 2146   Mask Ventilation: Nasal airway  Placed By: In ED  Inserted by: Dr. Sundar Calzada  Insertion attempts: 1  Airway Device: Endo Tracheal Tube  Size: 8  Placement Verified By[de-identified] Auscultation;Colorimetric EtCO2 device   Secured at 24 cm   Measured From [facility] By Commercial tube george   Site Condition Dry   Cuff Pressure 30 cm H2O

## 2021-02-25 LAB
A/G RATIO: 1.3 (ref 1.1–2.2)
ALBUMIN SERPL-MCNC: 4 G/DL (ref 3.4–5)
ALP BLD-CCNC: 92 U/L (ref 40–129)
ALT SERPL-CCNC: 36 U/L (ref 10–40)
ANION GAP SERPL CALCULATED.3IONS-SCNC: 11 MMOL/L (ref 3–16)
APTT: 29.8 SEC (ref 24.2–36.2)
AST SERPL-CCNC: 63 U/L (ref 15–37)
BILIRUB SERPL-MCNC: 0.8 MG/DL (ref 0–1)
BLOOD CULTURE, ROUTINE: NORMAL
BUN BLDV-MCNC: 8 MG/DL (ref 7–20)
CALCIUM IONIZED: 1.12 MMOL/L (ref 1.12–1.32)
CALCIUM SERPL-MCNC: 9.3 MG/DL (ref 8.3–10.6)
CHLORIDE BLD-SCNC: 99 MMOL/L (ref 99–110)
CO2: 28 MMOL/L (ref 21–32)
CREAT SERPL-MCNC: <0.5 MG/DL (ref 0.9–1.3)
CULTURE, BLOOD 2: NORMAL
GFR AFRICAN AMERICAN: >60
GFR NON-AFRICAN AMERICAN: >60
GLOBULIN: 3.2 G/DL
GLUCOSE BLD-MCNC: 105 MG/DL (ref 70–99)
GLUCOSE BLD-MCNC: 111 MG/DL (ref 70–99)
GLUCOSE BLD-MCNC: 116 MG/DL (ref 70–99)
GLUCOSE BLD-MCNC: 117 MG/DL (ref 70–99)
GLUCOSE BLD-MCNC: 151 MG/DL (ref 70–99)
HCT VFR BLD CALC: 38.1 % (ref 40.5–52.5)
HEMOGLOBIN: 13 G/DL (ref 13.5–17.5)
INR BLD: 1.23 (ref 0.86–1.14)
MAGNESIUM: 1.4 MG/DL (ref 1.8–2.4)
MCH RBC QN AUTO: 30.8 PG (ref 26–34)
MCHC RBC AUTO-ENTMCNC: 34.1 G/DL (ref 31–36)
MCV RBC AUTO: 90.4 FL (ref 80–100)
PDW BLD-RTO: 13.2 % (ref 12.4–15.4)
PERFORMED ON: ABNORMAL
PH VENOUS: 7.43 (ref 7.35–7.45)
PHOSPHORUS: 2.2 MG/DL (ref 2.5–4.9)
PLATELET # BLD: 182 K/UL (ref 135–450)
PMV BLD AUTO: 7.5 FL (ref 5–10.5)
POTASSIUM REFLEX MAGNESIUM: 3.2 MMOL/L (ref 3.5–5.1)
PROTHROMBIN TIME: 14.3 SEC (ref 10–13.2)
RBC # BLD: 4.21 M/UL (ref 4.2–5.9)
SODIUM BLD-SCNC: 138 MMOL/L (ref 136–145)
TOTAL CK: 1526 U/L (ref 39–308)
TOTAL PROTEIN: 7.2 G/DL (ref 6.4–8.2)
WBC # BLD: 13.3 K/UL (ref 4–11)

## 2021-02-25 PROCEDURE — 1200000000 HC SEMI PRIVATE

## 2021-02-25 PROCEDURE — 85027 COMPLETE CBC AUTOMATED: CPT

## 2021-02-25 PROCEDURE — 2580000003 HC RX 258: Performed by: INTERNAL MEDICINE

## 2021-02-25 PROCEDURE — 6370000000 HC RX 637 (ALT 250 FOR IP): Performed by: INTERNAL MEDICINE

## 2021-02-25 PROCEDURE — 6360000002 HC RX W HCPCS: Performed by: NURSE PRACTITIONER

## 2021-02-25 PROCEDURE — 6360000002 HC RX W HCPCS: Performed by: FAMILY MEDICINE

## 2021-02-25 PROCEDURE — 99232 SBSQ HOSP IP/OBS MODERATE 35: CPT | Performed by: PSYCHIATRY & NEUROLOGY

## 2021-02-25 PROCEDURE — 6360000002 HC RX W HCPCS: Performed by: INTERNAL MEDICINE

## 2021-02-25 PROCEDURE — 82330 ASSAY OF CALCIUM: CPT

## 2021-02-25 PROCEDURE — 85730 THROMBOPLASTIN TIME PARTIAL: CPT

## 2021-02-25 PROCEDURE — 6370000000 HC RX 637 (ALT 250 FOR IP): Performed by: NURSE PRACTITIONER

## 2021-02-25 PROCEDURE — 83735 ASSAY OF MAGNESIUM: CPT

## 2021-02-25 PROCEDURE — 2580000003 HC RX 258: Performed by: PSYCHIATRY & NEUROLOGY

## 2021-02-25 PROCEDURE — 99232 SBSQ HOSP IP/OBS MODERATE 35: CPT | Performed by: NURSE PRACTITIONER

## 2021-02-25 PROCEDURE — 99232 SBSQ HOSP IP/OBS MODERATE 35: CPT | Performed by: INTERNAL MEDICINE

## 2021-02-25 PROCEDURE — 85610 PROTHROMBIN TIME: CPT

## 2021-02-25 PROCEDURE — 84100 ASSAY OF PHOSPHORUS: CPT

## 2021-02-25 PROCEDURE — 6370000000 HC RX 637 (ALT 250 FOR IP): Performed by: FAMILY MEDICINE

## 2021-02-25 PROCEDURE — 2500000003 HC RX 250 WO HCPCS: Performed by: INTERNAL MEDICINE

## 2021-02-25 PROCEDURE — 6360000002 HC RX W HCPCS: Performed by: PSYCHIATRY & NEUROLOGY

## 2021-02-25 PROCEDURE — 80053 COMPREHEN METABOLIC PANEL: CPT

## 2021-02-25 PROCEDURE — 82550 ASSAY OF CK (CPK): CPT

## 2021-02-25 RX ORDER — AMOXICILLIN AND CLAVULANATE POTASSIUM 875; 125 MG/1; MG/1
1 TABLET, FILM COATED ORAL EVERY 12 HOURS SCHEDULED
Status: DISCONTINUED | OUTPATIENT
Start: 2021-02-25 | End: 2021-02-27 | Stop reason: HOSPADM

## 2021-02-25 RX ORDER — OXCARBAZEPINE 150 MG/1
600 TABLET, FILM COATED ORAL 2 TIMES DAILY
Status: DISCONTINUED | OUTPATIENT
Start: 2021-02-25 | End: 2021-02-27 | Stop reason: HOSPADM

## 2021-02-25 RX ORDER — MAGNESIUM SULFATE IN WATER 40 MG/ML
2000 INJECTION, SOLUTION INTRAVENOUS ONCE
Status: COMPLETED | OUTPATIENT
Start: 2021-02-25 | End: 2021-02-25

## 2021-02-25 RX ORDER — AMOXICILLIN AND CLAVULANATE POTASSIUM 875; 125 MG/1; MG/1
1 TABLET, FILM COATED ORAL EVERY 12 HOURS SCHEDULED
Status: DISCONTINUED | OUTPATIENT
Start: 2021-02-25 | End: 2021-02-25

## 2021-02-25 RX ORDER — AMOXICILLIN AND CLAVULANATE POTASSIUM 875; 125 MG/1; MG/1
1 TABLET, FILM COATED ORAL ONCE
Status: COMPLETED | OUTPATIENT
Start: 2021-02-25 | End: 2021-02-25

## 2021-02-25 RX ORDER — LEVETIRACETAM 500 MG/1
1000 TABLET ORAL 2 TIMES DAILY
Status: DISCONTINUED | OUTPATIENT
Start: 2021-02-25 | End: 2021-02-25

## 2021-02-25 RX ORDER — POTASSIUM CHLORIDE 7.45 MG/ML
10 INJECTION INTRAVENOUS
Status: COMPLETED | OUTPATIENT
Start: 2021-02-25 | End: 2021-02-25

## 2021-02-25 RX ADMIN — POTASSIUM CHLORIDE 10 MEQ: 10 INJECTION, SOLUTION INTRAVENOUS at 08:46

## 2021-02-25 RX ADMIN — OXCARBAZEPINE 600 MG: 150 TABLET, FILM COATED ORAL at 22:06

## 2021-02-25 RX ADMIN — AMPICILLIN SODIUM AND SULBACTAM SODIUM 3000 MG: 2; 1 INJECTION, POWDER, FOR SOLUTION INTRAMUSCULAR; INTRAVENOUS at 08:46

## 2021-02-25 RX ADMIN — CARVEDILOL 6.25 MG: 6.25 TABLET, FILM COATED ORAL at 16:36

## 2021-02-25 RX ADMIN — SACUBITRIL AND VALSARTAN 1 TABLET: 24; 26 TABLET, FILM COATED ORAL at 16:36

## 2021-02-25 RX ADMIN — Medication 1 CAPSULE: at 08:45

## 2021-02-25 RX ADMIN — Medication 10 ML: at 22:07

## 2021-02-25 RX ADMIN — AMOXICILLIN AND CLAVULANATE POTASSIUM 1 TABLET: 875; 125 TABLET, FILM COATED ORAL at 14:49

## 2021-02-25 RX ADMIN — ENOXAPARIN SODIUM 40 MG: 40 INJECTION SUBCUTANEOUS at 08:47

## 2021-02-25 RX ADMIN — METHADONE HYDROCHLORIDE 40 MG: 10 TABLET ORAL at 08:46

## 2021-02-25 RX ADMIN — MAGNESIUM SULFATE HEPTAHYDRATE 2000 MG: 40 INJECTION, SOLUTION INTRAVENOUS at 08:46

## 2021-02-25 RX ADMIN — CARVEDILOL 6.25 MG: 6.25 TABLET, FILM COATED ORAL at 08:46

## 2021-02-25 RX ADMIN — FAMOTIDINE 20 MG: 10 INJECTION, SOLUTION INTRAVENOUS at 08:45

## 2021-02-25 RX ADMIN — AMOXICILLIN AND CLAVULANATE POTASSIUM 1 TABLET: 875; 125 TABLET, FILM COATED ORAL at 22:05

## 2021-02-25 RX ADMIN — POTASSIUM CHLORIDE 10 MEQ: 10 INJECTION, SOLUTION INTRAVENOUS at 10:56

## 2021-02-25 RX ADMIN — LEVETIRACETAM 1000 MG: 100 INJECTION, SOLUTION INTRAVENOUS at 09:02

## 2021-02-25 RX ADMIN — AMPICILLIN SODIUM AND SULBACTAM SODIUM 3000 MG: 2; 1 INJECTION, POWDER, FOR SOLUTION INTRAMUSCULAR; INTRAVENOUS at 05:35

## 2021-02-25 RX ADMIN — Medication 10 ML: at 10:56
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
## 2021-02-25 NOTE — PROGRESS NOTES
Pulmonary & Critical Care Medicine ICU Progress Note      Events of Last 24 hours: The patient has remained essentially afebrile and hemodynamically stable. Echocardiogram suggested Takotsubo physiology, cardiac catheterization was unremarkable. He was extubated, has maintained adequate saturations. He is awake, alert and oriented. Invasive Lines:   CVC RIJ 2/22/2021                                 MV:    2/22/2021    Recent Labs     02/24/21  0410 02/24/21  1000   PHART 7.410 7.457*   QHS6DVA 44.0 38.0   PO2ART 60.7* 50.5*       MV Settings:  Vent Mode: CPAP Rate Set: 14 bmp/Vt Ordered: 500 mL/ [email or web address])    IV:   dextrose         Vitals:  /73   Pulse 79   Temp 98.8 °F (37.1 °C) (Oral)   Resp 18   Ht 5' 11\" (1.803 m)   Wt 131 lb 6.3 oz (59.6 kg)   SpO2 98%   BMI 18.33 kg/m²          Intake/Output Summary (Last 24 hours) at 2/25/2021 1029  Last data filed at 2/25/2021 7241  Gross per 24 hour   Intake 567 ml   Output 2550 ml   Net -1983 ml       General: Tall, averagely nourished,  mildly ill-appearing. Well developed, in no respiratory distress. Eyes:  No scleral icterus or periorbital edema. PERRL  Head: Atraumatic, normocephalic. ENMT:  Class II airway. No bleeding of lips or gums. Mucosa pink and  moist. No ulceration. No oral candidiasis. Neck: No JVD. RIJ CVC. Lymphadenopathy:  Deferred  CV: S1, S2, no murmurs, gallops  Respiratory: Clear to auscultation  Chest: Equal rise and fall of chest  GI:  Deferred  : Wu intact. Skin: No rashes, lesions, bruises, induration, discharge. Color, texture, turgor normal.  Musculoskeletal: Supple, no contractures or muscle atrophy. No clubbing or cyanosis of nailbeds. No joint swelling, redness.  Edema: None  Neuro: Detailed exam not performed, moves all extremities.        Medications:  Scheduled Meds:   potassium chloride  10 mEq Intravenous Q2H    levETIRAcetam  1,000 mg Oral BID    amoxicillin-clavulanate  1 tablet Oral 2 times per day    carvedilol  6.25 mg Oral BID WC    insulin lispro  0-6 Units Subcutaneous Q4H    methadone  40 mg Per NG tube Daily    lactobacillus  1 capsule Per NG tube Daily with breakfast    sodium chloride flush  10 mL Intravenous 2 times per day    enoxaparin  40 mg Subcutaneous Daily       PRN Meds:  hydrALAZINE, phenol, sodium chloride flush, promethazine **OR** [DISCONTINUED] ondansetron, polyethylene glycol, acetaminophen **OR** acetaminophen, perflutren lipid microspheres, glucose, dextrose, glucagon (rDNA), dextrose    Results:  CBC:   Recent Labs     02/23/21 0410 02/24/21  0350 02/25/21  0540   WBC 14.2* 12.5* 13.3*   HGB 12.2* 11.9* 13.0*   HCT 36.3* 35.4* 38.1*   MCV 91.9 90.9 90.4    134* 182     BMP:   Recent Labs     02/23/21 0410 02/24/21  0350 02/25/21  0540   * 136 138   K 4.0 3.8  3.8 3.2*    99 99   CO2 25 29 28   PHOS 2.3* 3.3 2.2*   BUN 9 5* 8   CREATININE <0.5* <0.5* <0.5*     LIVER PROFILE:   Recent Labs     02/23/21 0410 02/24/21  0350 02/25/21  0540   AST 34 39* 63*   ALT 27 30 36   BILITOT 0.4 0.5 0.8   ALKPHOS 86 89 92     PT/INR:   Recent Labs     02/23/21 0410 02/24/21  0350 02/25/21  0540   PROTIME 14.2* 13.4* 14.3*   INR 1.22* 1.15* 1.23*     APTT:   Recent Labs     02/23/21 0410 02/24/21  0350 02/25/21  0540   APTT 32.0 30.0 29.8     Cultures:  Negative so far    Films:  CXR reviewed by me       Assessment and plan:  Acute respiratory failure, hypoxemic and hypercarbic. Resolved Pneumonia versus pulmonary edema, possible ARDS. He had had fever, leukocytosis. CT suggests aspiration pneumonia. Completed diagnostic/therapeutic bronchoscopy, appeared to have mucoid secretions. Cultures negative. Rapid improvement in imaging suggests more of pulmonary edema. On cefepime   Septic shock, elevated lactic acid. Weaned off vasopressor  Metabolic acidosis. Probably from lactic acidosis  Seizure. On Keppra, neurology following  Hyperglycemia. Sliding scale if persistently high  Abnormal LFT. Could be part of septic shock, he could have   hepatitis C  Leukocytosis. Probably due to sepsis, significant improvement. Left upper lung nodule. Probably incidental finding. Past drug abuse, NSTEMI, chronic hepatitis C, mood disorder. Per IM. Will add methadone at the lower dose to assist with extubation and reducing sedation  Smoker. Address once extubated, reportedly he is a heavy smoker  DVT prophylaxis. On heparin infusion  PUD prophylaxis. Pepcid       Multidisciplinary rounds were completed at the bedside with participation from the intensivist, pharmacy, nursing, nutrition. All labs and imaging studies reviewed, discussed. Patient can be transferred out of ICU change from central line to peripheral IV. We will sign off as the patient has clinically improved.   Please call with questions and thank you for the consultation        Electronically signed by:  Magdy Weathers MD    2/25/2021    10:29 AM.

## 2021-02-25 NOTE — PROGRESS NOTES
Pulmonary & Critical Care Medicine ICU Progress Note      Events of Last 24 hours: The patient has remained essentially afebrile and hemodynamically stable. Echocardiogram suggested Takotsubo physiology, cardiology following. He had been placed on dobutamine which was discontinued. The patient had been on high-dose methadone at home. He has had significant improvement in his FiO2, down to 40% with SaO2 98%. On sedation with propofol 50, Versed 10, Precedex 0.4. Appears fairly deeply sedated. Underwent cardiac catheterization, no coronary artery disease. Attempting to decrease his sedation has been leading to tachycardia and hypertension. Invasive Lines:   CVC RIJ 2/22/2021                   Art Line right radial 2/22/2021                    MV:    2/22/2021    Recent Labs     02/24/21  0410 02/24/21  1000   PHART 7.410 7.457*   AES5VXW 44.0 38.0   PO2ART 60.7* 50.5*       MV Settings:  Vent Mode: CPAP Rate Set: 14 bmp/Vt Ordered: 500 mL/ [email or web address])    IV:   dextrose      propofol Stopped (02/24/21 1028)    midazolam Stopped (02/24/21 1028)    fentaNYL (SUBLIMAZE) infusion Stopped (02/24/21 1028)       Vitals:  BP (!) 151/105   Pulse 79   Temp 99.9 °F (37.7 °C) (Bladder)   Resp 15   Ht 5' 11\" (1.803 m)   Wt 131 lb 6.3 oz (59.6 kg)   SpO2 99%   BMI 18.33 kg/m²          Intake/Output Summary (Last 24 hours) at 2/24/2021 2050  Last data filed at 2/24/2021 1800  Gross per 24 hour   Intake 1109.1 ml   Output 3875 ml   Net -2765.9 ml       General: Tall, averagely nourished, sedated and intubated. Well developed, in no respiratory distress. Eyes:  No scleral icterus or periorbital edema. PERRL  Head: Atraumatic, normocephalic. ENMT: # 8  ETT, 25 cm at incisor level, OG/NG intact. No bleeding of lips or gums. Mucosa pink and dry. No ulceration. No oral candidiasis. Neck: No JVD. RIJ CVC.      Lymphadenopathy:  Deferred  CV: S1, S2, no murmurs, gallops  Respiratory: Clear to auscultation  Chest: Equal rise and fall of chest  GI:  Deferred  : Wu intact. Skin: No rashes, lesions, bruises, induration, discharge. Color, texture, turgor normal.  Musculoskeletal: Supple, no contractures or muscle atrophy. No clubbing or cyanosis of nailbeds. No joint swelling, redness. Edema: None  Neuro: Detailed exam not performed, moves all extremities.        Medications:  Scheduled Meds:   carvedilol  6.25 mg Oral BID WC    insulin lispro  0-6 Units Subcutaneous Q4H    methadone  40 mg Per NG tube Daily    ampicillin-sulbactam  3,000 mg Intravenous Q6H    lactobacillus  1 capsule Per NG tube Daily with breakfast    sodium chloride flush  10 mL Intravenous 2 times per day    famotidine (PEPCID) injection  20 mg Intravenous BID    chlorhexidine  15 mL Mouth/Throat BID    carboxymethylcellulose PF  1 drop Both Eyes 6 times per day    enoxaparin  40 mg Subcutaneous Daily    levetiracetam  1,000 mg Intravenous Q12H       PRN Meds:  hydrALAZINE, phenol, sodium chloride flush, promethazine **OR** [DISCONTINUED] ondansetron, polyethylene glycol, acetaminophen **OR** acetaminophen, perflutren lipid microspheres, glucose, dextrose, glucagon (rDNA), dextrose    Results:  CBC:   Recent Labs     02/22/21  0815 02/23/21  0410 02/24/21  0350   WBC 30.9* 14.2* 12.5*   HGB 14.2 12.2* 11.9*   HCT 42.8 36.3* 35.4*   MCV 92.6 91.9 90.9    144 134*     BMP:   Recent Labs     02/21/21  2141 02/22/21  0815 02/22/21  0815 02/23/21  0410 02/24/21  0350   * 133*  --  134* 136   K 4.8 5.7*   < > 4.0 3.8  3.8   CL 93* 103  --  102 99   CO2 12* 23  --  25 29   PHOS 8.2*  --   --  2.3* 3.3   BUN 10 12  --  9 5*   CREATININE 1.0 0.7*  --  <0.5* <0.5*    < > = values in this interval not displayed.      LIVER PROFILE:   Recent Labs     02/22/21  0815 02/23/21  0410 02/24/21  0350   AST 40* 34 39*   ALT 27 27 30   BILITOT 0.3 0.4 0.5   ALKPHOS 106 86 89     PT/INR:   Recent Labs     02/22/21  0815 02/23/21  0410 02/24/21  0350   PROTIME 12.6 14.2* 13.4*   INR 1.09 1.22* 1.15*     APTT:   Recent Labs     02/22/21  0815 02/23/21  0410 02/24/21  0350   APTT 80.6* 32.0 30.0     UA:  Recent Labs     02/21/21  2159   COLORU Yellow   PHUR 7.0  7.0   WBCUA 0-2   RBCUA 11-20*   CLARITYU SL CLOUDY*   SPECGRAV >=1.030   LEUKOCYTESUR Negative   UROBILINOGEN 0.2   BILIRUBINUR Negative   BLOODU MODERATE*   GLUCOSEU Negative       Cultures:  Negative so far    Films:  CXR reviewed by me       Assessment and plan:  Acute respiratory failure, hypoxemic and hypercarbic. Significant improvement in oxygenation, continue to wean sedation with attempt at SBT and extubation. May need to perform SBT with from sedation on board  Pneumonia versus pulmonary edema, possible ARDS. He had fever, leukocytosis. CT suggested aspiration pneumonia. Completed diagnostic/therapeutic bronchoscopy, appeared to have mucoid secretions. Cultures negative so far. Rapid improvement in imaging suggests more of pulmonary edema. Was on cefepime and vancomycin, vancomycin discontinued  Septic shock, elevated lactic acid. Weaned off vasopressor  Metabolic acidosis. Probably from lactic acidosis  Seizure. On Keppra, neurology following  Hyperglycemia. Sliding scale if persistently high  Abnormal LFT. Could be part of septic shock, he could have hepatitis C  Leukocytosis. Probably due to sepsis, significant improvement. Left upper lung nodule. Probably incidental finding. Past drug abuse, NSTEMI, chronic hepatitis C, mood disorder. Per IM. Will add methadone at the lower dose to assist with extubation and reducing sedation  Smoker. Address once extubated, reportedly he is a heavy smoker  DVT prophylaxis. On heparin infusion  PUD prophylaxis. Pepcid       Multidisciplinary rounds were completed at the bedside with participation from the intensivist, pharmacy, nursing, nutrition.   All labs and imaging studies reviewed, discussed.         Electronically signed by:  Nila Santos MD    2/24/2021    8:50 PM.

## 2021-02-25 NOTE — PROGRESS NOTES
Hospitalist Progress Note      PCP: No primary care provider on file. Date of Admission: 2/21/2021      Hospital Course:   32 y.o. male who presented to [facility] with past medical history of drug abuse, NSTEMI, seizure disorder, chronic hepatitis C, mood disorder, cigarette smoking presented to the ED for right-sided chest wall pain. Subjective:    Patient seen and examined. Awake on room air, oriented to place, person, year not month or date. Feels confused about what happened. Hungry. No dyspnea. No chest pain. Medications:  Reviewed    Infusion Medications    dextrose       Scheduled Medications    levETIRAcetam  1,000 mg Oral BID    amoxicillin-clavulanate  1 tablet Oral 2 times per day    amoxicillin-clavulanate  1 tablet Oral Once    carvedilol  6.25 mg Oral BID WC    insulin lispro  0-6 Units Subcutaneous Q4H    methadone  40 mg Per NG tube Daily    lactobacillus  1 capsule Per NG tube Daily with breakfast    sodium chloride flush  10 mL Intravenous 2 times per day    enoxaparin  40 mg Subcutaneous Daily     PRN Meds: hydrALAZINE, phenol, sodium chloride flush, promethazine **OR** [DISCONTINUED] ondansetron, polyethylene glycol, acetaminophen **OR** acetaminophen, perflutren lipid microspheres, glucose, dextrose, glucagon (rDNA), dextrose      Intake/Output Summary (Last 24 hours) at 2/25/2021 1110  Last data filed at 2/25/2021 9719  Gross per 24 hour   Intake 567 ml   Output 2550 ml   Net -1983 ml       Physical Exam Performed:    /73   Pulse 79   Temp 98.8 °F (37.1 °C) (Oral)   Resp 18   Ht 5' 11\" (1.803 m)   Wt 131 lb 6.3 oz (59.6 kg)   SpO2 98%   BMI 18.33 kg/m²     General appearance: No apparent distress, awake  HEENT: Conjunctivae/corneas clear, neck supple w/ full ROM  Respiratory:  NAD,  CTAB  Cardiovascular: Regular rate and rhythm  Abdomen: Soft, non-tender, non-distended with normal bowel sounds.   Musculoskeletal: No edema bilaterally  Neurologic:  Non focal  Psychiatric: calm  Peripheral Pulses: +2 palpable, equal bilaterally       Labs:   Recent Labs     02/23/21  0410 02/24/21  0350 02/25/21  0540   WBC 14.2* 12.5* 13.3*   HGB 12.2* 11.9* 13.0*   HCT 36.3* 35.4* 38.1*    134* 182     Recent Labs     02/23/21  0410 02/24/21  0350 02/25/21  0540   * 136 138   K 4.0 3.8  3.8 3.2*    99 99   CO2 25 29 28   BUN 9 5* 8   CREATININE <0.5* <0.5* <0.5*   CALCIUM 8.3 8.8 9.3   PHOS 2.3* 3.3 2.2*     Recent Labs     02/23/21  0410 02/24/21  0350 02/25/21  0540   AST 34 39* 63*   ALT 27 30 36   BILITOT 0.4 0.5 0.8   ALKPHOS 86 89 92     Recent Labs     02/23/21  0410 02/24/21  0350 02/25/21  0540   INR 1.22* 1.15* 1.23*     Recent Labs     02/23/21  0410 02/24/21  0350 02/25/21  0540   CKTOTAL 342* 623* 1,526*   TROPONINI 0.13*  --   --        Urinalysis:      Lab Results   Component Value Date    NITRU Negative 02/21/2021    WBCUA 0-2 02/21/2021    BACTERIA Rare 01/19/2020    RBCUA 11-20 02/21/2021    BLOODU MODERATE 02/21/2021    SPECGRAV >=1.030 02/21/2021    GLUCOSEU Negative 02/21/2021       Radiology:  XR CHEST PORTABLE   Final Result   Clearing of the bilateral airspace disease. Tubes and lines are satisfactory position         XR CHEST PORTABLE   Final Result   1. Interval placement of a right IJ central line, tip at the junction of the   SVC and right atrium   2. No evidence of a pneumothorax   3. Marked interval worsening of diffuse airspace disease, which may be on the   basis of aspiration or edema. 4. NG tube in place. Proximal port is within the distal esophagus, proximal   to the GE junction         CT CHEST PULMONARY EMBOLISM W CONTRAST   Final Result   1. No evidence of pulmonary embolism   2. Extensive airspace disease within the lower lobes bilaterally, and   posterior segment of the upper lobes, right greater than left.   Given the   clinical history, changes are highly suspicious for aspiration pneumonia. 3. ET tube and NG tube in place   4. 4 mm nodule, left upper lobe. 5. No evidence of a pneumothorax      RECOMMENDATIONS:   Fleischner Society guidelines for follow-up and management of incidentally   detected pulmonary nodules:      Single Solid Nodule:      Nodule size less than 6 mm: In a low-risk patient, no routine follow-up. - Low risk patients include individuals with minimal or absent history of   smoking and other known risk factors. - High risk patients include individuals with a history or smoking or known   risk factors. Reference: Radiology 2017   http://pubs. rsna.org/doi/full/10.1148/radiol. 8319004099         CT Head WO Contrast   Final Result   No acute intracranial abnormality. Chronic pansinusitis         XR CHEST PORTABLE   Final Result   1. No acute cardiopulmonary disease. 2. The endotracheal tube tip is located 6.7 cm above the lelo. 3. Incomplete visualization of the orogastric tube.                  Assessment/Plan:    Active Hospital Problems    Diagnosis    Seizure ([address] [R56.9]    Status epilepticus ([address] [G40.901]    NSTEMI (non-ST elevated myocardial infarction) ([address] [I21.4]    Abnormal ECG [R94.31]    Takotsubo cardiomyopathy [I51.81]    Tobacco abuse [Z72.0]    History of drug use [Z87.898]    Acute respiratory failure with hypoxia (HCC) [J96.01]    Aspiration pneumonia of both lower lobes ([address] [J69.0]    Septic shock (HCC) [A41.9, R65.21]    Polydrug abuse ([address] [F19.10]    Elevated troponin [R77.8]    Acute encephalopathy [G93.40]    Partial idiopathic epilepsy with seizures of localized onset, intractable, with status epilepticus ([address] [G40.011]     Seizure, acute encephalopathy   - EEG - severe diffuse encephalopathy, no epileptiform discharges - no evidence of status epilepticus  - neurology consulted - likely metabolic encephalopathy and toxic from substance abuse  - seizure precautions  - switch to home dose oral oxcarbazepine per neurology recs    Acute hypoxemic, hypercarbic respiratory failure  - extubated 2/24  - mgmt per pulmonology   - now on room air    Bilateral Lower lobe aspiration PNA  - c/w unasyn - switched to augmentin  - urine antigens negtive  - COVID19 negative rapid  - s/p bronch 2/22 -  Mucopurulent plugs not aspirated particles - cx ngtd    Spetic shock  - afebrile  - off levophed, off dobutaine  - bl cx ngtd    Lactic acidosis, AGMA  - resolved    Hyperkalemia - resolved    NSTEMI  - trop negative, 0.31, 0.70 - trend  - ECHO - EF 25%, only basal wall segments move well, rest are skinesis consistent with takotsubo syndrome  -  cardiology c/s  - EKG today with T wave changes  -CATH - normal coronaries  - stress cardiomyopathy  - c/w coreg, entresto added    Hyperglycemia  - monitor, ssi  - a1c 5.3    Abn LFTs  - in setting septic shock   - improved    HARINI nodule - incidental    Hx drug abuse  - UDS + amphetamine, benzodiazepine, cannabinoids, methadone, opitae  - c/w methadone     Tobacco abuse    DVT Prophylaxis: lovenox  Diet: DIET GENERAL;  Code Status: Full Code      Dispo - okay to transfer out of ICU    Kendra Freeman MD
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
## 2021-02-25 NOTE — PROGRESS NOTES
Patient arrived to floor in calm and stable condition. SUNDARS. Cl in room for seizures, 4/4 rails up and padded. Telemetry box 118 in place, sinus rhythm noted on the monitor. Will continue to monitor.

## 2021-02-25 NOTE — PROGRESS NOTES
Bertin Sharp  Neurology Follow-up  [facility] Neurology    Date of Service: 2/25/2021    Subjective:   CC: Follow up today regarding: New onset seizure and acute encephalopathy. Events noted. Chart and lab reviewed. The patient is more awake and alert today. He denies any chest pain, headache, dysphagia or dysarthria. No other new symptoms. Other review of system was unremarkable.       Family history: Noncontributory    Past Medical History:   Diagnosis Date    [facility]     NSTEMI (non-ST elevated myocardial infarction) ([facility]     Se[facility]ures [facility]      Current Facility-Administered Medications   Medication Dose Route Frequency Provider Last Rate Last Admin    magnesium sulfate 2000 mg in 50 mL IVPB premix  2,000 mg Intravenous Once Malik Corado MD 25 mL/hr at 02/25/21 0846 2,000 mg at 02/25/21 0846    potassium chloride 10 mEq/100 mL IVPB (Peripheral Line)  10 mEq Intravenous Q2H Malik Corado  mL/hr at 02/25/21 0846 10 mEq at 02/25/21 0846    levETIRAcetam (KEPPRA) tablet 1,000 mg  1,000 mg Oral BID Jorge Alberto Kincaid MD        amoxicillin-clavulanate (AUGMENTIN) 875-125 MG per tablet 1 tablet  1 tablet Oral 2 times per day Jorge Alberto Kincaid MD        carvedilol (COREG) tablet 6.25 mg  6.25 mg Oral BID  LEIGHTON Hoffmann CNP   6.25 mg at 02/25/21 0846    hydrALAZINE (APRESOLINE) injection 5 mg  5 mg Intravenous Q4H PRN LEIGHTON Hoffmann CNP        phenol 1.4 % mouth spray 1 spray  1 spray Mouth/Throat Q2H PRN LEIGHTON Thomas CNP   1 spray at 02/24/21 1518    insulin lispro (HUMALOG) injection vial 0-6 Units  0-6 Units Subcutaneous Q4H Jorge Alberto Kincaid MD   Stopped at 02/23/21 0746    methadone (DOLOPHINE) tablet 40 mg  40 mg Per NG tube Daily Jorge Alberto Kincaid MD   40 mg at 02/25/21 0846    lactobacillus (CULTURELLE) capsule 1 capsule  1 capsule Per NG tube Daily with breakfast Jorge Alberto Kincaid MD   1 capsule at 02/25/21 0837    sodium chloride flush reactive to light  III,IV,VI: No gaze preference. VII: Face is symmetric   Tongue is midline  Normal facial sensation  Motor exam: No focal weakness in both arms or legs  Normal tone  Cerebellar exam: No tremors  Sensory: Normal            Data:  LABS:   Lab Results   Component Value Date     02/25/2021    K 3.2 02/25/2021    CL 99 02/25/2021    CO2 28 02/25/2021    BUN 8 02/25/2021    CREATININE <0.5 02/25/2021    GFRAA >60 02/25/2021    LABGLOM >60 02/25/2021    GLUCOSE 116 02/25/2021    PHOS 2.2 02/25/2021    MG 1.40 02/25/2021    CALCIUM 9.3 02/25/2021     Lab Results   Component Value Date    WBC 13.3 02/25/2021    RBC 4.21 02/25/2021    HGB 13.0 02/25/2021    HCT 38.1 02/25/2021    MCV 90.4 02/25/2021    RDW 13.2 02/25/2021     02/25/2021     Lab Results   Component Value Date    INR 1.23 (H) 02/25/2021    PROTIME 14.3 (H) 02/25/2021       labs were independently reviewed by me  Reviewed notes from different physicians. Impression: The same     acute encephalopathy and breakthrough seizure, severe. Likely acute metabolic encephalopathy with multiorgan failure in addition to toxic encephalopathy from substance abuse. Recent EEG showed no evidence of status epilepticus. Acute respiratory failure with hypoxia, improved  Medical refractory epilepsy  Substance abuse  Sepsis        Recommendation      Restart his Trileptal  DC Keppra due to hx of aggressive behavior and side effect in the past  Seizure precautions  PT and OT  Aspiration precautions  Telemetry  DVT and GI prophylaxis  No driving  Discussed with the patient risk of status epilepticus, recurrent seizures and risk of substance abuse  Insulin sliding scale  Blood sugar control  Antibiotics  Can be discharged when medically stable and follow-up with UC epilepsy for further management regarding his intractable epilepsy  We will follow as needed  Please call for questions.         Cole Crowder MD   [phone]      This dictation was generated by voice recognition computer software. Although all attempts are made to edit the dictation for accuracy, there may be errors in the transcription that are not intended.

## 2021-02-26 LAB
ANION GAP SERPL CALCULATED.3IONS-SCNC: 13 MMOL/L (ref 3–16)
APTT: 26.3 SEC (ref 24.2–36.2)
BLOOD CULTURE, ROUTINE: NORMAL
BUN BLDV-MCNC: 12 MG/DL (ref 7–20)
CALCIUM IONIZED: 1.13 MMOL/L (ref 1.12–1.32)
CALCIUM SERPL-MCNC: 9.8 MG/DL (ref 8.3–10.6)
CHLORIDE BLD-SCNC: 99 MMOL/L (ref 99–110)
CO2: 28 MMOL/L (ref 21–32)
CREAT SERPL-MCNC: 0.6 MG/DL (ref 0.9–1.3)
GFR AFRICAN AMERICAN: >60
GFR NON-AFRICAN AMERICAN: >60
GLUCOSE BLD-MCNC: 106 MG/DL (ref 70–99)
GLUCOSE BLD-MCNC: 107 MG/DL (ref 70–99)
GLUCOSE BLD-MCNC: 119 MG/DL (ref 70–99)
GLUCOSE BLD-MCNC: 125 MG/DL (ref 70–99)
GLUCOSE BLD-MCNC: 140 MG/DL (ref 70–99)
GLUCOSE BLD-MCNC: 91 MG/DL (ref 70–99)
GLUCOSE BLD-MCNC: 93 MG/DL (ref 70–99)
HCT VFR BLD CALC: 45.7 % (ref 40.5–52.5)
HEMOGLOBIN: 15.6 G/DL (ref 13.5–17.5)
INR BLD: 1.11 (ref 0.86–1.14)
MAGNESIUM: 1.9 MG/DL (ref 1.8–2.4)
MCH RBC QN AUTO: 30.7 PG (ref 26–34)
MCHC RBC AUTO-ENTMCNC: 34.1 G/DL (ref 31–36)
MCV RBC AUTO: 90 FL (ref 80–100)
PDW BLD-RTO: 13.2 % (ref 12.4–15.4)
PERFORMED ON: ABNORMAL
PERFORMED ON: NORMAL
PERFORMED ON: NORMAL
PH VENOUS: 7.41 (ref 7.35–7.45)
PHOSPHORUS: 3.4 MG/DL (ref 2.5–4.9)
PLATELET # BLD: 256 K/UL (ref 135–450)
PMV BLD AUTO: 7.4 FL (ref 5–10.5)
POTASSIUM SERPL-SCNC: 3.5 MMOL/L (ref 3.5–5.1)
PROTHROMBIN TIME: 12.9 SEC (ref 10–13.2)
RBC # BLD: 5.07 M/UL (ref 4.2–5.9)
SODIUM BLD-SCNC: 140 MMOL/L (ref 136–145)
WBC # BLD: 12.9 K/UL (ref 4–11)

## 2021-02-26 PROCEDURE — 84100 ASSAY OF PHOSPHORUS: CPT

## 2021-02-26 PROCEDURE — 82330 ASSAY OF CALCIUM: CPT

## 2021-02-26 PROCEDURE — 99232 SBSQ HOSP IP/OBS MODERATE 35: CPT | Performed by: NURSE PRACTITIONER

## 2021-02-26 PROCEDURE — 6370000000 HC RX 637 (ALT 250 FOR IP): Performed by: INTERNAL MEDICINE

## 2021-02-26 PROCEDURE — 85610 PROTHROMBIN TIME: CPT

## 2021-02-26 PROCEDURE — 2580000003 HC RX 258: Performed by: INTERNAL MEDICINE

## 2021-02-26 PROCEDURE — 85027 COMPLETE CBC AUTOMATED: CPT

## 2021-02-26 PROCEDURE — 36415 COLL VENOUS BLD VENIPUNCTURE: CPT

## 2021-02-26 PROCEDURE — 85730 THROMBOPLASTIN TIME PARTIAL: CPT

## 2021-02-26 PROCEDURE — 6360000002 HC RX W HCPCS: Performed by: INTERNAL MEDICINE

## 2021-02-26 PROCEDURE — 83735 ASSAY OF MAGNESIUM: CPT

## 2021-02-26 PROCEDURE — 6360000004 HC RX CONTRAST MEDICATION: Performed by: NURSE PRACTITIONER

## 2021-02-26 PROCEDURE — 6370000000 HC RX 637 (ALT 250 FOR IP): Performed by: FAMILY MEDICINE

## 2021-02-26 PROCEDURE — 93308 TTE F-UP OR LMTD: CPT

## 2021-02-26 PROCEDURE — 6370000000 HC RX 637 (ALT 250 FOR IP): Performed by: NURSE PRACTITIONER

## 2021-02-26 PROCEDURE — 1200000000 HC SEMI PRIVATE

## 2021-02-26 PROCEDURE — 6360000002 HC RX W HCPCS: Performed by: FAMILY MEDICINE

## 2021-02-26 PROCEDURE — 80048 BASIC METABOLIC PNL TOTAL CA: CPT

## 2021-02-26 RX ORDER — HYDRALAZINE HYDROCHLORIDE 20 MG/ML
5 INJECTION INTRAMUSCULAR; INTRAVENOUS EVERY 4 HOURS PRN
Status: DISCONTINUED | OUTPATIENT
Start: 2021-02-26 | End: 2021-02-27 | Stop reason: HOSPADM

## 2021-02-26 RX ORDER — METHADONE HYDROCHLORIDE 10 MG/1
70 TABLET ORAL DAILY
Status: DISCONTINUED | OUTPATIENT
Start: 2021-02-27 | End: 2021-02-27 | Stop reason: HOSPADM

## 2021-02-26 RX ORDER — METHADONE HYDROCHLORIDE 10 MG/1
30 TABLET ORAL ONCE
Status: COMPLETED | OUTPATIENT
Start: 2021-02-26 | End: 2021-02-26

## 2021-02-26 RX ORDER — NICOTINE 21 MG/24HR
1 PATCH, TRANSDERMAL 24 HOURS TRANSDERMAL DAILY
Status: DISCONTINUED | OUTPATIENT
Start: 2021-02-26 | End: 2021-02-27 | Stop reason: HOSPADM

## 2021-02-26 RX ADMIN — ENOXAPARIN SODIUM 40 MG: 40 INJECTION SUBCUTANEOUS at 09:24

## 2021-02-26 RX ADMIN — CARVEDILOL 6.25 MG: 6.25 TABLET, FILM COATED ORAL at 17:45

## 2021-02-26 RX ADMIN — METHADONE HYDROCHLORIDE 30 MG: 10 TABLET ORAL at 17:45

## 2021-02-26 RX ADMIN — PROMETHAZINE HYDROCHLORIDE 12.5 MG: 25 TABLET ORAL at 21:20

## 2021-02-26 RX ADMIN — OXCARBAZEPINE 600 MG: 150 TABLET, FILM COATED ORAL at 21:20

## 2021-02-26 RX ADMIN — AMOXICILLIN AND CLAVULANATE POTASSIUM 1 TABLET: 875; 125 TABLET, FILM COATED ORAL at 09:24

## 2021-02-26 RX ADMIN — Medication 10 ML: at 09:26

## 2021-02-26 RX ADMIN — AMOXICILLIN AND CLAVULANATE POTASSIUM 1 TABLET: 875; 125 TABLET, FILM COATED ORAL at 21:20

## 2021-02-26 RX ADMIN — Medication 1 CAPSULE: at 09:25

## 2021-02-26 RX ADMIN — CARVEDILOL 6.25 MG: 6.25 TABLET, FILM COATED ORAL at 09:25

## 2021-02-26 RX ADMIN — PERFLUTREN 1.65 MG: 6.52 INJECTION, SUSPENSION INTRAVENOUS at 15:01

## 2021-02-26 RX ADMIN — HYDRALAZINE HYDROCHLORIDE 5 MG: 20 INJECTION INTRAMUSCULAR; INTRAVENOUS at 16:01

## 2021-02-26 RX ADMIN — OXCARBAZEPINE 600 MG: 150 TABLET, FILM COATED ORAL at 09:24

## 2021-02-26 RX ADMIN — SACUBITRIL AND VALSARTAN 1 TABLET: 24; 26 TABLET, FILM COATED ORAL at 12:57

## 2021-02-26 RX ADMIN — Medication 10 ML: at 21:20

## 2021-02-26 RX ADMIN — SACUBITRIL AND VALSARTAN 2 TABLET: 24; 26 TABLET, FILM COATED ORAL at 17:45

## 2021-02-26 RX ADMIN — METHADONE HYDROCHLORIDE 40 MG: 10 TABLET ORAL at 09:25

## 2021-02-26 RX ADMIN — SACUBITRIL AND VALSARTAN 1 TABLET: 24; 26 TABLET, FILM COATED ORAL at 09:24

## 2021-02-26 ASSESSMENT — PAIN SCALES - GENERAL
PAINLEVEL_OUTOF10: 6
PAINLEVEL_OUTOF10: 0

## 2021-02-26 NOTE — PROGRESS NOTES
Pt weighed on 2/24/21 bed scale weight: 59.6kg pt weighed on 2/26/21 bed scale weight: 34.2kg. Will get a standing weight when pt is ambulating better and will rezero bed. RN aware.

## 2021-02-26 NOTE — PROGRESS NOTES
Hospitalist Progress Note      PCP: No primary care provider on file. Date of Admission: 2/21/2021      Hospital Course:   32 y.o. male who presented to [facility] with past medical history of drug abuse, NSTEMI, seizure disorder, chronic hepatitis C, mood disorder, cigarette smoking presented to the ED for right-sided chest wall pain. Subjective:    Patient seen and examined. Feels fatigued. No dyspnea  BP elevated    Medications:  Reviewed    Infusion Medications    dextrose       Scheduled Medications    sacubitril-valsartan  2 tablet Oral BID    amoxicillin-clavulanate  1 tablet Oral 2 times per day    OXcarbazepine  600 mg Oral BID    carvedilol  6.25 mg Oral BID WC    insulin lispro  0-6 Units Subcutaneous Q4H    methadone  40 mg Per NG tube Daily    lactobacillus  1 capsule Per NG tube Daily with breakfast    sodium chloride flush  10 mL Intravenous 2 times per day    enoxaparin  40 mg Subcutaneous Daily     PRN Meds: perflutren lipid microspheres, hydrALAZINE, phenol, sodium chloride flush, promethazine **OR** [DISCONTINUED] ondansetron, polyethylene glycol, acetaminophen **OR** acetaminophen, perflutren lipid microspheres, glucose, dextrose, glucagon (rDNA), dextrose      Intake/Output Summary (Last 24 hours) at 2/26/2021 1347  Last data filed at 2/26/2021 0942  Gross per 24 hour   Intake 120 ml   Output 350 ml   Net -230 ml       Physical Exam Performed:    BP (!) 153/114   Pulse 79   Temp 98.2 °F (36.8 °C) (Oral)   Resp 16   Ht 5' 11\" (1.803 m)   Wt 118 lb 3.2 oz (53.6 kg)   SpO2 97%   BMI 16.49 kg/m²     General appearance: No apparent distress, awake  HEENT: Conjunctivae/corneas clear, neck supple w/ full ROM  Respiratory:  NAD,  CTAB  Cardiovascular: Regular rate and rhythm  Abdomen: Soft, non-tender, non-distended with normal bowel sounds.   Musculoskeletal: No edema bilaterally  Neurologic:  Non focal  Psychiatric: calm  Peripheral Pulses: +2 palpable, equal bilaterally       Labs:   Recent Labs     02/24/21  0350 02/25/21  0540 02/26/21  0715   WBC 12.5* 13.3* 12.9*   HGB 11.9* 13.0* 15.6   HCT 35.4* 38.1* 45.7   * 182 256     Recent Labs     02/24/21  0350 02/25/21  0540 02/26/21  0715    138 140   K 3.8  3.8 3.2* 3.5   CL 99 99 99   CO2 29 28 28   BUN 5* 8 12   CREATININE <0.5* <0.5* 0.6*   CALCIUM 8.8 9.3 9.8   PHOS 3.3 2.2* 3.4     Recent Labs     02/24/21  0350 02/25/21  0540   AST 39* 63*   ALT 30 36   BILITOT 0.5 0.8   ALKPHOS 89 92     Recent Labs     02/24/21  0350 02/25/21  0540 02/26/21  0715   INR 1.15* 1.23* 1.11     Recent Labs     02/24/21  0350 02/25/21  0540   CKTOTAL 623* 1,526*       Urinalysis:      Lab Results   Component Value Date    NITRU Negative 02/21/2021    WBCUA 0-2 02/21/2021    BACTERIA Rare 01/19/2020    RBCUA 11-20 02/21/2021    BLOODU MODERATE 02/21/2021    SPECGRAV >=1.030 02/21/2021    GLUCOSEU Negative 02/21/2021       Radiology:  XR CHEST PORTABLE   Final Result   Clearing of the bilateral airspace disease. Tubes and lines are satisfactory position         XR CHEST PORTABLE   Final Result   1. Interval placement of a right IJ central line, tip at the junction of the   SVC and right atrium   2. No evidence of a pneumothorax   3. Marked interval worsening of diffuse airspace disease, which may be on the   basis of aspiration or edema. 4. NG tube in place. Proximal port is within the distal esophagus, proximal   to the GE junction         CT CHEST PULMONARY EMBOLISM W CONTRAST   Final Result   1. No evidence of pulmonary embolism   2. Extensive airspace disease within the lower lobes bilaterally, and   posterior segment of the upper lobes, right greater than left. Given the   clinical history, changes are highly suspicious for aspiration pneumonia. 3. ET tube and NG tube in place   4. 4 mm nodule, left upper lobe.    5. No evidence of a pneumothorax      RECOMMENDATIONS:   Fleischner Society guidelines for follow-up and management of incidentally   detected pulmonary nodules:      Single Solid Nodule:      Nodule size less than 6 mm: In a low-risk patient, no routine follow-up. - Low risk patients include individuals with minimal or absent history of   smoking and other known risk factors. - High risk patients include individuals with a history or smoking or known   risk factors. Reference: Radiology 2017   http://pubs. rsna.org/doi/full/10.1148/radiol. 1912919947         CT Head WO Contrast   Final Result   No acute intracranial abnormality. Chronic pansinusitis         XR CHEST PORTABLE   Final Result   1. No acute cardiopulmonary disease. 2. The endotracheal tube tip is located 6.7 cm above the lelo. 3. Incomplete visualization of the orogastric tube.                  Assessment/Plan:    Active Hospital Problems    Diagnosis    Seizure ([address] [R56.9]    Status epilepticus ([address] [G40.901]    NSTEMI (non-ST elevated myocardial infarction) ([address] [I21.4]    Abnormal ECG [R94.31]    Takotsubo cardiomyopathy [I51.81]    Tobacco abuse [Z72.0]    History of drug use [Z87.898]    Acute respiratory failure with hypoxia (HCC) [J96.01]    Aspiration pneumonia of both lower lobes ([address] [J69.0]    Septic shock (HCC) [A41.9, R65.21]    Polydrug abuse ([address] [F19.10]    Elevated troponin [R77.8]    Acute encephalopathy [G93.40]    Partial idiopathic epilepsy with seizures of localized onset, intractable, with status epilepticus ([address] [G40.011]     Seizure, acute encephalopathy   - EEG - severe diffuse encephalopathy, no epileptiform discharges - no evidence of status epilepticus  - neurology consulted - likely metabolic encephalopathy and toxic from substance abuse  - seizure precautions  - c/w  home dose oral oxcarbazepine per neurology recs    Acute hypoxemic, hypercarbic respiratory failure  - extubated 2/24  - mgmt per pulmonology   - now on room air    Bilateral Lower lobe aspiration PNA  - urine antigens negtive  - COVID19 negative rapid  - s/p bronch 2/22 -  Mucopurulent plugs not aspirated particles - cx ngtd  - on Augmentin - continue through 2/27    Spetic shock  - afebrile  - off levophed, off dobutaine  - bl cx ngtd    Lactic acidosis, AGMA  - resolved    Hyperkalemia - resolved    NSTEMI  - trop negative, 0.31, 0.70 - trend  - ECHO - EF 25%, only basal wall segments move well, rest are skinesis consistent with takotsubo syndrome  -  cardiology c/s  - EKG today with T wave changes  -CATH - normal coronaries  - stress cardiomyopathy  - c/w coreg, entresto     HTN - elevated  - increase entresto dose, bb  - hydralazine prn     Hyperglycemia  - monitor, ssi  - a1c 5.3    Abn LFTs  - in setting septic shock   - improved    HARINI nodule - incidental    Hx drug abuse  - UDS + amphetamine, benzodiazepine, cannabinoids, methadone, opitae  - c/w methadone     Tobacco abuse    DVT Prophylaxis: lovenox  Diet: DIET GENERAL;  Dietary Nutrition Supplements: Standard High Calorie Oral Supplement  Code Status: Full Code      Dispo - continue care, home in 1-2 days    Nikolas Feldman MD
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
## 2021-02-27 VITALS
HEIGHT: 71 IN | TEMPERATURE: 98.2 F | HEART RATE: 87 BPM | BODY MASS INDEX: 16.44 KG/M2 | RESPIRATION RATE: 16 BRPM | WEIGHT: 117.4 LBS | OXYGEN SATURATION: 95 % | DIASTOLIC BLOOD PRESSURE: 108 MMHG | SYSTOLIC BLOOD PRESSURE: 141 MMHG

## 2021-02-27 LAB
GLUCOSE BLD-MCNC: 86 MG/DL (ref 70–99)
PERFORMED ON: NORMAL

## 2021-02-27 PROCEDURE — 6370000000 HC RX 637 (ALT 250 FOR IP): Performed by: NURSE PRACTITIONER

## 2021-02-27 PROCEDURE — 2580000003 HC RX 258: Performed by: INTERNAL MEDICINE

## 2021-02-27 PROCEDURE — 97116 GAIT TRAINING THERAPY: CPT

## 2021-02-27 PROCEDURE — 97535 SELF CARE MNGMENT TRAINING: CPT

## 2021-02-27 PROCEDURE — 6370000000 HC RX 637 (ALT 250 FOR IP): Performed by: INTERNAL MEDICINE

## 2021-02-27 PROCEDURE — 97166 OT EVAL MOD COMPLEX 45 MIN: CPT

## 2021-02-27 PROCEDURE — 6360000002 HC RX W HCPCS: Performed by: INTERNAL MEDICINE

## 2021-02-27 PROCEDURE — 6370000000 HC RX 637 (ALT 250 FOR IP): Performed by: FAMILY MEDICINE

## 2021-02-27 PROCEDURE — 97162 PT EVAL MOD COMPLEX 30 MIN: CPT

## 2021-02-27 RX ORDER — CARVEDILOL 6.25 MG/1
6.25 TABLET ORAL 2 TIMES DAILY WITH MEALS
Qty: 60 TABLET | Refills: 3 | Status: SHIPPED | OUTPATIENT
Start: 2021-02-27 | End: 2021-03-29

## 2021-02-27 RX ORDER — AMOXICILLIN AND CLAVULANATE POTASSIUM 875; 125 MG/1; MG/1
1 TABLET, FILM COATED ORAL EVERY 12 HOURS SCHEDULED
Qty: 1 TABLET | Refills: 0 | Status: SHIPPED | OUTPATIENT
Start: 2021-02-27 | End: 2021-02-28

## 2021-02-27 RX ADMIN — METHADONE HYDROCHLORIDE 70 MG: 10 TABLET ORAL at 08:44

## 2021-02-27 RX ADMIN — Medication 1 CAPSULE: at 08:44

## 2021-02-27 RX ADMIN — CARVEDILOL 6.25 MG: 6.25 TABLET, FILM COATED ORAL at 08:44

## 2021-02-27 RX ADMIN — Medication 10 ML: at 08:43

## 2021-02-27 RX ADMIN — AMOXICILLIN AND CLAVULANATE POTASSIUM 1 TABLET: 875; 125 TABLET, FILM COATED ORAL at 08:44

## 2021-02-27 RX ADMIN — SACUBITRIL AND VALSARTAN 2 TABLET: 24; 26 TABLET, FILM COATED ORAL at 08:46

## 2021-02-27 RX ADMIN — ENOXAPARIN SODIUM 40 MG: 40 INJECTION SUBCUTANEOUS at 08:43

## 2021-02-27 RX ADMIN — OXCARBAZEPINE 600 MG: 150 TABLET, FILM COATED ORAL at 08:43

## 2021-02-27 ASSESSMENT — PAIN SCALES - GENERAL: PAINLEVEL_OUTOF10: 0

## 2021-02-27 NOTE — PROGRESS NOTES
Occupational Therapy   Occupational Therapy Initial Assessment/Treatment   Date: 2021   Patient Name: Jesús West  MRN: [id number]     : 1990    Date of Service: 2021    Discharge Recommendations:  24 hour supervision or assist  OT Equipment Recommendations  Equipment Needed: No    Assessment   Performance deficits / Impairments: Decreased functional mobility ; Decreased strength;Decreased endurance;Decreased ADL status; Decreased balance  Assessment: Pt 31 yo male functioning with deficits in the areas listed above following new onset seizure. Pt is currently limited due to deconditioning and fatigue. pt is currently at a min-CGa for functional  mobility and adls. Pt educated on BUE exercises for increased endurance. Pt verbalized understanding. Pt would benefit from skilled OT services while in acute care. Prognosis: Good  Decision Making: Medium Complexity  OT Education: OT Role;Plan of Care;Transfer Training;Energy Conservation  Patient Education: importance of continued activity and OOB activity  REQUIRES OT FOLLOW UP: Yes  Activity Tolerance  Activity Tolerance: Patient Tolerated treatment well;Patient limited by fatigue  Activity Tolerance: /90 O2 97%  Safety Devices  Safety Devices in place: Yes  Type of devices: Bed alarm in place; Left in bed;Gait belt;Nurse notified;Call light within reach           Patient Diagnosis(es): The primary encounter diagnosis was Status epilepticus ([facility] [address] Diagnoses of Septicemia ([facility] [address] Aspiration pneumonia of both lungs, unspecified aspiration pneumonia type, unspecified part of lung ([address] Altered mental status, unspecified altered mental status type, Acute respiratory failure with hypoxia ([facility] [address] and New onset left bundle branch block (LBBB) were also pertinent to this visit.      has a past medical history of Aspiration pneumonia ([address] Hepatitis C, HTN (hypertension), NSTEMI (non-ST elevated myocardial infarction) ([facility] [address] Polysubstance abuse ([facility] [address] Seizures ([facility] [address] Septic shock ([facility] [address] and Takotsubo cardiomyopathy. has a past surgical history that includes Hand surgery; bronchoscopy (N/A, 2/22/2021); and bronchoscopy (2/22/2021).            Restrictions  Restrictions/Precautions  Restrictions/Precautions: General Precautions, Fall Risk, Seizure    Subjective   General  Chart Reviewed: Yes  Patient assessed for rehabilitation services?: Yes  Family / Caregiver Present: No  Referring Practitioner: Brown Weathers MD  Diagnosis: new onset seizure  Subjective  Subjective: Pt agreeable to therapy  General Comment  Comments: RN approved therapy  Patient Currently in Pain: Denies  Vital Signs  Temp: 98.2 °F (36.8 °C)  Temp Source: Oral  Pulse: 87  Heart Rate Source: Monitor  Resp: 16  BP: (!) 141/108  BP Location: Right upper arm  Patient Currently in Pain: Denies  Oxygen Therapy  SpO2: 95 %  O2 Device: None (Room air)     Social/Functional History  Social/Functional History  Lives With: Family(able to provide 24 hour assist)  Type of Home: Mobile home  Home Layout: One level  Home Access: Stairs to enter with rails  Entrance Stairs - Number of Steps: 3  Bathroom Shower/Tub: Tub/Shower unit  Bathroom Toilet: Standard  Bathroom Equipment: Shower chair  Home Equipment: Cane, Rolling walker, Wheelchair-electric  ADL Assistance: Independent  Homemaking Assistance: Independent  Homemaking Responsibilities: Yes  Ambulation Assistance: Independent  Transfer Assistance: Independent  Active : No  Occupation: On disability       Objective   Vision: Within Functional Limits  Hearing: Within functional limits    Orientation  Overall Orientation Status: Within Functional Limits  Observation/Palpation  Posture: Fair  Balance  Sitting Balance: Stand by assistance  Standing Balance: Contact guard assistance(no AD')  Functional Mobility  Functional - Mobility Device: No device  Activity: To/from bathroom  Assist Level: Minimal assistance  Functional Mobility Comments: 1 LOB  Toilet Transfers  Toilet - Technique: Ambulating  Equipment Used: Standard toilet  Toilet Transfer: Minimal assistance  ADL  Grooming: Contact guard assistance  LE Dressing: Contact guard assistance  Toileting: Contact guard assistance  Tone RUE  RUE Tone: Normotonic  Tone LUE  LUE Tone: Normotonic  Coordination  Movements Are Fluid And Coordinated: Yes     Bed mobility  Supine to Sit: Supervision  Sit to Supine: Supervision  Transfers  Sit to stand: Contact guard assistance  Stand to sit: Contact guard assistance     Cognition  Overall Cognitive Status: WFL        Sensation  Overall Sensation Status: WFL  Type of ROM/Therapeutic Exercise  Type of ROM/Therapeutic Exercise: AROM  Comment: BUE seated  Exercises  Elbow Flexion: x15  Elbow Extension: x15  Supination: x15  Pronation: x15  Wrist Flexion: x15  Wrist Extension: x15  Grasp/Release: x15     LUE AROM (degrees)  LUE AROM : WFL  RUE AROM (degrees)  RUE AROM : WFL  LUE Strength  L Hand General: 4+/5  RUE Strength  R Hand General: 4+/5                   Plan   Plan  Times per week: 3-5x/wk  Current Treatment Recommendations: Balance Training, Self-Care / ADL    AM-[facility] Score        [facility] Inpatient Daily Activity Raw Score: 20 (02/27/21 1139)  AM-PAC Inpatient ADL T-Scale Score : 42.03 (02/27/21 1139)  ADL Inpatient CMS 0-100% Score: 38.32 (02/27/21 1139)  ADL Inpatient CMS G-Code Modifier : Valentin Ankit (02/27/21 1139)    Goals  Short term goals  Time Frame for Short term goals: 1 week (3/05/21)  Short term goal 1: Pt will complete LB dressing with S.  Short term goal 2: Pt will complete 15 reps of BUE exercises for increased endruance and strength. (3/02/21)  Short term goal 3: Pt will complete 5 minutes of dynamic standing for adls with CGA.   Patient Goals   Patient goals : \"to go home\"       Therapy Time   Individual Concurrent Group Co-treatment   Time In 1010         Time Out 1030         Minutes 20         Timed Code Treatment Minutes: 10 Minutes(10 minutes for evaluation)       Carmelina Tineo, OTR/L    If pt is unable to be seen after this session, please let this note serve as discharge summary. Please see case management note for discharge disposition. Thank you.

## 2021-02-27 NOTE — PROGRESS NOTES
Physical Therapy    Facility/Department: [facility] B3 - MED SURG  Initial Assessment    NAME: Chantell Haskins  : 1990  MRN: [id number]    Date of Service: 2021    Discharge Recommendations:  24 hour supervision or assist   PT Equipment Recommendations  Equipment Needed: No    Assessment   Body structures, Functions, Activity limitations: Decreased functional mobility   Assessment: Pt is a 31 y/o male who presents with seizure. Pt was independent prior to admit living with family in mobile home. Pt currently requires SBA for transfers and min A for one loss of balance while ambulating short distance. Pt would benefit from continued skilled PT to address these limitations and allow for safe return home. Anticipate pt will be safe to return home with 24hr supervision initially  Treatment Diagnosis: impaired functional mobility  Prognosis: Good  Decision Making: Medium Complexity  PT Education: Goals;PT Role;General Safety  Patient Education: Pt verbalized understanding  REQUIRES PT FOLLOW UP: Yes  Activity Tolerance  Activity Tolerance: Patient Tolerated treatment well  Activity Tolerance: /90, , SpO2 97%       Patient Diagnosis(es): The primary encounter diagnosis was Status epilepticus ([address] Diagnoses of Septicemia ([address] Aspiration pneumonia of both lungs, unspecified aspiration pneumonia type, unspecified part of lung ([address] Altered mental status, unspecified altered mental status type, Acute respiratory failure with hypoxia ([address] and New onset left bundle branch block (LBBB) were also pertinent to this visit. has a past medical history of Aspiration pneumonia ([address] Hepatitis C, HTN (hypertension), NSTEMI (non-ST elevated myocardial infarction) ([address] Polysubstance abuse ([address] Seizures ([address] Septic shock ([address] and Takotsubo cardiomyopathy.    has a past surgical history that includes Hand surgery; bronchoscopy (N/A, 2021); and bronchoscopy (2/22/2021). Restrictions  Restrictions/Precautions  Restrictions/Precautions: General Precautions, Fall Risk, Seizure  Vision/Hearing  Vision: Within Functional Limits  Hearing: Within functional limits     Subjective  General  Chart Reviewed: Yes  Patient assessed for rehabilitation services?: Yes  Family / Caregiver Present: No  Referring Practitioner: Edwina Everett MD  Referral Date : 02/26/21  Diagnosis: seizure  Follows Commands: Within Functional Limits  General Comment  Comments: Pt supine in bed upon arrival.  Subjective  Subjective: Pt agreeable to evaluation.   Pain Screening  Patient Currently in Pain: Denies  Vital Signs  Patient Currently in Pain: Denies     Social/Functional History  Social/Functional History  Lives With: Family(able to provide 24 hour assist)  Type of Home: Mobile home  Home Layout: One level  Home Access: Stairs to enter with rails  Entrance Stairs - Number of Steps: 3  Bathroom Shower/Tub: Tub/Shower unit  Bathroom Toilet: Standard  Bathroom Equipment: Shower chair  Home Equipment: Cane, Rolling walker, Wheelchair-electric  ADL Assistance: Independent  Homemaking Assistance: Independent  Homemaking Responsibilities: Yes  Ambulation Assistance: Independent  Transfer Assistance: Independent  Active : No  Occupation: On disability    Objective    AROM RLE (degrees)  RLE AROM: WFL  AROM LLE (degrees)  LLE AROM : WFL  Strength RLE  Strength RLE: Exception  Comment: grossly 4/5  Strength LLE  Strength LLE: Exception  Comment: grossly 4/5     Sensation  Overall Sensation Status: WFL  Bed mobility  Supine to Sit: Supervision  Sit to Supine: Supervision  Transfers  Sit to Stand: Stand by assistance(from EOB and commode)  Stand to sit: Stand by assistance  Ambulation  Ambulation?: Yes  Ambulation 1  Surface: level tile  Device: No Device  Assistance: Stand by assistance;Minimal assistance  Quality of Gait: step through gait pattern, decreased anjali, mild unsteadiness with one

## 2021-02-27 NOTE — PROGRESS NOTES
Per pt, smokes approx 3 ppd. Paged CNP for nicotine patch and gum. Will administer per [facility] when ordered.

## 2021-02-27 NOTE — DISCHARGE SUMMARY
Hospital Discharge Summary    Patient's PCP: No primary care provider on file. Admit Date: 2/21/2021   Discharge Date: 2/27/2021    Admitting Physician: Dr. Nuria Barahona, DO  Discharge Physician: Dr. Percy Frausto   Consults: cardiology, pulmonary/intensive care and neurology    Brief HPI/ hospital course:           Patient comes into ED via [facility] with c/o by EMS of seizure like activity. EMS states once they were getting him into the truck he started to have tonic clonic like activity and 5mg of intranasal Versed was given. . He had also complained of chest pain. He was intubated on arrival..  CT chest showed. . No PE, extensive bilateral lower lobe , posterior segment of upper lobe infiltrates highly suggestive of aspiration pneumonia.   CT head showed no acute intracranial normalities, chronic pansinusitis  He was treated for the following       Seizure, acute encephalopathy   - EEG - severe diffuse encephalopathy, no epileptiform discharges - no evidence of status epilepticus  - neurology consulted - likely metabolic encephalopathy and toxic from substance abuse  - seizure precautions  - c/w  home dose oral oxcarbazepine per neurology recs     Acute hypoxemic, hypercarbic respiratory failure  - extubated 2/24  - mgmt per pulmonology   - now on room air     Bilateral Lower lobe aspiration PNA  - urine antigens negtive  - COVID19 negative rapid  - s/p bronch 2/22 -  Mucopurulent plugs not aspirated particles - cx ngtd  - on Augmentin - continue through 2/27     Septic shock  -Resolved  -Required levophed, off dobutaine  - bl cx ngtd     Lactic acidosis, AGMA  - resolved     Hyperkalemia - resolved     NSTEMI  - trop negative, 0.31, 0.70 - trend  - ECHO - EF 25%, only basal wall segments move well, rest are skinesis consistent with takotsubo syndrome  - EKG today with T wave changes  -CATH - normal coronaries  - stress cardiomyopathy  - c/w coreg, entresto   -Follow-up with cardiology     HTN - elevated  - increase entresto dose, bb  - hydralazine prn      Hyperglycemia  - monitor, ssi  - a1c 5.3     Abn LFTs  - in setting septic shock   - improved     HARINI nodule - incidental--follow-up outpatient with PCP     Polysubstance drug abuse  - UDS + amphetamine, benzodiazepine, cannabinoids, methadone, opitae  - c/w methadone . .. Drug abuse was recommended--- to follow-up with pain clinic and drug rehab     Tobacco abuse-smoking cessation was encouraged    Invasive procedures:      Discharge Diagnoses: Active Problems:    Seizure ([address]    Status epilepticus ([address]    NSTEMI (non-ST elevated myocardial infarction) (HCC)    Abnormal ECG    Takotsubo cardiomyopathy    Tobacco abuse    History of drug use    Acute respiratory failure with hypoxia (HCC)    Aspiration pneumonia of both lower lobes (HCC)    Septic shock (HCC)    Polydrug abuse (HCC)    Elevated troponin    Acute encephalopathy    Partial idiopathic epilepsy with seizures of localized onset, intractable, with status epilepticus ([address]  Resolved Problems:    * No resolved hospital problems. *      Physical Exam: /69   Pulse 109   Temp 97.9 °F (36.6 °C) (Oral)   Resp 16   Ht 5' 11\" (1.803 m)   Wt 117 lb 6.4 oz (53.3 kg)   SpO2 97%   BMI 16.37 kg/m²   Gen/overall appearance: Not in acute distress. Alert. Head: Normocephalic, atraumatic  Eyes: EOMI, good acuity  ENT:- Oral mucosa moist  Neck: No JVD, thyromegaly  CVS: Nml S1S2, no MRG, RRR  Pulm: Clear bilaterally. No crackles/wheezes  Gastrointestinal: Soft, NT/ND, +BS  Musculoskeletal: No edema. Warm  Neuro: No focal deficit. Moves extremity spontaneously. Psychiatry: Appropriate affect. Not agitated. Skin: Warm, dry with normal turgor. No rash        Significant Diagnostic Studies:    See above        Treatments: As above.       Discharge Medications:     Medication List      START taking these medications    amoxicillin-clavulanate 875-125 MG per tablet  Commonly known as: AUGMENTIN  Take 1 tablet by mouth every 12 hours for 1 dose Take this last dose this evening     carvedilol 6.25 MG tablet  Commonly known as: COREG  Take 1 tablet by mouth 2 times daily (with meals)     sacubitril-valsartan 24-26 MG per tablet  Commonly known as: ENTRESTO  Take 2 tablets by mouth 2 times daily        CONTINUE taking these medications    albuterol sulfate  (90 Base) MCG/ACT inhaler  Commonly known as: Ventolin HFA  Inhale 2 puffs into the lungs 4 times daily as needed for Wheezing     aspirin 81 MG chewable tablet     LORazepam 0.5 MG tablet  Commonly known as: ATIVAN     methadone 10 MG/ML solution  Commonly known as: DOLOPHINE     OXcarbazepine 300 MG tablet  Commonly known as: TRILEPTAL        STOP taking these medications    ibuprofen 600 MG tablet  Commonly known as: ADVIL;MOTRIN           Where to Get Your Medications      You can get these medications from any pharmacy    Bring a paper prescription for each of these medications  · amoxicillin-clavulanate 875-125 MG per tablet  · carvedilol 6.25 MG tablet  · sacubitril-valsartan 24-26 MG per tablet         Activity: activity as tolerated  Diet: DIET GENERAL;  Dietary Nutrition Supplements: Standard High Calorie Oral Supplement      Disposition: home  Discharged Condition: Stable  Follow Up:   Chela Bianchi, APRN - CNP  [address] [place] [address]  [phone]    On 3/11/2021  heart failure        Code status:  Full Code         Total time spent on discharge, finalizing medications, referrals and arranging outpatient follow up was more than 45 minutes      Thank you Dr. Taveras primary care provider on file. for the opportunity to be involved in this patients care.

## 2021-02-27 NOTE — PROGRESS NOTES
Patient discharged. IV removed, telemetry box and leads removed and returned. Lockbox emptied. All belongings gathered and returned to patient. Discharge instructions reviewed with patient, all questions answered by RN. Medications picked up from outpatient pharmacy / prescriptions sent with patient. No further needs.

## 2021-03-11 ENCOUNTER — OFFICE VISIT (OUTPATIENT)
Dept: CARDIOLOGY CLINIC | Age: 31
End: 2021-03-11
Payer: MEDICARE

## 2021-03-11 VITALS
SYSTOLIC BLOOD PRESSURE: 106 MMHG | OXYGEN SATURATION: 96 % | HEIGHT: 71 IN | BODY MASS INDEX: 18.55 KG/M2 | HEART RATE: 105 BPM | WEIGHT: 132.5 LBS | DIASTOLIC BLOOD PRESSURE: 68 MMHG

## 2021-03-11 DIAGNOSIS — R00.0 TACHYCARDIA: ICD-10-CM

## 2021-03-11 DIAGNOSIS — Z72.0 TOBACCO ABUSE: ICD-10-CM

## 2021-03-11 DIAGNOSIS — I51.81 TAKOTSUBO CARDIOMYOPATHY: ICD-10-CM

## 2021-03-11 DIAGNOSIS — I50.22 CHRONIC SYSTOLIC HEART FAILURE (HCC): Primary | ICD-10-CM

## 2021-03-11 PROCEDURE — G8427 DOCREV CUR MEDS BY ELIG CLIN: HCPCS | Performed by: NURSE PRACTITIONER

## 2021-03-11 PROCEDURE — G8484 FLU IMMUNIZE NO ADMIN: HCPCS | Performed by: NURSE PRACTITIONER

## 2021-03-11 PROCEDURE — G8419 CALC BMI OUT NRM PARAM NOF/U: HCPCS | Performed by: NURSE PRACTITIONER

## 2021-03-11 PROCEDURE — 4004F PT TOBACCO SCREEN RCVD TLK: CPT | Performed by: NURSE PRACTITIONER

## 2021-03-11 PROCEDURE — 99214 OFFICE O/P EST MOD 30 MIN: CPT | Performed by: NURSE PRACTITIONER

## 2021-03-11 PROCEDURE — 1111F DSCHRG MED/CURRENT MED MERGE: CPT | Performed by: NURSE PRACTITIONER

## 2021-03-11 RX ORDER — SACUBITRIL AND VALSARTAN 49; 51 MG/1; MG/1
1 TABLET, FILM COATED ORAL 2 TIMES DAILY
Qty: 60 TABLET | Refills: 2 | Status: SHIPPED | OUTPATIENT
Start: 2021-03-11 | End: 2022-02-19 | Stop reason: SDUPTHER

## 2021-03-11 RX ORDER — FUROSEMIDE 20 MG/1
20 TABLET ORAL DAILY PRN
Qty: 30 TABLET | Refills: 5 | Status: SHIPPED | OUTPATIENT
Start: 2021-03-11 | End: 2022-02-19 | Stop reason: SDUPTHER

## 2021-03-11 NOTE — PATIENT INSTRUCTIONS
Plan:   1. Continue the entresto 2 tabs of the 24-26mg twice a day until you  the 49-51mg tablets and then take 1 Twice a day  2. Check labs next week, BNP, BMP   3. Continue the coreg   4. Start lasix 20mg daily as needed for weight gain 3lbs in a day or 5lbs in a week with swelling. 5. Check your weight daily and record   6. Follow up in 2-3 weeks     Your provider has ordered lab work for further evaluation. The order/prescription is included in your paper work. You may have your labs completed at any of the [facility] locations includin [facility] associated with the [address] [place] [address] Road located on the [facility]. The address is [address] on the first floor. It is the building directly across from the Emergency Room. The main entrance to this building faces [employer]. You will have to drive around the building to locate the main entrance.  You may also use [facility] lab located inside the hospital, [place] lab located inside the hospital, or [place] [address] ER located off [facility].  You may use any other [facility] facilities or your [facility] office.  Labs may also be completed at any other facility of your choice, however, please allow 72 hours to receive your labs for review. If you do not receive your lab results 72-96 hours after completing, please call the office.

## 2021-03-11 NOTE — PROGRESS NOTES
bronchoscopy (2/22/2021). Social History:   reports that he has been smoking cigarettes. He has been smoking about 2.00 packs per day. He has never used smokeless tobacco. He reports previous drug use. Drugs: IV and Opiates . He reports that he does not drink alcohol. Family History:   History reviewed. No pertinent family history. Home Medications:  Prior to Admission medications    Medication Sig Start Date End Date Taking? Authorizing Provider   sacubitril-valsartan (ENTRESTO) 24-26 MG per tablet Take 2 tablets by mouth 2 times daily 2/27/21  Yes Coby Erickson MD   carvedilol (COREG) 6.25 MG tablet Take 1 tablet by mouth 2 times daily (with meals) 2/27/21  Yes Coby Erickson MD   OXcarbazepine (TRILEPTAL) 300 MG tablet Take 600 mg by mouth 2 times daily 8/5/20  Yes Historical Provider, MD   methadone (DOLOPHINE) 10 MG/ML solution Take 85 mg by mouth daily. Yes Historical Provider, MD   aspirin 81 MG chewable tablet Take 81 mg by mouth daily (with breakfast) 1/21/20  Yes Historical Provider, MD   LORazepam (ATIVAN) 0.5 MG tablet Take 0.5 mg by mouth 2 times daily. 9/17/20   Historical Provider, MD        Allergies:  Patient has no known allergies.      Review of Systems:   Lightheadedness/dizziness: Yes, dizziness  Nausea/Vomiting/Diarrhea: Yes, nausea  Hematuria: No    Physical Examination:    Vitals:    03/11/21 1525   BP: 106/68   Pulse: 105   SpO2: 96%   Weight: 132 lb 8 oz (60.1 kg)   Height: 5' 11\" (1.803 m)        Constitutional and General Appearance: no apparent distress, thin  HEENT: non-icteric sclera, mask in place   Neck: JVP less than 8 cm h20   Respiratory:  · No use of accessory muscles  · Clear breath sounds throughout, no wheezing, no crackles, no rhonchi  Cardiovascular:  · The apical impulses not displaced  · Heart tones are crisp and normal, no murmur/rub/gallop  · Regular rate and rhythm, S1,S2 normal  · Radial pulses 2+ and equal bilaterally  · Trace edema of the left lower extremity   · Pedal Pulses: 2+ and equal   Abdomen:  · No masses or tenderness  · Liver: No Abnormalities Noted  Musculoskeletal/Skin:  · Exhibits normal gait balance and coordination  · There is no clubbing, cyanosis of the extremities  · Skin is warm and dry  · Moves all extremities well  Neurological/Psychiatric:  · Alert and oriented in all spheres  · No abnormalities of mood, affect, memory, mentation, or behavior are noted    Lab Data reviewed and analyzed   CBC:   Lab Results   Component Value Date    WBC 12.9 02/26/2021    WBC 13.3 02/25/2021    WBC 12.5 02/24/2021    RBC 5.07 02/26/2021    RBC 4.21 02/25/2021    RBC 3.89 02/24/2021    HGB 15.6 02/26/2021    HGB 13.0 02/25/2021    HGB 11.9 02/24/2021    HCT 45.7 02/26/2021    HCT 38.1 02/25/2021    HCT 35.4 02/24/2021    MCV 90.0 02/26/2021    MCV 90.4 02/25/2021    MCV 90.9 02/24/2021    RDW 13.2 02/26/2021    RDW 13.2 02/25/2021    RDW 13.8 02/24/2021     02/26/2021     02/25/2021     02/24/2021     Iron: No results found for: IRON, TIBC, FERRITIN  BMP:   Lab Results   Component Value Date     02/26/2021     02/25/2021     02/24/2021    K 3.5 02/26/2021    K 3.2 02/25/2021    K 3.8 02/24/2021    K 3.8 02/24/2021    K 4.0 02/23/2021    CL 99 02/26/2021    CL 99 02/25/2021    CL 99 02/24/2021    CO2 28 02/26/2021    CO2 28 02/25/2021    CO2 29 02/24/2021    PHOS 3.4 02/26/2021    PHOS 2.2 02/25/2021    PHOS 3.3 02/24/2021    BUN 12 02/26/2021    BUN 8 02/25/2021    BUN 5 02/24/2021    CREATININE 0.6 02/26/2021    CREATININE <0.5 02/25/2021    CREATININE <0.5 02/24/2021     BNP: No results found for: PROBNP  Lipids:   Lab Results   Component Value Date    CHOL 132 02/24/2021        Lab Results   Component Value Date    TRIG 138 02/24/2021        Lab Results   Component Value Date    HDL 36 (L) 02/24/2021        Lab Results   Component Value Date    LDLCALC 68 02/24/2021        Lab Results   Component Value Date LABVLDL 28 02/24/2021      No results found for: CHOLDUSTIN    EF:   Lab Results   Component Value Date    LVEF 23 02/22/2021       Testing reviewed:    Echo: 1/31/2020 [facility]  - Left ventricle: The cavity size is normal. Wall thickness is normal. Systolic function was normal.    The estimated ejection fraction was in the range of 55% to 60%. Wall motion was normal; there were no regional wall motion abnormalities. Left ventricular diastolic function parameters were normal.  - Right ventricle: Systolic function was normal by objective interpretation. TAPSE: 2.7cm. Tricuspid    annular systolic velocity: 06LV/O.  - Pericardium, extracardiac: There is a left pleural effusion.     CARDIAC CTA: 3/10/2020  FINDINGS:  The left ventricle appears normal in size.  The right ventricle appears normal in size.  The atria are normal in size.  The left atrium receives two right and two left pulmonary veins without evidence of stenosis.  The left atrial appendage is chicken wing shaped without evidence of thrombus.  The aorta is normal in size.  The pulmonary artery is incompletely visualized.  The IVC is normal in size.  The coronary sinus is not well visualized. CORONARY ARTERIES:  The coronary arteries have normal origins and proximal courses.  The coronary system is right dominant.  The SA node artery originates from the right coronary artery. LEFT:  The left main artery is a moderate size vessel.  The left main artery has no significant disease.    The left anterior descending artery is a moderate size vessel.  The LAD has no significant disease. Savannah Franci is a small, area of shallow bridging in the mid LAD.  There are three small and patent diagonal arteries.    The left circumflex artery is a moderate size vessel.  The LCx has no significant disease.  The first obtuse marginal artery is a moderate size, branching vessel that supplies a significant portion of the lateral wall.  The first obtuse marginal artery has no History of IVDU- on methadone  Anemia  Prolong Qtc    Visit Diagnosis:    1. Chronic systolic heart failure ([address]    2. Takotsubo cardiomyopathy    3. Tobacco abuse    4. Tachycardia      Plan:   1. Continue the entresto 2 tabs of the 24-26mg twice a day until you  the 49-51mg tablets and then take 1 Twice a day  2. Check labs next week, BNP, BMP   3. Continue the coreg   4. Start lasix 20mg daily as needed for weight gain 3lbs in a day or 5lbs in a week with swelling. 5. Check your weight daily and record  6. Discussed fluid intake, 64 ounces; also discussed diet low sodium  7. Follow up in 2-3 weeks for medication titration    I appreciate the opportunity for caring for this patient.      Davion Posadas CNP, 3/11/2021, 3:43 PM

## 2021-03-18 ENCOUNTER — HOSPITAL ENCOUNTER (OUTPATIENT)
Age: 31
Discharge: HOME | End: 2021-03-18
Payer: MEDICARE

## 2021-03-18 DIAGNOSIS — I50.22 CHRONIC SYSTOLIC HEART FAILURE (HCC): ICD-10-CM

## 2021-03-18 LAB
ANION GAP SERPL CALCULATED.3IONS-SCNC: 10 MMOL/L (ref 3–16)
BUN BLDV-MCNC: 12 MG/DL (ref 7–20)
CALCIUM SERPL-MCNC: 8.8 MG/DL (ref 8.3–10.6)
CHLORIDE BLD-SCNC: 100 MMOL/L (ref 99–110)
CO2: 25 MMOL/L (ref 21–32)
CREAT SERPL-MCNC: 0.6 MG/DL (ref 0.9–1.3)
GFR AFRICAN AMERICAN: >60
GFR NON-AFRICAN AMERICAN: >60
GLUCOSE BLD-MCNC: 77 MG/DL (ref 70–99)
POTASSIUM SERPL-SCNC: 4.9 MMOL/L (ref 3.5–5.1)
PRO-BNP: 273 PG/ML (ref 0–124)
SODIUM BLD-SCNC: 135 MMOL/L (ref 136–145)

## 2021-03-18 PROCEDURE — 80048 BASIC METABOLIC PNL TOTAL CA: CPT

## 2021-03-18 PROCEDURE — 83880 ASSAY OF NATRIURETIC PEPTIDE: CPT

## 2021-03-18 PROCEDURE — 36415 COLL VENOUS BLD VENIPUNCTURE: CPT

## 2021-03-19 ENCOUNTER — TELEPHONE (OUTPATIENT)
Dept: CARDIOLOGY CLINIC | Age: 31
End: 2021-03-19

## 2021-03-19 NOTE — TELEPHONE ENCOUNTER
----- Message from LEIGHTON Turpin CNP sent at 3/19/2021  8:30 AM EDT -----  Please call patient. Kidney labs are stable compared to prior.   Continue current medications keep follow-up

## 2021-03-19 NOTE — RESULT ENCOUNTER NOTE
Please call patient. Kidney labs are stable compared to prior.   Continue current medications keep follow-up

## 2021-03-29 ENCOUNTER — OFFICE VISIT (OUTPATIENT)
Dept: CARDIOLOGY CLINIC | Age: 31
End: 2021-03-29
Payer: MEDICARE

## 2021-03-29 VITALS
DIASTOLIC BLOOD PRESSURE: 54 MMHG | SYSTOLIC BLOOD PRESSURE: 108 MMHG | HEIGHT: 71 IN | WEIGHT: 131 LBS | BODY MASS INDEX: 18.34 KG/M2 | HEART RATE: 58 BPM | OXYGEN SATURATION: 98 %

## 2021-03-29 DIAGNOSIS — I50.22 CHRONIC SYSTOLIC HEART FAILURE (HCC): ICD-10-CM

## 2021-03-29 DIAGNOSIS — Z72.0 TOBACCO ABUSE: ICD-10-CM

## 2021-03-29 DIAGNOSIS — I51.81 TAKOTSUBO CARDIOMYOPATHY: Primary | ICD-10-CM

## 2021-03-29 PROCEDURE — G8484 FLU IMMUNIZE NO ADMIN: HCPCS | Performed by: NURSE PRACTITIONER

## 2021-03-29 PROCEDURE — G8427 DOCREV CUR MEDS BY ELIG CLIN: HCPCS | Performed by: NURSE PRACTITIONER

## 2021-03-29 PROCEDURE — 4004F PT TOBACCO SCREEN RCVD TLK: CPT | Performed by: NURSE PRACTITIONER

## 2021-03-29 PROCEDURE — 99214 OFFICE O/P EST MOD 30 MIN: CPT | Performed by: NURSE PRACTITIONER

## 2021-03-29 PROCEDURE — 1111F DSCHRG MED/CURRENT MED MERGE: CPT | Performed by: NURSE PRACTITIONER

## 2021-03-29 PROCEDURE — G8419 CALC BMI OUT NRM PARAM NOF/U: HCPCS | Performed by: NURSE PRACTITIONER

## 2021-03-29 RX ORDER — CARVEDILOL 3.12 MG/1
3.12 TABLET ORAL 2 TIMES DAILY WITH MEALS
Qty: 60 TABLET | Refills: 1 | Status: SHIPPED | OUTPATIENT
Start: 2021-03-29 | End: 2022-02-20 | Stop reason: SDUPTHER

## 2021-03-29 RX ORDER — OXCARBAZEPINE 600 MG/1
2400 TABLET ORAL DAILY
COMMUNITY
Start: 2021-02-17 | End: 2021-05-20

## 2021-03-29 RX ORDER — CENOBAMATE 50MG-100MG
KIT ORAL
COMMUNITY
Start: 2021-03-10 | End: 2021-04-07

## 2021-03-29 NOTE — PATIENT INSTRUCTIONS
Plan:   1. Continue entresto 49-51mg twice a day  2. Check labs next week, BNP, BMP, magnesium in 2 weeks   3. Adjust the coreg 3.125mg twice a day (okay to cut your 6.25mg tablet in 1/2 until you get your new script of the 3.125mg and then take 1 tab twice a day )  4. Continue lasix 20mg daily for the next 2 days and then go to as needed for weight gain 3lbs in a day or 5lbs in a week with swelling. 5. Check your weight daily and record  6. Discussed fluid intake, 64 ounces; also discussed diet low sodium  7.  Follow up in 3-4 weeks for medication titration

## 2021-03-29 NOTE — PROGRESS NOTES
pulses 2+ and equal bilaterally  · Trace edema of the left lower extremity   · Pedal Pulses: 2+ and equal   Abdomen:  · No masses or tenderness  · Liver: No Abnormalities Noted  Musculoskeletal/Skin:  · Exhibits normal gait balance and coordination  · There is no clubbing, cyanosis of the extremities  · Skin is warm and dry  · Moves all extremities well  Neurological/Psychiatric:  · Alert and oriented in all spheres  · No abnormalities of mood, affect, memory, mentation, or behavior are noted    Lab Data reviewed and analyzed   CBC:   Lab Results   Component Value Date    WBC 12.9 02/26/2021    WBC 13.3 02/25/2021    WBC 12.5 02/24/2021    RBC 5.07 02/26/2021    RBC 4.21 02/25/2021    RBC 3.89 02/24/2021    HGB 15.6 02/26/2021    HGB 13.0 02/25/2021    HGB 11.9 02/24/2021    HCT 45.7 02/26/2021    HCT 38.1 02/25/2021    HCT 35.4 02/24/2021    MCV 90.0 02/26/2021    MCV 90.4 02/25/2021    MCV 90.9 02/24/2021    RDW 13.2 02/26/2021    RDW 13.2 02/25/2021    RDW 13.8 02/24/2021     02/26/2021     02/25/2021     02/24/2021     Iron: No results found for: IRON, TIBC, FERRITIN  BMP:   Lab Results   Component Value Date     03/18/2021     02/26/2021     02/25/2021    K 4.9 03/18/2021    K 3.5 02/26/2021    K 3.2 02/25/2021    K 3.8 02/24/2021    K 3.8 02/24/2021    K 4.0 02/23/2021     03/18/2021    CL 99 02/26/2021    CL 99 02/25/2021    CO2 25 03/18/2021    CO2 28 02/26/2021    CO2 28 02/25/2021    PHOS 3.4 02/26/2021    PHOS 2.2 02/25/2021    PHOS 3.3 02/24/2021    BUN 12 03/18/2021    BUN 12 02/26/2021    BUN 8 02/25/2021    CREATININE 0.6 03/18/2021    CREATININE 0.6 02/26/2021    CREATININE <0.5 02/25/2021     BNP:   Lab Results   Component Value Date    PROBNP 273 03/18/2021     Lipids:   Lab Results   Component Value Date    CHOL 132 02/24/2021        Lab Results   Component Value Date    TRIG 138 02/24/2021        Lab Results   Component Value Date    HDL 36 (L) 02/24/2021        Lab Results   Component Value Date    LDLCALC 68 02/24/2021        Lab Results   Component Value Date    LABVLDL 28 02/24/2021      No results found for: CHOLHDLRATIO    EF:   Lab Results   Component Value Date    LVEF 23 02/22/2021       Testing reviewed:    Echo: 1/31/2020 [facility]  - Left ventricle: The cavity size is normal. Wall thickness is normal. Systolic function was normal.    The estimated ejection fraction was in the range of 55% to 60%. Wall motion was normal; there were no regional wall motion abnormalities. Left ventricular diastolic function parameters were normal.  - Right ventricle: Systolic function was normal by objective interpretation. TAPSE: 2.7cm. Tricuspid    annular systolic velocity: 56IS/E.  - Pericardium, extracardiac: There is a left pleural effusion.     CARDIAC CTA: 3/10/2020  FINDINGS:  The left ventricle appears normal in size.  The right ventricle appears normal in size.  The atria are normal in size.  The left atrium receives two right and two left pulmonary veins without evidence of stenosis.  The left atrial appendage is chicken wing shaped without evidence of thrombus.  The aorta is normal in size.  The pulmonary artery is incompletely visualized.  The IVC is normal in size.  The coronary sinus is not well visualized. CORONARY ARTERIES:  The coronary arteries have normal origins and proximal courses.  The coronary system is right dominant.  The SA node artery originates from the right coronary artery. LEFT:  The left main artery is a moderate size vessel.  The left main artery has no significant disease.    The left anterior descending artery is a moderate size vessel.  The LAD has no significant disease. Buddy Lyndsay is a small, area of shallow bridging in the mid LAD.  There are three small and patent diagonal arteries.    The left circumflex artery is a moderate size vessel.  The LCx has no significant disease.  The first obtuse marginal artery is a moderate size, branching vessel that supplies a significant portion of the lateral wall.  The first obtuse marginal artery has no significant disease.  The second obtuse marginal artery has no significant disease.  The distal LCx is a small vessel that terminates in a small and patent OM. RIGHT:  The right coronary artery is a moderate size vessel.  The RCA has no significant disease.  The PDA is patent without significant disease in the visualized portion.  The PLV branch is small and patent.  The conus artery has an independent origin. PERICARDIUM:  There is no abnormal pericardial calcification. There is no pericardial effusion.     Echo 2/21/21  Summary   -- Left ventricular systolic function is severely reduced with a visually   estimated ejection fraction of 20-25%. EF estimated by Dooley's method at   25%. The left ventricle is normal in size with normal wall thickness. Only   all basal wall segments moves well, while the rest wall segments akinesis,   consistent with takotsubo syndrome. Normal diastolic function.   -- Right ventricular systolic function is reduced.   -- Mitral valve leaflets appear mildly myxomatous but opens adequately. Mild   mitral regurgitation.   Inadequate tricuspid valve regurgitation to estimate systolic pulmonary   artery pressure.     Left Heart Cath: 2/23/21    Findings   LVEDP 15   LVEF  20%   LV wall motion  apical and mid ballooning with basal hypokinesis consistent with Takotsubo cardiomyopathy   Gradient across AV  none   Mitral regurg  no significant      Coronary Angiogram:  Artery Findings/Result   LM  no angiographic CAD   LAD  no angiographic CAD   LCx  no angiographic CAD         RCA  dominant, no angiographic CAD      Assessment/Plan  1.  Normal coronary arteries,  2.  Takotsubo/stress cardiomyopathy  3.  Severe QTC prolongation will need to watch medications and follow EKGs    NYHA:   III  ACC/ AHA Stage:    c    Assessment:  Cardiomyopathy stress induced.  LVEF 20-25% Hypertension   Hx of tobacco abuse  Hx of TBI, Right temporal seizures since 2019; followed by  neuro  History of Hep C  History of IVDU- on methadone  Anemia  Prolong Qtc    Visit Diagnosis:    1. Takotsubo cardiomyopathy    2. Chronic systolic heart failure ([address]    3. Tobacco abuse      Plan:   1. Continue entresto 49-51mg twice a day  2. Check labs next week, BNP, BMP, magnesium in 2 weeks   3. Adjust the coreg 3.125mg twice a day due to fatigue and bradycardia (okay to cut your 6.25mg tablet in 1/2 until you get your new script of the 3.125mg and then take 1 tab twice a day )  4. Continue lasix 20mg daily for the next 2 days and then go to as needed for weight gain 3lbs in a day or 5lbs in a week with swelling. 5. Check your weight daily and record  6. Discussed fluid intake, 64 ounces; also discussed diet low sodium  7. Plan to repeat echocardiogram at the end of May for reevaluation of EF  8. Follow up in 3-4 weeks for medication titration    I appreciate the opportunity for caring for this patient.      Lucero Ballesteros CNP, 3/29/2021, 1:21 PM

## 2021-04-13 LAB
AFB CULTURE (MYCOBACTERIA): NORMAL
AFB SMEAR: NORMAL

## 2021-04-22 ENCOUNTER — VIRTUAL VISIT (OUTPATIENT)
Dept: CARDIOLOGY CLINIC | Age: 31
End: 2021-04-22
Payer: MEDICARE

## 2021-04-22 DIAGNOSIS — I50.22 CHRONIC SYSTOLIC HEART FAILURE (HCC): ICD-10-CM

## 2021-04-22 DIAGNOSIS — Z72.0 TOBACCO ABUSE: ICD-10-CM

## 2021-04-22 DIAGNOSIS — I51.81 TAKOTSUBO CARDIOMYOPATHY: Primary | ICD-10-CM

## 2021-04-22 PROCEDURE — G8427 DOCREV CUR MEDS BY ELIG CLIN: HCPCS | Performed by: NURSE PRACTITIONER

## 2021-04-22 PROCEDURE — 99214 OFFICE O/P EST MOD 30 MIN: CPT | Performed by: NURSE PRACTITIONER

## 2021-04-22 RX ORDER — CENOBAMATE 150-200 MG
KIT ORAL
COMMUNITY
Start: 2021-04-08 | End: 2021-05-06

## 2021-04-22 NOTE — PATIENT INSTRUCTIONS
Plan:  Continue daily weights  Check labs -discussed with patient today he plans to get these labs completed soon. Continue Lasix as needed  Continue carvedilol 3.125 mg twice daily  Continue Entresto 49-51 mg twice a day. Instructed patient to send a list of blood pressure readings through a [employer] message. Depending on blood pressure readings and lab results can consider adjusting the Entresto dose. Follow-up in 3 to 4 weeks for additional medication titration and plan for repeat echocardiogram at the end of May. Your provider has ordered lab work for further evaluation. The order/prescription is included in your paper work. You may have your labs completed at any of the [place] locations includin [facility] associated with the [employer] located on the [employer]. The address is [address] on the first floor. It is the building directly across from the Emergency Room. The main entrance to this building faces [employer]. You will have to drive around the building to locate the main entrance.  You may also use [facility] lab located inside the hospital, [facility] lab located inside the hospital, or [place] [id number] ER located off [facility].  You may use any other [facility] facilities or your [facility] office.  Labs may also be completed at any other facility of your choice, however, please allow 72 hours to receive your labs for review. If you do not receive your lab results 72-96 hours after completing, please call the office.

## 2021-04-22 NOTE — PROGRESS NOTES
2021    TELEHEALTH EVALUATION -- Audio/Visual (During [facility] public health emergency)    HPI:    Arvind Roberson (:  1990) has requested an audio/video evaluation for the following concern(s): cardiomyopathy. Last visit, coreg adjusted to 3.125mg BID, lasix was reduced to PRN. He has been taking his furosemide about every other day. He continues with some mild lower extremity edema. Since last visit his breathing is improved. He is taking the lower dose of carvedilol. He does get occasional chest pain, mild, and some shortness of breath with max exertion. Improves quickly with rest.  His weight is down to 126 pounds. He was checking his blood pressures at home however does not have blood pressure numbers to report at this time. His appetite is down. He does get occasional dizziness, suspect related to his antiseizure medications. He plans to follow-up with neurology soon. He is tolerating the Entresto. Taking medications as prescribed. No recent lab work to review. Review of Systems    Prior to Visit Medications    Medication Sig Taking? Authorizing Provider   Cenobamate (XCOPRI) 14 x 150 MG & 14 x200 MG TBPK Take by mouth Yes Historical Provider, MD   OXcarbazepine ER (OXTELLAR XR) 600 MG TB24 Take 2,400 mg by mouth daily Yes Historical Provider, MD   carvedilol (COREG) 3.125 MG tablet Take 1 tablet by mouth 2 times daily (with meals) Yes LEIGHTON Painter CNP   furosemide (LASIX) 20 MG tablet Take 1 tablet by mouth daily as needed (for weight gain 3lbs in a day or 5lb in a week with leg swelling.) Yes LEIGHTON Painter CNP   sacubitril-valsartan (ENTRESTO) 49-51 MG per tablet Take 1 tablet by mouth 2 times daily Yes LEIGHTON Painter CNP   methadone (DOLOPHINE) 10 MG/ML solution Take 70 mg by mouth daily.   Yes Historical Provider, MD   aspirin 81 MG chewable tablet Take 81 mg by mouth daily (with breakfast) Yes Historical Provider, MD         PHYSICAL EXAMINATION:    Vital Signs: (As obtained by patient/caregiver or practitioner observation)  Patient-Reported Vitals 4/22/2021   Patient-Reported Weight 126lb   Patient-Reported Height 5' 11\"      Constitutional: [x] Appears well-developed and well-nourished [x] No apparent distress      [] Abnormal-   Mental status  [x] Alert and awake  [x] Oriented to person/place/time [x]Able to follow commands      Eyes:  EOM    [x]  Normal  [] Abnormal-  Sclera  [x]  Normal  [] Abnormal -         Discharge []  None visible  [] Abnormal -    HENT:   [x] Normocephalic, atraumatic. [] Abnormal   [] Mouth/Throat: Mucous membranes are moist.     External Ears [x] Normal  [] Abnormal-     Neck: [x] No visualized mass     Pulmonary/Chest: [x] Respiratory effort normal.  [] No visualized signs of difficulty breathing or respiratory distress        [] Abnormal-      Musculoskeletal:   [] Normal gait with no signs of ataxia         [] Normal range of motion of neck        [] Abnormal-       Neurological:        [x] No Facial Asymmetry (Cranial nerve 7 motor function) (limited exam to video visit)          [] No gaze palsy        [] Abnormal-         Skin:        [] No significant exanthematous lesions or discoloration noted on facial skin         [x] Abnormal-trace edema noted of the lower extremities           Psychiatric:       [x] Normal Affect [x] No Hallucinations        [] Abnormal-     Other pertinent observable physical exam findings-   Echo: 1/31/2020 [facility]  - Left ventricle: The cavity size is normal. Wall thickness is normal. Systolic function was normal.    The estimated ejection fraction was in the range of 55% to 60%. Wall motion was normal; there were no regional wall motion abnormalities. Left ventricular diastolic function parameters were normal.  - Right ventricle: Systolic function was normal by objective interpretation. TAPSE: 2.7cm. Tricuspid    annular systolic velocity: 66WX/K.  - Pericardium, extracardiac:  There is a left pleural effusion.     CARDIAC CTA: 3/10/2020  FINDINGS:  The left ventricle appears normal in size.  The right ventricle appears normal in size.  The atria are normal in size.  The left atrium receives two right and two left pulmonary veins without evidence of stenosis.  The left atrial appendage is chicken wing shaped without evidence of thrombus.  The aorta is normal in size.  The pulmonary artery is incompletely visualized.  The IVC is normal in size.  The coronary sinus is not well visualized. CORONARY ARTERIES:  The coronary arteries have normal origins and proximal courses.  The coronary system is right dominant.  The SA node artery originates from the right coronary artery. LEFT:  The left main artery is a moderate size vessel.  The left main artery has no significant disease. The left anterior descending artery is a moderate size vessel.  The LAD has no significant disease. Lodema [place] is a small, area of shallow bridging in the mid LAD.  There are three small and patent diagonal arteries.    The left circumflex artery is a moderate size vessel.  The LCx has no significant disease.  The first obtuse marginal artery is a moderate size, branching vessel that supplies a significant portion of the lateral wall.  The first obtuse marginal artery has no significant disease.  The second obtuse marginal artery has no significant disease.  The distal LCx is a small vessel that terminates in a small and patent OM. RIGHT:  The right coronary artery is a moderate size vessel.  The RCA has no significant disease.  The PDA is patent without significant disease in the visualized portion.  The PLV branch is small and patent.  The conus artery has an independent origin. PERICARDIUM:  There is no abnormal pericardial calcification. There is no pericardial effusion.     Echo 2/21/21  Summary   -- Left ventricular systolic function is severely reduced with a visually   estimated ejection fraction of 20-25%.  EF encounter to address concerns as mentioned above. A caregiver was present when appropriate. Due to this being a TeleHealth encounter (During [facility] public health emergency), evaluation of the following organ systems was limited: Vitals/Constitutional/EENT/Resp/CV/GI//MS/Neuro/Skin/Heme-Lymph-Imm. Pursuant to the emergency declaration under the [address] [place] [address] and the [employer] Act, this Virtual Visit was conducted with patient's (and/or legal guardian's) consent, to reduce the patient's risk of exposure to COVID-19 and provide necessary medical care. The patient (and/or legal guardian) has also been advised to contact this office for worsening conditions or problems, and seek emergency medical treatment and/or call 911 if deemed necessary. Patient identification was verified at the start of the visit: Yes    Total time spent on this encounter: 16 minutes     Services were provided through a video synchronous discussion virtually to substitute for in-person clinic visit. Patient was located at their individual homes. Provider was in the office    --LEIGHTON Ernst CNP on 4/22/2021 at 10:59 AM    An electronic signature was used to authenticate this note.

## 2021-05-20 ENCOUNTER — HOSPITAL ENCOUNTER (OUTPATIENT)
Age: 31
Discharge: HOME | End: 2021-05-20
Payer: MEDICARE

## 2021-05-20 ENCOUNTER — OFFICE VISIT (OUTPATIENT)
Dept: CARDIOLOGY CLINIC | Age: 31
End: 2021-05-20
Payer: MEDICARE

## 2021-05-20 VITALS
SYSTOLIC BLOOD PRESSURE: 106 MMHG | BODY MASS INDEX: 17.22 KG/M2 | WEIGHT: 123 LBS | HEART RATE: 67 BPM | DIASTOLIC BLOOD PRESSURE: 68 MMHG | HEIGHT: 71 IN | OXYGEN SATURATION: 96 %

## 2021-05-20 DIAGNOSIS — I51.81 TAKOTSUBO CARDIOMYOPATHY: ICD-10-CM

## 2021-05-20 DIAGNOSIS — Z72.0 TOBACCO ABUSE: ICD-10-CM

## 2021-05-20 DIAGNOSIS — I50.22 CHRONIC SYSTOLIC HEART FAILURE (HCC): ICD-10-CM

## 2021-05-20 DIAGNOSIS — I51.81 STRESS-INDUCED CARDIOMYOPATHY: Primary | ICD-10-CM

## 2021-05-20 LAB
ANION GAP SERPL CALCULATED.3IONS-SCNC: 11 MMOL/L (ref 3–16)
BUN BLDV-MCNC: 13 MG/DL (ref 7–20)
CALCIUM SERPL-MCNC: 9.2 MG/DL (ref 8.3–10.6)
CHLORIDE BLD-SCNC: 104 MMOL/L (ref 99–110)
CO2: 25 MMOL/L (ref 21–32)
CREAT SERPL-MCNC: 0.8 MG/DL (ref 0.9–1.3)
GFR AFRICAN AMERICAN: >60
GFR NON-AFRICAN AMERICAN: >60
GLUCOSE BLD-MCNC: 82 MG/DL (ref 70–99)
MAGNESIUM: 2.2 MG/DL (ref 1.8–2.4)
POTASSIUM SERPL-SCNC: 4.2 MMOL/L (ref 3.5–5.1)
PRO-BNP: 33 PG/ML (ref 0–124)
SODIUM BLD-SCNC: 140 MMOL/L (ref 136–145)

## 2021-05-20 PROCEDURE — 99214 OFFICE O/P EST MOD 30 MIN: CPT | Performed by: NURSE PRACTITIONER

## 2021-05-20 PROCEDURE — G8427 DOCREV CUR MEDS BY ELIG CLIN: HCPCS | Performed by: NURSE PRACTITIONER

## 2021-05-20 PROCEDURE — 36415 COLL VENOUS BLD VENIPUNCTURE: CPT

## 2021-05-20 PROCEDURE — 4004F PT TOBACCO SCREEN RCVD TLK: CPT | Performed by: NURSE PRACTITIONER

## 2021-05-20 PROCEDURE — 83735 ASSAY OF MAGNESIUM: CPT

## 2021-05-20 PROCEDURE — 80048 BASIC METABOLIC PNL TOTAL CA: CPT

## 2021-05-20 PROCEDURE — 83880 ASSAY OF NATRIURETIC PEPTIDE: CPT

## 2021-05-20 PROCEDURE — G8419 CALC BMI OUT NRM PARAM NOF/U: HCPCS | Performed by: NURSE PRACTITIONER

## 2021-05-20 RX ORDER — OXCARBAZEPINE 600 MG/1
1800 TABLET ORAL DAILY
COMMUNITY
Start: 2021-05-13
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
## 2021-05-20 NOTE — PATIENT INSTRUCTIONS
Plan:   1. Continue entresto 49-51mg twice a day  2. Will call you with labs results; once labs resulted will decide on lasix dosing. 3. Continue coreg 3.125mg twice a day  4. Check your weight daily and record- at least weekly weights  5. Repeat echocardiogram- call to schedule   6.  Follow up in 3 months

## 2021-05-21 ENCOUNTER — TELEPHONE (OUTPATIENT)
Dept: CARDIOLOGY CLINIC | Age: 31
End: 2021-05-21

## 2021-05-21 NOTE — RESULT ENCOUNTER NOTE
Please call patient. These labs look great. Heart numbers completely improved. Kidney function is stable. I do recommend we reduce his Lasix to twice a week as needed for leg swelling. Continue the Entresto. Continue the carvedilol.

## 2021-07-20 ENCOUNTER — HOSPITAL ENCOUNTER (OUTPATIENT)
Dept: CARDIOLOGY | Age: 31
Discharge: HOME | End: 2021-07-20
Payer: MEDICARE

## 2021-07-20 ENCOUNTER — TELEPHONE (OUTPATIENT)
Dept: CARDIOLOGY CLINIC | Age: 31
End: 2021-07-20

## 2021-07-20 DIAGNOSIS — I51.81 STRESS-INDUCED CARDIOMYOPATHY: ICD-10-CM

## 2021-07-20 LAB
LV EF: 55 %
LVEF MODALITY: NORMAL

## 2021-07-20 PROCEDURE — 93306 TTE W/DOPPLER COMPLETE: CPT

## 2021-07-20 NOTE — TELEPHONE ENCOUNTER
----- Message from LEIGHTON Reina CNP sent at 7/20/2021  3:50 PM EDT -----  Please call patient.   His ejection fraction is now normal.  Continue current regimen, keep follow-up

## 2021-07-20 NOTE — RESULT ENCOUNTER NOTE
Please call patient.   His ejection fraction is now normal.  Continue current regimen, keep follow-up

## 2021-08-26 ENCOUNTER — OFFICE VISIT (OUTPATIENT)
Dept: CARDIOLOGY CLINIC | Age: 31
End: 2021-08-26
Payer: MEDICARE

## 2021-08-26 VITALS
DIASTOLIC BLOOD PRESSURE: 64 MMHG | WEIGHT: 126 LBS | OXYGEN SATURATION: 96 % | HEART RATE: 71 BPM | HEIGHT: 71 IN | BODY MASS INDEX: 17.64 KG/M2 | SYSTOLIC BLOOD PRESSURE: 108 MMHG

## 2021-08-26 DIAGNOSIS — I51.81 STRESS-INDUCED CARDIOMYOPATHY: ICD-10-CM

## 2021-08-26 DIAGNOSIS — I50.22 CHRONIC SYSTOLIC HEART FAILURE (HCC): Primary | ICD-10-CM

## 2021-08-26 PROCEDURE — 4004F PT TOBACCO SCREEN RCVD TLK: CPT | Performed by: NURSE PRACTITIONER

## 2021-08-26 PROCEDURE — G8419 CALC BMI OUT NRM PARAM NOF/U: HCPCS | Performed by: NURSE PRACTITIONER

## 2021-08-26 PROCEDURE — G8427 DOCREV CUR MEDS BY ELIG CLIN: HCPCS | Performed by: NURSE PRACTITIONER

## 2021-08-26 PROCEDURE — 99214 OFFICE O/P EST MOD 30 MIN: CPT | Performed by: NURSE PRACTITIONER

## 2021-08-26 NOTE — PROGRESS NOTES
[address]   Cardiac Follow-up    Primary Care Doctor:  No primary care provider on file. Chief Complaint   Patient presents with    Follow-up    Edema        History of Present Illness:   I had the pleasure of seeing Elisa Barahona in follow up for hospital follow-up. Admitted 2/21/2021-2/27/2021 with seizures, acute encephalopathy, septic shock, respiratory failure, NSTEMI, aspiration, stress-induced cardiomyopathy with LVEF 25% requiring dobutamine support, status post left heart cath showed normal coronaries. Since last visit, had repeat echo showing normal EF. He is here with his mom. He has been compliant with medications. Overall he is doing very well. Appetite improving. He reports no more relapses. He has been clean for over a month. He is feeling better overall. Having edema every few days. Edema improved with the Lasix every few days. Dizziness at times, he is not sure if it is related to his medications for his seizures. .  No syncope. Having very little chest pains, at night. Once a week. Elisa Barahona describes symptoms including edema but denies syncope. Past Medical History:   has a past medical history of Aspiration pneumonia ([facility] [address] Hepatitis C, HTN (hypertension), NSTEMI (non-ST elevated myocardial infarction) ([facility] [address] Polysubstance abuse ([facility] [address] Seizures ([facility] [address] Septic shock ([facility] [address] and Takotsubo cardiomyopathy. Surgical History:   has a past surgical history that includes Hand surgery; bronchoscopy (N/A, 2/22/2021); and bronchoscopy (2/22/2021). Social History:   reports that he has been smoking cigarettes. He has been smoking about 2.00 packs per day. He has never used smokeless tobacco. He reports current drug use. Drugs: IV, Opiates , Marijuana, and Methamphetamines. He reports that he does not drink alcohol. Family History:   History reviewed. No pertinent family history.     Home Medications:  Prior to Admission medications    Medication Sig Start Date End Date Taking? Authorizing Provider   Cenobamate (XCOPRI PO) Take by mouth   Yes Historical Provider, MD   OXcarbazepine ER (OXTELLAR XR) 600 MG TB24 Take 1,800 mg by mouth daily 5/13/21  Yes Historical Provider, MD   carvedilol (COREG) 3.125 MG tablet Take 1 tablet by mouth 2 times daily (with meals) 3/29/21  Yes LEIGHTON Garcia CNP   furosemide (LASIX) 20 MG tablet Take 1 tablet by mouth daily as needed (for weight gain 3lbs in a day or 5lb in a week with leg swelling.) 3/11/21  Yes LEIGHTON Garcia CNP   sacubitril-valsartan (ENTRESTO) 49-51 MG per tablet Take 1 tablet by mouth 2 times daily 3/11/21  Yes LEIGHTON Garcia CNP   methadone (DOLOPHINE) 10 MG/ML solution Take 75 mg by mouth daily. Yes Historical Provider, MD   aspirin 81 MG chewable tablet Take 81 mg by mouth daily (with breakfast) 1/21/20  Yes Historical Provider, MD      Allergies:  Patient has no known allergies.      Review of Systems:   Lightheadedness/dizziness: Yes, dizziness  Hematuria: No    Physical Examination:    Vitals:    08/26/21 1521   BP: 108/64   Pulse: 71   SpO2: 96%   Weight: 126 lb (57.2 kg)   Height: 5' 11\" (1.803 m)        Constitutional and General Appearance: no apparent distress, thin  HEENT: non-icteric sclera, mask in place   Neck: JVP less than 8 cm h20   Respiratory:  · No use of accessory muscles  · Clear breath sounds throughout, no wheezing, no crackles, no rhonchi  Cardiovascular:  · The apical impulses not displaced  · Heart tones are crisp and normal, no murmur/rub/gallop  · Regular rate and rhythm, S1,S2 normal  · Radial pulses 2+ and equal bilaterally  · No BLE  · Pedal Pulses: 2+ and equal   Abdomen:  · No masses or tenderness  · Liver: No Abnormalities Noted  Musculoskeletal/Skin:  · Exhibits normal gait balance and coordination  · There is no clubbing, cyanosis of the extremities  · Skin is warm and dry  · Moves all extremities well  Neurological/Psychiatric:  · Alert and Systolic function was normal.    The estimated ejection fraction was in the range of 55% to 60%. Wall motion was normal; there were no regional wall motion abnormalities. Left ventricular diastolic function parameters were normal.  - Right ventricle: Systolic function was normal by objective interpretation. TAPSE: 2.7cm. Tricuspid    annular systolic velocity: 82BS/F.  - Pericardium, extracardiac: There is a left pleural effusion.     CARDIAC CTA: 3/10/2020  FINDINGS:  The left ventricle appears normal in size.  The right ventricle appears normal in size.  The atria are normal in size.  The left atrium receives two right and two left pulmonary veins without evidence of stenosis.  The left atrial appendage is chicken wing shaped without evidence of thrombus.  The aorta is normal in size.  The pulmonary artery is incompletely visualized.  The IVC is normal in size.  The coronary sinus is not well visualized. CORONARY ARTERIES:  The coronary arteries have normal origins and proximal courses.  The coronary system is right dominant.  The SA node artery originates from the right coronary artery. LEFT:  The left main artery is a moderate size vessel.  The left main artery has no significant disease. The left anterior descending artery is a moderate size vessel.  The LAD has no significant disease. [place] is a small, area of shallow bridging in the mid LAD.  There are three small and patent diagonal arteries.    The left circumflex artery is a moderate size vessel.  The LCx has no significant disease.  The first obtuse marginal artery is a moderate size, branching vessel that supplies a significant portion of the lateral wall.  The first obtuse marginal artery has no significant disease.  The second obtuse marginal artery has no significant disease.  The distal LCx is a small vessel that terminates in a small and patent OM.   RIGHT:  The right coronary artery is a moderate size vessel.  The RCA has no significant disease.  The PDA is patent without significant disease in the visualized portion.  The PLV branch is small and patent.  The conus artery has an independent origin. PERICARDIUM:  There is no abnormal pericardial calcification. There is no pericardial effusion.     Echo 2/21/21  Summary   -- Left ventricular systolic function is severely reduced with a visually estimated ejection fraction of 20-25%. EF estimated by Dooley's method at 25%. The left ventricle is normal in size with normal wall thickness. Only   all basal wall segments moves well, while the rest wall segments akinesis, consistent with takotsubo syndrome. Normal diastolic function.   -- Right ventricular systolic function is reduced.   -- Mitral valve leaflets appear mildly myxomatous but opens adequately. Mild mitral regurgitation.   Inadequate tricuspid valve regurgitation to estimate systolic pulmonary artery pressure.     Left Heart Cath: 2/23/21    Findings   LVEDP 15   LVEF  20%   LV wall motion  apical and mid ballooning with basal hypokinesis consistent with Takotsubo cardiomyopathy   Gradient across AV  none   Mitral regurg  no significant      Coronary Angiogram:  Artery Findings/Result   LM  no angiographic CAD   LAD  no angiographic CAD   LCx  no angiographic CAD         RCA  dominant, no angiographic CAD      Assessment/Plan  1.  Normal coronary arteries,  2.  Takotsubo/stress cardiomyopathy  3.  Severe QTC prolongation will need to watch medications and follow EKGs    Echo 2/26/21  Summary   Definity is used for myocardial border enhancement and thrombus detection. Left ventricular systolic function is severely reduced with a visually estimated ejection fraction of 25-30%. EF estimated by Dooley's  method at 29%. There is severe hypokinesis of the entire anterior, lateral, and apical segments. The basal segments have near normal function. Findings compatible with apical ballooning syndrome. No thrombus is detected.    Compare with last echo on 2/22/21: LVEF is slightly improved. Echo 7/20/21  Summary  Technically difficult examination. Normal left ventricle size, wall thickness, and systolic function with a visually estimated ejection fraction of 55%. No regional wall motion abnormalities are seen. Normal left ventricular diastolic filling pressure. The left atrium appears mildly enlarged. Mild pulmonic regurgitation. Systolic pulmonary artery pressure (SPAP) is normal and estimated at 25 mmHg (right atrial pressure 8 mmHg). The IVC is dilated in size (>2.1 cm) but collapses >50% with respiration consistent with elevated right atrial pressures (8 mmHg) . Compared with the previous exam on 02/26/21 (EF 25-30%), left ventricular function is now normal.    NYHA:   I  ACC/ AHA Stage:    c    Assessment:  Cardiomyopathy stress induced. LVEF 20-25%-->55%  Hypertension   Hx of tobacco abuse  Hx of TBI, Right temporal seizures since 2019; followed by  neuro  History of Hep C  History of IVDU- on methadone  Anemia  Prolong Qtc    Visit Diagnosis:    1. Chronic systolic heart failure (HCC)    2. Stress-induced cardiomyopathy      Plan:   Reviewed recent echocardiogram results with him today. His heart function is now normalized, discussed the need for ongoing CHF medication. 1. Continue entresto 49-51mg twice a day  2. Continue coreg 3.125mg twice a day  3. Take the lasix as needed  4. Repeat BMP in November   5. Follow up 6 months or sooner if needed     I appreciate the opportunity for caring for this patient.      LEIGHTON Chowdhury - CNP, CNP, 8/26/2021

## 2021-08-26 NOTE — PATIENT INSTRUCTIONS
Plan:   1. Continue entresto 49-51mg twice a day  2. Continue coreg 3.125mg twice a day  3. Take the lasix as needed  4. Repeat BMP in November 5.  Follow up 6 months or sooner if needed

## 2021-10-24 ENCOUNTER — HOSPITAL ENCOUNTER (EMERGENCY)
Age: 31
Discharge: HOME | End: 2021-10-25
Attending: EMERGENCY MEDICINE
Payer: MEDICARE

## 2021-10-24 DIAGNOSIS — G40.909 SEIZURE DISORDER (HCC): Primary | ICD-10-CM

## 2021-10-24 LAB
A/G RATIO: 1.6 (ref 1.1–2.2)
ALBUMIN SERPL-MCNC: 4.8 G/DL (ref 3.4–5)
ALP BLD-CCNC: 106 U/L (ref 40–129)
ALT SERPL-CCNC: 40 U/L (ref 10–40)
AMPHETAMINE SCREEN, URINE: ABNORMAL
ANION GAP SERPL CALCULATED.3IONS-SCNC: 9 MMOL/L (ref 3–16)
AST SERPL-CCNC: 30 U/L (ref 15–37)
BARBITURATE SCREEN URINE: ABNORMAL
BASOPHILS ABSOLUTE: 0.1 K/UL (ref 0–0.2)
BASOPHILS RELATIVE PERCENT: 0.7 %
BENZODIAZEPINE SCREEN, URINE: ABNORMAL
BILIRUB SERPL-MCNC: 0.3 MG/DL (ref 0–1)
BILIRUBIN URINE: NEGATIVE
BLOOD, URINE: NEGATIVE
BUN BLDV-MCNC: 10 MG/DL (ref 7–20)
CALCIUM SERPL-MCNC: 9.6 MG/DL (ref 8.3–10.6)
CANNABINOID SCREEN URINE: POSITIVE
CHLORIDE BLD-SCNC: 101 MMOL/L (ref 99–110)
CLARITY: ABNORMAL
CO2: 28 MMOL/L (ref 21–32)
COCAINE METABOLITE SCREEN URINE: ABNORMAL
COLOR: YELLOW
CREAT SERPL-MCNC: 0.7 MG/DL (ref 0.9–1.3)
EOSINOPHILS ABSOLUTE: 0 K/UL (ref 0–0.6)
EOSINOPHILS RELATIVE PERCENT: 0.6 %
GFR AFRICAN AMERICAN: >60
GFR NON-AFRICAN AMERICAN: >60
GLOBULIN: 3 G/DL
GLUCOSE BLD-MCNC: 113 MG/DL (ref 70–99)
GLUCOSE URINE: NEGATIVE MG/DL
HCT VFR BLD CALC: 39.5 % (ref 40.5–52.5)
HEMOGLOBIN: 13.7 G/DL (ref 13.5–17.5)
KETONES, URINE: NEGATIVE MG/DL
LACTIC ACID, SEPSIS: 0.9 MMOL/L (ref 0.4–1.9)
LEUKOCYTE ESTERASE, URINE: NEGATIVE
LYMPHOCYTES ABSOLUTE: 1.9 K/UL (ref 1–5.1)
LYMPHOCYTES RELATIVE PERCENT: 23.6 %
Lab: ABNORMAL
MCH RBC QN AUTO: 31.6 PG (ref 26–34)
MCHC RBC AUTO-ENTMCNC: 34.8 G/DL (ref 31–36)
MCV RBC AUTO: 90.8 FL (ref 80–100)
METHADONE SCREEN, URINE: POSITIVE
MICROSCOPIC EXAMINATION: ABNORMAL
MONOCYTES ABSOLUTE: 0.2 K/UL (ref 0–1.3)
MONOCYTES RELATIVE PERCENT: 3 %
NEUTROPHILS ABSOLUTE: 5.9 K/UL (ref 1.7–7.7)
NEUTROPHILS RELATIVE PERCENT: 72.1 %
NITRITE, URINE: NEGATIVE
OPIATE SCREEN URINE: ABNORMAL
OXYCODONE URINE: ABNORMAL
PDW BLD-RTO: 13.4 % (ref 12.4–15.4)
PH UA: 7.5
PH UA: 7.5 (ref 5–8)
PHENCYCLIDINE SCREEN URINE: ABNORMAL
PLATELET # BLD: 176 K/UL (ref 135–450)
PMV BLD AUTO: 7.3 FL (ref 5–10.5)
POTASSIUM REFLEX MAGNESIUM: 4.4 MMOL/L (ref 3.5–5.1)
PROPOXYPHENE SCREEN: ABNORMAL
PROTEIN UA: NEGATIVE MG/DL
RBC # BLD: 4.35 M/UL (ref 4.2–5.9)
SODIUM BLD-SCNC: 138 MMOL/L (ref 136–145)
SPECIFIC GRAVITY UA: 1.01 (ref 1–1.03)
TOTAL PROTEIN: 7.8 G/DL (ref 6.4–8.2)
URINE TYPE: ABNORMAL
UROBILINOGEN, URINE: 0.2 E.U./DL
WBC # BLD: 8.1 K/UL (ref 4–11)

## 2021-10-24 PROCEDURE — 80307 DRUG TEST PRSMV CHEM ANLYZR: CPT

## 2021-10-24 PROCEDURE — 80053 COMPREHEN METABOLIC PANEL: CPT

## 2021-10-24 PROCEDURE — 96374 THER/PROPH/DIAG INJ IV PUSH: CPT

## 2021-10-24 PROCEDURE — 96375 TX/PRO/DX INJ NEW DRUG ADDON: CPT

## 2021-10-24 PROCEDURE — 83605 ASSAY OF LACTIC ACID: CPT

## 2021-10-24 PROCEDURE — 85025 COMPLETE CBC W/AUTO DIFF WBC: CPT

## 2021-10-24 PROCEDURE — 2580000003 HC RX 258: Performed by: EMERGENCY MEDICINE

## 2021-10-24 PROCEDURE — 6360000002 HC RX W HCPCS: Performed by: EMERGENCY MEDICINE

## 2021-10-24 PROCEDURE — 99285 EMERGENCY DEPT VISIT HI MDM: CPT

## 2021-10-24 PROCEDURE — 81003 URINALYSIS AUTO W/O SCOPE: CPT

## 2021-10-24 RX ORDER — LORAZEPAM 2 MG/ML
1 INJECTION INTRAMUSCULAR ONCE
Status: COMPLETED | OUTPATIENT
Start: 2021-10-24 | End: 2021-10-24

## 2021-10-24 RX ORDER — ONDANSETRON 2 MG/ML
4 INJECTION INTRAMUSCULAR; INTRAVENOUS ONCE
Status: COMPLETED | OUTPATIENT
Start: 2021-10-24 | End: 2021-10-24

## 2021-10-24 RX ORDER — 0.9 % SODIUM CHLORIDE 0.9 %
1000 INTRAVENOUS SOLUTION INTRAVENOUS ONCE
Status: COMPLETED | OUTPATIENT
Start: 2021-10-24 | End: 2021-10-24

## 2021-10-24 RX ADMIN — LORAZEPAM 1 MG: 2 INJECTION INTRAMUSCULAR; INTRAVENOUS at 20:55

## 2021-10-24 RX ADMIN — ONDANSETRON 4 MG: 2 INJECTION INTRAMUSCULAR; INTRAVENOUS at 20:55

## 2021-10-24 RX ADMIN — SODIUM CHLORIDE 1000 ML: 9 INJECTION, SOLUTION INTRAVENOUS at 20:56

## 2021-10-25 VITALS
BODY MASS INDEX: 18.2 KG/M2 | SYSTOLIC BLOOD PRESSURE: 152 MMHG | DIASTOLIC BLOOD PRESSURE: 100 MMHG | WEIGHT: 130 LBS | HEART RATE: 84 BPM | RESPIRATION RATE: 19 BRPM | HEIGHT: 71 IN | OXYGEN SATURATION: 97 % | TEMPERATURE: 98 F

## 2021-10-25 NOTE — ED PROVIDER NOTES
[facility] Emergency Department      CHIEF COMPLAINT  Seizures (Witnessed seizure at [facility], for a few min, hx sz w/ recent increase in dosage of meds)      HISTORY OF PRESENT ILLNESS  Radha Block is a 32 y.o. male with a history of reported seizure disorder and polysubstance abuse presents stating that he has had several seizures today. He states that he has focal seizures mostly. He is on Trileptal and Xcopri for his seizures. He admits he has not been completely compliant with these medications and missed several doses last week. He states he has taken them over the past several days. Apparently he has had several episodes today where he will start staring off and he states those are consistent with his focal seizures. He has a history of seizures and polysubstance abuse and was admitted earlier this summer with seizures and ultimately had an NSTEMI and developed Takotsubo cardiomyopathy. He states his heart has recovered but he is still on several heart medications. He is in a Suboxone program but denies any additional drug use. He has not been ill recently. No generalized tonic-clonic seizures no head trauma. .   No other complaints, modifying factors or associated symptoms. I have reviewed the following from the nursing documentation.     Past Medical History:   Diagnosis Date    Aspiration pneumonia ([facility] [address]     Hepatitis C     HTN (hypertension)     NSTEMI (non-ST elevated myocardial infarction) ([facility] [address]     Polysubstance abuse (HCC)     Seizures (HCC)     Septic shock (HCC)     Takotsubo cardiomyopathy      Past Surgical History:   Procedure Laterality Date    BRONCHOSCOPY N/A 2/22/2021    BRONCHOSCOPY DIAGNOSTIC OR CELL [address] ONLY performed by Usman Martinez MD at [address] [place] [address]  2/22/2021    BRONCHOSCOPY THERAPUTIC ASPIRATION INITIAL performed by Usman Martinez MD at [place] [address], PARTIAL AMPUTATIONS     History reviewed. No pertinent family history. Social History     Socioeconomic History    Marital status: Single     Spouse name: Not on file    Number of children: Not on file    Years of education: Not on file    Highest education level: Not on file   Occupational History    Not on file   Tobacco Use    Smoking status: Current Every Day Smoker     Packs/day: 2.00     Types: Cigarettes    Smokeless tobacco: Never Used   Vaping Use    Vaping Use: Never used   Substance and Sexual Activity    Alcohol use: No    Drug use: Yes     Types: IV, Opiates , Marijuana, Methamphetamines     Comment: prior heroin user; last use 4 years ago per mom as of 9/23/20,. Medical marijuana, 05/20/21- pt used since last visit meth, fentanyl, marijuana    Sexual activity: Yes     Partners: Female   Other Topics Concern    Not on file   Social History Narrative    Not on file     Social Determinants of Health     Financial Resource Strain:     Difficulty of Paying Living Expenses:    Food Insecurity:     Worried About Running Out of Food in the Last Year:     [address] in the Last Year:    Transportation Needs:     Lack of Transportation (Medical):  Lack of Transportation (Non-Medical):    Physical Activity:     Days of Exercise per Week:     Minutes of Exercise per Session:    Stress:     Feeling of Stress :    Social Connections:     Frequency of Communication with Friends and Family:     Frequency of Social Gatherings with Friends and Family:     Attends Ca[employer] Services:     Active Member of Clubs or Organizations:     Attends Club or Organization Meetings:     Marital Status:    Intimate Partner Violence:     Fear of Current or Ex-Partner:     Emotionally Abused:     Physically Abused:     Sexually Abused:      No current facility-administered medications for this encounter.      Current Outpatient Medications   Medication Sig Dispense Refill    Cenobamate (XCOPRI PO) Take by mouth      OXcarbazepine ER (OXTELLAR XR) 600 MG TB24 Take 1,800 mg by mouth daily      carvedilol (COREG) 3.125 MG tablet Take 1 tablet by mouth 2 times daily (with meals) 60 tablet 1    furosemide (LASIX) 20 MG tablet Take 1 tablet by mouth daily as needed (for weight gain 3lbs in a day or 5lb in a week with leg swelling.) 30 tablet 5    sacubitril-valsartan (ENTRESTO) 49-51 MG per tablet Take 1 tablet by mouth 2 times daily 60 tablet 2    methadone (DOLOPHINE) 10 MG/ML solution Take 75 mg by mouth daily.  aspirin 81 MG chewable tablet Take 81 mg by mouth daily (with breakfast)       No Known Allergies    REVIEW OF SYSTEMS  10 systems reviewed, pertinent positives per HPI otherwise noted to be negative. PHYSICAL EXAM  BP (!) 152/100   Pulse 84   Temp 98 °F (36.7 °C) (Oral)   Resp 19   Ht 5' 11\" (1.803 m)   Wt 130 lb (59 kg)   SpO2 97%   BMI 18.13 kg/m²   GENERAL APPEARANCE: Awake and alert. Cooperative. No acute distress. HEAD: Normocephalic. Atraumatic. EYES: PERRL. EOM's grossly intact. ENT: Mucous membranes are moist.   NECK: Supple, trachea midline. No meningismus. HEART: RRR. LUNGS: Respirations unlabored. CTAB. Good air exchange. No wheezes, rales, or rhonchi. Speaking comfortably in full sentences. ABDOMEN: Soft. Non-distended. Non-tender. No guarding or rebound. EXTREMITIES: No peripheral edema. MAEE. No acute deformities. SKIN: Warm, dry and intact. No acute rashes. NEUROLOGICAL: Alert and oriented X 3. CN II-XII grossly intact. Strength 5/5, sensation intact. Normal coordination. NIH stroke scale is 0. PSYCHIATRIC: Normal mood and affect. LABS  I have reviewed all labs for this visit.    Results for orders placed or performed during the hospital encounter of 10/24/21   CBC auto differential   Result Value Ref Range    WBC 8.1 4.0 - 11.0 K/uL    RBC 4.35 4.20 - 5.90 M/uL    Hemoglobin 13.7 13.5 - 17.5 g/dL    Hematocrit 39.5 (L) 40.5 - 52.5 %    MCV 90.8 80.0 - 100.0 fL    MCH 31.6 26.0 - 34.0 pg    MCHC 34.8 31.0 - 36.0 g/dL    RDW 13.4 12.4 - 15.4 %    Platelets 816 982 - 014 K/uL    MPV 7.3 5.0 - 10.5 fL    Neutrophils % 72.1 %    Lymphocytes % 23.6 %    Monocytes % 3.0 %    Eosinophils % 0.6 %    Basophils % 0.7 %    Neutrophils Absolute 5.9 1.7 - 7.7 K/uL    Lymphocytes Absolute 1.9 1.0 - 5.1 K/uL    Monocytes Absolute 0.2 0.0 - 1.3 K/uL    Eosinophils Absolute 0.0 0.0 - 0.6 K/uL    Basophils Absolute 0.1 0.0 - 0.2 K/uL   Comprehensive Metabolic Panel w/ Reflex to MG   Result Value Ref Range    Sodium 138 136 - 145 mmol/L    Potassium reflex Magnesium 4.4 3.5 - 5.1 mmol/L    Chloride 101 99 - 110 mmol/L    CO2 28 21 - 32 mmol/L    Anion Gap 9 3 - 16    Glucose 113 (H) 70 - 99 mg/dL    BUN 10 7 - 20 mg/dL    CREATININE 0.7 (L) 0.9 - 1.3 mg/dL    GFR Non-African American >60 >60    GFR African American >60 >60    Calcium 9.6 8.3 - 10.6 mg/dL    Total Protein 7.8 6.4 - 8.2 g/dL    Albumin 4.8 3.4 - 5.0 g/dL    Albumin/Globulin Ratio 1.6 1.1 - 2.2    Total Bilirubin 0.3 0.0 - 1.0 mg/dL    Alkaline Phosphatase 106 40 - 129 U/L    ALT 40 10 - 40 U/L    AST 30 15 - 37 U/L    Globulin 3.0 Not Established g/dL   Lactate, Sepsis   Result Value Ref Range    Lactic Acid, Sepsis 0.9 0.4 - 1.9 mmol/L   Urinalysis   Result Value Ref Range    Color, UA Yellow Straw/Yellow    Clarity, UA SL CLOUDY (A) Clear    Glucose, Ur Negative Negative mg/dL    Bilirubin Urine Negative Negative    Ketones, Urine Negative Negative mg/dL    Specific Gravity, UA 1.015 1.005 - 1.030    Blood, Urine Negative Negative    pH, UA 7.5 5.0 - 8.0    Protein, UA Negative Negative mg/dL    Urobilinogen, Urine 0.2 <2.0 E.U./dL    Nitrite, Urine Negative Negative    Leukocyte Esterase, Urine Negative Negative    Microscopic Examination Not Indicated     Urine Type NotGiven    Urine Drug Screen   Result Value Ref Range    Amphetamine Screen, Urine Neg Negative <1000ng/mL    Barbiturate Screen, Ur Neg Negative <200 ng/mL    Benzodiazepine Screen, Urine Neg Negative <200 ng/mL    Cannabinoid Scrn, Ur POSITIVE (A) Negative <50 ng/mL    Cocaine Metabolite Screen, Urine Neg Negative <300 ng/mL    Opiate Scrn, Ur Neg Negative <300 ng/mL    PCP Screen, Urine Neg Negative <25 ng/mL    Methadone Screen, Urine POSITIVE (A) Negative <300 ng/mL    Propoxyphene Scrn, Ur Neg Negative <300 ng/mL    Oxycodone Urine Neg Negative <100 ng/ml    pH, UA 7.5     Drug Screen Comment: see below              RADIOLOGY  X-RAYS:  I have reviewed radiologic plain film image(s). ALL OTHER NON-PLAIN FILM IMAGES SUCH AS CT, ULTRASOUND AND MRI HAVE BEEN READ BY THE RADIOLOGIST. No orders to display              Rechecks: Physical assessment performed. Patient is resting comfortably. He was given 1 dose of Ativan here after his mom said that he was having a focal seizure. This does not sound like seizure to me. When I went to evaluate him the episode was over but apparently he was talking to the nurse during the episode. He has been resting comfortably. ED COURSE/MDM  Patient seen and evaluated. Here the patient is afebrile with normal vitals signs. Old records reviewed, including details of his past admission for seizure disorder. Here he is in no acute distress. He has a nonfocal neurologic exam.  No signs of trauma. It does not sound like he is having generalized tonic-clonic seizures but more focal seizures or may be even absence seizure's. Here his lab work is reassuring. Lactic acid is normal.  Drug screen is positive for cannabinoids and methadone. The mother came into the riley and called the nurse into the room and said he was having a focal seizure. I did not witness this episode but apparently he was talking to the nurse during the episode and was not having any seizure-like activity. I did give him 1 dose of IV Ativan. He has been resting since.   Given his normal neurologic exam I do not think he needs head imaging here.  I did send a Trileptal level which is a send out labs I do not have the results. I think he is appropriate for discharge home. I counseled him that he should not drive or swim/bathe unsupervised. He states understanding. I will recommend follow-up with his neurologist this week. Strict return precautions given. .  Labs and imaging reviewed and results discussed with patient. Patient was reassessed as noted above . Plan of care discussed with patient. Patient in agreement with plan. Strict return precautions have been given. Patient was given scripts for the following medications. I counseled patient how to take these medications. Discharge Medication List as of 10/25/2021 12:13 AM            CLINICAL IMPRESSION  1. Seizure disorder (HCC)        Blood pressure (!) 152/100, pulse 84, temperature 98 °F (36.7 °C), temperature source Oral, resp. rate 19, height 5' 11\" (1.803 m), weight 130 lb (59 kg), SpO2 97 %. DISPOSITION  Candi Ellis was discharged to home in stable condition.     (Please note this note was completed with a voice recognition program.  Efforts were made to edit the dictations but occasionally words are mis-transcribed.)       Emelia Jimenez MD  10/26/21 0824

## 2021-10-25 NOTE — ED NOTES
Pt's mom to RN station, stating pt is having a focal seizure. RN to room. Pt talking, A&Ox4. No seizure activity noted. Pt's mom requesting IV valium. MD aware.       Tiera Locke RN  10/24/21 [id number]

## 2021-10-25 NOTE — ED NOTES
[id number] Diana Guerrier for d/c. Stable and ambulatory. Mother to drive pt.       Livia Oh RN  10/25/21 2739

## 2021-12-04 ENCOUNTER — APPOINTMENT (OUTPATIENT)
Dept: CT IMAGING | Age: 31
DRG: 053 | End: 2021-12-04
Payer: MEDICARE

## 2021-12-04 ENCOUNTER — APPOINTMENT (OUTPATIENT)
Dept: GENERAL RADIOLOGY | Age: 31
DRG: 053 | End: 2021-12-04
Payer: MEDICARE

## 2021-12-04 ENCOUNTER — HOSPITAL ENCOUNTER (INPATIENT)
Age: 31
LOS: 2 days | Discharge: HOME | DRG: 053 | End: 2021-12-07
Attending: EMERGENCY MEDICINE | Admitting: INTERNAL MEDICINE
Payer: MEDICARE

## 2021-12-04 DIAGNOSIS — R56.9 SEIZURE-LIKE ACTIVITY (HCC): ICD-10-CM

## 2021-12-04 DIAGNOSIS — E87.20 LACTIC ACIDOSIS: ICD-10-CM

## 2021-12-04 DIAGNOSIS — R56.9 SEIZURE (HCC): Primary | ICD-10-CM

## 2021-12-04 DIAGNOSIS — R41.82 ALTERED MENTAL STATUS, UNSPECIFIED ALTERED MENTAL STATUS TYPE: ICD-10-CM

## 2021-12-04 LAB
GLUCOSE BLD-MCNC: 221 MG/DL (ref 70–99)
PERFORMED ON: ABNORMAL

## 2021-12-04 PROCEDURE — 85025 COMPLETE CBC W/AUTO DIFF WBC: CPT

## 2021-12-04 PROCEDURE — 99285 EMERGENCY DEPT VISIT HI MDM: CPT

## 2021-12-04 PROCEDURE — 82803 BLOOD GASES ANY COMBINATION: CPT

## 2021-12-04 PROCEDURE — 71045 X-RAY EXAM CHEST 1 VIEW: CPT

## 2021-12-04 PROCEDURE — 93005 ELECTROCARDIOGRAM TRACING: CPT | Performed by: EMERGENCY MEDICINE

## 2021-12-04 PROCEDURE — 96361 HYDRATE IV INFUSION ADD-ON: CPT

## 2021-12-04 PROCEDURE — 83690 ASSAY OF LIPASE: CPT

## 2021-12-04 PROCEDURE — 84484 ASSAY OF TROPONIN QUANT: CPT

## 2021-12-04 PROCEDURE — 2580000003 HC RX 258: Performed by: EMERGENCY MEDICINE

## 2021-12-04 PROCEDURE — 83605 ASSAY OF LACTIC ACID: CPT

## 2021-12-04 PROCEDURE — 70450 CT HEAD/BRAIN W/O DYE: CPT

## 2021-12-04 PROCEDURE — 80053 COMPREHEN METABOLIC PANEL: CPT

## 2021-12-04 RX ORDER — SODIUM CHLORIDE, SODIUM LACTATE, POTASSIUM CHLORIDE, AND CALCIUM CHLORIDE .6; .31; .03; .02 G/100ML; G/100ML; G/100ML; G/100ML
1000 INJECTION, SOLUTION INTRAVENOUS ONCE
Status: COMPLETED | OUTPATIENT
Start: 2021-12-04 | End: 2021-12-05

## 2021-12-04 RX ORDER — LORAZEPAM 2 MG/ML
INJECTION INTRAMUSCULAR
Status: COMPLETED
Start: 2021-12-04 | End: 2021-12-05

## 2021-12-04 RX ORDER — LORAZEPAM 2 MG/ML
2 INJECTION INTRAMUSCULAR ONCE
Status: COMPLETED | OUTPATIENT
Start: 2021-12-04 | End: 2021-12-05

## 2021-12-04 RX ADMIN — SODIUM CHLORIDE, POTASSIUM CHLORIDE, SODIUM LACTATE AND CALCIUM CHLORIDE 1000 ML: 600; 310; 30; 20 INJECTION, SOLUTION INTRAVENOUS at 23:16

## 2021-12-05 ENCOUNTER — APPOINTMENT (OUTPATIENT)
Dept: GENERAL RADIOLOGY | Age: 31
DRG: 053 | End: 2021-12-05
Payer: MEDICARE

## 2021-12-05 PROBLEM — E87.20 LACTIC ACIDOSIS: Status: ACTIVE | Noted: 2021-12-05

## 2021-12-05 PROBLEM — E87.29 HIGH ANION GAP METABOLIC ACIDOSIS: Status: ACTIVE | Noted: 2021-12-05

## 2021-12-05 LAB
A/G RATIO: 1.5 (ref 1.1–2.2)
ALBUMIN SERPL-MCNC: 4.7 G/DL (ref 3.4–5)
ALP BLD-CCNC: 134 U/L (ref 40–129)
ALT SERPL-CCNC: 61 U/L (ref 10–40)
AMPHETAMINE SCREEN, URINE: ABNORMAL
ANION GAP SERPL CALCULATED.3IONS-SCNC: 10 MMOL/L (ref 3–16)
ANION GAP SERPL CALCULATED.3IONS-SCNC: 32 MMOL/L (ref 3–16)
ANISOCYTOSIS: ABNORMAL
AST SERPL-CCNC: 47 U/L (ref 15–37)
ATYPICAL LYMPHOCYTE RELATIVE PERCENT: 3 % (ref 0–6)
BACTERIA: ABNORMAL /HPF
BANDED NEUTROPHILS RELATIVE PERCENT: 5 % (ref 0–7)
BARBITURATE SCREEN URINE: ABNORMAL
BASE EXCESS VENOUS: -12 MMOL/L (ref -3–3)
BASE EXCESS VENOUS: -23.9 MMOL/L (ref -3–3)
BASOPHILS ABSOLUTE: 0 K/UL (ref 0–0.2)
BASOPHILS ABSOLUTE: 0.3 K/UL (ref 0–0.2)
BASOPHILS RELATIVE PERCENT: 0.2 %
BASOPHILS RELATIVE PERCENT: 1 %
BENZODIAZEPINE SCREEN, URINE: POSITIVE
BILIRUB SERPL-MCNC: <0.2 MG/DL (ref 0–1)
BILIRUBIN URINE: NEGATIVE
BLOOD, URINE: ABNORMAL
BUN BLDV-MCNC: 15 MG/DL (ref 7–20)
BUN BLDV-MCNC: 18 MG/DL (ref 7–20)
CALCIUM SERPL-MCNC: 8.9 MG/DL (ref 8.3–10.6)
CALCIUM SERPL-MCNC: 9.4 MG/DL (ref 8.3–10.6)
CANNABINOID SCREEN URINE: POSITIVE
CARBOXYHEMOGLOBIN: 7.5 % (ref 0–1.5)
CARBOXYHEMOGLOBIN: 9.8 % (ref 0–1.5)
CHLORIDE BLD-SCNC: 100 MMOL/L (ref 99–110)
CHLORIDE BLD-SCNC: 106 MMOL/L (ref 99–110)
CLARITY: CLEAR
CO2: 22 MMOL/L (ref 21–32)
CO2: 8 MMOL/L (ref 21–32)
COCAINE METABOLITE SCREEN URINE: ABNORMAL
COLOR: YELLOW
CREAT SERPL-MCNC: 0.9 MG/DL (ref 0.9–1.3)
CREAT SERPL-MCNC: 0.9 MG/DL (ref 0.9–1.3)
EKG ATRIAL RATE: 63 BPM
EKG DIAGNOSIS: NORMAL
EKG P AXIS: 66 DEGREES
EKG P-R INTERVAL: 156 MS
EKG Q-T INTERVAL: 440 MS
EKG QRS DURATION: 94 MS
EKG QTC CALCULATION (BAZETT): 450 MS
EKG R AXIS: 58 DEGREES
EKG T AXIS: 83 DEGREES
EKG VENTRICULAR RATE: 63 BPM
EOSINOPHILS ABSOLUTE: 0 K/UL (ref 0–0.6)
EOSINOPHILS ABSOLUTE: 1.3 K/UL (ref 0–0.6)
EOSINOPHILS RELATIVE PERCENT: 0.1 %
EOSINOPHILS RELATIVE PERCENT: 5 %
EPITHELIAL CELLS, UA: ABNORMAL /HPF (ref 0–5)
GFR AFRICAN AMERICAN: >60
GFR AFRICAN AMERICAN: >60
GFR NON-AFRICAN AMERICAN: >60
GFR NON-AFRICAN AMERICAN: >60
GLUCOSE BLD-MCNC: 120 MG/DL (ref 70–99)
GLUCOSE BLD-MCNC: 194 MG/DL (ref 70–99)
GLUCOSE URINE: NEGATIVE MG/DL
HCO3 VENOUS: 14.9 MMOL/L (ref 23–29)
HCO3 VENOUS: 7.8 MMOL/L (ref 23–29)
HCT VFR BLD CALC: 37.8 % (ref 40.5–52.5)
HCT VFR BLD CALC: 45.6 % (ref 40.5–52.5)
HEMATOLOGY PATH CONSULT: YES
HEMOGLOBIN: 12.8 G/DL (ref 13.5–17.5)
HEMOGLOBIN: 14.3 G/DL (ref 13.5–17.5)
HYALINE CASTS: ABNORMAL /LPF (ref 0–2)
KETONES, URINE: NEGATIVE MG/DL
LACTIC ACID, SEPSIS: 18.2 MMOL/L (ref 0.4–1.9)
LACTIC ACID, SEPSIS: 4.1 MMOL/L (ref 0.4–1.9)
LEUKOCYTE ESTERASE, URINE: NEGATIVE
LIPASE: 68 U/L (ref 13–60)
LYMPHOCYTES ABSOLUTE: 13.3 K/UL (ref 1–5.1)
LYMPHOCYTES ABSOLUTE: 2 K/UL (ref 1–5.1)
LYMPHOCYTES RELATIVE PERCENT: 16.8 %
LYMPHOCYTES RELATIVE PERCENT: 49 %
Lab: ABNORMAL
MACROCYTES: ABNORMAL
MCH RBC QN AUTO: 31.3 PG (ref 26–34)
MCH RBC QN AUTO: 31.6 PG (ref 26–34)
MCHC RBC AUTO-ENTMCNC: 31.4 G/DL (ref 31–36)
MCHC RBC AUTO-ENTMCNC: 34 G/DL (ref 31–36)
MCV RBC AUTO: 100.5 FL (ref 80–100)
MCV RBC AUTO: 92.1 FL (ref 80–100)
METHADONE SCREEN, URINE: POSITIVE
METHEMOGLOBIN VENOUS: 0.3 %
METHEMOGLOBIN VENOUS: 0.3 %
MICROCYTES: ABNORMAL
MICROSCOPIC EXAMINATION: YES
MONOCYTES ABSOLUTE: 0.8 K/UL (ref 0–1.3)
MONOCYTES ABSOLUTE: 1.8 K/UL (ref 0–1.3)
MONOCYTES RELATIVE PERCENT: 6.9 %
MONOCYTES RELATIVE PERCENT: 7 %
MONONUCLEAR UNIDENTIFIED CELLS: 1 %
MUCUS: ABNORMAL /LPF
NEUTROPHILS ABSOLUTE: 8.7 K/UL (ref 1.7–7.7)
NEUTROPHILS ABSOLUTE: 8.9 K/UL (ref 1.7–7.7)
NEUTROPHILS RELATIVE PERCENT: 29 %
NEUTROPHILS RELATIVE PERCENT: 76 %
NITRITE, URINE: NEGATIVE
O2 SAT, VEN: 74 %
O2 SAT, VEN: 98 %
O2 THERAPY: ABNORMAL
O2 THERAPY: ABNORMAL
OPIATE SCREEN URINE: ABNORMAL
OTHER OBSERVATIONS UA: ABNORMAL
OXYCODONE URINE: ABNORMAL
PCO2, VEN: 37.1 MMHG (ref 40–50)
PCO2, VEN: 37.6 MMHG (ref 40–50)
PDW BLD-RTO: 13.8 % (ref 12.4–15.4)
PDW BLD-RTO: 14.7 % (ref 12.4–15.4)
PH UA: 6
PH UA: 6 (ref 5–8)
PH VENOUS: 6.94 (ref 7.35–7.45)
PH VENOUS: 7.22 (ref 7.35–7.45)
PHENCYCLIDINE SCREEN URINE: ABNORMAL
PLATELET # BLD: 162 K/UL (ref 135–450)
PLATELET # BLD: 286 K/UL (ref 135–450)
PLATELET SLIDE REVIEW: ADEQUATE
PMV BLD AUTO: 7 FL (ref 5–10.5)
PMV BLD AUTO: 7.5 FL (ref 5–10.5)
PO2, VEN: 152.1 MMHG (ref 25–40)
PO2, VEN: 45.7 MMHG (ref 25–40)
POLYCHROMASIA: ABNORMAL
POTASSIUM REFLEX MAGNESIUM: 3.9 MMOL/L (ref 3.5–5.1)
POTASSIUM REFLEX MAGNESIUM: 4.3 MMOL/L (ref 3.5–5.1)
PROCALCITONIN: 0.32 NG/ML (ref 0–0.15)
PROPOXYPHENE SCREEN: ABNORMAL
PROTEIN UA: ABNORMAL MG/DL
RBC # BLD: 4.1 M/UL (ref 4.2–5.9)
RBC # BLD: 4.54 M/UL (ref 4.2–5.9)
RBC UA: ABNORMAL /HPF (ref 0–4)
RENAL EPITHELIAL, UA: ABNORMAL /HPF (ref 0–1)
SLIDE REVIEW: ABNORMAL
SODIUM BLD-SCNC: 138 MMOL/L (ref 136–145)
SODIUM BLD-SCNC: 140 MMOL/L (ref 136–145)
SPECIFIC GRAVITY UA: >=1.03 (ref 1–1.03)
TCO2 CALC VENOUS: 16 MMOL/L
TCO2 CALC VENOUS: 9 MMOL/L
TOTAL PROTEIN: 7.9 G/DL (ref 6.4–8.2)
TROPONIN: <0.01 NG/ML
URINE TYPE: ABNORMAL
UROBILINOGEN, URINE: 0.2 E.U./DL
WBC # BLD: 11.6 K/UL (ref 4–11)
WBC # BLD: 25.5 K/UL (ref 4–11)
WBC UA: ABNORMAL /HPF (ref 0–5)

## 2021-12-05 PROCEDURE — 80048 BASIC METABOLIC PNL TOTAL CA: CPT

## 2021-12-05 PROCEDURE — 94761 N-INVAS EAR/PLS OXIMETRY MLT: CPT

## 2021-12-05 PROCEDURE — 6360000002 HC RX W HCPCS: Performed by: EMERGENCY MEDICINE

## 2021-12-05 PROCEDURE — 93010 ELECTROCARDIOGRAM REPORT: CPT | Performed by: INTERNAL MEDICINE

## 2021-12-05 PROCEDURE — 2500000003 HC RX 250 WO HCPCS: Performed by: EMERGENCY MEDICINE

## 2021-12-05 PROCEDURE — 2580000003 HC RX 258: Performed by: INTERNAL MEDICINE

## 2021-12-05 PROCEDURE — 2060000000 HC ICU INTERMEDIATE R&B

## 2021-12-05 PROCEDURE — 36415 COLL VENOUS BLD VENIPUNCTURE: CPT

## 2021-12-05 PROCEDURE — 6360000002 HC RX W HCPCS

## 2021-12-05 PROCEDURE — 80307 DRUG TEST PRSMV CHEM ANLYZR: CPT

## 2021-12-05 PROCEDURE — 84145 PROCALCITONIN (PCT): CPT

## 2021-12-05 PROCEDURE — 2580000003 HC RX 258: Performed by: EMERGENCY MEDICINE

## 2021-12-05 PROCEDURE — 6360000002 HC RX W HCPCS: Performed by: INTERNAL MEDICINE

## 2021-12-05 PROCEDURE — 81001 URINALYSIS AUTO W/SCOPE: CPT

## 2021-12-05 PROCEDURE — 2700000000 HC OXYGEN THERAPY PER DAY

## 2021-12-05 PROCEDURE — 85025 COMPLETE CBC W/AUTO DIFF WBC: CPT

## 2021-12-05 PROCEDURE — 82803 BLOOD GASES ANY COMBINATION: CPT

## 2021-12-05 PROCEDURE — 6370000000 HC RX 637 (ALT 250 FOR IP): Performed by: INTERNAL MEDICINE

## 2021-12-05 PROCEDURE — 71045 X-RAY EXAM CHEST 1 VIEW: CPT

## 2021-12-05 PROCEDURE — 83605 ASSAY OF LACTIC ACID: CPT

## 2021-12-05 RX ORDER — CARVEDILOL 3.12 MG/1
3.12 TABLET ORAL 2 TIMES DAILY WITH MEALS
Status: DISCONTINUED | OUTPATIENT
Start: 2021-12-05 | End: 2021-12-07 | Stop reason: HOSPADM

## 2021-12-05 RX ORDER — LORAZEPAM 2 MG/ML
2 INJECTION INTRAMUSCULAR ONCE
Status: COMPLETED | OUTPATIENT
Start: 2021-12-05 | End: 2021-12-05

## 2021-12-05 RX ORDER — METHADONE HYDROCHLORIDE 10 MG/1
95 TABLET ORAL DAILY
Status: DISCONTINUED | OUTPATIENT
Start: 2021-12-05 | End: 2021-12-07 | Stop reason: HOSPADM

## 2021-12-05 RX ORDER — SODIUM CHLORIDE 0.9 % (FLUSH) 0.9 %
5-40 SYRINGE (ML) INJECTION PRN
Status: DISCONTINUED | OUTPATIENT
Start: 2021-12-05 | End: 2021-12-07 | Stop reason: HOSPADM

## 2021-12-05 RX ORDER — SODIUM CHLORIDE 0.9 % (FLUSH) 0.9 %
5-40 SYRINGE (ML) INJECTION EVERY 12 HOURS SCHEDULED
Status: DISCONTINUED | OUTPATIENT
Start: 2021-12-05 | End: 2021-12-07 | Stop reason: HOSPADM

## 2021-12-05 RX ORDER — ONDANSETRON 2 MG/ML
4 INJECTION INTRAMUSCULAR; INTRAVENOUS EVERY 6 HOURS PRN
Status: DISCONTINUED | OUTPATIENT
Start: 2021-12-05 | End: 2021-12-07 | Stop reason: HOSPADM

## 2021-12-05 RX ORDER — SODIUM CHLORIDE 9 MG/ML
25 INJECTION, SOLUTION INTRAVENOUS PRN
Status: DISCONTINUED | OUTPATIENT
Start: 2021-12-05 | End: 2021-12-07 | Stop reason: HOSPADM

## 2021-12-05 RX ORDER — SODIUM CHLORIDE 9 MG/ML
INJECTION, SOLUTION INTRAVENOUS CONTINUOUS
Status: DISCONTINUED | OUTPATIENT
Start: 2021-12-05 | End: 2021-12-05

## 2021-12-05 RX ORDER — DEXMEDETOMIDINE HYDROCHLORIDE 4 UG/ML
.2-1.4 INJECTION, SOLUTION INTRAVENOUS CONTINUOUS
Status: DISCONTINUED | OUTPATIENT
Start: 2021-12-05 | End: 2021-12-07 | Stop reason: HOSPADM

## 2021-12-05 RX ORDER — NICOTINE 21 MG/24HR
1 PATCH, TRANSDERMAL 24 HOURS TRANSDERMAL DAILY
Status: DISCONTINUED | OUTPATIENT
Start: 2021-12-05 | End: 2021-12-07 | Stop reason: HOSPADM

## 2021-12-05 RX ORDER — SODIUM CHLORIDE, SODIUM LACTATE, POTASSIUM CHLORIDE, AND CALCIUM CHLORIDE .6; .31; .03; .02 G/100ML; G/100ML; G/100ML; G/100ML
1000 INJECTION, SOLUTION INTRAVENOUS ONCE
Status: COMPLETED | OUTPATIENT
Start: 2021-12-05 | End: 2021-12-05

## 2021-12-05 RX ORDER — ACETAMINOPHEN 325 MG/1
650 TABLET ORAL EVERY 6 HOURS PRN
Status: DISCONTINUED | OUTPATIENT
Start: 2021-12-05 | End: 2021-12-07 | Stop reason: HOSPADM

## 2021-12-05 RX ORDER — POLYETHYLENE GLYCOL 3350 17 G/17G
17 POWDER, FOR SOLUTION ORAL DAILY PRN
Status: DISCONTINUED | OUTPATIENT
Start: 2021-12-05 | End: 2021-12-07 | Stop reason: HOSPADM

## 2021-12-05 RX ORDER — ONDANSETRON 4 MG/1
4 TABLET, ORALLY DISINTEGRATING ORAL EVERY 8 HOURS PRN
Status: DISCONTINUED | OUTPATIENT
Start: 2021-12-05 | End: 2021-12-07 | Stop reason: HOSPADM

## 2021-12-05 RX ORDER — ASPIRIN 81 MG/1
81 TABLET, CHEWABLE ORAL
Status: DISCONTINUED | OUTPATIENT
Start: 2021-12-05 | End: 2021-12-07 | Stop reason: HOSPADM

## 2021-12-05 RX ORDER — ACETAMINOPHEN 650 MG/1
650 SUPPOSITORY RECTAL EVERY 6 HOURS PRN
Status: DISCONTINUED | OUTPATIENT
Start: 2021-12-05 | End: 2021-12-07 | Stop reason: HOSPADM

## 2021-12-05 RX ADMIN — SODIUM CHLORIDE 59 MCG: 9 INJECTION, SOLUTION INTRAVENOUS at 00:27

## 2021-12-05 RX ADMIN — METHADONE HYDROCHLORIDE 95 MG: 10 TABLET ORAL at 11:33

## 2021-12-05 RX ADMIN — SODIUM BICARBONATE: 84 INJECTION, SOLUTION INTRAVENOUS at 01:50

## 2021-12-05 RX ADMIN — ASPIRIN 81 MG: 81 TABLET, CHEWABLE ORAL at 11:34

## 2021-12-05 RX ADMIN — LORAZEPAM 2 MG: 2 INJECTION INTRAMUSCULAR at 00:15

## 2021-12-05 RX ADMIN — CARVEDILOL 3.12 MG: 3.12 TABLET, FILM COATED ORAL at 11:34

## 2021-12-05 RX ADMIN — LORAZEPAM 2 MG: 2 INJECTION INTRAMUSCULAR; INTRAVENOUS at 00:22

## 2021-12-05 RX ADMIN — Medication 10 ML: at 21:15

## 2021-12-05 RX ADMIN — Medication 0.2 MCG/KG/HR: at 00:27

## 2021-12-05 RX ADMIN — SODIUM CHLORIDE: 9 INJECTION, SOLUTION INTRAVENOUS at 05:06

## 2021-12-05 RX ADMIN — LORAZEPAM 2 MG: 2 INJECTION INTRAMUSCULAR; INTRAVENOUS at 00:15

## 2021-12-05 RX ADMIN — SODIUM CHLORIDE 1500 MG: 900 INJECTION INTRAVENOUS at 17:56

## 2021-12-05 RX ADMIN — LORAZEPAM 2 MG: 2 INJECTION INTRAMUSCULAR; INTRAVENOUS at 00:16

## 2021-12-05 RX ADMIN — SODIUM CHLORIDE 1500 MG: 900 INJECTION INTRAVENOUS at 23:30

## 2021-12-05 RX ADMIN — LORAZEPAM 2 MG: 2 INJECTION INTRAMUSCULAR at 00:16

## 2021-12-05 RX ADMIN — LEVETIRACETAM 1500 MG: 100 INJECTION, SOLUTION INTRAVENOUS at 00:33

## 2021-12-05 RX ADMIN — CARVEDILOL 3.12 MG: 3.12 TABLET, FILM COATED ORAL at 17:50

## 2021-12-05 RX ADMIN — SACUBITRIL AND VALSARTAN 1 TABLET: 49; 51 TABLET, FILM COATED ORAL at 21:15

## 2021-12-05 RX ADMIN — LORAZEPAM 2 MG: 2 INJECTION INTRAMUSCULAR; INTRAVENOUS at 00:21

## 2021-12-05 RX ADMIN — SODIUM CHLORIDE, POTASSIUM CHLORIDE, SODIUM LACTATE AND CALCIUM CHLORIDE 1000 ML: 600; 310; 30; 20 INJECTION, SOLUTION INTRAVENOUS at 03:28

## 2021-12-05 ASSESSMENT — PAIN SCALES - GENERAL
PAINLEVEL_OUTOF10: 0
PAINLEVEL_OUTOF10: 10
PAINLEVEL_OUTOF10: 0
PAINLEVEL_OUTOF10: 9

## 2021-12-05 NOTE — PROGRESS NOTES
Patient unable to wean off oxygen. Congested breath sounds. Cough. New R-sided infiltrate on CXR. Procalcitonin mildly elevated. Started unasyn for aspiration pneumonia with acute hypoxic respiratory failure. Plan to discharge with PO augmentin.

## 2021-12-05 NOTE — ED NOTES
Bed: 14  Expected date:   Expected time:   Means of arrival:   Comments:  Felicitas Li RN  12/04/21 2127

## 2021-12-05 NOTE — H&P
Hospital Medicine History & Physical      PCP: No primary care provider on file. Date of Admission: 12/4/2021    Date of Service: Pt seen/examined on 12/5/2021 and Admitted to Inpatient with expected LOS greater than two midnights due to medical therapy. Chief Complaint: Seizure      History Of Present Illness:    32 y.o. male who presented to [facility] with above complaints  Patient with PMH of medical refractory epilepsy, status epilepticus, polysubstance abuse, Takotsubo cardiomyopathy presented to the ER today with complaints of seizure. Patient's mother present at bedside provides some of the history. She reports patient and his girlfriend were driving back home in a car from shopping, when he suddenly reported to his girlfriend that he was feeling weird and about to have a seizure and went on to have a generalized tonic-clonic seizure. Patient's mother reports his last seizure was a partial seizure that he had about 2 weeks ago. Looks like patient was here in the ER on 10/24 with seizure as well. He is maintained on Oxtellar XR. Sees neurology in . Upon arrival to the ED patient was encephalopathic and confused, agitated requiring repeated doses of IV Ativan, and subsequently started on Precedex gtt. he did not have any obvious seizure-like activity in the ED. patient technically did not return to baseline in the ER, continues to be sedated now on the Precedex gtt. his urine drug screen was positive for benzos, cannabinoids and methadone. Patient's mother reports that he does not use any drugs, he is on methadone therapy.      Past Medical History:          Diagnosis Date    [facility] ([facility] [address]     Hepatitis C     HTN (hypertension)     NSTEMI (non-ST elevated myocardial infarction) ([facility] [address]     Polysubstance abuse (HCC)     Seizures (HCC)     Septic shock (HCC)     Takotsubo cardiomyopathy        Past Surgical History:          Procedure Laterality Date    BRONCHOSCOPY N/A 2/22/2021    BRONCHOSCOPY DIAGNOSTIC OR CELL [address] ONLY performed by Kaylee Agosto MD at [address] [place] [facility] [facility]  2/22/2021    BRONCHOSCOPY THERAPUTIC ASPIRATION INITIAL performed by Kaylee Agosto MD at [address] [place] [address] HAND FINGERS INJURY, PARTIAL AMPUTATIONS       Medications Prior to Admission:      Prior to Admission medications    Medication Sig Start Date End Date Taking? Authorizing Provider   Cenobamate (XCOPRI PO) Take by mouth    Historical Provider, MD   OXcarbazepine ER (OXTELLAR XR) 600 MG TB24 Take 1,800 mg by mouth daily 5/13/21   Historical Provider, MD   carvedilol (COREG) 3.125 MG tablet Take 1 tablet by mouth 2 times daily (with meals) 3/29/21   LEIGHTON Cates CNP   furosemide (LASIX) 20 MG tablet Take 1 tablet by mouth daily as needed (for weight gain 3lbs in a day or 5lb in a week with leg swelling.) 3/11/21   LEIGHTON Cates CNP   sacubitril-valsartan (ENTRESTO) 49-51 MG per tablet Take 1 tablet by mouth 2 times daily 3/11/21   LEIGHTON Cates CNP   methadone (DOLOPHINE) 10 MG/ML solution Take 75 mg by mouth daily. Historical Provider, MD   aspirin 81 MG chewable tablet Take 81 mg by mouth daily (with breakfast) 1/21/20   Historical Provider, MD       Allergies:  Patient has no known allergies. Social History:      The patient currently lives at home    TOBACCO:   reports that he has been smoking cigarettes. He has been smoking about 2.00 packs per day. He has never used smokeless tobacco.  ETOH:   reports no history of alcohol use. E-Cigarettes/Vaping Use     Questions Responses    E-Cigarette/Vaping Use Never User    Start Date     Passive Exposure     Quit Date     Counseling Given     Comments             Family History:  Reviewed in detail and negative for DM, CAD, Cancer, CVA.     REVIEW OF SYSTEMS COMPLETED:   Unable to obtain due to patient's mental status    PHYSICAL EXAM PERFORMED:    BP 90/63 Pulse 77   Temp 97.6 °F (36.4 °C) (Axillary)   Resp 28   Ht 5' 11\" (1.803 m)   Wt 130 lb (59 kg)   SpO2 97%   BMI 18.13 kg/m²     General appearance: Sedated, no apparent distress, appears stated age and cooperative. HEENT:  Normal cephalic, atraumatic without obvious deformity. Pupils equal, round, and reactive to light. Conjunctivae/corneas clear. Neck: Supple, with full range of motion. No jugular venous distention. Trachea midline. Respiratory:  Normal respiratory effort. Clear to auscultation, bilaterally without Rales/Wheezes/Rhonchi. Cardiovascular:  Regular rate and rhythm with normal S1/S2 without murmurs, rubs or gallops. Abdomen: Soft, non-tender, non-distended with normal bowel sounds. Musculoskeletal:  No clubbing, cyanosis or edema bilaterally. Skin: Skin color, texture, turgor normal.  No rashes or lesions. Neurologic: Sedated  Psychiatric: Unable to assess  Capillary Refill: Brisk,3 seconds, normal  Peripheral Pulses: +2 palpable, equal bilaterally       Labs:     Recent Labs     12/04/21 2254   WBC 25.5*   HGB 14.3   HCT 45.6        Recent Labs     12/04/21  2254      K 3.9      CO2 8*   BUN 15   CREATININE 0.9   CALCIUM 9.4     Recent Labs     12/04/21 2254   AST 47*   ALT 61*   BILITOT <0.2   ALKPHOS 134*     No results for input(s): INR in the last 72 hours. Recent Labs     12/04/21 2254   TROPONINI <0.01       Urinalysis:      Lab Results   Component Value Date    NITRU Negative 12/05/2021    WBCUA 0-2 12/05/2021    BACTERIA Rare 12/05/2021    RBCUA 0-2 12/05/2021    BLOODU SMALL 12/05/2021    SPECGRAV >=1.030 12/05/2021    GLUCOSEU Negative 12/05/2021       Radiology:     I personally reviewed the CXR and CT head and also reviewed the radiologist reports    EKG:  I have reviewed the EKG with the following interpretation: NSR, no acute ischemic changes, normal intervals    XR CHEST PORTABLE   Final Result   Technically limited portable radiograph.   No focal consolidative change. Questionable mild vascular congestion or edema. CT Head WO Contrast   Final Result   No acute intracranial abnormality. ASSESSMENT:PLAN:    Acute encephalopathy   Likely due to seizure and prolonged postictal state , and acute toxic metabolic encephalopathy due to drugs with UDS positive for methadone, benzos and cannabinoids.  -Currently sedated on Precedex gtt maintaining spontaneous respirations  -Consult neurology  -EEG  -CT head reviewed, no acute abnormality  -Correct underlying metabolic derangements  -IV Keppra    Breakthrough seizure  Medical refractory epilepsy  Hx of TBI, Right temporal seizures since 2019  -Sedated on Precedex  -IV Keppra twice daily  -Neurology consult, EEG  -No recurrent/ongoing seizure-like activity, low clinical suspicion for status epilepticus    High anion gap metabolic acidosis  2/2 lactic acidosis from seizure  -Lactic improving with IV fluids, continue MIVF with bicarb drip  -Recheck BMP in a.m.    ? Polysubstance abuse  -Patient mother endorsed patient does not do any drugs. Not sure of the urine tox screen is showing benzos from IV Ativan that he received in the ED. patient is already on prescribed methadone. Hx of Takotsubo cardiomyopathy-resolved  Repeat echo from 7/20/2021 showed normal LVEF 55%  -Followed by [facility] cardiology  -On Coreg/Entresto currently on hold due to low BP  -Hold diuretics    Hx of IVDU -maintained on methadone    DVT Prophylaxis: Lovenox  Diet: No diet orders on file  Code Status: Full code  PT/OT Eval Status: Not consulted    Dispo -IP stay, admit to ICU    Total critical care time caring for this patient with life threatening, unstable organ failure, including direct patient contact, management of life support systems, review of data including imaging and labs, discussions with other team members and physicians at least 35 min so far today, excluding procedures.          Maday Brambila MD    Thank you No primary care provider on file. for the opportunity to be involved in this patient's care. If you have any questions or concerns please feel free to contact me at [phone].

## 2021-12-05 NOTE — ED PROVIDER NOTES
CHIEF COMPLAINT  Seizures (Pt found by family having multiple episodes of convulsions, called EMS, pt given 5 versed and stopped seizing, per EMS pt went into v bigeminy for a brief moment then back to NSR, pt is post ictal unresponsive to pain at triage. )      HISTORY OF PRESENT ILLNESS  Luan Brittle is a 32 y.o. male with a history of hypertension, polysubstance abuse, seizure disorder on oxcarbazepine who presents emerge department for evaluation of altered mental status, seizures. According to EMS, patient had one seizure prior to them being called. They gave the patient 5 mg of Versed and this stopped the seizure. He has been postictal since that time. Patient is not able to contribute to history at this time secondary to altered mental status. Kalyan Rush to family, he has been compliant with his seizure medications. They are not aware of illicit substance abuse. Silvana Lugo Past Medical History:   Diagnosis Date    [facility]     Hepatitis C     HTN (hypertension)     NSTEMI (non-ST elevated myocardial infarction) ([facility]     Polysubstance abuse (HCC)     Seizures (HCC)     Septic shock (HCC)     Takotsubo cardiomyopathy      Past Surgical History:   Procedure Laterality Date    BRONCHOSCOPY N/A 2/22/2021    BRONCHOSCOPY DIAGNOSTIC OR CELL [address] ONLY performed by Rachel Jackson MD at [address] [place] [address]  2/22/2021    BRONCHOSCOPY THERAPUTIC ASPIRATION INITIAL performed by Rachel Jackson MD at [place] [address], PARTIAL AMPUTATIONS     History reviewed. No pertinent family history.   Social History     Socioeconomic History    Marital status: Single     Spouse name: Not on file    Number of children: Not on file    Years of education: Not on file    Highest education level: Not on file   Occupational History    Not on file   Tobacco Use    Smoking status: Current Every Day Smoker     Packs/day: 2.00     Types: Cigarettes    Smokeless tobacco: Never Used   Vaping Use    Vaping Use: Never used   Substance and Sexual Activity    Alcohol use: No    Drug use: Yes     Types: IV, Opiates , Marijuana (Weed), Methamphetamines (Crystal Meth)     Comment: prior heroin user; last use 4 years ago per mom as of 9/23/20,. Medical marijuana, 05/20/21- pt used since last visit meth, fentanyl, marijuana    Sexual activity: Yes     Partners: Female   Other Topics Concern    Not on file   Social History Narrative    Not on file     Social Determinants of Health     Financial Resource Strain:     Difficulty of Paying Living Expenses: Not on file   Food Insecurity:     Worried About Running Out of Food in the Last Year: Not on file    Christin of Food in the Last Year: Not on file   Transportation Needs:     Lack of Transportation (Medical): Not on file    Lack of Transportation (Non-Medical):  Not on file   Physical Activity:     Days of Exercise per Week: Not on file    Minutes of Exercise per Session: Not on file   Stress:     Feeling of Stress : Not on file   Social Connections:     Frequency of Communication with Friends and Family: Not on file    Frequency of Social Gatherings with Friends and Family: Not on file    Attends Shinto Services: Not on file    Active Member of [address] [place] [address] [employer] or Organizations: Not on file    Attends Club or Organization Meetings: Not on file    Marital Status: Not on file   Intimate Partner Violence:     Fear of Current or Ex-Partner: Not on file    Emotionally Abused: Not on file    Physically Abused: Not on file    Sexually Abused: Not on file   Housing Stability:     Unable to Pay for Housing in the Last Year: Not on file    Number of [place] in the Last Year: Not on file    Unstable Housing in the Last Year: Not on file     Current Facility-Administered Medications   Medication Dose Route Frequency Provider Last Rate Last Admin    dexmedetomidine (PRECEDEX) 400 mcg in sodium chloride 0.9 % 100 mL infusion  0.2-1.4 mcg/kg/hr IntraVENous Continuous Bev Diaz MD 3 mL/hr at 12/05/21 0027 0.203 mcg/kg/hr at 12/05/21 0027    levETIRAcetam (KEPPRA) 1,500 mg in sodium chloride 0.9 % 100 mL IVPB  1,500 mg IntraVENous Q12H Bev Diaz MD   Stopped at 12/05/21 0121    lactated ringers bolus  1,000 mL IntraVENous Once Lucia Aguilar MD 1,000 mL/hr at 12/05/21 0328 1,000 mL at 12/05/21 0328    sodium bicarbonate 50 mEq in dextrose 5 % 1,000 mL infusion   IntraVENous Continuous Bev Diaz MD 50 mL/hr at 12/05/21 0150 New Bag at 12/05/21 0150    sodium chloride flush 0.9 % injection 5-40 mL  5-40 mL IntraVENous 2 times per day Bev Diaz MD        sodium chloride flush 0.9 % injection 5-40 mL  5-40 mL IntraVENous PRN Bev Diaz MD        0.9 % sodium chloride infusion  25 mL IntraVENous PRN Bev Diaz MD        enoxaparin (LOVENOX) injection 40 mg  40 mg SubCUTAneous Daily Bev Diaz MD        ondansetron (ZOFRAN-ODT) disintegrating tablet 4 mg  4 mg Oral Q8H PRN Bev Diaz MD        Or    ondansetron (ZOFRAN) injection 4 mg  4 mg IntraVENous Q6H PRN Bev Diaz MD        polyethylene glycol (GLYCOLAX) packet 17 g  17 g Oral Daily PRN Bev Diaz MD        acetaminophen (TYLENOL) tablet 650 mg  650 mg Oral Q6H PRN Bev Diaz MD        Or   [facility] acetaminophen (TYLENOL) suppository 650 mg  650 mg Rectal Q6H PRN Bev Diaz MD        0.9 % sodium chloride infusion   IntraVENous Continuous Bev Diaz MD         Current Outpatient Medications   Medication Sig Dispense Refill    Cenobamate (XCOPRI PO) Take by mouth      OXcarbazepine ER (OXTELLAR XR) 600 MG TB24 Take 1,800 mg by mouth daily      carvedilol (COREG) 3.125 MG tablet Take 1 tablet by mouth 2 times daily (with meals) 60 tablet 1    furosemide (LASIX) 20 MG tablet Take 1 tablet by mouth daily as needed (for weight gain 3lbs in a day or 5lb in a week with leg swelling.) 30 tablet 5    sacubitril-valsartan (ENTRESTO) 49-51 MG per tablet Take 1 tablet by mouth 2 times daily 60 tablet 2    methadone (DOLOPHINE) 10 MG/ML solution Take 75 mg by mouth daily.  aspirin 81 MG chewable tablet Take 81 mg by mouth daily (with breakfast)       No Known Allergies    REVIEW OF SYSTEMS  Unable to be obtained secondary to altered mental status    PHYSICAL EXAM  /78   Pulse 65   Temp 97.6 °F (36.4 °C) (Axillary)   Resp 14   Ht 5' 11\" (1.803 m)   Wt 130 lb (59 kg)   SpO2 93%   BMI 18.13 kg/m²   GENERAL APPEARANCE: Sedated, moving spontaneously  HEAD: Normocephalic. Atraumatic. NECK: Supple, trachea midline. No significant lymphadenopathy  HEART: RRR. No harsh murmurs. Intact radial pulses 2+ bilaterally. LUNGS: Respirations unlabored without accessory muscle use. Speaking comfortably in full sentences. ABDOMEN: Soft. Non-distended. Non-tender. No guarding or rebound. EXTREMITIES: No peripheral edema. No acute deformities. SKIN: Warm and dry. No acute rashes. NEUROLOGICAL: Agitated, moving all 4 extremities, no focal deficit    LABS  I have reviewed all labs for this visit.    Results for orders placed or performed during the hospital encounter of 12/04/21   CBC Auto Differential   Result Value Ref Range    WBC 25.5 (H) 4.0 - 11.0 K/uL    RBC 4.54 4.20 - 5.90 M/uL    Hemoglobin 14.3 13.5 - 17.5 g/dL    Hematocrit 45.6 40.5 - 52.5 %    .5 (H) 80.0 - 100.0 fL    MCH 31.6 26.0 - 34.0 pg    MCHC 31.4 31.0 - 36.0 g/dL    RDW 14.7 12.4 - 15.4 %    Platelets 092 595 - 914 K/uL    MPV 7.5 5.0 - 10.5 fL    PLATELET SLIDE REVIEW Adequate     SLIDE REVIEW see below     Path Consult Flagged (AA)     Neutrophils % 29.0 %    Lymphocytes % 49.0 %    Monocytes % 7.0 %    Eosinophils % 5.0 %    Basophils % 1.0 %    Neutrophils Absolute 8.7 (H) 1.7 - 7.7 K/uL    Lymphocytes Absolute 13.3 (H) 1.0 - 5.1 K/uL    Monocytes Absolute 1.8 (H) 0.0 - 1.3 K/uL    Eosinophils Absolute 1.3 (H) 0.0 - 0.6 K/uL    Basophils Absolute 0.3 (H) 0.0 - 0.2 K/uL    Bands Relative 5 0 - 7 %    Atypical Lymphocytes Relative 3 0 - 6 %    Unid. Mononu 1 (A) %    Anisocytosis Occasional (A)     Macrocytes Occasional (A)     Microcytes Occasional (A)     Polychromasia Occasional (A)    Comprehensive Metabolic Panel w/ Reflex to MG   Result Value Ref Range    Sodium 140 136 - 145 mmol/L    Potassium reflex Magnesium 3.9 3.5 - 5.1 mmol/L    Chloride 100 99 - 110 mmol/L    CO2 8 (LL) 21 - 32 mmol/L    Anion Gap 32 (H) 3 - 16    Glucose 194 (H) 70 - 99 mg/dL    BUN 15 7 - 20 mg/dL    CREATININE 0.9 0.9 - 1.3 mg/dL    GFR Non-African American >60 >60    GFR African American >60 >60    Calcium 9.4 8.3 - 10.6 mg/dL    Total Protein 7.9 6.4 - 8.2 g/dL    Albumin 4.7 3.4 - 5.0 g/dL    Albumin/Globulin Ratio 1.5 1.1 - 2.2    Total Bilirubin <0.2 0.0 - 1.0 mg/dL    Alkaline Phosphatase 134 (H) 40 - 129 U/L    ALT 61 (H) 10 - 40 U/L    AST 47 (H) 15 - 37 U/L   Lipase   Result Value Ref Range    Lipase 68.0 (H) 13.0 - 60.0 U/L   Troponin   Result Value Ref Range    Troponin <0.01 <0.01 ng/mL   Urinalysis, reflex to microscopic   Result Value Ref Range    Color, UA Yellow Straw/Yellow    Clarity, UA Clear Clear    Glucose, Ur Negative Negative mg/dL    Bilirubin Urine Negative Negative    Ketones, Urine Negative Negative mg/dL    Specific Gravity, UA >=1.030 1.005 - 1.030    Blood, Urine SMALL (A) Negative    pH, UA 6.0 5.0 - 8.0    Protein, UA TRACE (A) Negative mg/dL    Urobilinogen, Urine 0.2 <2.0 E.U./dL    Nitrite, Urine Negative Negative    Leukocyte Esterase, Urine Negative Negative    Microscopic Examination YES     Urine Type NotGiven    Lactate, Sepsis   Result Value Ref Range    Lactic Acid, Sepsis 18.2 (HH) 0.4 - 1.9 mmol/L   Lactate, Sepsis   Result Value Ref Range    Lactic Acid, Sepsis 4.1 (HH) 0.4 - 1.9 mmol/L   Blood Gas, Venous   Result Value Ref Range Result Value Ref Range    Ventricular Rate 63 BPM    Atrial Rate 63 BPM    P-R Interval 156 ms    QRS Duration 94 ms    Q-T Interval 440 ms    QTc Calculation (Bazett) 450 ms    P Axis 66 degrees    R Axis 58 degrees    T Axis 83 degrees    Diagnosis       Normal sinus rhythm with sinus arrhythmiaNonspecific ST and T wave abnormalityAbnormal ECGWhen compared with ECG of 24-FEB-2021 07:36,ST now depressed in Lateral leadsT wave inversion no longer evident in Inferior leadsT wave inversion no longer evident in Anterior leadsQT has shortened       EKG  The Ekg interpreted by myself in the emergency department in the absence of a cardiologist.  normal sinus rhythm with a rate of 63  Axis is   Normal  QTc is  within an acceptable range  Intervals and Durations are unremarkable. No specific ST-T wave changes appreciated. T wave inversion lead aVL  No evidence of acute ischemia. When compared to prior EKG dated February 21, 2021, T wave inversions have been resolved      RADIOLOGY  X-RAYS:  I have reviewed radiologic plain film image(s). ALL OTHER NON-PLAIN FILM IMAGES SUCH AS CT, ULTRASOUND AND MRI HAVE BEEN READ BY THE RADIOLOGIST. XR CHEST PORTABLE   Final Result   Technically limited portable radiograph. No focal consolidative change. Questionable mild vascular congestion or edema. CT Head WO Contrast   Final Result   No acute intracranial abnormality. Critical Care: Total critical care time is 80 minutes, which excludes separately billable procedures and updating family. Time spent is specifically for management of the presenting complaint and symptoms initially, direct bedside care, reevaluation, review of records, and consultation. There was a high probability of clinically significant life-threatening deterioration in the patient's condition, which required my urgent intervention.        ED COURSE/MDM  79-year-old male presenting for evaluation of altered mental status, seizure with known seizure disorder. He arrives altered after receiving Versed. He has not able to contribute to history. He is noted to be tachycardic into the 120s. He has not demonstrated any obvious seizure activity. He has been intermittently agitated requiring 2 mg of Versed. He has had multiple bouts of agitation and he is told approximately 10 mg of Versed. Because of his continued agitation, concern for injury to self and others, patient was initiated on Precedex infusion for control of his agitation. He has been loaded on Keppra as per chart review, he has previously been on Keppra and his seizure disorder has responded to this. Laboratory evaluation was performed including toxicology screen, CT head. He has significant lactic acidosis of 18. He was found to be in metabolic acidosis with a pH of 6.9. This is likely because of lactic acidosis related to seizure activity. Also has an anion gap of 32 which is likely secondary to lactic acidosis. White blood cell count is 25,000 likely secondary to de marginalization. He is not febrile and I am not concerned for meningitis. CT head without acute process. Patient had similar presentation earlier this year. He will be admitted to the ICU for continued management of seizure, altered mental status. Patient's family was updated at bedside. CLINICAL IMPRESSION  1. Seizure ([address]    2. Seizure-like activity ([address]    3. Lactic acidosis    4. Altered mental status, unspecified altered mental status type        Blood pressure 111/78, pulse 65, temperature 97.6 °F (36.4 °C), temperature source Axillary, resp. rate 14, height 5' 11\" (1.803 m), weight 130 lb (59 kg), SpO2 93 %.     Bhavani Gates was admitted in guarded condition    This chart was generated in part by using Dragon Dictation system and may contain errors related to that system including

## 2021-12-05 NOTE — ED NOTES
Pt woke angry and argumentative, wanted to leave. The patient was fighting against the restraints and saying he wanted to go smoke.   Paged hospitalist for an eval of competency but pt calmed down and fell back asleep      Demetris Leal RN  12/05/21 [id number]

## 2021-12-05 NOTE — PROGRESS NOTES
Patient transfer from ICU, A/O VSS no seizure activity noted. Patient refused Taya Lester stating that it makes him aggressive. Family to bring in seizure medication from home once it is filled. Patient refused skin assessment.

## 2021-12-05 NOTE — ED NOTES
Patient began to wake up again, fighting staff and unable to redirect. Dr. Ana Hilton at bedside. 2mg ativan ordered.      Isma Piedra RN  12/05/21 7700

## 2021-12-05 NOTE — PROGRESS NOTES
Admitted by my partner a few hours ago. Patient remains on precedex but his mental state has greatly improved. Nursing will try to wean off sedation this AM.  I cannot discharge him until neurology rounds, which might be tomorrow. Transfer to med/surg ordered. He can go once he has been doing well off the precedex for an hour or so.

## 2021-12-05 NOTE — ED NOTES
Pt woke again while writer administering IV bolus dose Precedex. Pt now sleeping after administration.   Keppra going as well/       Ney Valladares RN  12/05/21 2360

## 2021-12-06 LAB
ANION GAP SERPL CALCULATED.3IONS-SCNC: 8 MMOL/L (ref 3–16)
BASOPHILS ABSOLUTE: 0.1 K/UL (ref 0–0.2)
BASOPHILS RELATIVE PERCENT: 0.7 %
BUN BLDV-MCNC: 16 MG/DL (ref 7–20)
CALCIUM SERPL-MCNC: 8.7 MG/DL (ref 8.3–10.6)
CHLORIDE BLD-SCNC: 105 MMOL/L (ref 99–110)
CO2: 26 MMOL/L (ref 21–32)
CREAT SERPL-MCNC: 0.9 MG/DL (ref 0.9–1.3)
EOSINOPHILS ABSOLUTE: 0.1 K/UL (ref 0–0.6)
EOSINOPHILS RELATIVE PERCENT: 0.6 %
GFR AFRICAN AMERICAN: >60
GFR NON-AFRICAN AMERICAN: >60
GLUCOSE BLD-MCNC: 115 MG/DL (ref 70–99)
HCT VFR BLD CALC: 37.7 % (ref 40.5–52.5)
HEMATOLOGY PATH CONSULT: NORMAL
HEMOGLOBIN: 12.8 G/DL (ref 13.5–17.5)
LACTIC ACID: 0.8 MMOL/L (ref 0.4–2)
LYMPHOCYTES ABSOLUTE: 2.4 K/UL (ref 1–5.1)
LYMPHOCYTES RELATIVE PERCENT: 18.4 %
MCH RBC QN AUTO: 30.7 PG (ref 26–34)
MCHC RBC AUTO-ENTMCNC: 34 G/DL (ref 31–36)
MCV RBC AUTO: 90.4 FL (ref 80–100)
MONOCYTES ABSOLUTE: 1.1 K/UL (ref 0–1.3)
MONOCYTES RELATIVE PERCENT: 8.7 %
NEUTROPHILS ABSOLUTE: 9.2 K/UL (ref 1.7–7.7)
NEUTROPHILS RELATIVE PERCENT: 71.6 %
PDW BLD-RTO: 13.6 % (ref 12.4–15.4)
PLATELET # BLD: 158 K/UL (ref 135–450)
PMV BLD AUTO: 7.2 FL (ref 5–10.5)
POTASSIUM REFLEX MAGNESIUM: 4.1 MMOL/L (ref 3.5–5.1)
RBC # BLD: 4.16 M/UL (ref 4.2–5.9)
SODIUM BLD-SCNC: 139 MMOL/L (ref 136–145)
WBC # BLD: 12.8 K/UL (ref 4–11)

## 2021-12-06 PROCEDURE — 80048 BASIC METABOLIC PNL TOTAL CA: CPT

## 2021-12-06 PROCEDURE — 6360000002 HC RX W HCPCS: Performed by: INTERNAL MEDICINE

## 2021-12-06 PROCEDURE — 2580000003 HC RX 258: Performed by: INTERNAL MEDICINE

## 2021-12-06 PROCEDURE — 6370000000 HC RX 637 (ALT 250 FOR IP): Performed by: NURSE PRACTITIONER

## 2021-12-06 PROCEDURE — 83605 ASSAY OF LACTIC ACID: CPT

## 2021-12-06 PROCEDURE — 6370000000 HC RX 637 (ALT 250 FOR IP): Performed by: INTERNAL MEDICINE

## 2021-12-06 PROCEDURE — 95816 EEG AWAKE AND DROWSY: CPT

## 2021-12-06 PROCEDURE — 95819 EEG AWAKE AND ASLEEP: CPT | Performed by: PSYCHIATRY & NEUROLOGY

## 2021-12-06 PROCEDURE — 85025 COMPLETE CBC W/AUTO DIFF WBC: CPT

## 2021-12-06 PROCEDURE — 1200000000 HC SEMI PRIVATE

## 2021-12-06 PROCEDURE — 99222 1ST HOSP IP/OBS MODERATE 55: CPT | Performed by: NURSE PRACTITIONER

## 2021-12-06 PROCEDURE — 36415 COLL VENOUS BLD VENIPUNCTURE: CPT

## 2021-12-06 RX ORDER — LEVETIRACETAM 500 MG/1
500 TABLET ORAL 2 TIMES DAILY
Status: DISCONTINUED | OUTPATIENT
Start: 2021-12-06 | End: 2021-12-06

## 2021-12-06 RX ADMIN — CARVEDILOL 3.12 MG: 3.12 TABLET, FILM COATED ORAL at 09:11

## 2021-12-06 RX ADMIN — SACUBITRIL AND VALSARTAN 1 TABLET: 49; 51 TABLET, FILM COATED ORAL at 20:58

## 2021-12-06 RX ADMIN — LEVETIRACETAM 500 MG: 500 TABLET, FILM COATED ORAL at 11:30

## 2021-12-06 RX ADMIN — ENOXAPARIN SODIUM 40 MG: 100 INJECTION SUBCUTANEOUS at 09:10

## 2021-12-06 RX ADMIN — SODIUM CHLORIDE 1500 MG: 900 INJECTION INTRAVENOUS at 23:24

## 2021-12-06 RX ADMIN — ASPIRIN 81 MG: 81 TABLET, CHEWABLE ORAL at 09:11

## 2021-12-06 RX ADMIN — SODIUM CHLORIDE 1500 MG: 900 INJECTION INTRAVENOUS at 17:17

## 2021-12-06 RX ADMIN — SODIUM CHLORIDE 1500 MG: 900 INJECTION INTRAVENOUS at 05:52

## 2021-12-06 RX ADMIN — METHADONE HYDROCHLORIDE 95 MG: 10 TABLET ORAL at 09:11

## 2021-12-06 RX ADMIN — SACUBITRIL AND VALSARTAN 1 TABLET: 49; 51 TABLET, FILM COATED ORAL at 09:11

## 2021-12-06 RX ADMIN — LEVETIRACETAM 1500 MG: 100 INJECTION, SOLUTION INTRAVENOUS at 00:11

## 2021-12-06 RX ADMIN — Medication 10 ML: at 09:12

## 2021-12-06 RX ADMIN — ONDANSETRON 4 MG: 4 TABLET, ORALLY DISINTEGRATING ORAL at 17:33

## 2021-12-06 RX ADMIN — SODIUM CHLORIDE 1500 MG: 900 INJECTION INTRAVENOUS at 11:30

## 2021-12-06 RX ADMIN — CARVEDILOL 3.12 MG: 3.12 TABLET, FILM COATED ORAL at 17:16

## 2021-12-06 ASSESSMENT — PAIN SCALES - GENERAL: PAINLEVEL_OUTOF10: 7

## 2021-12-06 NOTE — PROGRESS NOTES
Pt alert and oriented, informed about his Keppra 1500 mg given every 12 hrs per IV dose as earlier noted per RN, Per Pt \" gets aggressive\"  Pt at this time agreed to have meds he said that he \" had terrible side effect I might have taken too much, but I'm Ok to have it and I'm fine to take it\" pt denies any further side effect after having dose this am.Appears calm and comfortable at this time. Denies any needs. Safety/fall prevention maintained. Will continue to monitor.  YARIN

## 2021-12-06 NOTE — PROGRESS NOTES
Pt resting appears comfortable. No c/o voiced, No seizure activity noted. Will continue to monitor. Safety/fall prevention/seizure precaution maintained. Call light within reach.  MKRN

## 2021-12-06 NOTE — FLOWSHEET NOTE
12/06/21 0900   Assessment   Charting Type Shift assessment   Neurological   Neuro (WDL) X  (post-ictal)   Level of Consciousness Alert (0)   Orientation Level Oriented X4   Cognition Appropriate judgement; Appropriate safety awareness; Appropriate attention/concentration; Appropriate for developmental age   Language Clear;  Appropriate for developmental age   Size R Pupil (mm) 3   R Pupil Shape Round   R Pupil Reaction Brisk   Size L Pupil (mm) 3   L Pupil Shape Round   L Pupil Reaction Brisk   R Hand  Strong   L Hand  Strong   R Foot Dorsiflexion Strong   L Foot Dorsiflexion Strong   R Foot Plantar Flexion Strong   L Foot Plantar Flexion Strong   RUE Motor Response Responds to command; Normal extension; Normal flexion   Sensation RUE Full sensation   LUE Motor Response Responds to command; Normal extension; Normal flexion   Sensation LUE Full sensation   RLE Motor Response Responds to command; Normal extension; Normal flexion   Sensation RLE Full sensation   LLE Motor Response Responds to command; Normal extension; Normal flexion   Sensation LLE Full sensation   Gag Present   Tongue Deviation None   Kristen Coma Scale   Eye Opening 4   Best Verbal Response 5   Best Motor Response 6   [place] Coma Scale Score 15   Respiratory   Respiratory (WDL) X   Respiratory Pattern Regular   Respiratory Depth Normal   Respiratory Quality/Effort Unlabored   Chest Assessment Chest expansion symmetrical   L Breath Sounds Diminished   R Breath Sounds Diminished   Breath Sounds   Right Upper Lobe Diminished   Right Middle Lobe Diminished   Right Lower Lobe Diminished   Left Upper Lobe Diminished   Left Lower Lobe Diminished   Cardiac   Cardiac (WDL) WDL   Heart Sounds S1, S2   Cardiac Rhythm Sinus rhythm   Cardiac Regularity Regular   Rhythm Interpretation   Pulse 78   Cardiac Monitor   Telemetry Monitor On Yes   Telemetry Audible Yes   Telemetry Alarms Set Yes   Telemetry Box Number 119   Gastrointestinal   Abdominal

## 2021-12-06 NOTE — CARE COORDINATION
Discussed in IDR rounds- nsg states no needs. Has insurance- await Neuro input. If needs identified will complete assessment.  Becca Kemp RN

## 2021-12-06 NOTE — CONSULTS
In patient Neurology consult        [facility] Neurology      MD Gordo Power  1990    Date of Service: 12/6/2021    Referring Physician: Jane Peter MD      Reason for the consult and CC: Breakthrough seizure    HPI:   The patient is a 32 y.o.  male, with a PMH of seizure disorder, Hep C, HTN, polysubstance abuse, and cardiomyopathy, who presented to [facility] following a seizure. The patient was riding in a car with his girlfriend, when he suddenly reported to his girlfriend that he was feeling weird and then had a generalized tonic-clonic seizure. Upon arrival to the ED, he was post-ictal and combative, requiring multiple doses of IV Ativan and eventually started on a Precedex gtt. He follows with [facility] and reports compliance with his seizure medications. He normally takes Oxtellar XR 1800 mg daily and Xcopri 50 mg daily. He tells me he was previously on Keppra, but it made him violent and irritable. History reviewed. No pertinent family history.   Past Surgical History:   Procedure Laterality Date    BRONCHOSCOPY N/A 2/22/2021    BRONCHOSCOPY DIAGNOSTIC OR CELL [facility] ONLY performed by Cara Cadet MD at [address] [place] [address]  2/22/2021    BRONCHOSCOPY THERAPUTIC ASPIRATION INITIAL performed by Cara Cadet MD at . [address] HAND FINGERS INJURY, PARTIAL AMPUTATIONS        Past Medical History:   Diagnosis Date    Aspiration pneumonia ([address]     Hepatitis C     HTN (hypertension)     NSTEMI (non-ST elevated myocardial infarction) ([address]     Polysubstance abuse ([address]     Seizures ([address]     Septic shock ([address]     Takotsubo cardiomyopathy      Social History     Tobacco Use    Smoking status: Current Every Day Smoker     Packs/day: 2.00     Types: Cigarettes    Smokeless tobacco: Never Used   Vaping Use    Vaping Use: Never used   Substance Use Topics    Alcohol use: No    Drug use: Yes     Types: IV, Opiates , MD Kristen   95 mg at 12/06/21 0911    OXcarbazepine ER TB24 1,800 mg  1,800 mg Oral Daily Sajniv Perez MD        sacubitril-valsartan (ENTRESTO) 49-51 MG per tablet 1 tablet  1 tablet Oral BID Sanjiv Perez MD   1 tablet at 12/06/21 0911    NONFORMULARY 250 mg  250 mg Oral Daily Sanjiv Perez MD        ampicillin-sulbactam (UNASYN) 1500 mg IVPB minibag  1,500 mg IntraVENous Q6H Sanjiv Perez  mL/hr at 12/06/21 1130 1,500 mg at 12/06/21 1130       ROS : A 10-14 system review of constitutional, cardiovascular, respiratory, eyes, musculoskeletal, endocrine, GI, ENT, skin, hematological, genitourinary, psychiatric and neurologic systems was obtained and updated today and is unremarkable except as mentioned in my HPI      Exam:     Constitutional:   Vitals:    12/06/21 0318 12/06/21 0454 12/06/21 0900 12/06/21 1145   BP: 119/80  114/70 110/71   Pulse: 87  78 61   Resp: 18  18 16   Temp: 97.9 °F (36.6 °C)  98.7 °F (37.1 °C) 98 °F (36.7 °C)   TempSrc: Oral  Oral Oral   SpO2:   96% 96%   Weight:  124 lb 12.8 oz (56.6 kg)     Height:           General appearance and observation: Normal development and appear in no acute distress. Neck: supple  Cardiovascular: No lower leg edema with good pulsation. Mental Status:   Oriented to person, place, problem, and time. Memory: Good immediate recall. Intact remote memory  Normal attention span and concentration. Language: intact naming, repeating and fluency   Good fund of Knowledge. Aware of current events and vocabulary   Cranial Nerves:   II: Visual fields: Full. Pupils: equal, round, reactive to light  III,IV,VI: Extra Ocular Movements are intact. No nystagmus  V: Facial sensation is intact  VII: Facial strength and movements: intact and symmetric  VIII: Hearing: Intact  IX: Palate elevation is symmetric  XI: Shoulder shrug is intact  XII: Tongue movements are normal  Musculoskeletal: 5/5 in all 4 extremities. Tone: Normal tone.    Reflexes: software. Although all attempts are made to edit the dictation for accuracy, there may be errors in the  transcription that are not intended      Attending Supervising Physician's R Nomi Maynard 106   I reviewed and agree with the findings and plan as documented in NP consult note   Patient admitted with breakthrough seizure  Known history of medically refractory epilepsy. Does see UC epilepsy  Agree with above note  Stop Keppra due to high risk for suicidal ideation and psychosis  No driving until he is cleared from his physician  Increase Oxtellar and continue Xcopri  Can be discharged once medically stable  Follow-up with UC for epilepsy management.     Electronically signed by Jesi Garsia MD on 12/6/21 at 1:42 PM EST

## 2021-12-06 NOTE — PLAN OF CARE
Problem: Coping:  Goal: Ability to cope will improve  Description: Ability to cope will improve  12/6/2021 1844 by Oneil Jenkins RN  Outcome: Ongoing  12/6/2021 0617 by Carlos A Dominguez RN  Outcome: Ongoing  Goal: Ability to identify appropriate support needs will improve  Description: Ability to identify appropriate support needs will improve  12/6/2021 1844 by Oneil Jenkins RN  Outcome: Ongoing  12/6/2021 0617 by Carlos A Dominguez RN  Outcome: Ongoing     Problem: Health Behavior:  Goal: Ability to manage health-related needs will improve  Description: Ability to manage health-related needs will improve  12/6/2021 1844 by Oneil Jenkins RN  Outcome: Ongoing  12/6/2021 0617 by Carlos A Dominguez RN  Outcome: Ongoing     Problem: Physical Regulation:  Goal: Signs of adequate cerebral perfusion will increase  Description: Signs of adequate cerebral perfusion will increase  12/6/2021 1844 by Oneil Jenkins RN  Outcome: Ongoing  12/6/2021 0617 by Carlos A Dominguez RN  Outcome: Ongoing  Goal: Ability to maintain a stable neurologic state will improve  Description: Ability to maintain a stable neurologic state will improve  12/6/2021 1844 by Oneil Jenkins RN  Outcome: Ongoing  12/6/2021 0617 by Carlos A Dominguez RN  Outcome: Ongoing     Problem: Safety:  Goal: Ability to remain free from injury will improve  Description: Ability to remain free from injury will improve  12/6/2021 1844 by Oneil Jenkins RN  Outcome: Ongoing  12/6/2021 0617 by Carlos A Dominguez RN  Outcome: Ongoing     Problem: Self-Concept:  Goal: Level of anxiety will decrease  Description: Level of anxiety will decrease  12/6/2021 1844 by Oneil Jenkins RN  Outcome: Ongoing  12/6/2021 0617 by Carlos A Dominguez RN  Outcome: Ongoing  Goal: Ability to verbalize feelings about condition will improve  Description: Ability to verbalize feelings about condition will improve  12/6/2021 1844 by Oneil Jenkins RN  Outcome: Ongoing  12/6/2021 0617 by Escobar Macias Cynthia Mar RN  Outcome: Ongoing     Problem: Pain:  Goal: Pain level will decrease  Description: Pain level will decrease  12/6/2021 1844 by Tammy Cleaning RN  Outcome: Ongoing  12/6/2021 0617 by Malcom Kearns RN  Outcome: Ongoing  Goal: Control of acute pain  Description: Control of acute pain  12/6/2021 1844 by Tammy Cleaning RN  Outcome: Ongoing  12/6/2021 0617 by Malcom Kearns RN  Outcome: Ongoing  Goal: Control of chronic pain  Description: Control of chronic pain  12/6/2021 1844 by Tammy Cleaning RN  Outcome: Ongoing  12/6/2021 0617 by Malcom Kearns RN  Outcome: Ongoing     Problem: Falls - Risk of:  Goal: Will remain free from falls  Description: Will remain free from falls  12/6/2021 1844 by Tammy Cleaning RN  Outcome: Ongoing  12/6/2021 0617 by Malcom Kearns RN  Outcome: Ongoing  Goal: Absence of physical injury  Description: Absence of physical injury  12/6/2021 1844 by Tammy Cleaning RN  Outcome: Ongoing  12/6/2021 0617 by Malcom Kearns RN  Outcome: Ongoing

## 2021-12-06 NOTE — PROGRESS NOTES
Hospitalist Progress Note      PCP: No primary care provider on file. Date of Admission: 12/4/2021      Chief Complaint: Seizure    Hospital Course: 32 y.o. male with PMH of medical refractory epilepsy, status epilepticus, polysubstance abuse, Takotsubo cardiomyopathy presented to the ER today with complaints of seizure. Neuro consulted. EEG ordered. Subjective: no seizure activity today, just completed EEG       Medications:  Reviewed    Infusion Medications    dexmedetomidine Stopped (12/05/21 1018)    sodium chloride       Scheduled Medications    levETIRAcetam  500 mg Oral BID    sodium chloride flush  5-40 mL IntraVENous 2 times per day    enoxaparin  40 mg SubCUTAneous Daily    nicotine  1 patch TransDERmal Daily    aspirin  81 mg Oral Daily with breakfast    carvedilol  3.125 mg Oral BID WC    methadone  95 mg Oral Daily    OXcarbazepine ER  1,800 mg Oral Daily    sacubitril-valsartan  1 tablet Oral BID    NONFORMULARY 250 mg  250 mg Oral Daily    ampicillin-sulbactam  1,500 mg IntraVENous Q6H     PRN Meds: sodium chloride flush, sodium chloride, ondansetron **OR** ondansetron, polyethylene glycol, acetaminophen **OR** acetaminophen      Intake/Output Summary (Last 24 hours) at 12/6/2021 1125  Last data filed at 12/6/2021 5741  Gross per 24 hour   Intake 2240 ml   Output 2600 ml   Net -360 ml       Physical Exam Performed:    /70   Pulse 78   Temp 98.7 °F (37.1 °C) (Oral)   Resp 18   Ht 5' 11\" (1.803 m)   Wt 124 lb 12.8 oz (56.6 kg)   SpO2 96%   BMI 17.41 kg/m²     General appearance: No apparent distress, appears stated age and cooperative. HEENT: Pupils equal, round, and reactive to light. Conjunctivae/corneas clear. Neck: Supple, with full range of motion. No jugular venous distention. Trachea midline. Respiratory:  Normal respiratory effort. Clear to auscultation, bilaterally without Wheezes/Rhonchi.  Rales at left base noted  Cardiovascular: Regular rate and rhythm with normal S1/S2 without murmurs, rubs or gallops. Abdomen: Soft, non-tender, non-distended with normal bowel sounds. Musculoskeletal: No clubbing, cyanosis or edema bilaterally. Full range of motion without deformity. Skin: Skin color, texture, turgor normal.  No rashes or lesions. Neurologic:  Neurovascularly intact without any focal sensory/motor deficits. Cranial nerves: II-XII intact, grossly non-focal.  Psychiatric: Alert and oriented, thought content appropriate, normal insight  Capillary Refill: Brisk,3 seconds, normal   Peripheral Pulses: +2 palpable, equal bilaterally       Labs:   Recent Labs     12/04/21 2254 12/05/21  0545 12/06/21  0457   WBC 25.5* 11.6* 12.8*   HGB 14.3 12.8* 12.8*   HCT 45.6 37.8* 37.7*    162 158     Recent Labs     12/04/21 2254 12/05/21  0545 12/06/21 0457    138 139   K 3.9 4.3 4.1    106 105   CO2 8* 22 26   BUN 15 18 16   CREATININE 0.9 0.9 0.9   CALCIUM 9.4 8.9 8.7     Recent Labs     12/04/21  2254   AST 47*   ALT 61*   BILITOT <0.2   ALKPHOS 134*     No results for input(s): INR in the last 72 hours. Recent Labs     12/04/21 2254   TROPONINI <0.01       Urinalysis:      Lab Results   Component Value Date    NITRU Negative 12/05/2021    WBCUA 0-2 12/05/2021    BACTERIA Rare 12/05/2021    RBCUA 0-2 12/05/2021    BLOODU SMALL 12/05/2021    SPECGRAV >=1.030 12/05/2021    GLUCOSEU Negative 12/05/2021       Radiology:  XR CHEST PORTABLE   Final Result   Right parahilar pulmonary opacity could represent aspiration or infection         XR CHEST PORTABLE   Final Result   Technically limited portable radiograph. No focal consolidative change. Questionable mild vascular congestion or edema. CT Head WO Contrast   Final Result   No acute intracranial abnormality.                  Assessment/Plan:    Active Hospital Problems    Diagnosis     High anion gap metabolic acidosis [Y74.5]     Seizure ([address] [R56.9]     Acute encephalopathy [G93.40]     Takotsubo cardiomyopathy [I51.81]     Polydrug abuse ([facility] [address] [F19.10]          Acute encephalopathy -resolved  Likely due to seizure and prolonged postictal state , and acute toxic metabolic encephalopathy due to drugs with UDS positive for methadone, benzos and cannabinoids.  -Currently sedated on Precedex gtt maintaining spontaneous respirations  -Consulted neurology  -EEG results pending  -CT head reviewed, no acute abnormality  -Correct underlying metabolic derangements  -IV Keppra     Possible aspiration pneumonitis- due to seizure?, procal was slightly elevated  Started on iv unasyn  -repeat cxr 12/7    Breakthrough seizure  Medical refractory epilepsy  Hx of TBI, Right temporal seizures since 2019  -Sedated on Precedex  -IV Keppra twice daily  -Neurology consult, EEG  -No recurrent/ongoing seizure-like activity, low clinical suspicion for status epilepticus     High anion gap metabolic acidosis- resolved  2/2 lactic acidosis from seizure  -Lactic improving with IV fluids, continue MIVF with bicarb drip  -Recheck BMP in a.m.     ? Polysubstance abuse  -Patient mother endorsed patient does not do any drugs. Not sure of the urine tox screen is showing benzos from IV Ativan that he received in the ED. patient is already on prescribed methadone.      Hx of Takotsubo cardiomyopathy-resolved  Repeat echo from 7/20/2021 showed normal LVEF 55%  -Followed by [facility] cardiology  -On Coreg/Entresto currently on hold due to low BP  -Hold diuretics     Hx of IVDU -maintained on methadone     DVT Prophylaxis: Lovenox  Diet: ADULT DIET;  Regular  Code Status: Full Code    PT/OT Eval Status: not ordered    Dispo - pending neuro recs, stable/improved resp status, seizure free, ?sandra Reyes MD

## 2021-12-06 NOTE — FLOWSHEET NOTE
12/05/21 2112   Assessment   Charting Type Shift assessment   Neurological   Level of Consciousness Alert (0)   Orientation Level Oriented X4   Cognition Appropriate judgement; Appropriate safety awareness; Appropriate attention/concentration; Appropriate for developmental age   Language Clear; Appropriate for developmental age   Size R Pupil (mm) 3   R Pupil Shape Round   R Pupil Reaction Brisk   Size L Pupil (mm) 3   L Pupil Shape Round   L Pupil Reaction Brisk   R Hand  Strong   L Hand  Strong   R Foot Dorsiflexion Strong   L Foot Dorsiflexion Strong   R Foot Plantar Flexion Strong   L Foot Plantar Flexion Strong   RUE Motor Response Responds to command; Normal extension; Normal flexion   Sensation RUE Full sensation   LUE Motor Response Responds to command; Normal extension; Normal flexion   Sensation LUE Full sensation   RLE Motor Response Responds to command; Normal extension; Normal flexion   Sensation RLE Full sensation   LLE Motor Response Responds to command; Normal extension; Normal flexion   Sensation LLE Full sensation   Gag Present   Swallow Screening   Is the patient able to remain alert for testing?  Yes   Kristen Coma Scale   Eye Opening 4   Best Verbal Response 5   Best Motor Response 6   Kristen Coma Scale Score 15   HEENT   HEENT (WDL) WDL   Respiratory   Respiratory Pattern Regular   Respiratory Depth Normal   Respiratory Quality/Effort Unlabored   Chest Assessment Chest expansion symmetrical   Breath Sounds   Right Upper Lobe Diminished   Right Middle Lobe Diminished   Right Lower Lobe Diminished   Left Upper Lobe Diminished   Left Lower Lobe Diminished   Cardiac   Heart Sounds S1, S2   Cardiac Rhythm Sinus rhythm  (on tele monitor)   Cardiac Regularity Regular   Rhythm Interpretation   Pulse 87   Cardiac Monitor   Telemetry Monitor On Yes   Telemetry Audible Yes   Telemetry Alarms Set Yes   Telemetry Box Number 119   Gastrointestinal   Abdominal (WDL) WDL   Peripheral Vascular

## 2021-12-07 ENCOUNTER — APPOINTMENT (OUTPATIENT)
Dept: GENERAL RADIOLOGY | Age: 31
DRG: 053 | End: 2021-12-07
Payer: MEDICARE

## 2021-12-07 VITALS
WEIGHT: 124.8 LBS | BODY MASS INDEX: 17.47 KG/M2 | RESPIRATION RATE: 20 BRPM | SYSTOLIC BLOOD PRESSURE: 119 MMHG | OXYGEN SATURATION: 97 % | DIASTOLIC BLOOD PRESSURE: 87 MMHG | HEART RATE: 57 BPM | TEMPERATURE: 98.5 F | HEIGHT: 71 IN

## 2021-12-07 LAB
ANION GAP SERPL CALCULATED.3IONS-SCNC: 8 MMOL/L (ref 3–16)
BASOPHILS ABSOLUTE: 0.1 K/UL (ref 0–0.2)
BASOPHILS RELATIVE PERCENT: 0.9 %
BUN BLDV-MCNC: 9 MG/DL (ref 7–20)
CALCIUM SERPL-MCNC: 9.5 MG/DL (ref 8.3–10.6)
CARBAMAZEPINE DOSE: ABNORMAL
CARBAMAZEPINE LEVEL: <2 UG/ML (ref 4–12)
CHLORIDE BLD-SCNC: 103 MMOL/L (ref 99–110)
CO2: 28 MMOL/L (ref 21–32)
CREAT SERPL-MCNC: 0.7 MG/DL (ref 0.9–1.3)
EOSINOPHILS ABSOLUTE: 0.3 K/UL (ref 0–0.6)
EOSINOPHILS RELATIVE PERCENT: 3.1 %
GFR AFRICAN AMERICAN: >60
GFR NON-AFRICAN AMERICAN: >60
GLUCOSE BLD-MCNC: 98 MG/DL (ref 70–99)
HCT VFR BLD CALC: 40.3 % (ref 40.5–52.5)
HEMOGLOBIN: 13.6 G/DL (ref 13.5–17.5)
LACTIC ACID: 0.8 MMOL/L (ref 0.4–2)
LYMPHOCYTES ABSOLUTE: 3.3 K/UL (ref 1–5.1)
LYMPHOCYTES RELATIVE PERCENT: 40.4 %
MCH RBC QN AUTO: 31.1 PG (ref 26–34)
MCHC RBC AUTO-ENTMCNC: 33.6 G/DL (ref 31–36)
MCV RBC AUTO: 92.4 FL (ref 80–100)
MONOCYTES ABSOLUTE: 0.7 K/UL (ref 0–1.3)
MONOCYTES RELATIVE PERCENT: 8.7 %
NEUTROPHILS ABSOLUTE: 3.9 K/UL (ref 1.7–7.7)
NEUTROPHILS RELATIVE PERCENT: 46.9 %
PDW BLD-RTO: 14 % (ref 12.4–15.4)
PLATELET # BLD: 169 K/UL (ref 135–450)
PMV BLD AUTO: 7 FL (ref 5–10.5)
POTASSIUM REFLEX MAGNESIUM: 4.8 MMOL/L (ref 3.5–5.1)
RBC # BLD: 4.37 M/UL (ref 4.2–5.9)
SODIUM BLD-SCNC: 139 MMOL/L (ref 136–145)
WBC # BLD: 8.3 K/UL (ref 4–11)

## 2021-12-07 PROCEDURE — 2580000003 HC RX 258: Performed by: INTERNAL MEDICINE

## 2021-12-07 PROCEDURE — 80156 ASSAY CARBAMAZEPINE TOTAL: CPT

## 2021-12-07 PROCEDURE — 36415 COLL VENOUS BLD VENIPUNCTURE: CPT

## 2021-12-07 PROCEDURE — 6360000002 HC RX W HCPCS: Performed by: INTERNAL MEDICINE

## 2021-12-07 PROCEDURE — 6370000000 HC RX 637 (ALT 250 FOR IP): Performed by: INTERNAL MEDICINE

## 2021-12-07 PROCEDURE — 85025 COMPLETE CBC W/AUTO DIFF WBC: CPT

## 2021-12-07 PROCEDURE — 71046 X-RAY EXAM CHEST 2 VIEWS: CPT

## 2021-12-07 PROCEDURE — 83605 ASSAY OF LACTIC ACID: CPT

## 2021-12-07 PROCEDURE — 80048 BASIC METABOLIC PNL TOTAL CA: CPT

## 2021-12-07 PROCEDURE — 99233 SBSQ HOSP IP/OBS HIGH 50: CPT | Performed by: NURSE PRACTITIONER

## 2021-12-07 RX ORDER — AMOXICILLIN AND CLAVULANATE POTASSIUM 875; 125 MG/1; MG/1
1 TABLET, FILM COATED ORAL EVERY 12 HOURS SCHEDULED
Qty: 6 TABLET | Refills: 0 | Status: SHIPPED | OUTPATIENT
Start: 2021-12-08 | End: 2021-12-11

## 2021-12-07 RX ORDER — SACCHAROMYCES BOULARDII 250 MG
250 CAPSULE ORAL 2 TIMES DAILY
Qty: 14 CAPSULE | Refills: 0 | Status: SHIPPED | OUTPATIENT
Start: 2021-12-07 | End: 2021-12-14

## 2021-12-07 RX ORDER — OXCARBAZEPINE 600 MG/1
2000 TABLET ORAL DAILY
Qty: 90 TABLET | Refills: 1 | Status: CANCELLED | OUTPATIENT
Start: 2021-12-08 | End: 2022-01-07

## 2021-12-07 RX ORDER — AMOXICILLIN AND CLAVULANATE POTASSIUM 875; 125 MG/1; MG/1
1 TABLET, FILM COATED ORAL EVERY 12 HOURS SCHEDULED
Status: DISCONTINUED | OUTPATIENT
Start: 2021-12-08 | End: 2021-12-07 | Stop reason: HOSPADM

## 2021-12-07 RX ADMIN — CARVEDILOL 3.12 MG: 3.12 TABLET, FILM COATED ORAL at 09:27

## 2021-12-07 RX ADMIN — SODIUM CHLORIDE 1500 MG: 900 INJECTION INTRAVENOUS at 11:37

## 2021-12-07 RX ADMIN — SACUBITRIL AND VALSARTAN 1 TABLET: 49; 51 TABLET, FILM COATED ORAL at 09:27

## 2021-12-07 RX ADMIN — SODIUM CHLORIDE 1500 MG: 900 INJECTION INTRAVENOUS at 04:56

## 2021-12-07 RX ADMIN — ENOXAPARIN SODIUM 40 MG: 100 INJECTION SUBCUTANEOUS at 09:27

## 2021-12-07 RX ADMIN — ASPIRIN 81 MG: 81 TABLET, CHEWABLE ORAL at 09:27

## 2021-12-07 RX ADMIN — Medication 10 ML: at 09:30

## 2021-12-07 RX ADMIN — METHADONE HYDROCHLORIDE 95 MG: 10 TABLET ORAL at 09:28

## 2021-12-07 RX ADMIN — ONDANSETRON 4 MG: 2 INJECTION INTRAMUSCULAR; INTRAVENOUS at 04:55

## 2021-12-07 ASSESSMENT — PAIN SCALES - GENERAL
PAINLEVEL_OUTOF10: 0
PAINLEVEL_OUTOF10: 0
PAINLEVEL_OUTOF10: 3

## 2021-12-07 NOTE — PROGRESS NOTES
Shirley Mcelroy  Neurology Follow-up  [facility] Neurology    Date of Service: 12/7/2021    Subjective:   CC: Follow up today regarding: Breakthrough seizure    Events noted. Chart and lab reviewed. No further seizure activity. ROS : A 10-12 system review obtained and updated today and is unremarkable except as mentioned  in my interval history. Family history is non-contributory.     Past Medical History:   Diagnosis Date    Aspiration pneumonia (HCC)     Hepatitis C     HTN (hypertension)     NSTEMI (non-ST elevated myocardial infarction) [address] [address]     Polysubstance abuse (HCC)     Seizures (HCC)     Septic shock (HCC)     Takotsubo cardiomyopathy      Current Facility-Administered Medications   Medication Dose Route Frequency Provider Last Rate Last Admin    OXcarbazepine ER TB24 2,000 mg  2,000 mg Oral Daily LEIGHTON Reddy - CNP        dexmedetomidine (PRECEDEX) 400 mcg in sodium chloride 0.9 % 100 mL infusion  0.2-1.4 mcg/kg/hr IntraVENous Continuous Maday Brambila MD   Stopped at 12/05/21 1018    sodium chloride flush 0.9 % injection 5-40 mL  5-40 mL IntraVENous 2 times per day Maday Brambila MD   10 mL at 12/07/21 0930    sodium chloride flush 0.9 % injection 5-40 mL  5-40 mL IntraVENous PRN Maday Brambila MD        0.9 % sodium chloride infusion  25 mL IntraVENous PRN Maday Brambila MD        enoxaparin (LOVENOX) injection 40 mg  40 mg SubCUTAneous Daily Maday Brambila MD   40 mg at 12/07/21 0927    ondansetron (ZOFRAN-ODT) disintegrating tablet 4 mg  4 mg Oral Q8H PRN Maday Brambila MD   4 mg at 12/06/21 1733    Or    ondansetron (ZOFRAN) injection 4 mg  4 mg IntraVENous Q6H PRN Maday Brambila MD   4 mg at 12/07/21 0455    polyethylene glycol (GLYCOLAX) packet 17 g  17 g Oral Daily PRN Maday Brambila MD        acetaminophen (TYLENOL) tablet 650 mg  650 mg Oral Q6H PRN Maday Brambila MD        Or    acetaminophen (TYLENOL) suppository 650 mg  650 mg Rectal Q6H PRN Stephanie Marin MD        nicotine (NICODERM CQ) 21 MG/24HR 1 patch  1 patch TransDERmal Daily Selvin Todd MD   1 patch at 12/07/21 0928    aspirin chewable tablet 81 mg  81 mg Oral Daily with breakfast Selvin Todd MD   81 mg at 12/07/21 0927    carvedilol (COREG) tablet 3.125 mg  3.125 mg Oral BID WC Rip MD Perez   3.125 mg at 12/07/21 0927    methadone (DOLOPHINE) tablet 95 mg  95 mg Oral Daily Selvin Right, MD   95 mg at 12/07/21 0928    sacubitril-valsartan (ENTRESTO) 49-51 MG per tablet 1 tablet  1 tablet Oral BID Selvin Todd MD   1 tablet at 12/07/21 0927    NONFORMULARY 250 mg  250 mg Oral Daily Selvin Todd MD        ampicillin-sulbactam (UNASYN) 1500 mg IVPB minibag  1,500 mg IntraVENous Q6H Selvin Todd MD   Stopped at 12/07/21 0529     No Known Allergies   reports that he has been smoking cigarettes. He has been smoking about 2.00 packs per day. He has never used smokeless tobacco. He reports current drug use. Drugs: IV, Opiates , Marijuana (Weed), and Methamphetamines (Crystal Meth). He reports that he does not drink alcohol. Objective:  Exam:   Constitutional:   Vitals:    12/06/21 1940 12/06/21 2325 12/07/21 0500 12/07/21 0930   BP: 121/80 118/73 123/81    Pulse: 57 67 61    Resp: 16 16 16 18   Temp: 97.9 °F (36.6 °C) 98 °F (36.7 °C) 97.9 °F (36.6 °C) 98.2 °F (36.8 °C)   TempSrc: Oral Oral Oral Oral   SpO2: 98% 93% 96% 99%   Weight:       Height:         General appearance:  Normal development and appear in no acute distress. Mental Status:   Oriented to person, place, problem, and time. Memory: Good immediate recall. Intact remote memory  Normal attention span and concentration. Language: intact naming, repeating and fluency   Good fund of Knowledge. Cranial Nerves:   II: Visual fields: Full. Pupils: equal, round, reactive to light  III,IV,VI: Extra Ocular Movements are intact.  No nystagmus  V: Facial sensation is intact  VII: Facial strength and movements: intact and symmetric  IX: Palate elevation is symmetric  XI: Shoulder shrug is intact  XII: Tongue movements are normal  Musculoskeletal: 5/5 in all 4 extremities. Tone: Normal tone. Reflexes: Symmetric 2+ in both arms and legs. Coordination: no pronator drift, no dysmetria with FNF  Sensation: normal to all modalities in both arms and legs. Gait/Posture: steady gait        Data:  LABS:   Lab Results   Component Value Date     12/07/2021    K 4.8 12/07/2021     12/07/2021    CO2 28 12/07/2021    BUN 9 12/07/2021    CREATININE 0.7 12/07/2021    GFRAA >60 12/07/2021    LABGLOM >60 12/07/2021    GLUCOSE 98 12/07/2021    PHOS 3.4 02/26/2021    MG 2.20 05/20/2021    CALCIUM 9.5 12/07/2021     Lab Results   Component Value Date    WBC 8.3 12/07/2021    RBC 4.37 12/07/2021    HGB 13.6 12/07/2021    HCT 40.3 12/07/2021    MCV 92.4 12/07/2021    RDW 14.0 12/07/2021     12/07/2021     Lab Results   Component Value Date    INR 1.11 02/26/2021    PROTIME 12.9 02/26/2021       Neuroimaging was independently reviewed by me and discussed results with the patient  I reviewed blood testing and other test results and discussed results with the patient      Impression:    Breakthrough seizure - reportedly compliant with AED. Currently on Oxtellar XR 1800 mg daily and Xcopri 50 mg daily as outpatient. Aspiration pneumonia  Hx of polysubstance abuse   Cardiomyopathy    Recommendation    Stop Keppra as patient states he did not tolerate Keppra in the past due to irritability. Increase Oxtellar to 2400 daily (unable to increase to 2000 mg per pharmacy). Continue same dosing of Xcopri. EEG reviewed. Abx per IM for aspiration pneumonia. Monitor on tele. NO DRIVING until cleared by primary neurologist.       F/u with his primary neurologist at [address] [place] [address].        May be discharged from a neurological perspective when medically stable. Zak Beltre CNP      This dictation was generated by voice recognition computer software. Although all attempts are made to edit the dictation for accuracy, there may be errors in the transcription that are not intended.

## 2021-12-07 NOTE — PLAN OF CARE
Problem: Coping:  Goal: Ability to cope will improve  Description: Ability to cope will improve  Outcome: Ongoing  Goal: Ability to identify appropriate support needs will improve  Description: Ability to identify appropriate support needs will improve  Outcome: Ongoing     Problem: Health Behavior:  Goal: Ability to manage health-related needs will improve  Description: Ability to manage health-related needs will improve  Outcome: Ongoing     Problem: Physical Regulation:  Goal: Signs of adequate cerebral perfusion will increase  Description: Signs of adequate cerebral perfusion will increase  Outcome: Ongoing  Goal: Ability to maintain a stable neurologic state will improve  Description: Ability to maintain a stable neurologic state will improve  Outcome: Ongoing     Problem: Safety:  Goal: Ability to remain free from injury will improve  Description: Ability to remain free from injury will improve  Outcome: Ongoing     Problem: Pain:  Goal: Pain level will decrease  Description: Pain level will decrease  Outcome: Ongoing  Goal: Control of acute pain  Description: Control of acute pain  Outcome: Ongoing  Goal: Control of chronic pain  Description: Control of chronic pain  Outcome: Ongoing     Problem: Self-Concept:  Goal: Level of anxiety will decrease  Description: Level of anxiety will decrease  Outcome: Ongoing  Goal: Ability to verbalize feelings about condition will improve  Description: Ability to verbalize feelings about condition will improve  Outcome: Ongoing     Problem: Falls - Risk of:  Goal: Will remain free from falls  Description: Will remain free from falls  Outcome: Ongoing  Goal: Absence of physical injury  Description: Absence of physical injury  Outcome: Ongoing

## 2021-12-07 NOTE — PROGRESS NOTES
Pt resting in bed. Report received from C4. Call light within reach. Orientated to patient to room and surroundings. Jose Neumann RN

## 2021-12-07 NOTE — FLOWSHEET NOTE
12/06/21 2031   Assessment   Charting Type Shift assessment   Neurological   Neuro (WDL) X  (post-ictal)   Level of Consciousness Alert (0)   Orientation Level Oriented X4   Cognition Appropriate judgement; Appropriate safety awareness; Appropriate attention/concentration; Appropriate for developmental age   Language Clear;  Appropriate for developmental age   Size R Pupil (mm) 3   R Pupil Shape Round   R Pupil Reaction Brisk   Size L Pupil (mm) 3   L Pupil Shape Round   L Pupil Reaction Brisk   R Hand  Strong   L Hand  Strong   R Foot Dorsiflexion Strong   L Foot Dorsiflexion Strong   R Foot Plantar Flexion Strong   L Foot Plantar Flexion Strong   RUE Motor Response Responds to command; Normal extension; Normal flexion   Sensation RUE Full sensation   LUE Motor Response Responds to command; Normal extension; Normal flexion   Sensation LUE Full sensation   RLE Motor Response Responds to command; Normal extension; Normal flexion   Sensation RLE Full sensation   LLE Motor Response Responds to command; Normal extension; Normal flexion   Sensation LLE Full sensation   Gag Present   Kristen Coma Scale   Eye Opening 4   Best Verbal Response 5   Best Motor Response 6   [place] Coma Scale Score 15   Respiratory   Respiratory (WDL) X   Respiratory Pattern Regular   Respiratory Depth Normal   Respiratory Quality/Effort Unlabored   Chest Assessment Chest expansion symmetrical   L Breath Sounds Diminished   R Breath Sounds Diminished   Breath Sounds   Right Upper Lobe Diminished   Right Middle Lobe Diminished   Right Lower Lobe Diminished   Left Upper Lobe Diminished   Left Lower Lobe Diminished   Cardiac   Cardiac (WDL) WDL   Heart Sounds S1, S2   Cardiac Rhythm Sinus rhythm   Cardiac Regularity Regular   Cardiac Monitor   Telemetry Monitor On Yes   Telemetry Audible Yes   Telemetry Alarms Set Yes   Telemetry Box Number 119   Gastrointestinal   Abdominal (WDL) WDL   Peripheral Vascular   Peripheral Vascular (WDL) WDL Edema None   Skin Color/Condition   Skin Color/Condition (WDL) WDL   Skin Integrity   Skin Integrity (WDL) WDL   Musculoskeletal   Musculoskeletal (WDL) WDL   Genitourinary   Genitourinary (WDL) WDL   Flank Tenderness No   Suprapubic Tenderness No   Dysuria No   Urine Assessment   Urine Color Yellow/straw   Urine Appearance Clear   Anus/Rectum   Anus/Rectum (WDL) WDL   Psychosocial   Psychosocial (WDL) WDL   Patient Behaviors Calm;  Cooperative

## 2021-12-07 NOTE — DISCHARGE SUMMARY
Hospital Medicine Discharge Summary    Patient ID: Tony Maldonado      Patient's PCP: No primary care provider on file. Admit Date: 12/4/2021     Discharge Date: 12/7/2021      Admitting Physician: Victor M Vo MD     Discharge Physician: Kaila Estrada MD     Discharge Diagnoses: Active Hospital Problems    Diagnosis     High anion gap metabolic acidosis [Y39.4]     Seizure ([address] [R56.9]     Acute encephalopathy [G93.40]     Takotsubo cardiomyopathy [I51.81]     Polydrug abuse ([address] [F19.10]        The patient was seen and examined on day of discharge and this discharge summary is in conjunction with any daily progress note from day of discharge. Hospital Course:   History Of Present Illness:    32 y.o. male who presented to [facility] with above complaints  Patient with PMH of medical refractory epilepsy, status epilepticus, polysubstance abuse, Takotsubo cardiomyopathy presented to the ER today with complaints of seizure. Patient's mother present at bedside provides some of the history. She reports patient and his girlfriend were driving back home in a car from shopping, when he suddenly reported to his girlfriend that he was feeling weird and about to have a seizure and went on to have a generalized tonic-clonic seizure. Patient's mother reports his last seizure was a partial seizure that he had about 2 weeks ago. Looks like patient was here in the ER on 10/24 with seizure as well. He is maintained on Oxtellar XR. Sees neurology in . Upon arrival to the ED patient was encephalopathic and confused, agitated requiring repeated doses of IV Ativan, and subsequently started on Precedex gtt. he did not have any obvious seizure-like activity in the ED. patient technically did not return to baseline in the ER, continues to be sedated now on the Precedex gtt. his urine drug screen was positive for benzos, cannabinoids and methadone.   Patient's mother reports that he does not use any drugs, he is on methadone therapy. Acute encephalopathy -resolved  Likely due to seizure and prolonged postictal state , and acute toxic metabolic encephalopathy due to drugs with UDS positive for methadone, benzos and cannabinoids.  -Currently initially sedated on Precedex gtt maintaining spontaneous respirations  -Consulted neurology  -EEG results were within normal limits  -CT head reviewed, no acute abnormality  -Correct underlying metabolic derangements  -IV Keppra was started, stopped by neuro      Possible aspiration pneumonitis- due to seizure?, procal was slightly elevated  Started on iv unasyn, switched to augmentin on dc(with florastor)  -repeat cxr 12/7 showed improvement     Breakthrough seizure  Medical refractory epilepsy  Hx of TBI, Right temporal seizures since 2019  -Sedated on Precedex  -Neurology consult, EEG  -IV Keppra twice daily, stopped by neuro given concern for SI and psychosis  -No recurrent/ongoing seizure-like activity, low clinical suspicion for status epilepticus   -continue home dosin gof Xcopri  -neuro rec inc'g to 2400mg daily of Oxtellar(from home dose of 1800mg) but given noncompliance, continued at home dose on discharge, spoke to [facility] neurology on day of dc(they will contact pt for f/u)     High anion gap metabolic acidosis- resolved  2/2 lactic acidosis from seizure  -Lactic improving with IV fluids, continue MIVF with bicarb drip  -Rechecked BMP in a.m.     ? Polysubstance abuse  -Patient mother endorsed patient does not do any drugs.  Not sure of the urine tox screen is showing benzos from IV Ativan that he received in the ED. patient is already on prescribed methadone.        Physical Exam Performed:     /87   Pulse 57   Temp 98.5 °F (36.9 °C) (Oral)   Resp 20   Ht 5' 11\" (1.803 m)   Wt 124 lb 12.8 oz (56.6 kg)   SpO2 97%   BMI 17.41 kg/m²       General appearance:  No apparent distress, appears stated age and cooperative.   HEENT:  Normal cephalic, atraumatic without obvious deformity. Pupils equal, round, and reactive to light. Extra ocular muscles intact. Conjunctivae/corneas clear. Neck: Supple, with full range of motion. No jugular venous distention. Trachea midline. Respiratory:  Normal respiratory effort. Clear to auscultation, bilaterally without Rales/Wheezes/Rhonchi. Cardiovascular:  Regular rate and rhythm with normal S1/S2 without murmurs, rubs or gallops. Abdomen: Soft, non-tender, non-distended with normal bowel sounds. Musculoskeletal:  No clubbing, cyanosis or edema bilaterally. Full range of motion without deformity. Skin: Skin color, texture, turgor normal.  No rashes or lesions. Neurologic:  Neurovascularly intact without any focal sensory/motor deficits. Cranial nerves: II-XII intact, grossly non-focal.  Psychiatric:  Alert and oriented, thought content appropriate, normal insight  Capillary Refill: Brisk,< 3 seconds   Peripheral Pulses: +2 palpable, equal bilaterally       Labs: For convenience and continuity at follow-up the following most recent labs are provided:      CBC:    Lab Results   Component Value Date    WBC 8.3 12/07/2021    HGB 13.6 12/07/2021    HCT 40.3 12/07/2021     12/07/2021       Renal:    Lab Results   Component Value Date     12/07/2021    K 4.8 12/07/2021     12/07/2021    CO2 28 12/07/2021    BUN 9 12/07/2021    CREATININE 0.7 12/07/2021    CALCIUM 9.5 12/07/2021    PHOS 3.4 02/26/2021         Significant Diagnostic Studies    Radiology:   XR CHEST (2 VW)   Final Result   Resolution of right lower lobe parenchymal opacity. No consolidation or   pleural fluid. XR CHEST PORTABLE   Final Result   Right parahilar pulmonary opacity could represent aspiration or infection         XR CHEST PORTABLE   Final Result   Technically limited portable radiograph. No focal consolidative change. Questionable mild vascular congestion or edema.          CT Head WO Contrast   Final Result   No acute intracranial abnormality. Consults:     IP CONSULT TO NEUROLOGY    Disposition:  home     Condition at Discharge: Stable    Discharge Instructions/Follow-up:   neurology as outpatient in next 1-2 weeks for adjustment in medications    Code Status: FULL    Activity: activity as tolerated    Diet: regular diet      Discharge Medications:     Discharge Medication List as of 12/7/2021  3:41 PM           Details   amoxicillin-clavulanate (AUGMENTIN) 875-125 MG per tablet Take 1 tablet by mouth every 12 hours for 3 days Take with food, Disp-6 tablet, R-0Normal      saccharomyces boulardii (FLORASTOR) 250 MG capsule Take 1 capsule by mouth 2 times daily for 7 days, Disp-14 capsule, R-0Normal              Details   OXcarbazepine ER (OXTELLAR XR) 600 MG TB24 Take 1,800 mg by mouth dailyHistorical Med      carvedilol (COREG) 3.125 MG tablet Take 1 tablet by mouth 2 times daily (with meals), Disp-60 tablet, R-1Normal      furosemide (LASIX) 20 MG tablet Take 1 tablet by mouth daily as needed (for weight gain 3lbs in a day or 5lb in a week with leg swelling.), Disp-30 tablet, R-5Normal      sacubitril-valsartan (ENTRESTO) 49-51 MG per tablet Take 1 tablet by mouth 2 times daily, Disp-60 tablet, R-2Normal      methadone (DOLOPHINE) 10 MG/ML solution Take 95 mg by mouth daily. Historical Med      aspirin 81 MG chewable tablet Take 81 mg by mouth daily (with breakfast)Historical Med      Cenobamate (XCOPRI PO) Take by mouthHistorical Med             Time Spent on discharge is more than 30 minutes in the examination, evaluation, counseling and review of medications and discharge plan. Signed:    Beryle Ganja, MD   12/7/2021      Thank you No primary care provider on file. for the opportunity to be involved in this patient's care. If you have any questions or concerns please feel free to contact me at [phone].

## 2021-12-07 NOTE — PROGRESS NOTES
Discharge instructions given to patient and pt verbalized understanding with no questions or concerns. Pt taken out by wheelchair to vehicle and safe on departure. Seng Claudio RN

## 2021-12-07 NOTE — CARE COORDINATION
Chart reviewed, pt new to A1, no discharge needs noted at this time. Neuro signed off, pt to f/u with primary neurologist at [facility].   ALEKS Verdin-RN

## 2022-02-20 ENCOUNTER — APPOINTMENT (OUTPATIENT)
Dept: CT IMAGING | Age: 32
End: 2022-02-20
Payer: MEDICARE

## 2022-02-20 ENCOUNTER — HOSPITAL ENCOUNTER (EMERGENCY)
Age: 32
Discharge: HOME | End: 2022-02-20
Attending: EMERGENCY MEDICINE
Payer: MEDICARE

## 2022-02-20 ENCOUNTER — APPOINTMENT (OUTPATIENT)
Dept: GENERAL RADIOLOGY | Age: 32
End: 2022-02-20
Payer: MEDICARE

## 2022-02-20 VITALS
DIASTOLIC BLOOD PRESSURE: 89 MMHG | OXYGEN SATURATION: 100 % | HEART RATE: 97 BPM | SYSTOLIC BLOOD PRESSURE: 122 MMHG | TEMPERATURE: 98 F | RESPIRATION RATE: 18 BRPM

## 2022-02-20 DIAGNOSIS — R41.82 ALTERED MENTAL STATUS, UNSPECIFIED ALTERED MENTAL STATUS TYPE: ICD-10-CM

## 2022-02-20 DIAGNOSIS — R51.9 ACUTE NONINTRACTABLE HEADACHE, UNSPECIFIED HEADACHE TYPE: ICD-10-CM

## 2022-02-20 DIAGNOSIS — R31.29 MICROSCOPIC HEMATURIA: ICD-10-CM

## 2022-02-20 DIAGNOSIS — R74.01 TRANSAMINITIS: ICD-10-CM

## 2022-02-20 DIAGNOSIS — R56.9 SEIZURE (HCC): Primary | ICD-10-CM

## 2022-02-20 LAB
A/G RATIO: 1.5 (ref 1.1–2.2)
A/G RATIO: 1.6 (ref 1.1–2.2)
ACETAMINOPHEN LEVEL: <5 UG/ML (ref 10–30)
ALBUMIN SERPL-MCNC: 4.3 G/DL (ref 3.4–5)
ALBUMIN SERPL-MCNC: 4.7 G/DL (ref 3.4–5)
ALP BLD-CCNC: 120 U/L (ref 40–129)
ALP BLD-CCNC: 134 U/L (ref 40–129)
ALT SERPL-CCNC: 49 U/L (ref 10–40)
ALT SERPL-CCNC: 55 U/L (ref 10–40)
AMPHETAMINE SCREEN, URINE: ABNORMAL
ANION GAP SERPL CALCULATED.3IONS-SCNC: 12 MMOL/L (ref 3–16)
ANION GAP SERPL CALCULATED.3IONS-SCNC: 27 MMOL/L (ref 3–16)
AST SERPL-CCNC: 36 U/L (ref 15–37)
AST SERPL-CCNC: 46 U/L (ref 15–37)
BACTERIA: ABNORMAL /HPF
BARBITURATE SCREEN URINE: ABNORMAL
BASE EXCESS VENOUS: -5.9 MMOL/L (ref -3–3)
BASE EXCESS VENOUS: -6.6 MMOL/L (ref -3–3)
BASOPHILS ABSOLUTE: 0.1 K/UL (ref 0–0.2)
BASOPHILS RELATIVE PERCENT: 0.4 %
BENZODIAZEPINE SCREEN, URINE: POSITIVE
BILIRUB SERPL-MCNC: 0.3 MG/DL (ref 0–1)
BILIRUB SERPL-MCNC: <0.2 MG/DL (ref 0–1)
BILIRUBIN URINE: NEGATIVE
BLOOD, URINE: ABNORMAL
BUN BLDV-MCNC: 11 MG/DL (ref 7–20)
BUN BLDV-MCNC: 12 MG/DL (ref 7–20)
CALCIUM SERPL-MCNC: 8.1 MG/DL (ref 8.3–10.6)
CALCIUM SERPL-MCNC: 9.4 MG/DL (ref 8.3–10.6)
CANNABINOID SCREEN URINE: POSITIVE
CARBOXYHEMOGLOBIN: 2.9 % (ref 0–1.5)
CARBOXYHEMOGLOBIN: 4 % (ref 0–1.5)
CHLORIDE BLD-SCNC: 105 MMOL/L (ref 99–110)
CHLORIDE BLD-SCNC: 94 MMOL/L (ref 99–110)
CLARITY: CLEAR
CO2: 12 MMOL/L (ref 21–32)
CO2: 21 MMOL/L (ref 21–32)
COCAINE METABOLITE SCREEN URINE: ABNORMAL
COLOR: YELLOW
CREAT SERPL-MCNC: 0.8 MG/DL (ref 0.9–1.3)
CREAT SERPL-MCNC: 0.9 MG/DL (ref 0.9–1.3)
EOSINOPHILS ABSOLUTE: 0.3 K/UL (ref 0–0.6)
EOSINOPHILS RELATIVE PERCENT: 2.4 %
ETHANOL: NORMAL MG/DL (ref 0–0.08)
GFR AFRICAN AMERICAN: >60
GFR AFRICAN AMERICAN: >60
GFR NON-AFRICAN AMERICAN: >60
GFR NON-AFRICAN AMERICAN: >60
GLUCOSE BLD-MCNC: 218 MG/DL (ref 70–99)
GLUCOSE BLD-MCNC: 241 MG/DL (ref 70–99)
GLUCOSE BLD-MCNC: 87 MG/DL (ref 70–99)
GLUCOSE URINE: NEGATIVE MG/DL
HCO3 VENOUS: 18.1 MMOL/L (ref 23–29)
HCO3 VENOUS: 20.1 MMOL/L (ref 23–29)
HCT VFR BLD CALC: 43.4 % (ref 40.5–52.5)
HEMOGLOBIN: 14.4 G/DL (ref 13.5–17.5)
KETONES, URINE: NEGATIVE MG/DL
LACTIC ACID: 1.9 MMOL/L (ref 0.4–2)
LACTIC ACID: 13.8 MMOL/L (ref 0.4–2)
LEUKOCYTE ESTERASE, URINE: NEGATIVE
LYMPHOCYTES ABSOLUTE: 5 K/UL (ref 1–5.1)
LYMPHOCYTES RELATIVE PERCENT: 39.6 %
Lab: ABNORMAL
MAGNESIUM: 2.8 MG/DL (ref 1.8–2.4)
MCH RBC QN AUTO: 31 PG (ref 26–34)
MCHC RBC AUTO-ENTMCNC: 33.1 G/DL (ref 31–36)
MCV RBC AUTO: 93.5 FL (ref 80–100)
METHADONE SCREEN, URINE: POSITIVE
METHEMOGLOBIN VENOUS: 0.5 %
METHEMOGLOBIN VENOUS: 0.8 %
MICROSCOPIC EXAMINATION: YES
MONOCYTES ABSOLUTE: 0.4 K/UL (ref 0–1.3)
MONOCYTES RELATIVE PERCENT: 3.6 %
NEUTROPHILS ABSOLUTE: 6.8 K/UL (ref 1.7–7.7)
NEUTROPHILS RELATIVE PERCENT: 54 %
NITRITE, URINE: NEGATIVE
O2 SAT, VEN: 67 %
O2 SAT, VEN: 92 %
O2 THERAPY: ABNORMAL
O2 THERAPY: ABNORMAL
OPIATE SCREEN URINE: ABNORMAL
OXYCODONE URINE: ABNORMAL
PCO2, VEN: 31.4 MMHG (ref 40–50)
PCO2, VEN: 44.3 MMHG (ref 40–50)
PDW BLD-RTO: 13.4 % (ref 12.4–15.4)
PERFORMED ON: ABNORMAL
PH UA: 6
PH UA: 6 (ref 5–8)
PH VENOUS: 7.28 (ref 7.35–7.45)
PH VENOUS: 7.38 (ref 7.35–7.45)
PHENCYCLIDINE SCREEN URINE: ABNORMAL
PLATELET # BLD: 293 K/UL (ref 135–450)
PMV BLD AUTO: 6.8 FL (ref 5–10.5)
PO2, VEN: 39.7 MMHG (ref 25–40)
PO2, VEN: 65.2 MMHG (ref 25–40)
POTASSIUM SERPL-SCNC: 4.1 MMOL/L (ref 3.5–5.1)
POTASSIUM SERPL-SCNC: 4.4 MMOL/L (ref 3.5–5.1)
PROPOXYPHENE SCREEN: ABNORMAL
PROTEIN UA: 30 MG/DL
RBC # BLD: 4.64 M/UL (ref 4.2–5.9)
RBC UA: ABNORMAL /HPF (ref 0–4)
REASON FOR REJECTION: NORMAL
REJECTED TEST: NORMAL
SALICYLATE, SERUM: <0.3 MG/DL (ref 15–30)
SODIUM BLD-SCNC: 133 MMOL/L (ref 136–145)
SODIUM BLD-SCNC: 138 MMOL/L (ref 136–145)
SPECIFIC GRAVITY UA: 1.02 (ref 1–1.03)
TCO2 CALC VENOUS: 19 MMOL/L
TCO2 CALC VENOUS: 22 MMOL/L
TOTAL PROTEIN: 7 G/DL (ref 6.4–8.2)
TOTAL PROTEIN: 7.9 G/DL (ref 6.4–8.2)
TROPONIN: <0.01 NG/ML
URINE TYPE: ABNORMAL
UROBILINOGEN, URINE: 0.2 E.U./DL
WBC # BLD: 12.5 K/UL (ref 4–11)
WBC UA: ABNORMAL /HPF (ref 0–5)

## 2022-02-20 PROCEDURE — 85025 COMPLETE CBC W/AUTO DIFF WBC: CPT

## 2022-02-20 PROCEDURE — 96365 THER/PROPH/DIAG IV INF INIT: CPT

## 2022-02-20 PROCEDURE — 82077 ASSAY SPEC XCP UR&BREATH IA: CPT

## 2022-02-20 PROCEDURE — 96375 TX/PRO/DX INJ NEW DRUG ADDON: CPT

## 2022-02-20 PROCEDURE — 2580000003 HC RX 258: Performed by: EMERGENCY MEDICINE

## 2022-02-20 PROCEDURE — 2580000003 HC RX 258: Performed by: PHYSICIAN ASSISTANT

## 2022-02-20 PROCEDURE — 93005 ELECTROCARDIOGRAM TRACING: CPT | Performed by: EMERGENCY MEDICINE

## 2022-02-20 PROCEDURE — 70450 CT HEAD/BRAIN W/O DYE: CPT

## 2022-02-20 PROCEDURE — 82803 BLOOD GASES ANY COMBINATION: CPT

## 2022-02-20 PROCEDURE — 71045 X-RAY EXAM CHEST 1 VIEW: CPT

## 2022-02-20 PROCEDURE — 81001 URINALYSIS AUTO W/SCOPE: CPT

## 2022-02-20 PROCEDURE — 83735 ASSAY OF MAGNESIUM: CPT

## 2022-02-20 PROCEDURE — 99284 EMERGENCY DEPT VISIT MOD MDM: CPT

## 2022-02-20 PROCEDURE — 6360000002 HC RX W HCPCS: Performed by: EMERGENCY MEDICINE

## 2022-02-20 PROCEDURE — 80143 DRUG ASSAY ACETAMINOPHEN: CPT

## 2022-02-20 PROCEDURE — 80307 DRUG TEST PRSMV CHEM ANLYZR: CPT

## 2022-02-20 PROCEDURE — 80179 DRUG ASSAY SALICYLATE: CPT

## 2022-02-20 PROCEDURE — 84484 ASSAY OF TROPONIN QUANT: CPT

## 2022-02-20 PROCEDURE — 80053 COMPREHEN METABOLIC PANEL: CPT

## 2022-02-20 PROCEDURE — 83605 ASSAY OF LACTIC ACID: CPT

## 2022-02-20 RX ORDER — CARVEDILOL 3.12 MG/1
3.12 TABLET ORAL 2 TIMES DAILY WITH MEALS
Qty: 60 TABLET | Refills: 1 | Status: SHIPPED | OUTPATIENT
Start: 2022-02-20 | End: 2022-03-14 | Stop reason: SDUPTHER

## 2022-02-20 RX ORDER — DIPHENHYDRAMINE HYDROCHLORIDE 50 MG/ML
12.5 INJECTION INTRAMUSCULAR; INTRAVENOUS ONCE
Status: COMPLETED | OUTPATIENT
Start: 2022-02-20 | End: 2022-02-20

## 2022-02-20 RX ORDER — METOCLOPRAMIDE HYDROCHLORIDE 5 MG/ML
10 INJECTION INTRAMUSCULAR; INTRAVENOUS ONCE
Status: COMPLETED | OUTPATIENT
Start: 2022-02-20 | End: 2022-02-20

## 2022-02-20 RX ORDER — 0.9 % SODIUM CHLORIDE 0.9 %
1000 INTRAVENOUS SOLUTION INTRAVENOUS ONCE
Status: COMPLETED | OUTPATIENT
Start: 2022-02-20 | End: 2022-02-20

## 2022-02-20 RX ORDER — KETOROLAC TROMETHAMINE 30 MG/ML
15 INJECTION, SOLUTION INTRAMUSCULAR; INTRAVENOUS ONCE
Status: COMPLETED | OUTPATIENT
Start: 2022-02-20 | End: 2022-02-20

## 2022-02-20 RX ORDER — BUTALBITAL, ACETAMINOPHEN AND CAFFEINE 50; 325; 40 MG/1; MG/1; MG/1
1 TABLET ORAL ONCE
Status: DISCONTINUED | OUTPATIENT
Start: 2022-02-20 | End: 2022-02-21 | Stop reason: HOSPADM

## 2022-02-20 RX ORDER — FUROSEMIDE 20 MG/1
20 TABLET ORAL DAILY PRN
Qty: 30 TABLET | Refills: 5 | Status: SHIPPED | OUTPATIENT
Start: 2022-02-20 | End: 2022-09-16 | Stop reason: SDUPTHER

## 2022-02-20 RX ORDER — SACUBITRIL AND VALSARTAN 49; 51 MG/1; MG/1
1 TABLET, FILM COATED ORAL 2 TIMES DAILY
Qty: 60 TABLET | Refills: 2 | Status: SHIPPED | OUTPATIENT
Start: 2022-02-20 | End: 2022-03-14 | Stop reason: SDUPTHER

## 2022-02-20 RX ORDER — ACETAMINOPHEN 500 MG
1000 TABLET ORAL ONCE
Status: DISCONTINUED | OUTPATIENT
Start: 2022-02-20 | End: 2022-02-21 | Stop reason: HOSPADM

## 2022-02-20 RX ADMIN — KETOROLAC TROMETHAMINE 15 MG: 30 INJECTION, SOLUTION INTRAMUSCULAR at 20:33

## 2022-02-20 RX ADMIN — LEVETIRACETAM 1000 MG: 100 INJECTION, SOLUTION INTRAVENOUS at 17:35

## 2022-02-20 RX ADMIN — SODIUM CHLORIDE 1000 ML: 9 INJECTION, SOLUTION INTRAVENOUS at 17:27

## 2022-02-20 RX ADMIN — DIPHENHYDRAMINE HYDROCHLORIDE 12.5 MG: 50 INJECTION, SOLUTION INTRAMUSCULAR; INTRAVENOUS at 20:33

## 2022-02-20 RX ADMIN — SODIUM CHLORIDE 1000 ML: 9 INJECTION, SOLUTION INTRAVENOUS at 15:38

## 2022-02-20 RX ADMIN — METOCLOPRAMIDE HYDROCHLORIDE 10 MG: 5 INJECTION INTRAMUSCULAR; INTRAVENOUS at 20:32

## 2022-02-20 ASSESSMENT — PAIN SCALES - GENERAL: PAINLEVEL_OUTOF10: 8

## 2022-02-20 NOTE — Clinical Note
Juan Rinaldi was seen and treated in our emergency department on 2/20/2022. He may return to work on 02/22/2022. If you have any questions or concerns, please don't hesitate to call.       Elda Reddy MD

## 2022-02-20 NOTE — ED PROVIDER NOTES
I independently performed a history and physical on Guillaume Ledezma. All diagnostic, treatment, and disposition decisions were made by myself in conjunction with the advanced practice provider. I personally saw the patient and performed a substantive portion of the visit including all aspects of the medical decision making. For further details of [facility] emergency department encounter, please see JR Monahan's documentation. Patient is a 70-year-old male presenting today due to concern for witnessed seizure by his girlfriend prior to arrival.  Per mother his girlfriend helped him down and therefore there was no reported trauma. He initially required 5 mg Versed and shortly after this became combative with EMS and therefore they gave a second 5 mg dose of her said relatively quickly after. On arrival to the emergency department he was requiring oxygen and was quite lethargic and barely arousable to sternal rub. Therefore, history and physical were severely limited due to presentation of the patient and being postictal with recent medication distribution associated with Versed. Physical:   Gen: Moderate acute distress. Obtunded  Psych: Noncommunicative  HEENT: NCAT, PERRL, MMM  Neck: supple, NTTP  Cardiac: RRR, pulses 2+ in upper extremities  Lungs: C2AB, no R/R/W  Abdomen: soft and nontender with no R/D/G  Neuro: GCS = 7 so limited neurological exam, pupils equal and reactive to light at 3 mm bilaterally    The Ekg interpreted by me shows  normal sinus rhythm with a rate of 77  Axis is   Normal  QTc is  borderline prolonged QT  Intervals and Durations are unremarkable.       ST Segments: nonspecific changes  No significant change from prior EKG dated - 12/4/21  No STEMI, hyperacute T waves no longer present today and improved compared to old EKG     Critical Care Time:    I personally saw the patient and independently provided 45 minutes minutes of non-concurrent critical care out of a total shared critical care time provided. Includes repeat examinations, lab interpretation, charting, speaking with family - mother for 5 minutes, reassessing patient initially due to concern for being obtunded and potentially needing to be intubated although he did ultimately become much more arousable in a relatively quick manner and ultimately did not require intubation and was able to be weaned off the oxygen and was GCS of 15 by the end of his stay in the ED   Excludes separate billable procedures. Patient at risk for serious decompensation if not treated for this life-threatening condition. MDM: Patient was evaluated due to concern for reported seizure activity prior to arrival.  He did receive 10 mg Versed and therefore was obtunded on arrival although did start to arouse relatively quickly and most likely was obtunded initially due to medication along with being postictal.  When I reevaluated him, he was talking although did still seem confused with GCS equals 14. Therefore, I did not initially order a head CT based on known history of seizure disorder. Initially, he had a lactic acidosis but with known history of seizure, I have very low suspicion for sepsis. Repeat lactic acid improved greatly and was back to normal.  His repeat CMP also improved. I went to reevaluate the patient at 4.5 hours (2 other times earlier in his stay as well) into his stay and at that point he was complaining of a significant headache although mother states he normally gets headaches after seizures. He reports this is similar to his other headaches with seizures and since there was no reported trauma, I do feel that intracranial hemorrhage is less likely but since he did vomit once hours after the initial seizure, I did order a head CT to be safe. His initial VBG was also mildly acidotic and therefore this was repeated as well.   After he received Reglan along with Benadryl and Toradol, his headache improved greatly and he was feeling better. He was answering questions appropriately. Head CT was negative for intracranial hemorrhage per radiologist.  He denied any chest pain or shortness of breath during repeat assessment. VBG also improved and he was no longer acidotic. At this point, he was feeling mostly back to normal although still was complaining of a moderate headache but better than prior to the Reglan and Benadryl. I did offer admission under observation based on the patient's initial assessment but the patient reported that he wanted to go home and did not want to stay in the hospital.  He had full mental capacity to make his own medical decisions and with his seizure history, I do feel this is reasonable since he has otherwise returned to his baseline mentally per patient and mother. He ambulated without any difficulty. He knows to call his neurologist first thing tomorrow morning for further recommendations to avoid any future seizure activity. He is aware that if headache continues to worsen associate with numbness or weakness on one side of the body, fever, chest pain or shortness of breath, or any other concerns, then return immediately to the emergency department, but otherwise follow-up with his neurologist or [facility] primary doctor over the next couple of days to be safe. He was well-appearing and in no acute distress at time of discharge and felt comfortable with this plan. Impression: Breakthrough seizure, headache, altered mental status that resolved, transaminitis, microscopic hematuria    Mother was aware of the urine findings and was told that he should have this repeated in a couple weeks when not having a seizure to ensure this resolves but otherwise may need to see urology.      Aurora Adhikari MD  02/22/22 2605

## 2022-02-20 NOTE — ED NOTES
Bed: 01  Expected date:   Expected time:   Means of arrival:   Comments:  M33 raisa Howe RN  02/20/22 0851

## 2022-02-20 NOTE — ED PROVIDER NOTES
[facility] Emergency Department    CHIEF COMPLAINT  Seizures (witnessed seizure at home by mother, 10mg versed given d/t combative with EMS. )      [facility]  I have seen and evaluated this patient with my supervising physician, Dr. Thony Botello. HISTORY OF PRESENT ILLNESS  Arnol Crane is a 28 y.o. male who presents to the ED complaining of seizure-like activity. Patient brought in by squad. They were called for seizures. According to family patient does have history of seizures. Recently started a new seizure medication. According to chart review also does have a history of polysubstance abuse. Squad reports that upon arrival patient was postictal and combative and they gave him 10 mg of Versed. Patient now diaphoretic and somnolent. Unable to corroborate or add to history at this time. No other complaints, modifying factors or associated symptoms. Nursing notes reviewed. Past Medical History:   Diagnosis Date    Aspiration pneumonia ([facility]     Hepatitis C     HTN (hypertension)     NSTEMI (non-ST elevated myocardial infarction) ([facility]     Polysubstance abuse (HCC)     Seizures (HCC)     Septic shock (HCC)     Takotsubo cardiomyopathy      Past Surgical History:   Procedure Laterality Date    BRONCHOSCOPY N/A 2/22/2021    BRONCHOSCOPY DIAGNOSTIC OR CELL [address] ONLY performed by Neftali Laguna MD at [address] [place] [address]  2/22/2021    BRONCHOSCOPY THERAPUTIC ASPIRATION INITIAL performed by Neftali Laguna MD at [place] [address], PARTIAL AMPUTATIONS     No family history on file.   Social History     Socioeconomic History    Marital status: Single     Spouse name: Not on file    Number of children: Not on file    Years of education: Not on file    Highest education level: Not on file   Occupational History    Not on file   Tobacco Use    Smoking status: Current Every Day Smoker Packs/day: 2.00     Types: Cigarettes    Smokeless tobacco: Never Used   Vaping Use    Vaping Use: Never used   Substance and Sexual Activity    Alcohol use: No    Drug use: Yes     Types: IV, Opiates , Marijuana (Weed), Methamphetamines (Crystal Meth)     Comment: prior heroin user; last use 4 years ago per mom as of 9/23/20,. Medical marijuana, 05/20/21- pt used since last visit meth, fentanyl, marijuana    Sexual activity: Yes     Partners: Female   Other Topics Concern    Not on file   Social History Narrative    Not on file     Social Determinants of Health     Financial Resource Strain:     Difficulty of Paying Living Expenses: Not on file   Food Insecurity:     Worried About Running Out of Food in the Last Year: Not on file    Christin of Food in the Last Year: Not on file   Transportation Needs:     Lack of Transportation (Medical): Not on file    Lack of Transportation (Non-Medical):  Not on file   Physical Activity:     Days of Exercise per Week: Not on file    Minutes of Exercise per Session: Not on file   Stress:     Feeling of Stress : Not on file   Social Connections:     Frequency of Communication with Friends and Family: Not on file    Frequency of Social Gatherings with Friends and Family: Not on file    Attends Restorationism Services: Not on file    Active Member of [address] [place] [address] Thanx or Organizations: Not on file    Attends Club or Organization Meetings: Not on file    Marital Status: Not on file   Intimate Partner Violence:     Fear of Current or Ex-Partner: Not on file    Emotionally Abused: Not on file    Physically Abused: Not on file    Sexually Abused: Not on file   Housing Stability:     Unable to Pay for Housing in the Last Year: Not on file    Number of [place] in the Last Year: Not on file    Unstable Housing in the Last Year: Not on file     Current Facility-Administered Medications   Medication Dose Route Frequency Provider Last Rate Last Admin    0.9 % sodium chloride bolus  1,000 mL IntraVENous Once Lidia Zafar, [address] 1,000 mL/hr at 02/20/22 1538 1,000 mL at 02/20/22 1538     Current Outpatient Medications   Medication Sig Dispense Refill    Cenobamate (XCOPRI PO) Take by mouth      OXcarbazepine ER (OXTELLAR XR) 600 MG TB24 Take 1,800 mg by mouth daily      carvedilol (COREG) 3.125 MG tablet Take 1 tablet by mouth 2 times daily (with meals) 60 tablet 1    furosemide (LASIX) 20 MG tablet Take 1 tablet by mouth daily as needed (for weight gain 3lbs in a day or 5lb in a week with leg swelling.) 30 tablet 5    sacubitril-valsartan (ENTRESTO) 49-51 MG per tablet Take 1 tablet by mouth 2 times daily 60 tablet 2    methadone (DOLOPHINE) 10 MG/ML solution Take 95 mg by mouth daily.  aspirin 81 MG chewable tablet Take 81 mg by mouth daily (with breakfast)       No Known Allergies    REVIEW OF SYSTEMS  10 systems reviewed, pertinent positives per HPI otherwise noted to be negative    PHYSICAL EXAM  /78   Pulse 74   Temp 98 °F (36.7 °C) (Oral)   Resp 21   SpO2 93%   GENERAL APPEARANCE: Somnolent ooperative. No acute distress. HEAD: Normocephalic. Atraumatic. No hematomas, lesions, lacerations or abrasions. Negative for gutierrez signs or raccoon's eyes. EYES: PERRL. EOM's grossly intact. No periorbital edema or erythema. No proptosis. Globe tenderness. No blood noted to anterior chambers. ENT: Mucous membranes are moist.  No oral lesions or lacerations. No TMJ pain or malocclusion. NECK: Supple. HEART: RRR. No murmurs. No paradoxical motion. LUNGS: Respirations unlabored. CTAB. Good air exchange. Protecting airway. Speaking comfortably in full sentences. No wheezing, rhonchi, rales. ABDOMEN: Soft. Non-distended. Non-tender. No guarding or rebound. EXTREMITIES: No peripheral edema. Moves all extremities equally. All extremities neurovascularly intact. SKIN: Warm and dry. No acute rashes. NEUROLOGICAL:No gross facial drooping.  Strength 5/5, sensation intact. RADIOLOGY  CT HEAD WO CONTRAST    Result Date: 2/20/2022  EXAMINATION: CT OF THE HEAD WITHOUT CONTRAST  2/20/2022 9:30 pm TECHNIQUE: CT of the head was performed without the administration of intravenous contrast. Dose modulation, iterative reconstruction, and/or weight based adjustment of the mA/kV was utilized to reduce the radiation dose to as low as reasonably achievable. COMPARISON: None. HISTORY: ORDERING SYSTEM PROVIDED HISTORY: headache after seizure, vomiting FINDINGS: Motion artifact is present. BRAIN/VENTRICLES: There is no acute intracranial hemorrhage, mass effect or midline shift. No abnormal extra-axial fluid collection. The gray-white differentiation is maintained without evidence of an acute infarct. There is no evidence of hydrocephalus. ORBITS: The visualized portion of the orbits demonstrate no acute abnormality. SINUSES: The visualized paranasal sinuses and mastoid air cells demonstrate no acute abnormality. SOFT TISSUES/SKULL:  No acute abnormality of the visualized skull or soft tissues. Motion limited exam demonstrates no acute intracranial abnormality. XR CHEST PORTABLE    Result Date: 2/20/2022  EXAMINATION: ONE XRAY VIEW OF THE CHEST 2/20/2022 3:38 pm COMPARISON: Chest x-ray dated 12/07/2021. HISTORY: ORDERING SYSTEM PROVIDED HISTORY: ams TECHNOLOGIST PROVIDED HISTORY: Reason for exam:->ams Reason for Exam: seizures FINDINGS: HEART/MEDIASTINUM: The cardiomediastinal silhouette is within normal limits. PLEURA/LUNGS: There are no focal consolidations or pleural effusions. There is no appreciable pneumothorax. BONES/SOFT TISSUE: No acute abnormality. No radiographic evidence of acute pulmonary disease. ED COURSE/MDM/DISPOSITION  Pain control was not required while here in the emergency department. .  Triage vitals stable. CBC with a mild leukocytosis at 12.5. No bandemia. Point-of-care glucose 218.   EKG normal sinus rhythm without evidence for acute ischemic change. Lactic acid was 13.8. Urinalysis without evidence for infectious process. Urine drug screen positive for benzodiazepines, cannabinoids, and methadone. CMP was showing anion gap of 27. Blood glucose of 241 although does not appear consistent with DKA. No ketonuria noted. Troponin less than 0.01. Ethanol level was negative and acetaminophen and salicylate levels also negative. Magnesium of 2.8. Patient was given an additional 1 L IV fluid bolus. Head CT without evidence for acute intercranial normality. Chest x-ray stable. VBG is showing some slight acidosis of 7.27. Pending additional fluids and reevaluation. Patient on reevaluation is mentating more clearly and does recall aura prior to seizure onset. Please refer to Dr. Isi Pratt documentation regarding ED course, evaluation, treatment, medical decision making, and disposition for this patient. CRITICAL CARE TIME  35 Minutes of critical care time spent not including separately billable procedures.          Maricarmen Buchanan, [address]  02/21/22 [place]

## 2022-02-21 LAB
EKG ATRIAL RATE: 77 BPM
EKG DIAGNOSIS: NORMAL
EKG P AXIS: 68 DEGREES
EKG P-R INTERVAL: 156 MS
EKG Q-T INTERVAL: 418 MS
EKG QRS DURATION: 94 MS
EKG QTC CALCULATION (BAZETT): 473 MS
EKG R AXIS: 55 DEGREES
EKG T AXIS: 57 DEGREES
EKG VENTRICULAR RATE: 77 BPM

## 2022-02-21 PROCEDURE — 93010 ELECTROCARDIOGRAM REPORT: CPT | Performed by: INTERNAL MEDICINE

## 2022-02-21 NOTE — ED NOTES
Writer entered room with PCA to help perform walk test.  Pt did not want to get out of bed, wanted to sleep a little longer. Writer informed pt that we needed the bed for another patient as it was our number 1 trauma room. Pt stood from bed and replied, Alexyyolymandi Alo are you being a fucking smart-ass? Why you gotta be a fucking smart ass? \" Writer attempted to calm patient but was unsuccessful. Charge nurse entered room to assist and ended up calling for assistance from the  to assist in helping pt to calm. Pt was discharged and escorted out without further issues.

## 2022-02-21 NOTE — ED NOTES
Ambulated pt with portable SpO2 monitor. RN assisted with procedure. Pt had O2 sat of 97% with HR of ~100. MD aware of result.      Olena Mccray  02/20/22 0880
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
## 2022-03-14 ENCOUNTER — OFFICE VISIT (OUTPATIENT)
Dept: CARDIOLOGY CLINIC | Age: 32
End: 2022-03-14
Payer: MEDICARE

## 2022-03-14 VITALS
DIASTOLIC BLOOD PRESSURE: 64 MMHG | WEIGHT: 129 LBS | BODY MASS INDEX: 18.06 KG/M2 | SYSTOLIC BLOOD PRESSURE: 114 MMHG | HEART RATE: 74 BPM | OXYGEN SATURATION: 99 % | HEIGHT: 71 IN

## 2022-03-14 DIAGNOSIS — Z72.0 TOBACCO ABUSE: ICD-10-CM

## 2022-03-14 DIAGNOSIS — I50.22 CHRONIC SYSTOLIC HEART FAILURE (HCC): Primary | ICD-10-CM

## 2022-03-14 DIAGNOSIS — I51.81 STRESS-INDUCED CARDIOMYOPATHY: ICD-10-CM

## 2022-03-14 PROCEDURE — G8484 FLU IMMUNIZE NO ADMIN: HCPCS | Performed by: NURSE PRACTITIONER

## 2022-03-14 PROCEDURE — G8427 DOCREV CUR MEDS BY ELIG CLIN: HCPCS | Performed by: NURSE PRACTITIONER

## 2022-03-14 PROCEDURE — 4004F PT TOBACCO SCREEN RCVD TLK: CPT | Performed by: NURSE PRACTITIONER

## 2022-03-14 PROCEDURE — G8419 CALC BMI OUT NRM PARAM NOF/U: HCPCS | Performed by: NURSE PRACTITIONER

## 2022-03-14 PROCEDURE — 99214 OFFICE O/P EST MOD 30 MIN: CPT | Performed by: NURSE PRACTITIONER

## 2022-03-14 RX ORDER — DULOXETIN HYDROCHLORIDE 30 MG/1
30 CAPSULE, DELAYED RELEASE ORAL DAILY
COMMUNITY
Start: 2022-02-10

## 2022-03-14 RX ORDER — SACUBITRIL AND VALSARTAN 49; 51 MG/1; MG/1
1 TABLET, FILM COATED ORAL 2 TIMES DAILY
Qty: 60 TABLET | Refills: 3 | Status: SHIPPED | OUTPATIENT
Start: 2022-03-14 | End: 2022-07-06 | Stop reason: SDUPTHER

## 2022-03-14 RX ORDER — CARVEDILOL 3.12 MG/1
3.12 TABLET ORAL 2 TIMES DAILY WITH MEALS
Qty: 60 TABLET | Refills: 3 | Status: SHIPPED | OUTPATIENT
Start: 2022-03-14 | End: 2022-07-06 | Stop reason: SDUPTHER

## 2022-03-14 NOTE — PATIENT INSTRUCTIONS
1. Continue entresto 49-51mg twice a day  2. Continue coreg 3.125mg twice a day  3. Take the lasix as needed for leg swelling  4. Repeat echocardiogram in July  5.  Follow up after echocardiogram

## 2022-03-16 ENCOUNTER — APPOINTMENT (OUTPATIENT)
Dept: CT IMAGING | Age: 32
DRG: 053 | End: 2022-03-16
Payer: MEDICARE

## 2022-03-16 ENCOUNTER — APPOINTMENT (OUTPATIENT)
Dept: GENERAL RADIOLOGY | Age: 32
DRG: 053 | End: 2022-03-16
Payer: MEDICARE

## 2022-03-16 ENCOUNTER — HOSPITAL ENCOUNTER (INPATIENT)
Age: 32
LOS: 2 days | Discharge: HOME | DRG: 053 | End: 2022-03-18
Attending: EMERGENCY MEDICINE | Admitting: INTERNAL MEDICINE
Payer: MEDICARE

## 2022-03-16 DIAGNOSIS — F11.20 METHADONE DEPENDENCE (HCC): ICD-10-CM

## 2022-03-16 DIAGNOSIS — R56.9 SEIZURE (HCC): Primary | ICD-10-CM

## 2022-03-16 DIAGNOSIS — F19.10 SUBSTANCE ABUSE (HCC): ICD-10-CM

## 2022-03-16 DIAGNOSIS — R41.82 ALTERED MENTAL STATUS, UNSPECIFIED ALTERED MENTAL STATUS TYPE: ICD-10-CM

## 2022-03-16 LAB
A/G RATIO: 1.9 (ref 1.1–2.2)
ALBUMIN SERPL-MCNC: 4.4 G/DL (ref 3.4–5)
ALBUMIN SERPL-MCNC: 4.8 G/DL (ref 3.4–5)
ALP BLD-CCNC: 125 U/L (ref 40–129)
ALT SERPL-CCNC: 35 U/L (ref 10–40)
AMPHETAMINE SCREEN, URINE: ABNORMAL
ANION GAP SERPL CALCULATED.3IONS-SCNC: 14 MMOL/L (ref 3–16)
ANION GAP SERPL CALCULATED.3IONS-SCNC: 23 MMOL/L (ref 3–16)
AST SERPL-CCNC: 32 U/L (ref 15–37)
ATYPICAL LYMPHOCYTE RELATIVE PERCENT: 2 % (ref 0–6)
BANDED NEUTROPHILS RELATIVE PERCENT: 4 % (ref 0–7)
BARBITURATE SCREEN URINE: ABNORMAL
BASE EXCESS VENOUS: -1.6 MMOL/L (ref -3–3)
BASOPHILS ABSOLUTE: 0 K/UL (ref 0–0.2)
BASOPHILS RELATIVE PERCENT: 0 %
BENZODIAZEPINE SCREEN, URINE: POSITIVE
BILIRUB SERPL-MCNC: <0.2 MG/DL (ref 0–1)
BUN BLDV-MCNC: 11 MG/DL (ref 7–20)
BUN BLDV-MCNC: 9 MG/DL (ref 7–20)
CALCIUM SERPL-MCNC: 9 MG/DL (ref 8.3–10.6)
CALCIUM SERPL-MCNC: 9.4 MG/DL (ref 8.3–10.6)
CANNABINOID SCREEN URINE: POSITIVE
CARBOXYHEMOGLOBIN: 2.3 % (ref 0–1.5)
CHLORIDE BLD-SCNC: 100 MMOL/L (ref 99–110)
CHLORIDE BLD-SCNC: 98 MMOL/L (ref 99–110)
CO2: 14 MMOL/L (ref 21–32)
CO2: 20 MMOL/L (ref 21–32)
COCAINE METABOLITE SCREEN URINE: ABNORMAL
CREAT SERPL-MCNC: 0.7 MG/DL (ref 0.9–1.3)
CREAT SERPL-MCNC: 0.9 MG/DL (ref 0.9–1.3)
EKG ATRIAL RATE: 106 BPM
EKG DIAGNOSIS: NORMAL
EKG P AXIS: 49 DEGREES
EKG P-R INTERVAL: 126 MS
EKG Q-T INTERVAL: 336 MS
EKG QRS DURATION: 80 MS
EKG QTC CALCULATION (BAZETT): 446 MS
EKG R AXIS: 57 DEGREES
EKG T AXIS: 76 DEGREES
EKG VENTRICULAR RATE: 106 BPM
EOSINOPHILS ABSOLUTE: 0.9 K/UL (ref 0–0.6)
EOSINOPHILS RELATIVE PERCENT: 7 %
GFR AFRICAN AMERICAN: >60
GFR AFRICAN AMERICAN: >60
GFR NON-AFRICAN AMERICAN: >60
GFR NON-AFRICAN AMERICAN: >60
GLUCOSE BLD-MCNC: 126 MG/DL (ref 70–99)
GLUCOSE BLD-MCNC: 142 MG/DL (ref 70–99)
HCO3 VENOUS: 25 MMOL/L (ref 23–29)
HCT VFR BLD CALC: 41.7 % (ref 40.5–52.5)
HEMATOLOGY PATH CONSULT: YES
HEMOGLOBIN: 13.7 G/DL (ref 13.5–17.5)
LACTIC ACID: 1.9 MMOL/L (ref 0.4–2)
LACTIC ACID: 11.7 MMOL/L (ref 0.4–2)
LYMPHOCYTES ABSOLUTE: 6.6 K/UL (ref 1–5.1)
LYMPHOCYTES RELATIVE PERCENT: 48 %
Lab: ABNORMAL
MCH RBC QN AUTO: 30.3 PG (ref 26–34)
MCHC RBC AUTO-ENTMCNC: 32.8 G/DL (ref 31–36)
MCV RBC AUTO: 92.4 FL (ref 80–100)
METHADONE SCREEN, URINE: POSITIVE
METHEMOGLOBIN VENOUS: 0.8 %
MONOCYTES ABSOLUTE: 0.1 K/UL (ref 0–1.3)
MONOCYTES RELATIVE PERCENT: 1 %
NEUTROPHILS ABSOLUTE: 5.5 K/UL (ref 1.7–7.7)
NEUTROPHILS RELATIVE PERCENT: 38 %
O2 SAT, VEN: 98 %
O2 THERAPY: ABNORMAL
OPIATE SCREEN URINE: ABNORMAL
OXYCODONE URINE: ABNORMAL
PCO2, VEN: 49.5 MMHG (ref 40–50)
PDW BLD-RTO: 13.6 % (ref 12.4–15.4)
PH UA: 6
PH VENOUS: 7.32 (ref 7.35–7.45)
PHENCYCLIDINE SCREEN URINE: ABNORMAL
PHOSPHORUS: 4 MG/DL (ref 2.5–4.9)
PLATELET # BLD: 259 K/UL (ref 135–450)
PLATELET SLIDE REVIEW: ADEQUATE
PMV BLD AUTO: 6.9 FL (ref 5–10.5)
PO2, VEN: 144.3 MMHG (ref 25–40)
POTASSIUM SERPL-SCNC: 4.4 MMOL/L (ref 3.5–5.1)
POTASSIUM SERPL-SCNC: 5 MMOL/L (ref 3.5–5.1)
PRO-BNP: 857 PG/ML (ref 0–124)
PROCALCITONIN: 0.04 NG/ML (ref 0–0.15)
PROLACTIN: 58.8 NG/ML
PROPOXYPHENE SCREEN: ABNORMAL
RBC # BLD: 4.51 M/UL (ref 4.2–5.9)
SLIDE REVIEW: ABNORMAL
SODIUM BLD-SCNC: 134 MMOL/L (ref 136–145)
SODIUM BLD-SCNC: 135 MMOL/L (ref 136–145)
TCO2 CALC VENOUS: 27 MMOL/L
TOTAL CK: 484 U/L (ref 39–308)
TOTAL CK: 584 U/L (ref 39–308)
TOTAL PROTEIN: 7.3 G/DL (ref 6.4–8.2)
TROPONIN: <0.01 NG/ML
TROPONIN: <0.01 NG/ML
WBC # BLD: 13.1 K/UL (ref 4–11)

## 2022-03-16 PROCEDURE — 84484 ASSAY OF TROPONIN QUANT: CPT

## 2022-03-16 PROCEDURE — 6360000002 HC RX W HCPCS

## 2022-03-16 PROCEDURE — 80053 COMPREHEN METABOLIC PANEL: CPT

## 2022-03-16 PROCEDURE — A4216 STERILE WATER/SALINE, 10 ML: HCPCS | Performed by: HOSPITALIST

## 2022-03-16 PROCEDURE — 85025 COMPLETE CBC W/AUTO DIFF WBC: CPT

## 2022-03-16 PROCEDURE — 82550 ASSAY OF CK (CPK): CPT

## 2022-03-16 PROCEDURE — 84145 PROCALCITONIN (PCT): CPT

## 2022-03-16 PROCEDURE — 2580000003 HC RX 258: Performed by: HOSPITALIST

## 2022-03-16 PROCEDURE — G0480 DRUG TEST DEF 1-7 CLASSES: HCPCS

## 2022-03-16 PROCEDURE — 96375 TX/PRO/DX INJ NEW DRUG ADDON: CPT

## 2022-03-16 PROCEDURE — 71045 X-RAY EXAM CHEST 1 VIEW: CPT

## 2022-03-16 PROCEDURE — 2500000003 HC RX 250 WO HCPCS: Performed by: HOSPITALIST

## 2022-03-16 PROCEDURE — 82803 BLOOD GASES ANY COMBINATION: CPT

## 2022-03-16 PROCEDURE — 36415 COLL VENOUS BLD VENIPUNCTURE: CPT

## 2022-03-16 PROCEDURE — 96376 TX/PRO/DX INJ SAME DRUG ADON: CPT

## 2022-03-16 PROCEDURE — 80307 DRUG TEST PRSMV CHEM ANLYZR: CPT

## 2022-03-16 PROCEDURE — 96374 THER/PROPH/DIAG INJ IV PUSH: CPT

## 2022-03-16 PROCEDURE — 6360000002 HC RX W HCPCS: Performed by: HOSPITALIST

## 2022-03-16 PROCEDURE — 2580000003 HC RX 258: Performed by: EMERGENCY MEDICINE

## 2022-03-16 PROCEDURE — 83605 ASSAY OF LACTIC ACID: CPT

## 2022-03-16 PROCEDURE — 93010 ELECTROCARDIOGRAM REPORT: CPT | Performed by: INTERNAL MEDICINE

## 2022-03-16 PROCEDURE — 6370000000 HC RX 637 (ALT 250 FOR IP): Performed by: HOSPITALIST

## 2022-03-16 PROCEDURE — 70450 CT HEAD/BRAIN W/O DYE: CPT

## 2022-03-16 PROCEDURE — 87040 BLOOD CULTURE FOR BACTERIA: CPT

## 2022-03-16 PROCEDURE — 93005 ELECTROCARDIOGRAM TRACING: CPT | Performed by: EMERGENCY MEDICINE

## 2022-03-16 PROCEDURE — 87150 DNA/RNA AMPLIFIED PROBE: CPT

## 2022-03-16 PROCEDURE — 83880 ASSAY OF NATRIURETIC PEPTIDE: CPT

## 2022-03-16 PROCEDURE — 1200000000 HC SEMI PRIVATE

## 2022-03-16 PROCEDURE — 99285 EMERGENCY DEPT VISIT HI MDM: CPT

## 2022-03-16 PROCEDURE — 84146 ASSAY OF PROLACTIN: CPT

## 2022-03-16 PROCEDURE — 6360000002 HC RX W HCPCS: Performed by: EMERGENCY MEDICINE

## 2022-03-16 PROCEDURE — 6370000000 HC RX 637 (ALT 250 FOR IP): Performed by: EMERGENCY MEDICINE

## 2022-03-16 RX ORDER — POTASSIUM CHLORIDE 7.45 MG/ML
10 INJECTION INTRAVENOUS PRN
Status: DISCONTINUED | OUTPATIENT
Start: 2022-03-16 | End: 2022-03-17

## 2022-03-16 RX ORDER — MAGNESIUM SULFATE IN WATER 40 MG/ML
2000 INJECTION, SOLUTION INTRAVENOUS PRN
Status: DISCONTINUED | OUTPATIENT
Start: 2022-03-16 | End: 2022-03-17

## 2022-03-16 RX ORDER — MIDAZOLAM HYDROCHLORIDE 5 MG/ML
INJECTION INTRAMUSCULAR; INTRAVENOUS
Status: COMPLETED
Start: 2022-03-16 | End: 2022-03-16

## 2022-03-16 RX ORDER — POTASSIUM CHLORIDE 20 MEQ/1
40 TABLET, EXTENDED RELEASE ORAL PRN
Status: DISCONTINUED | OUTPATIENT
Start: 2022-03-16 | End: 2022-03-17

## 2022-03-16 RX ORDER — SODIUM CHLORIDE 9 MG/ML
25 INJECTION, SOLUTION INTRAVENOUS PRN
Status: DISCONTINUED | OUTPATIENT
Start: 2022-03-16 | End: 2022-03-18 | Stop reason: HOSPADM

## 2022-03-16 RX ORDER — NICOTINE 21 MG/24HR
1 PATCH, TRANSDERMAL 24 HOURS TRANSDERMAL DAILY
Status: DISCONTINUED | OUTPATIENT
Start: 2022-03-16 | End: 2022-03-18 | Stop reason: HOSPADM

## 2022-03-16 RX ORDER — ASPIRIN 81 MG/1
81 TABLET, CHEWABLE ORAL
Status: DISCONTINUED | OUTPATIENT
Start: 2022-03-17 | End: 2022-03-18 | Stop reason: HOSPADM

## 2022-03-16 RX ORDER — ONDANSETRON 2 MG/ML
4 INJECTION INTRAMUSCULAR; INTRAVENOUS EVERY 30 MIN PRN
Status: DISCONTINUED | OUTPATIENT
Start: 2022-03-16 | End: 2022-03-16 | Stop reason: DRUGHIGH

## 2022-03-16 RX ORDER — FUROSEMIDE 20 MG/1
20 TABLET ORAL DAILY PRN
Status: DISCONTINUED | OUTPATIENT
Start: 2022-03-16 | End: 2022-03-18 | Stop reason: HOSPADM

## 2022-03-16 RX ORDER — SENNA PLUS 8.6 MG/1
1 TABLET ORAL DAILY PRN
Status: DISCONTINUED | OUTPATIENT
Start: 2022-03-16 | End: 2022-03-18 | Stop reason: HOSPADM

## 2022-03-16 RX ORDER — IPRATROPIUM BROMIDE AND ALBUTEROL SULFATE 2.5; .5 MG/3ML; MG/3ML
1 SOLUTION RESPIRATORY (INHALATION) EVERY 4 HOURS PRN
Status: DISCONTINUED | OUTPATIENT
Start: 2022-03-16 | End: 2022-03-18 | Stop reason: HOSPADM

## 2022-03-16 RX ORDER — CARVEDILOL 3.12 MG/1
3.12 TABLET ORAL 2 TIMES DAILY WITH MEALS
Status: DISCONTINUED | OUTPATIENT
Start: 2022-03-16 | End: 2022-03-18 | Stop reason: HOSPADM

## 2022-03-16 RX ORDER — IPRATROPIUM BROMIDE AND ALBUTEROL SULFATE 2.5; .5 MG/3ML; MG/3ML
1 SOLUTION RESPIRATORY (INHALATION) 4 TIMES DAILY
Status: DISCONTINUED | OUTPATIENT
Start: 2022-03-16 | End: 2022-03-16

## 2022-03-16 RX ORDER — 0.9 % SODIUM CHLORIDE 0.9 %
30 INTRAVENOUS SOLUTION INTRAVENOUS ONCE
Status: COMPLETED | OUTPATIENT
Start: 2022-03-16 | End: 2022-03-16

## 2022-03-16 RX ORDER — SODIUM CHLORIDE 0.9 % (FLUSH) 0.9 %
10 SYRINGE (ML) INJECTION PRN
Status: DISCONTINUED | OUTPATIENT
Start: 2022-03-16 | End: 2022-03-18 | Stop reason: HOSPADM

## 2022-03-16 RX ORDER — ACETAMINOPHEN 325 MG/1
650 TABLET ORAL EVERY 6 HOURS PRN
Status: DISCONTINUED | OUTPATIENT
Start: 2022-03-16 | End: 2022-03-18 | Stop reason: HOSPADM

## 2022-03-16 RX ORDER — SODIUM CHLORIDE 0.9 % (FLUSH) 0.9 %
10 SYRINGE (ML) INJECTION EVERY 12 HOURS SCHEDULED
Status: DISCONTINUED | OUTPATIENT
Start: 2022-03-16 | End: 2022-03-18 | Stop reason: HOSPADM

## 2022-03-16 RX ORDER — METHADONE HYDROCHLORIDE 10 MG/1
105 TABLET ORAL DAILY
Status: DISCONTINUED | OUTPATIENT
Start: 2022-03-17 | End: 2022-03-18 | Stop reason: HOSPADM

## 2022-03-16 RX ORDER — DULOXETIN HYDROCHLORIDE 30 MG/1
30 CAPSULE, DELAYED RELEASE ORAL DAILY
Status: DISCONTINUED | OUTPATIENT
Start: 2022-03-17 | End: 2022-03-18 | Stop reason: HOSPADM

## 2022-03-16 RX ORDER — LORAZEPAM 2 MG/ML
1 INJECTION INTRAMUSCULAR
Status: ACTIVE | OUTPATIENT
Start: 2022-03-16 | End: 2022-03-16

## 2022-03-16 RX ORDER — ONDANSETRON 2 MG/ML
4 INJECTION INTRAMUSCULAR; INTRAVENOUS EVERY 6 HOURS PRN
Status: DISCONTINUED | OUTPATIENT
Start: 2022-03-16 | End: 2022-03-18 | Stop reason: HOSPADM

## 2022-03-16 RX ORDER — PROMETHAZINE HYDROCHLORIDE 25 MG/1
12.5 TABLET ORAL EVERY 6 HOURS PRN
Status: DISCONTINUED | OUTPATIENT
Start: 2022-03-16 | End: 2022-03-18 | Stop reason: HOSPADM

## 2022-03-16 RX ORDER — ACETAMINOPHEN 650 MG/1
650 SUPPOSITORY RECTAL EVERY 6 HOURS PRN
Status: DISCONTINUED | OUTPATIENT
Start: 2022-03-16 | End: 2022-03-18 | Stop reason: HOSPADM

## 2022-03-16 RX ORDER — MIDAZOLAM HYDROCHLORIDE 1 MG/ML
4 INJECTION INTRAMUSCULAR; INTRAVENOUS ONCE
Status: COMPLETED | OUTPATIENT
Start: 2022-03-16 | End: 2022-03-16

## 2022-03-16 RX ORDER — NALOXONE HYDROCHLORIDE 1 MG/ML
1 INJECTION INTRAMUSCULAR; INTRAVENOUS; SUBCUTANEOUS ONCE
Status: COMPLETED | OUTPATIENT
Start: 2022-03-16 | End: 2022-03-16

## 2022-03-16 RX ADMIN — FAMOTIDINE 20 MG: 10 INJECTION, SOLUTION INTRAVENOUS at 20:44

## 2022-03-16 RX ADMIN — NALOXONE HYDROCHLORIDE 1 MG: 1 INJECTION PARENTERAL at 09:32

## 2022-03-16 RX ADMIN — SODIUM CHLORIDE 1755 ML: 9 INJECTION, SOLUTION INTRAVENOUS at 11:05

## 2022-03-16 RX ADMIN — ONDANSETRON 4 MG: 2 INJECTION INTRAMUSCULAR; INTRAVENOUS at 13:56

## 2022-03-16 RX ADMIN — VANCOMYCIN HYDROCHLORIDE 1000 MG: 1 INJECTION, POWDER, LYOPHILIZED, FOR SOLUTION INTRAVENOUS at 19:28

## 2022-03-16 RX ADMIN — MEROPENEM 1000 MG: 1 INJECTION, POWDER, FOR SOLUTION INTRAVENOUS at 18:36

## 2022-03-16 RX ADMIN — MEROPENEM 1000 MG: 1 INJECTION, POWDER, FOR SOLUTION INTRAVENOUS at 23:17

## 2022-03-16 RX ADMIN — METHADONE HYDROCHLORIDE 105 MG: 10 TABLET ORAL at 14:02

## 2022-03-16 RX ADMIN — MIDAZOLAM HYDROCHLORIDE 4 MG: 2 INJECTION, SOLUTION INTRAMUSCULAR; INTRAVENOUS at 11:01

## 2022-03-16 RX ADMIN — MIDAZOLAM HYDROCHLORIDE 4 MG: 5 INJECTION, SOLUTION INTRAMUSCULAR; INTRAVENOUS at 09:47

## 2022-03-16 RX ADMIN — ONDANSETRON 4 MG: 2 INJECTION INTRAMUSCULAR; INTRAVENOUS at 13:00

## 2022-03-16 RX ADMIN — CARVEDILOL 3.12 MG: 3.12 TABLET, FILM COATED ORAL at 20:52

## 2022-03-16 RX ADMIN — SACUBITRIL AND VALSARTAN 1 TABLET: 49; 51 TABLET, FILM COATED ORAL at 20:52

## 2022-03-16 RX ADMIN — LEVETIRACETAM 1000 MG: 500 INJECTION, SOLUTION, CONCENTRATE INTRAVENOUS at 14:51

## 2022-03-16 RX ADMIN — SODIUM CHLORIDE 25 ML: 9 INJECTION, SOLUTION INTRAVENOUS at 19:26

## 2022-03-16 RX ADMIN — Medication 10 ML: at 20:43

## 2022-03-16 RX ADMIN — SODIUM CHLORIDE 25 ML: 9 INJECTION, SOLUTION INTRAVENOUS at 18:34

## 2022-03-16 ASSESSMENT — ENCOUNTER SYMPTOMS
ANAL BLEEDING: 0
COUGH: 0
FACIAL SWELLING: 0
EYE ITCHING: 0
ABDOMINAL DISTENTION: 0
EYE DISCHARGE: 0
SINUS PRESSURE: 0
SHORTNESS OF BREATH: 0
CONSTIPATION: 0
VOICE CHANGE: 0
EYE PAIN: 0
SORE THROAT: 0
NAUSEA: 0
CHEST TIGHTNESS: 0
PHOTOPHOBIA: 0
DIARRHEA: 0
STRIDOR: 0
VOMITING: 0
TROUBLE SWALLOWING: 0
WHEEZING: 0
BLOOD IN STOOL: 0
ABDOMINAL PAIN: 0
RHINORRHEA: 0
EYE REDNESS: 0
BACK PAIN: 0

## 2022-03-16 ASSESSMENT — PAIN DESCRIPTION - PAIN TYPE: TYPE: CHRONIC PAIN

## 2022-03-16 ASSESSMENT — PAIN DESCRIPTION - LOCATION: LOCATION: GENERALIZED

## 2022-03-16 ASSESSMENT — PAIN SCALES - GENERAL
PAINLEVEL_OUTOF10: 0
PAINLEVEL_OUTOF10: 0
PAINLEVEL_OUTOF10: 6

## 2022-03-16 NOTE — CONSULTS
Pharmacy to dose IV Vanco skin/ tissue IVDA:  1g IV Q12hrs to provide Gram+ organism coverage to include MRSA. Trough 3/18 at 1500.   Pred are , Tr 15, Tox 10%  SETH Gillis H[facility]P [facility] PharmD 3/16/2022 3:41 PM

## 2022-03-16 NOTE — CARE COORDINATION
CASE MANAGEMENT INITIAL ASSESSMENT      Reviewed chart and completed assessment with patient:yes  Family present: mother  Explained Case Management role/services. yes    Primary contact information:mother, [facility] Decision Maker :   Primary Decision Maker: Maria Eugenia Petersen - Parent - [phone]    Secondary Decision Maker: Richarda Dubin - [phone]          Can this person be reached and be able to respond quickly, such as within a few minutes or hours? Yes      Admit date/status:3/16/22  Diagnosis:seizure   Is this a Readmission?:  No      Insurance:[place]  Precert required for SNF: Yes       3 night stay required: No    Living arrangements, Adls, care needs, prior to admission:with mother and girlfriend, independent in ADLs    Durable Medical Equipment at home:  Walker__Cane__RTS__ BSC__Shower Chair__  02__ HHN__ CPAP__  BiPap__  Hospital Bed__ W/C___ Other_____    Services in the home and/or outpatient, prior to admission:none    Current PCP:sees neurology    Transportation needs: none     Dialysis Facility (if applicable)   · Name:  · Address:  · Dialysis Schedule:  · Phone:  · Fax:    PT/OT recs:    Hospital Exemption Notification (HEN):    Barriers to discharge:medical complicatinos    Plan/comments:Referred to patient for possible d/c needs. Patient is a 28year old male admitted for seizure disorder. Spoke to mother as patient currently  Still slow to respond. Patient usually lives at home with mother and girlfriend. Patient is usually independent in ADLs. Case management to follow for d/c needs. Will need to discuss substance abuse with patient when able.   Electronically signed by LETTY Rowland LISW-S on 3/16/2022 at 2:16 PM     [facility] on chart for MD signature

## 2022-03-16 NOTE — PROGRESS NOTES
For patient safety, an [employer] camera has been placed in patient's room. [employer] monitoring needed for Seizure activty , Confusion, Increased Fall Risk, Pulling at Lines, Substance Withdrawal, Delirium, Elopement Risk and Potential episodes of aggression or violence. Writer placed call to monitor observer to verify camera number ceiling camera and review patient name, reason for monitoring, and contact information for primary RN. Patient, Family/Healthcare Decision Maker, HCPOA and Legal Guardian notified of use of remote monitoring upon implementation of camera and verbalized understanding.
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
## 2022-03-16 NOTE — PROGRESS NOTES
26- Dr. Tiffany Cardoso added to treatment team   Re: Recurrent breakthrough seizure with encephalopathy; concern for noncompliance; ? dose Wilner Sandoval

## 2022-03-16 NOTE — PROGRESS NOTES
4 Eyes Skin Assessment     The patient is being assess for   Admission    I agree that 2 RN's have performed a thorough Head to Toe Skin Assessment on the patient. ALL assessment sites listed below have been assessed. Areas assessed for pressure by both nurses:   [x]   Head, Face, and Ears   [x]   Shoulders, Back, and Chest, Abdomen  [x]   Arms, Elbows, and Hands   [x]   Coccyx, Sacrum, and Ischium  [x]   Legs, Feet, and Heels        Skin Assessed Under all Medical Devices by both nurses:  N/A              All Mepilex Borders were peeled back and area peeked at by both nurses:  No: N/A  Please list where Mepilex Borders are located:  N/A             **SHARE this note so that the co-signing nurse is able to place an eSignature**    Co-signer eSignature: Electronically signed by Jemal Velez RN on 3/16/22 at 6:29 PM EDT    Does the Patient have Skin Breakdown related to pressure?   No     (Insert Photo here)         Luke Prevention initiated:  NA   Wound Care Orders initiated:  NA      C nurse consulted for Pressure Injury (Stage 3,4, Unstageable, DTI, NWPT, Complex wounds)and New or Established Ostomies:  NA      Primary Nurse eSignature: Electronically signed by Sal Harmon RN on 3/16/22 at 5:28 PM EDT

## 2022-03-16 NOTE — ED NOTES
Patient arrived via squad. Patient delivered to [facility] post seizure per family. Witnessed 1 min seizure at [facility]. Patient became combative en route, given 4mg Versed per squad. Patient arrived post ictal non combative. Patient given Narcan and became very combative, restraints were applied, patient became tachycardic and 5 more of Versed given. EKG and Xray were obtained. Patient resting at this time.       Maximus Olivas RN  03/16/22 1100

## 2022-03-16 NOTE — PROGRESS NOTES
Pt brought up from ED via stretcher. VSS. Pt's mother at bedside. Call light and bedside table within reach.

## 2022-03-16 NOTE — DISCHARGE INSTR - COC
Isolation            No Isolation          Patient Infection Status       Infection Onset Added Last Indicated Last Indicated By Review Planned Expiration Resolved Resolved By    None active    Resolved    COVID-19 (Rule Out) 02/21/21 02/21/21 02/21/21 SARS-CoV-2 NAAT (Rapid) (Ordered)   02/22/21 Rule-Out Test Resulted    COVID-19 (Rule Out) 01/24/21 01/24/21 01/24/21 COVID-19 (Ordered)   01/24/21 Rule-Out Test Resulted            Nurse Assessment:  Last Vital Signs: BP (!) 143/83   Pulse 88   Resp 16   Ht 5' 7\" (1.702 m)   Wt 129 lb (58.5 kg)   SpO2 94%   BMI 20.20 kg/m²     Last documented pain score (0-10 scale):    Last Weight:   Wt Readings from Last 1 Encounters:   03/16/22 129 lb (58.5 kg)     Mental Status:  {IP PT MENTAL STATUS:16735}    IV Access:  { DUGLAS IV ACCESS:471715576}    Nursing Mobility/ADLs:  Walking   {[facility] DME ETTS:166319121}  Transfer  {[facility] DME IXSH:902842985}  Bathing  {[facility] DME WIBS:542464664}  Dressing  {[facility] DME HCMS:457109842}  Toileting  {[facility] DME PYQR:892682168}  Feeding  {[facility] DME HGNB:545099298}  Med Admin  {[facility] DME VAJS:590745380}  Med Delivery   {[facility] MED Delivery:134265164}    Wound Care Documentation and Therapy:        Elimination:  Continence: Bowel: {YES / SHORE:46185}  Bladder: {YES / LK:08377}  Urinary Catheter: {Urinary Catheter:495369751}   Colostomy/Ileostomy/Ileal Conduit: {YES / YP:99822}       Date of Last BM: ***  No intake or output data in the 24 hours ending 03/16/22 0929  No intake/output data recorded.     Safety Concerns:     [address] [email or web address] Safety Concerns:223518869}    Impairments/Disabilities:      [address] [email or web address] Impairments/Disabilities:798649769}    Nutrition Therapy:  Current Nutrition Therapy:   [email or web address] Diet List:435004223}    Routes of Feeding: {[facility] DME Other Feedings:526497948}  Liquids: {Slp liquid thickness:30038}  Daily Fluid Restriction: {CHP DME Yes amt example:360099545}  Last Modified Barium Swallow with Video (Video Swallowing Test): {Done Not Done ZQKA:093591141}    Treatments at the Time of Hospital Discharge:   Respiratory Treatments: ***  Oxygen Therapy:  {Therapy; copd oxygen:61730}  Ventilator:    { CC Vent JPLB:285311335}    Rehab Therapies: {THERAPEUTIC INTERVENTION:4612628743}  Weight Bearing Status/Restrictions: { CC Weight Bearin}  Other Medical Equipment (for information only, NOT a DME order):  {EQUIPMENT:010751294}  Other Treatments: ***    Patient's personal belongings (please select all that are sent with patient):  {[facility] DME Belongings:285966819}    RN SIGNATURE:  {Esignature:108404224}    CASE MANAGEMENT/SOCIAL WORK SECTION    Inpatient Status Date: ***    Readmission Risk Assessment Score:  Readmission Risk              Risk of Unplanned Readmission:  0           Discharging to Facility/ Agency   Name:   Address:  Phone:  Fax:    Dialysis Facility (if applicable)   Name:  Address:  Dialysis Schedule:  Phone:  Fax:    / signature: {Esignature:197554741}    PHYSICIAN SECTION    Prognosis: {Prognosis:3706231640}    Condition at Discharge: [address] [place] [address] Patient Condition:008052549}    Rehab Potential (if transferring to Rehab): {Prognosis:5162993081}    Recommended Labs or Other Treatments After Discharge: ***    Physician Certification: I certify the above information and transfer of Ene Prescott  is necessary for the continuing treatment of the diagnosis listed and that he requires {Admit to Appropriate Level of Care:73034} for {GREATER/LESS:436433805} 30 days.      Update Admission H&P: {CHP DME Changes in GWSYP:759799180}    PHYSICIAN SIGNATURE:  {Esignature:994356361}

## 2022-03-16 NOTE — PROGRESS NOTES
03/16/22 1701   RT Protocol   History Pulmonary Disease 1   Respiratory pattern 0   Breath sounds 0   Cough 0   Bronchodilator Assessment Score 1

## 2022-03-16 NOTE — RT PROTOCOL NOTE
RT Inhaler-Nebulizer Bronchodilator Protocol Note    There is a bronchodilator order in the chart from a provider indicating to follow the RT Bronchodilator Protocol and there is an Initiate RT Inhaler-Nebulizer Bronchodilator Protocol order as well (see protocol at bottom of note). CXR Findings:  XR CHEST PORTABLE    Result Date: 3/16/2022  1. No acute abnormality. The findings from the last RT Protocol Assessment were as follows:   History Pulmonary Disease: Smoker 15 pack years or more  Respiratory Pattern: Regular pattern and RR 12-20 bpm  Breath Sounds: Clear breath sounds  Cough: Strong, spontaneous, non-productive  Indication for Bronchodilator Therapy:    Bronchodilator Assessment Score: 1    Aerosolized bronchodilator medication orders have been revised according to the RT Inhaler-Nebulizer Bronchodilator Protocol below. Respiratory Therapist to perform RT Therapy Protocol Assessment initially then follow the protocol. Repeat RT Therapy Protocol Assessment PRN for score 0-3 or on second treatment, BID, and PRN for scores above 3. No Indications - adjust the frequency to every 6 hours PRN wheezing or bronchospasm, if no treatments needed after 48 hours then discontinue using Per Protocol order mode. If indication present, adjust the RT bronchodilator orders based on the Bronchodilator Assessment Score as indicated below. Use Inhaler orders unless patient has one or more of the following: on home nebulizer, not able to hold breath for 10 seconds, is not alert and oriented, cannot activate and use MDI correctly, or respiratory rate 25 breaths per minute or more, then use the equivalent nebulizer order(s) with same Frequency and PRN reasons based on the score. If a patient is on this medication at home then do not decrease Frequency below that used at home.     0-3 - enter or revise RT bronchodilator order(s) to equivalent RT Bronchodilator order with Frequency of every 4 hours PRN for wheezing or increased work of breathing using Per Protocol order mode. 4-6 - enter or revise RT Bronchodilator order(s) to two equivalent RT bronchodilator orders with one order with BID Frequency and one order with Frequency of every 4 hours PRN wheezing or increased work of breathing using Per Protocol order mode. 7-10 - enter or revise RT Bronchodilator order(s) to two equivalent RT bronchodilator orders with one order with TID Frequency and one order with Frequency of every 4 hours PRN wheezing or increased work of breathing using Per Protocol order mode. 11-13 - enter or revise RT Bronchodilator order(s) to one equivalent RT bronchodilator order with QID Frequency and an Albuterol order with Frequency of every 4 hours PRN wheezing or increased work of breathing using Per Protocol order mode. Greater than 13 - enter or revise RT Bronchodilator order(s) to one equivalent RT bronchodilator order with every 4 hours Frequency and an Albuterol order with Frequency of every 2 hours PRN wheezing or increased work of breathing using Per Protocol order mode. RT to enter RT Home Evaluation for COPD & MDI Assessment order using Per Protocol order mode.     Electronically signed by Brody Huang RCP on 3/16/2022 at 5:01 PM

## 2022-03-16 NOTE — ED NOTES
Gave patient Methadone dose. Patient able to sit up and hold water on his own. No longer nauseous at this time. Patient still \"feels like crap\" but is non combative and resting with Mom at the bedside.       Iam Osman RN  03/16/22 4242

## 2022-03-17 LAB
A/G RATIO: 1.8 (ref 1.1–2.2)
ALBUMIN SERPL-MCNC: 4.4 G/DL (ref 3.4–5)
ALP BLD-CCNC: 117 U/L (ref 40–129)
ALT SERPL-CCNC: 27 U/L (ref 10–40)
ANION GAP SERPL CALCULATED.3IONS-SCNC: 11 MMOL/L (ref 3–16)
AST SERPL-CCNC: 27 U/L (ref 15–37)
BASOPHILS ABSOLUTE: 0.1 K/UL (ref 0–0.2)
BASOPHILS RELATIVE PERCENT: 0.3 %
BILIRUB SERPL-MCNC: 0.3 MG/DL (ref 0–1)
BUN BLDV-MCNC: 9 MG/DL (ref 7–20)
CALCIUM SERPL-MCNC: 9.4 MG/DL (ref 8.3–10.6)
CHLORIDE BLD-SCNC: 98 MMOL/L (ref 99–110)
CO2: 27 MMOL/L (ref 21–32)
CREAT SERPL-MCNC: 0.6 MG/DL (ref 0.9–1.3)
EOSINOPHILS ABSOLUTE: 0 K/UL (ref 0–0.6)
EOSINOPHILS RELATIVE PERCENT: 0.1 %
GFR AFRICAN AMERICAN: >60
GFR NON-AFRICAN AMERICAN: >60
GLUCOSE BLD-MCNC: 121 MG/DL (ref 70–99)
HCT VFR BLD CALC: 41.1 % (ref 40.5–52.5)
HEMATOLOGY PATH CONSULT: NORMAL
HEMOGLOBIN: 14 G/DL (ref 13.5–17.5)
LYMPHOCYTES ABSOLUTE: 2.5 K/UL (ref 1–5.1)
LYMPHOCYTES RELATIVE PERCENT: 16.5 %
MCH RBC QN AUTO: 30.2 PG (ref 26–34)
MCHC RBC AUTO-ENTMCNC: 34.2 G/DL (ref 31–36)
MCV RBC AUTO: 88.5 FL (ref 80–100)
MONOCYTES ABSOLUTE: 0.7 K/UL (ref 0–1.3)
MONOCYTES RELATIVE PERCENT: 4.7 %
NEUTROPHILS ABSOLUTE: 12.1 K/UL (ref 1.7–7.7)
NEUTROPHILS RELATIVE PERCENT: 78.4 %
PDW BLD-RTO: 13.3 % (ref 12.4–15.4)
PLATELET # BLD: 245 K/UL (ref 135–450)
PMV BLD AUTO: 6.8 FL (ref 5–10.5)
POTASSIUM REFLEX MAGNESIUM: 4.1 MMOL/L (ref 3.5–5.1)
RBC # BLD: 4.65 M/UL (ref 4.2–5.9)
SODIUM BLD-SCNC: 136 MMOL/L (ref 136–145)
TOTAL CK: 414 U/L (ref 39–308)
TOTAL PROTEIN: 6.8 G/DL (ref 6.4–8.2)
WBC # BLD: 15.5 K/UL (ref 4–11)

## 2022-03-17 PROCEDURE — G0378 HOSPITAL OBSERVATION PER HR: HCPCS

## 2022-03-17 PROCEDURE — 6360000002 HC RX W HCPCS: Performed by: HOSPITALIST

## 2022-03-17 PROCEDURE — 6370000000 HC RX 637 (ALT 250 FOR IP): Performed by: HOSPITALIST

## 2022-03-17 PROCEDURE — 2500000003 HC RX 250 WO HCPCS: Performed by: HOSPITALIST

## 2022-03-17 PROCEDURE — 2580000003 HC RX 258: Performed by: HOSPITALIST

## 2022-03-17 PROCEDURE — 80183 DRUG SCRN QUANT OXCARBAZEPIN: CPT

## 2022-03-17 PROCEDURE — 82550 ASSAY OF CK (CPK): CPT

## 2022-03-17 PROCEDURE — 6370000000 HC RX 637 (ALT 250 FOR IP): Performed by: NURSE PRACTITIONER

## 2022-03-17 PROCEDURE — 36415 COLL VENOUS BLD VENIPUNCTURE: CPT

## 2022-03-17 PROCEDURE — 85025 COMPLETE CBC W/AUTO DIFF WBC: CPT

## 2022-03-17 PROCEDURE — 80053 COMPREHEN METABOLIC PANEL: CPT

## 2022-03-17 PROCEDURE — A4216 STERILE WATER/SALINE, 10 ML: HCPCS | Performed by: HOSPITALIST

## 2022-03-17 PROCEDURE — 1200000000 HC SEMI PRIVATE

## 2022-03-17 PROCEDURE — 99222 1ST HOSP IP/OBS MODERATE 55: CPT | Performed by: NURSE PRACTITIONER

## 2022-03-17 RX ORDER — OXCARBAZEPINE 150 MG/1
600 TABLET, FILM COATED ORAL 3 TIMES DAILY
Status: DISCONTINUED | OUTPATIENT
Start: 2022-03-17 | End: 2022-03-18 | Stop reason: HOSPADM

## 2022-03-17 RX ADMIN — OXCARBAZEPINE 600 MG: 150 TABLET, FILM COATED ORAL at 21:13

## 2022-03-17 RX ADMIN — ONDANSETRON 4 MG: 2 INJECTION INTRAMUSCULAR; INTRAVENOUS at 09:45

## 2022-03-17 RX ADMIN — LEVETIRACETAM 1000 MG: 100 INJECTION INTRAVENOUS at 02:27

## 2022-03-17 RX ADMIN — OXCARBAZEPINE 600 MG: 150 TABLET, FILM COATED ORAL at 15:13

## 2022-03-17 RX ADMIN — ASPIRIN 81 MG 81 MG: 81 TABLET ORAL at 10:07

## 2022-03-17 RX ADMIN — SACUBITRIL AND VALSARTAN 1 TABLET: 49; 51 TABLET, FILM COATED ORAL at 21:13

## 2022-03-17 RX ADMIN — Medication 10 ML: at 21:14

## 2022-03-17 RX ADMIN — CARVEDILOL 3.12 MG: 3.12 TABLET, FILM COATED ORAL at 10:07

## 2022-03-17 RX ADMIN — PROMETHAZINE HYDROCHLORIDE 12.5 MG: 25 TABLET ORAL at 21:46

## 2022-03-17 RX ADMIN — VANCOMYCIN HYDROCHLORIDE 1000 MG: 1 INJECTION, POWDER, LYOPHILIZED, FOR SOLUTION INTRAVENOUS at 11:04

## 2022-03-17 RX ADMIN — DULOXETINE HYDROCHLORIDE 30 MG: 30 CAPSULE, DELAYED RELEASE ORAL at 10:07

## 2022-03-17 RX ADMIN — VANCOMYCIN HYDROCHLORIDE 1000 MG: 1 INJECTION, POWDER, LYOPHILIZED, FOR SOLUTION INTRAVENOUS at 23:06

## 2022-03-17 RX ADMIN — Medication 10 ML: at 10:07

## 2022-03-17 RX ADMIN — METHADONE HYDROCHLORIDE 105 MG: 10 TABLET ORAL at 10:05

## 2022-03-17 RX ADMIN — MEROPENEM 1000 MG: 1 INJECTION, POWDER, FOR SOLUTION INTRAVENOUS at 10:01

## 2022-03-17 RX ADMIN — CARVEDILOL 3.12 MG: 3.12 TABLET, FILM COATED ORAL at 16:35

## 2022-03-17 RX ADMIN — FAMOTIDINE 20 MG: 10 INJECTION, SOLUTION INTRAVENOUS at 09:44

## 2022-03-17 RX ADMIN — FAMOTIDINE 20 MG: 10 INJECTION, SOLUTION INTRAVENOUS at 21:13

## 2022-03-17 RX ADMIN — ONDANSETRON 4 MG: 2 INJECTION INTRAMUSCULAR; INTRAVENOUS at 16:35

## 2022-03-17 RX ADMIN — ENOXAPARIN SODIUM 40 MG: 40 INJECTION SUBCUTANEOUS at 10:08

## 2022-03-17 RX ADMIN — SACUBITRIL AND VALSARTAN 1 TABLET: 49; 51 TABLET, FILM COATED ORAL at 12:23

## 2022-03-17 RX ADMIN — MEROPENEM 1000 MG: 1 INJECTION, POWDER, FOR SOLUTION INTRAVENOUS at 18:32

## 2022-03-17 ASSESSMENT — PAIN DESCRIPTION - PAIN TYPE
TYPE: ACUTE PAIN

## 2022-03-17 ASSESSMENT — PAIN SCALES - GENERAL
PAINLEVEL_OUTOF10: 2
PAINLEVEL_OUTOF10: 2
PAINLEVEL_OUTOF10: 9
PAINLEVEL_OUTOF10: 6

## 2022-03-17 ASSESSMENT — PAIN DESCRIPTION - LOCATION
LOCATION: HEAD
LOCATION: ABDOMEN
LOCATION: HEAD

## 2022-03-17 ASSESSMENT — PAIN DESCRIPTION - ORIENTATION
ORIENTATION: RIGHT;LEFT;ANTERIOR;POSTERIOR
ORIENTATION: RIGHT;LEFT;ANTERIOR;POSTERIOR

## 2022-03-17 NOTE — PROGRESS NOTES
Hospitalist Progress Note      PCP: No primary care provider on file. Date of Admission: 3/16/2022    Chief Complaint: Seizure    Subjective: no new c/o. Medications:  Reviewed    Infusion Medications    sodium chloride 100 mL/hr at 03/17/22 0226     Scheduled Medications    aspirin  81 mg Oral Daily with breakfast    carvedilol  3.125 mg Oral BID WC    DULoxetine  30 mg Oral Daily    methadone  105 mg Oral Daily    sacubitril-valsartan  1 tablet Oral BID    OXcarbazepine ER  1,800 mg Oral Daily    levetiracetam  1,000 mg IntraVENous Q12H    sodium chloride flush  10 mL IntraVENous 2 times per day    enoxaparin  40 mg SubCUTAneous Daily    famotidine (PEPCID) injection  20 mg IntraVENous BID    nicotine  1 patch TransDERmal Daily    vancomycin  1,000 mg IntraVENous Q12H    meropenem  1,000 mg IntraVENous Q8H     PRN Meds: furosemide, sodium chloride flush, sodium chloride, promethazine **OR** ondansetron, senna, acetaminophen **OR** acetaminophen, ipratropium-albuterol      Intake/Output Summary (Last 24 hours) at 3/17/2022 0921  Last data filed at 3/17/2022 0824  Gross per 24 hour   Intake --   Output 2025 ml   Net -2025 ml       Physical Exam Performed:    /67   Pulse 79   Temp 97.7 °F (36.5 °C) (Oral)   Resp 16   Ht 5' 7\" (1.702 m)   Wt 128 lb 4.9 oz (58.2 kg)   SpO2 97%   BMI 20.10 kg/m²     General appearance: No apparent distress, appears stated age and cooperative. HEENT: Pupils equal, round, and reactive to light. Conjunctivae/corneas clear. Neck: Supple, with full range of motion. No jugular venous distention. Trachea midline. Respiratory:  Normal respiratory effort. Clear to auscultation, bilaterally without Rales/Wheezes/Rhonchi. Cardiovascular: Regular rate and rhythm with normal S1/S2 without murmurs, rubs or gallops. Abdomen: Soft, non-tender, non-distended with normal bowel sounds. Musculoskeletal: No clubbing, cyanosis or edema bilaterally.   Full range of motion without deformity. Skin: Skin color, texture, turgor normal.  No rashes or lesions. Neurologic:  Neurovascularly intact without any focal sensory/motor deficits. Cranial nerves: II-XII intact, grossly non-focal.  Psychiatric: Alert and oriented, thought content appropriate, normal insight  Capillary Refill: Brisk,< 3 seconds   Peripheral Pulses: +2 palpable, equal bilaterally       Labs:   Recent Labs     03/16/22  0925 03/17/22  0830   WBC 13.1* 15.5*   HGB 13.7 14.0   HCT 41.7 41.1    245     Recent Labs     03/16/22  0925 03/16/22  2200 03/17/22  0830   * 134* 136   K 4.4 5.0 4.1   CL 98* 100 98*   CO2 14* 20* 27   BUN 11 9 9   CREATININE 0.9 0.7* 0.6*   CALCIUM 9.4 9.0 9.4   PHOS  --  4.0  --      Recent Labs     03/16/22  0925 03/17/22  0830   AST 32 27   ALT 35 27   BILITOT <0.2 0.3   ALKPHOS 125 117     No results for input(s): INR in the last 72 hours.   Recent Labs     03/16/22  0925 03/16/22  1219 03/16/22  1604 03/16/22  2200 03/17/22  0830   CKTOTAL  --   --  484* 584* 414*   TROPONINI <0.01 <0.01  --   --   --        Urinalysis:      Lab Results   Component Value Date    NITRU Negative 02/20/2022    WBCUA 3-5 02/20/2022    BACTERIA Rare 02/20/2022    RBCUA 3-4 02/20/2022    BLOODU MODERATE 02/20/2022    SPECGRAV 1.025 02/20/2022    GLUCOSEU Negative 02/20/2022       Consults:    IP CONSULT TO PHARMACY  IP CONSULT TO NEUROLOGY      Assessment/Plan:    Active Hospital Problems    Diagnosis     Polysubstance abuse ([facility] [address] [F19.10]     High anion gap metabolic acidosis [Z33.4]     Seizure ([facility] [address] [R56.9]     Acute encephalopathy [G93.40]     IV drug abuse ([facility] [address] [F19.10]     Takotsubo cardiomyopathy [I51.81]          Acute encephalopathy s/p breakthrough seizure  - Patient likely postictal, but unable to rule out acute narcotic intoxication as well and sepsis  - Admit for continuous telemetry, pulse oximetry monitoring overnight  - Serial neuro checks every 4 hours with aspiration and fall precautions in place  - Patient initiated on IV Keppra 1 g every 12 hours pending neurology consultation; resume home dosage antiepileptics with level pending to assess for compliance  - Mother and patient once again reminded that patient is not safe for operating a motor vehicle  - Serial lactate and CPK levels to be trended overnight     IVDA (hx) rule out active infection  - Lactate markedly elevated, but may be due to to seizure activity alone  - Patient actively vomiting and earlier LOC with risk of aspiration pneumonitis  - BUE with multiple epidermal excoriations, somewhat suggestive of early cellulitis  - Sepsis IVF not advised in this individual with recent stress-induced cardiomyopathy with LVEF 25%  - Following collection of blood cultures, patient initiated on vancomycin (MRSA) and Merrem (CNS/aspiration coverage)  - Continuation of antibiotics to be determined in a.m. following procalcitonin, repeat lactate, and encephalopathic resolution  - UDS positive for methadone, benzodiazepine, and cannabinoids; separate testing collected for acetyl morphine (heroin)     Takotsubo cardiomyopathy with LVEF 25%/QTC prolongation  - Admit to floor for continuous telemetry monitoring and strict I's & O's  - Continue home doses of Coreg, Entresto, and as needed Lasix  - 2G Na and fluid restriction dietary modifications in place  - Concern for continued methadone usage due to known cardiotoxicity and QTC prolongation effects; suspect Suboxone may be more appropriate       DVT Prophylaxis: LMWH     Recent Labs     03/16/22  0925 03/17/22  0830    245     Diet: Diet NPO  Code Status: Full Code      PT/OT Eval Status: not yet ordered. Dispo - Patient is likely to remain in-house at least until Friday/Sat 18/19 March pending clinical course and subspecialty recs.        Cyndy Robbins MD

## 2022-03-17 NOTE — PLAN OF CARE
Problem: VIOLENT RESTRAINTS  Goal: Removal from restraints as soon as assessed to be safe  Outcome: Ongoing  Goal: No harm/injury to patient while restraints in use  Outcome: Ongoing  Goal: Patient's dignity will be maintained  Outcome: Ongoing     Problem: Skin Integrity:  Goal: Will show no infection signs and symptoms  Description: Will show no infection signs and symptoms  Outcome: Ongoing  Goal: Absence of new skin breakdown  Description: Absence of new skin breakdown  Outcome: Ongoing     Problem: Pain:  Goal: Pain level will decrease  Description: Pain level will decrease  Outcome: Ongoing  Goal: Control of acute pain  Description: Control of acute pain  Outcome: Ongoing  Goal: Control of chronic pain  Description: Control of chronic pain  Outcome: Ongoing     Problem: Falls - Risk of:  Goal: Will remain free from falls  Description: Will remain free from falls  Outcome: Ongoing  Goal: Absence of physical injury  Description: Absence of physical injury  Outcome: Ongoing

## 2022-03-17 NOTE — CARE COORDINATION
3/17/22 Follow for possible antibiotic needs, pt not a candidate for OP Picc line, IPTA, otherwise no CM needs anticipated.

## 2022-03-17 NOTE — CONSULTS
In patient Neurology consult        [facility] Neurology      Ros Desir MD      Bill Salazars  1990    Date of Service: 3/17/2022    Referring Physician: Kenya Kramer MD      Reason for the consult and CC: Breakthrough seizure    HPI:   The patient is a 28 y.o.  male, with a PMH of refractory epilepsy, Hep C, HTN, polysubstance abuse, and cardiomyopathy, who presented to [facility] following a seizure. This is the patient's third trip to the ED in 3 months for seizures. The patient was in the car with his mother, when he started having a generalized tonic/clonic seizure. She pulled over to the nearest [facility] to have him evaluated. He was given IM Versed by EMS and his seizure aborted. He was post-ictal upon arrival to the ED. He was afebrile. Mild leukocytosis noted, but without bandemia. Lactic acidosis likely due to seizure. CT head was negative. He follows with  Neurology and is supposed to be Oxtellar 1800 mg daily and Xcopri 250 mg daily. I requested the pharmacist check if patient has been getting his AED prescriptions filled and he recently refilled Oxtellar, but got a 28 day supply of Xcopri last filled on 1/7/2022. Upon questioning the patient, he reluctantly admitted that he hasn't been taking the Xcopri as prescribed. The patient is complaining of a headache and nausea, which both he and his mother say are typical post-seizure. Family history is non-contributory.     Past Surgical History:   Procedure Laterality Date    BRONCHOSCOPY N/A 2/22/2021    BRONCHOSCOPY DIAGNOSTIC OR [facility] ONLY performed by Cliff Agosto MD at [address] [facility]  2/22/2021    BRONCHOSCOPY THERAPUTIC ASPIRATION INITIAL performed by Cliff Agosto MD at [address] HAND FINGERS INJURY, PARTIAL AMPUTATIONS        Past Medical History:   Diagnosis Date    Aspiration pneumonia ([address]     Hepatitis C     HTN (hypertension)     NSTEMI (non-ST elevated myocardial infarction) ([facility] [address]     Polysubstance abuse ([facility] [address]     Seizures ([facility] [address]     Septic shock (HCC)     Takotsubo cardiomyopathy      Social History     Tobacco Use    Smoking status: Current Every Day Smoker     Packs/day: 1.50     Types: Cigarettes    Smokeless tobacco: Never Used   Vaping Use    Vaping Use: Never used   Substance Use Topics    Alcohol use: No    Drug use: Not Currently     Types: IV, Opiates , Marijuana ([employer]), Methamphetamines (Crystal Meth)     Comment: prior heroin user; last use 4 years ago per mom as of 9/23/20,.  Medical marijuana, 05/20/21- pt used since last visit meth, fentanyl, marijuana     No Known Allergies  Current Facility-Administered Medications   Medication Dose Route Frequency Provider Last Rate Last Admin    aspirin chewable tablet 81 mg  81 mg Oral Daily with breakfast Edmar Bella MD        carvedilol (COREG) tablet 3.125 mg  3.125 mg Oral BID WC Edmar Bella MD   3.125 mg at 03/16/22 2052    DULoxetine (CYMBALTA) extended release capsule 30 mg  30 mg Oral Daily Edmar Bella MD        methadone (DOLOPHINE) tablet 105 mg  105 mg Oral Daily Edmar Bella MD        furosemide (LASIX) tablet 20 mg  20 mg Oral Daily PRN Edmar Bella MD        sacubitril-valsartan (ENTRESTO) 49-51 MG per tablet 1 tablet  1 tablet Oral BID Edmar Bella MD   1 tablet at 03/16/22 2052    OXcarbazepine ER TB24 1,800 mg  1,800 mg Oral Daily Edmar Bella MD        levETIRAcetam (KEPPRA) 1,000 mg in sodium chloride 0.9 % 100 mL IVPB  1,000 mg IntraVENous Q12H Edmar Bella MD   Stopped at 03/17/22 0244    sodium chloride flush 0.9 % injection 10 mL  10 mL IntraVENous 2 times per day Edmar Bella MD   10 mL at 03/16/22 2043    sodium chloride flush 0.9 % injection 10 mL  10 mL IntraVENous PRN Edmar Bella MD        0.9 % sodium chloride infusion  25 mL IntraVENous PRN Edmar Bella  mL/hr at 03/17/22 0226 Restarted at disc.   Neck: supple  Cardiovascular: No lower leg edema with good pulsation. Mental Status:   Oriented to person, place, problem, and time. Memory: Good immediate recall. Intact remote memory  Normal attention span and concentration. Language: intact naming, repeating and fluency   Good fund of Knowledge. Aware of current events and vocabulary   Cranial Nerves:   II: Visual fields: Full. Pupils: equal, round, reactive to light  III,IV,VI: Extra Ocular Movements are intact. No nystagmus  V: Facial sensation is intact  VII: Facial strength and movements: intact and symmetric  VIII: Hearing: Intact  IX: Palate elevation is symmetric  XI: Shoulder shrug is intact  XII: Tongue movements are normal  Musculoskeletal: 5/5 in all 4 extremities. Tone: Normal tone. Reflexes: Symmetric 2+ in the arms and 2+ in the legs   Planters: flexor bilaterally. Coordination: no pronator drift, no dysmetria with FNF in upper extremities. Normal REM. Sensation: normal to all modalities in both arms and legs. Gait/Posture: steady gait and normal posturing and station.      Data:  LABS:   Lab Results   Component Value Date     03/16/2022    K 5.0 03/16/2022    K 4.8 12/07/2021     03/16/2022    CO2 20 03/16/2022    BUN 9 03/16/2022    CREATININE 0.7 03/16/2022    GFRAA >60 03/16/2022    LABGLOM >60 03/16/2022    GLUCOSE 126 03/16/2022    PHOS 4.0 03/16/2022    MG 2.80 02/20/2022    CALCIUM 9.0 03/16/2022     Lab Results   Component Value Date    WBC 13.1 03/16/2022    RBC 4.51 03/16/2022    HGB 13.7 03/16/2022    HCT 41.7 03/16/2022    MCV 92.4 03/16/2022    RDW 13.6 03/16/2022     03/16/2022     Lab Results   Component Value Date    INR 1.11 02/26/2021    PROTIME 12.9 02/26/2021       Neuroimaging was independently reviewed by myself and discussed results with the patient and/or family  Reviewed notes from different physicians  Reviewed lab and blood testing    Impression:    Breakthrough seizure - likely due to medication non-compliance vs lowered seizure threshold due to substance abuse. CT head negative. Hx of IVDA on methadone  Lactic acidosis  Cardiomyopathy      Recommendation:     Does not tolerate Keppra - history of violent behaviors while on Keppra. Will stop. Continue Oxtellar 1800 mg daily and get back on prescribed Xcopri 250 mg daily. Discussed importance of medication compliance with patient and mother at bedside. Oxcarbazepine level pending. Needs to f/u with primary neurologist at [facility]. NO DRIVING for 3 months and cleared by physician. Discussed with patient and his mother that each time a seizure occurs, the driving restriction resets. May be discharged from a neurological perspective when medically stable. Thank you for referring such patient. If you have any questions regarding my consult note, please don't hesitate to call me. Mariette Gaucher, KRAIG  This dictation was generated by voice recognition computer software.  Although all attempts are made to edit the dictation for accuracy, there may be errors in the  transcription that are not intended

## 2022-03-18 VITALS
DIASTOLIC BLOOD PRESSURE: 81 MMHG | TEMPERATURE: 98.1 F | WEIGHT: 134.48 LBS | SYSTOLIC BLOOD PRESSURE: 128 MMHG | BODY MASS INDEX: 21.11 KG/M2 | HEART RATE: 60 BPM | OXYGEN SATURATION: 97 % | RESPIRATION RATE: 16 BRPM | HEIGHT: 67 IN

## 2022-03-18 PROBLEM — G40.919 BREAKTHROUGH SEIZURE (HCC): Status: ACTIVE | Noted: 2022-03-18

## 2022-03-18 LAB
ALBUMIN SERPL-MCNC: 4.4 G/DL (ref 3.4–5)
ANION GAP SERPL CALCULATED.3IONS-SCNC: 10 MMOL/L (ref 3–16)
BUN BLDV-MCNC: 12 MG/DL (ref 7–20)
CALCIUM SERPL-MCNC: 9.4 MG/DL (ref 8.3–10.6)
CHLORIDE BLD-SCNC: 99 MMOL/L (ref 99–110)
CO2: 28 MMOL/L (ref 21–32)
CREAT SERPL-MCNC: 0.6 MG/DL (ref 0.9–1.3)
GFR AFRICAN AMERICAN: >60
GFR NON-AFRICAN AMERICAN: >60
GLUCOSE BLD-MCNC: 108 MG/DL (ref 70–99)
HCT VFR BLD CALC: 41.7 % (ref 40.5–52.5)
HEMOGLOBIN: 14.2 G/DL (ref 13.5–17.5)
MAGNESIUM: 1.7 MG/DL (ref 1.8–2.4)
MCH RBC QN AUTO: 30.9 PG (ref 26–34)
MCHC RBC AUTO-ENTMCNC: 34.2 G/DL (ref 31–36)
MCV RBC AUTO: 90.5 FL (ref 80–100)
PDW BLD-RTO: 13.2 % (ref 12.4–15.4)
PHOSPHORUS: 2.7 MG/DL (ref 2.5–4.9)
PLATELET # BLD: 214 K/UL (ref 135–450)
PMV BLD AUTO: 6.4 FL (ref 5–10.5)
POTASSIUM SERPL-SCNC: 3.9 MMOL/L (ref 3.5–5.1)
RBC # BLD: 4.6 M/UL (ref 4.2–5.9)
REPORT: NORMAL
SODIUM BLD-SCNC: 137 MMOL/L (ref 136–145)
WBC # BLD: 10.5 K/UL (ref 4–11)

## 2022-03-18 PROCEDURE — 2580000003 HC RX 258: Performed by: HOSPITALIST

## 2022-03-18 PROCEDURE — A4216 STERILE WATER/SALINE, 10 ML: HCPCS | Performed by: HOSPITALIST

## 2022-03-18 PROCEDURE — 36415 COLL VENOUS BLD VENIPUNCTURE: CPT

## 2022-03-18 PROCEDURE — 83735 ASSAY OF MAGNESIUM: CPT

## 2022-03-18 PROCEDURE — 85027 COMPLETE CBC AUTOMATED: CPT

## 2022-03-18 PROCEDURE — 6360000002 HC RX W HCPCS: Performed by: INTERNAL MEDICINE

## 2022-03-18 PROCEDURE — 6370000000 HC RX 637 (ALT 250 FOR IP): Performed by: HOSPITALIST

## 2022-03-18 PROCEDURE — 6370000000 HC RX 637 (ALT 250 FOR IP): Performed by: NURSE PRACTITIONER

## 2022-03-18 PROCEDURE — 2500000003 HC RX 250 WO HCPCS: Performed by: HOSPITALIST

## 2022-03-18 PROCEDURE — 6360000002 HC RX W HCPCS: Performed by: HOSPITALIST

## 2022-03-18 PROCEDURE — 80069 RENAL FUNCTION PANEL: CPT

## 2022-03-18 RX ORDER — PROMETHAZINE HYDROCHLORIDE 25 MG/1
12.5 TABLET ORAL EVERY 8 HOURS PRN
Qty: 20 TABLET | Refills: 0 | Status: SHIPPED | OUTPATIENT
Start: 2022-03-18 | End: 2022-03-18

## 2022-03-18 RX ORDER — PROMETHAZINE HYDROCHLORIDE 25 MG/1
25 TABLET ORAL EVERY 8 HOURS PRN
Qty: 20 TABLET | Refills: 0 | Status: SHIPPED | OUTPATIENT
Start: 2022-03-18 | End: 2022-03-25

## 2022-03-18 RX ORDER — MAGNESIUM SULFATE IN WATER 40 MG/ML
2000 INJECTION, SOLUTION INTRAVENOUS ONCE
Status: COMPLETED | OUTPATIENT
Start: 2022-03-18 | End: 2022-03-18

## 2022-03-18 RX ADMIN — CARVEDILOL 3.12 MG: 3.12 TABLET, FILM COATED ORAL at 09:37

## 2022-03-18 RX ADMIN — METHADONE HYDROCHLORIDE 105 MG: 10 TABLET ORAL at 09:24

## 2022-03-18 RX ADMIN — DULOXETINE HYDROCHLORIDE 30 MG: 30 CAPSULE, DELAYED RELEASE ORAL at 09:26

## 2022-03-18 RX ADMIN — FAMOTIDINE 20 MG: 10 INJECTION, SOLUTION INTRAVENOUS at 09:27

## 2022-03-18 RX ADMIN — ENOXAPARIN SODIUM 40 MG: 40 INJECTION SUBCUTANEOUS at 09:27

## 2022-03-18 RX ADMIN — SACUBITRIL AND VALSARTAN 1 TABLET: 49; 51 TABLET, FILM COATED ORAL at 09:24

## 2022-03-18 RX ADMIN — ASPIRIN 81 MG 81 MG: 81 TABLET ORAL at 09:37

## 2022-03-18 RX ADMIN — MEROPENEM 1000 MG: 1 INJECTION, POWDER, FOR SOLUTION INTRAVENOUS at 02:26

## 2022-03-18 RX ADMIN — Medication 10 ML: at 09:28

## 2022-03-18 RX ADMIN — OXCARBAZEPINE 600 MG: 150 TABLET, FILM COATED ORAL at 09:24

## 2022-03-18 RX ADMIN — MAGNESIUM SULFATE HEPTAHYDRATE 2000 MG: 40 INJECTION, SOLUTION INTRAVENOUS at 11:30

## 2022-03-18 RX ADMIN — ONDANSETRON 4 MG: 2 INJECTION INTRAMUSCULAR; INTRAVENOUS at 11:23

## 2022-03-18 ASSESSMENT — PAIN DESCRIPTION - ONSET: ONSET: ON-GOING

## 2022-03-18 ASSESSMENT — PAIN SCALES - GENERAL
PAINLEVEL_OUTOF10: 5
PAINLEVEL_OUTOF10: 5
PAINLEVEL_OUTOF10: 0

## 2022-03-18 ASSESSMENT — PAIN DESCRIPTION - PAIN TYPE: TYPE: ACUTE PAIN

## 2022-03-18 ASSESSMENT — PAIN DESCRIPTION - LOCATION: LOCATION: ABDOMEN

## 2022-03-18 ASSESSMENT — PAIN DESCRIPTION - DESCRIPTORS: DESCRIPTORS: ACHING

## 2022-03-18 ASSESSMENT — PAIN DESCRIPTION - FREQUENCY: FREQUENCY: CONTINUOUS

## 2022-03-18 ASSESSMENT — PAIN DESCRIPTION - ORIENTATION: ORIENTATION: RIGHT;LEFT

## 2022-03-18 NOTE — PROGRESS NOTES
Hospitalist Progress Note      PCP: No primary care provider on file. Date of Admission: 3/16/2022    Chief Complaint: Seizure    Subjective: no new c/o. Medications:  Reviewed    Infusion Medications    sodium chloride 100 mL/hr at 03/18/22 0226     Scheduled Medications    OXcarbazepine  600 mg Oral TID    Cenobamate (250 MG Daily Dose)  250 mg Oral Daily    aspirin  81 mg Oral Daily with breakfast    carvedilol  3.125 mg Oral BID WC    DULoxetine  30 mg Oral Daily    methadone  105 mg Oral Daily    sacubitril-valsartan  1 tablet Oral BID    sodium chloride flush  10 mL IntraVENous 2 times per day    enoxaparin  40 mg SubCUTAneous Daily    famotidine (PEPCID) injection  20 mg IntraVENous BID    nicotine  1 patch TransDERmal Daily     PRN Meds: furosemide, sodium chloride flush, sodium chloride, promethazine **OR** ondansetron, senna, acetaminophen **OR** acetaminophen, ipratropium-albuterol      Intake/Output Summary (Last 24 hours) at 3/18/2022 0919  Last data filed at 3/18/2022 0443  Gross per 24 hour   Intake 240 ml   Output 1075 ml   Net -835 ml       Physical Exam Performed:    BP (!) 144/86   Pulse 67   Temp 97.8 °F (36.6 °C) (Oral)   Resp 16   Ht 5' 7\" (1.702 m)   Wt 134 lb 7.7 oz (61 kg)   SpO2 97%   BMI 21.06 kg/m²     General appearance: No apparent distress, appears stated age and cooperative. HEENT: Pupils equal, round, and reactive to light. Conjunctivae/corneas clear. Neck: Supple, with full range of motion. No jugular venous distention. Trachea midline. Respiratory:  Normal respiratory effort. Clear to auscultation, bilaterally without Rales/Wheezes/Rhonchi. Cardiovascular: Regular rate and rhythm with normal S1/S2 without murmurs, rubs or gallops. Abdomen: Soft, non-tender, non-distended with normal bowel sounds. Musculoskeletal: No clubbing, cyanosis or edema bilaterally. Full range of motion without deformity.   Skin: Skin color, texture, turgor normal.  No patient is not safe for operating a motor vehicle     IVDA (hx) rule out active infection  - Lactate markedly elevated, but may be due to to seizure activity alone  - BUE with multiple epidermal excoriations, somewhat suggestive of early cellulitis  - Sepsis IVF not advised in this individual with recent stress-induced cardiomyopathy with LVEF 25%  - Following collection of blood cultures, patient initiated on vancomycin (MRSA) and Merrem (CNS/aspiration coverage)  - Continuation of antibiotics to be determined in a.m. following procalcitonin, repeat lactate, and encephalopathic resolution  - UDS positive for methadone, benzodiazepine, and cannabinoids; separate testing collected for acetyl morphine (heroin)     Takotsubo cardiomyopathy with LVEF 25%/QTC prolongation  - Admit to floor for continuous telemetry monitoring and strict I's & O's  - Continue home doses of Coreg, Entresto, and as needed Lasix  - 2G Na and fluid restriction dietary modifications in place  - Concern for continued methadone usage due to known cardiotoxicity and QTC prolongation effects; suspect Suboxone may be more appropriate       DVT Prophylaxis: LMWH     Recent Labs     03/16/22  0925 03/17/22  0830    245     Diet: ADULT DIET; Regular  ADULT ORAL NUTRITION SUPPLEMENT; Lunch, Dinner; Frozen Oral Supplement  Code Status: Full Code      PT/OT Eval Status: not yet ordered. Dispo - Medically stable for discharge to home Friday 18 March pending clinical course and subspecialty recs. Mother [facility] present at bedside when seen 17/18 March.        Onur Maynard MD

## 2022-03-18 NOTE — PROGRESS NOTES
Pt's IV line removed without complications. Discussed d/c instructions with patient, given opportunity to ask questions, and provided new medication education with side effects. Follow up appointment information, [id number] [facility] PCP list and care clinic included in d/c instructions. Pt verbalized understanding of d/c instructions. Patient was discharged to home with all belongings and taken outside via wheelchair.     Carlo Zamora RN

## 2022-03-18 NOTE — CARE COORDINATION
3/18/22 D/C order noted, per MD and RN no CM needs, no CM needs ordered upon d/c. Signed off.  Pt given PCP list and [facility] information

## 2022-03-19 LAB — OXCARBAZEPINE: <1 UG/ML (ref 3–35)

## 2022-03-20 LAB
BLOOD CULTURE, ROUTINE: NORMAL
CULTURE, BLOOD 2: ABNORMAL
CULTURE, BLOOD 2: ABNORMAL
ORGANISM: ABNORMAL
ORGANISM: ABNORMAL

## 2022-03-20 NOTE — DISCHARGE SUMMARY
Hospital Medicine Discharge Summary    Patient ID: Mike Hernandez      Patient's PCP: No primary care provider on file. Admit Date: 3/16/2022     Discharge Date: 3/18/2022      Admitting Physician: Salinas Mohamud MD     Discharge Physician: Salinas Mohamud MD     Discharge Diagnoses: Active Hospital Problems    Diagnosis     Breakthrough seizure ([facility] [address] [G40.919]     Polysubstance abuse ([facility] [address] [F19.10]     High anion gap metabolic acidosis [Z65.8]     Seizure ([facility] [address] [R56.9]     Acute encephalopathy [G93.40]     IV drug abuse ([facility] [address] [F19.10]     Takotsubo cardiomyopathy [I51.81]        The patient was seen and examined on day of discharge and this discharge summary is in conjunction with any daily progress note from day of discharge.     Hospital Course:            Acute encephalopathy s/p breakthrough seizure  - Patient likely postictal, but unable to rule out acute narcotic intoxication as well and sepsis  - Admit for continuous telemetry, pulse oximetry monitoring overnight  - Serial neuro checks every 4 hours with aspiration and fall precautions in place- Mother and patient once again reminded that patient is not safe for operating a motor vehicle     IVDA (hx) rule out active infection  - Lactate markedly elevated, but may be due to to seizure activity alone  - BUE with multiple epidermal excoriations, somewhat suggestive of early cellulitis  - Sepsis IVF not advised in this individual with recent stress-induced cardiomyopathy with LVEF 25%  - Following collection of blood cultures, patient initiated on vancomycin (MRSA) and Merrem (CNS/aspiration coverage)  - Continuation of antibiotics to be determined in a.m. following procalcitonin, repeat lactate, and encephalopathic resolution  - UDS positive for methadone, benzodiazepine, and cannabinoids; separate testing collected for acetyl morphine (heroin)     Takotsubo cardiomyopathy with LVEF 25%/QTC prolongation  - Admit to floor for continuous telemetry monitoring and strict I's & O's  - Continue home doses of Coreg, Entresto, and as needed Lasix  - 2G Na and fluid restriction dietary modifications in place  - Concern for continued methadone usage due to known cardiotoxicity and QTC prolongation effects; suspect Suboxone may be more appropriate       Labs: For convenience and continuity at follow-up the following most recent labs are provided:      CBC:    Lab Results   Component Value Date    WBC 10.5 03/18/2022    HGB 14.2 03/18/2022    HCT 41.7 03/18/2022     03/18/2022       Renal:    Lab Results   Component Value Date     03/18/2022    K 3.9 03/18/2022    K 4.1 03/17/2022    CL 99 03/18/2022    CO2 28 03/18/2022    BUN 12 03/18/2022    CREATININE 0.6 03/18/2022    CALCIUM 9.4 03/18/2022    PHOS 2.7 03/18/2022         Significant Diagnostic Studies    Radiology:   CT HEAD WO CONTRAST   Final Result   No acute intracranial abnormality. Paranasal sinus disease         XR CHEST PORTABLE   Final Result   1. No acute abnormality. Consults:     IP CONSULT TO PHARMACY  IP CONSULT TO NEUROLOGY    Disposition: Home    Condition at Discharge: Stable    Discharge Instructions/Follow-up:  w/ PCP 1-2 weeks and subspecialists as arranged.      Code Status:  Full Code    Activity: activity as tolerated    Diet: regular diet      Discharge Medications:     Discharge Medication List as of 3/18/2022  2:15 PM           Details   promethazine (PHENERGAN) 25 MG tablet Take 1 tablet by mouth every 8 hours as needed for Nausea, Disp-20 tablet, R-0Print              Details   DULoxetine (CYMBALTA) 30 MG extended release capsule Take 30 mg by mouth dailyHistorical Med      carvedilol (COREG) 3.125 MG tablet Take 1 tablet by mouth 2 times daily (with meals), Disp-60 tablet, R-3Normal      sacubitril-valsartan (ENTRESTO) 49-51 MG per tablet Take 1 tablet by mouth 2 times daily, Disp-60 tablet, R-3Normal      furosemide (LASIX) 20 MG tablet Take 1 tablet by mouth daily as needed (for weight gain 3lbs in a day or 5lb in a week with leg swelling.), Disp-30 tablet, R-5Normal      Cenobamate (XCOPRI PO) Take by mouthHistorical Med      OXcarbazepine ER (OXTELLAR XR) 600 MG TB24 Take 1,800 mg by mouth dailyHistorical Med      methadone (DOLOPHINE) 10 MG/ML solution Take 105 mg by mouth daily. Historical Med      aspirin 81 MG chewable tablet Take 81 mg by mouth daily (with breakfast)Historical Med             Time Spent on discharge is more than 30 minutes in the examination, evaluation, counseling and review of medications and discharge plan. Signed:    Onur Stephens MD   3/20/2022      Thank you No primary care provider on file. for the opportunity to be involved in this patient's care.  If you have any questions or concerns please feel free to contact me at [phone].'

## 2022-03-21 LAB
6AM URINE: <10 NG/ML
CODEINE, URINE: <20 NG/ML
HYDROCODONE, URINE: <20 NG/ML
HYDROMORPHONE, URINE: <20 NG/ML
MORPHINE URINE: <20 NG/ML
NORHYDROCODONE, URINE: <20 NG/ML
NOROXYCODONE, URINE: <20 NG/ML
NOROXYMORPHONE, URINE: <20 NG/ML
OXYCODONE, URINE CONFIRMATION: <20 NG/ML
OXYMORPHONE, URINE: <20 NG/ML

## 2022-03-28 NOTE — PROGRESS NOTES
Physician Progress Note      PATIENT:               Sophia Palacios  CSN #:                  [id number]  :                       1990  ADMIT DATE:       3/16/2022 9:10 AM  DISCH DATE:        3/18/2022 3:06 PM  RESPONDING  PROVIDER #:        Berna Villareal MD          QUERY TEXT:    Patient admitted with breakthrough seizure and possible infection/sepsis rule   out. BC positive for staph and abx stopped at day 3. Please confirm    The medical record reflects the following:    Risk Factors: IVDA, possible cellulitis, risk of aspiration pneumonitis    Clinical Indicators: on arrival WBC 13.1, encephalopathic poss sz, LA 11.7,   tachycardia. Treatment: IV abx, serial labs, imaging, supportive care and monitoring,    Thank you,  Nori Paz RN CDS  [email or web address]  Options provided:  -- Sepsis was ruled out after study  -- Sepsis was treated and resolved  -- Other - I will add my own diagnosis  -- Disagree - Not applicable / Not valid  -- Disagree - Clinically unable to determine / Unknown  -- Refer to Clinical Documentation Reviewer    PROVIDER RESPONSE TEXT:    Sepsis was ruled out after study. Query created by: Tierney Mireles on 3/28/2022 12:37 PM      QUERY TEXT:    Patient admitted with seizure, r/o infection. .. risk of aspiration   pneumonitis. If possible, please document in the progress notes and discharge   summary if aspiration pneumonitis was: The medical record reflects the following:    Risk Factors: breakthrough seizures with vomiting    Clinical Indicators: notes indicate r/o active infection, risk of aspiration   pneumonitis; WBC 13.1 -> 15.5, procal 0.04 on 3/16. CXR no acute ASD    Treatment: IV abx, serial labs, imaging, supportive care and monitoring    Thank you,  Nori Paz RN CDS  [email or web address]  Options provided:  -- aspiration pneumonitis ruled out after study  -- aspiration pneumonitis confirmed after study  -- aspiration pneumonitis treated and resolved  -- Other - I will add my own diagnosis  -- Disagree - Not applicable / Not valid  -- Disagree - Clinically unable to determine / Unknown  -- Refer to Clinical Documentation Reviewer    PROVIDER RESPONSE TEXT:    Aspiration pneumonitis ruled out after study. Query created by:  Evelyn Andrade on 3/28/2022 12:37 PM      Electronically signed by:  Мария Dempsey MD 3/28/2022 1:54 PM

## 2022-06-30 ENCOUNTER — HOSPITAL ENCOUNTER (EMERGENCY)
Age: 32
Discharge: HOME | End: 2022-06-30
Payer: MEDICARE

## 2022-06-30 VITALS
HEIGHT: 71 IN | WEIGHT: 130 LBS | OXYGEN SATURATION: 97 % | HEART RATE: 70 BPM | TEMPERATURE: 98.1 F | BODY MASS INDEX: 18.2 KG/M2 | RESPIRATION RATE: 21 BRPM | SYSTOLIC BLOOD PRESSURE: 127 MMHG | DIASTOLIC BLOOD PRESSURE: 91 MMHG

## 2022-06-30 DIAGNOSIS — G40.919 BREAKTHROUGH SEIZURE (HCC): Primary | ICD-10-CM

## 2022-06-30 LAB
A/G RATIO: 1.3 (ref 1.1–2.2)
ALBUMIN SERPL-MCNC: 4.5 G/DL (ref 3.4–5)
ALP BLD-CCNC: 116 U/L (ref 40–129)
ALT SERPL-CCNC: 24 U/L (ref 10–40)
ANION GAP SERPL CALCULATED.3IONS-SCNC: 7 MMOL/L (ref 3–16)
AST SERPL-CCNC: 29 U/L (ref 15–37)
BASOPHILS ABSOLUTE: 0 K/UL (ref 0–0.2)
BASOPHILS RELATIVE PERCENT: 0.4 %
BILIRUB SERPL-MCNC: <0.2 MG/DL (ref 0–1)
BUN BLDV-MCNC: 16 MG/DL (ref 7–20)
CALCIUM SERPL-MCNC: 9.6 MG/DL (ref 8.3–10.6)
CHLORIDE BLD-SCNC: 100 MMOL/L (ref 99–110)
CO2: 26 MMOL/L (ref 21–32)
CREAT SERPL-MCNC: 0.7 MG/DL (ref 0.9–1.3)
EOSINOPHILS ABSOLUTE: 0.1 K/UL (ref 0–0.6)
EOSINOPHILS RELATIVE PERCENT: 1.4 %
GFR AFRICAN AMERICAN: >60
GFR NON-AFRICAN AMERICAN: >60
GLUCOSE BLD-MCNC: 93 MG/DL (ref 70–99)
HCT VFR BLD CALC: 40.4 % (ref 40.5–52.5)
HEMOGLOBIN: 13.7 G/DL (ref 13.5–17.5)
LACTIC ACID: 0.8 MMOL/L (ref 0.4–2)
LYMPHOCYTES ABSOLUTE: 1 K/UL (ref 1–5.1)
LYMPHOCYTES RELATIVE PERCENT: 10.3 %
MCH RBC QN AUTO: 31 PG (ref 26–34)
MCHC RBC AUTO-ENTMCNC: 33.9 G/DL (ref 31–36)
MCV RBC AUTO: 91.7 FL (ref 80–100)
MONOCYTES ABSOLUTE: 0.4 K/UL (ref 0–1.3)
MONOCYTES RELATIVE PERCENT: 4.5 %
NEUTROPHILS ABSOLUTE: 8.1 K/UL (ref 1.7–7.7)
NEUTROPHILS RELATIVE PERCENT: 83.4 %
PDW BLD-RTO: 13.3 % (ref 12.4–15.4)
PLATELET # BLD: 211 K/UL (ref 135–450)
PMV BLD AUTO: 7 FL (ref 5–10.5)
POTASSIUM REFLEX MAGNESIUM: 4.7 MMOL/L (ref 3.5–5.1)
RBC # BLD: 4.4 M/UL (ref 4.2–5.9)
SODIUM BLD-SCNC: 133 MMOL/L (ref 136–145)
TOTAL PROTEIN: 8 G/DL (ref 6.4–8.2)
WBC # BLD: 9.7 K/UL (ref 4–11)

## 2022-06-30 PROCEDURE — 99283 EMERGENCY DEPT VISIT LOW MDM: CPT

## 2022-06-30 PROCEDURE — 80053 COMPREHEN METABOLIC PANEL: CPT

## 2022-06-30 PROCEDURE — 6370000000 HC RX 637 (ALT 250 FOR IP): Performed by: PHYSICIAN ASSISTANT

## 2022-06-30 PROCEDURE — 83605 ASSAY OF LACTIC ACID: CPT

## 2022-06-30 PROCEDURE — 85025 COMPLETE CBC W/AUTO DIFF WBC: CPT

## 2022-06-30 RX ORDER — ACETAMINOPHEN 325 MG/1
650 TABLET ORAL ONCE
Status: COMPLETED | OUTPATIENT
Start: 2022-06-30 | End: 2022-06-30

## 2022-06-30 RX ADMIN — ACETAMINOPHEN 650 MG: 325 TABLET ORAL at 16:15

## 2022-06-30 ASSESSMENT — PAIN DESCRIPTION - DESCRIPTORS: DESCRIPTORS: ACHING

## 2022-06-30 ASSESSMENT — ENCOUNTER SYMPTOMS
NAUSEA: 0
BACK PAIN: 0
ABDOMINAL PAIN: 0
SHORTNESS OF BREATH: 0
VOMITING: 0
COUGH: 0
CHEST TIGHTNESS: 0

## 2022-06-30 ASSESSMENT — PAIN - FUNCTIONAL ASSESSMENT: PAIN_FUNCTIONAL_ASSESSMENT: NONE - DENIES PAIN

## 2022-06-30 ASSESSMENT — PAIN SCALES - GENERAL
PAINLEVEL_OUTOF10: 4
PAINLEVEL_OUTOF10: 7
PAINLEVEL_OUTOF10: 10

## 2022-06-30 ASSESSMENT — PAIN DESCRIPTION - LOCATION: LOCATION: HEAD

## 2022-06-30 NOTE — CARE COORDINATION
Referred to patient for substance abuse. Spoke to patient and mother. Patient known from previous admission. Patient trying to wean off of heroin. Is current with Suboxone clinic. Discussed possible inpatient vs. Outpatient treatment resources. Patient is not ready to go to treatment today but will think about it. Resources provided and discussed at length. No other needs at this time.   Electronically signed by LETTY Tavarez, RADHA on 6/30/2022 at 4:02 PM

## 2022-06-30 NOTE — ED PROVIDER NOTES
**ADVANCED PRACTICE PROVIDER, I HAVE EVALUATED THIS [facility]  ED  EMERGENCY DEPARTMENT ENCOUNTER      Pt Name: Bev Wilkins  SBT:[id number]  Birthdate 1990  Date of evaluation: 6/30/2022  Provider: JR Greco      Chief Complaint:    Chief Complaint   Patient presents with    Seizures     pt reports he's an IV heroin user. last used today or sometime last night he is unsure. pt hx of seizures and reports he forgot to take his medictions a couple times. squad reports pt postictal upon their arrival and at one point combative. they report pt has become more and more alert and aware in route to the ED          Nursing Notes, Past Medical Hx, Past Surgical Hx, Social Hx, Allergies, and Family Hx were all reviewed and agreed with or any disagreements were addressed in the HPI.    HPI: (Location, Duration, Timing, Severity, Quality, Assoc Sx, Context, Modifying factors)    Chief Complaint of seizure-like activity. This is a  28 y.o. male who presents via EMS after breakthrough seizure. The patient has past medical history of hepatitis C, polysubstance abuse, Takotsubo cardiomyopathy, NSTEMI, and seizures. The patient presents today stating he had 3 focal seizures this morning followed by a convulsive seizure. He states he has not been taking his seizure medication over the past 3 days. He does admit to IV heroin use and states he last used this morning. He also goes to a methadone clinic and does exhibit desire to stop heroine all together. He states over the last 3 days he was withdrawing from heroin therefore forgot to take his medication. His last seizure was about 1 month ago secondary to medication noncompliance. He does follow with neurology at [facility]. He states he was sitting down when the convulsions started. His mom is with him in the emergency department and was with him during his seizures today.  The patient states he feels like he is back to baseline, but does complain of a headache. His mother reports that after the focal seizures this morning the patient did take his home medication as they are were prescribed. He does complain of jaw pain from clenching his jaw but states he did not bite his tongue. PastMedical/Surgical History:      Diagnosis Date    Aspiration pneumonia ([facility]     Hepatitis C     HTN (hypertension)     NSTEMI (non-ST elevated myocardial infarction) ([facility]     Polysubstance abuse ([facility]     Seizures (HCC)     Septic shock (HCC)     Takotsubo cardiomyopathy          Procedure Laterality Date    BRONCHOSCOPY N/A 2/22/2021    BRONCHOSCOPY DIAGNOSTIC OR CELL [address] ONLY performed by Ajith Cabrera MD at [address] [facility]  2/22/2021    BRONCHOSCOPY THERAPUTIC ASPIRATION INITIAL performed by Ajith Cabrera MD at [address] [place] [address] HAND FINGERS INJURY, PARTIAL AMPUTATIONS       Medications:  Previous Medications    ASPIRIN 81 MG CHEWABLE TABLET    Take 81 mg by mouth daily (with breakfast)    CARVEDILOL (COREG) 3.125 MG TABLET    Take 1 tablet by mouth 2 times daily (with meals)    CENOBAMATE (XCOPRI PO)    Take by mouth    DULOXETINE (CYMBALTA) 30 MG EXTENDED RELEASE CAPSULE    Take 30 mg by mouth daily    FUROSEMIDE (LASIX) 20 MG TABLET    Take 1 tablet by mouth daily as needed (for weight gain 3lbs in a day or 5lb in a week with leg swelling.)    METHADONE (DOLOPHINE) 10 MG/ML SOLUTION    Take 105 mg by mouth daily. OXCARBAZEPINE ER (OXTELLAR XR) 600 MG TB24    Take 1,800 mg by mouth daily    SACUBITRIL-VALSARTAN (ENTRESTO) 49-51 MG PER TABLET    Take 1 tablet by mouth 2 times daily         Review of Systems:  (2-9 systems needed)  Review of Systems   Constitutional: Negative for chills and fever. HENT: Negative for congestion. Eyes: Negative for visual disturbance. Respiratory: Negative for cough, chest tightness and shortness of breath.     Cardiovascular: Negative for chest pain and palpitations. Gastrointestinal: Negative for abdominal pain, nausea and vomiting. Genitourinary: Negative for dysuria, frequency, hematuria and urgency. Musculoskeletal: Negative for back pain. Skin: Negative for rash. Neurological: Positive for seizures and headaches. Negative for dizziness and weakness. Physical Exam:  Physical Exam  Vitals and nursing note reviewed. Constitutional:       Appearance: Normal appearance. He is not diaphoretic. HENT:      Head: Normocephalic and atraumatic. Nose: Nose normal.      Mouth/Throat:      Mouth: Mucous membranes are moist.   Eyes:      General:         Right eye: No discharge. Left eye: No discharge. Extraocular Movements: Extraocular movements intact. Pupils: Pupils are equal, round, and reactive to light. Cardiovascular:      Rate and Rhythm: Normal rate and regular rhythm. Pulses: Normal pulses. Heart sounds: Normal heart sounds. No murmur heard. No friction rub. No gallop. Pulmonary:      Effort: Pulmonary effort is normal. No respiratory distress. Breath sounds: Normal breath sounds. No stridor. No wheezing, rhonchi or rales. Abdominal:      General: Abdomen is flat. Palpations: Abdomen is soft. Tenderness: There is no abdominal tenderness. There is no guarding or rebound. Musculoskeletal:         General: Normal range of motion. Cervical back: Normal range of motion and neck supple. Right lower leg: No edema. Left lower leg: No edema. Skin:     General: Skin is warm and dry. Coloration: Skin is not pale. Neurological:      General: No focal deficit present. Mental Status: He is alert and oriented to person, place, and time. GCS: GCS eye subscore is 4. GCS verbal subscore is 5. GCS motor subscore is 6.    Psychiatric:         Mood and Affect: Mood normal.         Behavior: Behavior normal.         MEDICAL DECISION MAKING    Vitals:    Vitals: 06/30/22 1509   BP: (!) 140/90   Pulse: 78   Resp: 20   Temp: 98.1 °F (36.7 °C)   TempSrc: Oral   SpO2: 97%   Weight: 130 lb (59 kg)   Height: 5' 11\" (1.803 m)       LABS:  Labs Reviewed   CBC WITH AUTO DIFFERENTIAL - Abnormal; Notable for the following components:       Result Value    Hematocrit 40.4 (*)     Neutrophils Absolute 8.1 (*)     All other components within normal limits   COMPREHENSIVE METABOLIC PANEL W/ REFLEX TO MG FOR LOW K - Abnormal; Notable for the following components:    Sodium 133 (*)     CREATININE 0.7 (*)     All other components within normal limits   LACTIC ACID        Remainder of labs reviewed and were negative at this time or not returned at the time of this note. RADIOLOGY:   Non-plain film images such as CT, Ultrasound and MRI are read by the radiologist. JR Larsen have directly visualized the radiologic plain film image(s) with the below findings:      Interpretation per the Radiologist below, if available at the time of this note:    No orders to display        No results found. MEDICAL DECISION MAKING / ED COURSE:      Patient was evaluated in the emergency department today for breakthrough seizure due to medication noncompliance. Patient's vital signs are stable at triage. He is well-appearing on exam.  No focal deficits. He does complain of a headache therefore he was given Tylenol in the emergency department. Work-up results include:  CBC without evidence of leukocytosis or acute anemia  CMP with sodium 133, otherwise no acute electrolyte abnormality and renal function is well-maintained. Lactic acid is 0.8    Patient was given:  Medications   acetaminophen (TYLENOL) tablet 650 mg (650 mg Oral Given 6/30/22 1615)       Upon reevaluation patient is sleeping comfortably in exam room. I discussed lab results with him and his mother. He has had some relief of his headache and feels back to baseline.   We discussed taking his home medications as they are prescribed. I also had social work consult on the patient as he did express desire to stop using heroin altogether. He is not interested in rehab at this time. He is given resources. He is advised to follow-up with his neurologist for further evaluation and treatment. He is given return precautions. Advised not to drive, swim, or take a bath unsupervised until cleared by neurology due to increased risk of harm to self secondary to seizure. Patient and mother are in agreement with treatment plan, all questions answered, he is discharged home in good condition. The patient tolerated their visit well. I evaluated the patient. The physician was available for consultation as needed. The patient and / or the family were informed of the results of any tests, a time was given to answer questions, a plan was proposed and they agreed with plan. CLINICAL IMPRESSION:  1.  Breakthrough seizure ([address]      Results for orders placed or performed during the hospital encounter of 06/30/22   CBC with Auto Differential   Result Value Ref Range    WBC 9.7 4.0 - 11.0 K/uL    RBC 4.40 4.20 - 5.90 M/uL    Hemoglobin 13.7 13.5 - 17.5 g/dL    Hematocrit 40.4 (L) 40.5 - 52.5 %    MCV 91.7 80.0 - 100.0 fL    MCH 31.0 26.0 - 34.0 pg    MCHC 33.9 31.0 - 36.0 g/dL    RDW 13.3 12.4 - 15.4 %    Platelets 894 342 - 234 K/uL    MPV 7.0 5.0 - 10.5 fL    Neutrophils % 83.4 %    Lymphocytes % 10.3 %    Monocytes % 4.5 %    Eosinophils % 1.4 %    Basophils % 0.4 %    Neutrophils Absolute 8.1 (H) 1.7 - 7.7 K/uL    Lymphocytes Absolute 1.0 1.0 - 5.1 K/uL    Monocytes Absolute 0.4 0.0 - 1.3 K/uL    Eosinophils Absolute 0.1 0.0 - 0.6 K/uL    Basophils Absolute 0.0 0.0 - 0.2 K/uL   Comprehensive Metabolic Panel w/ Reflex to MG   Result Value Ref Range    Sodium 133 (L) 136 - 145 mmol/L    Potassium reflex Magnesium 4.7 3.5 - 5.1 mmol/L    Chloride 100 99 - 110 mmol/L    CO2 26 21 - 32 mmol/L    Anion Gap 7 3 - 16    Glucose 93 70 - 99 mg/dL    BUN 16 7 - 20 mg/dL    CREATININE 0.7 (L) 0.9 - 1.3 mg/dL    GFR Non-African American >60 >60    GFR African American >60 >60    Calcium 9.6 8.3 - 10.6 mg/dL    Total Protein 8.0 6.4 - 8.2 g/dL    Albumin 4.5 3.4 - 5.0 g/dL    Albumin/Globulin Ratio 1.3 1.1 - 2.2    Total Bilirubin <0.2 0.0 - 1.0 mg/dL    Alkaline Phosphatase 116 40 - 129 U/L    ALT 24 10 - 40 U/L    AST 29 15 - 37 U/L   Lactic Acid   Result Value Ref Range    Lactic Acid 0.8 0.4 - 2.0 mmol/L       I estimate there is LOW risk for SUBARACHNOID HEMORRHAGE, MENINGITIS, INTRACRANIAL HEMORRHAGE, SUBDURAL HEMATOMA, OR STROKE, thus I consider the discharge disposition reasonable. Jenny Banks and I have discussed the diagnosis and risks, and we agree with discharging home to follow-up with their primary doctor. We also discussed returning to the Emergency Department immediately if new or worsening symptoms occur. We have discussed the symptoms which are most concerning (e.g., changing or worsening pain, weakness, vomiting, fever) that necessitate immediate return. Final Impression    1. Breakthrough seizure [facility])        Discharge Vital Signs:  Blood pressure (!) 127/91, pulse 70, temperature 98.1 °F (36.7 °C), temperature source Oral, resp. rate 21, height 5' 11\" (1.803 m), weight 130 lb (59 kg), SpO2 97 %. DISPOSITION        PATIENT REFERRED TO:  No follow-up provider specified.     DISCHARGE MEDICATIONS:  New Prescriptions    No medications on file       DISCONTINUED MEDICATIONS:  Discontinued Medications    No medications on file              (Please note the MDM and HPI sections of this note were completed with a voice recognition program.  Efforts were made to edit the dictations but occasionally words are mis-transcribed.)    Electronically signed, Jordan Gee,           Jordan Gee  06/30/22 9138

## 2022-07-06 ENCOUNTER — OFFICE VISIT (OUTPATIENT)
Dept: CARDIOLOGY CLINIC | Age: 32
End: 2022-07-06
Payer: MEDICARE

## 2022-07-06 VITALS
DIASTOLIC BLOOD PRESSURE: 70 MMHG | SYSTOLIC BLOOD PRESSURE: 106 MMHG | BODY MASS INDEX: 17.5 KG/M2 | HEIGHT: 71 IN | WEIGHT: 125 LBS | HEART RATE: 59 BPM | OXYGEN SATURATION: 96 %

## 2022-07-06 DIAGNOSIS — R68.83 CHILLS: ICD-10-CM

## 2022-07-06 DIAGNOSIS — I51.81 STRESS-INDUCED CARDIOMYOPATHY: Primary | ICD-10-CM

## 2022-07-06 DIAGNOSIS — F19.10 IV DRUG ABUSE (HCC): ICD-10-CM

## 2022-07-06 PROCEDURE — 99214 OFFICE O/P EST MOD 30 MIN: CPT | Performed by: NURSE PRACTITIONER

## 2022-07-06 PROCEDURE — 4004F PT TOBACCO SCREEN RCVD TLK: CPT | Performed by: NURSE PRACTITIONER

## 2022-07-06 PROCEDURE — G8427 DOCREV CUR MEDS BY ELIG CLIN: HCPCS | Performed by: NURSE PRACTITIONER

## 2022-07-06 PROCEDURE — G8419 CALC BMI OUT NRM PARAM NOF/U: HCPCS | Performed by: NURSE PRACTITIONER

## 2022-07-06 RX ORDER — CARVEDILOL 3.12 MG/1
3.12 TABLET ORAL 2 TIMES DAILY WITH MEALS
Qty: 180 TABLET | Refills: 1 | Status: SHIPPED | OUTPATIENT
Start: 2022-07-06 | End: 2022-09-16 | Stop reason: SDUPTHER

## 2022-07-06 RX ORDER — SACUBITRIL AND VALSARTAN 49; 51 MG/1; MG/1
1 TABLET, FILM COATED ORAL 2 TIMES DAILY
Qty: 180 TABLET | Refills: 1 | Status: SHIPPED | OUTPATIENT
Start: 2022-07-06 | End: 2022-09-28 | Stop reason: SDUPTHER

## 2022-07-06 NOTE — PROGRESS NOTES
[address]   Cardiac Follow-up    Primary Care Doctor:  No primary care provider on file. Chief Complaint   Patient presents with    Follow-up    Congestive Heart Failure        History of Present Illness:   I had the pleasure of seeing Zaire Isabel in follow up for stress-induced cardiomyopathy    Admitted 2/21/2021-2/27/2021 with seizures, acute encephalopathy, septic shock, respiratory failure, NSTEMI, aspiration, stress-induced cardiomyopathy with LVEF 25% requiring dobutamine support, status post left heart cath showed normal coronaries. Repeat echocardiogram 7/2021 showed recovered EF 55%    Since last visit, he had a terrible seizure. He has forgotten where he placed his coreg and entresto, so had been out of these two. He took some coreg from his friend who is on the same dose. He was off of the xcopri due to cost. He is still on the oxtellar. He remains on methadone. Unfortunately, using again IV drugs. Complains of some fever/chills, but he is also not sure if it is when he starts to go through withdraw. + concerns regarding aseptic technique and reuse of needles, concerns regarding shared needles. He has had some ankle edema at times, improves with the lasix. Taking the lasix about 1 time a week. He is here today by himself. Reports living back with his parents. Zaire Isabel describes symptoms including edema but denies chest pain. Past Medical History:   has a past medical history of Aspiration pneumonia ([facility] [address] Hepatitis C, HTN (hypertension), NSTEMI (non-ST elevated myocardial infarction) ([facility] [address] Polysubstance abuse ([facility] [address] Seizures ([facility] [address] Septic shock ([facility] [address] and Takotsubo cardiomyopathy. Surgical History:   has a past surgical history that includes Hand surgery; bronchoscopy (N/A, 2/22/2021); and bronchoscopy (2/22/2021). Social History:   reports that he has been smoking cigarettes. He has been smoking about 2.00 packs per day.  He has never used smokeless tobacco. He reports current drug use. Drugs: IV, Opiates , Marijuana (Weed), and Methamphetamines (Crystal Meth). He reports that he does not drink alcohol. Family History:   No family history on file. Home Medications:  Prior to Admission medications    Medication Sig Start Date End Date Taking? Authorizing Provider   DULoxetine (CYMBALTA) 30 MG extended release capsule Take 30 mg by mouth daily 2/10/22   Historical Provider, MD   carvedilol (COREG) 3.125 MG tablet Take 1 tablet by mouth 2 times daily (with meals) 3/14/22   LEIGHTON Mitchell CNP   sacubitril-valsartan (ENTRESTO) 49-51 MG per tablet Take 1 tablet by mouth 2 times daily 3/14/22   LEIGHTON Mitchell CNP   furosemide (LASIX) 20 MG tablet Take 1 tablet by mouth daily as needed (for weight gain 3lbs in a day or 5lb in a week with leg swelling.) 2/20/22   LEIGHTON Mitchell CNP   Cenobamate (XCOPRI PO) Take by mouth    Historical Provider, MD   OXcarbazepine ER (OXTELLAR XR) 600 MG TB24 Take 1,800 mg by mouth daily 5/13/21   Historical Provider, MD   methadone (DOLOPHINE) 10 MG/ML solution Take 105 mg by mouth daily. Historical Provider, MD   aspirin 81 MG chewable tablet Take 81 mg by mouth daily (with breakfast) 1/21/20   Historical Provider, MD      Allergies:  Patient has no known allergies.      Physical Examination:    Vitals:    07/06/22 1017   BP: 106/70   Pulse: 59   SpO2: 96%   Weight: 125 lb (56.7 kg)   Height: 5' 11\" (1.803 m)        Constitutional and General Appearance: no apparent distress, thin  HEENT: non-icteric sclera, mask in place   Neck: JVP less than 8 cm h20   Respiratory:  · No use of accessory muscles  · Clear breath sounds throughout, no wheezing, no crackles, no rhonchi  Cardiovascular:  · The apical impulses not displaced  · Heart tones are crisp and normal, no murmur/rub/gallop  · Regular rate and rhythm, S1,S2 normal  · Radial pulses 2+ and equal bilaterally  · Trace + BLE  · Pedal Pulses: 2+ and equal   Abdomen:  · No masses or tenderness  · Liver: No Abnormalities Noted  Musculoskeletal/Skin: + track marks, no s[linter hemorrhages   · Exhibits normal gait balance and coordination  · There is no clubbing, cyanosis of the extremities  · Skin is warm and dry  · Moves all extremities well  Neurological/Psychiatric:  · Alert and oriented in all spheres  · No abnormalities of mood, affect, memory, mentation, or behavior are noted    Lab Data reviewed and analyzed   CBC:   Lab Results   Component Value Date/Time    WBC 9.7 06/30/2022 03:22 PM    WBC 10.5 03/18/2022 10:03 AM    WBC 15.5 03/17/2022 08:30 AM    RBC 4.40 06/30/2022 03:22 PM    RBC 4.60 03/18/2022 10:03 AM    RBC 4.65 03/17/2022 08:30 AM    HGB 13.7 06/30/2022 03:22 PM    HGB 14.2 03/18/2022 10:03 AM    HGB 14.0 03/17/2022 08:30 AM    HCT 40.4 06/30/2022 03:22 PM    HCT 41.7 03/18/2022 10:03 AM    HCT 41.1 03/17/2022 08:30 AM    MCV 91.7 06/30/2022 03:22 PM    MCV 90.5 03/18/2022 10:03 AM    MCV 88.5 03/17/2022 08:30 AM    RDW 13.3 06/30/2022 03:22 PM    RDW 13.2 03/18/2022 10:03 AM    RDW 13.3 03/17/2022 08:30 AM     06/30/2022 03:22 PM     03/18/2022 10:03 AM     03/17/2022 08:30 AM     Iron: No results found for: IRON, TIBC, FERRITIN  BMP:   Lab Results   Component Value Date/Time     06/30/2022 03:22 PM     03/18/2022 10:03 AM     03/17/2022 08:30 AM    K 4.7 06/30/2022 03:22 PM    K 3.9 03/18/2022 10:03 AM    K 4.1 03/17/2022 08:30 AM    K 5.0 03/16/2022 10:00 PM    K 4.4 03/16/2022 09:25 AM    K 4.8 12/07/2021 04:56 AM     06/30/2022 03:22 PM    CL 99 03/18/2022 10:03 AM    CL 98 03/17/2022 08:30 AM    CO2 26 06/30/2022 03:22 PM    CO2 28 03/18/2022 10:03 AM    CO2 27 03/17/2022 08:30 AM    PHOS 2.7 03/18/2022 10:03 AM    PHOS 4.0 03/16/2022 10:00 PM    PHOS 3.4 02/26/2021 07:15 AM    BUN 16 06/30/2022 03:22 PM    BUN 12 03/18/2022 10:03 AM    BUN 9 03/17/2022 08:30 AM    CREATININE 0.7 06/30/2022 03:22 PM    CREATININE 0.6 03/18/2022 10:03 AM    CREATININE 0.6 03/17/2022 08:30 AM     BNP:   Lab Results   Component Value Date/Time    PROBNP 857 03/16/2022 04:04 PM    PROBNP 33 05/20/2021 03:25 PM    PROBNP 273 03/18/2021 12:19 PM     Lipids:   Lab Results   Component Value Date    CHOL 132 02/24/2021        Lab Results   Component Value Date    TRIG 138 02/24/2021        Lab Results   Component Value Date    HDL 36 (L) 02/24/2021        Lab Results   Component Value Date    LDLCALC 68 02/24/2021        Lab Results   Component Value Date    LABVLDL 28 02/24/2021      No results found for: CHOLHDLRATIO    EF:   Lab Results   Component Value Date    LVEF 55 07/20/2021       Testing reviewed:    Echo: 1/31/2020 [facility]  - Left ventricle: The cavity size is normal. Wall thickness is normal. Systolic function was normal.    The estimated ejection fraction was in the range of 55% to 60%. Wall motion was normal; there were no regional wall motion abnormalities. Left ventricular diastolic function parameters were normal.  - Right ventricle: Systolic function was normal by objective interpretation. TAPSE: 2.7cm. Tricuspid    annular systolic velocity: 00IY/N.  - Pericardium, extracardiac: There is a left pleural effusion.     CARDIAC CTA: 3/10/2020  FINDINGS:  The left ventricle appears normal in size.  The right ventricle appears normal in size.  The atria are normal in size.  The left atrium receives two right and two left pulmonary veins without evidence of stenosis.  The left atrial appendage is chicken wing shaped without evidence of thrombus.  The aorta is normal in size.  The pulmonary artery is incompletely visualized.  The IVC is normal in size.  The coronary sinus is not well visualized. CORONARY ARTERIES:  The coronary arteries have normal origins and proximal courses.  The coronary system is right dominant.  The SA node artery originates from the right coronary artery.     LEFT:  The left main artery is a moderate size vessel.  The left main artery has no significant disease. The left anterior descending artery is a moderate size vessel.  The LAD has no significant disease. Daphane Flies is a small, area of shallow bridging in the mid LAD.  There are three small and patent diagonal arteries.    The left circumflex artery is a moderate size vessel.  The LCx has no significant disease.  The first obtuse marginal artery is a moderate size, branching vessel that supplies a significant portion of the lateral wall.  The first obtuse marginal artery has no significant disease.  The second obtuse marginal artery has no significant disease.  The distal LCx is a small vessel that terminates in a small and patent OM. RIGHT:  The right coronary artery is a moderate size vessel.  The RCA has no significant disease.  The PDA is patent without significant disease in the visualized portion.  The PLV branch is small and patent.  The conus artery has an independent origin. PERICARDIUM:  There is no abnormal pericardial calcification. There is no pericardial effusion.     Echo 2/21/21  Summary   -- Left ventricular systolic function is severely reduced with a visually estimated ejection fraction of 20-25%. EF estimated by Dooley's method at 25%. The left ventricle is normal in size with normal wall thickness. Only   all basal wall segments moves well, while the rest wall segments akinesis, consistent with takotsubo syndrome. Normal diastolic function.   -- Right ventricular systolic function is reduced.   -- Mitral valve leaflets appear mildly myxomatous but opens adequately.  Mild mitral regurgitation.   Inadequate tricuspid valve regurgitation to estimate systolic pulmonary artery pressure.     Left Heart Cath: 2/23/21    Findings   LVEDP 15   LVEF  20%   LV wall motion  apical and mid ballooning with basal hypokinesis consistent with Takotsubo cardiomyopathy   Gradient across AV  none   Mitral regurg  no significant      Coronary Angiogram:  Artery Findings/Result   LM  no angiographic CAD   LAD  no angiographic CAD   LCx  no angiographic CAD         RCA  dominant, no angiographic CAD      Assessment/Plan  1.  Normal coronary arteries,  2.  Takotsubo/stress cardiomyopathy  3.  Severe QTC prolongation will need to watch medications and follow EKGs    Echo 2/26/21  Summary   Definity is used for myocardial border enhancement and thrombus detection. Left ventricular systolic function is severely reduced with a visually estimated ejection fraction of 25-30%. EF estimated by Dooley's  method at 29%. There is severe hypokinesis of the entire anterior, lateral, and apical segments. The basal segments have near normal function. Findings compatible with apical ballooning syndrome. No thrombus is detected. Compare with last echo on 2/22/21: LVEF is slightly improved. Echo 7/20/21  Summary  Technically difficult examination. Normal left ventricle size, wall thickness, and systolic function with a visually estimated ejection fraction of 55%. No regional wall motion abnormalities are seen. Normal left ventricular diastolic filling pressure. The left atrium appears mildly enlarged. Mild pulmonic regurgitation. Systolic pulmonary artery pressure (SPAP) is normal and estimated at 25 mmHg (right atrial pressure 8 mmHg). The IVC is dilated in size (>2.1 cm) but collapses >50% with respiration consistent with elevated right atrial pressures (8 mmHg) . Compared with the previous exam on 02/26/21 (EF 25-30%), left ventricular function is now normal.    NYHA:   I  ACC/ AHA Stage:    c    Assessment:  Cardiomyopathy stress induced. LVEF 20-25%-->55%  Hypertension   Hx of tobacco abuse  Hx of TBI, Right temporal seizures since 2019; followed by  neuro  History of Hep C   IVDU- on methadone  Anemia  Prolong Qtc  Fever/chills ? Just withdraw syndrome     Visit Diagnosis:    1. Stress-induced cardiomyopathy    2. IV drug abuse ([facility] [address]    3. Chills      Plan:     1. Restart entresto 1/2 tab of the  49-51mg twice a day for about 1 week and then increase back to full tab of the 49/51mg twice a day   2. Continue coreg 3.125mg twice a day  3. Take the lasix as needed for leg swelling  4. Repeat echocardiogram this summer   5. Orders for blood cultures, HIV screen; extensive counseling today regarding risk of infection, including endocarditis, hepatitis, bacteremia, etc. With the IVDU. 6. Follow up 6-8 weeks     I appreciate the opportunity for caring for this patient.      Prashant Mendoza, APRN - CNP, 7/6/2022

## 2022-07-06 NOTE — PATIENT INSTRUCTIONS
1. Restart entresto 1/2 tab of the  49-51mg twice a day for about 1 week and then increase back to full tab of the 49/51mg twice a day   2. Continue coreg 3.125mg twice a day  3. Take the lasix as needed for leg swelling  4. Repeat echocardiogram this summer   5. Orders for blood cultures, HIV screen - will call you with results  6. Follow up 6-8 weeks     Your provider has ordered testing for further evaluation. An order/prescription has been included in your paper work.  To schedule outpatient testing, contact Central Scheduling by calling [address] [place] [address] ([phone]).

## 2022-09-15 ENCOUNTER — HOSPITAL ENCOUNTER (OUTPATIENT)
Dept: CARDIOLOGY | Age: 32
Discharge: HOME | End: 2022-09-15
Payer: MEDICARE

## 2022-09-15 DIAGNOSIS — I50.22 CHRONIC SYSTOLIC HEART FAILURE (HCC): ICD-10-CM

## 2022-09-15 LAB
LV EF: 48 %
LVEF MODALITY: NORMAL

## 2022-09-15 PROCEDURE — 93306 TTE W/DOPPLER COMPLETE: CPT

## 2022-09-15 NOTE — RESULT ENCOUNTER NOTE
Please call patient with echocardiogram results. Echo shows heart function has slightly worsened and mildly reduced. Please check and see if he is taking his medications. Keep follow up. .    Continue current regimen.

## 2022-09-16 ENCOUNTER — TELEPHONE (OUTPATIENT)
Dept: CARDIOLOGY CLINIC | Age: 32
End: 2022-09-16

## 2022-09-16 RX ORDER — CARVEDILOL 3.12 MG/1
3.12 TABLET ORAL 2 TIMES DAILY WITH MEALS
Qty: 180 TABLET | Refills: 1 | Status: SHIPPED | OUTPATIENT
Start: 2022-09-16

## 2022-09-16 RX ORDER — FUROSEMIDE 20 MG/1
20 TABLET ORAL DAILY PRN
Qty: 90 TABLET | Refills: 1 | Status: SHIPPED | OUTPATIENT
Start: 2022-09-16

## 2022-09-16 NOTE — TELEPHONE ENCOUNTER
Spoke with pt relayed NPRB message. Pt v/u. Med list updated. He needs refills of lasix and coreg as he has been out for a few weeks and has not had those meds to take. Refills pended.

## 2022-09-28 DIAGNOSIS — I51.81 STRESS-INDUCED CARDIOMYOPATHY: Primary | ICD-10-CM

## 2022-09-28 DIAGNOSIS — I10 HYPERTENSION, UNSPECIFIED TYPE: ICD-10-CM

## 2022-09-28 RX ORDER — SACUBITRIL AND VALSARTAN 49; 51 MG/1; MG/1
1 TABLET, FILM COATED ORAL 2 TIMES DAILY
Qty: 180 TABLET | Refills: 1 | Status: SHIPPED | OUTPATIENT
Start: 2022-09-28

## 2022-09-28 NOTE — TELEPHONE ENCOUNTER
Arnold Leventhal is requesting refill of Entresto 49-51mg. Preferred pharmacy is [facility] in [address] [place] [address]. Last ov 07/06/2022 nprb. Upcoming ov 10/27/2022 nprb.  Pt has been out since 09/24/2022

## 2023-01-12 ENCOUNTER — APPOINTMENT (OUTPATIENT)
Dept: CT IMAGING | Age: 33
End: 2023-01-12
Payer: MEDICARE

## 2023-01-12 ENCOUNTER — APPOINTMENT (OUTPATIENT)
Dept: GENERAL RADIOLOGY | Age: 33
End: 2023-01-12
Payer: MEDICARE

## 2023-01-12 ENCOUNTER — HOSPITAL ENCOUNTER (INPATIENT)
Age: 33
LOS: 8 days | Discharge: HOME | DRG: 720 | End: 2023-01-20
Attending: INTERNAL MEDICINE | Admitting: INTERNAL MEDICINE
Payer: MEDICARE

## 2023-01-12 ENCOUNTER — HOSPITAL ENCOUNTER (EMERGENCY)
Age: 33
Discharge: ANOTHER HOSPITAL | End: 2023-01-12
Attending: EMERGENCY MEDICINE
Payer: MEDICARE

## 2023-01-12 ENCOUNTER — APPOINTMENT (OUTPATIENT)
Dept: GENERAL RADIOLOGY | Age: 33
DRG: 720 | End: 2023-01-12
Attending: INTERNAL MEDICINE
Payer: MEDICARE

## 2023-01-12 VITALS
SYSTOLIC BLOOD PRESSURE: 109 MMHG | OXYGEN SATURATION: 98 % | DIASTOLIC BLOOD PRESSURE: 58 MMHG | TEMPERATURE: 100.6 F | RESPIRATION RATE: 22 BRPM | HEART RATE: 135 BPM | WEIGHT: 122.56 LBS | BODY MASS INDEX: 17.09 KG/M2

## 2023-01-12 DIAGNOSIS — G40.901 STATUS EPILEPTICUS (HCC): Primary | ICD-10-CM

## 2023-01-12 DIAGNOSIS — R79.89 ELEVATED LACTIC ACID LEVEL: Primary | ICD-10-CM

## 2023-01-12 DIAGNOSIS — I44.7 NEW ONSET LEFT BUNDLE BRANCH BLOCK (LBBB): ICD-10-CM

## 2023-01-12 DIAGNOSIS — A41.9 SEPSIS WITH ACUTE HYPOXIC RESPIRATORY FAILURE WITHOUT SEPTIC SHOCK, DUE TO UNSPECIFIED ORGANISM (HCC): ICD-10-CM

## 2023-01-12 DIAGNOSIS — R65.20 SEPSIS WITH ACUTE HYPOXIC RESPIRATORY FAILURE WITHOUT SEPTIC SHOCK, DUE TO UNSPECIFIED ORGANISM (HCC): ICD-10-CM

## 2023-01-12 DIAGNOSIS — E72.20 HYPERAMMONEMIA (HCC): ICD-10-CM

## 2023-01-12 DIAGNOSIS — J96.01 SEPSIS WITH ACUTE HYPOXIC RESPIRATORY FAILURE WITHOUT SEPTIC SHOCK, DUE TO UNSPECIFIED ORGANISM (HCC): ICD-10-CM

## 2023-01-12 DIAGNOSIS — J18.9 HCAP (HEALTHCARE-ASSOCIATED PNEUMONIA): ICD-10-CM

## 2023-01-12 DIAGNOSIS — I51.3 APICAL MURAL THROMBUS: ICD-10-CM

## 2023-01-12 PROBLEM — E87.20 LACTIC ACIDOSIS: Status: ACTIVE | Noted: 2023-01-12

## 2023-01-12 PROBLEM — R91.8 PULMONARY INFILTRATES: Status: ACTIVE | Noted: 2023-01-12

## 2023-01-12 PROBLEM — E87.20 LACTIC ACIDOSIS: Status: RESOLVED | Noted: 2023-01-12 | Resolved: 2023-01-12

## 2023-01-12 PROBLEM — I96 MYONECROSIS (HCC): Status: ACTIVE | Noted: 2023-01-12

## 2023-01-12 PROBLEM — Z79.899 POLYPHARMACY: Status: ACTIVE | Noted: 2023-01-12

## 2023-01-12 PROBLEM — D72.829 LEUKOCYTOSIS: Status: ACTIVE | Noted: 2023-01-12

## 2023-01-12 PROBLEM — M62.89 MYONECROSIS (HCC): Status: ACTIVE | Noted: 2023-01-12

## 2023-01-12 PROBLEM — J96.02 ACUTE RESPIRATORY FAILURE WITH HYPOXIA AND HYPERCAPNIA (HCC): Status: ACTIVE | Noted: 2023-01-12

## 2023-01-12 PROBLEM — E87.20 NORMAL ANION GAP METABOLIC ACIDOSIS: Status: ACTIVE | Noted: 2023-01-12

## 2023-01-12 LAB
A/G RATIO: 1.3 (ref 1.1–2.2)
ALBUMIN SERPL-MCNC: 3.6 G/DL (ref 3.4–5)
ALBUMIN SERPL-MCNC: 4.5 G/DL (ref 3.4–5)
ALP BLD-CCNC: 123 U/L (ref 40–129)
ALP BLD-CCNC: 168 U/L (ref 40–129)
ALT SERPL-CCNC: 17 U/L (ref 10–40)
ALT SERPL-CCNC: 23 U/L (ref 10–40)
AMMONIA: 129 UMOL/L (ref 16–60)
AMMONIA: 46 UMOL/L (ref 16–60)
AMPHETAMINE SCREEN, URINE: ABNORMAL
ANION GAP SERPL CALCULATED.3IONS-SCNC: 10 MMOL/L (ref 3–16)
ANION GAP SERPL CALCULATED.3IONS-SCNC: 23 MMOL/L (ref 3–16)
ANISOCYTOSIS: ABNORMAL
AST SERPL-CCNC: 24 U/L (ref 15–37)
AST SERPL-CCNC: 29 U/L (ref 15–37)
ATYPICAL LYMPHOCYTE RELATIVE PERCENT: 14 % (ref 0–6)
BANDED NEUTROPHILS RELATIVE PERCENT: 6 % (ref 0–7)
BARBITURATE SCREEN URINE: ABNORMAL
BASE EXCESS ARTERIAL: -19.8 MMOL/L (ref -3–3)
BASE EXCESS ARTERIAL: -2 (ref -3–3)
BASE EXCESS ARTERIAL: -2.7 MMOL/L (ref -3–3)
BASOPHILS ABSOLUTE: 0 K/UL (ref 0–0.2)
BASOPHILS RELATIVE PERCENT: 0 %
BENZODIAZEPINE SCREEN, URINE: ABNORMAL
BILIRUB SERPL-MCNC: <0.2 MG/DL (ref 0–1)
BILIRUB SERPL-MCNC: <0.2 MG/DL (ref 0–1)
BILIRUBIN DIRECT: <0.2 MG/DL (ref 0–0.3)
BILIRUBIN URINE: NEGATIVE
BILIRUBIN, INDIRECT: NORMAL MG/DL (ref 0–1)
BLOOD, URINE: NEGATIVE
BUN BLDV-MCNC: 13 MG/DL (ref 7–20)
BUN BLDV-MCNC: 15 MG/DL (ref 7–20)
CALCIUM IONIZED: 1.14 MMOL/L (ref 1.12–1.32)
CALCIUM SERPL-MCNC: 8.4 MG/DL (ref 8.3–10.6)
CALCIUM SERPL-MCNC: 9.8 MG/DL (ref 8.3–10.6)
CANNABINOID SCREEN URINE: POSITIVE
CARBOXYHEMOGLOBIN ARTERIAL: 0.4 % (ref 0–1.5)
CARBOXYHEMOGLOBIN ARTERIAL: 4.4 % (ref 0–1.5)
CHLORIDE BLD-SCNC: 94 MMOL/L (ref 99–110)
CHLORIDE BLD-SCNC: 99 MMOL/L (ref 99–110)
CLARITY: CLEAR
CO2: 16 MMOL/L (ref 21–32)
CO2: 24 MMOL/L (ref 21–32)
COCAINE METABOLITE SCREEN URINE: ABNORMAL
COLOR: YELLOW
CREAT SERPL-MCNC: 1 MG/DL (ref 0.9–1.3)
CREAT SERPL-MCNC: 1.1 MG/DL (ref 0.9–1.3)
EKG ATRIAL RATE: 134 BPM
EKG ATRIAL RATE: 137 BPM
EKG DIAGNOSIS: NORMAL
EKG DIAGNOSIS: NORMAL
EKG P-R INTERVAL: 112 MS
EKG P-R INTERVAL: 116 MS
EKG Q-T INTERVAL: 342 MS
EKG Q-T INTERVAL: 350 MS
EKG QRS DURATION: 148 MS
EKG QRS DURATION: 154 MS
EKG QTC CALCULATION (BAZETT): 516 MS
EKG QTC CALCULATION (BAZETT): 522 MS
EKG R AXIS: -39 DEGREES
EKG R AXIS: -52 DEGREES
EKG T AXIS: 104 DEGREES
EKG T AXIS: 96 DEGREES
EKG VENTRICULAR RATE: 134 BPM
EKG VENTRICULAR RATE: 137 BPM
EOSINOPHILS ABSOLUTE: 2.3 K/UL (ref 0–0.6)
EOSINOPHILS RELATIVE PERCENT: 9 %
ETHANOL: NORMAL MG/DL (ref 0–0.08)
FENTANYL SCREEN, URINE: POSITIVE
GFR SERPL CREATININE-BSD FRML MDRD: >60 ML/MIN/{1.73_M2}
GFR SERPL CREATININE-BSD FRML MDRD: >60 ML/MIN/{1.73_M2}
GLUCOSE BLD-MCNC: 132 MG/DL (ref 70–99)
GLUCOSE BLD-MCNC: 158 MG/DL (ref 70–99)
GLUCOSE BLD-MCNC: 237 MG/DL (ref 70–99)
GLUCOSE BLD-MCNC: 279 MG/DL (ref 70–99)
GLUCOSE URINE: NEGATIVE MG/DL
HCO3 ARTERIAL: 15.8 MMOL/L (ref 21–29)
HCO3 ARTERIAL: 23 MMOL/L (ref 21–29)
HCO3 ARTERIAL: 26 MMOL/L (ref 21–29)
HCT VFR BLD CALC: 49.1 % (ref 40.5–52.5)
HEMATOLOGY PATH CONSULT: NO
HEMOGLOBIN, ART, EXTENDED: 15.9 G/DL
HEMOGLOBIN, ART, EXTENDED: 17 G/DL (ref 13.5–17.5)
HEMOGLOBIN: 15.8 G/DL (ref 13.5–17.5)
INFLUENZA A: NOT DETECTED
INFLUENZA B: NOT DETECTED
KETONES, URINE: NEGATIVE MG/DL
LACTATE: 2.91 MMOL/L (ref 0.4–2)
LACTIC ACID: 2.6 MMOL/L (ref 0.4–2)
LACTIC ACID: 6.9 MMOL/L (ref 0.4–2)
LEUKOCYTE ESTERASE, URINE: NEGATIVE
LYMPHOCYTES ABSOLUTE: 13.5 K/UL (ref 1–5.1)
LYMPHOCYTES RELATIVE PERCENT: 38 %
Lab: ABNORMAL
MCH RBC QN AUTO: 30.7 PG (ref 26–34)
MCHC RBC AUTO-ENTMCNC: 32.2 G/DL (ref 31–36)
MCV RBC AUTO: 95.3 FL (ref 80–100)
METAMYELOCYTES RELATIVE PERCENT: 1 %
METHADONE SCREEN, URINE: POSITIVE
METHEMOGLOBIN ARTERIAL: 0.5 %
METHEMOGLOBIN ARTERIAL: <0 % (ref 0–1.4)
MICROSCOPIC EXAMINATION: YES
MONOCYTES ABSOLUTE: 2.1 K/UL (ref 0–1.3)
MONOCYTES RELATIVE PERCENT: 8 %
MYELOCYTE PERCENT: 2 %
NEUTROPHILS ABSOLUTE: 8.1 K/UL (ref 1.7–7.7)
NEUTROPHILS RELATIVE PERCENT: 22 %
NITRITE, URINE: NEGATIVE
O2 SAT, ARTERIAL: 100 % (ref 93–100)
O2 SAT, ARTERIAL: 84.6 %
O2 SAT, ARTERIAL: 91 % (ref 93–100)
O2 THERAPY: ABNORMAL
OPIATE SCREEN URINE: ABNORMAL
OXYCODONE URINE: ABNORMAL
PCO2 ARTERIAL: 42.6 MMHG (ref 35–45)
PCO2 ARTERIAL: 69.1 MM HG (ref 35–45)
PCO2 ARTERIAL: 89.5 MMHG (ref 35–45)
PDW BLD-RTO: 15.2 % (ref 12.4–15.4)
PERFORMED ON: ABNORMAL
PERFORMED ON: ABNORMAL
PH ARTERIAL: 6.87 (ref 7.35–7.45)
PH ARTERIAL: 7.18 (ref 7.35–7.45)
PH ARTERIAL: 7.34 (ref 7.35–7.45)
PH UA: 7
PH UA: 7 (ref 5–8)
PHENCYCLIDINE SCREEN URINE: ABNORMAL
PHOSPHORUS: 5.5 MG/DL (ref 2.5–4.9)
PLATELET # BLD: 352 K/UL (ref 135–450)
PMV BLD AUTO: 6.3 FL (ref 5–10.5)
PO2 ARTERIAL: 195 MMHG (ref 75–108)
PO2 ARTERIAL: 75.4 MMHG (ref 75–108)
PO2 ARTERIAL: 78.6 MM HG (ref 75–108)
POC POTASSIUM: 5.5 MMOL/L (ref 3.5–5.1)
POC SAMPLE TYPE: ABNORMAL
POC SODIUM: 136 MMOL/L (ref 136–145)
POTASSIUM REFLEX MAGNESIUM: 4.1 MMOL/L (ref 3.5–5.1)
POTASSIUM SERPL-SCNC: 5 MMOL/L (ref 3.5–5.1)
PROTEIN UA: 30 MG/DL
RBC # BLD: 5.15 M/UL (ref 4.2–5.9)
RBC UA: NORMAL /HPF (ref 0–4)
SARS-COV-2 RNA, RT PCR: NOT DETECTED
SODIUM BLD-SCNC: 133 MMOL/L (ref 136–145)
SODIUM BLD-SCNC: 133 MMOL/L (ref 136–145)
SPECIFIC GRAVITY UA: >=1.03 (ref 1–1.03)
SPECIMEN STATUS: NORMAL
TCO2 ARTERIAL: 18.6 MMOL/L
TCO2 ARTERIAL: 24 MMOL/L
TCO2 ARTERIAL: 28 MMOL/L
TOTAL CK: 228 U/L (ref 39–308)
TOTAL PROTEIN: 6.6 G/DL (ref 6.4–8.2)
TOTAL PROTEIN: 8.1 G/DL (ref 6.4–8.2)
TROPONIN: <0.01 NG/ML
URINE REFLEX TO CULTURE: ABNORMAL
URINE TYPE: ABNORMAL
UROBILINOGEN, URINE: 0.2 E.U./DL
WBC # BLD: 26 K/UL (ref 4–11)
WBC UA: NORMAL /HPF (ref 0–5)

## 2023-01-12 PROCEDURE — 6370000000 HC RX 637 (ALT 250 FOR IP)

## 2023-01-12 PROCEDURE — 84295 ASSAY OF SERUM SODIUM: CPT

## 2023-01-12 PROCEDURE — 2000000000 HC ICU R&B

## 2023-01-12 PROCEDURE — 82140 ASSAY OF AMMONIA: CPT

## 2023-01-12 PROCEDURE — 6360000002 HC RX W HCPCS: Performed by: STUDENT IN AN ORGANIZED HEALTH CARE EDUCATION/TRAINING PROGRAM

## 2023-01-12 PROCEDURE — 6370000000 HC RX 637 (ALT 250 FOR IP): Performed by: INTERNAL MEDICINE

## 2023-01-12 PROCEDURE — 94761 N-INVAS EAR/PLS OXIMETRY MLT: CPT

## 2023-01-12 PROCEDURE — 6360000002 HC RX W HCPCS: Performed by: NURSE PRACTITIONER

## 2023-01-12 PROCEDURE — C9113 INJ PANTOPRAZOLE SODIUM, VIA: HCPCS | Performed by: INTERNAL MEDICINE

## 2023-01-12 PROCEDURE — 99285 EMERGENCY DEPT VISIT HI MDM: CPT

## 2023-01-12 PROCEDURE — 83605 ASSAY OF LACTIC ACID: CPT

## 2023-01-12 PROCEDURE — 96372 THER/PROPH/DIAG INJ SC/IM: CPT

## 2023-01-12 PROCEDURE — 74018 RADEX ABDOMEN 1 VIEW: CPT

## 2023-01-12 PROCEDURE — 6360000002 HC RX W HCPCS

## 2023-01-12 PROCEDURE — 96366 THER/PROPH/DIAG IV INF ADDON: CPT

## 2023-01-12 PROCEDURE — 2500000003 HC RX 250 WO HCPCS: Performed by: INTERNAL MEDICINE

## 2023-01-12 PROCEDURE — 2500000003 HC RX 250 WO HCPCS: Performed by: EMERGENCY MEDICINE

## 2023-01-12 PROCEDURE — 93010 ELECTROCARDIOGRAM REPORT: CPT | Performed by: INTERNAL MEDICINE

## 2023-01-12 PROCEDURE — 94002 VENT MGMT INPAT INIT DAY: CPT

## 2023-01-12 PROCEDURE — 81001 URINALYSIS AUTO W/SCOPE: CPT

## 2023-01-12 PROCEDURE — 96361 HYDRATE IV INFUSION ADD-ON: CPT

## 2023-01-12 PROCEDURE — 80053 COMPREHEN METABOLIC PANEL: CPT

## 2023-01-12 PROCEDURE — 6360000002 HC RX W HCPCS: Performed by: EMERGENCY MEDICINE

## 2023-01-12 PROCEDURE — 80069 RENAL FUNCTION PANEL: CPT

## 2023-01-12 PROCEDURE — 82947 ASSAY GLUCOSE BLOOD QUANT: CPT

## 2023-01-12 PROCEDURE — 71045 X-RAY EXAM CHEST 1 VIEW: CPT

## 2023-01-12 PROCEDURE — 93005 ELECTROCARDIOGRAM TRACING: CPT | Performed by: EMERGENCY MEDICINE

## 2023-01-12 PROCEDURE — 82330 ASSAY OF CALCIUM: CPT

## 2023-01-12 PROCEDURE — 84484 ASSAY OF TROPONIN QUANT: CPT

## 2023-01-12 PROCEDURE — 36620 INSERTION CATHETER ARTERY: CPT

## 2023-01-12 PROCEDURE — 96376 TX/PRO/DX INJ SAME DRUG ADON: CPT

## 2023-01-12 PROCEDURE — 2580000003 HC RX 258: Performed by: NURSE PRACTITIONER

## 2023-01-12 PROCEDURE — 80307 DRUG TEST PRSMV CHEM ANLYZR: CPT

## 2023-01-12 PROCEDURE — 2500000003 HC RX 250 WO HCPCS: Performed by: STUDENT IN AN ORGANIZED HEALTH CARE EDUCATION/TRAINING PROGRAM

## 2023-01-12 PROCEDURE — 82803 BLOOD GASES ANY COMBINATION: CPT

## 2023-01-12 PROCEDURE — 31500 INSERT EMERGENCY AIRWAY: CPT

## 2023-01-12 PROCEDURE — 71260 CT THORAX DX C+: CPT | Performed by: EMERGENCY MEDICINE

## 2023-01-12 PROCEDURE — 2580000003 HC RX 258

## 2023-01-12 PROCEDURE — 6370000000 HC RX 637 (ALT 250 FOR IP): Performed by: NURSE PRACTITIONER

## 2023-01-12 PROCEDURE — 85025 COMPLETE CBC W/AUTO DIFF WBC: CPT

## 2023-01-12 PROCEDURE — 36415 COLL VENOUS BLD VENIPUNCTURE: CPT

## 2023-01-12 PROCEDURE — 82077 ASSAY SPEC XCP UR&BREATH IA: CPT

## 2023-01-12 PROCEDURE — 70450 CT HEAD/BRAIN W/O DYE: CPT

## 2023-01-12 PROCEDURE — 6360000002 HC RX W HCPCS: Performed by: INTERNAL MEDICINE

## 2023-01-12 PROCEDURE — 6360000004 HC RX CONTRAST MEDICATION: Performed by: EMERGENCY MEDICINE

## 2023-01-12 PROCEDURE — 87636 SARSCOV2 & INF A&B AMP PRB: CPT

## 2023-01-12 PROCEDURE — 84145 PROCALCITONIN (PCT): CPT

## 2023-01-12 PROCEDURE — 84132 ASSAY OF SERUM POTASSIUM: CPT

## 2023-01-12 PROCEDURE — 87040 BLOOD CULTURE FOR BACTERIA: CPT

## 2023-01-12 PROCEDURE — 2580000003 HC RX 258: Performed by: INTERNAL MEDICINE

## 2023-01-12 PROCEDURE — 96368 THER/DIAG CONCURRENT INF: CPT

## 2023-01-12 PROCEDURE — 82550 ASSAY OF CK (CPK): CPT

## 2023-01-12 PROCEDURE — 95813 EEG EXTND MNTR 61-119 MIN: CPT

## 2023-01-12 PROCEDURE — 80076 HEPATIC FUNCTION PANEL: CPT

## 2023-01-12 PROCEDURE — 2700000000 HC OXYGEN THERAPY PER DAY

## 2023-01-12 PROCEDURE — 99291 CRITICAL CARE FIRST HOUR: CPT | Performed by: INTERNAL MEDICINE

## 2023-01-12 PROCEDURE — 96365 THER/PROPH/DIAG IV INF INIT: CPT

## 2023-01-12 PROCEDURE — 96375 TX/PRO/DX INJ NEW DRUG ADDON: CPT

## 2023-01-12 PROCEDURE — C9113 INJ PANTOPRAZOLE SODIUM, VIA: HCPCS | Performed by: STUDENT IN AN ORGANIZED HEALTH CARE EDUCATION/TRAINING PROGRAM

## 2023-01-12 PROCEDURE — 2580000003 HC RX 258: Performed by: STUDENT IN AN ORGANIZED HEALTH CARE EDUCATION/TRAINING PROGRAM

## 2023-01-12 PROCEDURE — 2580000003 HC RX 258: Performed by: EMERGENCY MEDICINE

## 2023-01-12 RX ORDER — ONDANSETRON 4 MG/1
4 TABLET, ORALLY DISINTEGRATING ORAL EVERY 8 HOURS PRN
Status: DISCONTINUED | OUTPATIENT
Start: 2023-01-12 | End: 2023-01-13

## 2023-01-12 RX ORDER — PROPOFOL 10 MG/ML
5-50 INJECTION, EMULSION INTRAVENOUS ONCE
Status: COMPLETED | OUTPATIENT
Start: 2023-01-12 | End: 2023-01-12

## 2023-01-12 RX ORDER — OXCARBAZEPINE 300 MG/1
900 TABLET, FILM COATED ORAL 2 TIMES DAILY
Status: DISCONTINUED | OUTPATIENT
Start: 2023-01-12 | End: 2023-01-20 | Stop reason: HOSPADM

## 2023-01-12 RX ORDER — LACTULOSE 10 G/15ML
20 SOLUTION ORAL
Status: DISCONTINUED | OUTPATIENT
Start: 2023-01-12 | End: 2023-01-12 | Stop reason: HOSPADM

## 2023-01-12 RX ORDER — MIDAZOLAM HYDROCHLORIDE 1 MG/ML
INJECTION INTRAMUSCULAR; INTRAVENOUS
Status: COMPLETED
Start: 2023-01-12 | End: 2023-01-12

## 2023-01-12 RX ORDER — LACTULOSE 10 G/15ML
20 SOLUTION ORAL 2 TIMES DAILY
Status: DISCONTINUED | OUTPATIENT
Start: 2023-01-13 | End: 2023-01-12

## 2023-01-12 RX ORDER — SODIUM CHLORIDE 0.9 % (FLUSH) 0.9 %
5-40 SYRINGE (ML) INJECTION EVERY 12 HOURS SCHEDULED
Status: DISCONTINUED | OUTPATIENT
Start: 2023-01-12 | End: 2023-01-20 | Stop reason: HOSPADM

## 2023-01-12 RX ORDER — LINEZOLID 2 MG/ML
600 INJECTION, SOLUTION INTRAVENOUS EVERY 12 HOURS
Status: DISCONTINUED | OUTPATIENT
Start: 2023-01-13 | End: 2023-01-13

## 2023-01-12 RX ORDER — HEPARIN SODIUM 5000 [USP'U]/ML
5000 INJECTION, SOLUTION INTRAVENOUS; SUBCUTANEOUS EVERY 8 HOURS SCHEDULED
Status: DISCONTINUED | OUTPATIENT
Start: 2023-01-12 | End: 2023-01-20 | Stop reason: HOSPADM

## 2023-01-12 RX ORDER — LORAZEPAM 2 MG/ML
4 INJECTION INTRAMUSCULAR ONCE
Status: COMPLETED | OUTPATIENT
Start: 2023-01-12 | End: 2023-01-12

## 2023-01-12 RX ORDER — FENTANYL CITRATE 50 UG/ML
INJECTION, SOLUTION INTRAMUSCULAR; INTRAVENOUS
Status: COMPLETED
Start: 2023-01-12 | End: 2023-01-12

## 2023-01-12 RX ORDER — ACETAMINOPHEN 650 MG/1
650 SUPPOSITORY RECTAL EVERY 6 HOURS PRN
Status: DISCONTINUED | OUTPATIENT
Start: 2023-01-12 | End: 2023-01-20 | Stop reason: HOSPADM

## 2023-01-12 RX ORDER — ACETAMINOPHEN 650 MG/1
650 SUPPOSITORY RECTAL ONCE
Status: COMPLETED | OUTPATIENT
Start: 2023-01-12 | End: 2023-01-12

## 2023-01-12 RX ORDER — POLYETHYLENE GLYCOL 3350 17 G/17G
17 POWDER, FOR SOLUTION ORAL DAILY PRN
Status: DISCONTINUED | OUTPATIENT
Start: 2023-01-12 | End: 2023-01-20 | Stop reason: HOSPADM

## 2023-01-12 RX ORDER — 0.9 % SODIUM CHLORIDE 0.9 %
1000 INTRAVENOUS SOLUTION INTRAVENOUS ONCE
Status: COMPLETED | OUTPATIENT
Start: 2023-01-12 | End: 2023-01-12

## 2023-01-12 RX ORDER — ACETAMINOPHEN 325 MG/1
650 TABLET ORAL EVERY 6 HOURS PRN
Status: DISCONTINUED | OUTPATIENT
Start: 2023-01-12 | End: 2023-01-20 | Stop reason: HOSPADM

## 2023-01-12 RX ORDER — FENTANYL CITRATE-0.9 % NACL/PF 20 MCG/2ML
50 SYRINGE (ML) INTRAVENOUS EVERY 30 MIN PRN
Status: DISCONTINUED | OUTPATIENT
Start: 2023-01-12 | End: 2023-01-12 | Stop reason: HOSPADM

## 2023-01-12 RX ORDER — PANTOPRAZOLE SODIUM 40 MG/10ML
40 INJECTION, POWDER, LYOPHILIZED, FOR SOLUTION INTRAVENOUS DAILY
Status: DISCONTINUED | OUTPATIENT
Start: 2023-01-12 | End: 2023-01-12 | Stop reason: HOSPADM

## 2023-01-12 RX ORDER — PANTOPRAZOLE SODIUM 40 MG/10ML
40 INJECTION, POWDER, LYOPHILIZED, FOR SOLUTION INTRAVENOUS DAILY
Status: DISCONTINUED | OUTPATIENT
Start: 2023-01-12 | End: 2023-01-17

## 2023-01-12 RX ORDER — FOSPHENYTOIN SODIUM 50 MG/ML
100 INJECTION, SOLUTION INTRAMUSCULAR; INTRAVENOUS EVERY 8 HOURS
Status: DISCONTINUED | OUTPATIENT
Start: 2023-01-12 | End: 2023-01-12

## 2023-01-12 RX ORDER — ONDANSETRON 2 MG/ML
4 INJECTION INTRAMUSCULAR; INTRAVENOUS EVERY 6 HOURS PRN
Status: DISCONTINUED | OUTPATIENT
Start: 2023-01-12 | End: 2023-01-13

## 2023-01-12 RX ORDER — FENTANYL CITRATE-0.9 % NACL/PF 10 MCG/ML
25-200 PLASTIC BAG, INJECTION (ML) INTRAVENOUS CONTINUOUS
Status: DISCONTINUED | OUTPATIENT
Start: 2023-01-12 | End: 2023-01-12 | Stop reason: HOSPADM

## 2023-01-12 RX ORDER — FENTANYL CITRATE 50 UG/ML
100 INJECTION, SOLUTION INTRAMUSCULAR; INTRAVENOUS ONCE
Status: COMPLETED | OUTPATIENT
Start: 2023-01-12 | End: 2023-01-12

## 2023-01-12 RX ORDER — FUROSEMIDE 10 MG/ML
40 INJECTION INTRAMUSCULAR; INTRAVENOUS ONCE
Status: COMPLETED | OUTPATIENT
Start: 2023-01-12 | End: 2023-01-12

## 2023-01-12 RX ORDER — SODIUM CHLORIDE 0.9 % (FLUSH) 0.9 %
5-40 SYRINGE (ML) INJECTION PRN
Status: DISCONTINUED | OUTPATIENT
Start: 2023-01-12 | End: 2023-01-20 | Stop reason: HOSPADM

## 2023-01-12 RX ORDER — MIDAZOLAM HYDROCHLORIDE 1 MG/ML
4 INJECTION INTRAMUSCULAR; INTRAVENOUS ONCE
Status: COMPLETED | OUTPATIENT
Start: 2023-01-12 | End: 2023-01-12

## 2023-01-12 RX ORDER — ENOXAPARIN SODIUM 100 MG/ML
40 INJECTION SUBCUTANEOUS DAILY
Status: DISCONTINUED | OUTPATIENT
Start: 2023-01-12 | End: 2023-01-12 | Stop reason: HOSPADM

## 2023-01-12 RX ORDER — FENTANYL CITRATE-0.9 % NACL/PF 10 MCG/ML
25-200 PLASTIC BAG, INJECTION (ML) INTRAVENOUS CONTINUOUS
Status: DISCONTINUED | OUTPATIENT
Start: 2023-01-12 | End: 2023-01-17

## 2023-01-12 RX ORDER — SODIUM CHLORIDE 9 MG/ML
INJECTION, SOLUTION INTRAVENOUS PRN
Status: DISCONTINUED | OUTPATIENT
Start: 2023-01-12 | End: 2023-01-20 | Stop reason: HOSPADM

## 2023-01-12 RX ADMIN — ENOXAPARIN SODIUM 40 MG: 100 INJECTION SUBCUTANEOUS at 14:16

## 2023-01-12 RX ADMIN — PIPERACILLIN AND TAZOBACTAM 4500 MG: 4; .5 INJECTION, POWDER, FOR SOLUTION INTRAVENOUS at 21:18

## 2023-01-12 RX ADMIN — LEVETIRACETAM 1000 MG: 100 INJECTION, SOLUTION INTRAVENOUS at 08:42

## 2023-01-12 RX ADMIN — FENTANYL CITRATE 100 MCG: 50 INJECTION, SOLUTION INTRAMUSCULAR; INTRAVENOUS at 11:04

## 2023-01-12 RX ADMIN — Medication 50 MCG/HR: at 10:50

## 2023-01-12 RX ADMIN — OXCARBAZEPINE 900 MG: 300 TABLET, FILM COATED ORAL at 23:23

## 2023-01-12 RX ADMIN — MIDAZOLAM 4 MG/HR: 5 INJECTION INTRAMUSCULAR; INTRAVENOUS at 10:54

## 2023-01-12 RX ADMIN — SODIUM CHLORIDE, PRESERVATIVE FREE 10 ML: 5 INJECTION INTRAVENOUS at 21:22

## 2023-01-12 RX ADMIN — HEPARIN SODIUM 5000 UNITS: 5000 INJECTION INTRAVENOUS; SUBCUTANEOUS at 22:29

## 2023-01-12 RX ADMIN — VANCOMYCIN HYDROCHLORIDE 1000 MG: 1 INJECTION, POWDER, LYOPHILIZED, FOR SOLUTION INTRAVENOUS at 10:43

## 2023-01-12 RX ADMIN — Medication 200 MCG/HR: at 17:05

## 2023-01-12 RX ADMIN — MIDAZOLAM HYDROCHLORIDE 2 MG: 1 INJECTION INTRAMUSCULAR; INTRAVENOUS at 11:26

## 2023-01-12 RX ADMIN — PHENYLEPHRINE HYDROCHLORIDE 30 MCG/MIN: 50 INJECTION INTRAVENOUS at 22:45

## 2023-01-12 RX ADMIN — PANTOPRAZOLE SODIUM 40 MG: 40 INJECTION, POWDER, LYOPHILIZED, FOR SOLUTION INTRAVENOUS at 21:35

## 2023-01-12 RX ADMIN — LACTULOSE 20 G: 20 SOLUTION ORAL at 14:14

## 2023-01-12 RX ADMIN — ACETAMINOPHEN 650 MG: 650 SUPPOSITORY RECTAL at 14:00

## 2023-01-12 RX ADMIN — LEVETIRACETAM 2000 MG: 100 INJECTION, SOLUTION INTRAVENOUS at 13:02

## 2023-01-12 RX ADMIN — DEXTROSE MONOHYDRATE 1110 MG PE: 50 INJECTION, SOLUTION INTRAVENOUS at 22:54

## 2023-01-12 RX ADMIN — PROPOFOL 20 MCG/KG/MIN: 10 INJECTION, EMULSION INTRAVENOUS at 10:00

## 2023-01-12 RX ADMIN — FENTANYL CITRATE 100 MCG: 50 INJECTION, SOLUTION INTRAMUSCULAR; INTRAVENOUS at 11:21

## 2023-01-12 RX ADMIN — IOPAMIDOL 75 ML: 755 INJECTION, SOLUTION INTRAVENOUS at 09:51

## 2023-01-12 RX ADMIN — SODIUM CHLORIDE 1000 ML: 9 INJECTION, SOLUTION INTRAVENOUS at 09:02

## 2023-01-12 RX ADMIN — FENTANYL CITRATE 100 MCG: 0.05 INJECTION, SOLUTION INTRAMUSCULAR; INTRAVENOUS at 11:04

## 2023-01-12 RX ADMIN — SODIUM CHLORIDE 1000 ML: 9 INJECTION, SOLUTION INTRAVENOUS at 10:43

## 2023-01-12 RX ADMIN — Medication 200 MCG/HR: at 20:02

## 2023-01-12 RX ADMIN — DEXMEDETOMIDINE 0.2 MCG/KG/HR: 100 INJECTION, SOLUTION INTRAVENOUS at 20:04

## 2023-01-12 RX ADMIN — MIDAZOLAM 4 MG: 1 INJECTION INTRAMUSCULAR; INTRAVENOUS at 11:03

## 2023-01-12 RX ADMIN — CEFEPIME 2000 MG: 2 INJECTION, POWDER, FOR SOLUTION INTRAVENOUS at 10:09

## 2023-01-12 RX ADMIN — LORAZEPAM 4 MG: 2 INJECTION INTRAMUSCULAR; INTRAVENOUS at 13:39

## 2023-01-12 RX ADMIN — PANTOPRAZOLE SODIUM 40 MG: 40 INJECTION, POWDER, FOR SOLUTION INTRAVENOUS at 14:16

## 2023-01-12 RX ADMIN — SODIUM BICARBONATE 50 MEQ: 84 INJECTION INTRAVENOUS at 14:15

## 2023-01-12 RX ADMIN — FUROSEMIDE 40 MG: 10 INJECTION, SOLUTION INTRAMUSCULAR; INTRAVENOUS at 11:58

## 2023-01-12 RX ADMIN — MIDAZOLAM 2 MG: 1 INJECTION INTRAMUSCULAR; INTRAVENOUS at 11:26

## 2023-01-12 RX ADMIN — SODIUM BICARBONATE: 84 INJECTION, SOLUTION INTRAVENOUS at 14:22

## 2023-01-12 RX ADMIN — CISATRACURIUM BESYLATE 2 MCG/KG/MIN: 200 INJECTION INTRAVENOUS at 13:57

## 2023-01-12 RX ADMIN — MIDAZOLAM HYDROCHLORIDE 4 MG: 1 INJECTION INTRAMUSCULAR; INTRAVENOUS at 11:03

## 2023-01-12 RX ADMIN — Medication 0.1 MG/KG/HR: at 12:24

## 2023-01-12 ASSESSMENT — PULMONARY FUNCTION TESTS
PIF_VALUE: 21
PIF_VALUE: 21
PIF_VALUE: 22
PIF_VALUE: 32
PIF_VALUE: 31
PIF_VALUE: 21
PIF_VALUE: 27
PIF_VALUE: 22
PIF_VALUE: 21
PIF_VALUE: 27
PIF_VALUE: 21
PIF_VALUE: 24
PIF_VALUE: 21
PIF_VALUE: 23
PIF_VALUE: 24
PIF_VALUE: 24
PIF_VALUE: 25

## 2023-01-12 NOTE — PROGRESS NOTES
01/12/23 0845   Patient Observation   Heart Rate (!) 144   Resp 18   SpO2 97 %   Vent Information   Vent Mode AC/VC   Ventilator Settings   FiO2  100 %   Vt (Set, mL) 450 mL   Resp Rate (Set) 18 bmp   PEEP/CPAP (cmH2O) 5   Vent Patient Data (Readings)   Vt (Measured) 513 mL   Peak Inspiratory Pressure (cmH2O) 27 cmH2O   Rate Measured 22 br/min   Minute Volume (L/min) 9.3 Liters   Mean Airway Pressure (cmH2O) 8 cmH20   I:E Ratio 1:2.0   Flow Sensitivity 3 L/min   Vent Alarm Settings   Low Pressure (cmH2O) 40 cmH2O   Low Minute Volume (lpm) 2 L/min   Low Exhaled Vt (ml) 200 mL   RR High (bpm) 40 br/min   Additional Respiratoray Assessments   Humidification Source HME

## 2023-01-12 NOTE — CONSULTS
INPATIENT PULMONARY CRITICAL CARE CONSULT NOTE      Chief Complaint/Referring Provider:  Patient is being seen at the request of Dr Gabriela Chapman  for a consultation for Vent management      Presenting HPI: Patient was brought to the hospital because of seizures    Patient apparently was found to be encephalopathic and having seizures and patient was not able to protect airways and for that reason patient was emergently intubated by the ER provider, patient will has been started on fentanyl along with Versed and ketamine infusions to maintain patient ventilator synchrony, patient continues to have asynchrony with the ventilator, patient also had some frothy secretion in the endotracheal tube, patient was not saturating enough on volume control ventilation and patient was changed to pressure control ventilation, patient when seen was on the pressure control of 20 with a PEEP of 10 but patient's saturation was still 84%, patient was still having asynchrony with the ventilator, patient had a T-max of 99.6 °F, patient has sinus tachycardia on the monitor, patient has elevated blood pressure and seen, patient was 100% oxygen with the current oxygen saturation, patient has been given 1 dose of IV Lasix with urine output of 810 mL, patient's blood sugars were on the higher side, no other pertinent review of system could be obtained because of patient's clinical status which remains precarious and critical     Patient Active Problem List    Diagnosis Date Noted    Acute respiratory failure with hypoxia and hypercapnia ([address] 01/12/2023    Pulmonary infiltrates 01/12/2023    Leukocytosis 01/12/2023    Hyperammonemia ([address] 01/12/2023    Polypharmacy 01/12/2023    Multifocal pneumonia 01/12/2023    Myonecrosis ([address] 01/12/2023    Elevated lactic acid level 01/12/2023    Normal anion gap metabolic acidosis 92/27/7420    Breakthrough seizure ([address] 03/18/2022    Polysubstance abuse ([address] 03/16/2022    High anion gap metabolic acidosis 12/05/2021    [facility]izure ([facility] 02/22/2021    Status epilepticus ([address]     NSTEMI (non-ST elevated myocardial infarction) ([address]     Abnormal ECG     Takotsubo cardiomyopathy     Tobacco abuse     History of drug use     Aspiration pneumonia of both lower lobes (HCC)     IV drug abuse ([address]     Acute encephalopathy     Partial idiopathic epilepsy with seizures of localized onset, intractable, with status epilepticus ([address]        Past Medical History:   Diagnosis Date    Aspiration pneumonia ([address]     Hepatitis C     HTN (hypertension)     NSTEMI (non-ST elevated myocardial infarction) ([address]     Polysubstance abuse ([facility] [address]     Seizures ([address]     Septic shock ([facility] [address]     Takotsubo cardiomyopathy         Past Surgical History:   Procedure Laterality Date    BRONCHOSCOPY N/A 2/22/2021    BRONCHOSCOPY DIAGNOSTIC OR CELL [address] ONLY performed by Chris Washington MD at [address] [place] [address]  2/22/2021    BRONCHOSCOPY THERAPUTIC ASPIRATION INITIAL performed by Chris Washington MD at [place] [address], PARTIAL AMPUTATIONS        No family history on file. Social History     Tobacco Use    Smoking status: Every Day     Packs/day: 2.00     Types: Cigarettes    Smokeless tobacco: Never   Substance Use Topics    Alcohol use: No        No Known Allergies            Physical Exam:  Blood pressure 117/85, pulse (!) 147, temperature 99.9 °F (37.7 °C), temperature source Core, resp. rate 30, weight 122 lb 9 oz (55.6 kg), SpO2 (!) 86 %.'   Constitutional: In profound respiratory distress on the mechanical vent support  HENT: Endotracheal tube present no thyromegaly. Eyes:  Conjunctivae are normal. Pupils equal, round, and reactive to light. No scleral icterus. Neck: . No tracheal deviation present. No obvious thyroid mass. Cardiovascular: Tachycardia normal heart sounds. No right ventricular heave. No lower extremity edema. Pulmonary/Chest: No wheezes. Bilateral rales.   Chest wall is not dull to percussion. No accessory muscle usage or stridor. Abdominal: Soft. Bowel sounds present. No distension or hernia. No tenderness. Musculoskeletal: No cyanosis. No clubbing. No obvious joint deformity. Lymphadenopathy: No cervical or supraclavicular adenopathy. Skin: Skin is warm and dry. No rash or nodules on the exposed extremities. Into neurologic: Intubated and sedated but still having asynchrony with the ventilator        Results:  CBC:   Recent Labs     01/12/23  0859   WBC 26.0*   HGB 15.8   HCT 49.1   MCV 95.3        BMP:   Recent Labs     01/12/23  0859   *   K 4.1   CL 94*   CO2 16*   BUN 13   CREATININE 1.1     LIVER PROFILE:   Recent Labs     01/12/23  0859   AST 24   ALT 23   BILITOT <0.2   ALKPHOS 168*     UA:  Recent Labs     01/12/23  0900   COLORU Yellow   PHUR 7.0  7.0   WBCUA 0-2   RBCUA 0-2   CLARITYU Clear   SPECGRAV >=1.030   LEUKOCYTESUR Negative   UROBILINOGEN 0.2   BILIRUBINUR Negative   BLOODU Negative   GLUCOSEU Negative       Imaging:  I have reviewed radiology images personally. XR ABDOMEN (KUB) (SINGLE AP VIEW)   Final Result   Enteric tube with tip and side-port in the stomach. CT Head W/O Contrast   Final Result   No acute intracranial abnormality. CT CHEST PULMONARY EMBOLISM W CONTRAST   Final Result   1. Negative for pulmonary embolus. 2. Multifocal bilateral pneumonia. XR ABDOMEN (KUB) (SINGLE AP VIEW)   Final Result   Satisfactory NG tube placement. XR CHEST PORTABLE   Final Result   1. Right internal jugular catheter terminating in the mid SVC. XR CHEST PORTABLE   Final Result   1. Nasogastric tube terminating in the gastric cardia should be advanced   approximately 7 cm. 2. Right lower lobe airspace disease concerning for pneumonia. Recommend   chest radiograph in 8 weeks to confirm resolution.            XR CHEST PORTABLE    Result Date: 1/12/2023  EXAMINATION: ONE XRAY VIEW OF THE CHEST 1/12/2023 8:39 am COMPARISON: 03/16/2022 HISTORY: ORDERING SYSTEM PROVIDED HISTORY: intubation TECHNOLOGIST PROVIDED HISTORY: Reason for exam:->intubation Reason for Exam: ETT, NG placement FINDINGS: The endotracheal tube terminates 6.2 cm above the lelo. The nasogastric tube terminates in the gastric cardia. Airspace disease is present throughout the right lung due to pneumonia. The cardiac silhouette is within normal limits. There is no pneumothorax or pleural effusion. 1. Nasogastric tube terminating in the gastric cardia should be advanced approximately 7 cm. 2. Right lower lobe airspace disease concerning for pneumonia. Recommend chest radiograph in 8 weeks to confirm resolution. Latest Reference Range & Units 1/12/23 08:59   Neutrophils Absolute 1.7 - 7.7 K/uL 8.1 (H)   Lymphocytes Absolute 1.0 - 5.1 K/uL 13.5 (H)   Monocytes Absolute 0.0 - 1.3 K/uL 2.1 (H)   Eosinophils Absolute 0.0 - 0.6 K/uL 2.3 (H)   Basophils Absolute 0.0 - 0.2 K/uL 0.0   Bands Relative 0 - 7 % 6   Myelocyte Percent % 2 ! Atypical Lymphocytes Relative 0 - 6 % 14 (H)   Anisocytosis  Occasional !      Latest Reference Range & Units 1/12/23 08:59   Hemoglobin, Art, Extended 13.5 - 17.5 g/dL 17.0   pH, Arterial 7.350 - 7.450  6.866 (LL)   pCO2, Arterial 35.0 - 45.0 mmHg 89.5 (HH)   pO2, Arterial 75.0 - 108.0 mmHg 75.4   HCO3, Arterial 21.0 - 29.0 mmol/L 15.8 (L)   TCO2 (calc), Art Not Established mmol/L 18.6   Base Excess, Arterial -3.0 - 3.0 mmol/L -19.8 (L)   O2 Sat, Arterial >92 % 84.6 (L)   Methemoglobin, Arterial <1.5 % 0.5   Carboxyhgb, Arterial 0.0 - 1.5 % 4.4 (H)       Echocardiogram:Summary   Left ventricular systolic function is low normal with a visually estimated   ejection fraction of 45-50 %. EF estimated by Dooley's method at 48 %. The left ventricle is normal in size with normal wall thickness. Paradoxic septal motion   Grade I diastolic dysfunction with normal LV pressure.    Systolic pulmonary artery pressure (SPAP) is normal and estimated at 19 mmHg   (right atrial pressure 3 mmHg). Latest Reference Range & Units 1/12/23 09:00   Amphetamine Screen, Urine Negative <1000ng/mL  Neg   Benzodiazepine Screen, Urine Negative <200 ng/mL  Neg   Cocaine Metabolite Screen, Urine Negative <300 ng/mL  Neg   FENTANYL SCREEN, URINE Negative <5 ng/mL  POSITIVE ! METHADONE SCREEN, URINE, 19418 Negative <300 ng/mL  POSITIVE ! Opiate Scrn, Ur Negative <300 ng/mL  Neg   Oxycodone Urine Negative <100 ng/ml  Neg   PCP Screen, Urine Negative <25 ng/mL  Neg   Cannabinoid Scrn, Ur Negative <50 ng/mL  POSITIVE ! Drug Screen Comment:  see below       CT chest -  1. Negative for pulmonary embolus. 2. Multifocal bilateral pneumonia. EEG in past -Findings: The background of the EEG showed normal alpha posterior background of 8=9 HZ and amplitude of 20-40 UV. This background was symmetric, waxing and waning, and reactive with eye opening and closure. As the patient became drowsy and asleep, generalized diffuse slowing was seen through recording at 4-6 HZ. This generalized slowing was symmetric, non rhythmical, and continuous. No spike or sharp waves were seen. .     Impression: This EEG  is within normal limits. There is no evidence of epileptiform discharges, focal, or lateralizing abnormalities.      Assessment:  Active Problems:    Acute respiratory failure with hypoxia and hypercapnia (HCC)    Pulmonary infiltrates    Leukocytosis    Hyperammonemia (HCC)    Polypharmacy    Multifocal pneumonia    Myonecrosis (HCC)    Elevated lactic acid level    Normal anion gap metabolic acidosis    Seizure (HCC)    Takotsubo cardiomyopathy    Tobacco abuse  Resolved Problems:    Lactic acidosis          Plan:   Ventilator support to keep saturation between 90 and 94% on the  Ventilator settings and waveforms reviewed  Ventilator changes made and discussed with RT  Pulmonary toilet  Patient appears to have aspiration of the airways causing him to have acute hypoxemic respiratory failure along with hypercarbia  Patient's titration of ventilator as per repeat ABG in 2 hours time  IV sedation to maintain patient ventilator synchrony  In spite of patient being on fentanyl Versed as well as ketamine infusions patient is still having significant asynchrony with the ventilator  Nursing requested to give patient Ativan IV push and patient to be started on neuromuscular blockade  Titration of sedation as per RASS scores  Titration of neuromuscular blockade as per the scores/PNS  Patient had seizure-like activity which can be secondary to patient having significant CO2 narcosis/hyperammonemia along with patient having polypharmacy  Patient is EEG in the past did not show any seizure activity  Patient was started on empiric Unasyn for aspiration in the airways-titration as per clinical status and cultures  Patient also has a significant acidosis which appears to be normal anion gap metabolic acidosis at this time and for that reason sodium bicarb infusion started on the patient  Patient also has a leukocytosis which needs to be trended  Patient has a significant relative atypical lymphocytes on smear of the CBC which needs to be trended  Patient's CT of the chest does not support with patient to be in acute congestive heart failure  Patient does have history of cardiomyopathy  Patient also has some mild necrosis which may be secondary patient in polypharmacy admitted needs to be trended  Lactulose ordered  Patient's ammonia levels need to be trended  Primary team to decide upon patient leaving [facility]  Trend the lactic acid levels  Neurochecks  Monitor I/O and BMP  Correct electrolytes on PRN basis   Trend blood sugars levels and monitor for any hypoor hyperglycemia   PUD and DVT prophylaxis       Case d/w ER provider and nursing     Patient's clinical status remains precarious and critical and has potential for further decompensation and needs to monitor closely in the ICU  Transfer to neuro ICU being contemplated by ER provider if bed is available    Critical care time from the patient was 40 minutes exclusive of any procedures      Electronically signed by:  Marco Antonio Domingo MD    1/12/2023    1:50 PM.

## 2023-01-12 NOTE — ED NOTES
Upon assessment, patient NG pulled out from patient bucking. NG appears to be in patient mouth. NG removed and new equipment gathered to place new NG.   16 fr NG reinserted into right nostril. Xray called to confirm placement.            Landon Mustafa RN  01/12/23 1683

## 2023-01-12 NOTE — PROGRESS NOTES
01/12/23 1523   Breath Sounds   Right Upper Lobe Clear;Rhonchi   Right Middle Lobe Clear;Rhonchi   Right Lower Lobe Clear;Rhonchi   Left Upper Lobe Clear;Rhonchi   Left Lower Lobe Clear;Rhonchi   Vent Information   Vent Mode AC/PC   Ventilator Settings   FiO2  100 %   Insp Time (sec) 0.7 sec   Resp Rate (Set) 22 bmp   PEEP/CPAP (cmH2O) 15   Pressure Ordered 15   Vent Patient Data (Readings)   Vt (Measured) 515 mL   Peak Inspiratory Pressure (cmH2O) 31 cmH2O   Rate Measured 22 br/min   Minute Volume (L/min) 11.3 Liters   Mean Airway Pressure (cmH2O) 20 cmH20   I:E Ratio 1:2.9   Vent Alarm Settings   Low Pressure (cmH2O) 2 cmH2O   High Pressure (cmH2O) 40 cmH2O   Low Exhaled Vt (ml) 200 mL   High Exhaled Vt (ml) 700 mL   Additional Respiratoray Assessments   Humidification Source HME   Ambu Bag With Mask At Bedside Yes  (with peep valve)   ETT    Placement Date/Time: 01/12/23 0840   Placed By: In ED  Placement Verified By: Auscultation; Chest X-ray;Capnometry  Preoxygenation: Yes  Airway Type: Cuffed  Airway Tube Size: 8 mm  Laryngoscope: GlideScope  Blade Size: 3  Location: Oral  Insertion att. ..    Secured At 23 cm   Measured From Lips   ETT Placement Center   Secured By Commercial tube george   Cuff Pressure 30 cm H2O

## 2023-01-12 NOTE — ED PROVIDER NOTES
I independently performed a history and physical on Adam Trujillo.   I personally saw the patient and performed a substantive portion of the visit including all aspects of the medical decision making.  All diagnostic, treatment, and disposition decisions were made by myself in conjunction with the advanced practice provider.  I have participated in the medical decision making and directed the treatment plan and disposition of the patient.    For further details of Adam Trujillo's emergency department encounter, please see the advanced practice provider's documentation.    CHIEF COMPLAINT  Chief Complaint   Patient presents with    Seizures     Patient's family called for witnessed seizure. EMS reports patient was not actively having a seizure upon their arrival but had three total in route to ED. EMS gave 10mg Versed in route. Patient arrived to ED actively seizing.        Briefly, Adam Trujillo is a 32 y.o. male  who presents to the ED in status epilepticus.    FOCUSED PHYSICAL EXAMINATION  /89   Pulse (!) 124   Temp 100.1 °F (37.8 °C) (Core)   Resp 22   Wt 122 lb 9 oz (55.6 kg)   SpO2 98%   BMI 17.09 kg/m²      Focused physical examination:  General appearance: Actively seizing  Skin:  Warm. Dry.   Eye:  Extraocular movements intact.   Pupils are equally round and reactive to light and accommodation.  CN II-XII intact.  Ears, nose, mouth and throat:  Oral mucosa moist,  Neck:  Trachea midline.   Heart:  Regular rate and rhythm  Perfusion:  intact  Respiratory:  Lungs clear to auscultation bilaterally.  Respirations nonlabored.     Abdominal:   Non distended.  Nontender  Neurological: Unresponsive.  Making no purposeful movements.  Active tonic-clonic activity of the upper extremities with tonic extension of the bilateral lower extremities.  Lipsmacking movements appreciated.  Musculoskeletal:   Normal ROM, no deformities        EKG *Interpreted by me*: Likely sinus tachycardia with aberrancy and  a left bundle branch block with left axis deviation. There are some ST segment abnormality and elevation anteriorly. Markedly changed when compared to most recent EKG. This EKG was emergently taken to the cardiology suite to be reviewed by the on-call cardiologist.  Their review of the EKG was felt to be sinus tachycardia with aberrancy. Differential Diagnosis: Sepsis, arrhythmia, status epilepticus, intracranial hemorrhage,    Patient presented to the emergency department today concern of seizures. History of epilepsy. Had multiple witnessed seizures at home, for EMS, and upon presentation that was not responsive to benzodiazepine treatment. Patient was not protecting his airway, was hypoxic and decision was made to intubate the patient. The intubation and central line were performed by the midlevel provider with my direct supervision. Please see his note for further details regarding this procedures. EKG was performed on the patient noting a new left bundle branch block. Concern for underlying ischemia versus even particular ventricular tachycardia. The EKG was taken immediately to the Cath Lab and was reviewed by cardiology who felt that this was likely a sinus tachycardia with aberrancy. His troponin was ultimately negative. Has had a prior cardiac cath showing normal coronary arteries. Had low suspicion for an ischemic etiology. Does have underlying cardiomyopathy. Medical work-up was performed and did show leukocytosis, lactic acidosis and a fever. While this all could be a result of his underlying seizures and status epilepticus I was more concerned for an infectious etiology and chest x-ray did confirm multifocal pneumonia. CT pulmonary angiogram due to underlying hypoxia confirmed this as well without evidence of PE.   He was treated with large-volume IV fluids and broad-spectrum antibiotics but unfortunately did suffer from some worsening hypoxia likely due to pulmonary edema and ultimately required Lasix. With extensive management of the patient's ventilator settings and sedation using IV Versed, IV fentanyl, IV ketamine and ultimate requirement of paralysis we have managed to achieve improved oxygen saturations and is resting comfortably at this time. Heart rate now down in the 120s. Suspect an underlying metabolic process due to sepsis and community-acquired pneumonia complicated by underlying IV drug use resulting in the patient's status epilepticus. Neurology at [facility] called and informed me that he would not be able to see or except the patient here due to the status epilepticus. I spoke to the neurosurgical ICU attending at the [facility] and unfortunately they are currently at capacity. I also spoke to the NS ICU attending at [facility] who provided multiple underlying recommendations and stated they would see the patient once he arrived. Patient was accepted by the hospitalist at [facility].  I then spoke to the intensivist at [facility] who had some concerns that the patient may not need to be seen at their facility and perhaps could stay here at [facility]. I expressed my concern given his underlying status epilepticus as we do not have continuous EEG monitoring. Multiple ongoing conversations have persisted. Patient overall clinically is improving at this time. At this time of dictation, [phone], [facility]. critical care physician has yet to accept the patient though the hospitalist has. The case has been discussed with the NS ICU attending at that facility. The [facility] has no active beds and could not accept the patient at this time after speaking with the NS ICU physician at the [facility] who confirmed this. Addendum 1527:  At this time the patient's oxygenation has improved and is in the upper 90s however he has required fentanyl, Versed, ketamine, and ultimate paralysis to maintain his saturations. At this time there is no way for me to confirm that he is not having underlying seizures and is not currently in subclinical status as his entire course here in the emergency department (while not on sedative medications or paralytics) has shown evidence of consistent seizures. I have spoken to the critical care physician, Dr. Arletta Cockayne, at [place] [address] and explained that transfer to the [facility] is not an option as they have no beds and have refused. The only facility within our transfer system that has continuous EEG is [place] [address] and he is still yet to accept the patient. I have been at this patient's bedside now for hours. I contacted my medical director to obtain additional assistance in transferring this patient as it is very clear he cannot stay here and he is obviously critically ill. I obtained the contact information for Dr. Yulia yarbrough, the  at [place] [address], and relayed the problems I am having transferring the patient. He is currently looking into the situation and is going to contact me soon. He did feel that at the very least, the patient likely could be boarded in the ED, though this would be a last resort. CRITICAL CARE TIME    I personally saw the patient and independently provided 180 minutes of non-concurrent critical care out of the total shared critical care time provided, excluding separately reportable procedures. There was a high probability of clinically significant/life threatening deterioration in the patient's condition which required my urgent intervention.   This time was spent reviewing the patient chart, interpreting diagnostic/laboratory data, managing ventilator settings, speaking with multiple consulting physicians [facility] cardiology, [facility] critical care, [facility] neurology, [facility] neurosurgical ICU physicians, [place] [address] neuro critical care ALBINA, [facility] neuro ICU physician, [facility] hospitalist, [facility] critical care physician), management of multiple IV sedation medications including: Fentanyl Versed and ketamine, management of paralytics, transfusion of large-volume IV fluids to treat sepsis as well as transfusion of broad-spectrum antibiotics to treat sepsis, obtaining additional history from the patient's family      History From: Patient, EMS, and the patient's mother    Limitations to history : Altered Mental Status    Chronic Conditions: Noted in HPI    CONSULTS: (Who and What was discussed)  IP CONSULT TO NEUROLOGY    Social Determinants : None and IV drug use    Multiple inpatient discharge summaries were reviewed from [address] [place] [address] regarding previous admissions for prior episodes of status epilepticus.     Disposition Considerations (Tests not ordered but considered, Shared Decision Making, Pt Expectation of Test or Tx.):     During the patient's ED course, the patient was given:  Medications   fentaNYL (SUBLIMAZE) 1,000 mcg in sodium chloride 0.9% 100 mL infusion (200 mcg/hr IntraVENous Rate/Dose Change 1/12/23 1148)     And   fentaNYL bolus from bag (has no administration in time range)   midazolam (VERSED) 100 mg in dextrose 5 % 100 mL infusion (10 mg/hr IntraVENous Rate/Dose Change 1/12/23 1155)   ketamine (KETALAR) 50 mg in dextrose 5 % 50 mL infusion (0.1 mg/kg/hr × 55.6 kg IntraVENous Rate/Dose Change 1/12/23 1350)   levETIRAcetam (KEPPRA) 2,000 mg in sodium chloride 0.9 % 100 mL IVPB (0 mg IntraVENous Stopped 1/12/23 1322)   cisatracurium besylate (NIMBEX) 200 mg in sodium chloride 0.9 % 100 mL infusion (2 mcg/kg/min × 55.6 kg IntraVENous New Bag 1/12/23 1357)   ampicillin-sulbactam (UNASYN) 3,000 mg in sodium chloride 0.9 % 100 mL IVPB (mini-bag) (has no administration in time range)   sodium bicarbonate 75 mEq in sodium chloride 0.45 % 1,075 mL infusion ( IntraVENous New Bag 1/12/23 1422)   enoxaparin (LOVENOX) injection 40 mg (40 mg SubCUTAneous Given 1/12/23 1416)   pantoprazole (PROTONIX) injection 40 mg (40 mg IntraVENous Given 1/12/23 1416)   lactulose (CHRONULAC) 10 GM/15ML solution 20 g (20 g Per NG tube Given 1/12/23 1414)   levETIRAcetam (KEPPRA) 1,000 mg in sodium chloride 0.9 % 100 mL IVPB (0 mg IntraVENous Stopped 1/12/23 0900)   0.9 % sodium chloride bolus (0 mLs IntraVENous Stopped 1/12/23 1105)   propofol injection (0 mcg/kg/min × 60 kg (Order-Specific) IntraVENous Stopped 1/12/23 1054)   vancomycin 1000 mg IVPB in 250 mL D5W addavial (0 mg/kg × 60 kg (Order-Specific) IntraVENous Stopped 1/12/23 1146)   cefepime (MAXIPIME) 2000 mg IVPB minibag (0 mg IntraVENous Stopped 1/12/23 1041)   0.9 % sodium chloride bolus (0 mLs IntraVENous Stopped 1/12/23 1114)   iopamidol (ISOVUE-370) 76 % injection 75 mL (75 mLs IntraVENous Given 1/12/23 0951)   fentaNYL (SUBLIMAZE) injection 100 mcg (100 mcg IntraVENous Given 1/12/23 1121)   midazolam (VERSED) injection 4 mg (2 mg IntraVENous Given 1/12/23 1126)   furosemide (LASIX) injection 40 mg (40 mg IntraVENous Given 1/12/23 1158)   LORazepam (ATIVAN) injection 4 mg (4 mg IntraVENous Given 1/12/23 1339)   sodium bicarbonate 8.4 % injection 50 mEq (50 mEq IntraVENous Given 1/12/23 1415)   acetaminophen (TYLENOL) suppository 650 mg (650 mg Rectal Given 1/12/23 1400)        CLINICAL IMPRESSION  1. Status epilepticus ([address]    2. New onset left bundle branch block (LBBB)    3. Sepsis with acute hypoxic respiratory failure without septic shock, due to unspecified organism ([address]    4. HCAP (healthcare-associated pneumonia)    5. Hyperammonemia ([address]        DISPOSITION  Transfer      This chart was created using Dragon dictation software. Efforts were made by me to ensure accuracy, however some errors may be present due to limitations of this technology.             Anika Byers MD  01/12/23 Karina Johnson, MD  01/12/23 153

## 2023-01-12 NOTE — ED NOTES
Patient continues to mock ventilator. Verbal order for 100 mcg of fetanyl by [facility] CNP. Given by Lolly Hubbard @  [phone].      Lupe Reynoso RN  01/12/23 1120

## 2023-01-12 NOTE — ED NOTES
Patient continues to have pink, frothy sputum in ET tube. RT at bedside to suction.        Carolyn Nelson RN  01/12/23 1200

## 2023-01-12 NOTE — PROGRESS NOTES
- Midazolam and Ketamine drip were dispensed after pt.was discharged from [facility] , pt.was being transferred to other hospital , needed those drips before leaving our hospital .   Lilliana Padron/Francine. 1/12/23 6:24 PM EST

## 2023-01-12 NOTE — ED NOTES
Patient continues to mock ventilator. Patient attempting to get out of bed and pulling at lines/ET Tube. Verbal order from Dr. Flip Meng to give bolus of 4mg Versed and 100mcg of Fentanyl IVP.       Dot Nagel RN  01/12/23 2453

## 2023-01-12 NOTE — ED NOTES
Dr. Mana Hyatt at bedside and aware of patient being hypoxic.       Al Aldana, JESSICA  01/12/23 1038

## 2023-01-12 NOTE — ED NOTES
20mg Etomodate given by [facility] RN @ 0558.  100mg of Rocuronium given by Cristóbal Michel @1513.      Chandrakant Garcia RN  01/12/23 5045

## 2023-01-12 NOTE — ED NOTES
Patient bucking ventilator at this time. 50mg push dose of Propofol given by Dr. Maritza Denver.       Bahman German RN  01/12/23 4164

## 2023-01-12 NOTE — ED NOTES
Called  Transfer Center @ [phone] to reach  Neurology  Reg: Status epilepticus  Per: Michelle Pedersen returned call @ [address] Neurology d/t  not having availability  Per: Dr. Gwendolyn Marin  Reg: Status epilepticus    Will, NP returned call @ [phone]    Transfer center has paged Des Tang, [facility] Hospitalist returned call @ [phone]    Dr. Briseyda Villavicencio returned call @ [address] [place] [address] Transfer center confirmed transport with Radha Gomes 1772 ([facility]) ETA 1730       Cleola Friend  01/12/23 1710

## 2023-01-12 NOTE — CONSULTS
ICU Consult       Hospital Day: 1  ICU Day: 1                                                         Code:Prior  Admit Date: (Not on file)  PCP: No primary care provider on file.                                  CC: Status Epilepticus    HISTORY OF PRESENT ILLNESS:   Adam Trujillo is a 32-year-old M with PMH of seizures, hep C, uncontrolled hypertension, takatsubo cardiomyopathy, COPD, temporal seizures, 20 yr tobacco use, HFrEF, polysubstance abuse (heroine and fentanyl) who presents to [facility] ED with seizures.  EMS was called for witnessed seizure.  Patient was actively seizing when he arrived to the ED.  He received multiple doses of benzos on route that did not improve his seizure.  He was actively seizing for about an hour.  He never returned to baseline.  He was eventually intubated due to hypoxia and not protecting his airway.      Other tests remarkable for EKG showed new LBBB concern for underlying ischemia he was taken to the Cath Lab.  Cardiology felt it was likely sinus tachycardia with aberrancy.  Troponins were negative.  Other labs remarkable for lactic acid was 6.9, ammonia 129, UDS was positive for cannabinoids methadone and fentanyl.  Patient is a known IV drug user and is on methadone at home.  UA was negative for any infection.  Imaging studies were done and showed CT PE negative for any PE but showed multifocal pneumonia, CT head showed no intra cranial abnormality.  He was treated with large-volume IV fluids and started on broad-spectrum antibiotics he had some worsening hypoxia with pulmonary edema and ultimately required Lasix.  He was put on IV Versed, IV fentanyl and IV ketamine for sedation and requirement of paralysis for management of improved oxygenation on the ventilator  PAST HISTORY:     Past Medical History:   Diagnosis Date    Aspiration pneumonia (HCC)     Hepatitis C     HTN (hypertension)     NSTEMI (non-ST elevated myocardial infarction) (HCC)     Polysubstance abuse  ([facility]     Seizures ([facility]     Septic shock ([facility]     Takotsubo cardiomyopathy        Past Surgical History:   Procedure Laterality Date    BRONCHOSCOPY N/A 2/22/2021    BRONCHOSCOPY DIAGNOSTIC OR CELL [address] ONLY performed by Krishna Pradhan MD at [facility]  2/22/2021    BRONCHOSCOPY THERAPUTIC ASPIRATION INITIAL performed by Krishna Pradhan MD at . [address] HAND FINGERS INJURY, PARTIAL AMPUTATIONS       SocialHistory:   The patient lives at    Alcohol:  Illicit drugs: no use  Tobacco:      Family History:  No family history on file.     MEDICATIONS:     Current Facility-Administered Medications on File Prior to Encounter   Medication Dose Route Frequency Provider Last Rate Last Admin    fentaNYL (SUBLIMAZE) 1,000 mcg in sodium chloride 0.9% 100 mL infusion   mcg/hr IntraVENous Continuous Duane Singer MD 20 mL/hr at 01/12/23 1148 200 mcg/hr at 01/12/23 1148    And    fentaNYL bolus from bag  50 mcg IntraVENous Q30 Min PRN Duane Singer MD        midazolam (VERSED) 100 mg in dextrose 5 % 100 mL infusion  1-10 mg/hr IntraVENous Continuous Duane Singer MD 10 mL/hr at 01/12/23 1155 10 mg/hr at 01/12/23 1155    ketamine (KETALAR) 50 mg in dextrose 5 % 50 mL infusion  0.05-0.42 mg/kg/hr IntraVENous Continuous Duane Singer MD 5.6 mL/hr at 01/12/23 1350 0.1 mg/kg/hr at 01/12/23 1350    levETIRAcetam (KEPPRA) 2,000 mg in sodium chloride 0.9 % 100 mL IVPB  2,000 mg IntraVENous Q12H LEIGHTON Steward - CNP   Stopped at 01/12/23 1322    cisatracurium besylate (NIMBEX) 200 mg in sodium chloride 0.9 % 100 mL infusion  0.5-10 mcg/kg/min IntraVENous Continuous Yeni Duarte MD 3.3 mL/hr at 01/12/23 1357 2 mcg/kg/min at 01/12/23 1357    ampicillin-sulbactam (UNASYN) 3,000 mg in sodium chloride 0.9 % 100 mL IVPB (mini-bag)  3,000 mg IntraVENous Q6H Yeni Duarte MD        sodium bicarbonate 75 mEq in sodium chloride 0.45 % 1,075 mL infusion IntraVENous Continuous Lisa Mejias  mL/hr at 01/12/23 1422 New Bag at 01/12/23 1422    enoxaparin (LOVENOX) injection 40 mg  40 mg SubCUTAneous Daily Lisa Mejias MD   40 mg at 01/12/23 1416    pantoprazole (PROTONIX) injection 40 mg  40 mg IntraVENous Daily Lisa Mejias MD   40 mg at 01/12/23 1416    lactulose ([facility] [facility] 10 GM/15ML solution 20 g  20 g Per NG tube 6 times per day Lisa Mejias MD   20 g at 01/12/23 1414     Current Outpatient Medications on File Prior to Encounter   Medication Sig Dispense Refill    sacubitril-valsartan (ENTRESTO) 49-51 MG per tablet Take 1 tablet by mouth 2 times daily 180 tablet 1    furosemide (LASIX) 20 MG tablet Take 1 tablet by mouth daily as needed (for weight gain 3lbs in a day or 5lb in a week with leg swelling.) 90 tablet 1    carvedilol (COREG) 3.125 MG tablet Take 1 tablet by mouth 2 times daily (with meals) 180 tablet 1    DULoxetine (CYMBALTA) 30 MG extended release capsule Take 30 mg by mouth daily      OXcarbazepine ER (OXTELLAR XR) 600 MG TB24 Take 1,800 mg by mouth daily      methadone (DOLOPHINE) 10 MG/ML solution Take 105 mg by mouth daily. aspirin 81 MG chewable tablet Take 81 mg by mouth daily (with breakfast)           Scheduled Meds:   Continuous Infusions:  PRN Meds:    Allergies: No Known Allergies    REVIEW OF SYSTEMS:       History obtained from chart review, unobtainable from patient due to mental status, and mother and aunt. Review of Systems    PHYSICAL EXAM:       Vitals: There were no vitals taken for this visit. I/O:    Intake/Output Summary (Last 24 hours) at 1/12/2023 1703  Last data filed at 1/12/2023 1236  Gross per 24 hour   Intake 800 ml   Output 810 ml   Net -10 ml     I/O this shift: In: 800 [IV Piggyback:800]  Out: 810 [Urine:810]  No intake/output data recorded.     Physical Examination:     Physical Exam      Access:   -Central Access Day #: 1                                  -Peripheral Access Day#:1  -Arterial line Day#:1                                  Wu Day#:1  OGT Day#: 1                                            ETT Day#:1  Vent Settings:    / / /     Recent Labs     01/12/23  0859   PHART 6.866*   [id number] 89.5*   PO2ART 75.4           DATA:       Labs:  CBC:   Recent Labs     01/12/23  0859   WBC 26.0*   HGB 15.8   HCT 49.1          BMP:   Recent Labs     01/12/23  0859   *   K 4.1   CL 94*   CO2 16*   BUN 13   CREATININE 1.1   GLUCOSE 237*     LFT's:   Recent Labs     01/12/23  0859   AST 24   ALT 23   BILITOT <0.2   ALKPHOS 168*     Troponin:   Recent Labs     01/12/23  0859   TROPONINI <0.01     BNP:No results for input(s): BNP in the last 72 hours. ABGs:   Recent Labs     01/12/23  0859   PHART 6.866*   [id number] 89.5*   PO2ART 75.4     INR: No results for input(s): INR in the last 72 hours. U/A:  Recent Labs     01/12/23  0900   COLORU Yellow   PHUR 7.0  7.0   WBCUA 0-2   RBCUA 0-2   CLARITYU Clear   SPECGRAV >=1.030   LEUKOCYTESUR Negative   UROBILINOGEN 0.2   BILIRUBINUR Negative   BLOODU Negative   GLUCOSEU Negative       No orders to display       EKG:   Echo:  Micro:     ASSESSMENT AND PLAN:   Robert Alvarenga is a 28 y.o. male, who was ***    Acute encephalopathy 2/2 status epilepticus  Patient has a history of right temporal epilepsy diagnosed in 2019 in the setting of a prior TBI. He has known epilepsy, he is on [place] at home and is known to be noncompliant, UDS was positive for fentanyl, and he was septic on arrival to the ED which could be a reason to put him into status epilepticus.  He follows with   -Neurology consulted  - cEEG    Acute hypoxic respiratory failure 2/2 multifocal pneumonia  Patient was intubated for hypoxia, and airway protection  -   /   /   /   / PEEP  -He is currently sedated on Versed, fentanyl, ketamine drip    Anion gap metabolic acidosis 2/2 lactic acidosis    New Left Bundle Branch Block    Hyperammonemia    Chronic Problems  COPD -   HTN - Holding home coreg  HFrEF - LVEF in March 2022 of 45-50% - Holding Home Entresto  Tobacco use   Polysubstance Abuse - holding home methadone    Code Status:Prior  FEN: No diet orders on file   PPX:  ***  DISPO:     This patient has been staffed and discussed with Carolina Brown  -----------------------------  aGge Gatica MD PGY-1  1/12/2023  5:03 PM

## 2023-01-12 NOTE — PROGRESS NOTES
01/12/23 1240   Patient Observation   Heart Rate (!) 145   Resp (!) 31   SpO2 (!) 85 %   Vent Information   Vent Mode AC/PC   Ventilator Settings   FiO2  100 %   Insp Time (sec) 0.69 sec   Resp Rate (Set) 20 bmp   PEEP/CPAP (cmH2O) 5   Pressure Ordered 22   Vent Patient Data (Readings)   Vt (Measured) 738 mL   Peak Inspiratory Pressure (cmH2O) 32 cmH2O   Rate Measured 32 br/min   Minute Volume (L/min) 27 Liters   Mean Airway Pressure (cmH2O) 19 cmH20   I:E Ratio 1:1.8   Vent Alarm Settings   Low Pressure (cmH2O) 40 cmH2O   Low Minute Volume (lpm) 2 L/min   Low Exhaled Vt (ml) 200 mL   RR High (bpm) 40 br/min   Additional Respiratoray Assessments   Humidification Source HME   Circuit Condensation Drained   Ambu Bag With Mask At Bedside Yes   Airway Clearance   Suction ET Tube   Suction Device Inline suction catheter   Sputum Method Obtained Endotracheal   Sputum Amount Large   Sputum Color/Odor Pink tinged; White   Sputum Consistency Frothy   ETT    Placement Date/Time: 01/12/23 0840   Placed By: In ED  Placement Verified By: Auscultation; Chest X-ray;Capnometry  Preoxygenation: Yes  Airway Type: Cuffed  Airway Tube Size: 8 mm  Laryngoscope: GlideScope  Blade Size: 3  Location: Oral  Insertion att. ..    Secured At 23 cm   Measured From Lips   ETT Placement Left   Secured By Commercial tube george   Site Assessment Dry   Cuff Pressure 30 cm H2O

## 2023-01-12 NOTE — ED NOTES
Patients O2 saturation 84% on ventilator. RN instructed to increase ventilator oxygen to 100%. Patient remains hypoxic. Dr. Salome Dunn at bedside and aware.       Norma Beck RN  01/12/23 0395

## 2023-01-12 NOTE — ED NOTES
Report called to Jacey Prasad RN at [facility] , [address] [facility] / [facility]  01/12/23 1534

## 2023-01-12 NOTE — ED NOTES
Bedside report given to [facility] MICU team. Patient transferred to [facility], [facility]. at this time.       Dot Nagel RN  01/12/23 6629

## 2023-01-12 NOTE — ED NOTES
Intubation successful by Maxi Glez NP. Positive color change. 8.0 ET tube, 23cm at the lip.       Saturnino Hoyt RN  01/12/23 9143

## 2023-01-12 NOTE — ED NOTES
RN spoke to [place] in pharmacy with Dr. Lisa Irving at bedside to verify starting dose of Ketamine.       Carisa Martinez RN  01/12/23 1954

## 2023-01-12 NOTE — ED NOTES
Patient diaphoretic, dressings having a hard time sticking to patients skin. Central line dressing changed at this time.      Nilson Vizcaino RN  01/12/23 [id number]

## 2023-01-12 NOTE — PROGRESS NOTES
01/12/23 0915   Patient Observation   Heart Rate (!) 143   Resp 22   SpO2 (!) 88 %   Vent Information   Vent Mode AC/VC   Ventilator Settings   FiO2  100 %   Vt (Set, mL) 500 mL   Resp Rate (Set) 22 bmp   PEEP/CPAP (cmH2O) 5   Vent Patient Data (Readings)   Vt (Measured) 505 mL   Peak Inspiratory Pressure (cmH2O) 27 cmH2O   Rate Measured 22 br/min   Minute Volume (L/min) 9 Liters   Mean Airway Pressure (cmH2O) 8 cmH20   I:E Ratio 1:2.0   Flow Sensitivity 3 L/min   Vent Alarm Settings   Low Pressure (cmH2O) 40 cmH2O   Low Minute Volume (lpm) 2 L/min   Low Exhaled Vt (ml) 200 mL   RR High (bpm) 40 br/min   Additional Respiratoray Assessments   Humidification Source HME   Ambu Bag With Mask At Bedside Yes   ETT    Placement Date/Time: 01/12/23 0840   Placed By: In ED  Placement Verified By: Auscultation; Chest X-ray;Capnometry  Preoxygenation: Yes  Airway Type: Cuffed  Airway Tube Size: 8 mm  Laryngoscope: GlideScope  Blade Size: 3  Location: Oral  Insertion att. ..   $ Intubation Emergent  $ Yes   Secured At 23 cm   Measured From Lips   ETT Placement Center   Secured By Commercial tube george   Site Assessment Dry   Cuff Pressure 30 cm H2O

## 2023-01-12 NOTE — ED PROVIDER NOTES
[address] [facility]  ED  EMERGENCY DEPARTMENT ENCOUNTER        Pt Name: Yina Mendez  MRN: [id number]  [place] 1990  Date of evaluation: 1/12/2023  Provider: LEIGHTON Contreras - CNP  PCP: No primary care provider on file. Note Started: 12:18 PM EST 1/12/23       I have seen and evaluated this patient with my supervising physician Jessica Okeefe MD.      [facility]       Chief Complaint   Patient presents with    Seizures     Patient's family called for witnessed seizure. EMS reports patient was not actively having a seizure upon their arrival but had three total in route to ED. EMS gave 10mg Versed in route. Patient arrived to ED actively seizing. HISTORY OF PRESENT ILLNESS: 1 or more Elements     History from : EMS    Limitations to history : Altered Mental Status    Yina Mendez is a 28 y.o. male who presents with seizures. Patient called EMS for witnessed seizure, patient has been actively seizing since prior to arrival.  Patient received multiple doses of benzos in route to the ED upon arrival in the ED patient is actively seizing again. Patient has a history of status epilepticus, has been in a multiple times per his medical records. He is here for further evaluation. Nursing Notes were all reviewed and agreed with or any disagreements were addressed in the HPI. REVIEW OF SYSTEMS :      Review of Systems    Positives and Pertinent negatives as per HPI.      SURGICAL HISTORY     Past Surgical History:   Procedure Laterality Date    BRONCHOSCOPY N/A 2/22/2021    BRONCHOSCOPY DIAGNOSTIC OR CELL [address] ONLY performed by Tate Clifton MD at [facility] [address]  2/22/2021    BRONCHOSCOPY THERAPUTIC ASPIRATION INITIAL performed by Tate Clifton MD at . [address] HAND FINGERS INJURY, PARTIAL AMPUTATIONS       CURRENTMEDICATIONS       Previous Medications    ASPIRIN 81 MG CHEWABLE TABLET    Take 81 mg by mouth daily (with breakfast)    CARVEDILOL (COREG) 3.125 MG TABLET    Take 1 tablet by mouth 2 times daily (with meals)    DULOXETINE (CYMBALTA) 30 MG EXTENDED RELEASE CAPSULE    Take 30 mg by mouth daily    FUROSEMIDE (LASIX) 20 MG TABLET    Take 1 tablet by mouth daily as needed (for weight gain 3lbs in a day or 5lb in a week with leg swelling.)    METHADONE (DOLOPHINE) 10 MG/ML SOLUTION    Take 105 mg by mouth daily. OXCARBAZEPINE ER (OXTELLAR XR) 600 MG TB24    Take 1,800 mg by mouth daily    SACUBITRIL-VALSARTAN (ENTRESTO) 49-51 MG PER TABLET    Take 1 tablet by mouth 2 times daily       ALLERGIES     Patient has no known allergies. FAMILYHISTORY     No family history on file. SOCIAL HISTORY       Social History     Tobacco Use    Smoking status: Every Day     Packs/day: 2.00     Types: Cigarettes    Smokeless tobacco: Never   Vaping Use    Vaping Use: Never used   Substance Use Topics    Alcohol use: No    Drug use: Yes     Types: IV, Opiates , Marijuana (Weed), Methamphetamines (Crystal Meth)     Comment: last used heroin 6/29-6/30       SCREENINGS                         CIWA Assessment  BP: (!) 159/96  Heart Rate: (!) 141           PHYSICAL EXAM  1 or more Elements     ED Triage Vitals   BP Temp Temp Source Heart Rate Resp SpO2 Height Weight   01/12/23 0831 01/12/23 0929 01/12/23 0929 01/12/23 0828 01/12/23 0828 01/12/23 0828 -- 01/12/23 1155   (!) 185/115 (!) 101.1 °F (38.4 °C) Rectal (!) 140 (!) 32 92 %  122 lb 9 oz (55.6 kg)       Physical Exam  Distressed  male, actively seizing, he does have some frothing from the mouth, this is tonic-clonic seizure activity. He has no obvious extremity trauma. He was tachypneic and tachycardic, his lungs were mostly clear, slight crackles noted in the right lower base.       DIAGNOSTIC RESULTS   LABS:    Labs Reviewed   CBC WITH AUTO DIFFERENTIAL - Abnormal; Notable for the following components:       Result Value    WBC 26.0 (*)     Neutrophils Absolute 8.1 (*)     Lymphocytes Absolute 13.5 (*)     Monocytes Absolute 2.1 (*)     Eosinophils Absolute 2.3 (*)     Atypical Lymphocytes Relative 14 (*)     Metamyelocytes Relative 1 (*)     Myelocyte Percent 2 (*)     Anisocytosis Occasional (*)     All other components within normal limits   COMPREHENSIVE METABOLIC PANEL W/ REFLEX TO MG FOR LOW K - Abnormal; Notable for the following components:    Sodium 133 (*)     Chloride 94 (*)     CO2 16 (*)     Anion Gap 23 (*)     Glucose 237 (*)     Alkaline Phosphatase 168 (*)     All other components within normal limits   URINALYSIS WITH REFLEX TO CULTURE - Abnormal; Notable for the following components:    Protein, UA 30 (*)     All other components within normal limits   URINE DRUG SCREEN - Abnormal; Notable for the following components:    Cannabinoid Scrn, Ur POSITIVE (*)     Methadone Screen, Urine POSITIVE (*)     FENTANYL SCREEN, URINE POSITIVE (*)     All other components within normal limits   LACTIC ACID - Abnormal; Notable for the following components:    Lactic Acid 6.9 (*)     All other components within normal limits    Narrative:     CANDI Espinosa  SAEBRENNA tel. [phone],  Chemistry results called to and read back by madison pathak rn, 01/12/2023  10:02, by [facility]   AMMONIA - Abnormal; Notable for the following components:    Ammonia 129 (*)     All other components within normal limits    Narrative:     Christelle MARQUIS tel. [phone],  Chemistry results called to and read back by madison pathak rn, 01/12/2023  10:02, by [facility]   BLOOD GAS, ARTERIAL - Abnormal; Notable for the following components:    pH, Arterial 6.866 (*)     pCO2, Arterial 89.5 (*)     HCO3, Arterial 15.8 (*)     Base Excess, Arterial -19.8 (*)     O2 Sat, Arterial 84.6 (*)     Carboxyhgb, Arterial 4.4 (*)     All other components within normal limits    Narrative:     Maude Raymundo tel. [phone],  Chemistry results called to and read back by Keila Severino RN, 01/12/2023  09:12, by [facility] GLUCOSE - Abnormal; Notable for the following components:    POC Glucose 279 (*)     All other components within normal limits   COVID-19 & INFLUENZA COMBO   CULTURE, BLOOD 1   CULTURE, BLOOD 2   ETHANOL   TROPONIN   MICROSCOPIC URINALYSIS   SAMPLE POSSIBLE BLOOD BANK TESTING       When ordered only abnormal lab results are displayed. All other labs were within normal range or not returned as of this dictation. EKG: When ordered, EKG's are interpreted by the Emergency Department Physician in the absence of a cardiologist.  Please see their note for interpretation of EKG. RADIOLOGY:   Non-plain film images such as CT, Ultrasound and MRI are read by the radiologist. Plain radiographic images are visualized and preliminarily interpreted by the ED Provider with the below findings:        Interpretation per the Radiologist below, if available at the time of this note:    XR ABDOMEN (KUB) (SINGLE AP VIEW)   Final Result   Enteric tube with tip and side-port in the stomach. CT Head W/O Contrast   Final Result   No acute intracranial abnormality. CT CHEST PULMONARY EMBOLISM W CONTRAST   Final Result   1. Negative for pulmonary embolus. 2. Multifocal bilateral pneumonia. XR ABDOMEN (KUB) (SINGLE AP VIEW)   Final Result   Satisfactory NG tube placement. XR CHEST PORTABLE   Final Result   1. Right internal jugular catheter terminating in the mid SVC. XR CHEST PORTABLE   Final Result   1. Nasogastric tube terminating in the gastric cardia should be advanced   approximately 7 cm. 2. Right lower lobe airspace disease concerning for pneumonia. Recommend   chest radiograph in 8 weeks to confirm resolution. XR ABDOMEN (KUB) (SINGLE AP VIEW)    Result Date: 1/12/2023  EXAMINATION: ONE SUPINE XRAY VIEW(S) OF THE ABDOMEN 1/12/2023 11:47 am COMPARISON: None.  HISTORY: ORDERING SYSTEM PROVIDED HISTORY: ng placement TECHNOLOGIST PROVIDED HISTORY: Reason for exam:->ng placement Reason for Exam: ng placement, replacement FINDINGS: There is an enteric tube with the tip and side-port in the stomach. Nonobstructive bowel gas pattern. Bilateral patchy airspace infiltrates     Enteric tube with tip and side-port in the stomach. XR ABDOMEN (KUB) (SINGLE AP VIEW)    Result Date: 1/12/2023  EXAMINATION: ONE SUPINE XRAY VIEW(S) OF THE ABDOMEN 1/12/2023 9:28 am COMPARISON: None. HISTORY: ORDERING SYSTEM PROVIDED HISTORY: NG placement TECHNOLOGIST PROVIDED HISTORY: Reason for exam:->NG placement Reason for Exam: NG placement FINDINGS: A nasogastric tube is been placed with tip coiled in the gastric body. There are patchy bilateral perihilar and lower lobe airspace opacities. The visualized bowel gas pattern is within normal limits. There is no free air or organomegaly. Satisfactory NG tube placement. CT Head W/O Contrast    Result Date: 1/12/2023  EXAMINATION: CT OF THE HEAD WITHOUT CONTRAST  1/12/2023 9:42 am TECHNIQUE: CT of the head was performed without the administration of intravenous contrast. Automated exposure control, iterative reconstruction, and/or weight based adjustment of the mA/kV was utilized to reduce the radiation dose to as low as reasonably achievable. COMPARISON: 03/16/2022 CT HISTORY: ORDERING SYSTEM PROVIDED HISTORY: status epilepticus TECHNOLOGIST PROVIDED HISTORY: Reason for exam:->status epilepticus Has a \"code stroke\" or \"stroke alert\" been called? ->No Decision Support Exception - unselect if not a suspected or confirmed emergency medical condition->Emergency Medical Condition (MA) Reason for Exam: multiple seizures today-no known injury Additional signs and symptoms: hx-seizures FINDINGS: BRAIN/VENTRICLES: The ventricles are normal in size. The sulci are normally formed over the convexities bilaterally. No extra-axial fluid collections. No sign of recent intracranial hemorrhage.   Brain parenchymal attenuation is normal. No mass lesions on this noncontrast study. ORBITS: The visualized portion of the orbits demonstrate no acute abnormality. SINUSES: The visualized paranasal sinuses and mastoid air cells demonstrate no acute abnormality. SOFT TISSUES/SKULL:  No acute abnormality of the visualized skull or soft tissues. No acute intracranial abnormality. XR CHEST PORTABLE    Result Date: 1/12/2023  EXAMINATION: ONE XRAY VIEW OF THE CHEST 1/12/2023 9:14 am COMPARISON: 01/12/2023 HISTORY: ORDERING SYSTEM PROVIDED HISTORY: central line confirmation TECHNOLOGIST PROVIDED HISTORY: Reason for exam:->central line confirmation Reason for Exam: central line confirmation FINDINGS: The right internal jugular catheter terminates in the mid SVC. No change in the lines and tubes. No change in the right airspace disease. The cardiac silhouette is incompletely imaged. There is no pneumothorax. 1. Right internal jugular catheter terminating in the mid SVC. XR CHEST PORTABLE    Result Date: 1/12/2023  EXAMINATION: ONE XRAY VIEW OF THE CHEST 1/12/2023 8:39 am COMPARISON: 03/16/2022 HISTORY: ORDERING SYSTEM PROVIDED HISTORY: intubation TECHNOLOGIST PROVIDED HISTORY: Reason for exam:->intubation Reason for Exam: ETT, NG placement FINDINGS: The endotracheal tube terminates 6.2 cm above the lelo. The nasogastric tube terminates in the gastric cardia. Airspace disease is present throughout the right lung due to pneumonia. The cardiac silhouette is within normal limits. There is no pneumothorax or pleural effusion. 1. Nasogastric tube terminating in the gastric cardia should be advanced approximately 7 cm. 2. Right lower lobe airspace disease concerning for pneumonia. Recommend chest radiograph in 8 weeks to confirm resolution.      CT CHEST PULMONARY EMBOLISM W CONTRAST    Result Date: 1/12/2023  EXAMINATION: CTA OF THE CHEST 1/12/2023 9:45 am TECHNIQUE: CTA of the chest was performed after the administration of intravenous contrast.  Multiplanar reformatted images are provided for review. MIP images are provided for review. Automated exposure control, iterative reconstruction, and/or weight based adjustment of the mA/kV was utilized to reduce the radiation dose to as low as reasonably achievable. COMPARISON: 02/21/2021 HISTORY: ORDERING SYSTEM PROVIDED HISTORY: hypoxia TECHNOLOGIST PROVIDED HISTORY: Reason for exam:->hypoxia Decision Support Exception - unselect if not a suspected or confirmed emergency medical condition->Emergency Medical Condition (MA) Reason for Exam: seizures today-hypoxia FINDINGS: Pulmonary Arteries: Pulmonary arteries are adequately opacified for evaluation. No evidence of intraluminal filling defect to suggest pulmonary embolism. Main pulmonary artery is normal in caliber. Mediastinum: No evidence of mediastinal lymphadenopathy. The heart and pericardium demonstrate no acute abnormality. There is no acute abnormality of the thoracic aorta. Lungs/pleura: There is complete consolidation of the right lower lobe with central air bronchograms. Segmental consolidation is present within the posterior aspects of the left upper lobe and left lung base. There is no pneumothorax or effusion. Upper Abdomen: Limited images of the upper abdomen are unremarkable. Soft Tissues/Bones: No acute bone or soft tissue abnormality. 1. Negative for pulmonary embolus. 2. Multifocal bilateral pneumonia. No results found. PROCEDURES   Unless otherwise noted below, none  Procedure Note:  Central Venous Access  Indication: Lack of vascular access. The skin over the right internal jugular vein was cleansed with ChloraPrep and draped in the usual sterile fashion. After infiltration of 0mL 1% lidocaine without epinephrine for anesthesia, an 18-gauge needle was inserted under ultrasound guidance while aspirating with a 10mL syringe.  After a flash of dark venous blood returned, the right internal jugular vein was accessed with a sterile guidewire through the needle. The needle was then removed over the guidwire. A small skin incision was made with a #11 scalpel over the guidewire. An 8.5 Malian subcutaneous dilator was then inserted and withdrawn. Using Seldinger technique, the normal saline pre-flushed 7-Malian triple lumen catheter was inserted over the guidewire and each port accessed for free aspiration and flushed with saline to confirm placement within the venous lumen. The catheter was then secured to the skin using sutures to prevent dislodgement. The area was then recleansed with ChloraPrep and dressed with an antibiotic-infused disc and a sterile occlusive dressing. Patient tolerated procedure well without immediate complications. PROCEDURE:  ENDOTRACHEAL INTUBATION  Johanna Collet was pre-oxygenated as best as possible. Suction was ready. The patient was sedated, then paralyzed; please see the chart for the medications and dosages administered. The vocal cords were visualized using the glidescope, and the endotracheal tube (size 8) was inserted without complication. Tracheal position was confirmed using auscultation, capnometry, tube condensation, and chest x-ray. The tube was appropriately secured. Sedation was provided for endotracheal tube and ventilatory comfort. CRITICAL CARE TIME (.cctime)   I personally saw the patient and independently provided 48 minutes of non-concurrent critical care out of the total shared critical care time provided not including separate billable procedure. The critical condition I managed or considered was status epilepticus, sepsis. PAST MEDICAL HISTORY      has a past medical history of Aspiration pneumonia ([facility] [address] Hepatitis C, HTN (hypertension), NSTEMI (non-ST elevated myocardial infarction) ([address] Polysubstance abuse ([facility] [address] Seizures ([facility] [address] Septic shock ([facility] [address] and Takotsubo cardiomyopathy.      Chronic Conditions affecting Care: IVDU, EPILEPSY    EMERGENCY DEPARTMENT COURSE and DIFFERENTIAL DIAGNOSIS/MDM: Vitals:    Vitals:    01/12/23 1113 01/12/23 1152 01/12/23 1155 01/12/23 1203   BP: (!) 146/110 (!) 158/120  (!) 159/96   Pulse: (!) 139 (!) 131  (!) 141   Resp: 22 30 28   Temp:  99.4 °F (37.4 °C)     TempSrc:  Oral     SpO2: (!) 88% (!) 79%  (!) 81%   Weight:   122 lb 9 oz (55.6 kg)        Patient was given the following medications:  Medications   fentaNYL (SUBLIMAZE) 1,000 mcg in sodium chloride 0.9% 100 mL infusion (200 mcg/hr IntraVENous Rate/Dose Change 1/12/23 1148)     And   fentaNYL bolus from bag (has no administration in time range)   midazolam (VERSED) 100 mg in dextrose 5 % 100 mL infusion (10 mg/hr IntraVENous Rate/Dose Change 1/12/23 1155)   ketamine (KETALAR) 50 mg in dextrose 5 % 50 mL infusion (has no administration in time range)   levETIRAcetam (KEPPRA) 1,000 mg in sodium chloride 0.9 % 100 mL IVPB (0 mg IntraVENous Stopped 1/12/23 0900)   0.9 % sodium chloride bolus (0 mLs IntraVENous Stopped 1/12/23 1105)   propofol injection (0 mcg/kg/min × 60 kg (Order-Specific) IntraVENous Stopped 1/12/23 1054)   vancomycin 1000 mg IVPB in 250 mL D5W addavial (0 mg/kg × 60 kg (Order-Specific) IntraVENous Stopped 1/12/23 1146)   cefepime (MAXIPIME) 2000 mg IVPB minibag (0 mg IntraVENous Stopped 1/12/23 1041)   0.9 % sodium chloride bolus (0 mLs IntraVENous Stopped 1/12/23 1114)   iopamidol (ISOVUE-370) 76 % injection 75 mL (75 mLs IntraVENous Given 1/12/23 0951)   fentaNYL (SUBLIMAZE) injection 100 mcg (100 mcg IntraVENous Given 1/12/23 1121)   midazolam (VERSED) injection 4 mg (2 mg IntraVENous Given 1/12/23 1126)   furosemide (LASIX) injection 40 mg (40 mg IntraVENous Given 1/12/23 1158)             Is this patient to be included in the SEP-1 Core Measure due to severe sepsis or septic shock? Yes   SEP-1 CORE MEASURE DATA      Sepsis Criteria   Severe Sepsis Criteria   Septic Shock Criteria     Must be confirmed or suspected to move forward with diagnosis of sepsis.     Must meet 2:    [x] Temperature > 100.9 F (38.3 C)        or < 96.8 F (36 C)  [x] HR > 90  [] RR > 20  [x] WBC > 12 or < 4 or 10% bands      AND:      [x] Infection Confirmed or        Suspected. Must meet 1:    [x] Lactate > 2       or   [] Signs of Organ Dysfunction:    - SBP < 90 or MAP < 65  - Altered mental status  - Creatinine > 2 or increased from      baseline  - Urine Output < 0.5 ml/kg/hr  - Bilirubin > 2  - INR > 1.5 (not anticoagulated)  - Platelets < 025,570  - Acute Respiratory Failure as     evidenced by new need for NIPPV     or mechanical ventilation      [] No criteria met for Severe Sepsis. Must meet 1:    [x] Lactate > 4        or   [] SBP < 90 or MAP < 65 for at        least two readings in the first        hour after fluid bolus        administration      [] Vasopressors initiated (if hypotension persists after fluid resuscitation)        [] No criteria met for Septic Shock.    Patient Vitals for the past 6 hrs:   BP Temp Pulse Resp SpO2 Weight Weight Method Percent Weight Change   01/12/23 0940 -- -- (!) 138 15 91 % -- -- --   01/12/23 1036 (!) 148/92 98.9 °F (37.2 °C) (!) 139 22 (!) 83 % -- -- --   01/12/23 1113 (!) 146/110 -- (!) 139 22 (!) 88 % -- -- --   01/12/23 1152 (!) 158/120 99.4 °F (37.4 °C) (!) 131 30 (!) 79 % -- -- --   01/12/23 1155 -- -- -- -- -- 122 lb 9 oz (55.6 kg) Bed scale 0   01/12/23 1203 (!) 159/96 -- (!) 141 28 (!) 81 % -- -- --   01/12/23 1228 (!) 132/107 99.6 °F (37.6 °C) (!) 150 (!) 32 (!) 85 % -- -- --   01/12/23 1240 -- -- (!) 145 (!) 31 (!) 85 % -- -- --   01/12/23 1249 (!) 137/95 -- (!) 151 26 (!) 85 % -- -- --   01/12/23 1314 (!) 128/97 -- (!) 147 22 (!) 85 % -- -- --   01/12/23 1324 (!) 127/97 -- (!) 148 (!) 32 (!) 85 % -- -- --   01/12/23 1342 117/85 99.9 °F (37.7 °C) (!) 147 30 (!) 86 % -- -- --   01/12/23 1357 104/83 100.1 °F (37.8 °C) (!) 149 28 90 % -- -- --   01/12/23 1437 111/89 -- (!) 124 22 98 % -- -- --   01/12/23 1456 (!) 123/95 -- (!) 136 -- 98 % -- -- --   01/12/23 1502 (!) 129/105 -- (!) 136 22 99 % -- -- --   01/12/23 1523 -- -- (!) 141 22 98 % -- -- --      Recent Labs     01/12/23  0859 01/12/23  0928   WBC 26.0*  --    LACTA  --  6.9*   CREATININE 1.1  --    BILITOT <0.2  --      --          Time Septic Shock     Fluid Resuscitation Rational: at least 30mL/kg based on entered actual weight at time of triage    Repeat lactate level: ordered and pending at this time    Reassessment Exam:   Patient is showing improvement. CONSULTS: (Who and What was discussed)  IP CONSULT TO NEUROLOGY  Discussion with Other Profesionals : None    Social Determinants : None    Records Reviewed : Inpatient Notes   and Outpatient Notes      CC/HPI Summary, DDx, ED Course, and Reassessment: Patient presented to the emergency room today with complaints of seizure activity, he has not been responsive during his ED visit, he was actively seizing upon arrival and seized for about an hour, his physical exam showed no acute trauma, he was tachycardic but lungs were clear except for some crackling in the right lower lung on exam.  He has had intermittent tonic-clonic activity, never returned to baseline, we did end up intubating the patient and I placed central line, see procedure notes. Patient laboratory studies were significantly abnormal.  Lactate was elevated, white blood cell count elevated. Patient is known IV drug user he is on methadone currently. His chest x-ray suggested multifocal pneumonia, he has been septic upon hospital admissions in the past with similar presentation. I did have this patient evaluated by the attending physician, see his notation for most of the details as he did do most of the critical care and sedating. He also spoke with all of the consultants, see his note for further details. Patient was eventually accepted to [facility]. and will be transferred there for further treatment.   Patient family was agreeable to plan, I did discuss all findings and plan of care with the patient family. I am the Primary Clinician of Record. FINAL IMPRESSION      1. Status epilepticus ([facility] [address]    2. New onset left bundle branch block (LBBB)    3. Sepsis with acute hypoxic respiratory failure without septic shock, due to unspecified organism ([facility] [address]    4. HCAP (healthcare-associated pneumonia)    5. Hyperammonemia ([facility] [address]          DISPOSITION/PLAN     DISPOSITION Decision to Transfer    PATIENT REFERRED TO:  No follow-up provider specified.     DISCHARGE MEDICATIONS:  New Prescriptions    No medications on file       DISCONTINUED MEDICATIONS:  Discontinued Medications    No medications on file              (Please note that portions of this note were completed with a voice recognition program.  Efforts were made to edit the dictations but occasionally words are mis-transcribed.)    LEIGHTON Garland CNP (electronically signed)      LEIGHTON Garland CNP  01/12/23 1580

## 2023-01-12 NOTE — ED NOTES
Patient diaphoretic. Central line dressing removed and replaced.      Patrick Franco, RN  01/12/23 1575

## 2023-01-12 NOTE — ED NOTES
Dr. Drea Loera at bedside to evaluate patient. Verbal order to give 2mg of Versed IVP as well as increase fetanyl and versed infusions. Fetanyl infusion increased to 150 mcg/hr and Versed increased to 6 mL/hr.        Gemma Vasques RN  01/12/23 5742

## 2023-01-13 PROBLEM — A41.9 SEPTIC SHOCK (HCC): Status: ACTIVE | Noted: 2023-01-13

## 2023-01-13 PROBLEM — I24.89 DEMAND ISCHEMIA: Status: ACTIVE | Noted: 2023-01-13

## 2023-01-13 PROBLEM — I24.8 DEMAND ISCHEMIA (HCC): Status: ACTIVE | Noted: 2023-01-13

## 2023-01-13 PROBLEM — I44.7 LBBB (LEFT BUNDLE BRANCH BLOCK): Status: ACTIVE | Noted: 2023-01-13

## 2023-01-13 PROBLEM — R65.21 SEPTIC SHOCK (HCC): Status: ACTIVE | Noted: 2023-01-13

## 2023-01-13 LAB
ALBUMIN SERPL-MCNC: 3.2 G/DL (ref 3.4–5)
ALP BLD-CCNC: 100 U/L (ref 40–129)
ALT SERPL-CCNC: 100 U/L (ref 10–40)
AMORPHOUS: ABNORMAL /HPF
ANION GAP SERPL CALCULATED.3IONS-SCNC: 18 MMOL/L (ref 3–16)
AST SERPL-CCNC: 101 U/L (ref 15–37)
BACTERIA: ABNORMAL /HPF
BASE EXCESS ARTERIAL: -13 (ref -3–3)
BASE EXCESS ARTERIAL: 1.4 MMOL/L (ref -3–3)
BASOPHILS ABSOLUTE: 0.3 K/UL (ref 0–0.2)
BASOPHILS RELATIVE PERCENT: 1 %
BILIRUB SERPL-MCNC: 0.6 MG/DL (ref 0–1)
BILIRUBIN DIRECT: 0.3 MG/DL (ref 0–0.3)
BILIRUBIN URINE: NEGATIVE
BILIRUBIN, INDIRECT: 0.3 MG/DL (ref 0–1)
BLOOD CULTURE, ROUTINE: NORMAL
BLOOD, URINE: ABNORMAL
BUN BLDV-MCNC: 19 MG/DL (ref 7–20)
CALCIUM SERPL-MCNC: 8 MG/DL (ref 8.3–10.6)
CARBOXYHEMOGLOBIN ARTERIAL: 0.7 % (ref 0–1.5)
CHLORIDE BLD-SCNC: 97 MMOL/L (ref 99–110)
CLARITY: CLEAR
CO2: 14 MMOL/L (ref 21–32)
COLOR: YELLOW
CORTISOL TOTAL: 29.8 UG/DL
CREAT SERPL-MCNC: 1.1 MG/DL (ref 0.9–1.3)
EKG ATRIAL RATE: 80 BPM
EKG DIAGNOSIS: NORMAL
EKG P AXIS: 71 DEGREES
EKG P-R INTERVAL: 178 MS
EKG Q-T INTERVAL: 486 MS
EKG QRS DURATION: 156 MS
EKG QTC CALCULATION (BAZETT): 560 MS
EKG R AXIS: -60 DEGREES
EKG T AXIS: 86 DEGREES
EKG VENTRICULAR RATE: 80 BPM
EOSINOPHILS ABSOLUTE: 0 K/UL (ref 0–0.6)
EOSINOPHILS RELATIVE PERCENT: 0 %
EPITHELIAL CELLS, UA: ABNORMAL /HPF (ref 0–5)
GFR SERPL CREATININE-BSD FRML MDRD: >60 ML/MIN/{1.73_M2}
GLUCOSE BLD-MCNC: 113 MG/DL (ref 70–99)
GLUCOSE BLD-MCNC: 124 MG/DL (ref 70–99)
GLUCOSE BLD-MCNC: 195 MG/DL (ref 70–99)
GLUCOSE BLD-MCNC: 233 MG/DL (ref 70–99)
GLUCOSE BLD-MCNC: 277 MG/DL (ref 70–99)
GLUCOSE URINE: 100 MG/DL
HCO3 ARTERIAL: 14.2 MMOL/L (ref 21–29)
HCO3 ARTERIAL: 24 MMOL/L (ref 21–29)
HCT VFR BLD CALC: 41.8 % (ref 40.5–52.5)
HEMOGLOBIN, ART, EXTENDED: 13.2 G/DL
HEMOGLOBIN: 14.1 G/DL (ref 13.5–17.5)
KETONES, URINE: NEGATIVE MG/DL
LACTATE: 7.08 MMOL/L (ref 0.4–2)
LACTIC ACID: 1.5 MMOL/L (ref 0.4–2)
LACTIC ACID: 2.6 MMOL/L (ref 0.4–2)
LACTIC ACID: 3.7 MMOL/L (ref 0.4–2)
LACTIC ACID: 4.2 MMOL/L (ref 0.4–2)
LACTIC ACID: 6.8 MMOL/L (ref 0.4–2)
LEUKOCYTE ESTERASE, URINE: NEGATIVE
LV EF: 20 %
LVEF MODALITY: NORMAL
LYMPHOCYTES ABSOLUTE: 3.8 K/UL (ref 1–5.1)
LYMPHOCYTES RELATIVE PERCENT: 15 %
MCH RBC QN AUTO: 30.6 PG (ref 26–34)
MCHC RBC AUTO-ENTMCNC: 33.7 G/DL (ref 31–36)
MCV RBC AUTO: 90.9 FL (ref 80–100)
METHEMOGLOBIN ARTERIAL: 0.3 % (ref 0–1.4)
MICROSCOPIC EXAMINATION: YES
MONOCYTES ABSOLUTE: 1.5 K/UL (ref 0–1.3)
MONOCYTES RELATIVE PERCENT: 6 %
NEUTROPHILS ABSOLUTE: 20 K/UL (ref 1.7–7.7)
NEUTROPHILS RELATIVE PERCENT: 78 %
NITRITE, URINE: NEGATIVE
O2 SAT, ARTERIAL: 99 % (ref 93–100)
O2 SAT, ARTERIAL: >100 % (ref 93–100)
OVALOCYTES: ABNORMAL
PCO2 ARTERIAL: 29.6 MMHG (ref 35–45)
PCO2 ARTERIAL: 30.7 MM HG (ref 35–45)
PDW BLD-RTO: 14.5 % (ref 12.4–15.4)
PERFORMED ON: ABNORMAL
PH ARTERIAL: 7.27 (ref 7.35–7.45)
PH ARTERIAL: 7.51 (ref 7.35–7.45)
PH UA: 6 (ref 5–8)
PHENYTOIN DOSE AMOUNT: ABNORMAL
PHENYTOIN DOSE AMOUNT: NORMAL
PHENYTOIN LEVEL: 13.6 UG/ML (ref 10–20)
PHENYTOIN LEVEL: >40 UG/ML (ref 10–20)
PHOSPHORUS: 5.9 MG/DL (ref 2.5–4.9)
PLATELET # BLD: 287 K/UL (ref 135–450)
PMV BLD AUTO: 6.7 FL (ref 5–10.5)
PO2 ARTERIAL: 137.8 MM HG (ref 75–108)
PO2 ARTERIAL: 159 MMHG (ref 75–108)
POC SAMPLE TYPE: ABNORMAL
POTASSIUM SERPL-SCNC: 5.2 MMOL/L (ref 3.5–5.1)
PRO-BNP: ABNORMAL PG/ML (ref 0–124)
PROCALCITONIN: 5.65 NG/ML (ref 0–0.15)
PROTEIN UA: 30 MG/DL
RBC # BLD: 4.6 M/UL (ref 4.2–5.9)
RBC UA: ABNORMAL /HPF (ref 0–4)
SODIUM BLD-SCNC: 129 MMOL/L (ref 136–145)
SPECIFIC GRAVITY UA: >=1.03 (ref 1–1.03)
T4 FREE: 0.8 NG/DL (ref 0.9–1.8)
TCO2 ARTERIAL: 15 MMOL/L
TCO2 ARTERIAL: 25 MMOL/L
TEAR DROP CELLS: ABNORMAL
TOTAL PROTEIN: 5.7 G/DL (ref 6.4–8.2)
TROPONIN: 0.18 NG/ML
TROPONIN: 0.19 NG/ML
TROPONIN: 0.29 NG/ML
TROPONIN: 0.39 NG/ML
TSH REFLEX: 5.55 UIU/ML (ref 0.27–4.2)
URINE REFLEX TO CULTURE: ABNORMAL
URINE TYPE: ABNORMAL
UROBILINOGEN, URINE: 0.2 E.U./DL
WBC # BLD: 25.6 K/UL (ref 4–11)
WBC UA: ABNORMAL /HPF (ref 0–5)

## 2023-01-13 PROCEDURE — 82533 TOTAL CORTISOL: CPT

## 2023-01-13 PROCEDURE — 84443 ASSAY THYROID STIM HORMONE: CPT

## 2023-01-13 PROCEDURE — 87449 NOS EACH ORGANISM AG IA: CPT

## 2023-01-13 PROCEDURE — C8929 TTE W OR WO FOL WCON,DOPPLER: HCPCS

## 2023-01-13 PROCEDURE — 6360000004 HC RX CONTRAST MEDICATION

## 2023-01-13 PROCEDURE — 84439 ASSAY OF FREE THYROXINE: CPT

## 2023-01-13 PROCEDURE — 6360000002 HC RX W HCPCS

## 2023-01-13 PROCEDURE — 2580000003 HC RX 258

## 2023-01-13 PROCEDURE — 6370000000 HC RX 637 (ALT 250 FOR IP): Performed by: STUDENT IN AN ORGANIZED HEALTH CARE EDUCATION/TRAINING PROGRAM

## 2023-01-13 PROCEDURE — 80069 RENAL FUNCTION PANEL: CPT

## 2023-01-13 PROCEDURE — 83605 ASSAY OF LACTIC ACID: CPT

## 2023-01-13 PROCEDURE — 2580000003 HC RX 258: Performed by: STUDENT IN AN ORGANIZED HEALTH CARE EDUCATION/TRAINING PROGRAM

## 2023-01-13 PROCEDURE — 93005 ELECTROCARDIOGRAM TRACING: CPT | Performed by: STUDENT IN AN ORGANIZED HEALTH CARE EDUCATION/TRAINING PROGRAM

## 2023-01-13 PROCEDURE — 99291 CRITICAL CARE FIRST HOUR: CPT | Performed by: PSYCHIATRY & NEUROLOGY

## 2023-01-13 PROCEDURE — 82803 BLOOD GASES ANY COMBINATION: CPT

## 2023-01-13 PROCEDURE — 83880 ASSAY OF NATRIURETIC PEPTIDE: CPT

## 2023-01-13 PROCEDURE — 5A1945Z RESPIRATORY VENTILATION, 24-96 CONSECUTIVE HOURS: ICD-10-PCS | Performed by: INTERNAL MEDICINE

## 2023-01-13 PROCEDURE — 6370000000 HC RX 637 (ALT 250 FOR IP)

## 2023-01-13 PROCEDURE — 2700000000 HC OXYGEN THERAPY PER DAY

## 2023-01-13 PROCEDURE — 94761 N-INVAS EAR/PLS OXIMETRY MLT: CPT

## 2023-01-13 PROCEDURE — 94003 VENT MGMT INPAT SUBQ DAY: CPT

## 2023-01-13 PROCEDURE — 81001 URINALYSIS AUTO W/SCOPE: CPT

## 2023-01-13 PROCEDURE — 80076 HEPATIC FUNCTION PANEL: CPT

## 2023-01-13 PROCEDURE — 93010 ELECTROCARDIOGRAM REPORT: CPT | Performed by: INTERNAL MEDICINE

## 2023-01-13 PROCEDURE — 6360000002 HC RX W HCPCS: Performed by: STUDENT IN AN ORGANIZED HEALTH CARE EDUCATION/TRAINING PROGRAM

## 2023-01-13 PROCEDURE — 80185 ASSAY OF PHENYTOIN TOTAL: CPT

## 2023-01-13 PROCEDURE — 87641 MR-STAPH DNA AMP PROBE: CPT

## 2023-01-13 PROCEDURE — 99291 CRITICAL CARE FIRST HOUR: CPT | Performed by: INTERNAL MEDICINE

## 2023-01-13 PROCEDURE — 99223 1ST HOSP IP/OBS HIGH 75: CPT | Performed by: INTERNAL MEDICINE

## 2023-01-13 PROCEDURE — 84484 ASSAY OF TROPONIN QUANT: CPT

## 2023-01-13 PROCEDURE — 85025 COMPLETE CBC W/AUTO DIFF WBC: CPT

## 2023-01-13 PROCEDURE — C9113 INJ PANTOPRAZOLE SODIUM, VIA: HCPCS | Performed by: STUDENT IN AN ORGANIZED HEALTH CARE EDUCATION/TRAINING PROGRAM

## 2023-01-13 PROCEDURE — APPNB45 APP NON BILLABLE 31-45 MINUTES: Performed by: NURSE PRACTITIONER

## 2023-01-13 PROCEDURE — 2000000000 HC ICU R&B

## 2023-01-13 PROCEDURE — 2500000003 HC RX 250 WO HCPCS: Performed by: STUDENT IN AN ORGANIZED HEALTH CARE EDUCATION/TRAINING PROGRAM

## 2023-01-13 PROCEDURE — 37799 UNLISTED PX VASCULAR SURGERY: CPT

## 2023-01-13 PROCEDURE — 95813 EEG EXTND MNTR 61-119 MIN: CPT

## 2023-01-13 PROCEDURE — 36415 COLL VENOUS BLD VENIPUNCTURE: CPT

## 2023-01-13 RX ORDER — PROPOFOL 10 MG/ML
INJECTION, EMULSION INTRAVENOUS
Status: DISCONTINUED
Start: 2023-01-13 | End: 2023-01-13 | Stop reason: WASHOUT

## 2023-01-13 RX ORDER — DOBUTAMINE HYDROCHLORIDE 200 MG/100ML
2.5-1 INJECTION INTRAVENOUS CONTINUOUS
Status: CANCELLED | OUTPATIENT
Start: 2023-01-13

## 2023-01-13 RX ORDER — 0.9 % SODIUM CHLORIDE 0.9 %
500 INTRAVENOUS SOLUTION INTRAVENOUS ONCE
Status: COMPLETED | OUTPATIENT
Start: 2023-01-13 | End: 2023-01-13

## 2023-01-13 RX ORDER — PROPOFOL 10 MG/ML
5-50 INJECTION, EMULSION INTRAVENOUS CONTINUOUS
Status: DISCONTINUED | OUTPATIENT
Start: 2023-01-13 | End: 2023-01-15

## 2023-01-13 RX ORDER — SODIUM CHLORIDE 9 MG/ML
INJECTION, SOLUTION INTRAVENOUS CONTINUOUS
Status: DISCONTINUED | OUTPATIENT
Start: 2023-01-13 | End: 2023-01-15

## 2023-01-13 RX ORDER — PROCHLORPERAZINE EDISYLATE 5 MG/ML
10 INJECTION INTRAMUSCULAR; INTRAVENOUS EVERY 6 HOURS PRN
Status: DISCONTINUED | OUTPATIENT
Start: 2023-01-13 | End: 2023-01-16

## 2023-01-13 RX ORDER — DOBUTAMINE HYDROCHLORIDE 200 MG/100ML
2.5 INJECTION INTRAVENOUS CONTINUOUS
Status: DISCONTINUED | OUTPATIENT
Start: 2023-01-13 | End: 2023-01-14

## 2023-01-13 RX ORDER — PROPOFOL 10 MG/ML
INJECTION, EMULSION INTRAVENOUS
Status: DISPENSED
Start: 2023-01-13 | End: 2023-01-14

## 2023-01-13 RX ADMIN — OXCARBAZEPINE 900 MG: 300 TABLET, FILM COATED ORAL at 08:54

## 2023-01-13 RX ADMIN — DEXMEDETOMIDINE 0.6 MCG/KG/HR: 100 INJECTION, SOLUTION INTRAVENOUS at 11:26

## 2023-01-13 RX ADMIN — PIPERACILLIN AND TAZOBACTAM 3375 MG: 3; .375 INJECTION, POWDER, LYOPHILIZED, FOR SOLUTION INTRAVENOUS at 02:20

## 2023-01-13 RX ADMIN — PANTOPRAZOLE SODIUM 40 MG: 40 INJECTION, POWDER, LYOPHILIZED, FOR SOLUTION INTRAVENOUS at 08:54

## 2023-01-13 RX ADMIN — SODIUM CHLORIDE: 9 INJECTION, SOLUTION INTRAVENOUS at 05:26

## 2023-01-13 RX ADMIN — PERFLUTREN 1.5 ML: 6.52 INJECTION, SUSPENSION INTRAVENOUS at 15:04

## 2023-01-13 RX ADMIN — PIPERACILLIN AND TAZOBACTAM 3375 MG: 3; .375 INJECTION, POWDER, LYOPHILIZED, FOR SOLUTION INTRAVENOUS at 10:07

## 2023-01-13 RX ADMIN — MIDAZOLAM 7 MG/HR: 5 INJECTION INTRAMUSCULAR; INTRAVENOUS at 23:39

## 2023-01-13 RX ADMIN — MIDAZOLAM 8 MG/HR: 5 INJECTION INTRAMUSCULAR; INTRAVENOUS at 10:11

## 2023-01-13 RX ADMIN — SODIUM CHLORIDE 500 ML: 9 INJECTION, SOLUTION INTRAVENOUS at 02:21

## 2023-01-13 RX ADMIN — NOREPINEPHRINE BITARTRATE 5 MCG/MIN: 1 SOLUTION INTRAVENOUS at 04:57

## 2023-01-13 RX ADMIN — PHENYLEPHRINE HYDROCHLORIDE 150 MCG/MIN: 50 INJECTION INTRAVENOUS at 03:50

## 2023-01-13 RX ADMIN — HEPARIN SODIUM 5000 UNITS: 5000 INJECTION INTRAVENOUS; SUBCUTANEOUS at 14:03

## 2023-01-13 RX ADMIN — SODIUM CHLORIDE 500 ML: 9 INJECTION, SOLUTION INTRAVENOUS at 00:23

## 2023-01-13 RX ADMIN — FOSPHENYTOIN SODIUM 100 MG PE: 50 INJECTION, SOLUTION INTRAMUSCULAR; INTRAVENOUS at 06:29

## 2023-01-13 RX ADMIN — SODIUM CHLORIDE, PRESERVATIVE FREE 10 ML: 5 INJECTION INTRAVENOUS at 20:24

## 2023-01-13 RX ADMIN — Medication 175 MCG/HR: at 22:19

## 2023-01-13 RX ADMIN — DEXMEDETOMIDINE 1.2 MCG/KG/HR: 100 INJECTION, SOLUTION INTRAVENOUS at 20:21

## 2023-01-13 RX ADMIN — LINEZOLID 600 MG: 600 INJECTION, SOLUTION INTRAVENOUS at 00:05

## 2023-01-13 RX ADMIN — FOSPHENYTOIN SODIUM 100 MG PE: 50 INJECTION, SOLUTION INTRAMUSCULAR; INTRAVENOUS at 15:31

## 2023-01-13 RX ADMIN — SODIUM CHLORIDE, PRESERVATIVE FREE 10 ML: 5 INJECTION INTRAVENOUS at 09:06

## 2023-01-13 RX ADMIN — OXCARBAZEPINE 900 MG: 300 TABLET, FILM COATED ORAL at 20:32

## 2023-01-13 RX ADMIN — HEPARIN SODIUM 5000 UNITS: 5000 INJECTION INTRAVENOUS; SUBCUTANEOUS at 06:27

## 2023-01-13 RX ADMIN — FOSPHENYTOIN SODIUM 100 MG PE: 50 INJECTION, SOLUTION INTRAMUSCULAR; INTRAVENOUS at 22:59

## 2023-01-13 RX ADMIN — DOBUTAMINE HYDROCHLORIDE 2.5 MCG/KG/MIN: 200 INJECTION INTRAVENOUS at 19:37

## 2023-01-13 RX ADMIN — VASOPRESSIN 0.03 UNITS/MIN: 20 INJECTION INTRAVENOUS at 00:30

## 2023-01-13 RX ADMIN — HEPARIN SODIUM 5000 UNITS: 5000 INJECTION INTRAVENOUS; SUBCUTANEOUS at 22:19

## 2023-01-13 RX ADMIN — ACETAMINOPHEN 650 MG: 325 TABLET ORAL at 06:47

## 2023-01-13 RX ADMIN — Medication 150 MCG/HR: at 00:44

## 2023-01-13 RX ADMIN — Medication 100 MCG/HR: at 07:15

## 2023-01-13 RX ADMIN — MIDAZOLAM 10 MG/HR: 5 INJECTION INTRAMUSCULAR; INTRAVENOUS at 01:04

## 2023-01-13 RX ADMIN — PIPERACILLIN AND TAZOBACTAM 3375 MG: 3; .375 INJECTION, POWDER, LYOPHILIZED, FOR SOLUTION INTRAVENOUS at 18:41

## 2023-01-13 RX ADMIN — Medication 200 MCG/HR: at 16:32

## 2023-01-13 ASSESSMENT — PULMONARY FUNCTION TESTS
PIF_VALUE: 22
PIF_VALUE: 23
PIF_VALUE: 23
PIF_VALUE: 21
PIF_VALUE: 22
PIF_VALUE: 22
PIF_VALUE: 25
PIF_VALUE: 22
PIF_VALUE: 22
PIF_VALUE: 23
PIF_VALUE: 24
PIF_VALUE: 21
PIF_VALUE: 25
PIF_VALUE: 22
PIF_VALUE: 21
PIF_VALUE: 24
PIF_VALUE: 22
PIF_VALUE: 22
PIF_VALUE: 21
PIF_VALUE: 21
PIF_VALUE: 30
PIF_VALUE: 21
PIF_VALUE: 21
PIF_VALUE: 24
PIF_VALUE: 23
PIF_VALUE: 24
PIF_VALUE: 20
PIF_VALUE: 23
PIF_VALUE: 20
PIF_VALUE: 22
PIF_VALUE: 25
PIF_VALUE: 20
PIF_VALUE: 22
PIF_VALUE: 22
PIF_VALUE: 21
PIF_VALUE: 20
PIF_VALUE: 23
PIF_VALUE: 21
PIF_VALUE: 23
PIF_VALUE: 22
PIF_VALUE: 22
PIF_VALUE: 20
PIF_VALUE: 25
PIF_VALUE: 20
PIF_VALUE: 22
PIF_VALUE: 23
PIF_VALUE: 24
PIF_VALUE: 21
PIF_VALUE: 22
PIF_VALUE: 21
PIF_VALUE: 24
PIF_VALUE: 25
PIF_VALUE: 20
PIF_VALUE: 26
PIF_VALUE: 30
PIF_VALUE: 22
PIF_VALUE: 24
PIF_VALUE: 22
PIF_VALUE: 23
PIF_VALUE: 28
PIF_VALUE: 22
PIF_VALUE: 22
PIF_VALUE: 21
PIF_VALUE: 22
PIF_VALUE: 21
PIF_VALUE: 23
PIF_VALUE: 25
PIF_VALUE: 22
PIF_VALUE: 24
PIF_VALUE: 27
PIF_VALUE: 24
PIF_VALUE: 22
PIF_VALUE: 22
PIF_VALUE: 20
PIF_VALUE: 23
PIF_VALUE: 24
PIF_VALUE: 23
PIF_VALUE: 22
PIF_VALUE: 22
PIF_VALUE: 23
PIF_VALUE: 25
PIF_VALUE: 20
PIF_VALUE: 21
PIF_VALUE: 21
PIF_VALUE: 22
PIF_VALUE: 21
PIF_VALUE: 27
PIF_VALUE: 21
PIF_VALUE: 24
PIF_VALUE: 23
PIF_VALUE: 22
PIF_VALUE: 23
PIF_VALUE: 21
PIF_VALUE: 24
PIF_VALUE: 29
PIF_VALUE: 21
PIF_VALUE: 25

## 2023-01-13 NOTE — PROGRESS NOTES
Pt with 2x episodes of extreme agitation and fighting the ventilator. Sedation titrated appropriately and RT bedside to adjust settings.

## 2023-01-13 NOTE — CONSULTS
ICU HISTORY AND [address] Day: 1  ICU Day: 1                                                         Code:Full Code  Admit Date: 1/12/2023  PCP: No primary care provider on file. CC: Status epilepticus    HISTORY OF PRESENT ILLNESS:     Interval History:  Pressor requirement decreased from 3 pressors to Levo 15  Febrile to 101.3 this AM  Moderately agitated on versed, fentanyl and precedex; precedex increased, propofol added    HPI:  Zarina Lawson is a 55-year-old M with PMH of seizures, hep C, uncontrolled hypertension, takatsubo cardiomyopathy, COPD, temporal seizures, 20 yr tobacco use, HFrEF, polysubstance abuse (heroine and fentanyl) who presents to [facility] ED with seizures. EMS was called for witnessed seizure. Patient was actively seizing when he arrived to the ED. He received multiple doses of benzos on route that did not improve his seizure. He was actively seizing for about an hour. He never returned to baseline. He was eventually intubated due to hypoxia and not protecting his airway. Other tests remarkable for EKG showed new LBBB concern for underlying ischemia he was taken to the Cath Lab. Cardiology felt it was likely sinus tachycardia with aberrancy. Troponins were negative. Other labs remarkable for lactic acid was 6.9, ammonia 129, UDS was positive for cannabinoids methadone and fentanyl. Patient is a known IV drug user and is on methadone at home. UA was negative for any infection. Imaging studies were done and showed CT PE negative for any PE but showed multifocal pneumonia, CT head showed no intra cranial abnormality. He was treated with large-volume IV fluids and started on broad-spectrum antibiotics he had some worsening hypoxia with pulmonary edema and ultimately required Lasix.   He was put on IV Versed, IV fentanyl and IV ketamine for sedation and requirement of paralysis for management of improved oxygenation on the ventilator. PAST HISTORY:     Past Medical History:   Diagnosis Date    Aspiration pneumonia (HCC)     Hepatitis C     HTN (hypertension)     NSTEMI (non-ST elevated myocardial infarction) ([facility] [address]     Polysubstance abuse ([facility] [address]     Seizures ([facility] [address]     Septic shock ([facility] [address]     Takotsubo cardiomyopathy      Past Surgical History:   Procedure Laterality Date    BRONCHOSCOPY N/A 2/22/2021    BRONCHOSCOPY DIAGNOSTIC OR CELL [address] ONLY performed by Anum Herron MD at [facility] [address]  2/22/2021    BRONCHOSCOPY THERAPUTIC ASPIRATION INITIAL performed by Anum Herron MD at [facility], PARTIAL AMPUTATIONS     Social History:   The patient lives at     Alcohol:  Illicit drugs: no use  Tobacco:      Family History:  No family history on file. MEDICATIONS:     No current facility-administered medications on file prior to encounter. Current Outpatient Medications on File Prior to Encounter   Medication Sig Dispense Refill    Cenobamate 14 x 50 MG & 14 x100 MG TBPK Take by mouth daily      sacubitril-valsartan (ENTRESTO) 49-51 MG per tablet Take 1 tablet by mouth 2 times daily 180 tablet 1    furosemide (LASIX) 20 MG tablet Take 1 tablet by mouth daily as needed (for weight gain 3lbs in a day or 5lb in a week with leg swelling.) 90 tablet 1    carvedilol (COREG) 3.125 MG tablet Take 1 tablet by mouth 2 times daily (with meals) 180 tablet 1    DULoxetine (CYMBALTA) 30 MG extended release capsule Take 30 mg by mouth daily      OXcarbazepine ER (OXTELLAR XR) 600 MG TB24 Take 1,800 mg by mouth daily      methadone (DOLOPHINE) 10 MG/ML solution Take 105 mg by mouth daily.        aspirin 81 MG chewable tablet Take 81 mg by mouth daily (with breakfast)       Scheduled Meds:   sodium chloride flush  5-40 mL IntraVENous 2 times per day    pantoprazole  40 mg IntraVENous Daily    heparin (porcine)  5,000 Units SubCUTAneous 3 times per day    linezolid  600 mg IntraVENous Q12H    piperacillin-tazobactam  3,375 mg IntraVENous Q8H    OXcarbazepine  900 mg Oral BID    fosphenytoin (CEREBYX) maintenance dose IVPB  100 mg PE IntraVENous Q8H      Continuous Infusions:   norepinephrine 9 mcg/min (01/13/23 0611)    sodium chloride 100 mL/hr at 01/13/23 0611    fentaNYL 100 mcg/hr (01/13/23 0715)    midazolam 10 mg/hr (01/13/23 5832)    dexmedetomidine (PRECEDEX) IV infusion 0.6 mcg/kg/hr (01/13/23 5721)    sodium chloride      [Held by provider] ketamine      phenylephrine (MISSAEL-SYNEPHRINE) 50mg/250mL infusion Stopped (01/13/23 0610)    vasopressin (Septic Shock) infusion 0.03 Units/min (01/13/23 0400)     PRN Meds:perflutren lipid microspheres, sodium chloride flush, sodium chloride, ondansetron **OR** ondansetron, polyethylene glycol, acetaminophen **OR** acetaminophen    Allergies: No Known Allergies    REVIEW OF SYSTEMS:       History obtained from chart review    Review of Systems   Unable to perform ROS: Intubated     PHYSICAL EXAM:       Vitals: BP 86/73   Pulse 74   Temp (!) 100.6 °F (38.1 °C) (Bladder)   Resp 26   Wt 135 lb 2.3 oz (61.3 kg)   SpO2 100%   BMI 18.85 kg/m²     I/O:    Intake/Output Summary (Last 24 hours) at 1/13/2023 0911  Last data filed at 1/13/2023 0611  Gross per 24 hour   Intake 2278.44 ml   Output 825 ml   Net 1453.44 ml     No intake/output data recorded. I/O last 3 completed shifts: In: 2278.4 [I.V.:829.7; IV Piggyback:1448.8]  Out: 825 [Urine:425; Emesis/NG output:400]    Physical Exam  Constitutional:       Comments: Sedated     HENT:      Head: Normocephalic and atraumatic. Right Ear: External ear normal.      Left Ear: External ear normal.      Nose: Nose normal.      Mouth/Throat:      Mouth: Mucous membranes are moist.   Cardiovascular:      Rate and Rhythm: Tachycardia present. Pulmonary:      Comments: Mechanical Ventilation  Abdominal:      Palpations: Abdomen is soft. Musculoskeletal:      Right lower leg: No edema. Left lower leg: No edema. Neurological:      Comments: Sedated   Psychiatric:      Comments: Sedated    Access:   -Central Access Day #: 1                                  -Peripheral Access Day#:1  -Arterial line Day#:1                                  Wu Day#:1  NGT Day#: 1                                            ETT Day#:1  Vent Settings: Vent Mode: AC/PRVC Resp Rate (Set): 26 bmp/Vt (Set, mL): 550 mL/ /FiO2 : 50 %    Recent Labs     01/12/23 2148 01/13/23  0604   PHART 7.342* 7.273*   XCM4YCZ 42.6 30.7*   PO2ART 195.0* 137.8*     DATA:       Labs:  CBC:   Recent Labs     01/12/23  0859 01/13/23  0504   WBC 26.0* 25.6*   HGB 15.8 14.1   HCT 49.1 41.8    287       BMP:   Recent Labs     01/12/23 0859 01/12/23 2031 01/13/23  0504   * 133* 129*   K 4.1 5.0 5.2*   CL 94* 99 97*   CO2 16* 24 14*   BUN 13 15 19   CREATININE 1.1 1.0 1.1   GLUCOSE 237* 132* 277*   PHOS  --  5.5* 5.9*     LFT's:   Recent Labs     01/12/23  0859 01/12/23 2031 01/13/23  0504   AST 24 29 101*   ALT 23 17 100*   BILITOT <0.2 <0.2 0.6   ALKPHOS 168* 123 100     Troponin:   Recent Labs     01/12/23  0859 01/13/23  0504   TROPONINI <0.01 0.39*     BNP:No results for input(s): BNP in the last 72 hours. ABGs:   Recent Labs     01/12/23 2148 01/13/23  0604   PHART 7.342* 7.273*   HBG7ISN 42.6 30.7*   PO2ART 195.0* 137.8*     INR: No results for input(s): INR in the last 72 hours. U/A:  Recent Labs     01/13/23  0026   COLORU Yellow   PHUR 6.0   WBCUA 6-9*   RBCUA 21-50*   BACTERIA 1+*   CLARITYU Clear   SPECGRAV >=1.030   LEUKOCYTESUR Negative   UROBILINOGEN 0.2   BILIRUBINUR Negative   BLOODU MODERATE*   GLUCOSEU 100*   AMORPHOUS 2+       XR CHEST PORTABLE   Final Result      The tip of the endotracheal tube projects over the mid trachea. Improved left-sided airspace disease and unchanged right mid and basilar airspace disease.       MRI BRAIN W WO CONTRAST    (Results Pending)     EKG:   Normal sinus rhythm  Left axis deviation  Left bundle branch block  Abnormal ECG  Echo: Pending  Micro: Gram positive rods in anaerobic bottle. Speciation, sensitivities to follow. ASSESSMENT AND PLAN:   Rogerio Keith is a 27-year-old M with PMH of temporal lobe seizures, hep C, uncontrolled HTN, Takutsobo cardiomyopathy, COPD, 20 pack-year smoking, HFrEF, polysubstance abuse (heroin and fentanyl on methadone) who presents to [facility] ED with seizures. Acute hypoxic respiratory failure 2/2 Septic Shock 2/2 Multifocal Pneumonia  Intubated for hypoxic respiratory failure 2/2 septic shock.  - Sedation: Fentanyl 100, Versed 8, Precedex 0.6  Vent Settings: Vent Mode: AC/PRVC Resp Rate (Set): 26 bmp/Vt (Set, mL): 550 mL/ / FiO2 : 50 % / PEEP 5 / PIP 22  Recent Labs     01/12/23  2148 01/13/23  0604   PHART 7.342* 7.273*   RBD6PIF 42.6 30.7*   PO2ART 195.0* 137.8*     - Zyvox and Zosyn started 01/12/23  - Vanc started, Zyvox d/c due to better tissue penetration of possible endocarditis  - Pro-calcitonin 5.65  - Respiratory cultures pending  - MRSA nasal swab pending  - Blood Cx from [facility]: Anaerobic Gram + rods on gram stain  - Arterial Line placed 01/12/23  - Echo to r/o endocarditis or decompensated HFREF   - EF <20% w/ diffuse hypokinesis. No vegetations. Low EF likely due to active septic shock and myocardial demand ischemia given Troponinemia. Should hold Entresto given need for pressors per Cards recs. Acute encephalopathy 2/2 status epilepticus  History of right temporal epilepsy diagnosed in 2019 in the setting of a prior TBI.   He has known epilepsy, he is on Oxtellar and Xcopri at home and is known to be noncompliant, UDS was positive for fentanyl, and he was septic on arrival to the ED which could be a reason to put him into status epilepticus.  - Neurology consulted, appreciate recs  - cEEG  - Versed okay for   - Q1H neuro checks  - Oxcarbazepine 900mg BID  - Fosphenytoin 100mg Q8H IV  - Will resume Xcopri when able to take PO (unable to crush for pharmacy)  - MRI Brain with and without contrast when able  - Avoid nimbex    Anion gap metabolic acidosis 2/2 lactic acidosis - Improving  Lactic acidosis 6.9> 2.91  - Continue to Monitor    New Left Bundle Branch Block  Per Cards: New LBBB/previous history of stress-induced, cardiomyopathy with borderline recovered LV function/elevated troponin/demand ischemia. - Cardiology consulted; appreciate recs  - Likely demand ischemia from sepsis. Unlikely true ACS given normal cath in 2021  -Telemetry  -Repeat EKG  -Trending Troponin: 0.01> 0.39  - Low-dose dobutamine  - No need for heparin  - Prev had IVCD and LVH, LBBB likely progression of CMP. - will need CHF meds once off pressors. Hyperammonemia - resolved  Ammonia level of 129> 46  Ammonia levels known to rise 2/2 Seizure activity therefore no lactulose ordered. Resolved on repeat draw. Chronic Problems  COPD -   HTN - Holding home coreg  HFrEF - LVEF in March 2022 of 45-50% - Holding Home Entresto  Tobacco use   Polysubstance Abuse - holding home methadone     Code Status: Prior  FEN: No diet orders on file   PPX:  Heparin and Protonix  DISPO: Home    This patient has been staffed and discussed with Viktor Azevedo MD.  -----------------------------  Joe Kathleen MD, PGY-1  1/13/2023  9:11 AM  Patient examined, findings as discussed with Lizbeth Ribera. Agree with assessment and plan. Management of critical illness required for septic shock due to bilateral pneumonia, with hypoxic and upper Respiratory failure. This was likely the precipitating event causing uncontrolled seizures. Seizures have been controlled, none seen on cvEEG, with Keppra, fosphenytoin, and oxcarbazepine, with Versed infusion. Shock required 3 pressor agents at one point, we have been able to wean down that treatment to norepinephrine infusion.   Respiratory failure requiring high level ventilator support, with minute ventilation 16 L, FiO2 50%, PEEP 10 cm. He requires high level of sedation to prevent agitation and maintain control of ventilation, utilizing fentanyl, midazolam, and dexmedetomidine. Cardiac function evaluated with echocardiogram, and he is found to have severe systolic dysfunction, with LVEF 20%. He does not have a history of coronary disease, and this may be consistent with sepsis. Family is updated regarding progress and plan.   Discussed with neurology service  Time spent in management of critical care 50 minutes

## 2023-01-13 NOTE — H&P
ICU HISTORY AND [address] Day: 1  ICU Day: 1                                                         Code:Full Code  Admit Date: 1/12/2023  PCP: No primary care provider on file. CC: Status Epilepticus    HISTORY OF PRESENT ILLNESS:   King Carl is a 59-year-old M with PMH of seizures, hep C, uncontrolled hypertension, takatsubo cardiomyopathy, COPD, temporal seizures, 20 yr tobacco use, HFrEF, polysubstance abuse (heroine and fentanyl) who presents to [address] [place] [address] ED with seizures. EMS was called for witnessed seizure. Patient was actively seizing when he arrived to the ED. He received multiple doses of benzos on route that did not improve his seizure. He was actively seizing for about an hour. He never returned to baseline. He was eventually intubated due to hypoxia and not protecting his airway. Other tests remarkable for EKG showed new LBBB concern for underlying ischemia he was taken to the Cath Lab. Cardiology felt it was likely sinus tachycardia with aberrancy. Troponins were negative. Other labs remarkable for lactic acid was 6.9, ammonia 129, UDS was positive for cannabinoids methadone and fentanyl. Patient is a known IV drug user and is on methadone at home. UA was negative for any infection. Imaging studies were done and showed CT PE negative for any PE but showed multifocal pneumonia, CT head showed no intra cranial abnormality. He was treated with large-volume IV fluids and started on broad-spectrum antibiotics he had some worsening hypoxia with pulmonary edema and ultimately required Lasix.   He was put on IV Versed, IV fentanyl and IV ketamine for sedation and requirement of paralysis for management of improved oxygenation on the ventilator    PAST HISTORY:     Past Medical History:   Diagnosis Date    Aspiration pneumonia (HCC)     Hepatitis C     HTN (hypertension)     NSTEMI (non-ST elevated myocardial infarction) ([facility] [address] Polysubstance abuse ([facility] [address]     Seizures ([facility] [address]     Septic shock ([facility] [address]     Takotsubo cardiomyopathy        Past Surgical History:   Procedure Laterality Date    BRONCHOSCOPY N/A 2/22/2021    BRONCHOSCOPY DIAGNOSTIC OR CELL [address] ONLY performed by Emilia Locke MD at [facility]  2/22/2021    BRONCHOSCOPY THERAPUTIC ASPIRATION INITIAL performed by Emilia Locke MD at [address], PARTIAL AMPUTATIONS       SocialHistory:   The patient lives at    Alcohol:  Illicit drugs: fentanyl and [place] in urine. Methadone as well. Tobacco:   20 years. 1 pack a day    Family History:  No family history on file. MEDICATIONS:     No current facility-administered medications on file prior to encounter. Current Outpatient Medications on File Prior to Encounter   Medication Sig Dispense Refill    Cenobamate 14 x 50 MG & 14 x100 MG TBPK Take by mouth daily      sacubitril-valsartan (ENTRESTO) 49-51 MG per tablet Take 1 tablet by mouth 2 times daily 180 tablet 1    furosemide (LASIX) 20 MG tablet Take 1 tablet by mouth daily as needed (for weight gain 3lbs in a day or 5lb in a week with leg swelling.) 90 tablet 1    carvedilol (COREG) 3.125 MG tablet Take 1 tablet by mouth 2 times daily (with meals) 180 tablet 1    DULoxetine (CYMBALTA) 30 MG extended release capsule Take 30 mg by mouth daily      OXcarbazepine ER (OXTELLAR XR) 600 MG TB24 Take 1,800 mg by mouth daily      methadone (DOLOPHINE) 10 MG/ML solution Take 105 mg by mouth daily.        aspirin 81 MG chewable tablet Take 81 mg by mouth daily (with breakfast)           Scheduled Meds:   sodium chloride flush  5-40 mL IntraVENous 2 times per day    pantoprazole  40 mg IntraVENous Daily    heparin (porcine)  5,000 Units SubCUTAneous 3 times per day    linezolid  600 mg IntraVENous Q12H    piperacillin-tazobactam  3,375 mg IntraVENous Q8H    OXcarbazepine  900 mg Oral BID    fosphenytoin (CEREBYX) maintenance dose IVPB  100 mg PE IntraVENous Q8H      Continuous Infusions:   norepinephrine 9 mcg/min (01/13/23 8589)    sodium chloride 100 mL/hr at 01/13/23 0611    fentaNYL 100 mcg/hr (01/13/23 0611)    midazolam 10 mg/hr (01/13/23 6194)    dexmedetomidine (PRECEDEX) IV infusion 0.6 mcg/kg/hr (01/13/23 0703)    sodium chloride      [Held by provider] ketamine      phenylephrine (MISSAEL-SYNEPHRINE) 50mg/250mL infusion Stopped (01/13/23 0610)    vasopressin (Septic Shock) infusion 0.03 Units/min (01/13/23 0400)     PRN Meds:perflutren lipid microspheres, sodium chloride flush, sodium chloride, ondansetron **OR** ondansetron, polyethylene glycol, acetaminophen **OR** acetaminophen    Allergies: No Known Allergies    REVIEW OF SYSTEMS:       History obtained from chart review, unobtainable from patient due to mental status, and Mother and Aunt. Review of Systems   Reason unable to perform ROS: Intubated and Sedated. PHYSICAL EXAM:       Vitals: BP 86/73   Pulse 75   Temp (!) 100.6 °F (38.1 °C) (Bladder)   Resp 26   Wt 135 lb 2.3 oz (61.3 kg)   SpO2 100%   BMI 18.85 kg/m²     I/O:    Intake/Output Summary (Last 24 hours) at 1/13/2023 0642  Last data filed at 1/13/2023 4168  Gross per 24 hour   Intake 2278.44 ml   Output 300 ml   Net 1978.44 ml     I/O this shift:  In: 2278.4 [I.V.:829.7; IV Piggyback:1448.8]  Out: 300 [Urine:300]  No intake/output data recorded. Physical Examination:     Physical Exam  Constitutional:       Comments: Sedated     HENT:      Head: Normocephalic and atraumatic. Right Ear: External ear normal.      Left Ear: External ear normal.      Nose: Nose normal.      Mouth/Throat:      Mouth: Mucous membranes are moist.   Cardiovascular:      Rate and Rhythm: Tachycardia present. Pulmonary:      Comments: Mechanical Ventilation  Abdominal:      Palpations: Abdomen is soft. Musculoskeletal:      Right lower leg: No edema. Left lower leg: No edema. Neurological:      Comments: Sedated   Psychiatric:      Comments: Sedated           Access:   -Central Access Day #: 1                                  -Peripheral Access Day#:1  -Arterial line Day#:1                                  Wu Day#:1  OGT Day#: 1                                            ETT Day#:1  Vent Settings: Vent Mode: AC/PRVC Resp Rate (Set): 26 bmp/Vt (Set, mL): 550 mL/ /FiO2 : 60 %    Recent Labs     01/12/23 2148 01/13/23  0604   PHART 7.342* 7.273*   [id number] 42.6 30.7*   PO2ART 195.0* 137.8*             DATA:       Labs:  CBC:   Recent Labs     01/12/23  0859 01/13/23  0504   WBC 26.0* 25.6*   HGB 15.8 14.1   HCT 49.1 41.8    287         BMP:   Recent Labs     01/12/23 0859 01/12/23 2031 01/13/23  0504   * 133* 129*   K 4.1 5.0 5.2*   CL 94* 99 97*   CO2 16* 24 14*   BUN 13 15 19   CREATININE 1.1 1.0 1.1   GLUCOSE 237* 132* 277*   PHOS  --  5.5* 5.9*       LFT's:   Recent Labs     01/12/23  0859 01/12/23 2031 01/13/23  0504   AST 24 29 101*   ALT 23 17 100*   BILITOT <0.2 <0.2 0.6   ALKPHOS 168* 123 100       Troponin:   Recent Labs     01/12/23  0859 01/13/23  0504   TROPONINI <0.01 0.39*       BNP:No results for input(s): BNP in the last 72 hours. ABGs:   Recent Labs     01/12/23 2148 01/13/23  0604   PHART 7.342* 7.273*   QYY8NWG 42.6 30.7*   PO2ART 195.0* 137.8*       INR: No results for input(s): INR in the last 72 hours. U/A:  Recent Labs     01/13/23  0026   COLORU Yellow   PHUR 6.0   WBCUA 6-9*   RBCUA 21-50*   BACTERIA 1+*   CLARITYU Clear   SPECGRAV >=1.030   LEUKOCYTESUR Negative   UROBILINOGEN 0.2   BILIRUBINUR Negative   BLOODU MODERATE*   GLUCOSEU 100*   AMORPHOUS 2+         XR CHEST PORTABLE   Final Result      The tip of the endotracheal tube projects over the mid trachea. Improved left-sided airspace disease and unchanged right mid and basilar airspace disease.       MRI BRAIN W WO CONTRAST    (Results Pending)       EKG:   Echo:  Micro: ASSESSMENT AND PLAN:   Enedina Landa is a 70-year-old M with PMH of seizures, hep C, uncontrolled hypertension, takatsubo cardiomyopathy, COPD, temporal seizures, 20 yr tobacco use, HFrEF, polysubstance abuse (heroine and fentanyl) who presents to [facility] ED with seizures. Acute encephalopathy 2/2 status epilepticus  Patient has a history of right temporal epilepsy diagnosed in 2019 in the setting of a prior TBI. He has known epilepsy, he is on [place] at home and is known to be noncompliant, UDS was positive for fentanyl, and he was septic on arrival to the ED which could be a reason to put him into status epilepticus. He follows with   -Neurology consulted  - cEEG     Acute hypoxic respiratory failure 2/2 multifocal pneumonia  Patient was intubated for hypoxia, and airway protection  - Zyvox and Zosyn started 01/12/23  - Prolactin pending   - Pro-calcitonin pending  - Respiratory cultures pending  - MRSA nasal swab pending  - Blood Cx pending from [facility]  - Arterial Line placed 01/12/23     SpO2 90% on MV  Vent Settings: Vent Mode: AC/PRVC Resp Rate (Set): 26 bmp/Vt (Set, mL): 550 mL/ /FiO2 : 60 % PEEP5  Recent Labs     01/12/23  2148 01/13/23  0604   PHART 7.342* 7.273*   [id number] 42.6 30.7*   PO2ART 195.0* 137.8*     -200 Fentantyl  -10 midazolam  - 0.2 precedex    Anion gap metabolic acidosis 2/2 lactic acidosis - Improving  Lactic acidosis of 2.91 on admission down from 6.9  - Continue to Monitor     New Left Bundle Branch Block  -Telemetry  -Repeat EKG  -Trending Troponin (negative at previous facility)  -Cardiology consulted; appreciate recs    Hyperammonemia  Ammonia level of 129 on admission  Ammonia levels known to rise 2/2 Seizure activity therefore will not order lactulose at this time.   -Repeat ammonia pending      Chronic Problems  COPD -   HTN - Holding home coreg  HFrEF - LVEF in March 2022 of 45-50% - Holding Home Entresto  Tobacco use   Polysubstance Abuse - holding home methadone     Code Status:Prior  FEN: No diet orders on file   PPX:  Heparin and Protonix  DISPO: Home    This patient has been staffed and discussed with Coco Kim MD.   -----------------------------  Chela Quevedo MD, PGY-1  1/13/2023  6:42 AM

## 2023-01-13 NOTE — H&P
ICU HISTORY AND 2025 [facility] ([place], [place]) Day: 1  ICU Day: 1                                                         Code:Full Code  Admit Date: 1/12/2023  PCP: No primary care provider on file. CC: Status Epilepticus    HISTORY OF PRESENT ILLNESS:   Paradise Cline is a 80-year-old M with PMH of seizures, hep C, uncontrolled hypertension, takatsubo cardiomyopathy, COPD, temporal seizures, 20 yr tobacco use, HFrEF, polysubstance abuse (heroine and fentanyl) who presents to [facility] ED with seizures. EMS was called for witnessed seizure. Patient was actively seizing when he arrived to the ED. He received multiple doses of benzos on route that did not improve his seizure. He was actively seizing for about an hour. He never returned to baseline. He was eventually intubated due to hypoxia and not protecting his airway. Other tests remarkable for EKG showed new LBBB concern for underlying ischemia he was taken to the Cath Lab. Cardiology felt it was likely sinus tachycardia with aberrancy. Troponins were negative. Other labs remarkable for lactic acid was 6.9, ammonia 129, UDS was positive for cannabinoids methadone and fentanyl. Patient is a known IV drug user and is on methadone at home. UA was negative for any infection. Imaging studies were done and showed CT PE negative for any PE but showed multifocal pneumonia, CT head showed no intra cranial abnormality. He was treated with large-volume IV fluids and started on broad-spectrum antibiotics he had some worsening hypoxia with pulmonary edema and ultimately required Lasix.   He was put on IV Versed, IV fentanyl and IV ketamine for sedation and requirement of paralysis for management of improved oxygenation on the ventilator    PAST HISTORY:     Past Medical History:   Diagnosis Date    Aspiration pneumonia (HCC)     Hepatitis C     HTN (hypertension)     NSTEMI (non-ST elevated myocardial infarction) ([facility] [address] Polysubstance abuse ([facility] [address]     Seizures ([facility] [address]     Septic shock ([facility] [address]     Takotsubo cardiomyopathy        Past Surgical History:   Procedure Laterality Date    BRONCHOSCOPY N/A 2/22/2021    BRONCHOSCOPY DIAGNOSTIC OR CELL [address] ONLY performed by Krishna Pradhan MD at [facility]  2/22/2021    BRONCHOSCOPY THERAPUTIC ASPIRATION INITIAL performed by Krishna Pradhan MD at [address], PARTIAL AMPUTATIONS       SocialHistory:   The patient lives at    Alcohol:  Illicit drugs: fentanyl and Joyceann Quirk in urine. Methadone as well. Tobacco:   20 years. 1 pack a day    Family History:  No family history on file. MEDICATIONS:     No current facility-administered medications on file prior to encounter. Current Outpatient Medications on File Prior to Encounter   Medication Sig Dispense Refill    sacubitril-valsartan (ENTRESTO) 49-51 MG per tablet Take 1 tablet by mouth 2 times daily 180 tablet 1    furosemide (LASIX) 20 MG tablet Take 1 tablet by mouth daily as needed (for weight gain 3lbs in a day or 5lb in a week with leg swelling.) 90 tablet 1    carvedilol (COREG) 3.125 MG tablet Take 1 tablet by mouth 2 times daily (with meals) 180 tablet 1    DULoxetine (CYMBALTA) 30 MG extended release capsule Take 30 mg by mouth daily      OXcarbazepine ER (OXTELLAR XR) 600 MG TB24 Take 1,800 mg by mouth daily      methadone (DOLOPHINE) 10 MG/ML solution Take 105 mg by mouth daily.        aspirin 81 MG chewable tablet Take 81 mg by mouth daily (with breakfast)           Scheduled Meds:   sodium chloride flush  5-40 mL IntraVENous 2 times per day    pantoprazole  40 mg IntraVENous Daily    heparin (porcine)  5,000 Units SubCUTAneous 3 times per day    piperacillin-tazobactam  4,500 mg IntraVENous Once    Followed by    [START ON 1/13/2023] piperacillin-tazobactam  3,375 mg IntraVENous Q8H    [START ON 1/13/2023] linezolid  600 mg IntraVENous Q12H Continuous Infusions:   fentaNYL 200 mcg/hr (01/12/23 2002)    midazolam 10 mg/hr (01/12/23 2008)    dexmedetomidine (PRECEDEX) IV infusion 0.2 mcg/kg/hr (01/12/23 2004)    sodium chloride      [Held by provider] ketamine      phenylephrine (MISSAEL-SYNEPHRINE) 50mg/250mL infusion       PRN Meds:sodium chloride flush, sodium chloride, ondansetron **OR** ondansetron, polyethylene glycol, acetaminophen **OR** acetaminophen    Allergies: No Known Allergies    REVIEW OF SYSTEMS:       History obtained from chart review, unobtainable from patient due to mental status, and Mother and Aunt. Review of Systems   Reason unable to perform ROS: Intubated and Sedated. PHYSICAL EXAM:       Vitals: Pulse (!) 127   Resp 26   SpO2 94%     I/O:  No intake or output data in the 24 hours ending 01/12/23 2116  No intake/output data recorded. No intake/output data recorded. Physical Examination:     Physical Exam  Constitutional:       Comments: Sedated     HENT:      Head: Normocephalic and atraumatic. Right Ear: External ear normal.      Left Ear: External ear normal.      Nose: Nose normal.      Mouth/Throat:      Mouth: Mucous membranes are moist.   Cardiovascular:      Rate and Rhythm: Tachycardia present. Pulmonary:      Comments: Mechanical Ventilation  Abdominal:      Palpations: Abdomen is soft. Musculoskeletal:      Right lower leg: No edema. Left lower leg: No edema.    Neurological:      Comments: Sedated   Psychiatric:      Comments: Sedated           Access:   -Central Access Day #: 1                                  -Peripheral Access Day#:1  -Arterial line Day#:1                                  Wu Day#:1  OGT Day#: 1                                            ETT Day#:1  Vent Settings: Vent Mode: AC/PRVC Resp Rate (Set): 26 bmp/Vt (Set, mL): 550 mL/ /FiO2 : 50 %    Recent Labs     01/12/23  0859 01/12/23  1942   PHART 6.866* 7.184*   NOD8APQ 89.5* 69.1*   PO2ART 75.4 78.6           DATA: Labs:  CBC:   Recent Labs     01/12/23  0859   WBC 26.0*   HGB 15.8   HCT 49.1          BMP:   Recent Labs     01/12/23  0859 01/12/23 2031   * 133*   K 4.1 5.0   CL 94* 99   CO2 16* 24   BUN 13 15   CREATININE 1.1 1.0   GLUCOSE 237* 132*   PHOS  --  5.5*     LFT's:   Recent Labs     01/12/23  0859 01/12/23 2031   AST 24 29   ALT 23 17   BILITOT <0.2 <0.2   ALKPHOS 168* 123     Troponin:   Recent Labs     01/12/23  0859   TROPONINI <0.01     BNP:No results for input(s): BNP in the last 72 hours. ABGs:   Recent Labs     01/12/23  0859 01/12/23  1942   PHART 6.866* 7.184*   ALX8WKS 89.5* 69.1*   PO2ART 75.4 78.6     INR: No results for input(s): INR in the last 72 hours. U/A:  Recent Labs     01/12/23  0900   COLORU Yellow   PHUR 7.0  7.0   WBCUA 0-2   RBCUA 0-2   CLARITYU Clear   SPECGRAV >=1.030   LEUKOCYTESUR Negative   UROBILINOGEN 0.2   BILIRUBINUR Negative   BLOODU Negative   GLUCOSEU Negative       XR CHEST PORTABLE    (Results Pending)       EKG:   Echo:  Micro:     ASSESSMENT AND PLAN:   Rogerio Keith is a 27-year-old M with PMH of seizures, hep C, uncontrolled hypertension, takatsubo cardiomyopathy, COPD, temporal seizures, 20 yr tobacco use, HFrEF, polysubstance abuse (heroine and fentanyl) who presents to [facility] ED with seizures. Acute encephalopathy 2/2 status epilepticus  Patient has a history of right temporal epilepsy diagnosed in 2019 in the setting of a prior TBI. He has known epilepsy, he is on [place] at home and is known to be noncompliant, UDS was positive for fentanyl, and he was septic on arrival to the ED which could be a reason to put him into status epilepticus.  He follows with   -Neurology consulted  - cEEG     Acute hypoxic respiratory failure 2/2 multifocal pneumonia  Patient was intubated for hypoxia, and airway protection  - Zyvox and Zosyn started 01/12/23  - Prolactin pending   - Pro-calcitonin pending  - Respiratory cultures pending  - MRSA nasal swab pending  - Blood Cx pending from [facility]  - Arterial Line placed 01/12/23     SpO2 90% on MV  Vent Settings: Vent Mode: AC/PRVC Resp Rate (Set): 26 bmp/Vt (Set, mL): 550 mL/ /FiO2 : 50 % PEEP5  Recent Labs     01/12/23  0859 01/12/23 1942   PHART 6.866* 7.184*   [id number] 89.5* 69.1*   PO2ART 75.4 78.6   -200 Fentantyl  -10 midazolam  - 0.2 precedex    Anion gap metabolic acidosis 2/2 lactic acidosis - Improving  Lactic acidosis of 2.91 on admission down from 6.9  - Continue to Monitor     New Left Bundle Branch Block  -Telemetry  -Repeat EKG  -Trending Troponin (negative at previous facility)  -Cardiology consulted; appreciate recs    Hyperammonemia  Ammonia level of 129 on admission  Ammonia levels known to rise 2/2 Seizure activity therefore will not order lactulose at this time.   -Repeat ammonia pending      Chronic Problems  COPD -   HTN - Holding home coreg  HFrEF - LVEF in March 2022 of 45-50% - Holding Home Entresto  Tobacco use   Polysubstance Abuse - holding home methadone     Code Status:Prior  FEN: No diet orders on file   PPX:  Heparin and Protonix  DISPO: Home    This patient has been staffed and discussed with Erica Brown MD.   -----------------------------  Lilly Ramirez MD, PGY-1  1/12/2023  9:16 PM

## 2023-01-13 NOTE — CONSULTS
[facility]  Cardiology Inpatient Consult Service                                                                                          Pt Name: Jenny Marshall  Age: 35 y.o. Sex: male  : 1990  Location: [address] [place], [place] [address]    Referring Physician: Evelyn Robert MD      Reason for Consult:       Reason for Consultation/Chief Complaint: new LBBB      HPI:      Jenny Marshall is a 35 y.o. male with a past medical history of polysubtance abuse, epilepsy, Takotsubo cardiomyopathy, hepatitic C infection  who presented to the hospital with seizures. History was obtained from chart review and patient's mother who is present at bedside. Patient lives with mother and at home had an episode of vomiting waking him from sleep. Mother describes that patient can feel when seizure is coming on and has feelings of fear prior to seizure event occurring. He has had both convulsive and non-convulsive seizures in the past. He was transported to hospital by EMS and intubated, eventually transferred to [facility]. He has history of intravenous substance use (opiates) and had been sober for few weeks prior to today. He is on methadone. Mother denies that he was experiencing symptoms such as cough, sneeze, wheezing, shortness of breath in days preceding this episode. She endorses he is compliant with seizure medication and has taken a higher dose of trileptal in the past. He is also on heart failure medications including entresto and lasix as needed. From a cardiac standpoint, he has a history of stress cardiomyopathy with a prior EF of 25%, repeat echo showed EF recovered, left heart cath with normal coronaries.         Histories     Past Medical History:   has a past medical history of Aspiration pneumonia ([facility] [address] Hepatitis C, HTN (hypertension), NSTEMI (non-ST elevated myocardial infarction) ([facility] [address] Polysubstance abuse ([facility] [address] Seizures ([facility] [address] Septic shock ([facility] [address] and Takotsubo cardiomyopathy. Surgical History:   has a past surgical history that includes Hand surgery; bronchoscopy (N/A, 2/22/2021); and bronchoscopy (2/22/2021). Social History:   reports that he has been smoking cigarettes. He has been smoking an average of 2 packs per day. He has never used smokeless tobacco. He reports current drug use. Drugs: IV, Opiates , Marijuana (Weed), and Methamphetamines (Crystal Meth). He reports that he does not drink alcohol. Family History:  No evidence for sudden cardiac death or premature CAD      Medications:       Home Medications  Were reviewed and are listed in nursing record. and/or listed below  Prior to Admission medications    Medication Sig Start Date End Date Taking? Authorizing Provider   Cenobamate 14 x 50 MG & 14 x100 MG TBPK Take by mouth daily    Historical Provider, MD   sacubitril-valsartan (ENTRESTO) 49-51 MG per tablet Take 1 tablet by mouth 2 times daily 9/28/22   Dahliaa Credit APRKYARA - CNP   furosemide (LASIX) 20 MG tablet Take 1 tablet by mouth daily as needed (for weight gain 3lbs in a day or 5lb in a week with leg swelling.) 9/16/22   Thelda , APRN - CNP   carvedilol (COREG) 3.125 MG tablet Take 1 tablet by mouth 2 times daily (with meals) 9/16/22   Justina Matias APRN - CNP   DULoxetine (CYMBALTA) 30 MG extended release capsule Take 30 mg by mouth daily 2/10/22   Historical Provider, MD   OXcarbazepine ER (OXTELLAR XR) 600 MG TB24 Take 1,800 mg by mouth daily 5/13/21   Historical Provider, MD   methadone (DOLOPHINE) 10 MG/ML solution Take 105 mg by mouth daily.      Historical Provider, MD   aspirin 81 MG chewable tablet Take 81 mg by mouth daily (with breakfast) 1/21/20   Historical Provider, MD          Inpatient Medications:   sodium chloride flush  5-40 mL IntraVENous 2 times per day    pantoprazole  40 mg IntraVENous Daily    heparin (porcine)  5,000 Units SubCUTAneous 3 times per day    linezolid  600 mg IntraVENous Q12H piperacillin-tazobactam  3,375 mg IntraVENous Q8H    OXcarbazepine  900 mg Oral BID    fosphenytoin (CEREBYX) maintenance dose IVPB  100 mg PE IntraVENous Q8H       IV drips:   norepinephrine 9 mcg/min (01/13/23 3902)    sodium chloride 100 mL/hr at 01/13/23 0611    fentaNYL 100 mcg/hr (01/13/23 0715)    midazolam 10 mg/hr (01/13/23 4634)    dexmedetomidine (PRECEDEX) IV infusion 0.6 mcg/kg/hr (01/13/23 3759)    sodium chloride      [Held by provider] ketamine      phenylephrine (MISSAEL-SYNEPHRINE) 50mg/250mL infusion Stopped (01/13/23 0610)    vasopressin (Septic Shock) infusion 0.03 Units/min (01/13/23 0400)       PRN:  perflutren lipid microspheres, sodium chloride flush, sodium chloride, ondansetron **OR** ondansetron, polyethylene glycol, acetaminophen **OR** acetaminophen    Allergy:     Patient has no known allergies. Review of Systems:     All 12 point review of symptoms completed. Pertinent positives identified in the HPI, all other review of symptoms negative as below. CONSTITUTIONAL: Nounanticipated weight loss. No change in energy level, sleep pattern, or activity level. SKIN: No rash or pruritis. EYES: No visual changes or diplopia. No scleral icterus. ENT: No Headaches, hearing loss or vertigo. No mouth sores or sore throat. CARDIOVASCULAR: No chest pain/chest pressure/chest discomfort. No palpitations. RESPIRATORY: No cough or wheezing, no sputum production. No hematemesis. GASTROINTESTINAL: No N/V/D. No abdominal pain, appetite loss, blood in stools. GENITOURINARY: No dysuria, trouble voiding, or hematuria. MUSCULOSKELETAL:  No gait disturbance, weakness or joint complaints. NEUROLOGICAL: No headache, diplopia, change in muscle strength, numbness or tingling. No change in gait, balance, coordination, mood, affect, memory, mentation, behavior. PSHYCH: No anxiety, loss of interest, change in sexual behavior, feelings of self-harm, or confusion.   ENDOCRINE: No malaise, fatigue or temperature intolerance. No excessive thirst, fluid intake, or urination. No tremor. HEMATOLOGIC: No abnormal bruising or bleeding. ALLERGY: No nasal congestion or hives. Physical Examination:     Vitals:    01/13/23 0600 01/13/23 0615 01/13/23 0859 01/13/23 0905   BP: 92/72 86/73     Pulse: 77 75 72 74   Resp: 25 26 26 26   Temp:       TempSrc:       SpO2: 100% 100% 100% 100%   Weight: 135 lb 2.3 oz (61.3 kg)          Wt Readings from Last 3 Encounters:   01/13/23 135 lb 2.3 oz (61.3 kg)   01/12/23 122 lb 9 oz (55.6 kg)   07/06/22 125 lb (56.7 kg)       Objective:  General Appearance: In no acute distress (intubated/sedated). Vital signs: (most recent): Blood pressure 95/71, pulse 75, temperature (!) 101.3 °F (38.5 °C), temperature source Bladder, resp. rate 26, weight 135 lb 2.3 oz (61.3 kg), SpO2 100 %. No fever. Output: Producing urine. Lungs: There are rales. Heart: Normal rate. Regular rhythm. S1 normal and S2 normal.  No murmur. Abdomen: Abdomen is soft and non-distended. There is no abdominal tenderness. Extremities: There is no dependent edema. Skin:  Cool.         General Appearance:  Alert, cooperative, no distress, appears stated age Appropriate weight   Head:  Normocephalic, without obvious abnormality, atraumatic   Eyes:  PERRL, conjunctiva/corneas clear EOM intact  Ears normal   Throat no lesions       Nose: Nares normal, no drainage or sinus tenderness   Throat: Lips, mucosa, and tongue normal   Neck: Supple, symmetrical, trachea midline, no adenopathy, thyroid: not enlarged, symmetric, no tenderness/mass/nodules, no carotid bruit or JVD       Lungs:   Clear to auscultation bilaterally, respirations unlabored   Chest Wall:  No tenderness or deformity   Heart:  Regular rate and rhythm, S1, S2 normal, no murmur, rub or gallop PMI intact   Abdomen:   Soft, non-tender, bowel sounds active all four quadrants,  no masses, no organomegaly       Extremities: Extremities normal, atraumatic, no cyanosis or edema   Pulses: 2+ and symmetric   Skin: Skin color, texture, turgor normal, no rashes or lesions   Pysch: Normal mood and affect   Neurologic: Normal gross motor and sensory exam.  Cranial nerves intact        Labs:     Recent Labs     01/12/23  0859 01/12/23 2031 01/13/23  0504   * 133* 129*   K 4.1 5.0 5.2*   BUN 13 15 19   CREATININE 1.1 1.0 1.1   CL 94* 99 97*   CO2 16* 24 14*   GLUCOSE 237* 132* 277*   CALCIUM 9.8 8.4 8.0*     Recent Labs     01/12/23  0859 01/13/23  0504   WBC 26.0* 25.6*   HGB 15.8 14.1   HCT 49.1 41.8    287   MCV 95.3 90.9     No results for input(s): CHOLTOT, TRIG, HDL, CHOLHDL, LDL in the last 72 hours. Invalid input(s): Barber Held  No results for input(s): PTT, INR in the last 72 hours. Invalid input(s): PT  Recent Labs     01/12/23  0859 01/12/23 2031 01/13/23  0504   CKTOTAL  --  228  --    TROPONINI <0.01  --  0.39*     No results for input(s): BNP in the last 72 hours. No results for input(s): TSH in the last 72 hours. No results for input(s): CHOL, HDL, LDLCALC, TRIG in the last 72 hours.]    Lab Results   Component Value Date    CKTOTAL 228 01/12/2023    TROPONINI 0.39 (H) 01/13/2023         Imaging:     I personally reviewed imaging studies including CXR, Stress test, TTE/KIKI. Last ECG (if available) -personally interpreted EKG:  I have reviewed EKG with the following interpretation  LBBB     Telemetry: Personally interpreted  NSR    Last Stress (if available):    Last TTE/KIKI(if available):  Echo: 1/31/2020 [facility]  - Left ventricle: The cavity size is normal. Wall thickness is normal. Systolic function was normal.    The estimated ejection fraction was in the range of 55% to 60%. Wall motion was normal; there were no regional wall motion abnormalities. Left ventricular diastolic function parameters were normal.  - Right ventricle: Systolic function was normal by objective interpretation. TAPSE: 2.7cm. Tricuspid annular systolic velocity: 47RK/V.  - Pericardium, extracardiac: There is a left pleural effusion. Last Cath (if available):  Left Heart Cath: 2/23/21    Findings   LVEDP 15   LVEF  20%   LV wall motion  apical and mid ballooning with basal hypokinesis consistent with Takotsubo cardiomyopathy   Gradient across AV  none   Mitral regurg  no significant       Last CMR  (if available):    CARDIAC CTA: 3/10/2020  FINDINGS:  The left ventricle appears normal in size. The right ventricle appears normal in size. The atria are normal in size. The left atrium receives two right and two left pulmonary veins without evidence of stenosis. The left atrial appendage is chicken wing shaped without evidence of thrombus. The aorta is normal in size. The pulmonary artery is incompletely visualized. The IVC is normal in size. The coronary sinus is not well visualized. CORONARY ARTERIES:  The coronary arteries have normal origins and proximal courses. The coronary system is right dominant. The SA node artery originates from the right coronary artery. LEFT:  The left main artery is a moderate size vessel. The left main artery has no significant disease. The left anterior descending artery is a moderate size vessel. The LAD has no significant disease. There is a small, area of shallow bridging in the mid LAD. There are three small and patent diagonal arteries. The left circumflex artery is a moderate size vessel. The LCx has no significant disease. The first obtuse marginal artery is a moderate size, branching vessel that supplies a significant portion of the lateral wall. The first obtuse marginal artery has no significant disease. The second obtuse marginal artery has no significant disease. The distal LCx is a small vessel that terminates in a small and patent OM. RIGHT:  The right coronary artery is a moderate size vessel. The RCA has no significant disease.   The PDA is patent without significant disease in the visualized portion. The PLV branch is small and patent. The conus artery has an independent origin. PERICARDIUM:  There is no abnormal pericardial calcification. There is no pericardial effusion. Assessment / Plan:     Abnormal EKG  Elevated troponin  H/o of stress cardiomyopathy; HFrEF  -trop elevation due to hypotension/shock, now downtrending. He has LBBB on EKG which has been present prior. He had EF of 25% which has since recovered. Current presentation consistent with seizure/sepsis rather than ACS  -continue telemetry monitoring  -replete electrolytes as needed        Septic shock likely 2/2 PNA  Procal elevated to 565 yesterday. CTPE with no evidence of pulmonary embolism but consolidation present with suspicion for multifocal PNA. Also has leukocytosis of 25.6.  -has received 2L NS and is currently on continuous infusion  -suspected source is currently PNA; echocardiogram pending  -Abx per primary  -trend procalc    AGMA 2/2 lactic acidosis  LA which was initially 6.9 initially improved to 2.6, now increasing again to 6.8. Notably he is on linezolid as well and has episode of seizure. Currently requiring pressor support to maintain adequate perfusion.  -continue with IVF  -maintain MAP > 65 mmHg  -wean pressors as tolerated      Respiratory failure  Seizure disorder  Patient initially had marked acidosis with pH 6.86 on ABG and CO2 of 89.5. This has improved to 7.27 and CO2 30.7 following mechanical ventilatory support. He is sedated with fentanyl (on methadone) and versed + precedex infusion  -vent management per pulm  -AED per neurology    [facility] PGY-2    Patient will be staffed/discussed with Dr. Mart Mills use was discussed with the patient and educated on the negative effects. I have asked the patient to not utilize these agents. Thank you for allowing to us to participate in the care or Lorraine [place].  Further evaluation will be based upon the patient's clinical course and testing results. I have spent 55 minutes of face to face time with the patient with more than 50% spent counseling and coordinating care. All questions and concerns were addressed to the patient/family. Alternatives to my treatment were discussed. The note was completed using EMR. Every effort was made to ensure accuracy; however, inadvertent computerized transcription errors may be present. I have personally reviewed the reports and images of labs, radiological studies, cardiac studies including ECG's and telemetry, current and old medical records. Yury Chowdhury MD, [facility], [facility] [address]

## 2023-01-13 NOTE — PROGRESS NOTES
Pharmacy Note - Extended Infusion Beta-Lactam Adjustment    Piperacillin/Tazobactam ordered for treatment of Aspiration PNA. Per [facility] Extended Infusion Beta-Lactam Policy, Zosyn will be changed to a LD of 4.5g followed by 3.375g q8hr EI. Estimated Creatinine Clearance: Estimated Creatinine Clearance: 76 mL/min (based on SCr of 1.1 mg/dL). Dialysis Status, WILBERT, CKD: None  BMI: There is no height or weight on file to calculate BMI. Rationale for Adjustment: Agent is renally eliminated and demonstrates time-dependent effect on bacterial eradication. Extended-infusion dosing strategy aims to enhance microbiologic and clinical efficacy. Pharmacy will continue to monitor renal function, cultures and sensitivities (where available) and adjust dose as necessary. Please call with any questions.     Asif Peterson PharmD  Main Pharmacy: [id number]  1/12/2023 8:20 PM

## 2023-01-13 NOTE — PROCEDURES
Tj Lafleur is a 28 y.o. male patient. No diagnosis found. Past Medical History:   Diagnosis Date    Aspiration pneumonia (HCC)     Hepatitis C     HTN (hypertension)     NSTEMI (non-ST elevated myocardial infarction) ([facility] [facility]     Polysubstance abuse ([facility] [facility]     Seizures ([facility] [address]     Septic shock (HCC)     Takotsubo cardiomyopathy      Pulse (!) 127, resp. rate 26, SpO2 94 %. Insert Arterial Line    Date/Time: 1/12/2023 8:41 PM  Performed by: Aguilar Noel MD  Authorized by: Aguilar Noel MD   Consent: Verbal consent obtained. Written consent not obtained. Consent given by: parent  Relevant documents: relevant documents present and verified  Test results: test results available and properly labeled  Site marked: the operative site was marked  Imaging studies: imaging studies available  Required items: required blood products, implants, devices, and special equipment available  Patient identity confirmed: hospital-assigned identification number  Preparation: Patient was prepped and draped in the usual sterile fashion.   Indications: multiple ABGs and hemodynamic monitoring  Location: left radial  Anesthesia: see MAR for details  Needle gauge: 18  Number of attempts: 1  Post-procedure: line sutured and dressing applied  Patient tolerance: patient tolerated the procedure well with no immediate complications  Comments: EBL <5 ML        Aguilar Noel MD  1/12/2023

## 2023-01-13 NOTE — CARE COORDINATION
Case management is following for discharge planning. The chart was reviewed. Mr. Edmund Hilliard was transferred to the [facility]. from the emegency department at [facility] for a neuro consult. He is presently in the ICU intubated, sedated, on pressor support, and cvEEG. He is from home with his family. Independent with self care and functional mobility at baseline. He is active with an outpatient  neurologist through [facility]. CM will reassess for disposition and service needs once he is more medically stable.     Electronically signed by GLADIS Howard RN-[facility]  Case Management  [phone]

## 2023-01-13 NOTE — PROGRESS NOTES
Addendum 01/17/23 @ 0803  Spoke with a nurse at the [address] [place] [address] at [facility] and patient's last reported dose of methadone was on 01/11/23 and his current dose is 110 mg of liquid methadone once daily. Will adjust med rec. Wilda Ward PharmD  PGY-1 Pharmacy Resident  [facility]  [id number]  1/17/2023   8:06 AM    Clinical Pharmacy Progress Note  Medication History    Admit Date: 1/12/2023    List of current medications patient is taking is in progress. Home medication list in EPIC updated to reflect changes noted below. Source of information: outpatient fill history, Care Everywhere, and patient's mother    Changes made to medication list:   Medications removed:  Duloxetine    Medications added:   None    Medication doses adjusted:   Oxcarbazepine changed to administration every morning  Cenobamate changed to administration every evening  Methadone was changed from 105 mg daily to 115 mg daily    Notes:  Cenobamate is a titration medication that follows strict titration dose increases. Patient currently has the box 50 mg (14 days) then 100 mg (14 days). Spoke with patient's mom about what methadone clinic patient goes to for treatment and she stated [facility] ([phone]). Patient's mom stated he is taking methadone 115 mg daily and when I tried to call to confirm dose, I was unable to get through to anyone. Will attempt again to confirm methadone dosing. Please call with any questions.   Wilda Ward PharmD  PGY-1 Pharmacy Resident  [facility]  [id number]  1/13/2023   12:54 PM

## 2023-01-13 NOTE — PROGRESS NOTES
Pt started on Dom and maxed out, Residents called bedside. Vaso ordered and 500mL NS bolus given. Lactic elevated 3.7.

## 2023-01-13 NOTE — CONSULTS
Neurology / Neurocritical Care Consult Note    Coco Kim MD is requesting this consult. Reason for Consult: status epilepticus  Admission Chief Complaint: seizures    History of Present Illness     Tanya Rizo is a 28 y.o. y/o male with PMH significant for hepatitis C, HTN, NSTEMI, seizures, polysubstance abuse (methadone patient), and takotsubo cardiomyopathy. Per my interview with the patient's mother, the patient had multiple seizures at home during which he would stare off in the distance, turn his head to the side, and then have generalized shaking in all 4 extremities. Per patient's mother this is the same semiology as his previous seizures. EMS was called and the patient was transported to [facility] ED. Patient continued to seize while en route, and on arrival to the ED. Patient was intubated and a central line was placed. CT head showed no intracranial abnormalities. Patient also presented with leukocytosis, lactic acidosis, and fever with PNA seen on chest CT and XR. UDS positive for fentanyl, methadone, and fentanyl. During ED workup patient continued to have an elevated HR and was fighting the ventilator with poor oxygenation. Patient eventually sedated with versed, fentanyl, ketamine, and nimbex. Patient also received a 3g of keppra in addition to ativan and propofol IV for his seizures. Patient was transferred to [facility] ICU for further evaluation and cvEEG. Of note:  Patient sees Dr. Scooter Jackson at [facility] for his seizure management. Per the most recent note from an office visit in 10/2022, patient was admitted to [facility] EMU in 10/2022 where a seizure was captured on EEG. Patient has had multiple hospitalizations since then for his seizures. Patient was originally started on keppra, but took himself off it due to side effects. Patient has since been on oxcarbazepine of varying doses until Alda Ayers was added to his regimen in 03/2022.   Most recent AED regimen consists of [employer] titration to 200mg daily and Oxtellar 1800mg daily. REVIEW OF SYSTEMS:   MICHI 2/2 intubation and sedation    Past Medical, Surgical, Family, and Social History   PAST MEDICAL HISTORY:  Past Medical History:   Diagnosis Date    Aspiration pneumonia (HCC)     Hepatitis C     HTN (hypertension)     NSTEMI (non-ST elevated myocardial infarction) ([facility] [address]     Polysubstance abuse ([facility] [address]     Seizures ([facility] [address]     Septic shock ([facility] [address]     Takotsubo cardiomyopathy      SURGICAL HISTORY:  Past Surgical History:   Procedure Laterality Date    BRONCHOSCOPY N/A 2/22/2021    BRONCHOSCOPY DIAGNOSTIC OR CELL [address] ONLY performed by Yolanda Grijalva MD at [address]  2/22/2021    BRONCHOSCOPY THERAPUTIC ASPIRATION INITIAL performed by Yolanda Grijalva MD at [address], PARTIAL AMPUTATIONS     FAMILY HISTORY & SOCIAL HISTORY:  Family history non-contributory  No family history on file.   Social History     Tobacco Use    Smoking status: Every Day     Packs/day: 2.00     Types: Cigarettes    Smokeless tobacco: Never   Vaping Use    Vaping Use: Never used   Substance Use Topics    Alcohol use: No    Drug use: Yes     Types: IV, Opiates , Marijuana (Weed), Methamphetamines (Crystal Meth)     Comment: last used heroin 6/29-6/30         Allergies & Outpatient Medications   ALLERGIES:  No Known Allergies  HOME MEDICATIONS:  Current Discharge Medication List        CONTINUE these medications which have NOT CHANGED    Details   sacubitril-valsartan (ENTRESTO) 49-51 MG per tablet Take 1 tablet by mouth 2 times daily  Qty: 180 tablet, Refills: 1    Associated Diagnoses: Stress-induced cardiomyopathy; Hypertension, unspecified type      furosemide (LASIX) 20 MG tablet Take 1 tablet by mouth daily as needed (for weight gain 3lbs in a day or 5lb in a week with leg swelling.)  Qty: 90 tablet, Refills: 1      carvedilol (COREG) 3.125 MG tablet Take 1 tablet by mouth 2 times daily (with meals)  Qty: 180 tablet, Refills: 1      DULoxetine (CYMBALTA) 30 MG extended release capsule Take 30 mg by mouth daily      OXcarbazepine ER (OXTELLAR XR) 600 MG TB24 Take 1,800 mg by mouth daily      methadone (DOLOPHINE) 10 MG/ML solution Take 105 mg by mouth daily. aspirin 81 MG chewable tablet Take 81 mg by mouth daily (with breakfast)               Physical Exam   PHYSICAL EXAM:  Vitals:    01/12/23 1948   Pulse: (!) 127   Resp: 26   SpO2: 94%         General: sedated and intubated  Neurologic  Mental status: sedated    Cranial nerves:   CN2: Blink to threat bilaterally   CN 3,4,6: Pupils equal and sluggishly reactive to light, extraocular muscles intact  CN5: MICHI 2/2 sedation  CN7: MICHI 2/2 intubation  CN8-12: MICHI 2/2 sedation/intubation     Motor/Sensory Exam:  Spontaneous, purposeful movement in all 4 extremities. No response to painful stim  Tone: normal in all 4 extremities  Gait: MICHI 2/2 sedation/intubation      OTHER SYSTEMS:  Cardiovascular: Warm, appears well perfused   Respiratory: Increased respiratory rate, non-labored respiratory pattern   Abdominal: Abdomen is without distention   Extremities: Upper and lower extremities are atraumatic in appearance without deformity. No swelling or erythema. Diagnostic Testing Results   IMAGES:  Images personally reviewed and agree w/ radiology interpretation. Head CT w/o Contrast:  Impression   No acute intracranial abnormality. LABS:  All results below personally reviewed. Pertinent positives & negatives are addressed in Impression & Recommendations below.      LABS   Metabolic Panel Recent Labs     01/12/23  0859 01/12/23  0928 01/12/23  2031   *  --  133*   K 4.1  --  5.0   CL 94*  --  99   CO2 16*  --  24   BUN 13  --  15   CREATININE 1.1  --  1.0   GLUCOSE 237*  --  132*   CALCIUM 9.8  --  8.4   LABALBU 4.5  --  3.6   PHOS  --   --  5.5*   ALKPHOS 168*  --  123   ALT 23  --  17   AST 24  --  29   AMMONIA  --  129*  -- CBC / Coags Recent Labs     01/12/23  0859   WBC 26.0*   RBC 5.15   HGB 15.8   HCT 49.1         Other Recent Labs     01/12/23  0918   COVID19 NOT DETECTED     Recent Labs     01/12/23 2031   LACTA 2.6*          CURRENT SCHEDULED MEDICATIONS   Inpatient Medications     sodium chloride flush, 5-40 mL, IntraVENous, 2 times per day    pantoprazole, 40 mg, IntraVENous, Daily    heparin (porcine), 5,000 Units, SubCUTAneous, 3 times per day    piperacillin-tazobactam, 4,500 mg, IntraVENous, Once **FOLLOWED BY** [START ON 1/13/2023] piperacillin-tazobactam, 3,375 mg, IntraVENous, Q8H    [START ON 1/13/2023] linezolid, 600 mg, IntraVENous, Q12H   Infusions    fentaNYL 200 mcg/hr (01/12/23 2002)    midazolam 10 mg/hr (01/12/23 2008)    dexmedetomidine (PRECEDEX) IV infusion 0.2 mcg/kg/hr (01/12/23 2004)    sodium chloride      [Held by provider] ketamine      phenylephrine (MISSAEL-SYNEPHRINE) 50mg/250mL infusion        Antibiotics   Recent Abx Admin                     piperacillin-tazobactam (ZOSYN) 4,500 mg in dextrose 5 % 100 mL IVPB (mini-bag) (mg) 4,500 mg New Bag 01/12/23 2118    vancomycin 1000 mg IVPB in 250 mL D5W addavial (mg) 1,000 mg New Bag 01/12/23 1043    cefepime (MAXIPIME) 2000 mg IVPB minibag (mg) 2,000 mg New Bag 01/12/23 1009                       IMPRESSION & RECOMMENDATIONS     IMPRESSION:  Gabe Gregg is a 31 yo male who presents with concern for status epilepticus. Patient had multiple witnessed seizures at home and in [facility] ED. On exam, patient sedated with versed and fentanyl gtts. Precedex gtt temporarily paused. Ketamine and nimbex stopped on admission. On exam, patient starting to bite at ETT and move all extremities spontaneously, no response to painful stim.      RECOMMENDATIONS:  - cvEEG  - q1h neuro checks  - oxcarb 900mg BID  - continue versed gtt until initial cvEEG read complete  - fosphenytoin load with scheduled fosphenytoin to follow, will draw levels 1 hour after load and in the AM  - patient's mother made aware of plan of care and need to bring Venson Rubinstein from home, as it is not part of [facility] formulary  - MRI brain w and w/o contrast when able      LEIGHTON Sibley - CNP   Neurology & Neurocritical Care   1/12/2023 9:21 PM      ICU Patients:   [address] [phone]  Perfect[employer]: [facility] Neurocritical Care    Critical Care:  Due to the immediate potential for life-threatening deterioration due to neurological failure, I spent 60 minutes providing critical care. This time excludes time spent performing procedures but includes time spent on direct patient care, history retrieval, review of the chart, and discussions with patient, family, and consultant(s). Addendum: Patient has had a few episodes of agitation with an increase in RR and BP, a drop in O2 sat, non-compliance with the ventilator, and spontaneous, non-purposeful movement. Initial cvEEG read shows no seizures since start of cvEEG. Patient remains on precedex, fentanyl, and versed gtts. Neosynephrine and vasopressin added per primary team due to hypotension and concern for sepsis.

## 2023-01-13 NOTE — PROGRESS NOTES
4 Eyes Admission Assessment     I agree as the admission nurse that 2 RN's have performed a thorough Head to Toe Skin Assessment on the patient. ALL assessment sites listed below have been assessed on admission. Areas assessed by both nurses:   [x]   Head, Face, and Ears   [x]   Shoulders, Back, and Chest  [x]   Arms, Elbows, and Hands   [x]   Coccyx, Sacrum, and Ischium  [x]   Legs, Feet, and Heels        Does the Patient have Skin Breakdown?   No         Luke Prevention initiated:  No   Wound Care Orders initiated:  No      [facility] nurse consulted for Pressure Injury (Stage 3,4, Unstageable, DTI, NWPT, and Complex wounds) or Luke score 18 or lower:  No      Nurse 1 eSignature: Electronically signed by Francie Segovia RN on 1/13/23 at 1:07 AM EST    **SHARE this note so that the co-signing nurse is able to place an eSignature**    Nurse 2 eSignature: {Esignature:[id number]}

## 2023-01-13 NOTE — PROGRESS NOTES
NEUROLOGY / NEUROCRITICAL CARE PROGRESS NOTE       Patient Name: Linda Mccord YOB: 1990   Sex: Male Age: 28 yrs     CC / Reason for Consult: status epilepticus    Interval Hx / Changes over last 24 hours:   Remains on pressor support  2 episodes of extreme agitation and fighting vent per nursing  Lactic acid 7.08  WBC 25.6  Troponin 0.39  Ammonia down to 46  PHT 13.6 today  Blood cultures in process    ABG upon admission: 6.86, 55,99,15   1/13/23 06:04   pH, Arterial 7.273 (L)   pCO2, Arterial 30.7 (L)   pO2, Arterial 137.8 (H)   HCO3, Arterial 14.2 (L)   TCO2 (calc), Art 15   Base Excess, Arterial -13 (L)   O2 Sat, Arterial 99     Pressors: levo @ 9, Dom off, vaso @ 0.03  Pain: Fentanyl @ 100  Sedation: Versed @ 10, precedex @ 0.6    ROS: MICHI given mental status    HISTORY   Admission HPI:   Linda Mccord is a 28 y.o. y/o male with PMH significant for hepatitis C, HTN, NSTEMI, seizures, polysubstance abuse (methadone patient), and takotsubo cardiomyopathy. Per my interview with the patient's mother, the patient had multiple seizures at home during which he would stare off in the distance, turn his head to the side, and then have generalized shaking in all 4 extremities. Per patient's mother this is the same semiology as his previous seizures. EMS was called and the patient was transported to [facility] ED. Patient continued to seize while en route, and on arrival to the ED. Patient was intubated and a central line was placed. CT head showed no intracranial abnormalities. Patient also presented with leukocytosis, lactic acidosis, and fever with PNA seen on chest CT and XR. UDS positive for methadone, and fentanyl. During ED workup patient continued to have an elevated HR and was fighting the ventilator with poor oxygenation. Patient eventually sedated with versed, fentanyl, ketamine, and nimbex.   Patient also received a 3g of keppra in addition to ativan and propofol IV for his seizures. Patient was transferred to [facility] ICU for further evaluation and cvEEG. Of note:  Patient sees Dr. Anival Giordano at [facility] for his seizure management. Per the most recent note from an office visit in 10/2022, patient was admitted to [facility] EMU in 10/2022 where a seizure was captured on EEG. Patient has had multiple hospitalizations since then for his seizures. Patient was originally started on keppra, but took himself off it due to side effects. Patient has since been on oxcarbazepine of varying doses until Radha Smiles was added to his regimen in 03/2022. Most recent AED regimen consists of Xcopir titration to 200mg daily and Oxtellar 1800mg daily.         PMH Past Medical History:   Diagnosis Date    Aspiration pneumonia (HCC)     Hepatitis C     HTN (hypertension)     NSTEMI (non-ST elevated myocardial infarction) ([facility] [address]     Polysubstance abuse ([facility] [address]     Seizures ([facility] [address]     Septic shock (HCC)     Takotsubo cardiomyopathy       Allergies No Known Allergies   Diet Diet NPO   Isolation No active isolations     CURRENT SCHEDULED MEDICATIONS   Inpatient Medications     sodium chloride flush, 5-40 mL, IntraVENous, 2 times per day    pantoprazole, 40 mg, IntraVENous, Daily    heparin (porcine), 5,000 Units, SubCUTAneous, 3 times per day    linezolid, 600 mg, IntraVENous, Q12H    [COMPLETED] piperacillin-tazobactam, 4,500 mg, IntraVENous, Once **FOLLOWED BY** piperacillin-tazobactam, 3,375 mg, IntraVENous, Q8H    OXcarbazepine, 900 mg, Oral, BID    fosphenytoin (CEREBYX) maintenance dose IVPB, 100 mg PE, IntraVENous, Q8H   Infusions    norepinephrine 9 mcg/min (01/13/23 1814)    sodium chloride 100 mL/hr at 01/13/23 0611    fentaNYL 100 mcg/hr (01/13/23 0715)    midazolam 10 mg/hr (01/13/23 0611)    dexmedetomidine (PRECEDEX) IV infusion 0.6 mcg/kg/hr (01/13/23 9892)    sodium chloride      [Held by provider] ketamine      phenylephrine (MISSAEL-SYNEPHRINE) 50mg/250mL infusion Stopped (01/13/23 0610)    vasopressin (Septic Shock) infusion 0.03 Units/min (01/13/23 0400)      Antibiotics   Recent Abx Admin                     piperacillin-tazobactam (ZOSYN) 3,375 mg in dextrose 5 % 50 mL IVPB (mini-bag) (mg) 3,375 mg New Bag 01/13/23 0220    linezolid (ZYVOX) IVPB 600 mg (mg) 600 mg New Bag 01/13/23 0005    piperacillin-tazobactam (ZOSYN) 4,500 mg in dextrose 5 % 100 mL IVPB (mini-bag) (mg) 4,500 mg New Bag 01/12/23 2118    vancomycin 1000 mg IVPB in 250 mL D5W addavial (mg) 1,000 mg New Bag 01/12/23 1043    cefepime (MAXIPIME) 2000 mg IVPB minibag (mg) 2,000 mg New Bag 01/12/23 1009                      LABS   Metabolic Panel Recent Labs     01/12/23  0859 01/12/23  0928 01/12/23  2031 01/12/23  2148 01/13/23  0504   *  --  133*  --  129*   K 4.1  --  5.0  --  5.2*   CL 94*  --  99  --  97*   CO2 16*  --  24  --  14*   BUN 13  --  15  --  19   CREATININE 1.1  --  1.0  --  1.1   GLUCOSE 237*  --  132*  --  277*   CALCIUM 9.8  --  8.4  --  8.0*   LABALBU 4.5  --  3.6  --  3.2*   PHOS  --   --  5.5*  --  5.9*   ALKPHOS 168*  --  123  --  100   ALT 23  --  17  --  100*   AST 24  --  29  --  101*   AMMONIA  --  129*  --  46  --       CBC / Coags Recent Labs     01/12/23  0859 01/13/23  0504   WBC 26.0* 25.6*   RBC 5.15 4.60   HGB 15.8 14.1   HCT 49.1 41.8    287      Other Recent Labs     01/12/23  0918   COVID19 NOT DETECTED     Recent Labs     01/13/23  0502 01/13/23  0504   PHENYTOIN  --  13.6   LACTA 6.8*  --         DIAGNOSTICS   IMAGES:  Images personally reviewed and agree w/ radiology interpretation. Head CT w/o Contrast: no acute intracranial abnormality     CT Chest: No PE; multifocal pneumonia    CVEEG 1/12/23 11 PM - 1/13/23 7 AM  During the recording:   No focal slowing was seen. No epileptiform discharges or seizures were detected. Video was reviewed intermittently at times when significant amounts of EMG artifact were present.   The EKG monitoring channel did not detect any significant rhythm abnormalities. EEG Interpretation: This EEG is abnormal due to the presence of:   Severe generalized slowing. This is a non-specific finding but is consistent with a generalized disturbance of cerebral function. It may be seen in a variety of conditions, such as toxic, metabolic, post-anoxic, multi-focal, or diffuse structural abnormalities. PHYSICAL EXAMINATION     PHYSICAL EXAM:  Vitals:    01/13/23 0859 01/13/23 0900 01/13/23 0905 01/13/23 0915   BP:  95/72  95/71   Pulse: 72 76 74 75   Resp: 26 26 26 26   Temp:       TempSrc:       SpO2: 100% 100% 100% 100%   Weight:           Exam  Sedated on vent. Reportedly very agitated and strong in all fours with stimulation   Further exam deferred for patient comfort. Attending will examine on AM rounds. ASSESSMENT & RECOMMENDATIONS   Assessment:  Cami Warner is a 31 yo male with a history of intractable epilepsy (follows with Dr. Fred Carey) who presents in status epilepticus in the setting of hyperammonemia (resolved), pneumonia and sepsis. cvEEG initial read with severe generalized slowing. No abnormality on head CT. Plan:  - Continue cEEG  - OK to continue versed given status epilepticus (on cEEG) and sedation needed for ventilator synchrony and agitation   - MRI brain if/when able. Not urgent, given known history of epilepsy.   - Monitor ammonia level and PHT levels  - Management of sepsis as per ICU team  - Avoid nimbex  - Continue  mg q8h,  mg BID  - Will resume Xcopri when able to take PO (unable to crush for pharmacy).      LEIGHTON Bañuelos - CNP   Neurology & Neurocritical Care   1/13/2023 9:50 AM    ICU Patients:   Neurocritical Care Line: [phone]  [facility]: [facility] Neurocritical Care

## 2023-01-13 NOTE — PLAN OF CARE
Seen and examined. Mother is at bedside. Patient with history of temporal seizures follows with  neurology. Patient also has a history of polysubstance use. Patient also seems to have history of Takotsubo's cardiomyopathy. Patient is on pressors. Patient is intubated and sedated. Patient is febrile. Does have a leukocytosis metabolic acidosis lactic acidosis.       Impression  Septic shock  Leukocytosis  1 out of 2 blood culture positive gram-positive rods  Drug screen positive for fentanyl  History of temporal lobe epilepsy  Intubation  History of cardiomyopathy    Plan  Tanishao and Zachn, pressors to keep the map above 65  Neurology consultation, reconsultation and critical care consultation patient is critical  Mother updated

## 2023-01-13 NOTE — CONSULTS
Clinical Pharmacy Progress Note    Vancomycin - Management by Pharmacy    Consult Date(s): 01/13/23  Consulting Provider(s): Dr. Kerline Childs / Plan  Aspiration PNA- Vancomycin  Concurrent Antimicrobials:   Zosyn (Day 1/7)  Day of Vanc Therapy / Ordered Duration: Day 1/7  Current Dosing Method: Bayesian-Guided AUC Dosing  Therapeutic Goal: -600 mg/L*hr  Current Dose / Plan:   Patient SCr 1.1 (last SCr prior to this admission, was on 12/01/22 and was 0.7). UOP over the last 24 hours ~0.4 ml/kg/hr  There is concern for sepsis at this time, so will load patient with 1500 mg IV (~25 mg/kg) vancomycin followed by 1250 mg IV every 18 hours   Estimated  mg/L.hr and trough 13.8 mg/L. Will draw a random level tomorrow AM to confirm kinetics. Will continue to monitor clinical condition and make adjustments to regimen as appropriate. Thank you for consulting Pharmacy! Trev Padilla, PharmD  PGY-1 Pharmacy Resident  [facility]  [id number]  1/13/2023   11:30 AM        Interval update:  Patient recently transferred from [facility] to [facility] for status epilepticus 2/2 pneumonia and sepsis. He is currently ventilated and on fentanyl, midazolam, precedex, and norepinephrine drip. Tmax over last 24 hours 101.3 and WBC 26->25.6 with procalcitonin 5.56. Subjective/Objective:   Katrina Canada is a 35 y.o. male with a PMHx significant for hepatitis C, HTN, NSTEMI, polysubstance abuse, seizures, and takotsubo cardiomyopathy who is admitted with status epilepticus. Pharmacy is consulted to dose vancomycin.     Ht Readings from Last 1 Encounters:   01/13/23 5' 10.98\" (1.803 m)     Wt Readings from Last 1 Encounters:   01/13/23 135 lb 2.3 oz (61.3 kg)     Current & Prior Antimicrobial Regimen(s):  Unasyn (1/12)  Cefepime (1/12)  Linezolid (1/13)  Zosyn EI (1/12-Current)  Vancomycin PTD   1500 mg IV x1 LD (1/13)  1250 mg IV q18h (1/13-Current)    Vancomycin Level(s) / Doses:    Date Time Dose Type of Level / Level Interpretation                 Note: Serum levels collected for AUC-based dosing may be high if collected in close proximity to the dose administered. This is not necessarily indicative of toxicity. Cultures & Sensitivities:    Date Site Micro Susceptibility / Result   1/12 Blood Cx 1/2 Gram pos rods    1/12 Blood Cx 1/2 NGTD    1/12 Resp Cx Collected    1/13 Strep pneumo urine In process    1/13 MRSA nares PCR In process      Recent Labs     01/12/23  0859 01/12/23  2031 01/13/23  0504   CREATININE 1.1 1.0 1.1   BUN 13 15 19   WBC 26.0*  --  25.6*       Estimated Creatinine Clearance: 83 mL/min (based on SCr of 1.1 mg/dL).     Additional Lab Values / Findings of Note:    Recent Labs     01/12/23  2148   PROCAL 5.65*

## 2023-01-13 NOTE — PROGRESS NOTES
Admission: Patient received to room 4522 from Severiano Quiroga at [facility]. Patient admitted with Dx status epilepticus and ARF . Patient intubated and sedated on arrival with versed, ketamine,fentanyl, and nimbex for paralysis. Pt unresponsive to pain and unable to follow commands x4 extremities, PERRLA present but not opening eyes. Nimbex and ketamine held on admission and precedex gtt started. Tele monitor applied, rate and rhythm verified with monitor reader. (Sinus tachy). Assessment as documented. Sacral heart placed on coccyx. CHG bath given. Wu catheter replaced. A-Line inserted.

## 2023-01-13 NOTE — PROGRESS NOTES
CONTINUOUS EEG    Name:  Poonam Yusuf  Medical Record Number:  [id number]  Age: 28 y.o. Gender: male  : 1990  Today's Date:  2023  Room:  [address] [place] [address]  Vital Signs   Pulse (!) 127   Temp 98.3 °F (36.8 °C) (Oral)   Resp 26   SpO2 94%           Continuous EEG Testing Start Time:  . Comments: Mahnaz calvo [facility] contacted 8. Impedence on all leads are within normal limits. Today is the initial set up day for cvEEG. Patient head is NOT wrapped. Plan of Care: Begin monitoring.     Electronically signed by Babatunde Lyn on 2023 at 11:32 PM

## 2023-01-13 NOTE — PROGRESS NOTES
Comprehensive Nutrition Assessment    RECOMMENDATIONS:  Monitor pressor requirements for ability to feed  Nutrition Education: Education not appropriate   Monitor nutrition adequacy, pertinent labs, bowel habits, wt changes, and clinical progress    NUTRITION ASSESSMENT:   Nutritional summary & status: New vent: Pt intubated. Hypotensive on levo, theron and vaso. Sedated on fentanyl, precedex and versed. D/t high pressor requirements unable to provide nutrition support at this time. No wt loss noted in EMR. Will monitor pressor requirements to determine ability to feed. Will continue to monitor. Admission/PMH: Seizures, acute respiratory failure, polysubstance abuse    MALNUTRITION ASSESSMENT  Context of Malnutrition: Acute Illness   Malnutrition Status: At risk for malnutrition (Comment)  Findings of the 6 clinical characteristics of malnutrition (Minimum of 2 out of 6 clinical characteristics is required to make the diagnosis of moderate or severe Protein Calorie Malnutrition based on AND/ASPEN Guidelines):  Energy Intake:  Mild decrease in energy intake (Comment)  Weight Loss:  No significant weight loss     Body Fat Loss:  Unable to assess     Muscle Mass Loss:  Unable to assess    Fluid Accumulation:  No significant fluid accumulation        NUTRITION DIAGNOSIS   Inadequate oral intake related to impaired respiratory function as evidenced by intubation, NPO or clear liquid status due to medical condition    Nutrition Monitoring and Evaluation:   Food/Nutrient Intake Outcomes:  Enteral Nutrition Intake/Tolerance  Physical Signs/Symptoms Outcomes:  Biochemical Data, Nutrition Focused Physical Findings, Weight, Hemodynamic Status     OBJECTIVE DATA: Significant to nutrition assessment  Nutrition Related Findings: Na 129. K 5.2. . Phos 5.9. No BM since admission.   Wounds: None  Nutrition Goals: other (specify)     CURRENT NUTRITION THERAPIES  Diet NPO  PO Intake: NPO   PO Supplement Intake:NPO  Additional Sources of Calories/IVF:N/A     ANTHROPOMETRICS  Current Height: 5' 10.98\" (180.3 cm)  Current Weight: 135 lb 2.3 oz (61.3 kg)    Admission weight: 135 lb 2.3 oz (61.3 kg)  Ideal Body Weight (IBW): 172 lbs  (78 kg)    BMI: 0    COMPARATIVE STANDARDS  Energy (kcal):  5408-6191 (25-30 kcal/kg CBW)     Protein (g):   (1.2-2.0 g/kg CBW)       Fluid (mL/day):  1 mL/kcal or per MD    The patient will be monitored per nutrition standards of care. Consult dietitian if additional nutrition interventions are needed prior to RD reassessment.      [employer], [address] [place] [address], [address] [place] [address] Drive:  [phone]  Office:  [phone]

## 2023-01-13 NOTE — PROGRESS NOTES
CONTINUOUS EEG    Name:  Delores Lam  Medical Record Number:  [id number]  Age: 35 y.o. Gender: male  : 1990  Today's Date:  2023  Room:  [address] [place] [address]  Vital Signs   BP 95/71   Pulse 75   Temp (!) 101.3 °F (38.5 °C) (Bladder)   Resp 26   Wt 135 lb 2.3 oz (61.3 kg)   SpO2 100%   BMI 18.85 kg/m²       Patient currently on continuous EEG monitoring. All EEG leads are currently in place with no current issues. Verified [employer] connection via team viewer. Checked in with patient RN for current plan of care. Comments: Continue monitoring. All leads within 10K limit. Today is day 1 of cvEEG.     Electronically signed by Lamont Martin on 2023 at 10:31 AM

## 2023-01-14 LAB
ALBUMIN SERPL-MCNC: 2.8 G/DL (ref 3.4–5)
ALBUMIN SERPL-MCNC: 2.8 G/DL (ref 3.4–5)
ALP BLD-CCNC: 77 U/L (ref 40–129)
ALT SERPL-CCNC: 2380 U/L (ref 10–40)
AMMONIA: 47 UMOL/L (ref 16–60)
ANION GAP SERPL CALCULATED.3IONS-SCNC: 5 MMOL/L (ref 3–16)
ANION GAP SERPL CALCULATED.3IONS-SCNC: 6 MMOL/L (ref 3–16)
AST SERPL-CCNC: 1782 U/L (ref 15–37)
BASE EXCESS ARTERIAL: 1 MMOL/L (ref -3–3)
BASE EXCESS VENOUS: -0.1 MMOL/L (ref -2–3)
BASOPHILS ABSOLUTE: 0 K/UL (ref 0–0.2)
BASOPHILS RELATIVE PERCENT: 0.4 %
BILIRUB SERPL-MCNC: 0.5 MG/DL (ref 0–1)
BILIRUBIN DIRECT: <0.2 MG/DL (ref 0–0.3)
BILIRUBIN, INDIRECT: ABNORMAL MG/DL (ref 0–1)
BUN BLDV-MCNC: 14 MG/DL (ref 7–20)
BUN BLDV-MCNC: 15 MG/DL (ref 7–20)
CALCIUM SERPL-MCNC: 8.3 MG/DL (ref 8.3–10.6)
CALCIUM SERPL-MCNC: 8.4 MG/DL (ref 8.3–10.6)
CARBOXYHEMOGLOBIN ARTERIAL: 0.7 % (ref 0–1.5)
CARBOXYHEMOGLOBIN: 0.9 % (ref 0–1.5)
CHLORIDE BLD-SCNC: 104 MMOL/L (ref 99–110)
CHLORIDE BLD-SCNC: 106 MMOL/L (ref 99–110)
CO2: 23 MMOL/L (ref 21–32)
CO2: 24 MMOL/L (ref 21–32)
CREAT SERPL-MCNC: 0.7 MG/DL (ref 0.9–1.3)
CREAT SERPL-MCNC: 0.7 MG/DL (ref 0.9–1.3)
EOSINOPHILS ABSOLUTE: 0.1 K/UL (ref 0–0.6)
EOSINOPHILS RELATIVE PERCENT: 0.6 %
GFR SERPL CREATININE-BSD FRML MDRD: >60 ML/MIN/{1.73_M2}
GFR SERPL CREATININE-BSD FRML MDRD: >60 ML/MIN/{1.73_M2}
GLUCOSE BLD-MCNC: 104 MG/DL (ref 70–99)
GLUCOSE BLD-MCNC: 119 MG/DL (ref 70–99)
GLUCOSE BLD-MCNC: 121 MG/DL (ref 70–99)
GLUCOSE BLD-MCNC: 125 MG/DL (ref 70–99)
GLUCOSE BLD-MCNC: 85 MG/DL (ref 70–99)
GLUCOSE BLD-MCNC: 87 MG/DL (ref 70–99)
HCO3 ARTERIAL: 25 MMOL/L (ref 21–29)
HCO3 VENOUS: 24.8 MMOL/L (ref 24–28)
HCT VFR BLD CALC: 34.2 % (ref 40.5–52.5)
HEMOGLOBIN, ART, EXTENDED: 12.7 G/DL
HEMOGLOBIN, VEN, REDUCED: 8.4 %
HEMOGLOBIN: 11.8 G/DL (ref 13.5–17.5)
INR BLD: 1.71 (ref 0.87–1.14)
LYMPHOCYTES ABSOLUTE: 2.2 K/UL (ref 1–5.1)
LYMPHOCYTES RELATIVE PERCENT: 19.3 %
MAGNESIUM: 1.5 MG/DL (ref 1.8–2.4)
MCH RBC QN AUTO: 31.2 PG (ref 26–34)
MCHC RBC AUTO-ENTMCNC: 34.5 G/DL (ref 31–36)
MCV RBC AUTO: 90.3 FL (ref 80–100)
METHEMOGLOBIN ARTERIAL: 0.4 % (ref 0–1.4)
METHEMOGLOBIN VENOUS: 0.3 % (ref 0–1.5)
MONOCYTES ABSOLUTE: 0.4 K/UL (ref 0–1.3)
MONOCYTES RELATIVE PERCENT: 3.6 %
MRSA SCREEN RT-PCR: NORMAL
NEUTROPHILS ABSOLUTE: 8.8 K/UL (ref 1.7–7.7)
NEUTROPHILS RELATIVE PERCENT: 76.1 %
O2 SAT, ARTERIAL: >100 % (ref 93–100)
O2 SAT, VEN: 92 %
PCO2 ARTERIAL: 36.9 MMHG (ref 35–45)
PCO2, VEN: 40.5 MMHG (ref 41–51)
PDW BLD-RTO: 14.7 % (ref 12.4–15.4)
PERFORMED ON: ABNORMAL
PERFORMED ON: ABNORMAL
PERFORMED ON: NORMAL
PERFORMED ON: NORMAL
PH ARTERIAL: 7.44 (ref 7.35–7.45)
PH VENOUS: 7.39 (ref 7.35–7.45)
PHENYTOIN DOSE AMOUNT: NORMAL
PHENYTOIN LEVEL: 14 UG/ML (ref 10–20)
PHOSPHORUS: 1.8 MG/DL (ref 2.5–4.9)
PHOSPHORUS: 2.3 MG/DL (ref 2.5–4.9)
PLATELET # BLD: 117 K/UL (ref 135–450)
PMV BLD AUTO: 6.7 FL (ref 5–10.5)
PO2 ARTERIAL: 173 MMHG (ref 75–108)
PO2, VEN: 63 MMHG (ref 25–40)
POTASSIUM SERPL-SCNC: 4 MMOL/L (ref 3.5–5.1)
POTASSIUM SERPL-SCNC: 4.2 MMOL/L (ref 3.5–5.1)
PROTHROMBIN TIME: 20.1 SEC (ref 11.7–14.5)
RBC # BLD: 3.79 M/UL (ref 4.2–5.9)
SODIUM BLD-SCNC: 134 MMOL/L (ref 136–145)
SODIUM BLD-SCNC: 134 MMOL/L (ref 136–145)
STREP PNEUMONIAE ANTIGEN, URINE: NORMAL
TCO2 ARTERIAL: 26 MMOL/L
TCO2 CALC VENOUS: 26 MMOL/L
TOTAL PROTEIN: 5.1 G/DL (ref 6.4–8.2)
VANCOMYCIN RANDOM: 6.1 UG/ML
WBC # BLD: 11.5 K/UL (ref 4–11)

## 2023-01-14 PROCEDURE — 2500000003 HC RX 250 WO HCPCS: Performed by: STUDENT IN AN ORGANIZED HEALTH CARE EDUCATION/TRAINING PROGRAM

## 2023-01-14 PROCEDURE — 6370000000 HC RX 637 (ALT 250 FOR IP)

## 2023-01-14 PROCEDURE — 85610 PROTHROMBIN TIME: CPT

## 2023-01-14 PROCEDURE — 2000000000 HC ICU R&B

## 2023-01-14 PROCEDURE — 2700000000 HC OXYGEN THERAPY PER DAY

## 2023-01-14 PROCEDURE — 2580000003 HC RX 258

## 2023-01-14 PROCEDURE — 82140 ASSAY OF AMMONIA: CPT

## 2023-01-14 PROCEDURE — 6360000002 HC RX W HCPCS: Performed by: STUDENT IN AN ORGANIZED HEALTH CARE EDUCATION/TRAINING PROGRAM

## 2023-01-14 PROCEDURE — 99291 CRITICAL CARE FIRST HOUR: CPT | Performed by: INTERNAL MEDICINE

## 2023-01-14 PROCEDURE — 82803 BLOOD GASES ANY COMBINATION: CPT

## 2023-01-14 PROCEDURE — 99291 CRITICAL CARE FIRST HOUR: CPT | Performed by: NURSE PRACTITIONER

## 2023-01-14 PROCEDURE — 37799 UNLISTED PX VASCULAR SURGERY: CPT

## 2023-01-14 PROCEDURE — 80202 ASSAY OF VANCOMYCIN: CPT

## 2023-01-14 PROCEDURE — 6360000002 HC RX W HCPCS

## 2023-01-14 PROCEDURE — 94761 N-INVAS EAR/PLS OXIMETRY MLT: CPT

## 2023-01-14 PROCEDURE — 85025 COMPLETE CBC W/AUTO DIFF WBC: CPT

## 2023-01-14 PROCEDURE — 36415 COLL VENOUS BLD VENIPUNCTURE: CPT

## 2023-01-14 PROCEDURE — 83735 ASSAY OF MAGNESIUM: CPT

## 2023-01-14 PROCEDURE — 80185 ASSAY OF PHENYTOIN TOTAL: CPT

## 2023-01-14 PROCEDURE — 80076 HEPATIC FUNCTION PANEL: CPT

## 2023-01-14 PROCEDURE — C9113 INJ PANTOPRAZOLE SODIUM, VIA: HCPCS | Performed by: STUDENT IN AN ORGANIZED HEALTH CARE EDUCATION/TRAINING PROGRAM

## 2023-01-14 PROCEDURE — 94003 VENT MGMT INPAT SUBQ DAY: CPT

## 2023-01-14 PROCEDURE — 2580000003 HC RX 258: Performed by: STUDENT IN AN ORGANIZED HEALTH CARE EDUCATION/TRAINING PROGRAM

## 2023-01-14 PROCEDURE — 95813 EEG EXTND MNTR 61-119 MIN: CPT

## 2023-01-14 PROCEDURE — 80069 RENAL FUNCTION PANEL: CPT

## 2023-01-14 PROCEDURE — 2500000003 HC RX 250 WO HCPCS

## 2023-01-14 RX ORDER — MAGNESIUM SULFATE IN WATER 40 MG/ML
4000 INJECTION, SOLUTION INTRAVENOUS ONCE
Status: COMPLETED | OUTPATIENT
Start: 2023-01-14 | End: 2023-01-14

## 2023-01-14 RX ORDER — MIDAZOLAM HYDROCHLORIDE 1 MG/ML
INJECTION INTRAMUSCULAR; INTRAVENOUS
Status: COMPLETED
Start: 2023-01-14 | End: 2023-01-14

## 2023-01-14 RX ADMIN — PIPERACILLIN AND TAZOBACTAM 3375 MG: 3; .375 INJECTION, POWDER, LYOPHILIZED, FOR SOLUTION INTRAVENOUS at 18:13

## 2023-01-14 RX ADMIN — PROPOFOL 25 MCG/KG/MIN: 10 INJECTION, EMULSION INTRAVENOUS at 13:34

## 2023-01-14 RX ADMIN — DEXMEDETOMIDINE 1.2 MCG/KG/HR: 100 INJECTION, SOLUTION INTRAVENOUS at 03:00

## 2023-01-14 RX ADMIN — Medication 175 MCG/HR: at 03:50

## 2023-01-14 RX ADMIN — PANTOPRAZOLE SODIUM 40 MG: 40 INJECTION, POWDER, LYOPHILIZED, FOR SOLUTION INTRAVENOUS at 09:28

## 2023-01-14 RX ADMIN — MAGNESIUM SULFATE HEPTAHYDRATE 4000 MG: 40 INJECTION, SOLUTION INTRAVENOUS at 16:06

## 2023-01-14 RX ADMIN — DEXMEDETOMIDINE 1.2 MCG/KG/HR: 100 INJECTION, SOLUTION INTRAVENOUS at 09:26

## 2023-01-14 RX ADMIN — VANCOMYCIN HYDROCHLORIDE 1250 MG: 10 INJECTION, POWDER, LYOPHILIZED, FOR SOLUTION INTRAVENOUS at 20:15

## 2023-01-14 RX ADMIN — FOSPHENYTOIN SODIUM 100 MG PE: 50 INJECTION, SOLUTION INTRAMUSCULAR; INTRAVENOUS at 22:45

## 2023-01-14 RX ADMIN — DEXMEDETOMIDINE 1.2 MCG/KG/HR: 100 INJECTION, SOLUTION INTRAVENOUS at 15:39

## 2023-01-14 RX ADMIN — FOSPHENYTOIN SODIUM 100 MG PE: 50 INJECTION, SOLUTION INTRAMUSCULAR; INTRAVENOUS at 13:36

## 2023-01-14 RX ADMIN — Medication 175 MCG/HR: at 09:27

## 2023-01-14 RX ADMIN — SODIUM CHLORIDE, PRESERVATIVE FREE 10 ML: 5 INJECTION INTRAVENOUS at 09:28

## 2023-01-14 RX ADMIN — Medication 175 MCG/HR: at 21:44

## 2023-01-14 RX ADMIN — POTASSIUM PHOSPHATE, MONOBASIC POTASSIUM PHOSPHATE, DIBASIC 20 MMOL: 224; 236 INJECTION, SOLUTION, CONCENTRATE INTRAVENOUS at 01:28

## 2023-01-14 RX ADMIN — HEPARIN SODIUM 5000 UNITS: 5000 INJECTION INTRAVENOUS; SUBCUTANEOUS at 14:24

## 2023-01-14 RX ADMIN — VANCOMYCIN HYDROCHLORIDE 1250 MG: 10 INJECTION, POWDER, LYOPHILIZED, FOR SOLUTION INTRAVENOUS at 06:32

## 2023-01-14 RX ADMIN — Medication 175 MCG/HR: at 15:42

## 2023-01-14 RX ADMIN — MIDAZOLAM 5 MG: 1 INJECTION INTRAMUSCULAR; INTRAVENOUS at 18:03

## 2023-01-14 RX ADMIN — OXCARBAZEPINE 900 MG: 300 TABLET, FILM COATED ORAL at 20:25

## 2023-01-14 RX ADMIN — PROPOFOL 30 MCG/KG/MIN: 10 INJECTION, EMULSION INTRAVENOUS at 05:30

## 2023-01-14 RX ADMIN — FOSPHENYTOIN SODIUM 100 MG PE: 50 INJECTION, SOLUTION INTRAMUSCULAR; INTRAVENOUS at 07:00

## 2023-01-14 RX ADMIN — OXCARBAZEPINE 900 MG: 300 TABLET, FILM COATED ORAL at 09:37

## 2023-01-14 RX ADMIN — PIPERACILLIN AND TAZOBACTAM 3375 MG: 3; .375 INJECTION, POWDER, LYOPHILIZED, FOR SOLUTION INTRAVENOUS at 02:48

## 2023-01-14 RX ADMIN — HEPARIN SODIUM 5000 UNITS: 5000 INJECTION INTRAVENOUS; SUBCUTANEOUS at 06:30

## 2023-01-14 RX ADMIN — DEXMEDETOMIDINE 1.2 MCG/KG/HR: 100 INJECTION, SOLUTION INTRAVENOUS at 22:31

## 2023-01-14 RX ADMIN — PIPERACILLIN AND TAZOBACTAM 3375 MG: 3; .375 INJECTION, POWDER, LYOPHILIZED, FOR SOLUTION INTRAVENOUS at 09:47

## 2023-01-14 RX ADMIN — SODIUM CHLORIDE, PRESERVATIVE FREE 10 ML: 5 INJECTION INTRAVENOUS at 20:20

## 2023-01-14 RX ADMIN — HEPARIN SODIUM 5000 UNITS: 5000 INJECTION INTRAVENOUS; SUBCUTANEOUS at 22:14

## 2023-01-14 ASSESSMENT — PULMONARY FUNCTION TESTS
PIF_VALUE: 23
PIF_VALUE: 23
PIF_VALUE: 24
PIF_VALUE: 19
PIF_VALUE: 21
PIF_VALUE: 21
PIF_VALUE: 23
PIF_VALUE: 21
PIF_VALUE: 20
PIF_VALUE: 19
PIF_VALUE: 25
PIF_VALUE: 23
PIF_VALUE: 25
PIF_VALUE: 20
PIF_VALUE: 23
PIF_VALUE: 21
PIF_VALUE: 23
PIF_VALUE: 26
PIF_VALUE: 23
PIF_VALUE: 24
PIF_VALUE: 23
PIF_VALUE: 20
PIF_VALUE: 23
PIF_VALUE: 25
PIF_VALUE: 21
PIF_VALUE: 21
PIF_VALUE: 24
PIF_VALUE: 23
PIF_VALUE: 22
PIF_VALUE: 23
PIF_VALUE: 25
PIF_VALUE: 25
PIF_VALUE: 21
PIF_VALUE: 20
PIF_VALUE: 25
PIF_VALUE: 20
PIF_VALUE: 20
PIF_VALUE: 21
PIF_VALUE: 20
PIF_VALUE: 20
PIF_VALUE: 29
PIF_VALUE: 20
PIF_VALUE: 23
PIF_VALUE: 23
PIF_VALUE: 20
PIF_VALUE: 24
PIF_VALUE: 20
PIF_VALUE: 25
PIF_VALUE: 21
PIF_VALUE: 20
PIF_VALUE: 21

## 2023-01-14 NOTE — PROGRESS NOTES
Round with Dr Salvador Farrell + ICU Team/ plan to decrease versed gtt by 1mg every 2 hours/ will relay to Neuro on am rounds     0945 Round with Sheryl Jeffery PN/ joel/ ok to cont weaning versed / cont eeg in place/ see orders/ notes/ flowheet    1400  versed weaned off/ Dr Radha Henning spoke with family at bedside with plan to start vent wean trials tomorrow/ see orders/ notes/ flow sheet/emar

## 2023-01-14 NOTE — PROCEDURES
CONTINUOUS VIDEO ELECTROENCEPHALOGRAM (cvEEG) REPORT    Identifying Information:  Name: Nerissa Merrill  MRN: [id number]  : 1990  Attending Physician: Stacia Reese MD  Interpreting Physician: Quang Vu MD  Reporting Physician: Cheryl Garrison MD,     Clinical History:  Nerissa Merrill is an 35 y.o. male who was admitted on 2023 with a primary diagnosis of seizures    Past Medical History:  Past Medical History:   Diagnosis Date    Aspiration pneumonia ([facility]     Hepatitis C     HTN (hypertension)     NSTEMI (non-ST elevated myocardial infarction) ([facility]     Polysubstance abuse ([facility]     Seizures ([facility]     Septic shock ([facility]     Takotsubo cardiomyopathy        Current Medications:    vancomycin  1,250 mg IntraVENous Q18H    sodium chloride flush  5-40 mL IntraVENous 2 times per day    pantoprazole  40 mg IntraVENous Daily    heparin (porcine)  5,000 Units SubCUTAneous 3 times per day    piperacillin-tazobactam  3,375 mg IntraVENous Q8H    OXcarbazepine  900 mg Oral BID    fosphenytoin (CEREBYX) maintenance dose IVPB  100 mg PE IntraVENous Q8H       cEEG start date & time: 2023 at 7 am  cEEG end date & time: 2023 at 7 am    Indication:  cEEG was initiated for monitoring and localization of seizures as the etiology of the altered mental status. It was available for review at the bedside as well as via remote access for the entire period of monitoring. Technical Summary:  32 channels of continuous EEG and video were recorded in a digital format on a patient who is reported to be intubated and sedated during the recording. The background rhythm consists of 1-3 Hz activity in the delta frequency range. Over riding fast activity seen as can be seen with sedated record. No well formed PDR,not reactive to stimulation. During the recording:   No focal slowing was seen. No epileptiform discharges or seizures were detected.    Video was reviewed intermittently at times when significant amounts of EMG artifact were present. The EKG monitoring channel did not detect any significant rhythm abnormalities. EEG Interpretation: This EEG is abnormal due to the presence of:     Severe generalized slowing. This is a non-specific finding but is consistent with a generalized disturbance of cerebral function. It may be seen in a variety of conditions, such as toxic, metabolic, post-anoxic, multi-focal, or diffuse structural abnormalities. Clinical correlation is recommended.          Madison Almonte MD, MD  1/14/2023

## 2023-01-14 NOTE — PROGRESS NOTES
Patient again became extremely agitated with repositioning in bed. Patient biting and trying to tongue ET tube out. Staff assist called. Patient was given 4 mg Versed bolus, Propofol infusion started. Patient calmed after a few minutes. Will continue to monitor.

## 2023-01-14 NOTE — PROGRESS NOTES
ICU Progress Note    Admit Date: 1/12/2023  Day: 2  Vent Day: 2  IV Access:Peripheral, CVC  IV Fluids: 100/hr NS  Vasopressors:None                Antibiotics: Vanc/ Zosyn  Diet: Diet NPO  ADULT TUBE FEEDING; Nasogastric; Peptide Based; Continuous; 25; Yes; 15; Q 6 hours; 55; 30; Q 4 hours    CC: Status epilepticus    Interval history:   Patient was able to wean off pressors. Acidosis resolved, pH overcompensated, vent rate decreased with improvement of pH from 7.59 to 7.439. Afebrile overnight, temp dropped down to 96.3. Patient not responding to painful stimulus this morning. UOP appropriate. HPI:   Per resident HPI: Jessica Pruitt is a 26-year-old M with PMH of seizures, hep C, uncontrolled hypertension, takatsubo cardiomyopathy, COPD, temporal seizures, 20 yr tobacco use, HFrEF, polysubstance abuse (heroine and fentanyl) who presents to [facility] ED with seizures. EMS was called for witnessed seizure. Patient was actively seizing when he arrived to the ED. He received multiple doses of benzos on route that did not improve his seizure. He was actively seizing for about an hour. He never returned to baseline. He was eventually intubated due to hypoxia and not protecting his airway. Other tests remarkable for EKG showed new LBBB concern for underlying ischemia he was taken to the Cath Lab. Cardiology felt it was likely sinus tachycardia with aberrancy. Troponins were negative. Other labs remarkable for lactic acid was 6.9, ammonia 129, UDS was positive for cannabinoids methadone and fentanyl. Patient is a known IV drug user and is on methadone at home. UA was negative for any infection. Imaging studies were done and showed CT PE negative for any PE but showed multifocal pneumonia, CT head showed no intra cranial abnormality.   He was treated with large-volume IV fluids and started on broad-spectrum antibiotics he had some worsening hypoxia with pulmonary edema and ultimately required Lasix. He was put on IV Versed, IV fentanyl and IV ketamine for sedation and requirement of paralysis for management of improved oxygenation on the ventilator. \"    Medications:     Scheduled Meds:   vancomycin  1,250 mg IntraVENous Q12H    sodium chloride flush  5-40 mL IntraVENous 2 times per day    pantoprazole  40 mg IntraVENous Daily    heparin (porcine)  5,000 Units SubCUTAneous 3 times per day    piperacillin-tazobactam  3,375 mg IntraVENous Q8H    OXcarbazepine  900 mg Oral BID    fosphenytoin (CEREBYX) maintenance dose IVPB  100 mg PE IntraVENous Q8H     Continuous Infusions:   norepinephrine Stopped (01/13/23 2053)    sodium chloride 100 mL/hr at 01/14/23 0839    propofol 25 mcg/kg/min (01/14/23 0839)    DOBUTamine 2.5 mcg/kg/min (01/14/23 0839)    fentaNYL 175 mcg/hr (01/14/23 0927)    midazolam 2 mg/hr (01/14/23 1304)    dexmedetomidine (PRECEDEX) IV infusion 1.2 mcg/kg/hr (01/14/23 0926)    sodium chloride      phenylephrine (MISSAEL-SYNEPHRINE) 50mg/250mL infusion Stopped (01/13/23 0610)    vasopressin (Septic Shock) infusion 0.03 Units/min (01/13/23 0400)     PRN Meds:prochlorperazine, sodium chloride flush, sodium chloride, polyethylene glycol, acetaminophen **OR** acetaminophen    Objective:   Vitals:   T-max:  Patient Vitals for the past 8 hrs:   BP Temp Temp src Pulse Resp SpO2   01/14/23 1304 129/88 97.5 °F (36.4 °C) Oral 54 18 99 %   01/14/23 1300 129/88 97 °F (36.1 °C) Temporal 53 18 99 %   01/14/23 1209 -- -- -- 53 18 99 %   01/14/23 1100 (!) 135/100 -- -- 53 18 100 %   01/14/23 1005 -- -- -- 53 18 100 %   01/14/23 1000 (!) 129/94 -- -- 52 18 100 %   01/14/23 0900 118/89 -- -- 55 18 99 %   01/14/23 0800 113/86 (!) 96.3 °F (35.7 °C) Bladder 58 18 98 %   01/14/23 0715 -- -- -- 58 18 96 %   01/14/23 0700 (!) 119/90 -- -- 55 18 99 %   01/14/23 0645 -- -- -- 56 18 99 %   01/14/23 0630 117/89 -- -- 56 18 99 %   01/14/23 0615 -- -- -- 59 18 98 %   01/14/23 0600 115/88 -- -- 58 18 98 %   01/14/23 0545 -- -- -- 60 18 97 %   01/14/23 0530 118/86 -- -- 63 18 96 %   01/14/23 0515 -- -- -- 85 19 94 %       Intake/Output Summary (Last 24 hours) at 1/14/2023 1310  Last data filed at 1/14/2023 1157  Gross per 24 hour   Intake 4086.94 ml   Output 3325 ml   Net 761.94 ml       Review of Systems   Unable to perform ROS: Acuity of condition     Physical Exam  Constitutional:       Appearance: He is ill-appearing and toxic-appearing. HENT:      Mouth/Throat:      Comments: Intubated  Cardiovascular:      Rate and Rhythm: Regular rhythm. Bradycardia present. Heart sounds: Normal heart sounds. Pulmonary:      Comments: Mechanical breath sounds  Abdominal:      General: Abdomen is flat. Palpations: Abdomen is soft. Musculoskeletal:      Right lower leg: No edema. Left lower leg: No edema. Skin:     Coloration: Skin is pale. Skin is not jaundiced. Neurological:      Comments: Deeply sedated, no response to painful stimulus         LABS:    CBC:   Recent Labs     01/12/23 0859 01/13/23 0504 01/14/23 0340   WBC 26.0* 25.6* 11.5*   HGB 15.8 14.1 11.8*   HCT 49.1 41.8 34.2*    287 117*   MCV 95.3 90.9 90.3     Renal:    Recent Labs     01/13/23 0504 01/13/23 2251 01/14/23 0340   * 134* 134*   K 5.2* 4.0 4.2   CL 97* 104 106   CO2 14* 24 23   BUN 19 15 14   CREATININE 1.1 0.7* 0.7*   GLUCOSE 277* 104* 125*   CALCIUM 8.0* 8.3 8.4   MG  --   --  1.50*   PHOS 5.9* 1.8* 2.3*   ANIONGAP 18* 6 5     Hepatic:   Recent Labs     01/12/23 2031 01/13/23  0504 01/13/23 2251 01/14/23  0340   AST 29 101*  --  1,782*   ALT 17 100*  --  2,380*   BILITOT <0.2 0.6  --  0.5   BILIDIR <0.2 0.3  --  <0.2   PROT 6.6 5.7*  --  5.1*   LABALBU 3.6 3.2* 2.8* 2.8*   ALKPHOS 123 100  --  77     Troponin:   Recent Labs     01/13/23  0908 01/13/23  1457 01/13/23  1916   TROPONINI 0.29* 0.18* 0.19*     BNP: No results for input(s): BNP in the last 72 hours.   Lipids: No results for input(s): CHOL, HDL in the last 72 hours.    Invalid input(s): LDLCALCU, TRIGLYCERIDE  ABGs:    Recent Labs     01/13/23  0604 01/13/23  2100 01/13/23  2251   PHART 7.273* 7.509* 7.439   KRZ3BRL 30.7* 29.6* 36.9   PO2ART 137.8* 159.0* 173.0*   RHI3MNP 14.2* 24 25   BEART -13* 1.4 1.0   Y5BTQLDB 99 >100 >100   SLN8ALA 15 25 26       INR: No results for input(s): INR in the last 72 hours. Lactate:   Recent Labs     01/12/23  1942 01/13/23  0604   LACTATE 2.91* 7.08*       RAD:   XR CHEST PORTABLE   Final Result      The tip of the endotracheal tube projects over the mid trachea. Improved left-sided airspace disease and unchanged right mid and basilar airspace disease. Assessment/Plan:   Malissa Frazier is a 77-year-old M with PMH of temporal lobe seizures, hep C, uncontrolled HTN, Takutsobo cardiomyopathy, COPD, 20 pack-year smoking, HFrEF, polysubstance abuse (heroin and fentanyl on methadone) who presents to [address] [place] [address] ED with seizures. Acute hypoxic respiratory failure 2/2 Septic Shock 2/2 Multifocal Pneumonia  Intubated for hypoxic respiratory failure 2/2 septic shock.  - Sedation: Fentanyl 100, Versed 8, Precedex 0.6   -Wean Versed   -Wean sedation for spontaneous awakening trial and SBT  Vent Settings: Vent Mode: AC/PRVC Resp Rate (Set): 26 bmp/Vt (Set, mL): 550 mL/ / FiO2 : 50 % / PEEP 5 / PIP 22   - Zyvox (1/13 only) and Zosyn 01/12/23  - Vanc started, Zyvox d/c due to better tissue penetration of possible endocarditis  - Pro-calcitonin 5.65  - Respiratory cultures pending  - MRSA nasal swab pending  - Blood Cx from [address] [place] [address]: Anaerobic Gram + rods on gram stain  - Arterial Line placed 01/12/23  - Echo to r/o endocarditis or decompensated HFREF             - EF <20% w/ diffuse hypokinesis. No vegetations. Low EF likely due to active septic shock and myocardial demand ischemia given Troponinemia. Should hold Entresto given need for pressors per Cards recs.     Ischemic Hepatitis:  -Jump in ALT/ AST to 1782/ 2380 from 100/100 this morning  -Trend daily  - F/u INR     Acute encephalopathy 2/2 status epilepticus  History of right temporal epilepsy diagnosed in 2019 in the setting of a prior TBI. He has known epilepsy, he is on Oxtellar and Xcopri at home and is known to be noncompliant, UDS was positive for fentanyl, and he was septic on arrival to the ED which could be a reason to put him into status epilepticus.  - Neurology consulted, appreciate recs  - cEEG  - Wean Versed  - Q1H neuro checks  - Oxcarbazepine 900mg BID  - Fosphenytoin 100mg Q8H IV  - Will resume Xcopri when able to take PO (unable to crush for pharmacy)  - MRI Brain with and without contrast when able  - Avoid nimbex     Anion gap metabolic acidosis 2/2 lactic acidosis - Resolved  Lactic acidosis 6.9> 2.91     New Left Bundle Branch Block  Per Cards: New LBBB/previous history of stress-induced, cardiomyopathy with borderline recovered LV function/elevated troponin/demand ischemia. - Cardiology consulted; appreciate recs  - Likely demand ischemia from sepsis. Unlikely true ACS given normal cath in 2021  -Telemetry  -Repeat EKG  -Trending Troponin: 0.01> 0.39  - No need for heparin gtt  - Prev had IVCD and LVH, LBBB likely progression of CMP. - will need CHF meds once stable enough     Hyperammonemia - resolved  Ammonia level of 129> 46  Ammonia levels known to rise 2/2 Seizure activity therefore no lactulose ordered. Resolved on repeat draw.  -No need to trend     Chronic Problems:  COPD - Duonebs  HTN - Holding home coreg  HFrEF - LVEF in March 2022 of 45-50% - Holding Home Entresto  Tobacco use   Polysubstance Abuse - holding home methadone       Code Status: Prior  FEN: Start tube feeds today, RD consulted  PPX:  SQH and Protonix  DISPO: ICU      Tharon Aase, DO, PGY-1  01/14/23  1:10 PM    This patient has been staffed and discussed with Dr. Chela Esteves    Patient examined, findings as discussed with Dr. Gustavo Smith. Agree with assessment and plan. Management of critical illness required for severe sepsis with respiratory failure, secondary to pneumonia. Shock has resolved. Endeavored to decrease sedation. Weaning off Versed, as we monitor for possible seizure breakthrough. No seizures have been recorded since arrival here. Ventilator support continued with minute volume 10 to 12 L. FiO2 has been decreased to 30%, with PEEP 5 cm. Gas exchange clearly improving. Starting enteral nutrition per NGT  Family updated regarding progress and plan.   Time spent in critical care 45 minutes

## 2023-01-14 NOTE — PROGRESS NOTES
PT AWAKE sitting up trying to pull out his breathing tube / called ICU team at bedside/ ok to give fentanyl 50mcgs iv bolus/ no change/ ordered to give versed 5mg ivp now The patient is advised to reduce or avoid restarting gtt/ see emar

## 2023-01-14 NOTE — PLAN OF CARE
Problem: Discharge Planning  Goal: Discharge to home or other facility with appropriate resources  1/14/2023 1556 by Roberto Ross RN  Outcome: Progressing  Flowsheets (Taken 1/14/2023 0730)  Discharge to home or other facility with appropriate resources: Identify barriers to discharge with patient and caregiver  1/14/2023 0537 by Gerhardt Stake, RN  Outcome: Progressing     Problem: Safety - Medical Restraint  Goal: Remains free of injury from restraints (Restraint for Interference with Medical Device)  Description: INTERVENTIONS:  1. Determine that other, less restrictive measures have been tried or would not be effective before applying the restraint  2. Evaluate the patient's condition at the time of restraint application  3. Inform patient/family regarding the reason for restraint  4.  Q2H: Monitor safety, psychosocial status, comfort, nutrition and hydration  1/14/2023 1556 by Roberto Ross RN  Outcome: Progressing  Flowsheets (Taken 1/14/2023 0800)  Remains free of injury from restraints (restraint for interference with medical device):   Determine that other, less restrictive measures have been tried or would not be effective before applying the restraint   Evaluate the patient's condition at the time of restraint application   Inform patient/family regarding the reason for restraint   Every 2 hours: Monitor safety, psychosocial status, comfort, nutrition and hydration  1/14/2023 0537 by Gerhardt Stake, RN  Outcome: Progressing     Problem: Pain  Goal: Verbalizes/displays adequate comfort level or baseline comfort level  1/14/2023 1556 by Roberto Ross RN  Outcome: Progressing  1/14/2023 0537 by Gerhardt Stake, RN  Outcome: Progressing  Flowsheets (Taken 1/13/2023 2300)  Verbalizes/displays adequate comfort level or baseline comfort level:   Encourage patient to monitor pain and request assistance   Assess pain using appropriate pain scale   Administer analgesics based on type and severity of pain and evaluate response   Implement non-pharmacological measures as appropriate and evaluate response   Consider cultural and social influences on pain and pain management   Notify Licensed Independent Practitioner if interventions unsuccessful or patient reports new pain     Problem: Skin/Tissue Integrity  Goal: Absence of new skin breakdown  Description: 1. Monitor for areas of redness and/or skin breakdown  2. Assess vascular access sites hourly  3. Every 4-6 hours minimum:  Change oxygen saturation probe site  4. Every 4-6 hours:  If on nasal continuous positive airway pressure, respiratory therapy assess nares and determine need for appliance change or resting period. 1/14/2023 1556 by Gildardo Finney RN  Outcome: Progressing  1/14/2023 0537 by Tammy Mckee RN  Outcome: Progressing     Problem: Safety - Adult  Goal: Free from fall injury  1/14/2023 1556 by Gildardo Finney RN  Outcome: Progressing  1/14/2023 0537 by Tammy Mckee RN  Outcome: Progressing     Problem: ABCDS Injury Assessment  Goal: Absence of physical injury  1/14/2023 1556 by Gildardo Finney RN  Outcome: Progressing  1/14/2023 0537 by Tammy Mckee RN  Outcome: Progressing     Problem: Neurosensory - Adult  Goal: Achieves stable or improved neurological status  1/14/2023 1556 by Gildardo Finney RN  Outcome: Progressing  Flowsheets (Taken 1/14/2023 0730)  Achieves stable or improved neurological status: Assess for and report changes in neurological status  1/14/2023 0537 by Tammy Mckee RN  Outcome: Progressing  Goal: Absence of seizures  1/14/2023 1556 by Gildardo Finney RN  Outcome: Progressing  Flowsheets (Taken 1/14/2023 0730)  Absence of seizures: Monitor for seizure activity.   If seizure occurs, document type and location of movements and any associated apnea  1/14/2023 0537 by Tammy Mckee RN  Outcome: Progressing  Goal: Remains free of injury related to seizures activity  1/14/2023 1556 by Gildardo Finney RN  Outcome: Progressing  Flowsheets (Taken 1/14/2023 0776)  Remains free of injury related to seizure activity: Maintain airway, patient safety  and administer oxygen as ordered  1/14/2023 0537 by Jamee Waters RN  Outcome: Progressing     Problem: Respiratory - Adult  Goal: Achieves optimal ventilation and oxygenation  1/14/2023 1556 by Jerome Cabello RN  Outcome: Progressing  1/14/2023 0537 by Jamee Waters RN  Outcome: Progressing     Problem: Cardiovascular - Adult  Goal: Maintains optimal cardiac output and hemodynamic stability  1/14/2023 1556 by Jerome Cabello RN  Outcome: Progressing  1/14/2023 0537 by Jamee Waters RN  Outcome: Progressing     Problem: Genitourinary - Adult  Goal: Urinary catheter remains patent  1/14/2023 1556 by Jerome Cabello RN  Outcome: Progressing  1/14/2023 0537 by Jamee Waters RN  Outcome: Progressing     Problem: Nutrition Deficit:  Goal: Optimize nutritional status  1/14/2023 1556 by Jerome Cabello RN  Outcome: Progressing  1/14/2023 0537 by Jamee Waters RN  Outcome: Progressing

## 2023-01-14 NOTE — PROGRESS NOTES
CONTINUOUS EEG    Name:  Tanya Rizo  Medical Record Number:  [id number]  Age: 35 y.o. Gender: male  : 1990  Today's Date:  2023  Room:  [address] [place] [address]  Vital Signs   BP (!) 125/92   Pulse 55   Temp 97.4 °F (36.3 °C) (Temporal)   Resp 18   Ht 5' 10.98\" (1.803 m)   Wt 136 lb 3.9 oz (61.8 kg)   SpO2 99%   BMI 19.01 kg/m²       Patient currently on continuous EEG monitoring. All EEG leads are currently in place with no current issues. Verified [employer] connection via team viewer. Checked in with patient RN for current plan of care. Comments: Continue monitoring. All leads within 10K limit. Today is day 2 of cvEEG.     Electronically signed by Lizette Johnson on 2023 at 3:33 PM

## 2023-01-14 NOTE — PROGRESS NOTES
NEUROLOGY / NEUROCRITICAL CARE PROGRESS NOTE       Patient Name: Radha Rangel YOB: 1990   Sex: Male Age: 28 yrs     CC / Reason for Consult: status epilepticus    Interval Hx / Changes over last 24 hours:   Agitated at times overnight  Attempted to self extubate  Now on propofol (25) and versed (6)  PHT 14  Na 133  No seizures on cEEG    ROS: unobtainable given mental status and sedation    HISTORY   Admission HPI:   Radha Rangel is a 28 y.o. y/o male with PMH significant for hepatitis C, HTN, NSTEMI, seizures, polysubstance abuse (methadone patient), and takotsubo cardiomyopathy. Per my interview with the patient's mother, the patient had multiple seizures at home during which he would stare off in the distance, turn his head to the side, and then have generalized shaking in all 4 extremities. Per patient's mother this is the same semiology as his previous seizures. EMS was called and the patient was transported to [facility] ED. Patient continued to seize while en route, and on arrival to the ED. Patient was intubated and a central line was placed. CT head showed no intracranial abnormalities. Patient also presented with leukocytosis, lactic acidosis, and fever with PNA seen on chest CT and XR. UDS positive for methadone, and fentanyl. During ED workup patient continued to have an elevated HR and was fighting the ventilator with poor oxygenation. Patient eventually sedated with versed, fentanyl, ketamine, and nimbex. Patient also received a 3g of keppra in addition to ativan and propofol IV for his seizures. Patient was transferred to [facility] ICU for further evaluation and cvEEG. Of note:  Patient sees Dr. Efrain Suresh at [facility] for his seizure management. Per the most recent note from an office visit in 10/2022, patient was admitted to [facility] EMU in 10/2022 where a seizure was captured on EEG. Patient has had multiple hospitalizations since then for his seizures.   Patient was originally started on keppra, but took himself off it due to side effects. Patient has since been on oxcarbazepine of varying doses until Keira Corey was added to his regimen in 03/2022. Most recent AED regimen consists of Xcopir titration to 200mg daily and Oxtellar 1800mg daily.       PMH Past Medical History:   Diagnosis Date    Aspiration pneumonia (HCC)     Hepatitis C     HTN (hypertension)     NSTEMI (non-ST elevated myocardial infarction) ([facility]     [facility]     Seizures ([facility]     Septic shock ([facility]     Takotsubo cardiomyopathy       Allergies No Known Allergies   Diet Diet NPO   Isolation No active isolations     CURRENT SCHEDULED MEDICATIONS   Inpatient Medications     vancomycin, 1,250 mg, IntraVENous, Q12H    sodium chloride flush, 5-40 mL, IntraVENous, 2 times per day    pantoprazole, 40 mg, IntraVENous, Daily    heparin (porcine), 5,000 Units, SubCUTAneous, 3 times per day    [COMPLETED] piperacillin-tazobactam, 4,500 mg, IntraVENous, Once **FOLLOWED BY** piperacillin-tazobactam, 3,375 mg, IntraVENous, Q8H    OXcarbazepine, 900 mg, Oral, BID    fosphenytoin (CEREBYX) maintenance dose IVPB, 100 mg PE, IntraVENous, Q8H   Infusions    norepinephrine Stopped (01/13/23 2053)    sodium chloride 100 mL/hr at 01/14/23 0839    propofol 25 mcg/kg/min (01/14/23 0839)    DOBUTamine 2.5 mcg/kg/min (01/14/23 0839)    fentaNYL 175 mcg/hr (01/14/23 0927)    midazolam 6 mg/hr (01/14/23 0839)    dexmedetomidine (PRECEDEX) IV infusion 1.2 mcg/kg/hr (01/14/23 0926)    sodium chloride      phenylephrine (MISSAEL-SYNEPHRINE) 50mg/250mL infusion Stopped (01/13/23 0610)    vasopressin (Septic Shock) infusion 0.03 Units/min (01/13/23 0400)      Antibiotics   Recent Abx Admin                     piperacillin-tazobactam (ZOSYN) 3,375 mg in dextrose 5 % 50 mL IVPB (mini-bag) (mg) 3,375 mg New Bag 01/14/23 0947     3,375 mg New Bag  0248     3,375 mg New Bag 01/13/23 1841     3,375 mg New Bag  1007    vancomycin (VANCOCIN) 1,250 mg in dextrose 5 % 250 mL IVPB (mg) 1,250 mg New Bag 01/14/23 5371    vancomycin (VANCOCIN) 1,500 mg in dextrose 5 % 250 mL IVPB (mg) 1,500 mg New Bag 01/13/23 1210                      LABS   Metabolic Panel Recent Labs     01/12/23  0859 01/12/23  0928 01/12/23  2031 01/12/23  2148 01/13/23  0504 01/13/23 2251 01/14/23  0340   NA  --   --  133*  --  129* 134* 134*   K   < >  --  5.0  --  5.2* 4.0 4.2   CL  --   --  99  --  97* 104 106   CO2  --   --  24  --  14* 24 23   BUN  --   --  15  --  19 15 14   CREATININE  --   --  1.0  --  1.1 0.7* 0.7*   GLUCOSE  --   --  132*  --  277* 104* 125*   CALCIUM  --   --  8.4  --  8.0* 8.3 8.4   LABALBU  --   --  3.6  --  3.2* 2.8* 2.8*   PHOS   < >  --  5.5*  --  5.9* 1.8* 2.3*   ALKPHOS  --   --  123  --  100  --  77   ALT  --   --  17  --  100*  --  2,380*   AST  --   --  29  --  101*  --  1,782*   AMMONIA  --  129*  --  46  --   --  47    < > = values in this interval not displayed. CBC / Coags Recent Labs     01/12/23  0859 01/13/23  0504 01/14/23  0340   WBC 26.0* 25.6* 11.5*   RBC 5.15 4.60 3.79*   HGB 15.8 14.1 11.8*   HCT 49.1 41.8 34.2*    287 117*      Other Recent Labs     01/12/23  0918   COVID19 NOT DETECTED     Recent Labs     01/13/23  1922 01/14/23  0340   PHENYTOIN  --  14.0   LACTA 1.5  --         DIAGNOSTICS   IMAGES:  Images personally reviewed and agree w/ radiology interpretation. Head CT w/o Contrast: no acute intracranial abnormality      CT Chest: No PE; multifocal pneumonia     CVEEG 1/13 7 am - 1/14 7 am  No focal slowing was seen. No epileptiform discharges or seizures were detected. Video was reviewed intermittently at times when significant amounts of EMG artifact were present. The EKG monitoring channel did not detect any significant rhythm abnormalities. EEG Interpretation: This EEG is abnormal due to the presence of:      Severe generalized slowing.  This is a non-specific finding but is consistent with a generalized disturbance of cerebral function. It may be seen in a variety of conditions, such as toxic, metabolic, post-anoxic, multi-focal, or diffuse structural abnormalities. cvEEG 1/12/23 11 PM - 1/13/23 7 AM  During the recording:   No focal slowing was seen. No epileptiform discharges or seizures were detected. Video was reviewed intermittently at times when significant amounts of EMG artifact were present. The EKG monitoring channel did not detect any significant rhythm abnormalities. EEG Interpretation: This EEG is abnormal due to the presence of:   Severe generalized slowing. This is a non-specific finding but is consistent with a generalized disturbance of cerebral function. It may be seen in a variety of conditions, such as toxic, metabolic, post-anoxic, multi-focal, or diffuse structural abnormalities. PHYSICAL EXAMINATION     PHYSICAL EXAM:  Vitals:    01/14/23 0645 01/14/23 0700 01/14/23 0715 01/14/23 0800   BP:  (!) 119/90  113/86   Pulse: 56 55 58 58   Resp: 18 18 18 18   Temp:    (!) 96.3 °F (35.7 °C)   TempSrc:    Bladder   SpO2: 99% 99% 96% 98%   Weight:       Height:           Exam   Heavily sedated with propofol and versed - with bed changes he becomes very agitated and is strong in all four limbs  Does not open eyes or attend exam for me  Intubated and makes no attempt to speak  Does not follow commands  No blink to threat  Absent EOM  Pupils equal round and reactive 3 to 2 mm bilaterally  No movement all four limbs  No response to pain in all four limbs  Tone is normal   Gait deferred given sedation and vent requirements     ASSESSMENT & RECOMMENDATIONS   Assessment:  Coretta Damon is a 31 yo male with a history of intractable epilepsy (follows with Dr. Betsy Calloway) who presents in status epilepticus in the setting of hyperammonemia (resolved), pneumonia and sepsis. cvEEG initial read with severe generalized slowing. No abnormality on head CT.     Plan:  - Continue cvEEG   - Continue Versed, propofol, and wean when otherwise more medically stable; will coordinate timing and rate with ICU Team  - Continue oxcarb 900mg bid & PHT 100mg q8  - Check PHT level daily. 14 today. - Monitor sodium levels for hyponatremia with oxcarb    LEIGHTON Brasher - CNP   Neurology & Neurocritical Care   1/14/2023 9:48 AM    ICU Patients:   [address] [facility] [address] [phone]  [employer]: [facility] Neurocritical [facility]    Critical Care:  Due to the immediate potential for life-threatening deterioration due to neurological failure, I spent 36 minutes providing critical care. This time excludes time spent performing procedures but includes time spent on direct patient care, history retrieval, review of the chart, and discussions with patient, family, and consultant(s).

## 2023-01-14 NOTE — CONSULTS
Comprehensive Nutrition Assessment    RECOMMENDATIONS:  PO Diet: NPO  Nutrition Support:  Recommend EN formula Peptide-Based  Vital AF 1.2 @ goal rate 55 ml/hr   Initiate EN @ 25 mL/hr and as tolerated, increase by 15 mL/hr q 6 hours until goal of 55 mL/hr is met. Maintenance water flush of 30 ml every 4 hours. Nutrition Education: Education not appropriate     NUTRITION ASSESSMENT:   Nutritional summary & status: Consult for TF order and management. Pt intubated and sedated on 9.2 ml/hr propofol. Pt no longer on levo or theron. Propofol providing 243 kcal/day. Will order Vital AF @ 55 ml/hr. Admission/PMH: Seizures, acute respiratory failure, polysubstance abuse    MALNUTRITION ASSESSMENT  Context of Malnutrition: Acute Illness   Malnutrition Status:  At risk for malnutrition (Comment)  Findings of the 6 clinical characteristics of malnutrition (Minimum of 2 out of 6 clinical characteristics is required to make the diagnosis of moderate or severe Protein Calorie Malnutrition based on AND/ASPEN Guidelines):  Energy Intake:  Mild decrease in energy intake (Comment)  Weight Loss:  No significant weight loss     Body Fat Loss:  Unable to assess     Muscle Mass Loss:  Unable to assess    Fluid Accumulation:  No significant fluid accumulation     Strength:  Not Performed    NUTRITION DIAGNOSIS   Inadequate oral intake related to impaired respiratory function as evidenced by intubation, NPO or clear liquid status due to medical condition    Nutrition Monitoring and Evaluation:   Food/Nutrient Intake Outcomes:  Enteral Nutrition Intake/Tolerance  Physical Signs/Symptoms Outcomes:  Biochemical Data, Nutrition Focused Physical Findings, Weight, Hemodynamic Status     OBJECTIVE DATA: Significant to nutrition assessment  Nutrition Related Findings: 2.3 phos, 134 sodium, K normal  Wounds: None  Nutrition Goals: Initiate nutrition support, by next RD assessment     CURRENT NUTRITION THERAPIES  Diet NPO  ADULT TUBE FEEDING; Nasogastric; Peptide Based; Continuous; 25; Yes; 15; Q 6 hours; 55; 30; Q 4 hours  Current Tube Feeding (TF) Orders:  Goal TF & Flush Orders Provides: Vital AF @ 55 ml/hr (This provides 1827 kcal (1584 kcal from TF + 243 kcal from propofol), 99 g protein, 1320 ml TV, 1071 ml free water) + 30 ml flush q4  PO Intake: NPO   PO Supplement Intake:NPO  Additional Sources of Calories/IVF:9.2 ml/hr propofol providing 243 kcal/day     ANTHROPOMETRICS  Current Height: 5' 10.98\" (180.3 cm)  Current Weight: 136 lb 3.9 oz (61.8 kg)    Admission weight: 135 lb 2.3 oz (61.3 kg)  Ideal Body Weight (IBW): 172 lbs  (78 kg)    Usual Bodyweight     BMI: 19.1    COMPARATIVE STANDARDS  Energy (kcal):  0828-9563 (25-30 kcal/kg CBW)     Protein (g):   (1.2-2.0 g/kg CBW)       Fluid (mL/day):  1 mL/kcal or per MD    The patient will be monitored per nutrition standards of care. Consult dietitian if additional nutrition interventions are needed prior to RD reassessment.      Agueda Peters, [address]:  [phone]  Office:  [phone]

## 2023-01-14 NOTE — PROGRESS NOTES
Cardiology Consult Service  Daily Progress Note        Admit Date:  1/12/2023  Primary cardiologist: Keshia Villar    Reason for Consultation/Chief Complaint: new HFrEF    Subjective:     Patient is 41-year-old man with history of seizures, HCV, uncontrolled hypertension, Takotsubo cardiomyopathy, COPD, smoking, polysubstance abuse (heroin, fentanyl). Patient presented to [facility] on/12/23 with seizures. New LBBB on ECG thought to be due to cardiomyopathy rather than ACS in view of normal coronaries in 2021 per cath. Echo 1/13/2023 consistent with mild LVD, LVEF less than 20%, indeterminate diastolic function, moderate MR, severe RV dysfunction, RVSP 42 (15). Compared to echo 09/2022 with LVEF 45-50% and normal LVEF in 07/2021. Interval history:  Patient remains intubated today off pressors. Primary team is planning to slowly wean sedation. He is currently on minimum oxygen requirements on the ventilator. Telemetry consistent with sinus bradycardia in the 50s. He remains on dobutamine 2.5 mics per kilo per minute and normal saline 100 mill per hour. LFTs increasing.     Objective:     Medications:   vancomycin  1,250 mg IntraVENous Q12H    magnesium sulfate  4,000 mg IntraVENous Once    sodium chloride flush  5-40 mL IntraVENous 2 times per day    pantoprazole  40 mg IntraVENous Daily    heparin (porcine)  5,000 Units SubCUTAneous 3 times per day    piperacillin-tazobactam  3,375 mg IntraVENous Q8H    OXcarbazepine  900 mg Oral BID    fosphenytoin (CEREBYX) maintenance dose IVPB  100 mg PE IntraVENous Q8H       IV drips:   norepinephrine Stopped (01/13/23 2053)    sodium chloride 100 mL/hr at 01/14/23 1526    propofol 25 mcg/kg/min (01/14/23 1526)    DOBUTamine 2.5 mcg/kg/min (01/14/23 1526)    fentaNYL 175 mcg/hr (01/14/23 1542)    midazolam Stopped (01/14/23 1331)    dexmedetomidine (PRECEDEX) IV infusion 1.2 mcg/kg/hr (01/14/23 1539)    sodium chloride      phenylephrine (MISSAEL-SYNEPHRINE) 50mg/250mL infusion Stopped (01/13/23 0610)    vasopressin (Septic Shock) infusion 0.03 Units/min (01/13/23 0400)       PRN:  prochlorperazine, sodium chloride flush, sodium chloride, polyethylene glycol, acetaminophen **OR** acetaminophen    Vitals:    01/14/23 1300 01/14/23 1304 01/14/23 1400 01/14/23 1500   BP: 129/88 129/88 (!) 131/92 (!) 125/92   Pulse: 53 54 52 55   Resp: 18 18 18 18   Temp: 97 °F (36.1 °C) 97.5 °F (36.4 °C)  97.4 °F (36.3 °C)   TempSrc: Temporal Oral  Temporal   SpO2: 99% 99% 99% 99%   Weight:       Height:           Intake/Output Summary (Last 24 hours) at 1/14/2023 1607  Last data filed at 1/14/2023 1526  Gross per 24 hour   Intake 5259.39 ml   Output 3405 ml   Net 1854.39 ml     I/O last 3 completed shifts: In: 6752 [I.V.:4316.4; NG/GT:65; IV Piggyback:2176.6]  Out: 0961 [Urine:3090; Emesis/NG output:750]  Wt Readings from Last 3 Encounters:   01/14/23 136 lb 3.9 oz (61.8 kg)   01/12/23 122 lb 9 oz (55.6 kg)   07/06/22 125 lb (56.7 kg)       Admit Wt: Weight: 135 lb 2.3 oz (61.3 kg)   Todays Wt: Weight: 136 lb 3.9 oz (61.8 kg)    TELEMETRY personally reviewed: Sinus zenaida    Physical Exam:         General Appearance:  Sedated, intubated. Head:  Normocephalic, without obvious abnormality, atraumatic   Eyes:  PERRL, conjunctiva/corneas clear EOM intact  Ears normal   Throat no lesions       Nose: Nares normal, no drainage or sinus tenderness   Throat: Lips, mucosa, and tongue normal   Neck: Supple, symmetrical, trachea midline, no adenopathy, thyroid: not enlarged, symmetric, no tenderness/mass/nodules, no carotid bruit. Lungs:   Normal respiratory rate, lungs clear to auscultation without any wheezes, rubs or ronchi bilaterally.     Chest Wall:  No tenderness or deformity   Heart:  Regular rhythm, rate is controlled, S1, S2 normal, there is no murmur, there is no rub or gallop, cannot assess jvd, no bilateral lower extremity edema   Abdomen:   Soft, non-tender, bowel sounds active all four quadrants,  no masses, no organomegaly       Extremities: Extremities normal, atraumatic, no cyanosis. Pulses: 2+ and symmetric   Skin: Skin color, texture, turgor normal, no rashes or lesions   Pysch: Normal mood and affect   Neurologic: Normal gross motor and sensory exam.  Cranial nerves intact       Labs:   Recent Labs     01/13/23  0504 01/13/23  2251 01/14/23  0340   * 134* 134*   K 5.2* 4.0 4.2   BUN 19 15 14   CREATININE 1.1 0.7* 0.7*   CL 97* 104 106   CO2 14* 24 23   GLUCOSE 277* 104* 125*   CALCIUM 8.0* 8.3 8.4   MG  --   --  1.50*     Recent Labs     01/12/23  0859 01/13/23  0504 01/14/23  0340   WBC 26.0* 25.6* 11.5*   HGB 15.8 14.1 11.8*   HCT 49.1 41.8 34.2*    287 117*   MCV 95.3 90.9 90.3     No results for input(s): CHOLTOT, TRIG, HDL, CHOLHDL, LDL in the last 72 hours. Invalid input(s): [address] [facility], [address]  Recent Labs     01/14/23  1229   INR 1.71*     Recent Labs     01/12/23  0859 01/12/23 2031 01/13/23  0504 01/13/23  0908 01/13/23  1457 01/13/23  1916   CKTOTAL  --  228  --   --   --   --    TROPONINI <0.01  --  0.39* 0.29* 0.18* 0.19*     No results for input(s): BNP in the last 72 hours. No results for input(s): NTPROBNP in the last 72 hours. No results for input(s): TSH in the last 72 hours. Imaging:       Assessment & Plan:     1. Borderline elevation of troponin. It is most likely due to demand ischemia rather than ACS. 2.  New and compensated HFrEF. It is likely due to recurrent Takotsubo cardiomyopathy. LHC in 2021 revealed normal coronaries. 3.  Polysubstance abuse  4. History of seizures with recurrent episode leading to admission.        - Obtain CVC VBG; if oxygen saturation > 55%, stop dobutamine infusion.   - Once extubated, could consider starting heart failure medications.     Due to the high probability of clinically significant life threating deterioration of the patient's condition that required my urgent intervention, a total critical care time 35 minutes was used. This time excludes any time that may have been spent performing procedures. This includes but not limited to vital sign monitoring, telemetry monitoring, continuous pulse oximety, IV medication, clinical response to the IV medications, documentation time, consultation time, interpretation of lab data, review of nursing notes and old record review. I have personally reviewed the reports and images of labs, radiological studies, cardiac studies including ECG's and telemetry, current and old medical records. The note was completed using EMR and Dragon dictation system. Every effort was made to ensure accuracy; however, inadvertent computerized transcription errors may be present. All questions and concerns were addressed to the patient/family. Alternatives to my treatment were discussed. Thank you for allowing to us to participate in the edmond or Cinthya Enciso. Please call our service with questions.     Amanda Mejia MD, [facility], [address]  The [address]  [address] [place] [address] [place] [id number]  Ph: [phone]  Fax: [phone]

## 2023-01-14 NOTE — PLAN OF CARE
Problem: Discharge Planning  Goal: Discharge to home or other facility with appropriate resources  Outcome: Not Progressing  Flowsheets (Taken 1/12/2023 2000 by Vielka Powell RN)  Discharge to home or other facility with appropriate resources:   Identify barriers to discharge with patient and caregiver   Arrange for needed discharge resources and transportation as appropriate     Problem: Safety - Medical Restraint  Goal: Remains free of injury from restraints (Restraint for Interference with Medical Device)  Description: INTERVENTIONS:  1. Determine that other, less restrictive measures have been tried or would not be effective before applying the restraint  2. Evaluate the patient's condition at the time of restraint application  3. Inform patient/family regarding the reason for restraint  4. Q2H: Monitor safety, psychosocial status, comfort, nutrition and hydration  Outcome: Not Progressing  Flowsheets  Taken 1/13/2023 1800  Remains free of injury from restraints (restraint for interference with medical device): Determine that other, less restrictive measures have been tried or would not be effective before applying the restraint  Taken 1/13/2023 1600  Remains free of injury from restraints (restraint for interference with medical device): Determine that other, less restrictive measures have been tried or would not be effective before applying the restraint  Taken 1/13/2023 1400  Remains free of injury from restraints (restraint for interference with medical device): Determine that other, less restrictive measures have been tried or would not be effective before applying the restraint  Taken 1/13/2023 1200  Remains free of injury from restraints (restraint for interference with medical device): Determine that other, less restrictive measures have been tried or would not be effective before applying the restraint  Taken 1/13/2023 1000  Remains free of injury from restraints (restraint for interference with  medical device):   Determine that other, less restrictive measures have been tried or would not be effective before applying the restraint   Every 2 hours: Monitor safety, psychosocial status, comfort, nutrition and hydration  Taken 1/13/2023 0800  Remains free of injury from restraints (restraint for interference with medical device):   Determine that other, less restrictive measures have been tried or would not be effective before applying the restraint   Evaluate the patient's condition at the time of restraint application   Every 2 hours: Monitor safety, psychosocial status, comfort, nutrition and hydration     Problem: Pain  Goal: Verbalizes/displays adequate comfort level or baseline comfort level  Outcome: Not Progressing  Flowsheets (Taken 1/13/2023 1955)  Verbalizes/displays adequate comfort level or baseline comfort level:   Encourage patient to monitor pain and request assistance   Assess pain using appropriate pain scale     Problem: Skin/Tissue Integrity  Goal: Absence of new skin breakdown  Description: 1. Monitor for areas of redness and/or skin breakdown  2. Assess vascular access sites hourly  3. Every 4-6 hours minimum:  Change oxygen saturation probe site  4. Every 4-6 hours:  If on nasal continuous positive airway pressure, respiratory therapy assess nares and determine need for appliance change or resting period. Outcome: Not Progressing  Note: Absence of new skin breakdown. Patient turned routinely with pillow support. Heels and arms elevated off of bed. Skin assessed. Prophylactic sacral heart in place. Patient on special air mattress. Patient family educated on risks associated with prolonged bed rest. Patient checked routinely for incontinence, cleansed thoroughly in its presence. Lines off loaded from skin. Will continue to monitor and reassess.         Problem: Safety - Adult  Goal: Free from fall injury  Outcome: Not Progressing     Problem: ABCDS Injury Assessment  Goal: Absence of physical injury  Outcome: Not Progressing     Problem: Neurosensory - Adult  Goal: Achieves stable or improved neurological status  Outcome: Not Progressing  Goal: Absence of seizures  Outcome: Not Progressing  Goal: Remains free of injury related to seizures activity  Outcome: Not Progressing     Problem: Respiratory - Adult  Goal: Achieves optimal ventilation and oxygenation  Outcome: Not Progressing     Problem: Cardiovascular - Adult  Goal: Maintains optimal cardiac output and hemodynamic stability  Outcome: Not Progressing     Problem: Genitourinary - Adult  Goal: Urinary catheter remains patent  Outcome: Not Progressing     Problem: Nutrition Deficit:  Goal: Optimize nutritional status  Outcome: Not Progressing     Problem: Discharge Planning  Goal: Discharge to home or other facility with appropriate resources  Outcome: Not Progressing  Flowsheets (Taken 1/12/2023 2000 by Elizabeth Navas RN)  Discharge to home or other facility with appropriate resources:   Identify barriers to discharge with patient and caregiver   Arrange for needed discharge resources and transportation as appropriate     Problem: Safety - Medical Restraint  Goal: Remains free of injury from restraints (Restraint for Interference with Medical Device)  Description: INTERVENTIONS:  1. Determine that other, less restrictive measures have been tried or would not be effective before applying the restraint  2. Evaluate the patient's condition at the time of restraint application  3. Inform patient/family regarding the reason for restraint  4.  Q2H: Monitor safety, psychosocial status, comfort, nutrition and hydration  Outcome: Not Progressing  Flowsheets  Taken 1/13/2023 1800  Remains free of injury from restraints (restraint for interference with medical device): Determine that other, less restrictive measures have been tried or would not be effective before applying the restraint  Taken 1/13/2023 1600  Remains free of injury from restraints (restraint for interference with medical device): Determine that other, less restrictive measures have been tried or would not be effective before applying the restraint  Taken 1/13/2023 1400  Remains free of injury from restraints (restraint for interference with medical device): Determine that other, less restrictive measures have been tried or would not be effective before applying the restraint  Taken 1/13/2023 1200  Remains free of injury from restraints (restraint for interference with medical device): Determine that other, less restrictive measures have been tried or would not be effective before applying the restraint  Taken 1/13/2023 1000  Remains free of injury from restraints (restraint for interference with medical device):   Determine that other, less restrictive measures have been tried or would not be effective before applying the restraint   Every 2 hours: Monitor safety, psychosocial status, comfort, nutrition and hydration  Taken 1/13/2023 0800  Remains free of injury from restraints (restraint for interference with medical device):   Determine that other, less restrictive measures have been tried or would not be effective before applying the restraint   Evaluate the patient's condition at the time of restraint application   Every 2 hours: Monitor safety, psychosocial status, comfort, nutrition and hydration     Problem: Pain  Goal: Verbalizes/displays adequate comfort level or baseline comfort level  Outcome: Not Progressing  Flowsheets (Taken 1/13/2023 1955)  Verbalizes/displays adequate comfort level or baseline comfort level:   Encourage patient to monitor pain and request assistance   Assess pain using appropriate pain scale     Problem: Skin/Tissue Integrity  Goal: Absence of new skin breakdown  Description: 1. Monitor for areas of redness and/or skin breakdown  2. Assess vascular access sites hourly  3. Every 4-6 hours minimum:  Change oxygen saturation probe site  4.   Every 4-6 hours: If on nasal continuous positive airway pressure, respiratory therapy assess nares and determine need for appliance change or resting period. Outcome: Not Progressing  Note: Absence of new skin breakdown. Patient turned routinely with pillow support. Heels and arms elevated off of bed. Skin assessed. Prophylactic sacral heart in place. Patient on special air mattress. Patient family educated on risks associated with prolonged bed rest. Patient checked routinely for incontinence, cleansed thoroughly in its presence. Lines off loaded from skin. Will continue to monitor and reassess.         Problem: Safety - Adult  Goal: Free from fall injury  Outcome: Not Progressing     Problem: ABCDS Injury Assessment  Goal: Absence of physical injury  Outcome: Not Progressing     Problem: Neurosensory - Adult  Goal: Achieves stable or improved neurological status  Outcome: Not Progressing  Goal: Absence of seizures  Outcome: Not Progressing  Goal: Remains free of injury related to seizures activity  Outcome: Not Progressing     Problem: Respiratory - Adult  Goal: Achieves optimal ventilation and oxygenation  Outcome: Not Progressing     Problem: Cardiovascular - Adult  Goal: Maintains optimal cardiac output and hemodynamic stability  Outcome: Not Progressing     Problem: Genitourinary - Adult  Goal: Urinary catheter remains patent  Outcome: Not Progressing     Problem: Nutrition Deficit:  Goal: Optimize nutritional status  Outcome: Not Progressing

## 2023-01-14 NOTE — PROGRESS NOTES
Patient was given a CHG bath and Wu care with soap and water. During repositioning, patient became agitated, sitting up in bed and attempting to bend down and grab ETT. Fentanyl bolus given. Patient settled. Will continue to monitor.

## 2023-01-14 NOTE — PROGRESS NOTES
During linen change and bed bath patient became very agitated and restless and was thrashing his body in bed. Increased sedation and asked for ICU residents to come to bedside to assess for further needs. Propofol was ordered but was never started. Needs were covered by increases in the Fentanyl and Precedex and family at bedside to help reorient and calm patient down.

## 2023-01-14 NOTE — PROGRESS NOTES
Patient assessed. Remains intubated and sedated. Family members at bedside. Patient responds to tactile stimuli. Continuous EEG on and connected. PEERLA. Dobutamine gtt started per order. Levophed weaned down to 2 mcg/min and eventually turned off. SBP consistently in the 120's. Dr. Farzana Segal present at bedside. ABG drawn and vent changes made based on results. Repeat ABG done per order. Wu draining adequate amount of yellow urine with occasional cloudiness. Patient repositioned for comfort. Bed in lowest position, side rails up X 3, bed alarm on.

## 2023-01-14 NOTE — CONSULTS
Clinical Pharmacy Progress Note    Vancomycin - Management by Pharmacy    Consult Date(s): 01/13/23  Consulting Provider(s): Dr. Ade Gonzalez / Plan  [facility]- Vancomycin  Concurrent Antimicrobials:   Zosyn (Day 2/7)  Day of Vanc Therapy / Ordered Duration: Day 2/7  Current Dosing Method: Bayesian-Guided AUC Dosing  Therapeutic Goal: -600 mg/L*hr  Current Dose / Plan:   Patient SCr improved to 0.7 today. UOP improved. Random level = 6.1 mcg/mL today. Predicts below goal AUC at 278 mg/L*hr.  Considering recent renal dysfunction, will cautiously increase dose to 1250 mg q12h. Estimated  mg/L*hr, steady state trough 10 mcg/mL. Will draw a random level tomorrow AM to assess kinetics of dose adjustment. Consider discontinuing vancomycin with negative MRSA PCR. Will continue to monitor clinical condition and make adjustments to regimen as appropriate. Please call with any questions. Lan Grajeda, PharmD, [facility]  Main pharmacy: [id number]  1/14/2023 7:22 AM          Interval update:  Patient recently transferred from [facility] to [facility] for status epilepticus 2/2 pneumonia and sepsis. He is currently ventilated and on fentanyl, midazolam, precedex, and norepinephrine drip. Tmax over last 24 hours 100.8F. WBC improved today (25.6--> 11.5). Subjective/Objective:   Mayela Benitez is a 35 y.o. male with a PMHx significant for hepatitis C, HTN, NSTEMI, polysubstance abuse, seizures, and takotsubo cardiomyopathy who is admitted with status epilepticus. Pharmacy is consulted to dose vancomycin.     Ht Readings from Last 1 Encounters:   01/13/23 5' 10.98\" (1.803 m)     Wt Readings from Last 1 Encounters:   01/14/23 136 lb 3.9 oz (61.8 kg)     Current & Prior Antimicrobial Regimen(s):  Unasyn (1/12)  Cefepime (1/12)  Linezolid (1/13)  Zosyn EI (1/12-Current)  Vancomycin PTD   1500 mg IV x1 LD (1/13)  1250 mg IV q18h (1/13-1/14)  1250 mg IV q12h (1/14-current)    Vancomycin Level(s) / Doses:    Date Time Dose Type of Level / Level Interpretation   1/14 0340 1250 mg q18h Random = 6.1 mcg/mL AUC below goal (278 mg/L*hr)  Increase to 1250 mg q12h   1/15 0600 1250 mg q12h Random = ordered    Note: Serum levels collected for AUC-based dosing may be high if collected in close proximity to the dose administered. This is not necessarily indicative of toxicity. Cultures & Sensitivities:    Date Site Micro Susceptibility / Result   1/12 Blood Cx 1/2 Gram pos rods Pending   1/12 Blood Cx 1/2 NGTD    1/12 Resp Cx Collected    1/13 Strep pneumo urine In process    1/13 MRSA nares PCR Negative      Recent Labs     01/12/23  0859 01/12/23  2031 01/13/23  0504 01/13/23  2251 01/14/23  0340   CREATININE 1.1   < > 1.1 0.7* 0.7*   BUN 13   < > 19 15 14   WBC 26.0*  --  25.6*  --  11.5*    < > = values in this interval not displayed. Estimated Creatinine Clearance: 131 mL/min (A) (based on SCr of 0.7 mg/dL (L)).     Additional Lab Values / Findings of Note:    Recent Labs     01/12/23  2148   PROCAL 5.65*

## 2023-01-14 NOTE — PLAN OF CARE
Problem: Discharge Planning  Goal: Discharge to home or other facility with appropriate resources  1/14/2023 0537 by Kyara Lyon RN  Outcome: Progressing     Problem: Safety - Medical Restraint  Goal: Remains free of injury from restraints (Restraint for Interference with Medical Device)  Description: INTERVENTIONS:  1. Determine that other, less restrictive measures have been tried or would not be effective before applying the restraint  2. Evaluate the patient's condition at the time of restraint application  3. Inform patient/family regarding the reason for restraint  4. Q2H: Monitor safety, psychosocial status, comfort, nutrition and hydration  1/14/2023 0537 by Kyara Lyon RN  Outcome: Progressing     Problem: Pain  Goal: Verbalizes/displays adequate comfort level or baseline comfort level  1/14/2023 0537 by Kyara Lyon RN  Outcome: Progressing  Flowsheets (Taken 1/13/2023 2300)  Verbalizes/displays adequate comfort level or baseline comfort level:   Encourage patient to monitor pain and request assistance   Assess pain using appropriate pain scale   Administer analgesics based on type and severity of pain and evaluate response   Implement non-pharmacological measures as appropriate and evaluate response   Consider cultural and social influences on pain and pain management   Notify Licensed Independent Practitioner if interventions unsuccessful or patient reports new pain     Problem: Skin/Tissue Integrity  Goal: Absence of new skin breakdown  Description: 1. Monitor for areas of redness and/or skin breakdown  2. Assess vascular access sites hourly  3. Every 4-6 hours minimum:  Change oxygen saturation probe site  4. Every 4-6 hours:  If on nasal continuous positive airway pressure, respiratory therapy assess nares and determine need for appliance change or resting period.   1/14/2023 0537 by Kyara Lyon RN  Outcome: Progressing     Problem: Safety - Adult  Goal: Free from fall injury  1/14/2023 0537 by Vinicio Antoine RN  Outcome: Progressing     Problem: ABCDS Injury Assessment  Goal: Absence of physical injury  1/14/2023 0537 by Vinicio Antoine RN  Outcome: Progressing     Problem: Neurosensory - Adult  Goal: Achieves stable or improved neurological status  1/14/2023 0537 by Vinicio Antoine RN  Outcome: Progressing     Problem: Neurosensory - Adult  Goal: Absence of seizures  1/14/2023 0537 by Vinicio Antoine RN  Outcome: Progressing     Problem: Neurosensory - Adult  Goal: Remains free of injury related to seizures activity  1/14/2023 0537 by Vinicio Antoine RN  Outcome: Progressing     Problem: Respiratory - Adult  Goal: Achieves optimal ventilation and oxygenation  1/14/2023 0537 by Vinicio Antoine RN  Outcome: Progressing     Problem: Cardiovascular - Adult  Goal: Maintains optimal cardiac output and hemodynamic stability  1/14/2023 0537 by Vinicio Antoine RN  Outcome: Progressing     Problem: Genitourinary - Adult  Goal: Urinary catheter remains patent  1/14/2023 0537 by Vinicio Antoine RN  Outcome: Progressing     Problem: Nutrition Deficit:  Goal: Optimize nutritional status  1/14/2023 0537 by Vinicio Antoine RN  Outcome: Progressing     Problem: Discharge Planning  Goal: Discharge to home or other facility with appropriate resources  1/14/2023 0537 by Vinicio Antoine RN  Outcome: Progressing  1/13/2023 1955 by Faustino Solares RN  Outcome: Not Progressing  Flowsheets  Taken 1/13/2023 1915 by Vinicio Antoine RN  Discharge to home or other facility with appropriate resources:   Identify barriers to discharge with patient and caregiver   Arrange for needed discharge resources and transportation as appropriate   Identify discharge learning needs (meds, wound care, etc)   Arrange for interpreters to assist at discharge as needed   Refer to discharge planning if patient needs post-hospital services based on physician order or complex needs related to functional status, cognitive ability or social support system  Taken 1/12/2023 2000 by Dawson Diallo RN  Discharge to home or other facility with appropriate resources:   Identify barriers to discharge with patient and caregiver   Arrange for needed discharge resources and transportation as appropriate     Problem: Safety - Medical Restraint  Goal: Remains free of injury from restraints (Restraint for Interference with Medical Device)  Description: INTERVENTIONS:  1. Determine that other, less restrictive measures have been tried or would not be effective before applying the restraint  2. Evaluate the patient's condition at the time of restraint application  3. Inform patient/family regarding the reason for restraint  4.  Q2H: Monitor safety, psychosocial status, comfort, nutrition and hydration  1/14/2023 0537 by Renuka Serna RN  Outcome: Progressing  1/13/2023 1955 by Chilango Urbina RN  Outcome: Not Progressing  Flowsheets  Taken 1/13/2023 1900 by Renuka Serna RN  Remains free of injury from restraints (restraint for interference with medical device):   Determine that other, less restrictive measures have been tried or would not be effective before applying the restraint   Evaluate the patient's condition at the time of restraint application   Inform patient/family regarding the reason for restraint   Every 2 hours: Monitor safety, psychosocial status, comfort, nutrition and hydration  Taken 1/13/2023 1800 by Chilango Urbina RN  Remains free of injury from restraints (restraint for interference with medical device): Determine that other, less restrictive measures have been tried or would not be effective before applying the restraint  Taken 1/13/2023 1600 by Chilango Urbina RN  Remains free of injury from restraints (restraint for interference with medical device): Determine that other, less restrictive measures have been tried or would not be effective before applying the restraint  Taken 1/13/2023 1400 by Chilango Urbina RN  Remains free of injury from restraints (restraint for interference with medical device): Determine that other, less restrictive measures have been tried or would not be effective before applying the restraint  Taken 1/13/2023 1200 by Campos Dodd RN  Remains free of injury from restraints (restraint for interference with medical device): Determine that other, less restrictive measures have been tried or would not be effective before applying the restraint  Taken 1/13/2023 1000 by Campos Dodd RN  Remains free of injury from restraints (restraint for interference with medical device):   Determine that other, less restrictive measures have been tried or would not be effective before applying the restraint   Every 2 hours: Monitor safety, psychosocial status, comfort, nutrition and hydration  Taken 1/13/2023 0800 by Campos Dodd RN  Remains free of injury from restraints (restraint for interference with medical device):   Determine that other, less restrictive measures have been tried or would not be effective before applying the restraint   Evaluate the patient's condition at the time of restraint application   Every 2 hours: Monitor safety, psychosocial status, comfort, nutrition and hydration     Problem: Pain  Goal: Verbalizes/displays adequate comfort level or baseline comfort level  1/14/2023 0537 by Myriam Garcia RN  Outcome: Progressing  Flowsheets (Taken 1/13/2023 2300)  Verbalizes/displays adequate comfort level or baseline comfort level:   Encourage patient to monitor pain and request assistance   Assess pain using appropriate pain scale   Administer analgesics based on type and severity of pain and evaluate response   Implement non-pharmacological measures as appropriate and evaluate response   Consider cultural and social influences on pain and pain management   Notify Licensed Independent Practitioner if interventions unsuccessful or patient reports new pain  1/13/2023 1955 by Campos Dodd RN  Outcome: Not Progressing  Flowsheets (Taken 1/13/2023 1955)  Verbalizes/displays adequate comfort level or baseline comfort level:   Encourage patient to monitor pain and request assistance   Assess pain using appropriate pain scale     Problem: Skin/Tissue Integrity  Goal: Absence of new skin breakdown  Description: 1. Monitor for areas of redness and/or skin breakdown  2. Assess vascular access sites hourly  3. Every 4-6 hours minimum:  Change oxygen saturation probe site  4. Every 4-6 hours:  If on nasal continuous positive airway pressure, respiratory therapy assess nares and determine need for appliance change or resting period. 1/14/2023 0537 by Aguilar Holm RN  Outcome: Progressing  1/13/2023 1955 by Carroll Garcia RN  Outcome: Not Progressing  Note: Absence of new skin breakdown. Patient turned routinely with pillow support. Heels and arms elevated off of bed. Skin assessed. Prophylactic sacral heart in place. Patient on special air mattress. Patient family educated on risks associated with prolonged bed rest. Patient checked routinely for incontinence, cleansed thoroughly in its presence. Lines off loaded from skin. Will continue to monitor and reassess.         Problem: Safety - Adult  Goal: Free from fall injury  1/14/2023 0537 by Aguilar Holm RN  Outcome: Progressing  1/13/2023 1955 by Carroll Garcia RN  Outcome: Not Progressing  Flowsheets (Taken 1/13/2023 1930 by Aguilar Holm RN)  Free From Fall Injury: Instruct family/caregiver on patient safety     Problem: ABCDS Injury Assessment  Goal: Absence of physical injury  1/14/2023 0537 by Aguilar Holm RN  Outcome: Progressing  1/13/2023 1955 by Carroll Garcia RN  Outcome: Not Progressing  Flowsheets (Taken 1/13/2023 1930 by Aguilar Holm RN)  Absence of Physical Injury: Implement safety measures based on patient assessment     Problem: Neurosensory - Adult  Goal: Achieves stable or improved neurological status  1/14/2023 0537 by Junior Fior RN  Outcome: Progressing  1/13/2023 1955 by Lorri Mcnulty RN  Outcome: Not Progressing  Flowsheets (Taken 1/13/2023 1915 by Junior Fior RN)  Achieves stable or improved neurological status:   Assess for and report changes in neurological status   Initiate measures to prevent increased intracranial pressure   Maintain blood pressure and fluid volume within ordered parameters to optimize cerebral perfusion and minimize risk of hemorrhage   Monitor temperature, glucose, and sodium. Initiate appropriate interventions as ordered  Goal: Absence of seizures  1/14/2023 0537 by Junior Fior RN  Outcome: Progressing  1/13/2023 1955 by Lorri Mcnulty RN  Outcome: Not Progressing  Flowsheets (Taken 1/13/2023 1915 by Junior Fior RN)  Absence of seizures:   Monitor for seizure activity.   If seizure occurs, document type and location of movements and any associated apnea   If seizure occurs, turn head to side and suction secretions as needed   Administer anticonvulsants as ordered   Support airway/breathing, administer oxygen as needed   Diagnostic studies as ordered  Goal: Remains free of injury related to seizures activity  1/14/2023 0537 by Junior Fior RN  Outcome: Progressing  1/13/2023 1955 by Lorri Mcnulty RN  Outcome: Not Progressing  Flowsheets (Taken 1/13/2023 1915 by Junior Fior RN)  Remains free of injury related to seizure activity:   Maintain airway, patient safety  and administer oxygen as ordered   Monitor patient for seizure activity, document and report duration and description of seizure to Licensed Independent Practitioner   If seizure occurs, turn patient to side and suction secretions as needed   Reorient patient post seizure   Seizure pads on all 4 side rails   Instruct patient/family to notify RN of any seizure activity   Instruct patient/family to call for assistance with activity based on assessment     Problem: Respiratory - Adult  Goal: Achieves optimal ventilation and oxygenation  1/14/2023 0537 by Venkata Saenz RN  Outcome: Progressing  1/13/2023 1955 by Aravind Fuller RN  Outcome: Not Progressing  Flowsheets (Taken 1/13/2023 1915 by Venkata Saenz RN)  Achieves optimal ventilation and oxygenation:   Assess for changes in respiratory status   Assess for changes in mentation and behavior   Position to facilitate oxygenation and minimize respiratory effort   Oxygen supplementation based on oxygen saturation or arterial blood gases   Assess and instruct to report shortness of breath or any respiratory difficulty   Respiratory therapy support as indicated     Problem: Cardiovascular - Adult  Goal: Maintains optimal cardiac output and hemodynamic stability  1/14/2023 0537 by Venkata Saenz RN  Outcome: Progressing  1/13/2023 1955 by Aravind Fuller RN  Outcome: Not Progressing     Problem: Genitourinary - Adult  Goal: Urinary catheter remains patent  1/14/2023 0537 by Venkata Saenz RN  Outcome: Progressing  1/13/2023 1955 by Aravind Fuller RN  Outcome: Not Progressing  Flowsheets (Taken 1/13/2023 1915 by Venkata Saenz RN)  Urinary catheter remains patent:   Assess patency of urinary catheter   Irrigate catheter per Licensed Independent Practitioner order if indicated and notify Licensed Independent Practitioner if unable to irrigate   Assess need for a larger catheter size or a 3-way catheter for continuous bladder irrigation     Problem: Nutrition Deficit:  Goal: Optimize nutritional status  1/14/2023 0537 by Venkata Saenz RN  Outcome: Progressing  1/13/2023 1955 by Aravind Fuller RN  Outcome: Not Progressing

## 2023-01-14 NOTE — PROGRESS NOTES
HOSPITALISTS PROGRESS NOTE    1/14/2023 12:33 PM        Name: Sugar Lomeli . Admitted: 1/12/2023  Primary Care Provider: No primary care provider on file. (Tel: None)      Problem List  Principal Problem:    Status epilepticus ([address]  Active Problems:    Pneumonia of both lower lobes due to infectious organism    Demand ischemia (HCC)    LBBB (left bundle branch block)    Septic shock (HCC)  Resolved Problems:    * No resolved hospital problems. *       Assessment & Plan:   Concerns of septic shock  EF is less than 20%, patient intubated and sedated at this time patient is dobutamine drip 1 out of 2 blood culture positive for bacillus, will mention that he could be doing IV drugs about 4 weeks ago is on methadone now, patient on vancomycin and Zosyn    History of temporal lobe epilepsy follows with   Neurology following, on now fosphenytoin Trileptal    History of cardiomyopathy  IV Access: Right IJ  Wu: Yes  Diet: Diet NPO  ADULT TUBE FEEDING; Nasogastric; Peptide Based; Continuous; 25; Yes; 15; Q 6 hours; 55; 30; Q 4 hours  Code:Full Code  DVT PPX Heparin  Disposition monitor in ICU      Brief Course: This is 60-year-old male with a past medical history of polysubstance abuse, temporal lobe epilepsy, hypertension, COPD, hepatitis C who is presenting from outside hospital with concern for continuous seizure. Patient presented to the outside hospital with altered mental status was intubated for airway protection. Lab work revealed WBC of 26,000, low sodium, elevated lactate of 6.9 anion gap metabolic acidosis. CC: Intubated and sedated    Subjective:  .   Is at bedside, blood pressure is better, patient is off the Dom-Synephrine and Levophed but is on dobutamine  On assist control, making urine    Reviewed interval ancillary notes    Current Medications  vancomycin (VANCOCIN) 1,250 mg in dextrose 5 % 250 mL IVPB, Q12H  norepinephrine (LEVOPHED) 16 mg in dextrose 5 % 250 mL infusion, Continuous  0.9 % sodium chloride infusion, Continuous  prochlorperazine (COMPAZINE) injection 10 mg, Q6H PRN  propofol injection, Continuous  DOBUTamine (DOBUTREX) 500 mg in dextrose 5 % 250 mL infusion, Continuous  fentaNYL (SUBLIMAZE) 1,000 mcg in sodium chloride 0.9% 100 mL infusion, Continuous  midazolam (VERSED) 100 mg in dextrose 5 % 100 mL infusion, Continuous  dexmedetomidine (PRECEDEX) 400 mcg in sodium chloride 0.9 % 100 mL infusion, Continuous  sodium chloride flush 0.9 % injection 5-40 mL, 2 times per day  sodium chloride flush 0.9 % injection 5-40 mL, PRN  0.9 % sodium chloride infusion, PRN  polyethylene glycol (GLYCOLAX) packet 17 g, Daily PRN  acetaminophen (TYLENOL) tablet 650 mg, Q6H PRN   Or  acetaminophen (TYLENOL) suppository 650 mg, Q6H PRN  pantoprazole (PROTONIX) injection 40 mg, Daily  heparin (porcine) injection 5,000 Units, 3 times per day  phenylephrine (MISSAEL-SYNEPHRINE) 50 mg in dextrose 5 % 250 mL infusion, Continuous  piperacillin-tazobactam (ZOSYN) 3,375 mg in dextrose 5 % 50 mL IVPB (mini-bag), Q8H  OXcarbazepine (TRILEPTAL) tablet 900 mg, BID  fosphenytoin (CEREBYX) 100 mg PE in dextrose 5 % 50 mL IVPB (maintenance dose), Q8H  vasopressin 20 Units in dextrose 5 % 100 mL infusion, Continuous        Objective:  BP (!) 135/100   Pulse 53   Temp (!) 96.3 °F (35.7 °C) (Bladder)   Resp 18   Ht 5' 10.98\" (1.803 m)   Wt 136 lb 3.9 oz (61.8 kg)   SpO2 99%   BMI 19.01 kg/m²     Intake/Output Summary (Last 24 hours) at 1/14/2023 1233  Last data filed at 1/14/2023 1157  Gross per 24 hour   Intake 4086.94 ml   Output 3325 ml   Net 761.94 ml      Wt Readings from Last 3 Encounters:   01/14/23 136 lb 3.9 oz (61.8 kg)   01/12/23 122 lb 9 oz (55.6 kg)   07/06/22 125 lb (56.7 kg)     Is intubated  Lungs are diminished  S1-S2 heard  Belly soft nontender  No significant edema  Right IJ noticed  Labs and Tests:  CBC:   Recent Labs     01/12/23  0859 01/13/23  0504 01/14/23  0340   WBC 26.0* 25.6* 11.5*   HGB 15.8 14.1 11.8*    287 117*     BMP:    Recent Labs     01/13/23  0504 01/13/23  2251 01/14/23  0340   * 134* 134*   K 5.2* 4.0 4.2   CL 97* 104 106   CO2 14* 24 23   BUN 19 15 14   CREATININE 1.1 0.7* 0.7*   GLUCOSE 277* 104* 125*     Hepatic:   Recent Labs     01/12/23  2031 01/13/23  0504 01/14/23  0340   AST 29 101* 1,782*   ALT 17 100* 2,380*   BILITOT <0.2 0.6 0.5   ALKPHOS 123 100 77     XR CHEST PORTABLE   Final Result      The tip of the endotracheal tube projects over the mid trachea. Improved left-sided airspace disease and unchanged right mid and basilar airspace disease.                 Lilly Scott MD   1/14/2023 12:33 PM

## 2023-01-15 LAB
ALBUMIN SERPL-MCNC: 2.6 G/DL (ref 3.4–5)
ALP BLD-CCNC: 85 U/L (ref 40–129)
ALT SERPL-CCNC: 2998 U/L (ref 10–40)
ANION GAP SERPL CALCULATED.3IONS-SCNC: 11 MMOL/L (ref 3–16)
AST SERPL-CCNC: 2023 U/L (ref 15–37)
BASE EXCESS ARTERIAL: -2.3 MMOL/L (ref -3–3)
BASOPHILS ABSOLUTE: 0.1 K/UL (ref 0–0.2)
BASOPHILS RELATIVE PERCENT: 0.7 %
BILIRUB SERPL-MCNC: 0.4 MG/DL (ref 0–1)
BILIRUBIN DIRECT: <0.2 MG/DL (ref 0–0.3)
BILIRUBIN, INDIRECT: ABNORMAL MG/DL (ref 0–1)
BUN BLDV-MCNC: 14 MG/DL (ref 7–20)
CALCIUM SERPL-MCNC: 8 MG/DL (ref 8.3–10.6)
CARBOXYHEMOGLOBIN ARTERIAL: 0.6 % (ref 0–1.5)
CHLORIDE BLD-SCNC: 106 MMOL/L (ref 99–110)
CO2: 20 MMOL/L (ref 21–32)
CREAT SERPL-MCNC: 0.7 MG/DL (ref 0.9–1.3)
CULTURE, BLOOD 2: ABNORMAL
EOSINOPHILS ABSOLUTE: 0.2 K/UL (ref 0–0.6)
EOSINOPHILS RELATIVE PERCENT: 2.7 %
GFR SERPL CREATININE-BSD FRML MDRD: >60 ML/MIN/{1.73_M2}
GLUCOSE BLD-MCNC: 115 MG/DL (ref 70–99)
GLUCOSE BLD-MCNC: 90 MG/DL (ref 70–99)
GLUCOSE BLD-MCNC: 94 MG/DL (ref 70–99)
HCO3 ARTERIAL: 22 MMOL/L (ref 21–29)
HCT VFR BLD CALC: 33.1 % (ref 40.5–52.5)
HEMOGLOBIN, ART, EXTENDED: 11.8 G/DL
HEMOGLOBIN: 11.4 G/DL (ref 13.5–17.5)
LYMPHOCYTES ABSOLUTE: 1.7 K/UL (ref 1–5.1)
LYMPHOCYTES RELATIVE PERCENT: 22.8 %
MAGNESIUM: 2.2 MG/DL (ref 1.8–2.4)
MCH RBC QN AUTO: 30.9 PG (ref 26–34)
MCHC RBC AUTO-ENTMCNC: 34.3 G/DL (ref 31–36)
MCV RBC AUTO: 89.8 FL (ref 80–100)
METHEMOGLOBIN ARTERIAL: 0.4 % (ref 0–1.4)
MONOCYTES ABSOLUTE: 0.3 K/UL (ref 0–1.3)
MONOCYTES RELATIVE PERCENT: 3.4 %
NEUTROPHILS ABSOLUTE: 5.3 K/UL (ref 1.7–7.7)
NEUTROPHILS RELATIVE PERCENT: 70.4 %
O2 SAT, ARTERIAL: 100 % (ref 93–100)
ORGANISM: ABNORMAL
PCO2 ARTERIAL: 34.8 MMHG (ref 35–45)
PDW BLD-RTO: 14.7 % (ref 12.4–15.4)
PERFORMED ON: ABNORMAL
PERFORMED ON: NORMAL
PH ARTERIAL: 7.41 (ref 7.35–7.45)
PHENYTOIN DOSE AMOUNT: NORMAL
PHENYTOIN LEVEL: 11.4 UG/ML (ref 10–20)
PHOSPHORUS: 3.2 MG/DL (ref 2.5–4.9)
PLATELET # BLD: 102 K/UL (ref 135–450)
PMV BLD AUTO: 7.1 FL (ref 5–10.5)
PO2 ARTERIAL: 165 MMHG (ref 75–108)
POTASSIUM SERPL-SCNC: 3.9 MMOL/L (ref 3.5–5.1)
RBC # BLD: 3.68 M/UL (ref 4.2–5.9)
SODIUM BLD-SCNC: 137 MMOL/L (ref 136–145)
TCO2 ARTERIAL: 23 MMOL/L
TOTAL PROTEIN: 4.9 G/DL (ref 6.4–8.2)
VANCOMYCIN RANDOM: 16.2 UG/ML
WBC # BLD: 7.5 K/UL (ref 4–11)

## 2023-01-15 PROCEDURE — 99233 SBSQ HOSP IP/OBS HIGH 50: CPT | Performed by: INTERNAL MEDICINE

## 2023-01-15 PROCEDURE — 94761 N-INVAS EAR/PLS OXIMETRY MLT: CPT

## 2023-01-15 PROCEDURE — 82803 BLOOD GASES ANY COMBINATION: CPT

## 2023-01-15 PROCEDURE — 92526 ORAL FUNCTION THERAPY: CPT

## 2023-01-15 PROCEDURE — 6370000000 HC RX 637 (ALT 250 FOR IP): Performed by: STUDENT IN AN ORGANIZED HEALTH CARE EDUCATION/TRAINING PROGRAM

## 2023-01-15 PROCEDURE — 94003 VENT MGMT INPAT SUBQ DAY: CPT

## 2023-01-15 PROCEDURE — 80069 RENAL FUNCTION PANEL: CPT

## 2023-01-15 PROCEDURE — 2580000003 HC RX 258: Performed by: STUDENT IN AN ORGANIZED HEALTH CARE EDUCATION/TRAINING PROGRAM

## 2023-01-15 PROCEDURE — 2580000003 HC RX 258

## 2023-01-15 PROCEDURE — 6360000002 HC RX W HCPCS

## 2023-01-15 PROCEDURE — 6370000000 HC RX 637 (ALT 250 FOR IP)

## 2023-01-15 PROCEDURE — 2500000003 HC RX 250 WO HCPCS: Performed by: STUDENT IN AN ORGANIZED HEALTH CARE EDUCATION/TRAINING PROGRAM

## 2023-01-15 PROCEDURE — 36415 COLL VENOUS BLD VENIPUNCTURE: CPT

## 2023-01-15 PROCEDURE — 99291 CRITICAL CARE FIRST HOUR: CPT | Performed by: NURSE PRACTITIONER

## 2023-01-15 PROCEDURE — 99291 CRITICAL CARE FIRST HOUR: CPT | Performed by: INTERNAL MEDICINE

## 2023-01-15 PROCEDURE — 95813 EEG EXTND MNTR 61-119 MIN: CPT

## 2023-01-15 PROCEDURE — 80076 HEPATIC FUNCTION PANEL: CPT

## 2023-01-15 PROCEDURE — 92610 EVALUATE SWALLOWING FUNCTION: CPT

## 2023-01-15 PROCEDURE — 85025 COMPLETE CBC W/AUTO DIFF WBC: CPT

## 2023-01-15 PROCEDURE — 6360000002 HC RX W HCPCS: Performed by: STUDENT IN AN ORGANIZED HEALTH CARE EDUCATION/TRAINING PROGRAM

## 2023-01-15 PROCEDURE — 83735 ASSAY OF MAGNESIUM: CPT

## 2023-01-15 PROCEDURE — 2000000000 HC ICU R&B

## 2023-01-15 PROCEDURE — C9113 INJ PANTOPRAZOLE SODIUM, VIA: HCPCS | Performed by: STUDENT IN AN ORGANIZED HEALTH CARE EDUCATION/TRAINING PROGRAM

## 2023-01-15 PROCEDURE — 2700000000 HC OXYGEN THERAPY PER DAY

## 2023-01-15 PROCEDURE — 80185 ASSAY OF PHENYTOIN TOTAL: CPT

## 2023-01-15 PROCEDURE — 37799 UNLISTED PX VASCULAR SURGERY: CPT

## 2023-01-15 PROCEDURE — 80202 ASSAY OF VANCOMYCIN: CPT

## 2023-01-15 RX ORDER — ONDANSETRON 2 MG/ML
4 INJECTION INTRAMUSCULAR; INTRAVENOUS EVERY 6 HOURS PRN
Status: DISCONTINUED | OUTPATIENT
Start: 2023-01-15 | End: 2023-01-16

## 2023-01-15 RX ADMIN — SODIUM CHLORIDE, PRESERVATIVE FREE 10 ML: 5 INJECTION INTRAVENOUS at 20:45

## 2023-01-15 RX ADMIN — DEXMEDETOMIDINE 1.2 MCG/KG/HR: 100 INJECTION, SOLUTION INTRAVENOUS at 19:05

## 2023-01-15 RX ADMIN — HEPARIN SODIUM 5000 UNITS: 5000 INJECTION INTRAVENOUS; SUBCUTANEOUS at 14:47

## 2023-01-15 RX ADMIN — PIPERACILLIN AND TAZOBACTAM 3375 MG: 3; .375 INJECTION, POWDER, LYOPHILIZED, FOR SOLUTION INTRAVENOUS at 02:46

## 2023-01-15 RX ADMIN — PIPERACILLIN AND TAZOBACTAM 3375 MG: 3; .375 INJECTION, POWDER, LYOPHILIZED, FOR SOLUTION INTRAVENOUS at 11:53

## 2023-01-15 RX ADMIN — PANTOPRAZOLE SODIUM 40 MG: 40 INJECTION, POWDER, LYOPHILIZED, FOR SOLUTION INTRAVENOUS at 08:42

## 2023-01-15 RX ADMIN — POLYETHYLENE GLYCOL 3350 17 G: 17 POWDER, FOR SOLUTION ORAL at 14:46

## 2023-01-15 RX ADMIN — OXCARBAZEPINE 900 MG: 300 TABLET, FILM COATED ORAL at 08:42

## 2023-01-15 RX ADMIN — PROPOFOL 40 MCG/KG/MIN: 10 INJECTION, EMULSION INTRAVENOUS at 01:45

## 2023-01-15 RX ADMIN — DEXMEDETOMIDINE 1.2 MCG/KG/HR: 100 INJECTION, SOLUTION INTRAVENOUS at 05:25

## 2023-01-15 RX ADMIN — HEPARIN SODIUM 5000 UNITS: 5000 INJECTION INTRAVENOUS; SUBCUTANEOUS at 22:00

## 2023-01-15 RX ADMIN — FOSPHENYTOIN SODIUM 100 MG PE: 50 INJECTION, SOLUTION INTRAMUSCULAR; INTRAVENOUS at 06:32

## 2023-01-15 RX ADMIN — SODIUM CHLORIDE, PRESERVATIVE FREE 10 ML: 5 INJECTION INTRAVENOUS at 08:42

## 2023-01-15 RX ADMIN — ONDANSETRON 4 MG: 2 INJECTION INTRAMUSCULAR; INTRAVENOUS at 21:44

## 2023-01-15 RX ADMIN — FOSPHENYTOIN SODIUM 100 MG PE: 50 INJECTION, SOLUTION INTRAMUSCULAR; INTRAVENOUS at 22:32

## 2023-01-15 RX ADMIN — PROCHLORPERAZINE EDISYLATE 10 MG: 5 INJECTION, SOLUTION INTRAMUSCULAR; INTRAVENOUS at 23:50

## 2023-01-15 RX ADMIN — Medication 175 MCG/HR: at 03:19

## 2023-01-15 RX ADMIN — PIPERACILLIN AND TAZOBACTAM 3375 MG: 3; .375 INJECTION, POWDER, LYOPHILIZED, FOR SOLUTION INTRAVENOUS at 18:35

## 2023-01-15 RX ADMIN — FOSPHENYTOIN SODIUM 100 MG PE: 50 INJECTION, SOLUTION INTRAMUSCULAR; INTRAVENOUS at 15:26

## 2023-01-15 RX ADMIN — OXCARBAZEPINE 900 MG: 300 TABLET, FILM COATED ORAL at 20:44

## 2023-01-15 RX ADMIN — Medication 125 MCG/HR: at 19:52

## 2023-01-15 RX ADMIN — HEPARIN SODIUM 5000 UNITS: 5000 INJECTION INTRAVENOUS; SUBCUTANEOUS at 06:32

## 2023-01-15 RX ADMIN — VANCOMYCIN HYDROCHLORIDE 1250 MG: 10 INJECTION, POWDER, LYOPHILIZED, FOR SOLUTION INTRAVENOUS at 06:50

## 2023-01-15 RX ADMIN — DEXMEDETOMIDINE 1.2 MCG/KG/HR: 100 INJECTION, SOLUTION INTRAVENOUS at 12:06

## 2023-01-15 RX ADMIN — Medication 100 MCG/HR: at 10:42

## 2023-01-15 ASSESSMENT — PULMONARY FUNCTION TESTS
PIF_VALUE: 21
PIF_VALUE: 13
PIF_VALUE: 21
PIF_VALUE: 20
PIF_VALUE: 13
PIF_VALUE: 21
PIF_VALUE: 14
PIF_VALUE: 21
PIF_VALUE: 13
PIF_VALUE: 21

## 2023-01-15 ASSESSMENT — PAIN SCALES - GENERAL
PAINLEVEL_OUTOF10: 0

## 2023-01-15 NOTE — PROGRESS NOTES
Cardiology Consult Service  Daily Progress Note        Admit Date:  1/12/2023  Primary cardiologist: Mary Hudson    Reason for Consultation/Chief Complaint: new HFrEF    Subjective:     Patient is 66-year-old man with history of seizures, HCV, uncontrolled hypertension, Takotsubo cardiomyopathy, COPD, smoking, polysubstance abuse (heroin, fentanyl). Patient presented to [facility] on/12/23 with seizures. New LBBB on ECG thought to be due to cardiomyopathy rather than ACS in view of normal coronaries in 2021 per cath. Echo 1/13/2023 consistent with mild LVD, LVEF less than 20%, indeterminate diastolic function, moderate MR, severe RV dysfunction, RVSP 42 (15). Compared to echo 09/2022 with LVEF 45-50% and normal LVEF in 07/2021. Interval history: There were no overnight events. Patient is on a weaning protocol to be extubated later today. Dobutamine was stopped 1/14/2022, patient remains hemodynamically stable. He remains on normal saline 100 mill per hour. I's and O's positive, weight up 2 pounds. LFTs continue to increase, creatinine stable 8.7.     Objective:     Medications:   sodium chloride flush  5-40 mL IntraVENous 2 times per day    pantoprazole  40 mg IntraVENous Daily    heparin (porcine)  5,000 Units SubCUTAneous 3 times per day    piperacillin-tazobactam  3,375 mg IntraVENous Q8H    OXcarbazepine  900 mg Oral BID    fosphenytoin (CEREBYX) maintenance dose IVPB  100 mg PE IntraVENous Q8H       IV drips:   norepinephrine Stopped (01/13/23 2053)    propofol Stopped (01/15/23 1007)    fentaNYL 100 mcg/hr (01/15/23 1042)    midazolam Stopped (01/14/23 1331)    dexmedetomidine (PRECEDEX) IV infusion 1.2 mcg/kg/hr (01/15/23 1206)    sodium chloride      phenylephrine (MISSAEL-SYNEPHRINE) 50mg/250mL infusion Stopped (01/13/23 0610)    vasopressin (Septic Shock) infusion 0.03 Units/min (01/13/23 0400)       PRN:  prochlorperazine, sodium chloride flush, sodium chloride, polyethylene glycol, acetaminophen **OR** acetaminophen    Vitals:    01/15/23 1017 01/15/23 1028 01/15/23 1100 01/15/23 1200   BP:   138/88 123/80   Pulse: 98 94 (!) 104 89   Resp:  (!) 34 (!) 40 23   Temp:    99.7 °F (37.6 °C)   TempSrc:    Bladder   SpO2:  (!) 88% 96% 98%   Weight:       Height:           Intake/Output Summary (Last 24 hours) at 1/15/2023 1403  Last data filed at 1/15/2023 1200  Gross per 24 hour   Intake 3893.32 ml   Output 2180 ml   Net 1713.32 ml       I/O last 3 completed shifts:  In: 7144.2 [I.V.:4995.6; NG/GT:836; IV Piggyback:1312.5]  Out: 4350 [Urine:4100; Emesis/NG output:250]  Wt Readings from Last 3 Encounters:   01/15/23 138 lb 0.1 oz (62.6 kg)   01/12/23 122 lb 9 oz (55.6 kg)   07/06/22 125 lb (56.7 kg)       Admit Wt: Weight: 135 lb 2.3 oz (61.3 kg)   Todays Wt: Weight: 138 lb 0.1 oz (62.6 kg)    TELEMETRY personally reviewed: Sinus zenaida    Physical Exam:         General Appearance:  Alert, intubated.    Head:  Normocephalic, without obvious abnormality, atraumatic   Eyes:  PERRL, conjunctiva/corneas clear EOM intact  Ears normal   Throat no lesions       Nose: Nares normal, no drainage or sinus tenderness   Throat: Lips, mucosa, and tongue normal   Neck: Supple, symmetrical, trachea midline, no adenopathy, thyroid: not enlarged, symmetric, no tenderness/mass/nodules, no carotid bruit.        Lungs:   Normal respiratory rate, lungs clear to auscultation without any wheezes, rubs or ronchi bilaterally.    Chest Wall:  No tenderness or deformity   Heart:  Regular rhythm, rate is controlled, S1, S2 normal, there is no murmur, there is no rub or gallop, cannot assess jvd, no bilateral lower extremity edema   Abdomen:   Soft, non-tender, bowel sounds active all four quadrants,  no masses, no organomegaly       Extremities: Extremities normal, atraumatic, no cyanosis.    Pulses: 2+ and symmetric   Skin: Skin color, texture, turgor normal, no rashes or lesions   Pysch: Normal mood and affect   Neurologic: Normal gross  motor and sensory exam.  Cranial nerves intact       Labs:   Recent Labs     01/13/23  2251 01/14/23  0340 01/15/23  0400   * 134* 137   K 4.0 4.2 3.9   BUN 15 14 14   CREATININE 0.7* 0.7* 0.7*    106 106   CO2 24 23 20*   GLUCOSE 104* 125* 94   CALCIUM 8.3 8.4 8.0*   MG  --  1.50* 2.20       Recent Labs     01/13/23  0504 01/14/23  0340 01/15/23  0400   WBC 25.6* 11.5* 7.5   HGB 14.1 11.8* 11.4*   HCT 41.8 34.2* 33.1*    117* 102*   MCV 90.9 90.3 89.8       No results for input(s): CHOLTOT, TRIG, HDL, CHOLHDL, LDL in the last 72 hours. Invalid input(s): [address] [facility],[address]  Recent Labs     01/14/23  1229   INR 1.71*       Recent Labs     01/12/23  2031 01/13/23  0504 01/13/23  0908 01/13/23  1457 01/13/23  1916   CKTOTAL 228  --   --   --   --    TROPONINI  --  0.39* 0.29* 0.18* 0.19*       No results for input(s): BNP in the last 72 hours. No results for input(s): NTPROBNP in the last 72 hours. No results for input(s): TSH in the last 72 hours. Imaging:       Assessment & Plan:     1. Borderline elevation of troponin. It is most likely due to demand ischemia rather than ACS. 2.  New and compensated HFrEF. It is likely due to recurrent Takotsubo cardiomyopathy. [facility] in 2021 revealed normal coronaries. 3.  Polysubstance abuse  4. History of seizures with recurrent episode leading to admission.        - Will stop IV fluids.   - Once extubated, could consider starting heart failure medications. I have personally reviewed the reports and images of labs, radiological studies, cardiac studies including ECG's and telemetry, current and old medical records. The note was completed using EMR and Dragon dictation system. Every effort was made to ensure accuracy; however, inadvertent computerized transcription errors may be present. All questions and concerns were addressed to the patient/family. Alternatives to my treatment were discussed.      Thank you for allowing to us to participate in the care or Tiny Dean. Please call our service with questions.     Alan Fleming MD, [facility] [place], [address]  The [address]  [address] Time [address]  Ph: [phone]  Fax: [phone]

## 2023-01-15 NOTE — PROGRESS NOTES
MECHANICAL VENTILATION WEANING PROTOCOL    PRE-TRIAL PATIENT ASSESSMENT - COMPLETED AT 0820 per Dr. Grayson Warner    Ventilatory Assessment:    PARAMETER CRITERIA FOR WEANING   Spontaneous Cough:  Yes    Sputum Characteristics:          SPONTANEOUS COUGH With small to moderate  Amount of secretions   FiO2 : 30 % FIO2 less than or equal to 50%     PEEP less than or equal to 8   Progressive Mobility Protocol  No     ABG:  Lab Results   Component Value Date/Time    PHART 7.439 01/13/2023 10:51 PM    TPE9DJK 36.9 01/13/2023 10:51 PM    PO2ART 173.0 01/13/2023 10:51 PM    Y2FPTMGB >100 01/13/2023 10:51 PM    GOL2XOS 25 01/13/2023 10:51 PM    BEART 1.0 01/13/2023 10:51 PM     HGB/WBC:  Lab Results   Component Value Date/Time    HGB 11.4 01/15/2023 04:00 AM    WBC 7.5 01/15/2023 04:00 AM        Vital Signs:    PARAMETER CRITERIA FOR WEANING Meets Criteria   Heart Rate: 63 Within patient's normal limits / stable Yes   Resp: 15 Less than or equal to 30 Yes   BP: 96/60 Within patient's normal limits / minimal pressors (Hemodynamically Stable) Yes   SpO2: 99 % Greater than or equal to 90% Yes   End Tidal CO2: 22 (%) Within patient's normal limits Yes   Temp: 97.7 °F (36.5 °C) Less than 38. 5oC / 101. 3oF Yes     [x]    Based on this assessment and the [facility] Ventilator Weaning Protocol, this patient  IS being placed on a Spontaneous Breathing Trial (SBT) at this time.  []    Based on this assessment and the [facility] Ventilator Weaning Protocol, this patient  IS NOT being placed on a Spontaneous Breathing Trial (SBT) at this time. []    Patient  IS NOT being placed on a Spontaneous Breathing Trial (SBT) at this time because of factors not previously addressed.   Those factors include      Vital Capacity (VC) = 1.151    Maximum Inspiratory Force (MIF) = -14    SBT - Initiated at  [address]:  CPAP - 5 cmH2O, PS - 8 cmH2O(if using settings other than CPAP 5/PS 8, please explain reasons for settings here):       1 Minute SBT Respiratory Parameters:   VE: 9 L   RR: 14 b/m   VT: 442 mL (average VT = VE/RR)   RSBI: 22 (RR/VT in liters)   ETCO2: 24 cmH2O   SPO2: 99 %   If on sedation, amount and type 100mcg/kg/hr fentanyl, 1.2mcg/kg/hr precidex, 40mcg/kg/min propofol    [x]   RR is less than 35, RSBI is less than 100, patient's vitals signs are stable, and patient is in no apparent distress; therefore, patient is being left on SBT for up to 1 hour. []   RR is greater than 35, RSBI is greater than 100, VS's are unstable, or patient is in distress; therefore, patient is being placed back on previous settings. SBT - Concluded at  3085. Weaning Parameters/VS's at conclusion of SBT:   VE: 12.2 L   RR: 20 b/m   VT: 996 mL (average VT = VE/RR)   RSBI: 36 (RR/VT in liters)   ETCO2: 29 cmH2O   SPO2: 96 %    If on sedation, amount and type 100mcg/kg/hr fentanyl, 1.2mcg/kg/hr precidex, 20mcg/kg/min propofol      [x]   Patient tolerated SBT for full 60 minutes with acceptable weaning parameters and vital signs and showed no signs or distress. []   Patient tolerated SBT for full 60 minutes, but had unacceptable weaning parameters or vital signs, and/or signs of distress. []   Patient was unable to tolerate SBT for 60 minutes and was placed back on previous settings.             COMMENTS:  SBT completed, awaiting further orders

## 2023-01-15 NOTE — PROGRESS NOTES
Received verbal order from Chantale Salazar NP, to take continuous EEG down from patient. Arterial line and hodges catheter removed per order ICU residents without difficulty. Patient remains on 100mcg/hr Fentanyl and 1.2 mcg/kg/hr of Precedex. Patient is alert and oriented x 4 at this time, but remains anxious at times. Family at bedside, continually updated patient and family on plan of care. Weaning high flow O2 at this time, down to 12 liters currently. VSS. Will monitor closely.

## 2023-01-15 NOTE — PROGRESS NOTES
ICU Progress Note    Admit Date: 1/12/2023  Day: 2  Vent Day: 2  IV Access:Peripheral, CVC  IV Fluids: 100/hr NS  Vasopressors:None                Antibiotics: Vanc/ Zosyn  Diet: Diet NPO  ADULT TUBE FEEDING; Nasogastric; Peptide Based; Continuous; 25; Yes; 15; Q 6 hours; 55; 30; Q 4 hours    CC: Status epilepticus    Interval history:   Afebrile  Extubated this AM after propofol weaned off  Fentanyl and precedex gtt maintained for much needed sedation  Stable and saturating to 100% on 12L by high flow NC  Patient no longer on pressors    HPI:   Per resident HPI: Eran Rousseau is a 55-year-old M with PMH of seizures, hep C, uncontrolled hypertension, takatsubo cardiomyopathy, COPD, temporal seizures, 20 yr tobacco use, HFrEF, polysubstance abuse (heroine and fentanyl) who presents to [facility] ED with seizures. EMS was called for witnessed seizure. Patient was actively seizing when he arrived to the ED. He received multiple doses of benzos on route that did not improve his seizure. He was actively seizing for about an hour. He never returned to baseline. He was eventually intubated due to hypoxia and not protecting his airway. Other tests remarkable for EKG showed new LBBB concern for underlying ischemia he was taken to the Cath Lab. Cardiology felt it was likely sinus tachycardia with aberrancy. Troponins were negative. Other labs remarkable for lactic acid was 6.9, ammonia 129, UDS was positive for cannabinoids methadone and fentanyl. Patient is a known IV drug user and is on methadone at home. UA was negative for any infection. Imaging studies were done and showed CT PE negative for any PE but showed multifocal pneumonia, CT head showed no intra cranial abnormality. He was treated with large-volume IV fluids and started on broad-spectrum antibiotics he had some worsening hypoxia with pulmonary edema and ultimately required Lasix.   He was put on IV Versed, IV fentanyl and IV ketamine for sedation and requirement of paralysis for management of improved oxygenation on the ventilator. \"    Medications:     Scheduled Meds:   vancomycin  1,250 mg IntraVENous Q12H    sodium chloride flush  5-40 mL IntraVENous 2 times per day    pantoprazole  40 mg IntraVENous Daily    heparin (porcine)  5,000 Units SubCUTAneous 3 times per day    piperacillin-tazobactam  3,375 mg IntraVENous Q8H    OXcarbazepine  900 mg Oral BID    fosphenytoin (CEREBYX) maintenance dose IVPB  100 mg PE IntraVENous Q8H     Continuous Infusions:   norepinephrine Stopped (01/13/23 2053)    propofol 40 mcg/kg/min (01/15/23 2525)    fentaNYL 150 mcg/hr (01/15/23 0650)    midazolam Stopped (01/14/23 1331)    dexmedetomidine (PRECEDEX) IV infusion 1.2 mcg/kg/hr (01/15/23 2363)    sodium chloride      phenylephrine (MISSAEL-SYNEPHRINE) 50mg/250mL infusion Stopped (01/13/23 0610)    vasopressin (Septic Shock) infusion 0.03 Units/min (01/13/23 0400)     PRN Meds:prochlorperazine, sodium chloride flush, sodium chloride, polyethylene glycol, acetaminophen **OR** acetaminophen    Objective:   Vitals:   T-max:  Patient Vitals for the past 8 hrs:   BP Temp Temp src Pulse Resp SpO2 Weight   01/15/23 0938 -- -- -- 71 21 96 % --   01/15/23 0830 -- -- -- 63 15 99 % --   01/15/23 0820 -- -- -- 60 -- -- --   01/15/23 0800 96/60 97.7 °F (36.5 °C) Bladder 58 18 99 % 138 lb 0.1 oz (62.6 kg)   01/15/23 0756 -- -- -- 59 18 99 % --   01/15/23 0720 -- -- -- 58 -- -- --   01/15/23 0700 98/66 -- -- 59 18 98 % --   01/15/23 0630 -- -- -- 58 18 99 % --   01/15/23 0600 103/70 97.7 °F (36.5 °C) Bladder 58 18 99 % --   01/15/23 0535 -- -- -- 58 18 99 % --   01/15/23 0530 -- -- -- 58 18 100 % --   01/15/23 0500 100/67 -- -- 58 18 99 % --   01/15/23 0430 -- -- -- 59 18 100 % --   01/15/23 0400 100/68 97.9 °F (36.6 °C) Bladder 57 18 100 % 138 lb 0.1 oz (62.6 kg)   01/15/23 0330 -- -- -- 58 18 100 % --   01/15/23 0300 97/67 -- -- 57 18 100 % --   01/15/23 0230 -- -- -- 58 18 99 % --   01/15/23 0200 90/60 -- -- 60 18 99 % --         Intake/Output Summary (Last 24 hours) at 1/15/2023 0946  Last data filed at 1/15/2023 9669  Gross per 24 hour   Intake 3893.32 ml   Output 2070 ml   Net 1823.32 ml       Review of Systems   Unable to perform ROS: Acuity of condition     Physical Exam  Constitutional:       Appearance: He is ill-appearing. He is not toxic-appearing. HENT:      Head: Normocephalic and atraumatic. Mouth/Throat:      Comments: Very poor dentition  Cardiovascular:      Rate and Rhythm: Regular rhythm. Tachycardia present. Heart sounds: Normal heart sounds. Pulmonary:      Effort: Respiratory distress present. Breath sounds: Stridor present. No wheezing. Abdominal:      General: Abdomen is flat. Palpations: Abdomen is soft. Musculoskeletal:      Right lower leg: No edema. Left lower leg: No edema. Skin:     Coloration: Skin is pale. Skin is not jaundiced. Neurological:      General: No focal deficit present. Mental Status: He is oriented to person, place, and time.       Comments: Deeply sedated, no response to painful stimulus     LABS:    CBC:   Recent Labs     01/13/23  0504 01/14/23  0340 01/15/23  0400   WBC 25.6* 11.5* 7.5   HGB 14.1 11.8* 11.4*   HCT 41.8 34.2* 33.1*    117* 102*   MCV 90.9 90.3 89.8       Renal:    Recent Labs     01/13/23 2251 01/14/23  0340 01/15/23  0400   * 134* 137   K 4.0 4.2 3.9    106 106   CO2 24 23 20*   BUN 15 14 14   CREATININE 0.7* 0.7* 0.7*   GLUCOSE 104* 125* 94   CALCIUM 8.3 8.4 8.0*   MG  --  1.50* 2.20   PHOS 1.8* 2.3* 3.2   ANIONGAP 6 5 11       Hepatic:   Recent Labs     01/13/23  0504 01/13/23  2251 01/14/23  0340 01/15/23  0400   *  --  1,782* 2,023*   *  --  2,380* 2,998*   BILITOT 0.6  --  0.5 0.4   BILIDIR 0.3  --  <0.2 <0.2   PROT 5.7*  --  5.1* 4.9*   LABALBU 3.2* 2.8* 2.8* 2.6*   ALKPHOS 100  --  77 85       Troponin:   Recent Labs     01/13/23  0908 01/13/23  1457 01/13/23  1916   TROPONINI 0.29* 0.18* 0.19*       BNP: No results for input(s): BNP in the last 72 hours. Lipids: No results for input(s): CHOL, HDL in the last 72 hours. Invalid input(s): LDLCALCU, TRIGLYCERIDE  ABGs:    Recent Labs     01/13/23  0604 01/13/23  2100 01/13/23  2251   PHART 7.273* 7.509* 7.439   BGD6JUB 30.7* 29.6* 36.9   PO2ART 137.8* 159.0* 173.0*   XHV2IBD 14.2* 24 25   BEART -13* 1.4 1.0   F5GZQNHB 99 >100 >100   XAT1AJO 15 25 26         INR:   Recent Labs     01/14/23  1229   INR 1.71*     Lactate:   Recent Labs     01/12/23  1942 01/13/23  0604   LACTATE 2.91* 7.08*         RAD:   XR CHEST PORTABLE   Final Result      The tip of the endotracheal tube projects over the mid trachea. Improved left-sided airspace disease and unchanged right mid and basilar airspace disease. Assessment/Plan:   Aki Steward is a 59-year-old M with PMH of temporal lobe seizures, hep C, uncontrolled HTN, Takutsobo cardiomyopathy, COPD, 20 pack-year smoking, HFrEF, polysubstance abuse (heroin and fentanyl on methadone) who presents to [facility] ED with seizures. Acute hypoxic respiratory failure 2/2 Septic Shock 2/2 Multifocal Pneumonia  - Extubated this AM. Now on HF NC 13L O2 saturating to 100%  - Sedation: Fentanyl 100, Precedex 1.2   -Weaning Fentanyl and Precedex as tolerated. He has high sedation/analgesic tolerance. - Zyvox (1/13 only) and Zosyn 01/12/23  - Vanc started, Zyvox d/c due to better tissue penetration of possible endocarditis  - Vanc discontinued, continuing on Zosyn for now. After SLP clearance may begin PO Augmentin  - Respiratory cultures pending  - MRSA nasal swab negative  - Blood Cx from [facility]: Bacillus  - Echo to r/o endocarditis or decompensated HFREF  - EF <20% w/ diffuse hypokinesis. No vegetations. Low EF likely due to active septic shock and myocardial demand ischemia given Troponinemia.  Should hold Entresto until extubated and/or off pressors per Cards recs. - SLP Eval. Once passes we can d/c NG tube, start home Entresto    Ischemic Hepatitis  - Jump in ALT/ /100> 1782/2380> 3000/2000  - Trend daily  -  F/u INR     Acute encephalopathy 2/2 status epilepticus  History of right temporal epilepsy diagnosed in 2019 in the setting of a prior TBI. He has known epilepsy, he is on Oxtellar and Xcopri at home and is known to be noncompliant, UDS was positive for fentanyl, and he was septic on arrival to the ED which could be a reason to put him into status epilepticus.  - Neurology consulted, appreciate recs  - cEEG  - Weaned Versed off  - Q1H neuro checks  - Oxcarbazepine 900mg BID  - Fosphenytoin 100mg Q8H IV  - Will resume Xcopri when able to take PO (unable to crush for pharmacy) - pending SLP eval tomorrow  - MRI Brain with and without contrast when able  - Avoid nimbex    New Left Bundle Branch Block  Per Cards: New LBBB/previous history of stress-induced, cardiomyopathy with borderline recovered LV function/elevated troponin/demand ischemia. - Cardiology consulted; appreciate recs  - Likely demand ischemia from sepsis. Unlikely true ACS given normal cath in 2021  -Telemetry  -Repeat EKG  -Trending Troponin: 0.01> 0.39  - No need for heparin gtt  - Prev had IVCD and LVH, LBBB likely progression of CMP. - will need CHF meds once stable enough     Hyperammonemia - resolved  Ammonia level of 129> 46  Ammonia levels known to rise 2/2 Seizure activity therefore no lactulose ordered. Resolved on repeat draw.   - No need to trend    Anion gap metabolic acidosis 2/2 lactic acidosis - Resolved  Lactic acidosis 6.9> 2.91     Chronic Problems:  COPD - Duonebs  HTN - Holding home coreg  HFrEF - LVEF in March 2022 of 45-50% - Holding Home Entresto  Tobacco use   Polysubstance Abuse - holding home methadone     Code Status: Prior  FEN: Start tube feeds today, RD consulted  PPX:  SQH and Protonix  DISPO: ICU    Roxanne Rush MD, PGY-1  01/15/23  9:46 AM    This patient has been staffed and discussed with Dr. Viktor Azevedo MD  Examined, findings as discussed with Lizbeth Ribera. Agree with assessment and plan. Management of critical illness required for respiratory failure, associated with severe sepsis, due to pneumonia. Infecting organism unknown. Blood culture with bacillus is likely contaminant. Signs of infection continue to improve with broad-spectrum antibiotic therapy. MRSA swab is negative, and vancomycin can be discontinued. Hemodynamically improved, and he remains off of pressor agents. Renal function remains normal.  Ventilator requirements decreasing, FiO2 down to 30%. Minute volume has been 10-12 L. Placed on SBT, and he is able to maintain adequate ventilation, RSBI 22. Arterial blood gas obtained after 1 hour showed pH 7.40, PCO2 35, PO2 165. He was extubated. On follow-up examination he is maintaining normal breathing pattern, does not have shortness of breath. Cough mildly productive. Hepatic enzymes continues to climb although rate of rise is slower in the last 24 hours. Findings remain consistent with shock liver. Patient and family updated regarding progress and plans. Discussed with Dr. Candace Mcneil. No further treatment of decreased cardiac function is indicated at this time. Would follow-up echocardiogram after a week.   Time spent in critical care 50 minutes

## 2023-01-15 NOTE — PROGRESS NOTES
EEG taken down from patient. [facility] notified, Dre El, NP gave order to discontinue continuous EEG. Test completed at 1552.

## 2023-01-15 NOTE — PROGRESS NOTES
Patient has been successfully weaned from Mechanical Ventilation. RSBI before extubation was 22 with EtCO2 of 24 and SpO2 of 99 on 30% FiO2. Patient extubated and placed on 15 liters/min via non-rebreather face mask. Post extubation SpO2 is 94% with HR  101 bpm and RR 30 breaths/min. Patient had strong cough that was productive of tan and brown sputum. Extubation fair by patient. Pt had increased anxiety before and during extubation.

## 2023-01-15 NOTE — PROGRESS NOTES
Patient assessed. Remains intubated, sedated, on cEEG. Sedation recently increased after episode of agitation. PERRLA. VSS. Family at bedside. Tolerating current vent settings. Tube feeds running at ordered rate. Will increase at 2200 per order. Urine output adequate. Bed in lowest position, side rails up X 3, bed alarm on. Will continue to monitor.

## 2023-01-15 NOTE — PROGRESS NOTES
NEUROLOGY / NEUROCRITICAL CARE PROGRESS NOTE       Patient Name: Poonam Yusuf YOB: 1990   Sex: Male Age: 28 yrs     CC / Reason for Consult: status epilepticus    Interval Hx / Changes over last 24 hours: On SBT - likely going to extubate today  Now on propofol (40); off versed   PHT 11  Na 137  No seizures on cEEG    ROS: unobtainable given mental status and sedation    HISTORY   Admission HPI:   Poonam Yusuf is a 28 y.o. y/o male with PMH significant for hepatitis C, HTN, NSTEMI, seizures, polysubstance abuse (methadone patient), and takotsubo cardiomyopathy. Per my interview with the patient's mother, the patient had multiple seizures at home during which he would stare off in the distance, turn his head to the side, and then have generalized shaking in all 4 extremities. Per patient's mother this is the same semiology as his previous seizures. EMS was called and the patient was transported to [facility] ED. Patient continued to seize while en route, and on arrival to the ED. Patient was intubated and a central line was placed. CT head showed no intracranial abnormalities. Patient also presented with leukocytosis, lactic acidosis, and fever with PNA seen on chest CT and XR. UDS positive for methadone, and fentanyl. During ED workup patient continued to have an elevated HR and was fighting the ventilator with poor oxygenation. Patient eventually sedated with versed, fentanyl, ketamine, and nimbex. Patient also received a 3g of keppra in addition to ativan and propofol IV for his seizures. Patient was transferred to [facility] ICU for further evaluation and cvEEG. Of note:  Patient sees Dr. Farooq Ramírez at [facility] for his seizure management. Per the most recent note from an office visit in 10/2022, patient was admitted to [facility] EMU in 10/2022 where a seizure was captured on EEG. Patient has had multiple hospitalizations since then for his seizures.   Patient was originally started on keppra, but took himself off it due to side effects. Patient has since been on oxcarbazepine of varying doses until Era Hammans was added to his regimen in 03/2022. Most recent AED regimen consists of Xcopir titration to 200mg daily and Oxtellar 1800mg daily.       PMH Past Medical History:   Diagnosis Date    Aspiration pneumonia (HCC)     Hepatitis C     HTN (hypertension)     NSTEMI (non-ST elevated myocardial infarction) ([facility]     [facility] ([facility]     Seizures ([facility]     Septic shock ([facility]     Takotsubo cardiomyopathy       Allergies No Known Allergies   Diet Diet NPO  ADULT TUBE FEEDING; Nasogastric; Peptide Based; Continuous; 25; Yes; 15; Q 6 hours; 55; 30; Q 4 hours   Isolation No active isolations     CURRENT SCHEDULED MEDICATIONS   Inpatient Medications     vancomycin, 1,250 mg, IntraVENous, Q12H    sodium chloride flush, 5-40 mL, IntraVENous, 2 times per day    pantoprazole, 40 mg, IntraVENous, Daily    heparin (porcine), 5,000 Units, SubCUTAneous, 3 times per day    [COMPLETED] piperacillin-tazobactam, 4,500 mg, IntraVENous, Once **FOLLOWED BY** piperacillin-tazobactam, 3,375 mg, IntraVENous, Q8H    OXcarbazepine, 900 mg, Oral, BID    fosphenytoin (CEREBYX) maintenance dose IVPB, 100 mg PE, IntraVENous, Q8H   Infusions    norepinephrine Stopped (01/13/23 2053)    propofol 40 mcg/kg/min (01/15/23 1033)    fentaNYL 150 mcg/hr (01/15/23 0650)    midazolam Stopped (01/14/23 1331)    dexmedetomidine (PRECEDEX) IV infusion 1.2 mcg/kg/hr (01/15/23 5055)    sodium chloride      phenylephrine (MISSAEL-SYNEPHRINE) 50mg/250mL infusion Stopped (01/13/23 0610)    vasopressin (Septic Shock) infusion 0.03 Units/min (01/13/23 0400)      Antibiotics   Recent Abx Admin                     vancomycin (VANCOCIN) 1,250 mg in dextrose 5 % 250 mL IVPB (mg) 1,250 mg New Bag 01/15/23 0650     1,250 mg New Bag 01/14/23 2015    piperacillin-tazobactam (ZOSYN) 3,375 mg in dextrose 5 % 50 mL IVPB (mini-bag) (mg) 3,375 mg New Bag 01/15/23 0246     3,375 mg New Bag 01/14/23 1813     3,375 mg New Bag  0947                      LABS   Metabolic Panel Recent Labs     01/12/23  0928 01/12/23  2031 01/12/23  2148 01/13/23  0504 01/13/23  2251 01/14/23  0340 01/15/23  0400   NA  --    < >  --  129* 134* 134* 137   K  --    < >  --  5.2* 4.0 4.2 3.9   CL  --    < >  --  97* 104 106 106   CO2  --    < >  --  14* 24 23 20*   BUN  --    < >  --  19 15 14 14   CREATININE  --    < >  --  1.1 0.7* 0.7* 0.7*   GLUCOSE  --    < >  --  277* 104* 125* 94   CALCIUM  --    < >  --  8.0* 8.3 8.4 8.0*   LABALBU  --    < >  --  3.2* 2.8* 2.8* 2.6*   PHOS  --    < >  --  5.9* 1.8* 2.3* 3.2   MG  --   --   --   --   --  1.50* 2.20   ALKPHOS  --    < >  --  100  --  77 85   ALT  --    < >  --  100*  --  2,380* 2,998*   AST  --    < >  --  101*  --  1,782* 2,023*   AMMONIA 129*  --  46  --   --  47  --     < > = values in this interval not displayed. CBC / Coags Recent Labs     01/13/23  0504 01/14/23  0340 01/14/23  1229 01/15/23  0400   WBC 25.6* 11.5*  --  7.5   RBC 4.60 3.79*  --  3.68*   HGB 14.1 11.8*  --  11.4*   HCT 41.8 34.2*  --  33.1*    117*  --  102*   INR  --   --  1.71*  --         Other No results for input(s): LABA1C, LDLCALC, TRIG, TSH, CYENJBPQ49, FOLATE, LABSALI, COVID19 in the last 72 hours. Recent Labs     01/13/23 1922 01/14/23  0340 01/15/23  0400   PHENYTOIN  --    < > 11.4   LACTA 1.5  --   --     < > = values in this interval not displayed. DIAGNOSTICS   IMAGES:  Images personally reviewed and agree w/ radiology interpretation. Head CT w/o Contrast: no acute intracranial abnormality      CT Chest: No PE; multifocal pneumonia     CvEEG   cEEG start date & time: 1- at 7 am  cEEG end date & time: 1- at 7 am  During the recording:   No focal slowing was seen. No epileptiform discharges or seizures were detected.    Video was reviewed intermittently at times when significant amounts of EMG artifact were present. The EKG monitoring channel did not detect any significant rhythm abnormalities. EEG Interpretation: This EEG is abnormal due to the presence of:  Severe generalized slowing. This is a non-specific finding but is consistent with a generalized disturbance of cerebral function. It may be seen in a variety of conditions, such as toxic, metabolic, post-anoxic, multi-focal, or diffuse structural abnormalities. cvEEG 1/13 7 am - 1/14 7 am  No focal slowing was seen. No epileptiform discharges or seizures were detected. Video was reviewed intermittently at times when significant amounts of EMG artifact were present. The EKG monitoring channel did not detect any significant rhythm abnormalities. EEG Interpretation: This EEG is abnormal due to the presence of:      Severe generalized slowing. This is a non-specific finding but is consistent with a generalized disturbance of cerebral function. It may be seen in a variety of conditions, such as toxic, metabolic, post-anoxic, multi-focal, or diffuse structural abnormalities. cvEEG 1/12/23 11 PM - 1/13/23 7 AM  During the recording:   No focal slowing was seen. No epileptiform discharges or seizures were detected. Video was reviewed intermittently at times when significant amounts of EMG artifact were present. The EKG monitoring channel did not detect any significant rhythm abnormalities. EEG Interpretation: This EEG is abnormal due to the presence of:   Severe generalized slowing. This is a non-specific finding but is consistent with a generalized disturbance of cerebral function. It may be seen in a variety of conditions, such as toxic, metabolic, post-anoxic, multi-focal, or diffuse structural abnormalities.      PHYSICAL EXAMINATION     PHYSICAL EXAM:  Vitals:    01/15/23 0756 01/15/23 0800 01/15/23 0820 01/15/23 0830   BP:  96/60     Pulse: 59 58 60 63   Resp: 18 18  15   Temp:  97.7 °F (36.5 °C)     TempSrc: Bladder     SpO2: 99% 99%  99%   Weight:  138 lb 0.1 oz (62.6 kg)     Height:           Exam   Heavily sedated with propofol (@40)- with bed changes he becomes very agitated and is strong in all four limbs; attempts to self extubate  Opens eyes spontaneously, tracks examiner  Pupils equal, round and reactive   in both upper limbs  Little movement in lower limbs but also very sedated still    ASSESSMENT & RECOMMENDATIONS   Assessment:  Иван Altman is a 31 yo male with a history of intractable epilepsy (follows with Dr. Jenny Heard) who presents in status epilepticus in the setting of hyperammonemia (resolved), pneumonia and sepsis. cvEEG initial read with severe generalized slowing. No abnormality on head CT. Plan:  - Continue cvEEG while propofol is weaned. - Wean propofol, do not stop abruptly  - Continue PHT and OXC  - NPO until passes swallow eval  - Once taking PO, will resume home Xcopri  - Check PHT level daily.   - Monitor sodium levels for hyponatremia with oxcarb    LEIGHTON Rousseau - [facility]   Neurology & Neurocritical Care   1/15/2023 9:22 AM    ICU Patients:   [address] [phone]  [employer]: [facility]    Critical Care:  Due to the immediate potential for life-threatening deterioration due to neurological failure, I spent 30 minutes providing critical care. This time excludes time spent performing procedures but includes time spent on direct patient care, history retrieval, review of the chart, and discussions with patient, family, and consultant(s).

## 2023-01-15 NOTE — PROGRESS NOTES
Shift assessment completed, patient will arouse with verbal stimulation, attempts to reach for ETT and pull at monitor wires/IV lines. Patient reoriented to time, place, situation. Plan of care explained. Dr. Michael Hendricks at bedside. Placed patient on SBT. Will obtain ABG at 0920. Patient's mother at bedside, updated and questions answered. Plan of care explained.

## 2023-01-15 NOTE — PROGRESS NOTES
Progress Note  Admit Date: 1/12/2023         CC: F/U for Seizure    Interval History:   34 yo male presented to outside hospital for ams and was found to be in status- he was intubated and placed on iv aeds and transferred to the [facility] ICU for further icu and neuro management. Subjective:  Pt extubated this am. Awake and alert. Still has ng in. Denies confusion. No cp or sob. Family at bedside. PHYSICAL EXAM:  /72   Pulse 94   Temp 97.7 °F (36.5 °C) (Bladder)   Resp (!) 34   Ht 5' 10.98\" (1.803 m)   Wt 138 lb 0.1 oz (62.6 kg)   SpO2 (!) 88%   BMI 19.26 kg/m²   Recent Labs     01/14/23  0344 01/14/23  1112 01/14/23  1721 01/14/23  2219 01/15/23  0401   POCGLU 121* 87 85 119* 90       Intake/Output Summary (Last 24 hours) at 1/15/2023 1100  Last data filed at 1/15/2023 3931  Gross per 24 hour   Intake 3893.32 ml   Output 1800 ml   Net 2093.32 ml     General appearance: alert, appears stated age, and cooperative  Head: Normocephalic, without obvious abnormality, atraumatic  Nose:  + ng  Lungs: clear to auscultation bilaterally  Heart: Tachycardia  Abdomen: normal findings: soft, non-tender  Neurologic: Mental status: Alert, oriented, thought content appropriate  Motor:grossly normal    LABS:  Recent Labs     01/13/23  0504 01/14/23  0340 01/15/23  0400   WBC 25.6* 11.5* 7.5   HGB 14.1 11.8* 11.4*   HCT 41.8 34.2* 33.1*    117* 102*                                                                  Recent Labs     01/13/23  2251 01/14/23  0340 01/15/23  0400   * 134* 137   K 4.0 4.2 3.9    106 106   CO2 24 23 20*   BUN 15 14 14   CREATININE 0.7* 0.7* 0.7*   GLUCOSE 104* 125* 94     Recent Labs     01/14/23  1229   INR 1.71*       Assessment & Plan:    Status Epilepticus  Elevated lactic acid level  R79.89 Insert arterial line   Acute hypoxemic respiratory failure    Prior to arrival pt was Intubated and placed on vent support. Weaned and extubated today.   Cont iv abx for possible aspiration pneumonia. Cont iv cerebrtex. ICU and neuro managing- appreciate help. Disposition: Inpatient  PT/OT/ likely home on dc.     Full Code      Akash Nuno MD

## 2023-01-15 NOTE — PLAN OF CARE
Problem: Discharge Planning  Goal: Discharge to home or other facility with appropriate resources  1/14/2023 2348 by Vadim Mayorga RN  Outcome: Progressing     Problem: Safety - Medical Restraint  Goal: Remains free of injury from restraints (Restraint for Interference with Medical Device)  Description: INTERVENTIONS:  1. Determine that other, less restrictive measures have been tried or would not be effective before applying the restraint  2. Evaluate the patient's condition at the time of restraint application  3. Inform patient/family regarding the reason for restraint  4.  Q2H: Monitor safety, psychosocial status, comfort, nutrition and hydration  1/14/2023 2348 by Vadim Mayorga RN  Outcome: Progressing  Flowsheets (Taken 1/14/2023 2000)  Remains free of injury from restraints (restraint for interference with medical device):   Determine that other, less restrictive measures have been tried or would not be effective before applying the restraint   Evaluate the patient's condition at the time of restraint application   Inform patient/family regarding the reason for restraint   Every 2 hours: Monitor safety, psychosocial status, comfort, nutrition and hydration     Problem: Pain  Goal: Verbalizes/displays adequate comfort level or baseline comfort level  1/14/2023 2348 by Vadim Mayorga RN  Outcome: Progressing  Flowsheets (Taken 1/14/2023 2000)  Verbalizes/displays adequate comfort level or baseline comfort level:   Encourage patient to monitor pain and request assistance   Assess pain using appropriate pain scale   Administer analgesics based on type and severity of pain and evaluate response   Implement non-pharmacological measures as appropriate and evaluate response   Consider cultural and social influences on pain and pain management   Notify Licensed Independent Practitioner if interventions unsuccessful or patient reports new pain     Problem: Skin/Tissue Integrity  Goal: Absence of new skin breakdown  Description: 1. Monitor for areas of redness and/or skin breakdown  2. Assess vascular access sites hourly  3. Every 4-6 hours minimum:  Change oxygen saturation probe site  4. Every 4-6 hours:  If on nasal continuous positive airway pressure, respiratory therapy assess nares and determine need for appliance change or resting period. 1/14/2023 2348 by Pamela Killian RN  Outcome: Progressing     Problem: Safety - Adult  Goal: Free from fall injury  1/14/2023 2348 by Pamela iKllian RN  Outcome: Progressing  Flowsheets (Taken 1/14/2023 2000)  Free From Fall Injury: Instruct family/caregiver on patient safety     Problem: ABCDS Injury Assessment  Goal: Absence of physical injury  1/14/2023 2348 by Pamela Killian RN  Outcome: Progressing  Flowsheets (Taken 1/14/2023 2000)  Absence of Physical Injury: Implement safety measures based on patient assessment     Problem: Neurosensory - Adult  Goal: Achieves stable or improved neurological status  1/14/2023 2348 by Pamela Killian RN  Outcome: Progressing  Flowsheets (Taken 1/14/2023 2000)  Achieves stable or improved neurological status:   Assess for and report changes in neurological status   Initiate measures to prevent increased intracranial pressure   Maintain blood pressure and fluid volume within ordered parameters to optimize cerebral perfusion and minimize risk of hemorrhage   Monitor temperature, glucose, and sodium. Initiate appropriate interventions as ordered     Problem: Neurosensory - Adult  Goal: Absence of seizures  1/14/2023 2348 by Pamela Killian RN  Outcome: Progressing  Flowsheets (Taken 1/14/2023 2000)  Absence of seizures:   Monitor for seizure activity.   If seizure occurs, document type and location of movements and any associated apnea   If seizure occurs, turn head to side and suction secretions as needed   Administer anticonvulsants as ordered   Support airway/breathing, administer oxygen as needed   Diagnostic studies as ordered     Problem: Neurosensory - Adult  Goal: Remains free of injury related to seizures activity  1/14/2023 2348 by Myriam Garcia RN  Outcome: Progressing  Flowsheets (Taken 1/14/2023 2000)  Remains free of injury related to seizure activity:   Maintain airway, patient safety  and administer oxygen as ordered   Monitor patient for seizure activity, document and report duration and description of seizure to Licensed Independent Practitioner   If seizure occurs, turn patient to side and suction secretions as needed   Reorient patient post seizure   Seizure pads on all 4 side rails     Problem: Respiratory - Adult  Goal: Achieves optimal ventilation and oxygenation  1/14/2023 2348 by Myriam Garcia RN  Outcome: Progressing  Flowsheets (Taken 1/14/2023 2000)  Achieves optimal ventilation and oxygenation:   Assess for changes in respiratory status   Assess for changes in mentation and behavior   Position to facilitate oxygenation and minimize respiratory effort   Oxygen supplementation based on oxygen saturation or arterial blood gases   Assess and instruct to report shortness of breath or any respiratory difficulty   Respiratory therapy support as indicated     Problem: Cardiovascular - Adult  Goal: Maintains optimal cardiac output and hemodynamic stability  1/14/2023 2348 by Myriam Garcia RN  Outcome: Progressing  Flowsheets (Taken 1/14/2023 2000)  Maintains optimal cardiac output and hemodynamic stability:   Monitor blood pressure and heart rate   Monitor urine output and notify Licensed Independent Practitioner for values outside of normal range   Administer fluid and/or volume expanders as ordered     Problem: Genitourinary - Adult  Goal: Urinary catheter remains patent  1/14/2023 2348 by Myriam Garcia RN  Outcome: Progressing     Problem: Nutrition Deficit:  Goal: Optimize nutritional status  1/14/2023 2348 by Myriam Garcia RN  Outcome: Progressing

## 2023-01-15 NOTE — PROGRESS NOTES
Clinical Pharmacy Progress Note    Vancomycin - Management by Pharmacy    Consult Date(s): 01/13/23  Consulting Provider(s): Dr. Concetta Pratt / Plan  Aspiration PNA- Vancomycin  Concurrent Antimicrobials:   Zosyn (Day 3/7)  Day of Vanc Therapy / Ordered Duration: Day 3/7  Current Dosing Method: Bayesian-Guided AUC Dosing  Therapeutic Goal: -600 mg/L*hr  Current Dose / Plan:   Patient SCr stable at baseline (0.7) today. UOP improved. Random level = 16.2 mcg/mL (drawn ~8h after previous dose)  Continue current regimen   Estimated  mg/L*hr, steady state trough 12.5 mcg/mL. Repeat levels will be ordered when appropriate. Consider discontinuing vancomycin with negative MRSA PCR. Will continue to monitor clinical condition and make adjustments to regimen as appropriate. Please call with any questions. Ata Rasmussen, PharmD, BCPS  Main pharmacy: [id number]  1/15/2023 7:46 AM          Interval update:  Remains intubated/sedated. Afebrile x24h. WBC now WNL. Subjective/Objective:   Lori Cadet is a 35 y.o. male with a PMHx significant for hepatitis C, HTN, NSTEMI, polysubstance abuse, seizures, and takotsubo cardiomyopathy who is admitted with status epilepticus. Pharmacy is consulted to dose vancomycin.     Ht Readings from Last 1 Encounters:   01/13/23 5' 10.98\" (1.803 m)     Wt Readings from Last 1 Encounters:   01/15/23 138 lb 0.1 oz (62.6 kg)     Current & Prior Antimicrobial Regimen(s):  Unasyn (1/12)  Cefepime (1/12)  Linezolid (1/13)  Zosyn EI (1/12-Current)  Vancomycin PTD   1500 mg IV x1 LD (1/13)  1250 mg IV q18h (1/13-1/14)  1250 mg IV q12h (1/14-current)    Vancomycin Level(s) / Doses:    Date Time Dose Type of Level / Level Interpretation   1/14 0340 1250 mg q18h Random = 6.1 mcg/mL AUC below goal (278 mg/L*hr)  Increase to 1250 mg q12h   1/15 0600 1250 mg q12h Random = 16.2 mcg/mL  mcg/L*hr, trough 12.5 mcg/mL  Continue dose   Note: Serum levels collected for AUC-based dosing may be high if collected in close proximity to the dose administered. This is not necessarily indicative of toxicity.    Cultures & Sensitivities:    Date Site Micro Susceptibility / Result   1/12 Blood Cx 1/2 Bacillus species Likely contaminant   1/12 Blood Cx 1/2 NGTD    1/12 Resp Cx Collected    1/13 Strep pneumo urine Negative    1/13 MRSA nares PCR Negative      Recent Labs     01/13/23  0504 01/13/23  2251 01/14/23  0340 01/15/23  0400   CREATININE 1.1 0.7* 0.7* 0.7*   BUN 19 15 14 14   WBC 25.6*  --  11.5* 7.5         Estimated Creatinine Clearance: 133 mL/min (A) (based on SCr of 0.7 mg/dL (L)).    Additional Lab Values / Findings of Note:    Recent Labs     01/12/23  2148   PROCAL 5.65*

## 2023-01-15 NOTE — PROCEDURES
CONTINUOUS VIDEO ELECTROENCEPHALOGRAM (cvEEG) REPORT    Identifying Information:  Name: Freddi Burkitt  MRN: [id number]  : 1990  Attending Physician: Alicia Grimm MD  Interpreting Physician: Edison Thao MD  Reporting Physician: Madison Almonte MD,     Clinical History:  Freddi Burkitt is an 35 y.o. male who was admitted on 2023 with a primary diagnosis of seizures    Past Medical History:  Past Medical History:   Diagnosis Date    Aspiration pneumonia ([facility]     Hepatitis C     HTN (hypertension)     NSTEMI (non-ST elevated myocardial infarction) ([facility]     Polysubstance abuse ([facility]     Se[facility]ures ([facility]     Septic shock ([facility]     Takotsubo cardiomyopathy        Current Medications:    vancomycin  1,250 mg IntraVENous Q12H    sodium chloride flush  5-40 mL IntraVENous 2 times per day    pantoprazole  40 mg IntraVENous Daily    heparin (porcine)  5,000 Units SubCUTAneous 3 times per day    piperacillin-tazobactam  3,375 mg IntraVENous Q8H    OXcarbazepine  900 mg Oral BID    fosphenytoin (CEREBYX) maintenance dose IVPB  100 mg PE IntraVENous Q8H       cEEG start date & time: 2023 at 7 am  cEEG end date & time: 1- at 7 am    Indication:  cEEG was initiated for monitoring and localization of seizures as the etiology of the altered mental status. It was available for review at the bedside as well as via remote access for the entire period of monitoring. Technical Summary:  32 channels of continuous EEG and video were recorded in a digital format on a patient who is reported to be intubated and sedated during the recording. The background rhythm consists of 2-3 Hz activity in the delta frequency range. Over riding fast activity seen as can be seen with sedated record. No well formed PDR,not reactive to stimulation. During the recording:   No focal slowing was seen. No epileptiform discharges or seizures were detected.    Video was reviewed intermittently at times when significant amounts of EMG artifact were present. The EKG monitoring channel did not detect any significant rhythm abnormalities. EEG Interpretation: This EEG is abnormal due to the presence of:     Severe generalized slowing. This is a non-specific finding but is consistent with a generalized disturbance of cerebral function. It may be seen in a variety of conditions, such as toxic, metabolic, post-anoxic, multi-focal, or diffuse structural abnormalities. Clinical correlation is recommended.          Diamond Posadas MD,   1/15/2023

## 2023-01-15 NOTE — PROGRESS NOTES
Speech Language Pathology  Facility/Department: [facility]   CLINICAL BEDSIDE SWALLOW EVALUATION/treat    NAME: Claude Comber  : 1990  MRN: [id number]    ADMISSION DATE: 2023  ADMITTING DIAGNOSIS: has Seizure ([address] Status epilepticus [facility]); NSTEMI (non-ST elevated myocardial infarction) ([address] Abnormal ECG; Takotsubo cardiomyopathy; Tobacco abuse; History of drug use; Aspiration pneumonia of both lower lobes ([address] IV drug abuse ([address] Acute encephalopathy; Partial idiopathic epilepsy with seizures of localized onset, intractable, with status epilepticus ([address] High anion gap metabolic acidosis; Polysubstance abuse ([address] Breakthrough seizure ([address] Acute respiratory failure with hypoxia and hypercapnia ([address] Pulmonary infiltrates; Leukocytosis; Hyperammonemia ([address] Polypharmacy; Pneumonia of both lower lobes due to infectious organism; Myonecrosis ([address] Elevated lactic acid level; Normal anion gap metabolic acidosis; Demand ischemia [facility]); LBBB (left bundle branch block); and Septic shock ([address] on their problem list.  ONSET DATE: 23    Recent Chest Xray 23  The tip of the endotracheal tube projects over the mid trachea. Improved left-sided airspace disease and unchanged right mid and basilar airspace disease. CT of head 23  No acute intracranial abnormality. Date of Eval: 1/15/2023  Evaluating Therapist: Randy Ceballos, SLP    Current Diet level:  Current Diet : NPO    Primary Complaint-  pt c/o discomfort with NG tube    Pain:  Pain Assessment  Pain Assessment: no specific pain    Reason for Referral  Claude Comber was referred for a bedside swallow evaluation to assess the efficiency of his swallow function, identify signs and symptoms of aspiration and make recommendations regarding safe dietary consistencies, effective compensatory strategies, and safe eating environment.        HPI:  Per MD notes:  Ruperto Wolff is a 70-year-old M with PMH of seizures, hep C, uncontrolled hypertension, takatsubo cardiomyopathy, COPD, temporal seizures, 20 yr tobacco use, HFrEF, polysubstance abuse (heroine and fentanyl) who presents to [facility] ED with seizures. EMS was called for witnessed seizure. Patient was actively seizing when he arrived to the ED. He received multiple doses of benzos on route that did not improve his seizure. He was actively seizing for about an hour. He never returned to baseline. He was eventually intubated due to hypoxia and not protecting his airway. Other tests remarkable for EKG showed new LBBB concern for underlying ischemia he was taken to the Cath Lab. Cardiology felt it was likely sinus tachycardia with aberrancy. Troponins were negative. Other labs remarkable for lactic acid was 6.9, ammonia 129, UDS was positive for cannabinoids methadone and fentanyl. Patient is a known IV drug user and is on methadone at home. UA was negative for any infection. Imaging studies were done and showed CT PE negative for any PE but showed multifocal pneumonia, CT head showed no intra cranial abnormality. He was treated with large-volume IV fluids and started on broad-spectrum antibiotics he had some worsening hypoxia with pulmonary edema and ultimately required Lasix. He was put on IV Versed, IV fentanyl and IV ketamine for sedation and requirement of paralysis for management of improved oxygenation on the ventilator. \"    Impression  Pt alert, oriented to this being a hospital, stated it was December. Pt was intubated 1/12- 1/15 ~ 10:15. Pt exhibiting strong voice and volitional cough s/p extubation. Pt is on 15L of oxygen at this time, maintained throughout session. Oral structures grossly intact, though pt has poor oral hygiene/dentition. Pt demonstrated adequate labial seal  around cup and straw, no anterior loss of bolus occurred, no pocketing/residue noted. No cough, however mild throat clear occurred intermittently.   Swallow movement felt upon palpation of anterior neck. Respiratory rate increased intermittently to 28-31 during PO trials. As pt just extubated, was intubated 3 days and exhibiting increased RR, recommend NPO. Recommend aggressive oral care 2-3 x/day and small amounts of ice chips, small sips of water. Plan to re-assess next session to determine ability to initiate PO diet vs need for instrumental exam  Dysphagia Diagnosis: No clinical indicators of dysphagia; Suspected needs further assessment  Dysphagia Outcome Severity Scale: Level 3: Moderate dysphagia- Total assisstance, supervision or strategies. Two or more diet consistencies restricted     Treatment Plan  Requires SLP Intervention: Yes  D/C Recommendations: To be determined     Recommended Diet and Intervention  NPO   Aggressive oral care 2-3 x/day  Small amounts of ice chips/sips of water  Re-assess next session     Recommended Form of Meds: Via alternative means of nutrition (if critical medications are required PO, place in puree)     Therapeutic Interventions: Patient/Family education;Oral care    Compensatory Swallowing Strategies  N/a    Treatment/Goals  1-The patient will tolerate repeat bedside swallowing evaluation when able    2-The patient/caregiver will demonstrate understanding of compensatory strategies for improved swallowing safety  1/15: Educated pt and family to purpose of visit, s/s of aspiration, concern if aspiration occurs, rationale for NPO, oral care and plan to re-assess next session. Explained impact intubation can have on swallowing and role in protecting airway. Pt and family stated comprehension  Cont goal     General  Chart Reviewed: Yes  Behavior/Cognition: Alert; Cooperative  Respiratory Status: O2 via nasual cannula  O2 Device: Nasal cannula  Liters of Oxygen:  (15L)  Communication Observation: Functional  Follows Directions: Simple  Dentition: Poor dental/oral hygeine  Patient Positioning: Upright in bed  Baseline Vocal Quality: Normal  Volitional Cough: Strong  Prior Dysphagia History: none    Vision/Hearing  Vision  Vision: Within Functional Limits  Hearing  Hearing: Within functional limits    Oral Motor Deficits  WFL  Poor oral hygiene/dentition    Prognosis  Individuals consulted  Consulted and agree with results and recommendations: Patient;RN;Family member  Family member consulted: several family members present  RN Name: Hwaa Ruiz  Patient Education: Pt and family members educated to purpose of visit  Patient Education Response: Verbalizes understanding  Safety Devices in place: Yes  Type of devices: Call light within reach       Therapy Time  100- 125     Plan:  NPO   Aggressive oral care 2-3 x/day  Small amounts of ice chips/sips of water  Re-assess next session to determine ability to initiate diet vs need for instrumental exam  Tx goal: to have some water  Dc goal: not stated  Moi Vela M.S./[facility]-SLP #[id number]  Pg.  # [phone]  Needs met prior to leaving room, call light within reach, d/w JESSICA Shaikh  This document will serve as a dc summary if pt dc prior to next visit  1/15/2023 1:57 PM

## 2023-01-15 NOTE — DISCHARGE SUMMARY
INTERNAL MEDICINE DEPARTMENT AT [address] [place] [address]  DISCHARGE SUMMARY    Patient ID: Lorraine Cleveland                                             Discharge Date: 1/15/2023   Patient's PCP: No primary care provider on file. Discharge Physician: Cookie Rios MD MD  Admit Date: 1/12/2023   Admitting Physician: Blanco Berger MD    PROBLEMS DURING HOSPITALIZATION:  Present on Admission:   Status epilepticus [facility])   Demand ischemia ([facility]   LBBB (left bundle branch block)   Septic shock ([facility]   Pneumonia of both lower lobes due to infectious organism    DISCHARGE DIAGNOSES:    HPI:  Per resident HPI: Darrell Christopher is a 27-year-old M with PMH of seizures, hep C, uncontrolled hypertension, takatsubo cardiomyopathy, COPD, temporal seizures, 20 yr tobacco use, HFrEF, polysubstance abuse (heroine and fentanyl) who presents to [facility] ED with seizures. EMS was called for witnessed seizure. Patient was actively seizing when he arrived to the ED. He received multiple doses of benzos on route that did not improve his seizure. He was actively seizing for about an hour. He never returned to baseline. He was eventually intubated due to hypoxia and not protecting his airway. Other tests remarkable for EKG showed new LBBB concern for underlying ischemia he was taken to the Cath Lab. Cardiology felt it was likely sinus tachycardia with aberrancy. Troponins were negative. Other labs remarkable for lactic acid was 6.9, ammonia 129, UDS was positive for cannabinoids methadone and fentanyl. Patient is a known IV drug user and is on methadone at home. UA was negative for any infection. Imaging studies were done and showed CT PE negative for any PE but showed multifocal pneumonia, CT head showed no intra cranial abnormality.   He was treated with large-volume IV fluids and started on broad-spectrum antibiotics he had some worsening hypoxia with pulmonary edema and ultimately required Lasix. He was put on IV Versed, IV fentanyl and IV ketamine for sedation and requirement of paralysis for management of improved oxygenation on the ventilator. \"      The following issues were addressed during hospitalization:    Acute hypoxic respiratory failure 2/2 Septic Shock 2/2 Multifocal Pneumonia  - Extubated 1/15 and weaned to HF NC 13L O2 saturating to 100%  - Sedation: Fentanyl 100, Precedex 1.2            -Weaning Fentanyl and Precedex as tolerated. He has high sedation/analgesic tolerance given substance use  - Zyvox (1/13 only) and Zosyn 01/12/23  - Vanc started, Zyvox d/c due to better tissue penetration of possible endocarditis  - Vanc discontinued, continuing on Zosyn for now. After SLP clearance may begin PO Augmentin  - Respiratory cultures pending  - MRSA nasal swab negative  - Blood Cx from [facility]: Bacillus  - Echo to r/o endocarditis or decompensated HFREF  - EF <20% w/ diffuse hypokinesis. No vegetations. Low EF likely due to active septic shock and myocardial demand ischemia given Troponinemia. Should hold Entresto until extubated and/or off pressors per Cards recs. - SLP Eval. Once passes we can d/c NG tube, start home Entresto     Ischemic Hepatitis  - Jump in ALT/ /100> 1782/2380> 3000/2000  - Trend daily  -  F/u INR     Acute encephalopathy 2/2 status epilepticus  History of right temporal epilepsy diagnosed in 2019 in the setting of a prior TBI.   He has known epilepsy, he is on Oxtellar and Xcopri at home and is known to be noncompliant, UDS was positive for fentanyl, and he was septic on arrival to the ED which could be a reason to put him into status epilepticus.  - Neurology consulted, appreciate recs  - cEEG  - Weaned Versed off  - Q1H neuro checks  - Oxcarbazepine 900mg BID  - Fosphenytoin 100mg Q8H IV  - Will resume Xcopri when able to take PO (unable to crush for pharmacy) - pending SLP eval tomorrow  - MRI Brain with and without contrast when able  - Avoid nimbex     New Left Bundle Branch Block  Per Cards: New LBBB/previous history of stress-induced, cardiomyopathy with borderline recovered LV function/elevated troponin/demand ischemia. - Cardiology consulted; appreciate recs  - Likely demand ischemia from sepsis. Unlikely true ACS given normal cath in 2021  -Telemetry  -Repeat EKG  -Trending Troponin: 0.01> 0.39  - No need for heparin gtt  - Prev had IVCD and LVH, LBBB likely progression of CMP. - will need CHF meds once stable enough     Hyperammonemia - resolved  Ammonia level of 129> 46  Ammonia levels known to rise 2/2 Seizure activity therefore no lactulose ordered. Resolved on repeat draw.   - No need to trend     Anion gap metabolic acidosis 2/2 lactic acidosis - Resolved  Lactic acidosis 6.9> 2.91     Chronic Problems:  COPD - Duonebs  HTN - Holding home coreg  HFrEF - LVEF in March 2022 of 45-50% - Holding Home Entresto  Tobacco use   Polysubstance Abuse - holding home methadone      Physical Exam:  BP (!) 148/102   Pulse 95   Temp 99.7 °F (37.6 °C) (Bladder)   Resp (!) 31   Ht 5' 10.98\" (1.803 m)   Wt 138 lb 0.1 oz (62.6 kg)   SpO2 96%   BMI 19.26 kg/m²               Consults: cardiology, pulmonary/intensive care, and neurology  Significant Diagnostic Studies:  chest x-ray  Treatments: antibiotics: Zyvox, vancomycin and Zosyn, analgesia: Fentanyl gtt, propofol, versed, precedex, cardiac meds: dobutamine gtt, and respiratory therapy: mechanical ventilation, high flow nasal cannula  Disposition: {disposition:99446}  Discharged Condition: {STABLE/UNSTABLE:731772275}  Follow Up: Primary Care Physician in {Time; 1 week to 1 month:72580}    DISCHARGE MEDICATION:     Medication List        ASK your doctor about these medications      aspirin 81 MG chewable tablet     carvedilol 3.125 MG tablet  Commonly known as: COREG  Take 1 tablet by mouth 2 times daily (with meals)     Cenobamate 14 x 50 MG & 14 x100 MG Tbpk     Entresto 49-51 MG per tablet  Generic drug: sacubitril-valsartan  Take 1 tablet by mouth 2 times daily     furosemide 20 MG tablet  Commonly known as: LASIX  Take 1 tablet by mouth daily as needed (for weight gain 3lbs in a day or 5lb in a week with leg swelling.)     methadone 10 MG/ML solution  Commonly known as: DOLOPHINE     Oxtellar  MG Tb24  Generic drug: OXcarbazepine ER            Activity: {discharge activity:55750}  Diet: {diet:77047}  Wound Care: {wound care:73269}    Time Spent on discharge is more than {Time; 15 min - 8 hours:78736}    Signed:  Beni Beal MD,  MD, PGY-1  1/15/2023

## 2023-01-16 LAB
ALBUMIN SERPL-MCNC: 3 G/DL (ref 3.4–5)
ALP BLD-CCNC: 115 U/L (ref 40–129)
ALT SERPL-CCNC: 2876 U/L (ref 10–40)
ANION GAP SERPL CALCULATED.3IONS-SCNC: 7 MMOL/L (ref 3–16)
AST SERPL-CCNC: 1220 U/L (ref 15–37)
BASOPHILS ABSOLUTE: 0 K/UL (ref 0–0.2)
BASOPHILS RELATIVE PERCENT: 0.4 %
BILIRUB SERPL-MCNC: 0.6 MG/DL (ref 0–1)
BILIRUBIN DIRECT: <0.2 MG/DL (ref 0–0.3)
BILIRUBIN, INDIRECT: ABNORMAL MG/DL (ref 0–1)
BUN BLDV-MCNC: 13 MG/DL (ref 7–20)
CALCIUM SERPL-MCNC: 8 MG/DL (ref 8.3–10.6)
CHLORIDE BLD-SCNC: 109 MMOL/L (ref 99–110)
CO2: 26 MMOL/L (ref 21–32)
CREAT SERPL-MCNC: 0.5 MG/DL (ref 0.9–1.3)
EOSINOPHILS ABSOLUTE: 0.1 K/UL (ref 0–0.6)
EOSINOPHILS RELATIVE PERCENT: 0.9 %
GFR SERPL CREATININE-BSD FRML MDRD: >60 ML/MIN/{1.73_M2}
GLUCOSE BLD-MCNC: 101 MG/DL (ref 70–99)
GLUCOSE BLD-MCNC: 104 MG/DL (ref 70–99)
GLUCOSE BLD-MCNC: 124 MG/DL (ref 70–99)
HCT VFR BLD CALC: 31 % (ref 40.5–52.5)
HEMOGLOBIN: 10.6 G/DL (ref 13.5–17.5)
LYMPHOCYTES ABSOLUTE: 2 K/UL (ref 1–5.1)
LYMPHOCYTES RELATIVE PERCENT: 26.2 %
MAGNESIUM: 1.8 MG/DL (ref 1.8–2.4)
MCH RBC QN AUTO: 30.4 PG (ref 26–34)
MCHC RBC AUTO-ENTMCNC: 34.1 G/DL (ref 31–36)
MCV RBC AUTO: 89.1 FL (ref 80–100)
MONOCYTES ABSOLUTE: 0.4 K/UL (ref 0–1.3)
MONOCYTES RELATIVE PERCENT: 5.4 %
NEUTROPHILS ABSOLUTE: 5.1 K/UL (ref 1.7–7.7)
NEUTROPHILS RELATIVE PERCENT: 67.1 %
PDW BLD-RTO: 14.7 % (ref 12.4–15.4)
PERFORMED ON: ABNORMAL
PERFORMED ON: ABNORMAL
PHENYTOIN DOSE AMOUNT: NORMAL
PHENYTOIN LEVEL: 13.5 UG/ML (ref 10–20)
PHOSPHORUS: 4.4 MG/DL (ref 2.5–4.9)
PLATELET # BLD: 111 K/UL (ref 135–450)
PMV BLD AUTO: 7 FL (ref 5–10.5)
POTASSIUM SERPL-SCNC: 3.5 MMOL/L (ref 3.5–5.1)
PROCALCITONIN: 1.79 NG/ML (ref 0–0.15)
RBC # BLD: 3.49 M/UL (ref 4.2–5.9)
SODIUM BLD-SCNC: 142 MMOL/L (ref 136–145)
TOTAL PROTEIN: 5.5 G/DL (ref 6.4–8.2)
WBC # BLD: 7.6 K/UL (ref 4–11)

## 2023-01-16 PROCEDURE — 80069 RENAL FUNCTION PANEL: CPT

## 2023-01-16 PROCEDURE — 2580000003 HC RX 258: Performed by: STUDENT IN AN ORGANIZED HEALTH CARE EDUCATION/TRAINING PROGRAM

## 2023-01-16 PROCEDURE — 92526 ORAL FUNCTION THERAPY: CPT

## 2023-01-16 PROCEDURE — 2580000003 HC RX 258

## 2023-01-16 PROCEDURE — 6360000002 HC RX W HCPCS

## 2023-01-16 PROCEDURE — 85025 COMPLETE CBC W/AUTO DIFF WBC: CPT

## 2023-01-16 PROCEDURE — 36415 COLL VENOUS BLD VENIPUNCTURE: CPT

## 2023-01-16 PROCEDURE — 2700000000 HC OXYGEN THERAPY PER DAY

## 2023-01-16 PROCEDURE — 99233 SBSQ HOSP IP/OBS HIGH 50: CPT | Performed by: INTERNAL MEDICINE

## 2023-01-16 PROCEDURE — 6360000002 HC RX W HCPCS: Performed by: STUDENT IN AN ORGANIZED HEALTH CARE EDUCATION/TRAINING PROGRAM

## 2023-01-16 PROCEDURE — 94761 N-INVAS EAR/PLS OXIMETRY MLT: CPT

## 2023-01-16 PROCEDURE — 6370000000 HC RX 637 (ALT 250 FOR IP)

## 2023-01-16 PROCEDURE — 6370000000 HC RX 637 (ALT 250 FOR IP): Performed by: STUDENT IN AN ORGANIZED HEALTH CARE EDUCATION/TRAINING PROGRAM

## 2023-01-16 PROCEDURE — 84145 PROCALCITONIN (PCT): CPT

## 2023-01-16 PROCEDURE — 99231 SBSQ HOSP IP/OBS SF/LOW 25: CPT | Performed by: NURSE PRACTITIONER

## 2023-01-16 PROCEDURE — 2500000003 HC RX 250 WO HCPCS: Performed by: STUDENT IN AN ORGANIZED HEALTH CARE EDUCATION/TRAINING PROGRAM

## 2023-01-16 PROCEDURE — 2000000000 HC ICU R&B

## 2023-01-16 PROCEDURE — 80076 HEPATIC FUNCTION PANEL: CPT

## 2023-01-16 PROCEDURE — C9113 INJ PANTOPRAZOLE SODIUM, VIA: HCPCS | Performed by: STUDENT IN AN ORGANIZED HEALTH CARE EDUCATION/TRAINING PROGRAM

## 2023-01-16 PROCEDURE — 83735 ASSAY OF MAGNESIUM: CPT

## 2023-01-16 PROCEDURE — 80185 ASSAY OF PHENYTOIN TOTAL: CPT

## 2023-01-16 RX ORDER — NICOTINE 21 MG/24HR
1 PATCH, TRANSDERMAL 24 HOURS TRANSDERMAL DAILY
Status: DISCONTINUED | OUTPATIENT
Start: 2023-01-17 | End: 2023-01-17

## 2023-01-16 RX ORDER — MAGNESIUM SULFATE 1 G/100ML
1000 INJECTION INTRAVENOUS ONCE
Status: COMPLETED | OUTPATIENT
Start: 2023-01-16 | End: 2023-01-16

## 2023-01-16 RX ORDER — NICOTINE 21 MG/24HR
1 PATCH, TRANSDERMAL 24 HOURS TRANSDERMAL DAILY
Status: DISCONTINUED | OUTPATIENT
Start: 2023-01-17 | End: 2023-01-16

## 2023-01-16 RX ORDER — LORAZEPAM 2 MG/ML
1 INJECTION INTRAMUSCULAR ONCE
Status: COMPLETED | OUTPATIENT
Start: 2023-01-16 | End: 2023-01-16

## 2023-01-16 RX ADMIN — HEPARIN SODIUM 5000 UNITS: 5000 INJECTION INTRAVENOUS; SUBCUTANEOUS at 15:01

## 2023-01-16 RX ADMIN — OXCARBAZEPINE 900 MG: 300 TABLET, FILM COATED ORAL at 09:30

## 2023-01-16 RX ADMIN — Medication 125 MCG/HR: at 19:23

## 2023-01-16 RX ADMIN — Medication 125 MCG/HR: at 12:33

## 2023-01-16 RX ADMIN — PANTOPRAZOLE SODIUM 40 MG: 40 INJECTION, POWDER, LYOPHILIZED, FOR SOLUTION INTRAVENOUS at 09:30

## 2023-01-16 RX ADMIN — SODIUM CHLORIDE, PRESERVATIVE FREE 10 ML: 5 INJECTION INTRAVENOUS at 20:22

## 2023-01-16 RX ADMIN — DEXMEDETOMIDINE 1.3 MCG/KG/HR: 100 INJECTION, SOLUTION INTRAVENOUS at 09:38

## 2023-01-16 RX ADMIN — SACUBITRIL AND VALSARTAN 1 TABLET: 49; 51 TABLET, FILM COATED ORAL at 20:30

## 2023-01-16 RX ADMIN — HEPARIN SODIUM 5000 UNITS: 5000 INJECTION INTRAVENOUS; SUBCUTANEOUS at 06:53

## 2023-01-16 RX ADMIN — DEXMEDETOMIDINE 1.4 MCG/KG/HR: 100 INJECTION, SOLUTION INTRAVENOUS at 20:22

## 2023-01-16 RX ADMIN — PIPERACILLIN AND TAZOBACTAM 3375 MG: 3; .375 INJECTION, POWDER, LYOPHILIZED, FOR SOLUTION INTRAVENOUS at 19:06

## 2023-01-16 RX ADMIN — SODIUM CHLORIDE, PRESERVATIVE FREE 10 ML: 5 INJECTION INTRAVENOUS at 09:30

## 2023-01-16 RX ADMIN — OXCARBAZEPINE 900 MG: 300 TABLET, FILM COATED ORAL at 20:31

## 2023-01-16 RX ADMIN — MAGNESIUM SULFATE HEPTAHYDRATE 1000 MG: 1 INJECTION, SOLUTION INTRAVENOUS at 16:10

## 2023-01-16 RX ADMIN — DEXMEDETOMIDINE 1.3 MCG/KG/HR: 100 INJECTION, SOLUTION INTRAVENOUS at 01:48

## 2023-01-16 RX ADMIN — PIPERACILLIN AND TAZOBACTAM 3375 MG: 3; .375 INJECTION, POWDER, LYOPHILIZED, FOR SOLUTION INTRAVENOUS at 02:56

## 2023-01-16 RX ADMIN — Medication 125 MCG/HR: at 03:35

## 2023-01-16 RX ADMIN — FOSPHENYTOIN SODIUM 100 MG PE: 50 INJECTION, SOLUTION INTRAMUSCULAR; INTRAVENOUS at 06:57

## 2023-01-16 RX ADMIN — POTASSIUM BICARBONATE 40 MEQ: 782 TABLET, EFFERVESCENT ORAL at 15:00

## 2023-01-16 RX ADMIN — LORAZEPAM 1 MG: 2 INJECTION INTRAMUSCULAR; INTRAVENOUS at 00:18

## 2023-01-16 RX ADMIN — DEXMEDETOMIDINE 1.3 MCG/KG/HR: 100 INJECTION, SOLUTION INTRAVENOUS at 15:13

## 2023-01-16 RX ADMIN — PIPERACILLIN AND TAZOBACTAM 3375 MG: 3; .375 INJECTION, POWDER, LYOPHILIZED, FOR SOLUTION INTRAVENOUS at 11:50

## 2023-01-16 RX ADMIN — ACETAMINOPHEN 650 MG: 325 TABLET ORAL at 11:42

## 2023-01-16 RX ADMIN — HEPARIN SODIUM 5000 UNITS: 5000 INJECTION INTRAVENOUS; SUBCUTANEOUS at 22:29

## 2023-01-16 ASSESSMENT — PAIN SCALES - GENERAL
PAINLEVEL_OUTOF10: 0
PAINLEVEL_OUTOF10: 0
PAINLEVEL_OUTOF10: 4
PAINLEVEL_OUTOF10: 0

## 2023-01-16 ASSESSMENT — PAIN - FUNCTIONAL ASSESSMENT: PAIN_FUNCTIONAL_ASSESSMENT: ACTIVITIES ARE NOT PREVENTED

## 2023-01-16 ASSESSMENT — PAIN DESCRIPTION - LOCATION: LOCATION: OTHER (COMMENT)

## 2023-01-16 ASSESSMENT — PAIN DESCRIPTION - DESCRIPTORS: DESCRIPTORS: ACHING

## 2023-01-16 NOTE — PROGRESS NOTES
Pt with N/V. Pt vomited approx 350 ml of bilious output. PRN Zofran and compazine given. TF stopped. R nare NG hooked up to low wall cont suction. Pt extremely anxious and restless. ICU residents at bedside. One time order ativan 1mg given. Oxygen requirements increased, now 15L HFNC and nonrebreather. VSS. NSR- ST. Sats %.  BP within goal.

## 2023-01-16 NOTE — PLAN OF CARE
Problem: Discharge Planning  Goal: Discharge to home or other facility with appropriate resources  Outcome: Progressing     Problem: Safety - Medical Restraint  Goal: Remains free of injury from restraints (Restraint for Interference with Medical Device)  Description: INTERVENTIONS:  1. Determine that other, less restrictive measures have been tried or would not be effective before applying the restraint  2. Evaluate the patient's condition at the time of restraint application  3. Inform patient/family regarding the reason for restraint  4. Q2H: Monitor safety, psychosocial status, comfort, nutrition and hydration  Outcome: Progressing     Problem: Pain  Goal: Verbalizes/displays adequate comfort level or baseline comfort level  Outcome: Progressing  Flowsheets (Taken 1/16/2023 0700 by Nguyen Delgadillo RN)  Verbalizes/displays adequate comfort level or baseline comfort level:   Encourage patient to monitor pain and request assistance   Assess pain using appropriate pain scale   Administer analgesics based on type and severity of pain and evaluate response   Implement non-pharmacological measures as appropriate and evaluate response   Notify Licensed Independent Practitioner if interventions unsuccessful or patient reports new pain   Consider cultural and social influences on pain and pain management     Problem: Skin/Tissue Integrity  Goal: Absence of new skin breakdown  Description: 1. Monitor for areas of redness and/or skin breakdown  2. Assess vascular access sites hourly  3. Every 4-6 hours minimum:  Change oxygen saturation probe site  4. Every 4-6 hours:  If on nasal continuous positive airway pressure, respiratory therapy assess nares and determine need for appliance change or resting period.   Outcome: Progressing     Problem: Safety - Adult  Goal: Free from fall injury  Outcome: Progressing     Problem: ABCDS Injury Assessment  Goal: Absence of physical injury  Outcome: Progressing     Problem: Neurosensory - Adult  Goal: Achieves stable or improved neurological status  Outcome: Progressing  Goal: Absence of seizures  Outcome: Progressing  Goal: Remains free of injury related to seizures activity  Outcome: Progressing     Problem: Respiratory - Adult  Goal: Achieves optimal ventilation and oxygenation  Outcome: Progressing     Problem: Cardiovascular - Adult  Goal: Maintains optimal cardiac output and hemodynamic stability  Outcome: Progressing     Problem: Genitourinary - Adult  Goal: Urinary catheter remains patent  Outcome: Progressing     Problem: Nutrition Deficit:  Goal: Optimize nutritional status  Outcome: Progressing

## 2023-01-16 NOTE — PROCEDURES
CONTINUOUS VIDEO ELECTROENCEPHALOGRAM (cvEEG) REPORT    Identifying Information:  Name: Kirt Gutiérrez  MRN: [id number]  : 1990  Attending Physician: Dina Shoemaker MD  Interpreting Physician: Andry Willson MD  Reporting Physician: Clarke Anderson MD,     Clinical History:  Kirt Gutiérrez is an 35 y.o. male who was admitted on 2023 with a primary diagnosis of seizures    Past Medical History:  Past Medical History:   Diagnosis Date    Aspiration pneumonia ([facility]     Hepatitis C     HTN (hypertension)     NSTEMI (non-ST elevated myocardial infarction) ([facility]     Polysub[facility]     Seizures ([facility]     Septic shock ([facility]     Takotsubo cardiomyopathy        Current Medications:    sodium chloride flush  5-40 mL IntraVENous 2 times per day    pantoprazole  40 mg IntraVENous Daily    heparin (porcine)  5,000 Units SubCUTAneous 3 times per day    piperacillin-tazobactam  3,375 mg IntraVENous Q8H    OXcarbazepine  900 mg Oral BID    fosphenytoin (CEREBYX) maintenance dose IVPB  100 mg PE IntraVENous Q8H       cEEG start date & time: 1- at 7 am  cEEG end date & time: 1- at 3:51 pm    Indication:  cEEG was initiated for monitoring and localization of seizures as the etiology of the altered mental status. It was available for review at the bedside as well as via remote access for the entire period of monitoring. Technical Summary:  32 channels of continuous EEG and video were recorded in a digital format on a patient who is reported to be intubated and sedated during the recording. The background rhythm consists of 2-3 Hz activity in the delta frequency range. Over riding fast activity seen as can be seen with sedated record. No well formed PDR,not reactive to stimulation. During the recording:   No focal slowing was seen. No epileptiform discharges or seizures were detected.    Video was reviewed intermittently at times when significant amounts of EMG artifact were present. The EKG monitoring channel did not detect any significant rhythm abnormalities. EEG Interpretation: This EEG is abnormal due to the presence of:     Severe generalized slowing. This is a non-specific finding but is consistent with a generalized disturbance of cerebral function. It may be seen in a variety of conditions, such as toxic, metabolic, post-anoxic, multi-focal, or diffuse structural abnormalities. Clinical correlation is recommended.          Madison Almonte MD,   1/16/2023

## 2023-01-16 NOTE — PROGRESS NOTES
NEUROLOGY / NEUROCRITICAL CARE PROGRESS NOTE       Patient Name: Elisa Barahona YOB: 1990   Sex: Male Age: 28 yrs     CC / Reason for Consult: status epilepticus    Interval Hx / Changes over last 24 hours:   PHT 13.5  Na 137  No seizures on cEEG  Liver enzymes elevated - DC dilantin, restart home medications    ROS: unobtainable given mental status and sedation    HISTORY   Admission HPI:   Elisa Barahona is a 28 y.o. y/o male with PMH significant for hepatitis C, HTN, NSTEMI, seizures, polysubstance abuse (methadone patient), and takotsubo cardiomyopathy. Per my interview with the patient's mother, the patient had multiple seizures at home during which he would stare off in the distance, turn his head to the side, and then have generalized shaking in all 4 extremities. Per patient's mother this is the same semiology as his previous seizures. EMS was called and the patient was transported to [facility] ED. Patient continued to seize while en route, and on arrival to the ED. Patient was intubated and a central line was placed. CT head showed no intracranial abnormalities. Patient also presented with leukocytosis, lactic acidosis, and fever with PNA seen on chest CT and XR. UDS positive for methadone, and fentanyl. During ED workup patient continued to have an elevated HR and was fighting the ventilator with poor oxygenation. Patient eventually sedated with versed, fentanyl, ketamine, and nimbex. Patient also received a 3g of keppra in addition to ativan and propofol IV for his seizures. Patient was transferred to [facility] ICU for further evaluation and cvEEG. Of note:  Patient sees Dr. Romario Moreno at [facility] for his seizure management. Per the most recent note from an office visit in 10/2022, patient was admitted to [facility] EMU in 10/2022 where a seizure was captured on EEG. Patient has had multiple hospitalizations since then for his seizures.   Patient was originally started on keppra, but took himself off it due to side effects. Patient has since been on oxcarbazepine of varying doses until Genora Diver was added to his regimen in 03/2022. Most recent AED regimen consists of Xcopir titration to 200mg daily and Oxtellar 1800mg daily.       PMH Past Medical History:   Diagnosis Date    Aspiration pneumonia (HCC)     Hepatitis C     HTN (hypertension)     NSTEMI (non-ST elevated myocardial infarction) ([address]     Polysubstance abuse ([facility]     Seizures ([facility]     Septic shock ([facility]     Takotsubo cardiomyopathy       Allergies No Known Allergies   Diet Diet NPO  ADULT TUBE FEEDING; Nasogastric; Peptide Based; Continuous; 25; Yes; 15; Q 6 hours; 55; 30; Q 4 hours   Isolation No active isolations     CURRENT SCHEDULED MEDICATIONS   Inpatient Medications     sodium chloride flush, 5-40 mL, IntraVENous, 2 times per day    pantoprazole, 40 mg, IntraVENous, Daily    heparin (porcine), 5,000 Units, SubCUTAneous, 3 times per day    [COMPLETED] piperacillin-tazobactam, 4,500 mg, IntraVENous, Once **FOLLOWED BY** piperacillin-tazobactam, 3,375 mg, IntraVENous, Q8H    OXcarbazepine, 900 mg, Oral, BID    fosphenytoin (CEREBYX) maintenance dose IVPB, 100 mg PE, IntraVENous, Q8H   Infusions    fentaNYL 125 mcg/hr (01/16/23 0335)    dexmedetomidine (PRECEDEX) IV infusion 1.3 mcg/kg/hr (01/16/23 0148)    sodium chloride        Antibiotics   Recent Abx Admin                     piperacillin-tazobactam (ZOSYN) 3,375 mg in dextrose 5 % 50 mL IVPB (mini-bag) (mg) 3,375 mg New Bag 01/16/23 0256     3,375 mg New Bag 01/15/23 1835     3,375 mg New Bag  1153                      LABS   Metabolic Panel Recent Labs     01/14/23  0340 01/15/23  0400 01/16/23  0542   * 137 142   K 4.2 3.9 3.5    106 109   CO2 23 20* 26   BUN 14 14 13   CREATININE 0.7* 0.7* 0.5*   GLUCOSE 125* 94 101*   CALCIUM 8.4 8.0* 8.0*   LABALBU 2.8* 2.6* 3.0*   PHOS 2.3* 3.2 4.4   MG 1.50* 2.20 1.80   ALKPHOS 77 85 115   ALT 2,380* 2,998* 2,876*   AST 1,782* 2,023* 1,220*   AMMONIA 47  --   --         CBC / Coags Recent Labs     01/14/23  0340 01/14/23  1229 01/15/23  0400 01/16/23  0542   WBC 11.5*  --  7.5 7.6   RBC 3.79*  --  3.68* 3.49*   HGB 11.8*  --  11.4* 10.6*   HCT 34.2*  --  33.1* 31.0*   *  --  102* 111*   INR  --  1.71*  --   --         Other No results for input(s): LABA1C, LDLCALC, TRIG, TSH, [id number], FOLATE, LABSALI, COVID19 in the last 72 hours. Recent Labs     01/13/23  1922 01/14/23  0340 01/16/23  0542   PHENYTOIN  --    < > 13.5   LACTA 1.5  --   --     < > = values in this interval not displayed. DIAGNOSTICS   IMAGES:  Images personally reviewed and agree w/ radiology interpretation. Head CT w/o Contrast: no acute intracranial abnormality      CT Chest: No PE; multifocal pneumonia     CvEEG   cEEG start date & time: 1- at 7 am  cEEG end date & time: 1- at 7 am  During the recording:   No focal slowing was seen. No epileptiform discharges or seizures were detected. Video was reviewed intermittently at times when significant amounts of EMG artifact were present. The EKG monitoring channel did not detect any significant rhythm abnormalities. EEG Interpretation: This EEG is abnormal due to the presence of:  Severe generalized slowing. This is a non-specific finding but is consistent with a generalized disturbance of cerebral function. It may be seen in a variety of conditions, such as toxic, metabolic, post-anoxic, multi-focal, or diffuse structural abnormalities. cvEEG 1/13 7 am - 1/14 7 am  No focal slowing was seen. No epileptiform discharges or seizures were detected. Video was reviewed intermittently at times when significant amounts of EMG artifact were present. The EKG monitoring channel did not detect any significant rhythm abnormalities. EEG Interpretation: This EEG is abnormal due to the presence of:      Severe generalized slowing.  This is a non-specific finding but is consistent with a generalized disturbance of cerebral function. It may be seen in a variety of conditions, such as toxic, metabolic, post-anoxic, multi-focal, or diffuse structural abnormalities. cvEEG 1/12/23 11 PM - 1/13/23 7 AM  During the recording:   No focal slowing was seen. No epileptiform discharges or seizures were detected. Video was reviewed intermittently at times when significant amounts of EMG artifact were present. The EKG monitoring channel did not detect any significant rhythm abnormalities. EEG Interpretation: This EEG is abnormal due to the presence of:   Severe generalized slowing. This is a non-specific finding but is consistent with a generalized disturbance of cerebral function. It may be seen in a variety of conditions, such as toxic, metabolic, post-anoxic, multi-focal, or diffuse structural abnormalities. PHYSICAL EXAMINATION     PHYSICAL EXAM:  Vitals:    01/16/23 0400 01/16/23 0401 01/16/23 0700 01/16/23 0800   BP: 108/75  (!) 126/94 (!) 138/93   Pulse: 75 75 73 80   Resp: 21 21 14 17   Temp: 98.3 °F (36.8 °C)  98.8 °F (37.1 °C)    TempSrc: Temporal  Temporal    SpO2: 94% 94% 100% 100%   Weight:       Height:           Exam   Heavily sedated with propofol (@40)- with bed changes he becomes very agitated and is strong in all four limbs; attempts to self extubate  Opens eyes spontaneously, tracks examiner  Pupils equal, round and reactive   in both upper limbs  Little movement in lower limbs but also very sedated still    ASSESSMENT & RECOMMENDATIONS   Assessment:  Dale Villar is a 33 yo male with a history of intractable epilepsy (follows with Dr. Elsi Azevedo) who presents in status epilepticus in the setting of hyperammonemia (resolved), pneumonia and sepsis. cvEEG initial read with severe generalized slowing. No abnormality on head CT.     Now extubated and doing well   EEG removed 1/15    Plan:  - DC dilantin (liver enzymes significantly elevated)  - Resume home Xcorpi   - Continue home [address]  - Will f/u tomorrow. Please call with questions.      Norbert Connolly, APRN - CNP   Neurology & Neurocritical Care   1/16/2023 9:01 AM    ICU Patients:   Neurocritical Care Line: [phone]  [employer]: [facility] Neurocritical Care

## 2023-01-16 NOTE — PROGRESS NOTES
ICU Progress Note    Admit Date: 1/12/2023  Day: 2  Vent Day: 2  IV Access:Peripheral, CVC  IV Fluids: 100/hr NS  Vasopressors:None                Antibiotics: Vanc/ Zosyn  Diet: Diet NPO  ADULT TUBE FEEDING; Nasogastric; Peptide Based; Continuous; 25; Yes; 15; Q 6 hours; 55; 30; Q 4 hours    CC: Status epilepticus    Interval history:   - No acute events over night, pt is laying in bed comfortably with some complaints of abdominal tenderness, He feels like he has a lot of gas  - SLP saw pt today and recommended soft and bite sized/thin liquids  - Pt is on day 4 of Zosyn    HPI:   Per resident HPI: Cruz Salcedo is a 27-year-old M with PMH of seizures, hep C, uncontrolled hypertension, takatsubo cardiomyopathy, COPD, temporal seizures, 20 yr tobacco use, HFrEF, polysubstance abuse (heroine and fentanyl) who presents to [facility] ED with seizures. EMS was called for witnessed seizure. Patient was actively seizing when he arrived to the ED. He received multiple doses of benzos on route that did not improve his seizure. He was actively seizing for about an hour. He never returned to baseline. He was eventually intubated due to hypoxia and not protecting his airway. Other tests remarkable for EKG showed new LBBB concern for underlying ischemia he was taken to the Cath Lab. Cardiology felt it was likely sinus tachycardia with aberrancy. Troponins were negative. Other labs remarkable for lactic acid was 6.9, ammonia 129, UDS was positive for cannabinoids methadone and fentanyl. Patient is a known IV drug user and is on methadone at home. UA was negative for any infection. Imaging studies were done and showed CT PE negative for any PE but showed multifocal pneumonia, CT head showed no intra cranial abnormality. He was treated with large-volume IV fluids and started on broad-spectrum antibiotics he had some worsening hypoxia with pulmonary edema and ultimately required Lasix.   He was put on IV Versed, IV fentanyl and IV ketamine for sedation and requirement of paralysis for management of improved oxygenation on the ventilator. \"    Medications:     Scheduled Meds:   sodium chloride flush  5-40 mL IntraVENous 2 times per day    pantoprazole  40 mg IntraVENous Daily    heparin (porcine)  5,000 Units SubCUTAneous 3 times per day    piperacillin-tazobactam  3,375 mg IntraVENous Q8H    OXcarbazepine  900 mg Oral BID    fosphenytoin (CEREBYX) maintenance dose IVPB  100 mg PE IntraVENous Q8H     Continuous Infusions:   fentaNYL 125 mcg/hr (01/16/23 0335)    dexmedetomidine (PRECEDEX) IV infusion 1.3 mcg/kg/hr (01/16/23 0148)    sodium chloride       PRN Meds:ondansetron, prochlorperazine, sodium chloride flush, sodium chloride, polyethylene glycol, acetaminophen **OR** acetaminophen    Objective:   Vitals:   T-max:  Patient Vitals for the past 8 hrs:   BP Temp Temp src Pulse Resp SpO2   01/16/23 0800 (!) 138/93 -- -- 80 17 100 %   01/16/23 0700 (!) 126/94 98.8 °F (37.1 °C) Temporal 73 14 100 %   01/16/23 0401 -- -- -- 75 21 94 %   01/16/23 0400 108/75 98.3 °F (36.8 °C) Temporal 75 21 94 %         Intake/Output Summary (Last 24 hours) at 1/16/2023 1453  Last data filed at 1/16/2023 0400  Gross per 24 hour   Intake 1351.16 ml   Output 2680 ml   Net -1328.84 ml       Review of Systems   Unable to perform ROS: Acuity of condition     Physical Exam  Constitutional:       Appearance: He is ill-appearing. He is not toxic-appearing. HENT:      Head: Normocephalic and atraumatic. Mouth/Throat:      Comments: Very poor dentition  Cardiovascular:      Rate and Rhythm: Regular rhythm. Tachycardia present. Heart sounds: Normal heart sounds. Pulmonary:      Effort: Respiratory distress present. Breath sounds: Stridor present. No wheezing. Abdominal:      General: Abdomen is flat. Palpations: Abdomen is soft. Musculoskeletal:      Right lower leg: No edema. Left lower leg: No edema.    Skin: Coloration: Skin is pale. Skin is not jaundiced. Neurological:      General: No focal deficit present. Mental Status: He is oriented to person, place, and time. LABS:    CBC:   Recent Labs     01/14/23  0340 01/15/23  0400 01/16/23  0542   WBC 11.5* 7.5 7.6   HGB 11.8* 11.4* 10.6*   HCT 34.2* 33.1* 31.0*   * 102* 111*   MCV 90.3 89.8 89.1       Renal:    Recent Labs     01/14/23  0340 01/15/23  0400 01/16/23  0542   * 137 142   K 4.2 3.9 3.5    106 109   CO2 23 20* 26   BUN 14 14 13   CREATININE 0.7* 0.7* 0.5*   GLUCOSE 125* 94 101*   CALCIUM 8.4 8.0* 8.0*   MG 1.50* 2.20 1.80   PHOS 2.3* 3.2 4.4   ANIONGAP 5 11 7       Hepatic:   Recent Labs     01/14/23  0340 01/15/23  0400 01/16/23  0542   AST 1,782* 2,023* 1,220*   ALT 2,380* 2,998* 2,876*   BILITOT 0.5 0.4 0.6   BILIDIR <0.2 <0.2 <0.2   PROT 5.1* 4.9* 5.5*   LABALBU 2.8* 2.6* 3.0*   ALKPHOS 77 85 115       Troponin:   Recent Labs     01/13/23  0908 01/13/23  1457 01/13/23  1916   TROPONINI 0.29* 0.18* 0.19*       BNP: No results for input(s): BNP in the last 72 hours. Lipids: No results for input(s): CHOL, HDL in the last 72 hours. Invalid input(s): LDLCALCU, TRIGLYCERIDE  ABGs:    Recent Labs     01/13/23  2100 01/13/23  2251 01/15/23  0928   PHART 7.509* 7.439 7.407   JGC9BKX 29.6* 36.9 34.8*   PO2ART 159.0* 173.0* 165.0*   YNO3FQN 24 25 22   BEART 1.4 1.0 -2.3   H5WAVXHH >100 >100 100   IQI6LWH 25 26 23         INR:   Recent Labs     01/14/23  1229   INR 1.71*       Lactate:   No results for input(s): LACTATE in the last 72 hours. RAD:   XR CHEST PORTABLE   Final Result      The tip of the endotracheal tube projects over the mid trachea. Improved left-sided airspace disease and unchanged right mid and basilar airspace disease.         Assessment/Plan:   Zarina Cancer is a 51-year-old M with PMH of temporal lobe seizures, hep C, uncontrolled HTN, Takutsobo cardiomyopathy, COPD, 20 pack-year smoking, HFrEF, polysubstance abuse (heroin and fentanyl on methadone) who presents to [facility] ED with seizures. Acute hypoxic respiratory failure 2/2 Septic Shock 2/2 Multifocal Pneumonia  - 15 HF NC  - Sedation: Fentanyl 125, Precedex 1.3   -Weaning Fentanyl and Precedex as tolerated. He has high sedation/analgesic tolerance. - Day 4 Zosyn  - Respiratory cultures pending  - Blood Cx from [facility]: Bacillus likely contaminant   - Echo to r/o endocarditis or decompensated HFREF  - EF <20% w/ diffuse hypokinesis. No vegetations. Low EF likely due to active septic shock and myocardial demand ischemia given Troponinemia. Should hold Entresto until extubated and/or off pressors per Cards recs. - SLP Eval - thin liquids/bite sized     Ischemic Hepatitis  - Jump in ALT/ /100> 1782/2380> continues to trend down  - Trend daily  -  F/u INR     Acute encephalopathy 2/2 status epilepticus  History of right temporal epilepsy diagnosed in 2019 in the setting of a prior TBI. He has known epilepsy, he is on Oxtellar and Xcopri at home and is known to be noncompliant, UDS was positive for fentanyl, and he was septic on arrival to the ED which could be a reason to put him into status epilepticus.  - q4h Neuro checks  - Oxcarbazepine 900mg BID  - Fosphenytoin 100mg Q8H IV  - Will resume Xcopri when able to take PO   - Avoid nimbex       New Left Bundle Branch Block  Per Cards: New LBBB/previous history of stress-induced, cardiomyopathy with borderline recovered LV function/elevated troponin/demand ischemia. - Cardiology consulted; appreciate recs  - Likely demand ischemia from sepsis.  Unlikely true ACS given normal cath in 2021     Chronic Problems:  COPD - Duonebs  HTN - Holding home coreg  HFrEF - LVEF in March 2022 of 45-50% - Holding Home Entresto  Tobacco use   Polysubstance Abuse - holding home methadone     Code Status: Prior  FEN: Start tube feeds today, RD consulted  PPX:  SQH and Protonix  DISPO: ICU    Florence Michelle DO, PGY-1  01/16/23  9:04 AM    This patient has been staffed and discussed with Dr. Rhiannon Ledezma MD    Patient was seen, examined and discussed with Dr. Edwina Tian. I agree with the interval history. My physical exam confirms the findings listed below  Chart was reviewed including labs and medical records confirm the findings noted    CVC Triple Lumen 01/12/23 Right External jugular (Active)   Number of days: 4     Pneumonia. Cx is negative. He is on Zosyn day #5. Vancomycin stopped. MRSA is negative. He is fully alert. He is on fentanyl drip 125 mcg/hr and precedex 1.3 mcg/kg/hr. He is on large does of methadone chronically and has hx of leaving hospital AMA  Status epilepticus - resolved.   Known hx of epilepsy   IVDU  Chronic systolic CHF    Plan to restart methadone and d/c fentanyl tomorrow  Restart entresto today   Start diet

## 2023-01-16 NOTE — PROGRESS NOTES
Called to room d/t pt desatting, previously on 13L HFNC and now requiring 15L HFNC and NRB. SpO2 currently 95%, will continue to monitor and wean as appropriate.

## 2023-01-16 NOTE — PROGRESS NOTES
Speech Language Pathology  Facility/Department: [address] ICU  Dysphagia Daily Treatment Note    NAME: Bong Figueroa  : 1990  MRN: [id number]    Patient Diagnosis(es):   Patient Active Problem List    Diagnosis Date Noted    Demand ischemia ([address] 2023    LBBB (left bundle branch block) 2023    Septic shock ([address] 2023    Acute respiratory failure with hypoxia and hypercapnia ([address] 2023    Pulmonary infiltrates 2023    Leukocytosis 2023    Hyperammonemia ([address] 2023    Polypharmacy 2023    Pneumonia of both lower lobes due to infectious organism 2023    Myonecrosis ([address] 2023    Elevated lactic acid level 2023    Normal anion gap metabolic acidosis     Breakthrough seizure ([address] 2022    Polysubstance abuse ([address] 2022    High anion gap metabolic acidosis     Seizure ([address] 2021    Status epilepticus ([address]     NSTEMI (non-ST elevated myocardial infarction) ([address]     Abnormal ECG     Takotsubo cardiomyopathy     Tobacco abuse     History of drug use     Aspiration pneumonia of both lower lobes ([address]     IV drug abuse ([address]     Acute encephalopathy     Partial idiopathic epilepsy with seizures of localized onset, intractable, with status epilepticus ([address]      Allergies: No Known Allergies. Recent Chest Xray 23  The tip of the endotracheal tube projects over the mid trachea. Improved left-sided airspace disease and unchanged right mid and basilar airspace disease. CT of head 23  No acute intracranial abnormality. Previous MBS - n/a  Chart reviewed. Medical Diagnosis: Status epilepticus ([address] [G40.901]   Treatment Diagnosis: dysphagia    BSE Impression 1/15/23  Pt alert, oriented to this being a hospital, stated it was December. Pt was intubated - 1/15 ~ 10:15. Pt exhibiting strong voice and volitional cough s/p extubation.  Pt is on 15L of oxygen at this time, maintained throughout session. Oral structures grossly intact, though pt has poor oral hygiene/dentition. Pt demonstrated adequate labial seal  around cup and straw, no anterior loss of bolus occurred, no pocketing/residue noted. No cough, however mild throat clear occurred intermittently. Swallow movement felt upon palpation of anterior neck. Respiratory rate increased intermittently to 28-31 during PO trials. As pt just extubated, was intubated 3 days and exhibiting increased RR, recommend NPO. Recommend aggressive oral care 2-3 x/day and small amounts of ice chips, small sips of water. Plan to re-assess next session to determine ability to initiate PO diet vs need for instrumental exam    Dysphagia Diagnosis: No clinical indicators of dysphagia, Suspected needs further assessment    MBS results - will cont to monitor need    Pain: denies    Current Diet : Diet NPO  ADULT TUBE FEEDING; Nasogastric; Peptide Based; Continuous; 25; Yes; 15; Q 6 hours; 55; 30; Q 4 hours   Recommended Form of Meds: Via alternative means of nutrition (if critical medications are required PO, place in puree)      Treatment:  Pt seen bedside to address the following goals:  1-The patient will tolerate repeat bedside swallowing evaluation when able  1/16: pt sitting up in bed, very pleasant and alert. Voice and volitional cough remain strong. Pt re-assessed with ice chips, water via straw (did not assess with cup sips due to presence of NG tube), puree and solid. Pt consumed tsps of applesauce and 6 ounces of water, including sequential swallows with no cough, throat clear or change in voice occurring. Pt demonstrated adequate mastication with jay cracker, no oral residue noted.    Recommend Soft and bite sized (d/t presence of NG)/thin liquids with use of straw  Goal met    2-The patient/caregiver will demonstrate understanding of compensatory strategies for improved swallowing safety  1/15: Educated pt and family to purpose of visit, s/s of aspiration, concern if aspiration occurs, rationale for NPO, oral care and plan to re-assess next session. Explained impact intubation can have on swallowing and role in protecting airway. Pt and family stated comprehension  Cont goal   1/16: educated pt and family member to recommendation for diet and explanation of texture, s/s of aspiration, strategies to improve safety of swallow and instruction to notify staff if any signs emerge. Both stated comprehension  Cont goal    New goal:  3-  Pt will consume PO safely without signs or symptoms of aspiration      Patient/Family/Caregiver Education:  As above    Compensatory Strategies:  90 degrees all meals and 15 minutes after  Small bites  Liquids via straw while NG in place     Plan:  Continue dysphagia treatment with goals per plan of care. Diet recommendations: Soft and bite sized/thin liquids - if any s/s of aspiration emerge, or there is respiratory decline,  make NPO until further evaluated by SLP  DC recommendation:  TBD  Treatment: 15  D/W nursing Hawa  Needs met prior to leaving room, call button in reach. CHITRA Duque.S./[facility]-SLP #[id number]  Pg.  # [phone]  If patient is discharged prior to next treatment, this note will serve as the discharge summary

## 2023-01-16 NOTE — PROGRESS NOTES
Neuro assessment unchanged. Pt denies n/v at this time. Resting in bed. Oxygen requirements decreased. 15L HFNC at this time. Fall, seizure, and skin breakdown precautions in place.

## 2023-01-17 PROBLEM — I10 PRIMARY HYPERTENSION: Status: ACTIVE | Noted: 2023-01-17

## 2023-01-17 LAB
ALBUMIN SERPL-MCNC: 3.2 G/DL (ref 3.4–5)
ALP BLD-CCNC: 106 U/L (ref 40–129)
ALT SERPL-CCNC: 2014 U/L (ref 10–40)
ANION GAP SERPL CALCULATED.3IONS-SCNC: 8 MMOL/L (ref 3–16)
AST SERPL-CCNC: 429 U/L (ref 15–37)
BASOPHILS ABSOLUTE: 0.1 K/UL (ref 0–0.2)
BASOPHILS RELATIVE PERCENT: 0.8 %
BILIRUB SERPL-MCNC: 0.9 MG/DL (ref 0–1)
BILIRUBIN DIRECT: 0.3 MG/DL (ref 0–0.3)
BILIRUBIN, INDIRECT: 0.6 MG/DL (ref 0–1)
BUN BLDV-MCNC: 12 MG/DL (ref 7–20)
CALCIUM SERPL-MCNC: 8.6 MG/DL (ref 8.3–10.6)
CHLORIDE BLD-SCNC: 104 MMOL/L (ref 99–110)
CO2: 24 MMOL/L (ref 21–32)
CREAT SERPL-MCNC: 0.7 MG/DL (ref 0.9–1.3)
EOSINOPHILS ABSOLUTE: 0.2 K/UL (ref 0–0.6)
EOSINOPHILS RELATIVE PERCENT: 2.5 %
GFR SERPL CREATININE-BSD FRML MDRD: >60 ML/MIN/{1.73_M2}
GLUCOSE BLD-MCNC: 109 MG/DL (ref 70–99)
GLUCOSE BLD-MCNC: 110 MG/DL (ref 70–99)
HCT VFR BLD CALC: 33.5 % (ref 40.5–52.5)
HEMOGLOBIN: 11.4 G/DL (ref 13.5–17.5)
LYMPHOCYTES ABSOLUTE: 2.9 K/UL (ref 1–5.1)
LYMPHOCYTES RELATIVE PERCENT: 29.9 %
MAGNESIUM: 1.9 MG/DL (ref 1.8–2.4)
MCH RBC QN AUTO: 30.4 PG (ref 26–34)
MCHC RBC AUTO-ENTMCNC: 33.9 G/DL (ref 31–36)
MCV RBC AUTO: 89.5 FL (ref 80–100)
MONOCYTES ABSOLUTE: 0.7 K/UL (ref 0–1.3)
MONOCYTES RELATIVE PERCENT: 7 %
NEUTROPHILS ABSOLUTE: 5.9 K/UL (ref 1.7–7.7)
NEUTROPHILS RELATIVE PERCENT: 59.8 %
PDW BLD-RTO: 14.6 % (ref 12.4–15.4)
PERFORMED ON: ABNORMAL
PHOSPHORUS: 3 MG/DL (ref 2.5–4.9)
PLATELET # BLD: 133 K/UL (ref 135–450)
PMV BLD AUTO: 7.1 FL (ref 5–10.5)
POTASSIUM SERPL-SCNC: 3.6 MMOL/L (ref 3.5–5.1)
RBC # BLD: 3.74 M/UL (ref 4.2–5.9)
SODIUM BLD-SCNC: 136 MMOL/L (ref 136–145)
TOTAL PROTEIN: 5.9 G/DL (ref 6.4–8.2)
WBC # BLD: 9.8 K/UL (ref 4–11)

## 2023-01-17 PROCEDURE — 92526 ORAL FUNCTION THERAPY: CPT

## 2023-01-17 PROCEDURE — 85025 COMPLETE CBC W/AUTO DIFF WBC: CPT

## 2023-01-17 PROCEDURE — 83735 ASSAY OF MAGNESIUM: CPT

## 2023-01-17 PROCEDURE — 86702 HIV-2 ANTIBODY: CPT

## 2023-01-17 PROCEDURE — 99233 SBSQ HOSP IP/OBS HIGH 50: CPT | Performed by: INTERNAL MEDICINE

## 2023-01-17 PROCEDURE — 2580000003 HC RX 258: Performed by: STUDENT IN AN ORGANIZED HEALTH CARE EDUCATION/TRAINING PROGRAM

## 2023-01-17 PROCEDURE — 36415 COLL VENOUS BLD VENIPUNCTURE: CPT

## 2023-01-17 PROCEDURE — 99233 SBSQ HOSP IP/OBS HIGH 50: CPT | Performed by: NURSE PRACTITIONER

## 2023-01-17 PROCEDURE — 6370000000 HC RX 637 (ALT 250 FOR IP)

## 2023-01-17 PROCEDURE — 6360000002 HC RX W HCPCS: Performed by: STUDENT IN AN ORGANIZED HEALTH CARE EDUCATION/TRAINING PROGRAM

## 2023-01-17 PROCEDURE — 2000000000 HC ICU R&B

## 2023-01-17 PROCEDURE — 99231 SBSQ HOSP IP/OBS SF/LOW 25: CPT

## 2023-01-17 PROCEDURE — 94761 N-INVAS EAR/PLS OXIMETRY MLT: CPT

## 2023-01-17 PROCEDURE — 6370000000 HC RX 637 (ALT 250 FOR IP): Performed by: STUDENT IN AN ORGANIZED HEALTH CARE EDUCATION/TRAINING PROGRAM

## 2023-01-17 PROCEDURE — 6370000000 HC RX 637 (ALT 250 FOR IP): Performed by: NURSE PRACTITIONER

## 2023-01-17 PROCEDURE — 6360000002 HC RX W HCPCS

## 2023-01-17 PROCEDURE — 86701 HIV-1ANTIBODY: CPT

## 2023-01-17 PROCEDURE — 87390 HIV-1 AG IA: CPT

## 2023-01-17 PROCEDURE — 80076 HEPATIC FUNCTION PANEL: CPT

## 2023-01-17 PROCEDURE — 80069 RENAL FUNCTION PANEL: CPT

## 2023-01-17 PROCEDURE — 2500000003 HC RX 250 WO HCPCS: Performed by: STUDENT IN AN ORGANIZED HEALTH CARE EDUCATION/TRAINING PROGRAM

## 2023-01-17 PROCEDURE — 2700000000 HC OXYGEN THERAPY PER DAY

## 2023-01-17 RX ORDER — CARVEDILOL 3.12 MG/1
3.12 TABLET ORAL 2 TIMES DAILY WITH MEALS
Status: DISCONTINUED | OUTPATIENT
Start: 2023-01-17 | End: 2023-01-19

## 2023-01-17 RX ORDER — SPIRONOLACTONE 25 MG/1
12.5 TABLET ORAL DAILY
Status: DISCONTINUED | OUTPATIENT
Start: 2023-01-17 | End: 2023-01-20 | Stop reason: HOSPADM

## 2023-01-17 RX ORDER — METHADONE HYDROCHLORIDE 10 MG/ML
110 CONCENTRATE ORAL DAILY
Status: DISCONTINUED | OUTPATIENT
Start: 2023-01-17 | End: 2023-01-20 | Stop reason: HOSPADM

## 2023-01-17 RX ORDER — MAGNESIUM SULFATE 1 G/100ML
1000 INJECTION INTRAVENOUS ONCE
Status: COMPLETED | OUTPATIENT
Start: 2023-01-17 | End: 2023-01-17

## 2023-01-17 RX ORDER — CARVEDILOL 6.25 MG/1
6.25 TABLET ORAL 2 TIMES DAILY WITH MEALS
Status: DISCONTINUED | OUTPATIENT
Start: 2023-01-17 | End: 2023-01-17

## 2023-01-17 RX ORDER — HYDROXYZINE PAMOATE 25 MG/1
50 CAPSULE ORAL 3 TIMES DAILY PRN
Status: DISCONTINUED | OUTPATIENT
Start: 2023-01-17 | End: 2023-01-18

## 2023-01-17 RX ORDER — PANTOPRAZOLE SODIUM 40 MG/1
40 TABLET, DELAYED RELEASE ORAL DAILY
Status: DISCONTINUED | OUTPATIENT
Start: 2023-01-17 | End: 2023-01-20 | Stop reason: HOSPADM

## 2023-01-17 RX ORDER — NICOTINE 21 MG/24HR
1 PATCH, TRANSDERMAL 24 HOURS TRANSDERMAL DAILY
Status: DISCONTINUED | OUTPATIENT
Start: 2023-01-17 | End: 2023-01-20 | Stop reason: HOSPADM

## 2023-01-17 RX ADMIN — MAGNESIUM SULFATE HEPTAHYDRATE 1000 MG: 1 INJECTION, SOLUTION INTRAVENOUS at 16:12

## 2023-01-17 RX ADMIN — PIPERACILLIN AND TAZOBACTAM 3375 MG: 3; .375 INJECTION, POWDER, LYOPHILIZED, FOR SOLUTION INTRAVENOUS at 18:01

## 2023-01-17 RX ADMIN — PIPERACILLIN AND TAZOBACTAM 3375 MG: 3; .375 INJECTION, POWDER, LYOPHILIZED, FOR SOLUTION INTRAVENOUS at 10:21

## 2023-01-17 RX ADMIN — DEXMEDETOMIDINE 1.4 MCG/KG/HR: 100 INJECTION, SOLUTION INTRAVENOUS at 17:58

## 2023-01-17 RX ADMIN — HYDROXYZINE PAMOATE 50 MG: 25 CAPSULE ORAL at 22:45

## 2023-01-17 RX ADMIN — DEXMEDETOMIDINE 1.4 MCG/KG/HR: 100 INJECTION, SOLUTION INTRAVENOUS at 06:23

## 2023-01-17 RX ADMIN — HYDROXYZINE PAMOATE 50 MG: 25 CAPSULE ORAL at 17:58

## 2023-01-17 RX ADMIN — OXCARBAZEPINE 900 MG: 300 TABLET, FILM COATED ORAL at 20:33

## 2023-01-17 RX ADMIN — SPIRONOLACTONE 12.5 MG: 25 TABLET, FILM COATED ORAL at 13:18

## 2023-01-17 RX ADMIN — HEPARIN SODIUM 5000 UNITS: 5000 INJECTION INTRAVENOUS; SUBCUTANEOUS at 22:07

## 2023-01-17 RX ADMIN — DEXMEDETOMIDINE 1.4 MCG/KG/HR: 100 INJECTION, SOLUTION INTRAVENOUS at 23:26

## 2023-01-17 RX ADMIN — DEXMEDETOMIDINE 1.4 MCG/KG/HR: 100 INJECTION, SOLUTION INTRAVENOUS at 00:55

## 2023-01-17 RX ADMIN — HYDROXYZINE PAMOATE 50 MG: 25 CAPSULE ORAL at 10:41

## 2023-01-17 RX ADMIN — Medication 110 MG: at 10:41

## 2023-01-17 RX ADMIN — HEPARIN SODIUM 5000 UNITS: 5000 INJECTION INTRAVENOUS; SUBCUTANEOUS at 05:36

## 2023-01-17 RX ADMIN — Medication 125 MCG/HR: at 08:23

## 2023-01-17 RX ADMIN — OXCARBAZEPINE 900 MG: 300 TABLET, FILM COATED ORAL at 08:53

## 2023-01-17 RX ADMIN — DEXMEDETOMIDINE 1.4 MCG/KG/HR: 100 INJECTION, SOLUTION INTRAVENOUS at 11:41

## 2023-01-17 RX ADMIN — SACUBITRIL AND VALSARTAN 1 TABLET: 49; 51 TABLET, FILM COATED ORAL at 20:33

## 2023-01-17 RX ADMIN — POTASSIUM BICARBONATE 40 MEQ: 782 TABLET, EFFERVESCENT ORAL at 16:11

## 2023-01-17 RX ADMIN — SACUBITRIL AND VALSARTAN 1 TABLET: 49; 51 TABLET, FILM COATED ORAL at 08:52

## 2023-01-17 RX ADMIN — SODIUM CHLORIDE, PRESERVATIVE FREE 10 ML: 5 INJECTION INTRAVENOUS at 08:25

## 2023-01-17 RX ADMIN — Medication 125 MCG/HR: at 02:15

## 2023-01-17 RX ADMIN — CARVEDILOL 3.12 MG: 3.12 TABLET, FILM COATED ORAL at 16:11

## 2023-01-17 RX ADMIN — PIPERACILLIN AND TAZOBACTAM 3375 MG: 3; .375 INJECTION, POWDER, LYOPHILIZED, FOR SOLUTION INTRAVENOUS at 02:17

## 2023-01-17 RX ADMIN — HEPARIN SODIUM 5000 UNITS: 5000 INJECTION INTRAVENOUS; SUBCUTANEOUS at 14:07

## 2023-01-17 RX ADMIN — PANTOPRAZOLE SODIUM 40 MG: 40 TABLET, DELAYED RELEASE ORAL at 08:52

## 2023-01-17 RX ADMIN — SODIUM CHLORIDE, PRESERVATIVE FREE 10 ML: 5 INJECTION INTRAVENOUS at 20:33

## 2023-01-17 ASSESSMENT — PAIN SCALES - GENERAL
PAINLEVEL_OUTOF10: 0

## 2023-01-17 NOTE — PROGRESS NOTES
Patient awake and alert this shift, with Fentanyl gtt remaining at 125 mcg/hr, and Precedex gtt titrated up to 1.4 mcg/kg/hr during this shift. Patient weaned to 4 liters nasal cannula. Breath sounds clear. Does have some dyspnea with activity. Skin pale, warm, dry. Afebrile. Patient's family at bedside; updated and questions answered.

## 2023-01-17 NOTE — PROGRESS NOTES
ICU Progress Note    Admit Date: 1/12/2023  IV Access:Peripheral, CVC  IV Fluids: off  Vasopressors:None                Antibiotics: Zosyn  Diet: ADULT DIET; Dysphagia - Soft and Bite Sized    CC: Status epilepticus    Interval history:   HDS overnight. He states feeling \"like he is withdrawing\" this morning. Endorses anxiety, generalized abdominal pain. Continues on 1.4 precedex and 125 fentanyl. UOP 1.4L; net 3.8L. Day 6 zosyn. HPI:   Per resident HPI: Dee Dee Leung is a 68-year-old M with PMH of seizures, hep C, uncontrolled hypertension, takatsubo cardiomyopathy, COPD, temporal seizures, 20 yr tobacco use, HFrEF, polysubstance abuse (heroine and fentanyl) who presents to [facility] ED with seizures. EMS was called for witnessed seizure. Patient was actively seizing when he arrived to the ED. He received multiple doses of benzos on route that did not improve his seizure. He was actively seizing for about an hour. He never returned to baseline. He was eventually intubated due to hypoxia and not protecting his airway. Other tests remarkable for EKG showed new LBBB concern for underlying ischemia he was taken to the Cath Lab. Cardiology felt it was likely sinus tachycardia with aberrancy. Troponins were negative. Other labs remarkable for lactic acid was 6.9, ammonia 129, UDS was positive for cannabinoids methadone and fentanyl. Patient is a known IV drug user and is on methadone at home. UA was negative for any infection. Imaging studies were done and showed CT PE negative for any PE but showed multifocal pneumonia, CT head showed no intra cranial abnormality. He was treated with large-volume IV fluids and started on broad-spectrum antibiotics he had some worsening hypoxia with pulmonary edema and ultimately required Lasix.   He was put on IV Versed, IV fentanyl and IV ketamine for sedation and requirement of paralysis for management of improved oxygenation on the ventilator. \"    Medications:     Scheduled Meds:   pantoprazole  40 mg Oral Daily    Cenobamate  100 mg Oral Nightly    sacubitril-valsartan  1 tablet Oral BID    nicotine  1 patch TransDERmal Daily    sodium chloride flush  5-40 mL IntraVENous 2 times per day    heparin (porcine)  5,000 Units SubCUTAneous 3 times per day    piperacillin-tazobactam  3,375 mg IntraVENous Q8H    OXcarbazepine  900 mg Oral BID     Continuous Infusions:   fentaNYL 125 mcg/hr (01/17/23 0823)    dexmedetomidine (PRECEDEX) IV infusion 1.4 mcg/kg/hr (01/17/23 0623)    sodium chloride       PRN Meds:sodium chloride flush, sodium chloride, polyethylene glycol, acetaminophen **OR** acetaminophen    Objective:   Vitals:   T-max:  Patient Vitals for the past 8 hrs:   BP Temp Temp src Pulse Resp SpO2 Height Weight   01/17/23 0852 (!) 150/97 -- -- 91 22 92 % -- --   01/17/23 0845 (!) 150/97 -- -- 89 15 97 % -- --   01/17/23 0830 (!) 145/107 -- -- 83 20 94 % -- --   01/17/23 0815 (!) 148/99 -- -- 84 12 96 % -- --   01/17/23 0805 -- -- -- 83 16 97 % -- --   01/17/23 0800 (!) 150/102 98.2 °F (36.8 °C) Oral 90 17 98 % -- --   01/17/23 0730 (!) 133/90 -- -- 82 26 97 % -- --   01/17/23 0715 (!) 144/96 -- -- 80 25 98 % -- --   01/17/23 0630 131/80 -- -- 80 18 95 % -- --   01/17/23 0615 -- -- -- 79 21 97 % -- --   01/17/23 0600 (!) 141/99 -- -- 75 20 98 % 5' 11\" (1.803 m) 135 lb 2.3 oz (61.3 kg)   01/17/23 0545 -- -- -- 79 20 95 % -- --   01/17/23 0530 -- -- -- 72 22 91 % -- --   01/17/23 0515 -- -- -- 71 21 95 % -- --   01/17/23 0500 112/79 -- -- 72 20 94 % -- --   01/17/23 0445 -- -- -- 71 21 93 % -- --   01/17/23 0430 116/78 -- -- 72 20 92 % -- --   01/17/23 0415 -- -- -- 73 21 92 % -- --   01/17/23 0400 122/81 97.9 °F (36.6 °C) Oral 72 22 (!) 89 % -- --   01/17/23 0345 -- -- -- 74 18 92 % -- --   01/17/23 0330 (!) 151/111 -- -- 88 25 (!) 85 % -- --   01/17/23 0315 -- -- -- 75 18 90 % -- --   01/17/23 0300 115/82 -- -- 69 19 (!) 88 % -- --   01/17/23 0245 -- -- -- 71 20 (!) 88 % -- --   01/17/23 0230 109/80 -- -- 71 19 (!) 87 % -- --   01/17/23 0215 -- -- -- 76 14 92 % -- --   01/17/23 0200 119/76 -- -- 71 20 93 % -- --   01/17/23 0145 -- -- -- 70 20 94 % -- --   01/17/23 0130 112/76 -- -- 71 21 93 % -- --   01/17/23 0115 -- -- -- 69 19 93 % -- --       Intake/Output Summary (Last 24 hours) at 1/17/2023 0904  Last data filed at 1/17/2023 0700  Gross per 24 hour   Intake 1878.92 ml   Output 1375 ml   Net 503.92 ml     ROS: +anxiety, +generalized abd pain. Physical Exam  Constitutional:       Appearance: He is not ill-appearing or toxic-appearing. HENT:      Head: Normocephalic and atraumatic. Mouth/Throat:      Comments: Very poor dentition  Cardiovascular:      Rate and Rhythm: Regular rhythm. Tachycardia present. Heart sounds: Normal heart sounds. Pulmonary:      Effort: No respiratory distress. Breath sounds: No stridor. No wheezing. Abdominal:      General: Abdomen is flat. Palpations: Abdomen is soft. Musculoskeletal:      Right lower leg: No edema. Left lower leg: No edema. Skin:     Coloration: Skin is pale. Skin is not jaundiced. Neurological:      General: No focal deficit present. Mental Status: He is oriented to person, place, and time.      LABS:    CBC:   Recent Labs     01/15/23  0400 01/16/23  0542 01/17/23  0338   WBC 7.5 7.6 9.8   HGB 11.4* 10.6* 11.4*   HCT 33.1* 31.0* 33.5*   * 111* 133*   MCV 89.8 89.1 89.5     Renal:    Recent Labs     01/15/23  0400 01/16/23  0542 01/17/23  0338    142 136   K 3.9 3.5 3.6    109 104   CO2 20* 26 24   BUN 14 13 12   CREATININE 0.7* 0.5* 0.7*   GLUCOSE 94 101* 110*   CALCIUM 8.0* 8.0* 8.6   MG 2.20 1.80 1.90   PHOS 3.2 4.4 3.0   ANIONGAP 11 7 8     Hepatic:   Recent Labs     01/15/23  0400 01/16/23  0542 01/17/23  0338   AST 2,023* 1,220* 429*   ALT 2,998* 2,876* 2,014*   BILITOT 0.4 0.6 0.9   BILIDIR <0.2 <0.2 0.3   PROT 4.9* 5.5* 5.9*   LABALBU 2. 6* 3.0* 3.2*   ALKPHOS 85 115 106     Troponin:   No results for input(s): TROPONINI in the last 72 hours. BNP: No results for input(s): BNP in the last 72 hours. Lipids: No results for input(s): CHOL, HDL in the last 72 hours. Invalid input(s): LDLCALCU, TRIGLYCERIDE  ABGs:    Recent Labs     01/15/23  0928   PHART 7.407   AQZ9RYX 34.8*   PO2ART 165.0*   YPF2XSD 22   BEART -2.3   B2JJHHBS 100   GDW7PCZ 23       INR:   Recent Labs     01/14/23  1229   INR 1.71*     Lactate:   No results for input(s): LACTATE in the last 72 hours. RAD:   XR CHEST PORTABLE   Final Result      The tip of the endotracheal tube projects over the mid trachea. Improved left-sided airspace disease and unchanged right mid and basilar airspace disease. Assessment/Plan:   Luciano Coreas is a 70-year-old M with PMH of temporal lobe seizures, hep C, uncontrolled HTN, Takutsobo cardiomyopathy, COPD, 20 pack-year smoking, HFrEF, polysubstance abuse (heroin and fentanyl on methadone) who presents to [facility] ED with seizures. Acute hypoxic respiratory failure 2/2 Multifocal Pneumonia  - 2L HFNC --> off O2, satting 90s  - sedation: fentanyl 125, precedex 1.3  - hx of polysubstance abuse; wean sedation as tolerated   - d/c fentanyl today   - start home methadone  - Day 6 of zosyn  - vancomycin discontinued; MRSA negative  - s/p SLP eval - OK for thin liquids/bite sized  - s/p echo to rule out endocarditis or HFrEF decompensation  - EF <20% w/ diffuse hypokinesis. No vegetations. Low EF likely due to active septic shock and myocardial demand ischemia given Troponinemia. Ischemic Hepatitis, improving  At admission, ALT/ /100> 1782/2380. Downtrending  - Trend daily  -  F/u INR     Acute encephalopathy 2/2 status epilepticus  History of right temporal epilepsy diagnosed in 2019 in the setting of a prior TBI.   He has known epilepsy, he is on [place] at home and is known to be noncompliant, UDS was positive for fentanyl, and he was septic on arrival to the ED which could be a reason to put him into status epilepticus.  - neurology following - appreciate recs  - q4h Neuro checks  - Oxcarbazepine 900mg BID  - Fosphenytoin 100mg Q8H IV  - Will resume Xcopri when able to take PO   - Avoid nimbex    New Left Bundle Branch Block  Per Cards: New LBBB/previous history of stress-induced, cardiomyopathy with borderline recovered LV function/elevated troponin/demand ischemia. Likely demand ischemia from sepsis. Unlikely true ACS given normal cath in 2021.  - Cardiology consulted; appreciate recs     Chronic Problems:  COPD - Duonebs  HTN - Holding home coreg  HFrEF - LVEF in March 2022 of 45-50% - start Entresto  Tobacco use   Polysubstance Abuse - restart home methadone; nicotine patch given     Code Status: Prior  FEN: thin, bite sized  PPX:  SQH and Protonix  DISPO: ICU    Nannette Almeida MD, PGY-1  01/17/23  9:04 AM    This patient has been staffed and discussed with Dr. Gordo Alves MD    Patient was seen, examined and discussed with Dr. Masoud Mendoza. I agree with the interval history. My physical exam confirms the findings listed below  Chart was reviewed including labs and medical records confirm the findings noted    CVC Triple Lumen 01/12/23 Right External jugular (Active)   Number of days: 5     Pneumonia. Cx is negative. He is on Zosyn day #6.  (plan for total of 7 days)  Status epilepticus - resolved.   Known hx of epilepsy   IVDU  Chronic systolic CHF     Fentanyl discontinued this morning and he was started on methadone   Wean of precedex  Continue entresto  Restart coreg

## 2023-01-17 NOTE — PROGRESS NOTES
Patient resting in bed. VSS. Requesting a Nicotine patch states \"I smoke 2 1/2 - 3 packs a day at home and feel like I am withdrawing, I am getting hot flashes and anxious\" Talked to ICU Residents and Nicotine patch ordered. Will continue to monitor.

## 2023-01-17 NOTE — PLAN OF CARE
Problem: Discharge Planning  Goal: Discharge to home or other facility with appropriate resources  1/17/2023 0635 by Landon Johnson RN  Outcome: Progressing  Flowsheets (Taken 1/16/2023 2000 by Landon Johnson RN)  Discharge to home or other facility with appropriate resources:   Identify barriers to discharge with patient and caregiver   Arrange for needed discharge resources and transportation as appropriate   Arrange for interpreters to assist at discharge as needed   Refer to discharge planning if patient needs post-hospital services based on physician order or complex needs related to functional status, cognitive ability or social support system   Identify discharge learning needs (meds, wound care, etc)     Problem: Pain  Goal: Verbalizes/displays adequate comfort level or baseline comfort level  1/17/2023 0635 by Landon Johnson RN  Outcome: Progressing  Flowsheets (Taken 1/16/2023 2000 by Landon Johnson RN)  Verbalizes/displays adequate comfort level or baseline comfort level:   Encourage patient to monitor pain and request assistance   Assess pain using appropriate pain scale   Administer analgesics based on type and severity of pain and evaluate response   Implement non-pharmacological measures as appropriate and evaluate response   Consider cultural and social influences on pain and pain management   Notify Licensed Independent Practitioner if interventions unsuccessful or patient reports new pain     Problem: ABCDS Injury Assessment  Goal: Absence of physical injury  1/17/2023 0635 by Landon Johnson RN  Outcome: Progressing  Flowsheets (Taken 1/16/2023 2056)  Absence of Physical Injury: Implement safety measures based on patient assessment

## 2023-01-17 NOTE — PROGRESS NOTES
Pt resting in bed. Appears more comfortable and less anxious now that Nicotine patch applied. Will continue to monitor.

## 2023-01-17 NOTE — FLOWSHEET NOTE
01/17/23 1045   Treatment Team Notification   Team Member Name Dr. Sheri Che Team Role Resident   Method of Communication Face to face   Response See orders   Notification Time 1025       Dr. Wilson Michelle, with ICU Team, updated on patient condition with all questions answered. Patient home medications restarted, see MAR. Patient updated on plan of care with all questions answered. Will continue to keep updated.

## 2023-01-17 NOTE — PROGRESS NOTES
Comprehensive Nutrition Assessment    RECOMMENDATIONS:  PO Diet: Continue Dysphagia - soft and bite sized diet  ONS: Add Ensure BID  Nutrition Education: Education not indicated   Monitor nutrition adequacy, pertinent labs, bowel habits, wt changes, and clinical progress    NUTRITION ASSESSMENT:   Nutritional summary & status: Follow up: Pt extubated, remains on fentanyl drip, sedated on precedex. Advanced to dysphagia - soft and bite sized diet, consuming 50% of meals. Will add Ensure BID to encourage continued adequate intakes. Will continue to monitor. Admission/PMH: Seizures, acute respiratory failure, polysubstance abuse    MALNUTRITION ASSESSMENT  Context of Malnutrition: Acute Illness   Malnutrition Status: At risk for malnutrition (Comment)  Findings of the 6 clinical characteristics of malnutrition (Minimum of 2 out of 6 clinical characteristics is required to make the diagnosis of moderate or severe Protein Calorie Malnutrition based on AND/ASPEN Guidelines):  Energy Intake:  Mild decrease in energy intake (Comment)  Weight Loss:  No significant weight loss     Body Fat Loss:  Unable to assess     Muscle Mass Loss:  Unable to assess    Fluid Accumulation:  No significant fluid accumulation        NUTRITION DIAGNOSIS   Inadequate protein intake related to inadequate protein-energy intake as evidenced by intake 26-50%    Nutrition Monitoring and Evaluation:   Food/Nutrient Intake Outcomes:  Food and Nutrient Intake, Supplement Intake  Physical Signs/Symptoms Outcomes:  Biochemical Data, Nutrition Focused Physical Findings, Weight     OBJECTIVE DATA: Significant to nutrition assessment  Nutrition Related Findings: . Active bowel sounds. +BM 1/17. +3.8L. Wounds: None  Nutrition Goals: Meet at least 75% of estimated needs, prior to discharge     [facility] DIET;  Dysphagia - Soft and Bite Sized  PO Intake: 51-75%   PO Supplement Intake:None Ordered  Additional Sources of Calories/IVF:N/A     ANTHROPOMETRICS  Current Height: 5' 11\" (180.3 cm)  Current Weight: 135 lb 2.3 oz (61.3 kg)    Admission weight: 135 lb 2.3 oz (61.3 kg)  Ideal Body Weight (IBW): 172 lbs  (78 kg)    BMI: 18.9    COMPARATIVE STANDARDS  Energy (kcal):  1222-8407 (25-30 kcal/kg CBW)     Protein (g):  61-74 (1.0-1.2 g/kg CBW)       Fluid (mL/day):  1 mL/kcal or per MD    The patient will be monitored per nutrition standards of care. Consult dietitian if additional nutrition interventions are needed prior to RD reassessment.      Nelia De La O, [address] [place] [address], [address] [place] [address] Drive:  [phone]  Office:  [phone]

## 2023-01-17 NOTE — PROGRESS NOTES
NEUROLOGY / NEUROCRITICAL CARE PROGRESS NOTE       Patient Name: Kirt Gutiérrez YOB: 1990   Sex: Male Age: 28 yrs     CC / Reason for Consult: status epilepticus     Interval Hx / Changes over last 24 hours:     Clinically stable overnight. ROS: +anxiety, +generalized abdominal pain     HISTORY   Admission HPI:   Kirt Gutiérrez is a 28 y.o. y/o male with PMH significant for hepatitis C, HTN, NSTEMI, seizures, polysubstance abuse (methadone patient), and takotsubo cardiomyopathy. Per my interview with the patient's mother, the patient had multiple seizures at home during which he would stare off in the distance, turn his head to the side, and then have generalized shaking in all 4 extremities. Per patient's mother this is the same semiology as his previous seizures. EMS was called and the patient was transported to [facility] ED. Patient continued to seize while en route, and on arrival to the ED. Patient was intubated and a central line was placed. CT head showed no intracranial abnormalities. Patient also presented with leukocytosis, lactic acidosis, and fever with PNA seen on chest CT and XR. UDS positive for methadone, and fentanyl. During ED workup patient continued to have an elevated HR and was fighting the ventilator with poor oxygenation. Patient eventually sedated with versed, fentanyl, ketamine, and nimbex. Patient also received a 3g of keppra in addition to ativan and propofol IV for his seizures. Patient was transferred to [facility] ICU for further evaluation and cvEEG. Of note:  Patient sees Dr. Rosendo Nielsen at [facility] for his seizure management. Per the most recent note from an office visit in 10/2022, patient was admitted to [facility] EMU in 10/2022 where a seizure was captured on EEG. Patient has had multiple hospitalizations since then for his seizures. Patient was originally started on keppra, but took himself off it due to side effects.   Patient has since been on oxcarbazepine of varying doses until Xcopri was added to his regimen in 03/2022. Most recent AED regimen consists of Xcopir titration to 200mg daily and Oxtellar 1800mg daily. PMH Past Medical History:   Diagnosis Date    Aspiration pneumonia (HCC)     Hepatitis C     HTN (hypertension)     NSTEMI (non-ST elevated myocardial infarction) ([facility]     Polysubstance abuse ([facility]     [facility]     Septic shock (HCC)     Systolic CHF ([facility]     Takotsubo cardiomyopathy       Allergies No Known Allergies   Diet ADULT DIET;  Dysphagia - Soft and Bite Sized   Isolation No active isolations     CURRENT SCHEDULED MEDICATIONS   Inpatient Medications     pantoprazole, 40 mg, Oral, Daily    Cenobamate, 100 mg, Oral, Nightly    sacubitril-valsartan, 1 tablet, Oral, BID    nicotine, 1 patch, TransDERmal, Daily    sodium chloride flush, 5-40 mL, IntraVENous, 2 times per day    heparin (porcine), 5,000 Units, SubCUTAneous, 3 times per day    [COMPLETED] piperacillin-tazobactam, 4,500 mg, IntraVENous, Once **FOLLOWED BY** piperacillin-tazobactam, 3,375 mg, IntraVENous, Q8H    OXcarbazepine, 900 mg, Oral, BID   Infusions    fentaNYL 125 mcg/hr (01/17/23 0823)    dexmedetomidine (PRECEDEX) IV infusion 1.4 mcg/kg/hr (01/17/23 5719)    sodium chloride        Antibiotics   Recent Abx Admin                     piperacillin-tazobactam (ZOSYN) 3,375 mg in dextrose 5 % 50 mL IVPB (mini-bag) (mg) 3,375 mg New Bag 01/17/23 0217     3,375 mg New Bag 01/16/23 1906     3,375 mg New Bag  1150                      LABS   Metabolic Panel Recent Labs     01/15/23  0400 01/16/23  0542 01/17/23  0338    142 136   K 3.9 3.5 3.6    109 104   CO2 20* 26 24   BUN 14 13 12   CREATININE 0.7* 0.5* 0.7*   GLUCOSE 94 101* 110*   CALCIUM 8.0* 8.0* 8.6   LABALBU 2.6* 3.0* 3.2*   PHOS 3.2 4.4 3.0   MG 2.20 1.80 1.90   ALKPHOS 85 115 106   ALT 2,998* 2,876* 2,014*   AST 2,023* 1,220* 429*      CBC / Coags Recent Labs     01/14/23  1223 01/15/23  0400 01/16/23  0542 01/17/23  0338   WBC  --  7.5 7.6 9.8   RBC  --  3.68* 3.49* 3.74*   HGB  --  11.4* 10.6* 11.4*   HCT  --  33.1* 31.0* 33.5*   PLT  --  102* 111* 133*   INR 1.71*  --   --   --       Other No results for input(s): LABA1C, LDLCALC, TRIG, TSH, [id number], FOLATE, LABSALI, COVID19 in the last 72 hours. Recent Labs     01/16/23  0542   PHENYTOIN 13.5        DIAGNOSTICS   IMAGES:  Images personally reviewed and agree w/ radiology interpretation. CVEEG:  cEEG start date & time: 1- at 7 am  cEEG end date & time: 1- at 3:51 pm     Indication:  cEEG was initiated for monitoring and localization of seizures as the etiology of the altered mental status. It was available for review at the bedside as well as via remote access for the entire period of monitoring. Technical Summary:  32 channels of continuous EEG and video were recorded in a digital format on a patient who is reported to be intubated and sedated during the recording. The background rhythm consists of 2-3 Hz activity in the delta frequency range. Over riding fast activity seen as can be seen with sedated record. No well formed PDR,not reactive to stimulation. During the recording:   No focal slowing was seen. No epileptiform discharges or seizures were detected. Video was reviewed intermittently at times when significant amounts of EMG artifact were present. The EKG monitoring channel did not detect any significant rhythm abnormalities. EEG Interpretation: This EEG is abnormal due to the presence of:      Severe generalized slowing. This is a non-specific finding but is consistent with a generalized disturbance of cerebral function. It may be seen in a variety of conditions, such as toxic, metabolic, post-anoxic, multi-focal, or diffuse structural abnormalities.        PHYSICAL EXAMINATION     PHYSICAL EXAM:  Vitals:    01/17/23 0815 01/17/23 0830 01/17/23 0845 01/17/23 0852   BP: (!) 148/99 (!) 145/107 (!) 150/97 (!) 150/97   Pulse: 84 83 89 91   Resp: 12 20 15 22   Temp:       TempSrc:       SpO2: 96% 94% 97% 92%   Weight:       Height:             General: Alert, no distress, well-nourished  Neurologic  Mental status:   orientation to person, place, time, situation   Attention intact as able to attend well to the exam     Language fluent in conversation   Comprehension intact; follows simple commands    Cranial nerves:   CN2: Visual fields full w/o extinction on confrontational testing   CN 3,4,6: Pupils equal and reactive to light, extraocular muscles intact  CN5: Facial sensation symmetric   CN7: Face symmetric  CN8: Hearing symmetric to spoken voice  CN9: Palate elevated symmetrically  CN11: Traps full strength on shoulder shrug  CN12: Tongue midline with protrusion    Motor Exam:   R  L    Deltoid 5  5   Biceps 5 5   Triceps 5 5   Wrist extension  5 5   Interossei 5 5      R  L    Hip flexion  5  5   Hip extension  5 5   Knee flexion  5 5   Knee extension  5 5   Ankle dorsiflexion  5 5   Ankle plantar flexion  5 5       Sensory: light touch intact and symmetric in all 4 extremities. No sensory extinction on bilateral simultaneous stimulation  Cerebellar/coordination: finger nose finger normal without ataxia  Tone: normal in all 4 extremities  Gait: held 2/2 patient safety    OTHER SYSTEMS:  Cardiovascular: Warm, appears well perfused   Respiratory: Easy, non-labored respiratory pattern   Abdominal: Abdomen is without distention   Extremities: Upper and lower extremities are atraumatic in appearance without deformity. No swelling or erythema. ASSESSMENT & RECOMMENDATIONS   Assessment:  Dale Villar is a 33 yo male with a history of intractable epilepsy (follows with Dr. Elsi Azevedo) who presents in status epilepticus in the setting of hyperammonemia (resolved), pneumonia and sepsis. cvEEG initial read with severe generalized slowing. No abnormality on head CT.        Plan:  -Continue xcopri (cenobamate) titrate per   -Continue oxcarbazepine (trileptal) 900mg PO BID, when discharged, will go back on oxtellar 24 hour tablet, 900mg PO daily  -Seizure precautions  -Q4H neuro checks  -Treatment of underlying medical issues per ICU team (infection, cardiomyopathy, sepsis)  -Having a seizure while driving puts that patient at risk for injuring themselves, or others. If a patient drives while having uncontrolled seizures, they may be held liable for injuries to others. No driving until they have been spell free for at least 3 months. Inform patient that in other states and countries the driving limitation may be longer and to check with applicable local laws prior to travel. Injury Risk: Please avoid working from [employer], swimming alone or taking baths in a bathtub, use showers only. LEIGHTON Lawton - CNP   Neurology & Neurocritical Care   1/17/2023 10:28 PM    ICU Patients:   Neurocritical Care Line: [phone]  [facility]: [facility] Neurocritical [facility]    Floor / PCU Patients:  Neurology Line: [phone]  [facility]: [facility] Neurology    I spent 25 minutes in the care of this patient. Over 50% of that time was in face-to-face counseling regarding disease process, diagnostic testing, preventative measures, and answering patient and family questions.

## 2023-01-17 NOTE — PROGRESS NOTES
Speech Language Pathology  Facility/Department: [facility]  Dysphagia Daily Treatment Note    NAME: Justa Luke  : 1990  MRN: [id number]    Patient Diagnosis(es):   Patient Active Problem List    Diagnosis Date Noted    Primary hypertension 2023    Demand ischemia ([address] 2023    LBBB (left bundle branch block) 2023    Septic shock ([address] 2023    Acute respiratory failure with hypoxia and hypercapnia ([address] 2023    Pulmonary infiltrates 2023    Leukocytosis 2023    Hyperammonemia ([address] 2023    Polypharmacy 2023    Pneumonia of both lower lobes due to infectious organism 2023    Myonecrosis ([address] 2023    Elevated lactic acid level 2023    Normal anion gap metabolic acidosis     Breakthrough seizure ([address] 2022    Polysubstance abuse ([address] 2022    High anion gap metabolic acidosis 7459    Seizure ([address] 2021    Status epilepticus ([address]     NSTEMI (non-ST elevated myocardial infarction) ([address]     Abnormal ECG     Takotsubo cardiomyopathy     Tobacco abuse     History of drug use     Aspiration pneumonia of both lower lobes ([address]     IV drug abuse ([address]     Acute encephalopathy     Partial idiopathic epilepsy with seizures of localized onset, intractable, with status epilepticus ([address]      Allergies: No Known Allergies. Recent Chest Xray 23  The tip of the endotracheal tube projects over the mid trachea. Improved left-sided airspace disease and unchanged right mid and basilar airspace disease. CT of head 23  No acute intracranial abnormality. Previous MBS - n/a  Chart reviewed. Medical Diagnosis: Status epilepticus ([address] [G40.901]   Treatment Diagnosis: dysphagia    BSE Impression 1/15/23  Pt alert, oriented to this being a hospital, stated it was December. Pt was intubated - 1/15 ~ 10:15. Pt exhibiting strong voice and volitional cough s/p extubation.  Pt is on 15L of oxygen at this time, maintained throughout session. Oral structures grossly intact, though pt has poor oral hygiene/dentition. Pt demonstrated adequate labial seal  around cup and straw, no anterior loss of bolus occurred, no pocketing/residue noted. No cough, however mild throat clear occurred intermittently. Swallow movement felt upon palpation of anterior neck. Respiratory rate increased intermittently to 28-31 during PO trials. As pt just extubated, was intubated 3 days and exhibiting increased RR, recommend NPO. Recommend aggressive oral care 2-3 x/day and small amounts of ice chips, small sips of water. Plan to re-assess next session to determine ability to initiate PO diet vs need for instrumental exam    Dysphagia Diagnosis: No clinical indicators of dysphagia, Suspected needs further assessment    MBS results - none indicated at this time    Pain: denies    Current Diet : ADULT DIET; Dysphagia - Soft and Bite Sized  ADULT ORAL NUTRITION SUPPLEMENT; Breakfast, Dinner; Standard High Calorie/High Protein Oral Supplement   Recommended Form of Meds: Via alternative means of nutrition (if critical medications are required PO, place in puree)      Treatment:  Pt seen bedside to address the following goals:  1-The patient will tolerate repeat bedside swallowing evaluation when able  1/16: pt sitting up in bed, very pleasant and alert. Voice and volitional cough remain strong. Pt re-assessed with ice chips, water via straw (did not assess with cup sips due to presence of NG tube), puree and solid. Pt consumed tsps of applesauce and 6 ounces of water, including sequential swallows with no cough, throat clear or change in voice occurring. Pt demonstrated adequate mastication with jay cracker, no oral residue noted.    Recommend Soft and bite sized (d/t presence of NG)/thin liquids with use of straw  Goal met    2-The patient/caregiver will demonstrate understanding of compensatory strategies for improved swallowing safety  1/15: Educated pt and family to purpose of visit, s/s of aspiration, concern if aspiration occurs, rationale for NPO, oral care and plan to re-assess next session. Explained impact intubation can have on swallowing and role in protecting airway. Pt and family stated comprehension  Cont goal   1/16: educated pt and family member to recommendation for diet and explanation of texture, s/s of aspiration, strategies to improve safety of swallow and instruction to notify staff if any signs emerge. Both stated comprehension  Cont goal  1/17: SLP educated pt regarding rationale for follow up treatment, diet level, rec's for solid diet advancement, s/s penetration/aspiration if they emerge, and strategies to use when consuming PO. Pt stated understanding and agreement. Cont. New goal:  3-  Pt will consume PO safely without signs or symptoms of aspiration  1/17: Pt expressed tolerating current diet level of soft and bite sized/thin liquids well. Intermittent nausea and dysgeusia (however reports this is partially baseline from withdrawal etc). Of note, NG tube was removed. Pt observed with trials of SBS, regular and thin liquids. Pt demonstrated timely and adequate mastication of all SBS and regular. Pt appeared to swallow all trials with no overt s/s penetration/aspiration. Clear vocal quality throughout and no c/o globus sensation. Rec upgrade to regular solids. Pt in agreement. Cont. Patient/Family/Caregiver Education:  As above    Compensatory Strategies:  90 degrees all meals and 15 minutes after  Small bites     Plan:  Continue dysphagia treatment with goals per plan of care. Diet recommendations: Upgrade to Regular Solids, thin liquids - if any s/s of aspiration emerge, or there is respiratory decline,  make NPO until further evaluated by SLP  DC recommendation:  TBD  Treatment: 15  D/W nursing: Omer  Needs met prior to leaving room, call button in reach.       Electronically signed by:  Marybeth Milligan M.A., [address] [place] [address]  Speech-Language Pathologist  Pg #: [phone]    If patient is discharged prior to next treatment, this note will serve as the discharge summary

## 2023-01-17 NOTE — PROGRESS NOTES
CC:  HPI:     S: Feeling less anxious and nauseous since getting methadone. Denies cp, sob, or orthopnea. Tele: Sinus     O:  Physical Exam:  BP (!) 138/99   Pulse 86   Temp 98.2 °F (36.8 °C) (Oral)   Resp 14   Ht 5' 11\" (1.803 m)   Wt 135 lb 2.3 oz (61.3 kg)   SpO2 97%   BMI 18.85 kg/m²    General (appearance):  No acute distress. Poor dentition   Eyes: anicteric   Neck: soft, No JVD  Ears/Nose/Mouth/Thorat: No cyanosis  CV: RRR   Respiratory:  normal effort  GI: soft, non-tender, non-distended  Skin: Warm, dry. No rashes  Neuro/Psych: Alert and oriented x 3. Appropriate behavior  Ext:  No c/c. No significant edema  Pulses:  2+ radial     I.O's= +3 L     Weight  Admission: Weight: 135 lb 2.3 oz (61.3 kg)   Today: Weight: 135 lb 2.3 oz (61.3 kg)    CBC:   Recent Labs     01/15/23  0400 01/16/23  0542 23  033   WBC 7.5 7.6 9.8   HGB 11.4* 10.6* 11.4*   HCT 33.1* 31.0* 33.5*   MCV 89.8 89.1 89.5   * 111* 133*     BMP:   Recent Labs     01/15/23  0400 01/16/23  0542 23  0338    142 136   K 3.9 3.5 3.6    109 104   CO2 20* 26 24   PHOS 3.2 4.4 3.0   BUN 14 13 12   CREATININE 0.7* 0.5* 0.7*     Estimated Creatinine Clearance: 130 mL/min (A) (based on SCr of 0.7 mg/dL (L)). Mag:   Lab Results   Component Value Date/Time    MG 1.90 2023 03:38 AM     LIVER PROFILE:   Recent Labs     01/15/23  0400 01/16/23  0542 23  0338   AST 2,023* 1,220* 429*   ALT 2,998* 2,876* 2,014*   BILIDIR <0.2 <0.2 0.3   BILITOT 0.4 0.6 0.9   ALKPHOS 85 115 106     PT/INR:   Recent Labs     23  1229   PROTIME 20.1*   INR 1.71*     Pro-BNP:   Lab Results   Component Value Date/Time    PROBNP 20,822 2023 02:57 PM    PROBNP 857 2022 04:04 PM    PROBNP 33 2021 03:25 PM       Imagin2022 Echo   Definity contrast administered. Left ventricular cavity size is mildly dilated. Normal left ventricular wall thickness.    Overall left ventricular systolic function appears severely reduced.   Ejection fraction is visually estimated to be <20%.   There is severe diffuse hypokinesis. Indeterminate diastolic function.   Questionable apical thrombus.   Moderate mitral regurgitation is present.   Systolic pulmonary artery pressure (SPAP) is normal and estimated at 30 mmHg (right atrial pressure 3 mmHg).    9/2022 Echo   Left ventricular systolic function is low normal with a visually estimated   ejection fraction of 45-50 %.   EF estimated by Dooley's method at 48 %.   The left ventricle is normal in size with normal wall thickness.   Paradoxic septal motion   Grade I diastolic dysfunction with normal LV pressure.   Systolic pulmonary artery pressure (SPAP) is normal and estimated at 19 mmHg   (right atrial pressure 3 mmHg).    7/21/2022   Technically difficult examination.  Normal left ventricle size, wall thickness, and systolic function with a visually estimated ejection fraction of 55%.  No regional wall motion abnormalities are seen.  Normal left ventricular diastolic filling pressure.  The left atrium appears mildly enlarged.  Mild pulmonic regurgitation.  Systolic pulmonary artery pressure (SPAP) is normal and estimated at 25 mmHg (right atrial pressure 8 mmHg).  The IVC is dilated in size (>2.1 cm) but collapses >50% with respiration consistent with elevated right atrial pressures (8 mmHg) .  Compared with the previous exam on 02/26/21 (EF 25-30%), left ventricular function is now normal.    Left Heart Cath: 2/23/21    Findings   LVEDP 15   LVEF  20%   LV wall motion  apical and mid ballooning with basal hypokinesis consistent with Takotsubo cardiomyopathy   Gradient across AV  none   Mitral regurg  no significant      Coronary Angiogram:  Artery Findings/Result   LM  no angiographic CAD   LAD  no angiographic CAD   LCx  no angiographic CAD         RCA  dominant, no angiographic CAD      Assessment/Plan  1.  Normal coronary arteries,  2.  Takotsubo/stress  cardiomyopathy  3. Severe QTC prolongation will need to watch medications and follow EKGs    Assessment:  35 y.o. who presented with seizure. He has known takotsubo cardiomyopathy . Notes to have new LBBB on ECG thought to be due to cardiomyopathy rather than ACS in view of normal coronaries in 2021 per cath. Issues:   -LBBB  -Demand ischemia   -Takotsubo CMP  -Poorly controlled HTN  -Seizures  -HCV  -COPD  -Smoking  -polysubstance abuse  -Septic shock/ PNA  -Hepatitis     Plan:  -Keep K>4, Mg>2.  -Entresto 49/51 mg po bid   -Will try to restart coreg once off precedex gtt  -ASA unless contraindicated   -Spironolactone 12.5 mg po daily  -Will try to start Jardiance prior to d/c   Optimize medications and recheck echo  EP referral for ICD consideration  No CAD on 2/2021 cath     Long term compliance and affordability of medications should be considered.

## 2023-01-17 NOTE — PROGRESS NOTES
Notification of IV to PO Conversion     IV To Po Conversion:   Will convert pantoprazole to oral based on [facility] IV to PO policy (see below). Please call with questions.   Sarah Mahoney, PharmD  PGY-1 Pharmacy Resident  [facility]  [id number]  1/17/2023   7:40 AM      Criteria for conversion from IV to PO therapy per [facility] IV to PO Protocol:   Patients should meet all of the following inclusion criteria and none of the exclusion criteria    Criteria to initiate medication route switch (Inclusion Criteria)  IV therapy for > 24 hours (antibiotics only)  Tolerating diet more advanced than clear liquids  Tolerating oral (PO) medications  Does not require vasopressor therapy for blood pressure support  No seizures for 72hrs (antiepileptic medications only)      Criteria indicating that the patient is NOT a candidate for IV to PO conversion (Exclusion Criteria)  Infections requiring IV therapy (ie: meningitis, endocarditis, osteomyelitis, pancreatitis)  Nausea and/or vomiting or severe diarrhea within past 24 hours  Has gastrectomy, ileus, gastric outlet or bowel obstruction, or malabsorption syndromes  Has significant, painful oral ulceration  TPN with an NPO order  Active GI bleed  Unable to swallow  Nothing by Mouth (NPO) status  Febrile in the last 24 hours- (antibiotics only)  Clinical deteriorating or unstable - (antibiotics only)  Pediatric patients and patients who are not euthyroid (not on oral levothyroxine/not stabilized on oral levothyroxine) - (Levothyroxine only)  Patients being treated for myxedema coma or during the organ donation process - (Levothyroxine only)    Other notes-   Patients may be given suppositories when available for the product being ordered if no contraindications exist (ie: rectal surgery or infection)   Oral solutions/suspensions may be considered for patients with a functioning enteral tube not on continuous suction (if medication is available in this formulation)

## 2023-01-17 NOTE — PROGRESS NOTES
Noted on camera that patient was being assisted by mother to bedside commode. This nurse immediately went to patient's room, finished assisting patient to [facility]. Pt's mother stated that she had turned off the bed alarm and assisted patient to [facility] because \"I know how to do it and I just wanted to give you a break. \" Reiterated to mother that nurse absolutely must be in room in order for patient to get up out of bed and that it was a severe threat to patient's safety without staff in the room to assist. Patient's mother stated it would not happen again.

## 2023-01-17 NOTE — PLAN OF CARE
Problem: Discharge Planning  Goal: Discharge to home or other facility with appropriate resources  1/16/2023 2026 by April Harrison RN  Outcome: Progressing     Problem: Pain  Goal: Verbalizes/displays adequate comfort level or baseline comfort level  1/16/2023 2026 by April Harrison RN  Outcome: Progressing  Flowsheets (Taken 1/16/2023 0700 by April Harrison RN)  Verbalizes/displays adequate comfort level or baseline comfort level:   Encourage patient to monitor pain and request assistance   Assess pain using appropriate pain scale   Administer analgesics based on type and severity of pain and evaluate response   Implement non-pharmacological measures as appropriate and evaluate response   Notify Licensed Independent Practitioner if interventions unsuccessful or patient reports new pain   Consider cultural and social influences on pain and pain management     Problem: Skin/Tissue Integrity  Goal: Absence of new skin breakdown  Description: 1. Monitor for areas of redness and/or skin breakdown  2. Assess vascular access sites hourly  3. Every 4-6 hours minimum:  Change oxygen saturation probe site  4. Every 4-6 hours:  If on nasal continuous positive airway pressure, respiratory therapy assess nares and determine need for appliance change or resting period.   1/16/2023 2026 by April Harrison RN  Outcome: Progressing     Problem: Safety - Adult  Goal: Free from fall injury  1/16/2023 2026 by April Harrison RN  Outcome: Progressing     Problem: ABCDS Injury Assessment  Goal: Absence of physical injury  1/16/2023 2026 by April Harrison RN  Outcome: Progressing     Problem: Neurosensory - Adult  Goal: Achieves stable or improved neurological status  1/16/2023 2026 by April Harrison RN  Outcome: Progressing  1/16/2023 2026 by April Harrison RN  Outcome: Progressing  Flowsheets (Taken 1/16/2023 0800)  Achieves stable or improved neurological status:   Assess for and report changes in neurological status   Initiate measures to prevent increased intracranial pressure   Maintain blood pressure and fluid volume within ordered parameters to optimize cerebral perfusion and minimize risk of hemorrhage   Monitor temperature, glucose, and sodium. Initiate appropriate interventions as ordered  Goal: Absence of seizures  1/16/2023 2026 by Aletha Lucia RN  Outcome: Progressing  Flowsheets (Taken 1/16/2023 0800)  Absence of seizures:   Monitor for seizure activity.   If seizure occurs, document type and location of movements and any associated apnea   If seizure occurs, turn head to side and suction secretions as needed   Administer anticonvulsants as ordered   Support airway/breathing, administer oxygen as needed   Diagnostic studies as ordered  Goal: Remains free of injury related to seizures activity  1/16/2023 2026 by Aletha Lucia RN  Outcome: Progressing  1/16/2023 2026 by Aletha Lucia RN  Outcome: Progressing  Flowsheets (Taken 1/16/2023 0800)  Remains free of injury related to seizure activity:   Maintain airway, patient safety  and administer oxygen as ordered   Monitor patient for seizure activity, document and report duration and description of seizure to Licensed Independent Practitioner   If seizure occurs, turn patient to side and suction secretions as needed   Reorient patient post seizure   Seizure pads on all 4 side rails   Instruct patient/family to notify RN of any seizure activity   Instruct patient/family to call for assistance with activity based on assessment     Problem: Respiratory - Adult  Goal: Achieves optimal ventilation and oxygenation  1/16/2023 2026 by Aletha Lucia RN  Outcome: Progressing  1/16/2023 2026 by Aletha Lucia RN  Outcome: Progressing  Flowsheets (Taken 1/16/2023 0800)  Achieves optimal ventilation and oxygenation:   Assess for changes in respiratory status   Assess for changes in mentation and behavior   Position to facilitate oxygenation and minimize respiratory effort   Oxygen supplementation based on oxygen saturation or arterial blood gases   Encourage broncho-pulmonary hygiene including cough, deep breathe, incentive spirometry   Assess the need for suctioning and aspirate as needed   Assess and instruct to report shortness of breath or any respiratory difficulty   Respiratory therapy support as indicated     Problem: Cardiovascular - Adult  Goal: Maintains optimal cardiac output and hemodynamic stability  1/16/2023 2026 by Mehdi Bautista RN  Outcome: Progressing    Problem: Nutrition Deficit:  Goal: Optimize nutritional status  1/16/2023 2026 by Mehdi Bautista RN  Outcome: Progressing

## 2023-01-17 NOTE — PLAN OF CARE
Problem: Discharge Planning  Goal: Discharge to home or other facility with appropriate resources  1/17/2023 0729 by Robina Ronquillo RN  Outcome: Progressing  1/17/2023 0635 by Thea Jewell RN  Outcome: Progressing  1/16/2023 2026 by Jax Vazquez RN  Outcome: Progressing  1/16/2023 2026 by Jax Vazquez RN  Outcome: Progressing  Flowsheets (Taken 1/16/2023 2000 by Thea Jewell RN)  Discharge to home or other facility with appropriate resources:   Identify barriers to discharge with patient and caregiver   Arrange for needed discharge resources and transportation as appropriate   Arrange for interpreters to assist at discharge as needed   Refer to discharge planning if patient needs post-hospital services based on physician order or complex needs related to functional status, cognitive ability or social support system   Identify discharge learning needs (meds, wound care, etc)     Problem: Pain  Goal: Verbalizes/displays adequate comfort level or baseline comfort level  1/17/2023 0729 by Robina Ronquillo RN  Outcome: Progressing  1/17/2023 0635 by Thea Jewell RN  Outcome: Progressing  1/16/2023 2026 by Jax Vazquez RN  Outcome: Progressing  1/16/2023 2026 by Jax Vazquez RN  Outcome: Progressing  Flowsheets (Taken 1/16/2023 2000 by Thea Jewell RN)  Verbalizes/displays adequate comfort level or baseline comfort level:   Encourage patient to monitor pain and request assistance   Assess pain using appropriate pain scale   Administer analgesics based on type and severity of pain and evaluate response   Implement non-pharmacological measures as appropriate and evaluate response   Consider cultural and social influences on pain and pain management   Notify Licensed Independent Practitioner if interventions unsuccessful or patient reports new pain     Problem: Skin/Tissue Integrity  Goal: Absence of new skin breakdown  Description: 1. Monitor for areas of redness and/or skin breakdown  2. Assess vascular access sites hourly  3. Every 4-6 hours minimum:  Change oxygen saturation probe site  4. Every 4-6 hours:  If on nasal continuous positive airway pressure, respiratory therapy assess nares and determine need for appliance change or resting period.   1/17/2023 0729 by Christianne Kruse RN  Outcome: Progressing  1/17/2023 0635 by Valdez Jim RN  Outcome: Progressing  1/16/2023 2026 by Nguyen Delgadillo RN  Outcome: Progressing  1/16/2023 2026 by Nguyen Delgadillo RN  Outcome: Progressing     Problem: Safety - Adult  Goal: Free from fall injury  1/17/2023 0729 by Christianne Kruse RN  Outcome: Progressing  1/17/2023 0635 by Valdez Jim RN  Outcome: Progressing  Flowsheets (Taken 1/16/2023 2056)  Free From Fall Injury:   Instruct family/caregiver on patient safety   Based on caregiver fall risk screen, instruct family/caregiver to ask for assistance with transferring infant if caregiver noted to have fall risk factors  1/16/2023 2026 by Nguyen Delgadillo RN  Outcome: Progressing  1/16/2023 2026 by Nguyen Delgadillo RN  Outcome: Progressing     Problem: ABCDS Injury Assessment  Goal: Absence of physical injury  1/17/2023 0729 by Christianne Kruse RN  Outcome: Progressing  1/17/2023 0635 by Valdez Jim RN  Outcome: Progressing  Flowsheets (Taken 1/16/2023 2056)  Absence of Physical Injury: Implement safety measures based on patient assessment  1/16/2023 2026 by Nguyen Delgadillo RN  Outcome: Progressing  1/16/2023 2026 by Nguyen Delgadillo RN  Outcome: Progressing     Problem: Neurosensory - Adult  Goal: Achieves stable or improved neurological status  1/17/2023 0729 by Chrisitanne Kruse RN  Outcome: Progressing  1/17/2023 0635 by Valdez Jim RN  Outcome: Progressing  1/16/2023 2026 by Nguyen Delgadillo RN  Outcome: Progressing  1/16/2023 2026 by Nguyen Delgadillo RN  Outcome: Progressing  Flowsheets  Taken 1/16/2023 2000 by Valdez Jim RN  Achieves stable or improved neurological status:   Assess for and report changes in neurological status   Initiate measures to prevent increased intracranial pressure   Maintain blood pressure and fluid volume within ordered parameters to optimize cerebral perfusion and minimize risk of hemorrhage   Monitor temperature, glucose, and sodium. Initiate appropriate interventions as ordered  Taken 1/16/2023 0800 by Amy Forbes Phalen, RN  Achieves stable or improved neurological status:   Assess for and report changes in neurological status   Initiate measures to prevent increased intracranial pressure   Maintain blood pressure and fluid volume within ordered parameters to optimize cerebral perfusion and minimize risk of hemorrhage   Monitor temperature, glucose, and sodium. Initiate appropriate interventions as ordered  Goal: Absence of seizures  1/17/2023 0729 by Ivan Franco RN  Outcome: Progressing  1/17/2023 0635 by Abhijit Maynard RN  Outcome: Progressing  1/16/2023 2026 by Laci Michel RN  Outcome: Progressing  1/16/2023 2026 by Laci Michel RN  Outcome: Progressing  Flowsheets  Taken 1/16/2023 2000 by Abhijit Maynard RN  Absence of seizures:   Monitor for seizure activity. If seizure occurs, document type and location of movements and any associated apnea   If seizure occurs, turn head to side and suction secretions as needed   Administer anticonvulsants as ordered   Support airway/breathing, administer oxygen as needed   Diagnostic studies as ordered  Taken 1/16/2023 0800 by Laci Michel RN  Absence of seizures:   Monitor for seizure activity.   If seizure occurs, document type and location of movements and any associated apnea   If seizure occurs, turn head to side and suction secretions as needed   Administer anticonvulsants as ordered   Support airway/breathing, administer oxygen as needed   Diagnostic studies as ordered  Goal: Remains free of injury related to seizures activity  1/17/2023 0729 by Ivan Franco RN  Outcome: Progressing  1/17/2023 0635 by Kellie Harmon RN  Outcome: Progressing  1/16/2023 2026 by Sonya Kirkland RN  Outcome: Progressing  1/16/2023 2026 by Sonya Kirkland RN  Outcome: Progressing  Flowsheets  Taken 1/16/2023 2000 by Kellie Harmon RN  Remains free of injury related to seizure activity:   Maintain airway, patient safety  and administer oxygen as ordered   Monitor patient for seizure activity, document and report duration and description of seizure to Licensed Independent Practitioner   If seizure occurs, turn patient to side and suction secretions as needed   Reorient patient post seizure   Seizure pads on all 4 side rails   Instruct patient/family to notify RN of any seizure activity   Instruct patient/family to call for assistance with activity based on assessment  Taken 1/16/2023 0800 by Sonya Kirkland RN  Remains free of injury related to seizure activity:   Maintain airway, patient safety  and administer oxygen as ordered   Monitor patient for seizure activity, document and report duration and description of seizure to Licensed Independent Practitioner   If seizure occurs, turn patient to side and suction secretions as needed   Reorient patient post seizure   Seizure pads on all 4 side rails   Instruct patient/family to notify RN of any seizure activity   Instruct patient/family to call for assistance with activity based on assessment     Problem: Respiratory - Adult  Goal: Achieves optimal ventilation and oxygenation  1/17/2023 0729 by Shazia Helms RN  Outcome: Progressing  1/17/2023 0635 by Kellie Harmon RN  Outcome: Progressing  1/16/2023 2026 by Sonya Kirkland RN  Outcome: Progressing  1/16/2023 2026 by Sonya Kirkland RN  Outcome: Progressing  Flowsheets  Taken 1/16/2023 2000 by Kellie Harmon RN  Achieves optimal ventilation and oxygenation:   Assess for changes in respiratory status   Assess for changes in mentation and behavior   Position to facilitate oxygenation and minimize respiratory effort Initiate smoking cessation protocol as indicated   Oxygen supplementation based on oxygen saturation or arterial blood gases   Encourage broncho-pulmonary hygiene including cough, deep breathe, incentive spirometry   Assess the need for suctioning and aspirate as needed   Assess and instruct to report shortness of breath or any respiratory difficulty   Respiratory therapy support as indicated  Taken 1/16/2023 0800 by Hawa Kwan RN  Achieves optimal ventilation and oxygenation:   Assess for changes in respiratory status   Assess for changes in mentation and behavior   Position to facilitate oxygenation and minimize respiratory effort   Oxygen supplementation based on oxygen saturation or arterial blood gases   Encourage broncho-pulmonary hygiene including cough, deep breathe, incentive spirometry   Assess the need for suctioning and aspirate as needed   Assess and instruct to report shortness of breath or any respiratory difficulty   Respiratory therapy support as indicated     Problem: Cardiovascular - Adult  Goal: Maintains optimal cardiac output and hemodynamic stability  1/17/2023 0729 by Amy Lema RN  Outcome: Progressing  1/17/2023 0635 by Prateek Colon RN  Outcome: Progressing  1/16/2023 2026 by Belen Layne RN  Outcome: Progressing  1/16/2023 2026 by Belen Layne RN  Outcome: Progressing  Flowsheets (Taken 1/16/2023 2000 by Prateek Colon RN)  Maintains optimal cardiac output and hemodynamic stability:   Monitor blood pressure and heart rate   Administer fluid and/or volume expanders as ordered   Assess for signs of decreased cardiac output   Monitor urine output and notify Licensed Independent Practitioner for values outside of normal range   Administer vasoactive medications as ordered     Problem: Nutrition Deficit:  Goal: Optimize nutritional status  1/17/2023 0729 by Amy Lema RN  Outcome: Progressing  1/17/2023 0635 by Prateek Colon RN  Outcome: Progressing  1/16/2023 2026 by Kanu Dai RN  Outcome: Progressing  1/16/2023 2026 by Kanu Dai RN  Outcome: Progressing

## 2023-01-18 PROBLEM — B18.2 CHRONIC HEPATITIS C WITHOUT HEPATIC COMA (HCC): Status: ACTIVE | Noted: 2023-01-18

## 2023-01-18 PROBLEM — Z11.4 ENCOUNTER FOR HUMAN IMMUNODEFICIENCY VIRUS TEST: Status: ACTIVE | Noted: 2023-01-18

## 2023-01-18 LAB
ALBUMIN SERPL-MCNC: 3.1 G/DL (ref 3.4–5)
ALP BLD-CCNC: 97 U/L (ref 40–129)
ALT SERPL-CCNC: 1161 U/L (ref 10–40)
ANION GAP SERPL CALCULATED.3IONS-SCNC: 9 MMOL/L (ref 3–16)
AST SERPL-CCNC: 133 U/L (ref 15–37)
BASOPHILS ABSOLUTE: 0.1 K/UL (ref 0–0.2)
BASOPHILS RELATIVE PERCENT: 0.7 %
BILIRUB SERPL-MCNC: 0.6 MG/DL (ref 0–1)
BILIRUBIN DIRECT: <0.2 MG/DL (ref 0–0.3)
BILIRUBIN, INDIRECT: ABNORMAL MG/DL (ref 0–1)
BUN BLDV-MCNC: 6 MG/DL (ref 7–20)
CALCIUM SERPL-MCNC: 8.7 MG/DL (ref 8.3–10.6)
CHLORIDE BLD-SCNC: 108 MMOL/L (ref 99–110)
CO2: 25 MMOL/L (ref 21–32)
CREAT SERPL-MCNC: 0.6 MG/DL (ref 0.9–1.3)
EOSINOPHILS ABSOLUTE: 0.4 K/UL (ref 0–0.6)
EOSINOPHILS RELATIVE PERCENT: 3.6 %
GFR SERPL CREATININE-BSD FRML MDRD: >60 ML/MIN/{1.73_M2}
GLUCOSE BLD-MCNC: 113 MG/DL (ref 70–99)
GLUCOSE BLD-MCNC: 119 MG/DL (ref 70–99)
HCT VFR BLD CALC: 35.7 % (ref 40.5–52.5)
HEMOGLOBIN: 12.2 G/DL (ref 13.5–17.5)
HIV AG/AB: NORMAL
HIV ANTIGEN: NORMAL
HIV-1 ANTIBODY: NORMAL
HIV-2 AB: NORMAL
LYMPHOCYTES ABSOLUTE: 3 K/UL (ref 1–5.1)
LYMPHOCYTES RELATIVE PERCENT: 28.4 %
MAGNESIUM: 1.8 MG/DL (ref 1.8–2.4)
MCH RBC QN AUTO: 30 PG (ref 26–34)
MCHC RBC AUTO-ENTMCNC: 34 G/DL (ref 31–36)
MCV RBC AUTO: 88.2 FL (ref 80–100)
MONOCYTES ABSOLUTE: 1 K/UL (ref 0–1.3)
MONOCYTES RELATIVE PERCENT: 9.6 %
NEUTROPHILS ABSOLUTE: 6.2 K/UL (ref 1.7–7.7)
NEUTROPHILS RELATIVE PERCENT: 57.7 %
PDW BLD-RTO: 14.5 % (ref 12.4–15.4)
PERFORMED ON: ABNORMAL
PHOSPHORUS: 3.5 MG/DL (ref 2.5–4.9)
PLATELET # BLD: 163 K/UL (ref 135–450)
PMV BLD AUTO: 7.4 FL (ref 5–10.5)
POTASSIUM SERPL-SCNC: 3.6 MMOL/L (ref 3.5–5.1)
RBC # BLD: 4.05 M/UL (ref 4.2–5.9)
SODIUM BLD-SCNC: 142 MMOL/L (ref 136–145)
TOTAL PROTEIN: 5.8 G/DL (ref 6.4–8.2)
WBC # BLD: 10.7 K/UL (ref 4–11)

## 2023-01-18 PROCEDURE — 6370000000 HC RX 637 (ALT 250 FOR IP)

## 2023-01-18 PROCEDURE — 6370000000 HC RX 637 (ALT 250 FOR IP): Performed by: STUDENT IN AN ORGANIZED HEALTH CARE EDUCATION/TRAINING PROGRAM

## 2023-01-18 PROCEDURE — 80069 RENAL FUNCTION PANEL: CPT

## 2023-01-18 PROCEDURE — 99233 SBSQ HOSP IP/OBS HIGH 50: CPT | Performed by: NURSE PRACTITIONER

## 2023-01-18 PROCEDURE — 2580000003 HC RX 258: Performed by: STUDENT IN AN ORGANIZED HEALTH CARE EDUCATION/TRAINING PROGRAM

## 2023-01-18 PROCEDURE — 80076 HEPATIC FUNCTION PANEL: CPT

## 2023-01-18 PROCEDURE — 2500000003 HC RX 250 WO HCPCS: Performed by: STUDENT IN AN ORGANIZED HEALTH CARE EDUCATION/TRAINING PROGRAM

## 2023-01-18 PROCEDURE — 99232 SBSQ HOSP IP/OBS MODERATE 35: CPT | Performed by: INTERNAL MEDICINE

## 2023-01-18 PROCEDURE — 2000000000 HC ICU R&B

## 2023-01-18 PROCEDURE — 99231 SBSQ HOSP IP/OBS SF/LOW 25: CPT

## 2023-01-18 PROCEDURE — 6360000002 HC RX W HCPCS: Performed by: STUDENT IN AN ORGANIZED HEALTH CARE EDUCATION/TRAINING PROGRAM

## 2023-01-18 PROCEDURE — 83735 ASSAY OF MAGNESIUM: CPT

## 2023-01-18 PROCEDURE — 36592 COLLECT BLOOD FROM PICC: CPT

## 2023-01-18 PROCEDURE — 85025 COMPLETE CBC W/AUTO DIFF WBC: CPT

## 2023-01-18 PROCEDURE — 6370000000 HC RX 637 (ALT 250 FOR IP): Performed by: NURSE PRACTITIONER

## 2023-01-18 PROCEDURE — 94761 N-INVAS EAR/PLS OXIMETRY MLT: CPT

## 2023-01-18 PROCEDURE — 99254 IP/OBS CNSLTJ NEW/EST MOD 60: CPT | Performed by: INTERNAL MEDICINE

## 2023-01-18 PROCEDURE — 6360000002 HC RX W HCPCS

## 2023-01-18 RX ORDER — HYDROXYZINE PAMOATE 25 MG/1
50 CAPSULE ORAL EVERY 6 HOURS PRN
Status: DISCONTINUED | OUTPATIENT
Start: 2023-01-18 | End: 2023-01-20

## 2023-01-18 RX ORDER — MAGNESIUM SULFATE IN WATER 40 MG/ML
2000 INJECTION, SOLUTION INTRAVENOUS ONCE
Status: COMPLETED | OUTPATIENT
Start: 2023-01-18 | End: 2023-01-18

## 2023-01-18 RX ADMIN — HEPARIN SODIUM 5000 UNITS: 5000 INJECTION INTRAVENOUS; SUBCUTANEOUS at 22:04

## 2023-01-18 RX ADMIN — OXCARBAZEPINE 900 MG: 300 TABLET, FILM COATED ORAL at 20:24

## 2023-01-18 RX ADMIN — SODIUM CHLORIDE, PRESERVATIVE FREE 10 ML: 5 INJECTION INTRAVENOUS at 20:20

## 2023-01-18 RX ADMIN — PIPERACILLIN AND TAZOBACTAM 3375 MG: 3; .375 INJECTION, POWDER, LYOPHILIZED, FOR SOLUTION INTRAVENOUS at 03:01

## 2023-01-18 RX ADMIN — EMPAGLIFLOZIN 10 MG: 10 TABLET, FILM COATED ORAL at 11:26

## 2023-01-18 RX ADMIN — SPIRONOLACTONE 12.5 MG: 25 TABLET, FILM COATED ORAL at 07:53

## 2023-01-18 RX ADMIN — DEXMEDETOMIDINE 0.6 MCG/KG/HR: 100 INJECTION, SOLUTION INTRAVENOUS at 06:10

## 2023-01-18 RX ADMIN — Medication 110 MG: at 08:29

## 2023-01-18 RX ADMIN — HYDROXYZINE PAMOATE 50 MG: 25 CAPSULE ORAL at 16:29

## 2023-01-18 RX ADMIN — MAGNESIUM SULFATE HEPTAHYDRATE 2000 MG: 40 INJECTION, SOLUTION INTRAVENOUS at 13:43

## 2023-01-18 RX ADMIN — CARVEDILOL 3.12 MG: 3.12 TABLET, FILM COATED ORAL at 07:52

## 2023-01-18 RX ADMIN — PIPERACILLIN AND TAZOBACTAM 3375 MG: 3; .375 INJECTION, POWDER, LYOPHILIZED, FOR SOLUTION INTRAVENOUS at 11:26

## 2023-01-18 RX ADMIN — CARVEDILOL 3.12 MG: 3.12 TABLET, FILM COATED ORAL at 17:51

## 2023-01-18 RX ADMIN — HEPARIN SODIUM 5000 UNITS: 5000 INJECTION INTRAVENOUS; SUBCUTANEOUS at 06:07

## 2023-01-18 RX ADMIN — POTASSIUM BICARBONATE 40 MEQ: 782 TABLET, EFFERVESCENT ORAL at 13:45

## 2023-01-18 RX ADMIN — HYDROXYZINE PAMOATE 50 MG: 25 CAPSULE ORAL at 08:55

## 2023-01-18 RX ADMIN — HEPARIN SODIUM 5000 UNITS: 5000 INJECTION INTRAVENOUS; SUBCUTANEOUS at 13:45

## 2023-01-18 RX ADMIN — PANTOPRAZOLE SODIUM 40 MG: 40 TABLET, DELAYED RELEASE ORAL at 07:53

## 2023-01-18 RX ADMIN — SACUBITRIL AND VALSARTAN 1 TABLET: 49; 51 TABLET, FILM COATED ORAL at 07:55

## 2023-01-18 RX ADMIN — DEXMEDETOMIDINE 0.6 MCG/KG/HR: 100 INJECTION, SOLUTION INTRAVENOUS at 19:14

## 2023-01-18 RX ADMIN — SACUBITRIL AND VALSARTAN 1 TABLET: 49; 51 TABLET, FILM COATED ORAL at 20:25

## 2023-01-18 RX ADMIN — OXCARBAZEPINE 900 MG: 300 TABLET, FILM COATED ORAL at 08:33

## 2023-01-18 RX ADMIN — HYDROXYZINE PAMOATE 50 MG: 25 CAPSULE ORAL at 12:30

## 2023-01-18 RX ADMIN — PIPERACILLIN AND TAZOBACTAM 3375 MG: 3; .375 INJECTION, POWDER, LYOPHILIZED, FOR SOLUTION INTRAVENOUS at 17:58

## 2023-01-18 ASSESSMENT — PAIN SCALES - GENERAL
PAINLEVEL_OUTOF10: 0

## 2023-01-18 NOTE — PROGRESS NOTES
CC:  HPI:     S: Mild SOB. No chest pain. Denies bloating or edema. Tele: Sinus     O:  Physical Exam:  BP (!) 154/101   Pulse 80   Temp 97.7 °F (36.5 °C) (Oral)   Resp 18   Ht 5' 11\" (1.803 m)   Wt 130 lb 11.7 oz (59.3 kg)   SpO2 96%   BMI 18.23 kg/m²    General (appearance):  No acute distress. Poor dentition   Eyes: anicteric   Neck: soft, No JVD  Ears/Nose/Mouth/Thorat: No cyanosis  CV: RRR   Respiratory:  normal effort  GI: soft, non-tender, non-distended  Skin: Warm, dry. No rashes  Neuro/Psych: Alert and oriented x 3. Appropriate behavior  Ext:  No c/c. No significant edema  Pulses:  2+ radial     I.O's= +3 L     Weight  Admission: Weight: 135 lb 2.3 oz (61.3 kg)   Today: Weight: 130 lb 11.7 oz (59.3 kg)    CBC:   Recent Labs     23  0542 23  040   WBC 7.6 9.8 10.7   HGB 10.6* 11.4* 12.2*   HCT 31.0* 33.5* 35.7*   MCV 89.1 89.5 88.2   * 133* 163     BMP:   Recent Labs     2342 23  0338 23  0406    136 142   K 3.5 3.6 3.6    104 108   CO2 26 24 25   PHOS 4.4 3.0 3.5   BUN 13 12 6*   CREATININE 0.5* 0.7* 0.6*     Estimated Creatinine Clearance: 147 mL/min (A) (based on SCr of 0.6 mg/dL (L)). Mag:   Lab Results   Component Value Date/Time    MG 1.80 2023 04:06 AM     LIVER PROFILE:   Recent Labs     2342 23  0406   AST 1,220* 429* 133*   ALT 2,876* 2,014* 1,161*   BILIDIR <0.2 0.3 <0.2   BILITOT 0.6 0.9 0.6   ALKPHOS 115 106 97     PT/INR:   No results for input(s): PROTIME, INR in the last 72 hours. Pro-BNP:   Lab Results   Component Value Date/Time    PROBNP 20,822 2023 02:57 PM    PROBNP 857 2022 04:04 PM    PROBNP 33 2021 03:25 PM       [facility]agin2022 Echo   Definity contrast administered. Left ventricular cavity size is mildly dilated. Normal left ventricular wall thickness. Overall left ventricular systolic function appears severely reduced.    Ejection fraction is visually estimated to be <20%. There is severe diffuse hypokinesis. Indeterminate diastolic function. Questionable apical thrombus. Moderate mitral regurgitation is present. Systolic pulmonary artery pressure (SPAP) is normal and estimated at 30 mmHg (right atrial pressure 3 mmHg). 9/2022 Echo   Left ventricular systolic function is low normal with a visually estimated   ejection fraction of 45-50 %. EF estimated by Dooley's method at 48 %. The left ventricle is normal in size with normal wall thickness. Paradoxic septal motion   Grade I diastolic dysfunction with normal LV pressure. Systolic pulmonary artery pressure (SPAP) is normal and estimated at 19 mmHg   (right atrial pressure 3 mmHg). 7/21/2022   Technically difficult examination. Normal left ventricle size, wall thickness, and systolic function with a visually estimated ejection fraction of 55%. No regional wall motion abnormalities are seen. Normal left ventricular diastolic filling pressure. The left atrium appears mildly enlarged. Mild pulmonic regurgitation. Systolic pulmonary artery pressure (SPAP) is normal and estimated at 25 mmHg (right atrial pressure 8 mmHg). The IVC is dilated in size (>2.1 cm) but collapses >50% with respiration consistent with elevated right atrial pressures (8 mmHg) . Compared with the previous exam on 02/26/21 (EF 25-30%), left ventricular function is now normal.    Left Heart Cath: 2/23/21    Findings   LVEDP 15   LVEF  20%   LV wall motion  apical and mid ballooning with basal hypokinesis consistent with Takotsubo cardiomyopathy   Gradient across AV  none   Mitral regurg  no significant      Coronary Angiogram:  Artery Findings/Result   LM  no angiographic CAD   LAD  no angiographic CAD   LCx  no angiographic CAD         RCA  dominant, no angiographic CAD      Assessment/Plan  1. Normal coronary arteries,  2. Takotsubo/stress cardiomyopathy  3.   Severe QTC prolongation will need to watch medications and follow EKGs    Assessment:  35 y.o. who presented with seizure. He has known takotsubo cardiomyopathy . Notes to have new LBBB on ECG thought to be due to cardiomyopathy rather than ACS in view of normal coronaries in 2021 per cath.    Issues:   -LBBB  -Demand ischemia   -Takotsubo CMP  -Poorly controlled HTN  -Seizures  -HCV  -COPD  -Smoking  -polysubstance abuse  -Septic shock/ PNA  -Hepatitis     Plan:  -Keep K>4, Mg>2.  -Entresto 49/51 mg po bid   -Coreg 3.125 mg po bid   -ASA unless contraindicated   -Spironolactone 12.5 mg po daily  -Starting [id number] mg po daily     Optimize medications and recheck echo  EP referral for ICD consideration  No CAD on 2/2021 cath       Spoke with patient and his mother regarding plan of care

## 2023-01-18 NOTE — PROGRESS NOTES
Patient resting in bed. CORNELIO Bang NP with [facility] came to see pt, talked about seizure causes and informed pt that due to seizure he cannot drive for a bit, pt verbalized understanding. Will continue to monitor.

## 2023-01-18 NOTE — PLAN OF CARE
Problem: Discharge Planning  Goal: Discharge to home or other facility with appropriate resources  Outcome: Progressing  Flowsheets (Taken 1/17/2023 2000)  Discharge to home or other facility with appropriate resources:   Identify barriers to discharge with patient and caregiver   Arrange for interpreters to assist at discharge as needed   Identify discharge learning needs (meds, wound care, etc)   Arrange for needed discharge resources and transportation as appropriate   Refer to discharge planning if patient needs post-hospital services based on physician order or complex needs related to functional status, cognitive ability or social support system     Problem: Pain  Goal: Verbalizes/displays adequate comfort level or baseline comfort level  Outcome: Progressing  Flowsheets (Taken 1/17/2023 2000)  Verbalizes/displays adequate comfort level or baseline comfort level:   Encourage patient to monitor pain and request assistance   Assess pain using appropriate pain scale   Administer analgesics based on type and severity of pain and evaluate response   Implement non-pharmacological measures as appropriate and evaluate response   Consider cultural and social influences on pain and pain management   Notify Licensed Independent Practitioner if interventions unsuccessful or patient reports new pain     Problem: Skin/Tissue Integrity  Goal: Absence of new skin breakdown  Description: 1. Monitor for areas of redness and/or skin breakdown  2. Assess vascular access sites hourly  3. Every 4-6 hours minimum:  Change oxygen saturation probe site  4. Every 4-6 hours:  If on nasal continuous positive airway pressure, respiratory therapy assess nares and determine need for appliance change or resting period.   Outcome: Progressing     Problem: Safety - Adult  Goal: Free from fall injury  Outcome: Progressing  Flowsheets (Taken 1/17/2023 2148)  Free From Fall Injury:   Instruct family/caregiver on patient safety   Based on caregiver fall risk screen, instruct family/caregiver to ask for assistance with transferring infant if caregiver noted to have fall risk factors     Problem: ABCDS Injury Assessment  Goal: Absence of physical injury  Outcome: Progressing  Flowsheets (Taken 1/17/2023 2148)  Absence of Physical Injury: Implement safety measures based on patient assessment     Problem: Neurosensory - Adult  Goal: Achieves stable or improved neurological status  Outcome: Progressing  Flowsheets (Taken 1/17/2023 2000)  Achieves stable or improved neurological status:   Assess for and report changes in neurological status   Initiate measures to prevent increased intracranial pressure   Monitor temperature, glucose, and sodium. Initiate appropriate interventions as ordered   Maintain blood pressure and fluid volume within ordered parameters to optimize cerebral perfusion and minimize risk of hemorrhage  Goal: Absence of seizures  Outcome: Progressing  Flowsheets (Taken 1/17/2023 2000)  Absence of seizures:   Monitor for seizure activity.   If seizure occurs, document type and location of movements and any associated apnea   If seizure occurs, turn head to side and suction secretions as needed   Administer anticonvulsants as ordered   Support airway/breathing, administer oxygen as needed   Diagnostic studies as ordered  Goal: Remains free of injury related to seizures activity  Outcome: Progressing  Flowsheets (Taken 1/17/2023 2000)  Remains free of injury related to seizure activity:   Maintain airway, patient safety  and administer oxygen as ordered   If seizure occurs, turn patient to side and suction secretions as needed   Monitor patient for seizure activity, document and report duration and description of seizure to Licensed Independent Practitioner   Reorient patient post seizure   Seizure pads on all 4 side rails   Instruct patient/family to notify RN of any seizure activity   Instruct patient/family to call for assistance with activity based on assessment     Problem: Respiratory - Adult  Goal: Achieves optimal ventilation and oxygenation  Outcome: Progressing  Flowsheets (Taken 1/17/2023 2000)  Achieves optimal ventilation and oxygenation:   Assess for changes in respiratory status   Assess for changes in mentation and behavior   Position to facilitate oxygenation and minimize respiratory effort   Oxygen supplementation based on oxygen saturation or arterial blood gases   Initiate smoking cessation protocol as indicated   Encourage broncho-pulmonary hygiene including cough, deep breathe, incentive spirometry   Assess the need for suctioning and aspirate as needed   Assess and instruct to report shortness of breath or any respiratory difficulty   Respiratory therapy support as indicated     Problem: Cardiovascular - Adult  Goal: Maintains optimal cardiac output and hemodynamic stability  Outcome: Progressing  Flowsheets (Taken 1/17/2023 2000)  Maintains optimal cardiac output and hemodynamic stability:   Monitor blood pressure and heart rate   Monitor urine output and notify Licensed Independent Practitioner for values outside of normal range   Assess for signs of decreased cardiac output   Administer fluid and/or volume expanders as ordered   Administer vasoactive medications as ordered     Problem: Nutrition Deficit:  Goal: Optimize nutritional status  Outcome: Progressing  Flowsheets (Taken 1/17/2023 1307 by Dre Jack RD)  Nutrient intake appropriate for improving, restoring, or maintaining nutritional needs:   Assess nutritional status and recommend course of action   Monitor oral intake, labs, and treatment plans     Problem: Chronic Conditions and Co-morbidities  Goal: Patient's chronic conditions and co-morbidity symptoms are monitored and maintained or improved  Outcome: Progressing  Flowsheets (Taken 1/17/2023 2000)  Care Plan - Patient's Chronic Conditions and Co-Morbidity Symptoms are Monitored and Maintained or Improved:   Monitor and assess patient's chronic conditions and comorbid symptoms for stability, deterioration, or improvement   Collaborate with multidisciplinary team to address chronic and comorbid conditions and prevent exacerbation or deterioration   Update acute care plan with appropriate goals if chronic or comorbid symptoms are exacerbated and prevent overall improvement and discharge

## 2023-01-18 NOTE — PROGRESS NOTES
Pt experiencing severe withdrawal symptoms. BP in 160-170's. Instructed by ICU team to increase frequency of Vistril and monitor BP.

## 2023-01-18 NOTE — PROGRESS NOTES
ICU Progress Note    Admit Date: 1/12/2023  IV Access:Peripheral, CVC  IV Fluids: off  Vasopressors:None                Antibiotics: Zosyn  Diet: ADULT DIET; Dysphagia - Soft and Bite Sized  ADULT ORAL NUTRITION SUPPLEMENT; Breakfast, Dinner; Standard High Calorie/High Protein Oral Supplement    CC: Status epilepticus    Interval history:   HDS overnight. Off fentanyl gtt and methadone restarted yesterday along with vistaril. Feeling less anxious. Tolerating PO intake well. Seen by cardiology. Continues on precedex 0.6. Zosyn day 7. Good UOP.    HPI:   Per resident HPI: Omar Navas is a 26-year-old M with PMH of seizures, hep C, uncontrolled hypertension, takatsubo cardiomyopathy, COPD, temporal seizures, 20 yr tobacco use, HFrEF, polysubstance abuse (heroine and fentanyl) who presents to [facility] ED with seizures. EMS was called for witnessed seizure. Patient was actively seizing when he arrived to the ED. He received multiple doses of benzos on route that did not improve his seizure. He was actively seizing for about an hour. He never returned to baseline. He was eventually intubated due to hypoxia and not protecting his airway. Other tests remarkable for EKG showed new LBBB concern for underlying ischemia he was taken to the Cath Lab. Cardiology felt it was likely sinus tachycardia with aberrancy. Troponins were negative. Other labs remarkable for lactic acid was 6.9, ammonia 129, UDS was positive for cannabinoids methadone and fentanyl. Patient is a known IV drug user and is on methadone at home. UA was negative for any infection. Imaging studies were done and showed CT PE negative for any PE but showed multifocal pneumonia, CT head showed no intra cranial abnormality. He was treated with large-volume IV fluids and started on broad-spectrum antibiotics he had some worsening hypoxia with pulmonary edema and ultimately required Lasix.   He was put on IV Versed, IV fentanyl and IV ketamine for sedation and requirement of paralysis for management of improved oxygenation on the ventilator. \"    Medications:     Scheduled Meds:   empagliflozin  10 mg Oral Daily    pantoprazole  40 mg Oral Daily    nicotine  1 patch TransDERmal Daily    methadone  110 mg Oral Daily    spironolactone  12.5 mg Oral Daily    carvedilol  3.125 mg Oral BID WC    Cenobamate  100 mg Oral Nightly    sacubitril-valsartan  1 tablet Oral BID    sodium chloride flush  5-40 mL IntraVENous 2 times per day    heparin (porcine)  5,000 Units SubCUTAneous 3 times per day    piperacillin-tazobactam  3,375 mg IntraVENous Q8H    OXcarbazepine  900 mg Oral BID     Continuous Infusions:   dexmedetomidine (PRECEDEX) IV infusion 0.6 mcg/kg/hr (01/18/23 0610)    sodium chloride       PRN Meds:hydrOXYzine pamoate, sodium chloride flush, sodium chloride, polyethylene glycol, acetaminophen **OR** acetaminophen    Objective:   Vitals:   T-max:  Patient Vitals for the past 8 hrs:   BP Temp Temp src Pulse Resp SpO2 Height Weight   01/18/23 0752 (!) 154/101 -- -- 80 -- -- -- --   01/18/23 0600 (!) 139/97 -- -- 77 18 96 % -- --   01/18/23 0556 -- -- -- 74 17 96 % -- --   01/18/23 0545 -- -- -- 68 10 97 % -- --   01/18/23 0530 -- -- -- 73 16 96 % -- --   01/18/23 0515 -- -- -- 71 22 95 % -- --   01/18/23 0500 (!) 143/100 -- -- 78 22 95 % -- --   01/18/23 0445 -- -- -- 69 18 94 % -- --   01/18/23 0430 -- -- -- 67 11 95 % -- --   01/18/23 0415 -- -- -- 67 11 94 % -- --   01/18/23 0400 132/85 97.7 °F (36.5 °C) Oral 72 19 94 % 5' 11\" (1.803 m) 130 lb 11.7 oz (59.3 kg)   01/18/23 0345 -- -- -- 67 (!) 9 93 % -- --   01/18/23 0330 -- -- -- 70 17 94 % -- --   01/18/23 0315 -- -- -- 71 22 94 % -- --   01/18/23 0300 (!) 132/98 -- -- 68 16 90 % -- --   01/18/23 0245 -- -- -- 66 19 90 % -- --   01/18/23 0230 -- -- -- 69 17 90 % -- --   01/18/23 0215 -- -- -- 69 20 90 % -- --   01/18/23 0200 139/89 -- -- 70 20 90 % -- --   01/18/23 0145 -- -- -- 73 15 90 % -- --   01/18/23 0130 -- -- -- 69 17 92 % -- --   01/18/23 0115 -- -- -- 69 18 92 % -- --         Intake/Output Summary (Last 24 hours) at 1/18/2023 0902  Last data filed at 1/18/2023 0600  Gross per 24 hour   Intake 2439.04 ml   Output 2490 ml   Net -50.96 ml       ROS: No HA, CP, SOB, abd pain, N/V/D, constipation, peripheral edema. Physical Exam  Constitutional:       Appearance: He is not ill-appearing or toxic-appearing. HENT:      Head: Normocephalic and atraumatic. Mouth/Throat:      Comments: Very poor dentition  Cardiovascular:      Rate and Rhythm: Regular rhythm. Tachycardia present. Heart sounds: Normal heart sounds. Pulmonary:      Effort: No respiratory distress. Breath sounds: No stridor. No wheezing. Abdominal:      General: Abdomen is flat. Palpations: Abdomen is soft. Musculoskeletal:      Right lower leg: No edema. Left lower leg: No edema. Skin:     Coloration: Skin is pale. Skin is not jaundiced. Neurological:      General: No focal deficit present. Mental Status: He is oriented to person, place, and time. LABS:    CBC:   Recent Labs     01/16/23 0542 01/17/23 0338 01/18/23  0406   WBC 7.6 9.8 10.7   HGB 10.6* 11.4* 12.2*   HCT 31.0* 33.5* 35.7*   * 133* 163   MCV 89.1 89.5 88.2       Renal:    Recent Labs     01/16/23 0542 01/17/23 0338 01/18/23  0406    136 142   K 3.5 3.6 3.6    104 108   CO2 26 24 25   BUN 13 12 6*   CREATININE 0.5* 0.7* 0.6*   GLUCOSE 101* 110* 113*   CALCIUM 8.0* 8.6 8.7   MG 1.80 1.90 1.80   PHOS 4.4 3.0 3.5   ANIONGAP 7 8 9       Hepatic:   Recent Labs     01/16/23 0542 01/17/23  0338 01/18/23  0406   AST 1,220* 429* 133*   ALT 2,876* 2,014* 1,161*   BILITOT 0.6 0.9 0.6   BILIDIR <0.2 0.3 <0.2   PROT 5.5* 5.9* 5.8*   LABALBU 3.0* 3.2* 3.1*   ALKPHOS 115 106 97       Troponin:   No results for input(s): TROPONINI in the last 72 hours. BNP: No results for input(s): BNP in the last 72 hours.   Lipids: No results for input(s): CHOL, HDL in the last 72 hours. Invalid input(s): LDLCALCU, TRIGLYCERIDE  ABGs:    Recent Labs     01/15/23  0928   PHART 7.407   UJA3CLP 34.8*   PO2ART 165.0*   KEQ4VFC 22   BEART -2.3   C8FVBNAE 100   OQC5MQM 23         INR:   No results for input(s): INR in the last 72 hours. Lactate:   No results for input(s): LACTATE in the last 72 hours. RAD:   XR CHEST PORTABLE   Final Result      The tip of the endotracheal tube projects over the mid trachea. Improved left-sided airspace disease and unchanged right mid and basilar airspace disease. Assessment/Plan:   Maria Esther Ovalles is a 66-year-old M with PMH of temporal lobe seizures, hep C, uncontrolled HTN, Takutsobo cardiomyopathy, COPD, 20 pack-year smoking, HFrEF, polysubstance abuse (heroin and fentanyl on methadone) who presents to [facility] ED with seizures. Acute hypoxic respiratory failure 2/2 Multifocal Pneumonia  - off O2, satting 90s  - sedation: precedex 0.6  - hx of polysubstance abuse; wean sedation as tolerated   - off fentanyl gtt   - cont home methadone   - cont vistaril for anxiety  - Day 7 of zosyn  - vancomycin discontinued; MRSA negative  - s/p SLP eval - OK for thin liquids/bite sized  - s/p echo to rule out endocarditis or HFrEF decompensation  - EF <20% w/ diffuse hypokinesis. No vegetations. Low EF likely due to active septic shock and myocardial demand ischemia given Troponinemia. Ischemic Hepatitis, improving  At admission, ALT/ /100> 1782/2380. Downtrending  - Trend daily     Acute encephalopathy 2/2 status epilepticus  History of right temporal epilepsy diagnosed in 2019 in the setting of a prior TBI.   He has known epilepsy, he is on Oxtellar and Xcopri at home and is known to be noncompliant, UDS was positive for fentanyl, and he was septic on arrival to the ED which could be a reason to put him into status epilepticus.  - neurology following - appreciate recs  - q4h Neuro checks  - Oxcarbazepine 900mg BID  - Fosphenytoin 100mg Q8H IV  - cont Xcopri  - Avoid nimbex    Systolic CHF  New Left Bundle Branch Block  Per Cards: New LBBB/previous history of stress-induced, cardiomyopathy with borderline recovered LV function/elevated troponin/demand ischemia. Likely demand ischemia from sepsis. Unlikely true ACS given normal cath in 2021.  - Cardiology consulted; appreciate recs   - start jardiance, spironolactone, entresto, coreg     Chronic Problems:  COPD - Duonebs  HTN - start coreg  HFrEF - cont Entresto  Tobacco use   Polysubstance Abuse - cont home methadone; nicotine patch given     Code Status: Prior  FEN: thin, bite sized  PPX:  SQH and Protonix  DISPO: ICU    Viviane Pulido MD, PGY-1  01/18/23  9:02 AM    This patient has been staffed and discussed with Dr. Yvette Cabrera MD    Patient was seen, examined and discussed with Dr. Serafin Galloway. I agree with the interval history. My physical exam confirms the findings listed below  Chart was reviewed including labs, CXR, CT scan and medical records confirm the findings noted    CVC Triple Lumen 01/12/23 Right Internal jugular (Active)   Number of days: 6      Pneumonia. Cx is negative. He is on Zosyn day #7  (last day)  Status epilepticus - resolved.   Known hx of epilepsy   IVDU  Chronic systolic CHF     Continue methadone   Wean off precedex   On entresto, coreg, spironolactone and Jardiance

## 2023-01-18 NOTE — CONSULTS
Infectious Diseases Inpatient Consult Note    RESIDENT NOTE - reviewed / edited, attending note at bottom    Reason for Consult:   Positive HIV test, HCV, pneumonia  Requesting Physician:   Dr. Milton Ramirez  Primary Care Physician:  No primary care provider on file.   History Obtained From:   Pt, EPIC    Admit Date: 1/12/2023  Hospital Day: 7    CHIEF COMPLAINT:     Seizure     HISTORY OF PRESENT ILLNESS:      34 yo M, who presented to the ED with seizures   Med Hx; polysubstance abuse (fentanyl and heroin most commonly) attends a methadone clinic, status epi s/p TBI, HepC, drug-induced cardiomyopathy, COPD, HfrEF  Surg Hx: Orthopedic Hand surgery    1/12  Pt arrived at [facility] ED in continuous seizures and was intubated for airway protection and admitted to the ICU for aspiration PNA and neurological evaluation and vent management     1/12 In the ED: 129/105, , WBC 26.0, LA 6.9  - Pt actively seizing and unable to protect airway and was intubated  - CXR showed suspected PNA  - New LBBB on EKG  - Imaging negative    1/12 Admission to ICU  Cards: Cath showed no concern for underlying ischemia  Placed on Versed, Fentanyl, Ketamine for sedation and paralysis    Today 1/18:  Afeb, AST/ALT trending down 133/1161, WBC 10.7   HIV Ag/Ab Non-Reactive    HIV-1 Antibody Non-Reactive    HIV ANTIGEN Non-Reactive    HIV-2 Ab Non-Reactive    9/19/2022:   - HIV 1+2 AB/AGN: reactive   - HIV Confirmatory Test:    - Non-reactive    Past Medical History:    Past Medical History:   Diagnosis Date    Aspiration pneumonia ([address]     Hepatitis C     HTN (hypertension)     NSTEMI (non-ST elevated myocardial infarction) ([address]     Polysubstance abuse ([address]     Seizures ([address]     Septic shock ([address]     Systolic CHF ([address]     Takotsubo cardiomyopathy        Past Surgical History:    Past Surgical History:   Procedure Laterality Date    BRONCHOSCOPY N/A 2/22/2021    BRONCHOSCOPY DIAGNOSTIC OR CELL [address] ONLY performed by Alphonse Molina MD at [facility]    BRONCHOSCOPY  2/22/2021    BRONCHOSCOPY THERAPUTIC ASPIRATION INITIAL performed by Josiane Snell MD at [facility] SURGERY      RIGHT HAND FINGERS INJURY, PARTIAL AMPUTATIONS       Current Medications:     empagliflozin  10 mg Oral Daily    pantoprazole  40 mg Oral Daily    nicotine  1 patch TransDERmal Daily    methadone  110 mg Oral Daily    spironolactone  12.5 mg Oral Daily    carvedilol  3.125 mg Oral BID WC    Cenobamate  100 mg Oral Nightly    sacubitril-valsartan  1 tablet Oral BID    sodium chloride flush  5-40 mL IntraVENous 2 times per day    heparin (porcine)  5,000 Units SubCUTAneous 3 times per day    piperacillin-tazobactam  3,375 mg IntraVENous Q8H    OXcarbazepine  900 mg Oral BID       Allergies:  Patient has no known allergies. Social History:    TOBACCO:    2 pack/day  ETOH:    None  DRUGS:   Marijuana, IV Meth, IV Opiates  MARITAL STATUS:   None  OCCUPATION:   Self-Employed    Patient lives at home    Family History:   No immunodeficiency    REVIEW OF SYSTEMS:    Pt is sweating profusely 2/2 withdrawal    No visual change, eye pain, eye discharge. No oral lesion, sore throat, dysphagia. Denies cough / sputum. Denies chest pain, palpitations. Denies n / v / abd pain. No diarrhea. Denies dysuria or change in urinary function. Denies joint swelling or pain. No myalgia, arthralgia. Denies skin changes, itching  Denies focal weakness, sensory change or other neurologic symptom    Denies new / worse depression, psychiatric symptoms    PHYSICAL EXAM:      Vitals:    BP (!) 151/113   Pulse 85   Temp 98 °F (36.7 °C) (Oral)   Resp 19   Ht 5' 11\" (1.803 m)   Wt 130 lb 11.7 oz (59.3 kg)   SpO2 97%   BMI 18.23 kg/m²     GENERAL: No apparent distress.     HEENT: Membranes moist, no oral lesion, PERRL  NECK:  Supple, no lymphadenopathy  LUNGS: Clear b/l, no rales, no dullness  CARDIAC: RRR, no murmur appreciated  ABD:  + BS, soft / NT  EXT:  No rash, no edema, no lesions  NEURO: No focal neurologic findings  PSYCH: Orientation, sensorium, mood normal  LINES:  Peripheral iv, Central Line    DATA:    Lab Results   Component Value Date    WBC 10.7 2023    HGB 12.2 (L) 2023    HCT 35.7 (L) 2023    MCV 88.2 2023     2023     Lab Results   Component Value Date    CREATININE 0.6 (L) 2023    BUN 6 (L) 2023     2023    K 3.6 2023     2023    CO2 25 2023       Hepatic Function Panel:   Lab Results   Component Value Date/Time    ALKPHOS 97 2023 04:06 AM    ALT 1,161 2023 04:06 AM     2023 04:06 AM    PROT 5.8 2023 04:06 AM    BILITOT 0.6 2023 04:06 AM    BILIDIR <0.2 2023 04:06 AM    IBILI see below 2023 04:06 AM    LABALBU 3.1 2023 04:06 AM       Micro:  : MRSA DNA Probe - not detected  : Strep Pneumoniae - negative    Imagin/12 XR Chest:   Right lower lobe airspace disease concerning for pneumonia. Recommend   chest radiograph in 8 weeks to confirm resolution.  CT PE:   Negative for pulmonary embolus. 2. Multifocal bilateral pneumonia.  CT H w/o: No acute intracranial abnormality.       IMPRESSION:      Patient Active Problem List   Diagnosis    Seizure ([facility]    Nonintractable epilepsy with status epilepticus ([address]    NSTEMI (non-ST elevated myocardial infarction) (HCC)    Abnormal ECG    Takotsubo cardiomyopathy    Tobacco abuse    History of drug use    Aspiration pneumonia of both lower lobes ([facility])    IV drug abuse ([address]    Acute encephalopathy    Partial idiopathic epilepsy with seizures of localized onset, intractable, with status epilepticus ([address]    High anion gap metabolic acidosis    Polysubstance abuse ([address]    Breakthrough seizure ([address]    Acute respiratory failure with hypoxia and hypercapnia (HCC)    Pulmonary infiltrates    Leukocytosis    Hyperammonemia (HCC)    Polypharmacy    Pneumonia of both lower lobes due to infectious organism    Myonecrosis (HCC)    Elevated lactic acid level    Normal anion gap metabolic acidosis    Demand ischemia (HCC)    LBBB (left bundle branch block)    Septic shock ([address]    Primary hypertension     - Pt is active IV drug user   - Pt received a positive HIV test on 9/19/22 at OSH clinic with non-reactive confirmatory test  - Pt HIV test this morning came back non-reactive   - Positive test is likely due to a false positive    RECOMMENDATIONS:    No further action is required on our end    Discussed with pt  Dante Vasques DO    Addendum to Resident Consult note:  Pt seen, examined and evaluated. I have reviewed the current history, physical findings, labs and assessment and plan and agree with note as documented by resident (Dr. Traci Sanchez). 34 yo man   Med - hx traumatic brain injury, seizures, COPD, HCV, CHF  Surg - Hand surg    Active polysubstance use  Presented with seizure,  In ED 1/12, intubated and admit to ICU  Seen by Neuro, restarted on seizure meds    1/17 HIV screen neg  He had labs at [facility] 9/12 - HIV screen pos, confirmatory test neg    IMP/  HIV neg - false positive screen at [address] [place] [address]  HCV pos, no prior rx  Poss aspiration pneumonia    REC/  No further testing for HIV  Outpt f/u for eval / rx HCV  Zosyn d#7, can d/c after last dose today    Medical Decision Making: The following items were considered in medical decision making:  Discussion of patient care with other providers  Reviewed clinical lab tests  Reviewed radiology tests  Reviewed other diagnostic tests/interventions  Microbiology cultures and other micro tests reviewed      Risk of Complications/Morbidity: High   Illness(es)/ Infection present that pose threat to bodily function. There is potential for severe exacerbation of infection/side effects of treatment.   Therapy requires intensive monitoring for antimicrobial agent toxicity    Discussed with pt, mother   Arnaldo Geiger MD

## 2023-01-18 NOTE — CARE COORDINATION
Patient known to be active IVDU. Patient has been extubated and note review seeing that still having withdrawal symptoms. Patient noted to be in ED 6 months ago and was not ready for any substance abuse rehab. CM will continue to follow patient until discharge.  Electronically signed by Lisa Petit RN on 1/18/2023 at 6:34 PM

## 2023-01-18 NOTE — DISCHARGE INSTRUCTIONS
-Please make a appointment to follow up with the [facility] outpatient clinic (primary care doctor) in 1 week  -Please make an appointment to follow-up with cardiology (Dr. Hernandez) at  in 2 week  -Please make an appointment to follow-up with neurology (Dr. West) within 1 week, as soon as possible is preferable.  -Please note your medications have changed:   -Please start taking Jardiance once daily AND Aldactone half tablet daily.  These are both for blood pressure and heart failure.   -Please start taking Protonix once daily.  This is for acid reduction in the stomach to help with burning, and prevent ulcers.   -Please take Vistaril 100 mg every 8 hours AS NEEDED for 14 days.  This helps with anxiety/tremulousness/withdrawal symptoms   -Please now increase the amount of Coreg to 12.5 mg (from 3.125 mg) twice daily. This also for both blood pressure and heart failure   -Otherwise, please continue taking your other medications as directed      Seizure Driving Risk: Having a Seizure while driving puts you at risk for injuring yourself or others. If you drive while having uncontrolled seizures, you may be held liable for injuries to others. Do not drive until you have been seizure free for at least 3 months, state laws vary so please check laws in your state. Injury Risk: Please avoid working from tall heights, swimming alone or taking baths in a bathtub, use showers only.

## 2023-01-18 NOTE — PLAN OF CARE
Problem: Discharge Planning  Goal: Discharge to home or other facility with appropriate resources  Outcome: Progressing  Flowsheets (Taken 1/18/2023 0800)  Discharge to home or other facility with appropriate resources: Identify barriers to discharge with patient and caregiver     Problem: Pain  Goal: Verbalizes/displays adequate comfort level or baseline comfort level  Outcome: Progressing  Flowsheets  Taken 1/18/2023 1200  Verbalizes/displays adequate comfort level or baseline comfort level: Encourage patient to monitor pain and request assistance  Taken 1/18/2023 0800  Verbalizes/displays adequate comfort level or baseline comfort level: Encourage patient to monitor pain and request assistance     Problem: Skin/Tissue Integrity  Goal: Absence of new skin breakdown  Description: 1.  Monitor for areas of redness and/or skin breakdown  2.  Assess vascular access sites hourly  3.  Every 4-6 hours minimum:  Change oxygen saturation probe site  4.  Every 4-6 hours:  If on nasal continuous positive airway pressure, respiratory therapy assess nares and determine need for appliance change or resting period.  Outcome: Progressing     Problem: Safety - Adult  Goal: Free from fall injury  Outcome: Progressing     Problem: ABCDS Injury Assessment  Goal: Absence of physical injury  Outcome: Progressing

## 2023-01-18 NOTE — PROGRESS NOTES
NEUROLOGY / NEUROCRITICAL CARE PROGRESS NOTE       Patient Name: David Mckeon YOB: 1990   Sex: Male Age: 28 yrs     CC / Reason for Consult: status epilepticus     Interval Hx / Changes over last 24 hours:     Clinically stable overnight. AST and ALT slowly improving. ROS: +anxiety, +hot flashes    HISTORY   Admission HPI:   David Mckeon is a 28 y.o. y/o male with PMH significant for hepatitis C, HTN, NSTEMI, seizures, polysubstance abuse (methadone patient), and takotsubo cardiomyopathy. Per my interview with the patient's mother, the patient had multiple seizures at home during which he would stare off in the distance, turn his head to the side, and then have generalized shaking in all 4 extremities. Per patient's mother this is the same semiology as his previous seizures. EMS was called and the patient was transported to [facility] ED. Patient continued to seize while en route, and on arrival to the ED. Patient was intubated and a central line was placed. CT head showed no intracranial abnormalities. Patient also presented with leukocytosis, lactic acidosis, and fever with PNA seen on chest CT and XR. UDS positive for methadone, and fentanyl. During ED workup patient continued to have an elevated HR and was fighting the ventilator with poor oxygenation. Patient eventually sedated with versed, fentanyl, ketamine, and nimbex. Patient also received a 3g of keppra in addition to ativan and propofol IV for his seizures. Patient was transferred to [facility] ICU for further evaluation and cvEEG. Of note:  Patient sees Dr. Barb Solis at [facility] for his seizure management. Per the most recent note from an office visit in 10/2022, patient was admitted to [facility] EMU in 10/2022 where a seizure was captured on EEG. Patient has had multiple hospitalizations since then for his seizures. Patient was originally started on keppra, but took himself off it due to side effects.   Patient has since been on oxcarbazepine of varying doses until Tucker Owens was added to his regimen in 03/2022. Most recent AED regimen consists of Xcopir titration to 200mg daily and Oxtellar 1800mg daily. PMH Past Medical History:   Diagnosis Date    Aspiration pneumonia (HCC)     Hepatitis C     HTN (hypertension)     NSTEMI (non-ST elevated myocardial infarction) ([facility]     Polysubstance abuse ([facility]     Seizures ([facility]     Septic shock (HCC)     Systolic CHF ([facility]     Takotsubo cardiomyopathy       Allergies No Known Allergies   Diet ADULT DIET;  Dysphagia - Soft and Bite Sized  ADULT ORAL NUTRITION SUPPLEMENT; Breakfast, Dinner; Standard High Calorie/High Protein Oral Supplement   Isolation No active isolations     CURRENT SCHEDULED MEDICATIONS   Inpatient Medications     empagliflozin, 10 mg, Oral, Daily    pantoprazole, 40 mg, Oral, Daily    nicotine, 1 patch, TransDERmal, Daily    methadone, 110 mg, Oral, Daily    spironolactone, 12.5 mg, Oral, Daily    carvedilol, 3.125 mg, Oral, BID WC    Cenobamate, 100 mg, Oral, Nightly    sacubitril-valsartan, 1 tablet, Oral, BID    sodium chloride flush, 5-40 mL, IntraVENous, 2 times per day    heparin (porcine), 5,000 Units, SubCUTAneous, 3 times per day    [COMPLETED] piperacillin-tazobactam, 4,500 mg, IntraVENous, Once **FOLLOWED BY** piperacillin-tazobactam, 3,375 mg, IntraVENous, Q8H    OXcarbazepine, 900 mg, Oral, BID   Infusions    dexmedetomidine (PRECEDEX) IV infusion 0.6 mcg/kg/hr (01/18/23 0610)    sodium chloride        Antibiotics   Recent Abx Admin                     piperacillin-tazobactam (ZOSYN) 3,375 mg in dextrose 5 % 50 mL IVPB (mini-bag) (mg) 3,375 mg New Bag 01/18/23 0301     3,375 mg New Bag 01/17/23 1801                      LABS   Metabolic Panel Recent Labs     01/16/23  0542 01/17/23  0338 01/18/23  0406    136 142   K 3.5 3.6 3.6    104 108   CO2 26 24 25   BUN 13 12 6*   CREATININE 0.5* 0.7* 0.6*   GLUCOSE 101* 110* 113*   CALCIUM 8. 0* 8.6 8.7   LABALBU 3.0* 3.2* 3.1*   PHOS 4.4 3.0 3.5   MG 1.80 1.90 1.80   ALKPHOS 115 106 97   ALT 2,876* 2,014* 1,161*   AST 1,220* 429* 133*        CBC / Coags Recent Labs     01/16/23  0542 01/17/23  0338 01/18/23  0406   WBC 7.6 9.8 10.7   RBC 3.49* 3.74* 4.05*   HGB 10.6* 11.4* 12.2*   HCT 31.0* 33.5* 35.7*   * 133* 163        Other No results for input(s): LABA1C, LDLCALC, TRIG, TSH, [id number], FOLATE, LABSALI, COVID19 in the last 72 hours. Recent Labs     01/16/23  0542   PHENYTOIN 13.5          DIAGNOSTICS   IMAGES:  No new imagine to review    PHYSICAL EXAMINATION     PHYSICAL EXAM:  Vitals:    01/18/23 0600 01/18/23 0752 01/18/23 0800 01/18/23 0900   BP: (!) 139/97 (!) 154/101 (!) 167/113 (!) 147/72   Pulse: 77 80 85 78   Resp: 18  18 18   Temp:   98 °F (36.7 °C)    TempSrc:   Oral    SpO2: 96%  99% 100%   Weight:       Height:           -Mental status: A&O x3; pleasant & appropriate  -Speech & Language: no aphasia; no dysarthria  -Cranial nerves: pupils symmetric; no notable dysconjugate gaze; eyes midline; no facial asymmetry  -Motor: moving all extremities symmetrically and fully  -Other: no adventitious movements noted  Other Systems  -General Appearance: well-developed, well-nourished, no apparent distress  -Neck: supple  -Lungs: breathing unlabored, regular, no audible wheezes  -CV: pulses strong x4 extremities  -Abd: flat      ASSESSMENT & RECOMMENDATIONS   Assessment:  Jesi Fraire is a 33 yo male with a history of intractable epilepsy (follows with Dr. Yisel Marquez) who presents in status epilepticus in the setting of hyperammonemia (resolved), pneumonia and sepsis. cvEEG initial read with severe generalized slowing. No abnormality on head CT. His exam is significantly improved and he is back on his home medication regimen and to his baseline mental status.       Plan:  -Continue xcopri (cenobamate) at home dose  -Continue oxcarbazepine (trileptal) 900mg PO BID, when discharged, will go back on oxtellar 24 hour tablet, 900mg PO daily  -Seizure precautions  -Q4H neuro checks  -Treatment of underlying medical issues per ICU team (infection, cardiomyopathy, sepsis)  -Discussed driving and safety precautions, expressed understanding. Having a seizure while driving puts that patient at risk for injuring themselves, or others. If a patient drives while having uncontrolled seizures, they may be held liable for injuries to others. No driving until they have been spell free for at least 3 months. Inform patient that in other states and countries the driving limitation may be longer and to check with applicable local laws prior to travel. Injury Risk: Please avoid working from [employer], swimming alone or taking baths in a bathtub, use showers only. - Needs to follow up with Neurologist promptly after discharge  - Will sign off, please call Neurology with any questions or concerns that may arise    Case discussed with attending Neurologist, Dr. Demetrius Maddox and ICU team    LEIGHTON Johnson - [facility]   Neurology & Neurocritical Care   1/18/2023 10:49 AM    ICU Patients:   [address] [phone]  [facility]: Mykel Roper / PCU Patients:  Neurology Line: [phone]  [facility]: [facility] Neurology    I spent 15 minutes in the care of this patient. Over 50% of that time was in face-to-face counseling regarding disease process, diagnostic testing, preventative measures, and answering patient and family questions.

## 2023-01-19 LAB
ALBUMIN SERPL-MCNC: 3.2 G/DL (ref 3.4–5)
ALP BLD-CCNC: 90 U/L (ref 40–129)
ALT SERPL-CCNC: 773 U/L (ref 10–40)
AST SERPL-CCNC: 56 U/L (ref 15–37)
BASOPHILS ABSOLUTE: 0.1 K/UL (ref 0–0.2)
BASOPHILS RELATIVE PERCENT: 0.5 %
BILIRUB SERPL-MCNC: 0.4 MG/DL (ref 0–1)
BILIRUBIN DIRECT: <0.2 MG/DL (ref 0–0.3)
BILIRUBIN, INDIRECT: ABNORMAL MG/DL (ref 0–1)
EOSINOPHILS ABSOLUTE: 0.4 K/UL (ref 0–0.6)
EOSINOPHILS RELATIVE PERCENT: 3.8 %
GLUCOSE BLD-MCNC: 100 MG/DL (ref 70–99)
HCT VFR BLD CALC: 38.9 % (ref 40.5–52.5)
HEMOGLOBIN: 13 G/DL (ref 13.5–17.5)
LYMPHOCYTES ABSOLUTE: 3.2 K/UL (ref 1–5.1)
LYMPHOCYTES RELATIVE PERCENT: 31.1 %
MAGNESIUM: 2.1 MG/DL (ref 1.8–2.4)
MCH RBC QN AUTO: 30.2 PG (ref 26–34)
MCHC RBC AUTO-ENTMCNC: 33.3 G/DL (ref 31–36)
MCV RBC AUTO: 90.5 FL (ref 80–100)
MONOCYTES ABSOLUTE: 0.9 K/UL (ref 0–1.3)
MONOCYTES RELATIVE PERCENT: 8.9 %
NEUTROPHILS ABSOLUTE: 5.7 K/UL (ref 1.7–7.7)
NEUTROPHILS RELATIVE PERCENT: 55.7 %
PDW BLD-RTO: 15.1 % (ref 12.4–15.4)
PERFORMED ON: ABNORMAL
PLATELET # BLD: 195 K/UL (ref 135–450)
PMV BLD AUTO: 7.1 FL (ref 5–10.5)
RBC # BLD: 4.3 M/UL (ref 4.2–5.9)
TOTAL PROTEIN: 6 G/DL (ref 6.4–8.2)
WBC # BLD: 10.3 K/UL (ref 4–11)

## 2023-01-19 PROCEDURE — 6360000002 HC RX W HCPCS

## 2023-01-19 PROCEDURE — 99232 SBSQ HOSP IP/OBS MODERATE 35: CPT | Performed by: INTERNAL MEDICINE

## 2023-01-19 PROCEDURE — 6370000000 HC RX 637 (ALT 250 FOR IP): Performed by: STUDENT IN AN ORGANIZED HEALTH CARE EDUCATION/TRAINING PROGRAM

## 2023-01-19 PROCEDURE — 6370000000 HC RX 637 (ALT 250 FOR IP)

## 2023-01-19 PROCEDURE — 6370000000 HC RX 637 (ALT 250 FOR IP): Performed by: NURSE PRACTITIONER

## 2023-01-19 PROCEDURE — 99233 SBSQ HOSP IP/OBS HIGH 50: CPT | Performed by: NURSE PRACTITIONER

## 2023-01-19 PROCEDURE — 2580000003 HC RX 258: Performed by: STUDENT IN AN ORGANIZED HEALTH CARE EDUCATION/TRAINING PROGRAM

## 2023-01-19 PROCEDURE — 92526 ORAL FUNCTION THERAPY: CPT

## 2023-01-19 PROCEDURE — 94761 N-INVAS EAR/PLS OXIMETRY MLT: CPT

## 2023-01-19 PROCEDURE — 2500000003 HC RX 250 WO HCPCS: Performed by: STUDENT IN AN ORGANIZED HEALTH CARE EDUCATION/TRAINING PROGRAM

## 2023-01-19 PROCEDURE — 36592 COLLECT BLOOD FROM PICC: CPT

## 2023-01-19 PROCEDURE — 1200000000 HC SEMI PRIVATE

## 2023-01-19 PROCEDURE — 85025 COMPLETE CBC W/AUTO DIFF WBC: CPT

## 2023-01-19 PROCEDURE — 6360000002 HC RX W HCPCS: Performed by: STUDENT IN AN ORGANIZED HEALTH CARE EDUCATION/TRAINING PROGRAM

## 2023-01-19 PROCEDURE — 80076 HEPATIC FUNCTION PANEL: CPT

## 2023-01-19 PROCEDURE — 83735 ASSAY OF MAGNESIUM: CPT

## 2023-01-19 PROCEDURE — 2580000003 HC RX 258

## 2023-01-19 RX ORDER — CARVEDILOL 6.25 MG/1
6.25 TABLET ORAL 2 TIMES DAILY WITH MEALS
Status: DISCONTINUED | OUTPATIENT
Start: 2023-01-19 | End: 2023-01-20

## 2023-01-19 RX ADMIN — SACUBITRIL AND VALSARTAN 1 TABLET: 49; 51 TABLET, FILM COATED ORAL at 08:36

## 2023-01-19 RX ADMIN — PIPERACILLIN AND TAZOBACTAM 3375 MG: 3; .375 INJECTION, POWDER, LYOPHILIZED, FOR SOLUTION INTRAVENOUS at 02:37

## 2023-01-19 RX ADMIN — SACUBITRIL AND VALSARTAN 1 TABLET: 49; 51 TABLET, FILM COATED ORAL at 21:01

## 2023-01-19 RX ADMIN — EMPAGLIFLOZIN 10 MG: 10 TABLET, FILM COATED ORAL at 08:36

## 2023-01-19 RX ADMIN — CARVEDILOL 3.12 MG: 3.12 TABLET, FILM COATED ORAL at 08:36

## 2023-01-19 RX ADMIN — HEPARIN SODIUM 5000 UNITS: 5000 INJECTION INTRAVENOUS; SUBCUTANEOUS at 06:30

## 2023-01-19 RX ADMIN — OXCARBAZEPINE 900 MG: 300 TABLET, FILM COATED ORAL at 09:30

## 2023-01-19 RX ADMIN — HYDROXYZINE PAMOATE 50 MG: 25 CAPSULE ORAL at 14:23

## 2023-01-19 RX ADMIN — Medication 110 MG: at 09:53

## 2023-01-19 RX ADMIN — PANTOPRAZOLE SODIUM 40 MG: 40 TABLET, DELAYED RELEASE ORAL at 09:57

## 2023-01-19 RX ADMIN — SPIRONOLACTONE 12.5 MG: 25 TABLET, FILM COATED ORAL at 08:35

## 2023-01-19 RX ADMIN — HEPARIN SODIUM 5000 UNITS: 5000 INJECTION INTRAVENOUS; SUBCUTANEOUS at 21:01

## 2023-01-19 RX ADMIN — CARVEDILOL 6.25 MG: 6.25 TABLET, FILM COATED ORAL at 17:35

## 2023-01-19 RX ADMIN — HEPARIN SODIUM 5000 UNITS: 5000 INJECTION INTRAVENOUS; SUBCUTANEOUS at 16:05

## 2023-01-19 RX ADMIN — SODIUM CHLORIDE, PRESERVATIVE FREE 10 ML: 5 INJECTION INTRAVENOUS at 21:01

## 2023-01-19 RX ADMIN — OXCARBAZEPINE 900 MG: 300 TABLET, FILM COATED ORAL at 21:01

## 2023-01-19 RX ADMIN — HYDROXYZINE PAMOATE 50 MG: 25 CAPSULE ORAL at 08:36

## 2023-01-19 RX ADMIN — SODIUM CHLORIDE, PRESERVATIVE FREE 10 ML: 5 INJECTION INTRAVENOUS at 10:00

## 2023-01-19 RX ADMIN — DEXMEDETOMIDINE 0.4 MCG/KG/HR: 100 INJECTION, SOLUTION INTRAVENOUS at 05:46

## 2023-01-19 ASSESSMENT — PAIN SCALES - GENERAL
PAINLEVEL_OUTOF10: 0

## 2023-01-19 NOTE — PROGRESS NOTES
Received patient awake on chair during handoff  Alert oriented X4, PERRLA  No complaints of pain  Eating and drinking well  Uses the urinal  Central line noted on the right IJ  Vitals are within range  All care documented

## 2023-01-19 NOTE — PROGRESS NOTES
Speech Language Pathology  Facility/Department: [facility] ICU  Dysphagia Daily Treatment Note/Discharge    NAME: José Puri  : 1990  MRN: [id number]    Patient Diagnosis(es):   Patient Active Problem List    Diagnosis Date Noted    Chronic hepatitis C without hepatic coma ([address] 2023    Encounter for human immunodeficiency virus test 2023    Primary hypertension 2023    Demand ischemia ([address] 2023    LBBB (left bundle branch block) 2023    Septic shock ([address] 2023    Acute respiratory failure with hypoxia and hypercapnia ([address] 2023    Pulmonary infiltrates 2023    Leukocytosis 2023    Hyperammonemia ([address] 2023    Polypharmacy 2023    Pneumonia of both lower lobes due to infectious organism 2023    Myonecrosis ([address] 2023    Elevated lactic acid level 2023    Normal anion gap metabolic acidosis     Breakthrough seizure ([address] 2022    Polysubstance abuse ([address] 2022    High anion gap metabolic acidosis 2877    Seizure ([address] 2021    Status epilepticus ([address]     NSTEMI (non-ST elevated myocardial infarction) ([address]     Abnormal ECG     Takotsubo cardiomyopathy     Tobacco abuse     History of drug use     Aspiration pneumonia of both lower lobes ([address]     IV drug abuse ([address]     Acute encephalopathy     Partial idiopathic epilepsy with seizures of localized onset, intractable, with status epilepticus ([address]      Allergies: No Known Allergies. Recent Chest Xray 23  The tip of the endotracheal tube projects over the mid trachea. Improved left-sided airspace disease and unchanged right mid and basilar airspace disease. CT of head 23  No acute intracranial abnormality. Previous MBS - n/a  Chart reviewed.   Medical Diagnosis: Status epilepticus ([address] [G40.901]   Treatment Diagnosis: dysphagia    BSE Impression 1/15/23  Pt alert, oriented to this being a hospital, stated it was December. Pt was intubated 1/12- 1/15 ~ 10:15. Pt exhibiting strong voice and volitional cough s/p extubation. Pt is on 15L of oxygen at this time, maintained throughout session. Oral structures grossly intact, though pt has poor oral hygiene/dentition. Pt demonstrated adequate labial seal  around cup and straw, no anterior loss of bolus occurred, no pocketing/residue noted. No cough, however mild throat clear occurred intermittently. Swallow movement felt upon palpation of anterior neck. Respiratory rate increased intermittently to 28-31 during PO trials. As pt just extubated, was intubated 3 days and exhibiting increased RR, recommend NPO. Recommend aggressive oral care 2-3 x/day and small amounts of ice chips, small sips of water. Plan to re-assess next session to determine ability to initiate PO diet vs need for instrumental exam    Dysphagia Diagnosis: No clinical indicators of dysphagia, Suspected needs further assessment    MBS results - none indicated at this time    Pain: denies    Current Diet : ADULT DIET; Dysphagia - Soft and Bite Sized  ADULT ORAL NUTRITION SUPPLEMENT; Breakfast, Dinner; Standard High Calorie/High Protein Oral Supplement   Recommended Form of Meds: Via alternative means of nutrition (if critical medications are required PO, place in puree)      Treatment:  Pt seen bedside to address the following goals:  1-The patient will tolerate repeat bedside swallowing evaluation when able  1/16: pt sitting up in bed, very pleasant and alert. Voice and volitional cough remain strong. Pt re-assessed with ice chips, water via straw (did not assess with cup sips due to presence of NG tube), puree and solid. Pt consumed tsps of applesauce and 6 ounces of water, including sequential swallows with no cough, throat clear or change in voice occurring. Pt demonstrated adequate mastication with jay cracker, no oral residue noted.    Recommend Soft and bite sized (d/t presence of NG)/thin liquids with use of straw  Goal met    2-The patient/caregiver will demonstrate understanding of compensatory strategies for improved swallowing safety  1/15: Educated pt and family to purpose of visit, s/s of aspiration, concern if aspiration occurs, rationale for NPO, oral care and plan to re-assess next session. Explained impact intubation can have on swallowing and role in protecting airway. Pt and family stated comprehension  Cont goal   1/16: educated pt and family member to recommendation for diet and explanation of texture, s/s of aspiration, strategies to improve safety of swallow and instruction to notify staff if any signs emerge. Both stated comprehension  Cont goal  1/17: SLP educated pt regarding rationale for follow up treatment, diet level, rec's for solid diet advancement, s/s penetration/aspiration if they emerge, and strategies to use when consuming PO. Pt stated understanding and agreement. Cont. 1/19: Pt able to demonstrate appropriate teach back of rationale for diet level, SLP follow up treatment and strategies to use as needed. Goal met. DC. New goal:  3-  Pt will consume PO safely without signs or symptoms of aspiration  1/17: Pt expressed tolerating current diet level of soft and bite sized/thin liquids well. Intermittent nausea and dysgeusia (however reports this is partially baseline from withdrawal etc). Of note, NG tube was removed. Pt observed with trials of SBS, regular and thin liquids. Pt demonstrated timely and adequate mastication of all SBS and regular. Pt appeared to swallow all trials with no overt s/s penetration/aspiration. Clear vocal quality throughout and no c/o globus sensation. Rec upgrade to regular solids. Pt in agreement. Cont. 1/19: Pt observed with regular solids and thin liquids. Pt demonstrated appropriate mastication and complete oral clearance.  Pt expressed has been continuing to receive soft and bite sized, despite SLP previous session of rec for upgrade to regular. SLP messaged MD and relayed rec's to RN for correcting diet level order. No s/s penetration/aspiration. Continue current diet level of Regular Solids and Thin Liquids. Goal met. DC. Patient/Family/Caregiver Education:  As above    Compensatory Strategies:  90 degrees all meals and 15 minutes after  Small bites     Plan: Discharge from SLP service at this time. No further intervention required. Diet recommendations: Continue Regular Solids, Thin liquids - if any s/s of aspiration emerge, or there is respiratory decline, make NPO until further evaluated by SLP  DC recommendation: TBD  Treatment: 10  D/W nursing: Nidia  Needs met prior to leaving room, call button in reach.       Electronically signed by:  Dulce Galvez M.A., [address] [place] [address]  Speech-Language Pathologist  Pg #: [phone]    If patient is discharged prior to next treatment, this note will serve as the discharge summary

## 2023-01-19 NOTE — PROGRESS NOTES
CC:  HPI:     S: No cp or sob     Tele: Sinus     O:  Physical Exam:  BP (!) 151/112   Pulse 85   Temp 99 °F (37.2 °C) (Axillary)   Resp 14   Ht 5' 11\" (1.803 m)   Wt 128 lb 8.5 oz (58.3 kg)   SpO2 100%   BMI 17.93 kg/m²    General (appearance):  No acute distress. Poor dentition   Eyes: anicteric   Neck: soft, No JVD  Ears/Nose/Mouth/Thorat: No cyanosis  CV: RRR   Respiratory:  normal effort  GI: soft, non-tender, non-distended  Skin: Warm, dry. No rashes  Neuro/Psych: Alert and oriented x 3. Appropriate behavior  Ext:  No c/c. No significant edema  Pulses:  2+ radial     I.O's= +4 L     Weight  Admission: Weight: 135 lb 2.3 oz (61.3 kg)   Today: Weight: 128 lb 8.5 oz (58.3 kg)    CBC:   Recent Labs     236 23  0557   WBC 9.8 10.7 10.3   HGB 11.4* 12.2* 13.0*   HCT 33.5* 35.7* 38.9*   MCV 89.5 88.2 90.5   * 163 195     BMP:   Recent Labs     23  040    142   K 3.6 3.6    108   CO2 24 25   PHOS 3.0 3.5   BUN 12 6*   CREATININE 0.7* 0.6*     Estimated Creatinine Clearance: 144 mL/min (A) (based on SCr of 0.6 mg/dL (L)). Mag:   Lab Results   Component Value Date/Time    MG 2.10 2023 05:57 AM     LIVER PROFILE:   Recent Labs     23  0406 23  0557   * 133* 56*   ALT 2,014* 1,161* 773*   BILIDIR 0.3 <0.2 <0.2   BILITOT 0.9 0.6 0.4   ALKPHOS 106 97 90     PT/INR:   No results for input(s): PROTIME, INR in the last 72 hours. Pro-BNP:   Lab Results   Component Value Date/Time    PROBNP 20,822 2023 02:57 PM    PROBNP 857 2022 04:04 PM    PROBNP 33 2021 03:25 PM       Imagin2022 Echo   Definity contrast administered. Left ventricular cavity size is mildly dilated. Normal left ventricular wall thickness. Overall left ventricular systolic function appears severely reduced. Ejection fraction is visually estimated to be <20%. There is severe diffuse hypokinesis. Indeterminate diastolic function. Questionable apical thrombus. Moderate mitral regurgitation is present. Systolic pulmonary artery pressure (SPAP) is normal and estimated at 30 mmHg (right atrial pressure 3 mmHg). 9/2022 Echo   Left ventricular systolic function is low normal with a visually estimated   ejection fraction of 45-50 %. EF estimated by Dooley's method at 48 %. The left ventricle is normal in size with normal wall thickness. Paradoxic septal motion   Grade I diastolic dysfunction with normal LV pressure. Systolic pulmonary artery pressure (SPAP) is normal and estimated at 19 mmHg   (right atrial pressure 3 mmHg). 7/21/2022   Technically difficult examination. Normal left ventricle size, wall thickness, and systolic function with a visually estimated ejection fraction of 55%. No regional wall motion abnormalities are seen. Normal left ventricular diastolic filling pressure. The left atrium appears mildly enlarged. Mild pulmonic regurgitation. Systolic pulmonary artery pressure (SPAP) is normal and estimated at 25 mmHg (right atrial pressure 8 mmHg). The IVC is dilated in size (>2.1 cm) but collapses >50% with respiration consistent with elevated right atrial pressures (8 mmHg) . Compared with the previous exam on 02/26/21 (EF 25-30%), left ventricular function is now normal.    Left Heart Cath: 2/23/21    Findings   LVEDP 15   LVEF  20%   LV wall motion  apical and mid ballooning with basal hypokinesis consistent with Takotsubo cardiomyopathy   Gradient across AV  none   Mitral regurg  no significant      Coronary Angiogram:  Artery Findings/Result   LM  no angiographic CAD   LAD  no angiographic CAD   LCx  no angiographic CAD         RCA  dominant, no angiographic CAD      Assessment/Plan  1. Normal coronary arteries,  2. Takotsubo/stress cardiomyopathy  3.   Severe QTC prolongation will need to watch medications and follow EKGs    Assessment:  35 y.o. who presented with seizure. He has known takotsubo cardiomyopathy . Notes to have new LBBB on ECG thought to be due to cardiomyopathy rather than ACS in view of normal coronaries in 2021 per cath. Issues:   -LBBB  -Demand ischemia   -Takotsubo CMP  -Poorly controlled HTN  -Seizures  -HCV  -COPD  -Smoking  -polysubstance abuse  -Septic shock/ PNA  -Hepatitis     Plan:  -Keep K>4, Mg>2.  -Entresto 49/51 mg po bid   -Coreg 3.125 mg po bid increased to 6.25 mg po bid.  Hold for SBP< 100 mmHg or HR <60 bpm  -Spironolactone 12.5 mg po daily  -Jardiance 10 mg po daily     Optimize medications and recheck echo  EP referral for ICD consideration  No CAD on 2/2021 cath

## 2023-01-19 NOTE — PROGRESS NOTES
ICU Progress Note    Admit Date: 1/12/2023  IV Access:Peripheral, CVC  IV Fluids: off  Vasopressors:None                Antibiotics: Zosyn (7 day course)  Diet: ADULT DIET; Dysphagia - Soft and Bite Sized  ADULT ORAL NUTRITION SUPPLEMENT; Breakfast, Dinner; Standard High Calorie/High Protein Oral Supplement    CC: Status epilepticus    Interval history:   Yesterday afternoon, patient sweating and endorsing increased anxiety. Vistaril dose frequency increased. BP soft overnight, however he was 130s/90s this AM. Tolerating PO intake. Precedex 0.2 this AM.  He endorses some anxiety. He has not received morning methadone dose yet. HPI:   Per resident HPI: Emilee Chua is a 55-year-old M with PMH of seizures, hep C, uncontrolled hypertension, takatsubo cardiomyopathy, COPD, temporal seizures, 20 yr tobacco use, HFrEF, polysubstance abuse (heroine and fentanyl) who presents to [facility] ED with seizures. EMS was called for witnessed seizure. Patient was actively seizing when he arrived to the ED. He received multiple doses of benzos on route that did not improve his seizure. He was actively seizing for about an hour. He never returned to baseline. He was eventually intubated due to hypoxia and not protecting his airway. Other tests remarkable for EKG showed new LBBB concern for underlying ischemia he was taken to the Cath Lab. Cardiology felt it was likely sinus tachycardia with aberrancy. Troponins were negative. Other labs remarkable for lactic acid was 6.9, ammonia 129, UDS was positive for cannabinoids methadone and fentanyl. Patient is a known IV drug user and is on methadone at home. UA was negative for any infection. Imaging studies were done and showed CT PE negative for any PE but showed multifocal pneumonia, CT head showed no intra cranial abnormality.   He was treated with large-volume IV fluids and started on broad-spectrum antibiotics he had some worsening hypoxia with pulmonary edema and ultimately required Lasix. He was put on IV Versed, IV fentanyl and IV ketamine for sedation and requirement of paralysis for management of improved oxygenation on the ventilator. \"    Medications:     Scheduled Meds:   empagliflozin  10 mg Oral Daily    pantoprazole  40 mg Oral Daily    nicotine  1 patch TransDERmal Daily    methadone  110 mg Oral Daily    spironolactone  12.5 mg Oral Daily    carvedilol  3.125 mg Oral BID WC    Cenobamate  100 mg Oral Nightly    sacubitril-valsartan  1 tablet Oral BID    sodium chloride flush  5-40 mL IntraVENous 2 times per day    heparin (porcine)  5,000 Units SubCUTAneous 3 times per day    OXcarbazepine  900 mg Oral BID     Continuous Infusions:   dexmedetomidine (PRECEDEX) IV infusion 0.4 mcg/kg/hr (01/19/23 0546)    sodium chloride       PRN Meds:hydrOXYzine pamoate, sodium chloride flush, sodium chloride, polyethylene glycol, acetaminophen **OR** acetaminophen    Objective:   Vitals:   T-max:  Patient Vitals for the past 8 hrs:   BP Temp Temp src Pulse Resp SpO2 Weight   01/19/23 0600 (!) 130/92 -- -- 77 14 100 % --   01/19/23 0554 -- -- -- 65 12 96 % 128 lb 8.5 oz (58.3 kg)   01/19/23 0500 (!) 86/46 -- -- 59 13 -- --   01/19/23 0400 (!) 85/52 99 °F (37.2 °C) Axillary 59 13 95 % --   01/19/23 0303 (!) 91/54 -- -- 60 13 -- --   01/19/23 0300 (!) 86/51 -- -- 61 13 94 % --   01/19/23 0200 (!) 126/91 -- -- 74 14 92 % --   01/19/23 0100 109/71 -- -- 71 15 97 % --         Intake/Output Summary (Last 24 hours) at 1/19/2023 0825  Last data filed at 1/19/2023 0600  Gross per 24 hour   Intake 874.55 ml   Output 775 ml   Net 99.55 ml       ROS: No HA, CP, SOB, abd pain, N/V/D, constipation, peripheral edema. +anxiety, sweating    Physical Exam  Constitutional:       Appearance: He is not ill-appearing or toxic-appearing. HENT:      Head: Normocephalic and atraumatic.       Mouth/Throat:      Comments: Very poor dentition  Cardiovascular:      Rate and Rhythm: Normal rate and regular rhythm. Heart sounds: Normal heart sounds. Pulmonary:      Effort: No respiratory distress. Breath sounds: No stridor. No wheezing. Abdominal:      General: Abdomen is flat. Palpations: Abdomen is soft. Musculoskeletal:      Right lower leg: No edema. Left lower leg: No edema. Skin:     Coloration: Skin is pale. Skin is not jaundiced. Neurological:      General: No focal deficit present. Mental Status: He is oriented to person, place, and time. LABS:    CBC:   Recent Labs     01/17/23 0338 01/18/23 0406 01/19/23  0557   WBC 9.8 10.7 10.3   HGB 11.4* 12.2* 13.0*   HCT 33.5* 35.7* 38.9*   * 163 195   MCV 89.5 88.2 90.5       Renal:    Recent Labs     01/17/23 0338 01/18/23 0406 01/19/23  0557    142  --    K 3.6 3.6  --     108  --    CO2 24 25  --    BUN 12 6*  --    CREATININE 0.7* 0.6*  --    GLUCOSE 110* 113*  --    CALCIUM 8.6 8.7  --    MG 1.90 1.80 2.10   PHOS 3.0 3.5  --    ANIONGAP 8 9  --        Hepatic:   Recent Labs     01/17/23 0338 01/18/23 0406 01/19/23  0557   * 133* 56*   ALT 2,014* 1,161* 773*   BILITOT 0.9 0.6 0.4   BILIDIR 0.3 <0.2 <0.2   PROT 5.9* 5.8* 6.0*   LABALBU 3.2* 3.1* 3.2*   ALKPHOS 106 97 90       Troponin:   No results for input(s): TROPONINI in the last 72 hours. BNP: No results for input(s): BNP in the last 72 hours. Lipids: No results for input(s): CHOL, HDL in the last 72 hours. Invalid input(s): LDLCALCU, TRIGLYCERIDE  ABGs:    No results for input(s): PHART, BXM8DCK, PO2ART, JPH9VZP, BEART, THGBART, O8SAZFZP, AWS6RXV in the last 72 hours. INR:   No results for input(s): INR in the last 72 hours. Lactate:   No results for input(s): LACTATE in the last 72 hours. RAD:   XR CHEST PORTABLE   Final Result      The tip of the endotracheal tube projects over the mid trachea. Improved left-sided airspace disease and unchanged right mid and basilar airspace disease.         Assessment/Plan: Paradise Cline is a 66-year-old M with PMH of temporal lobe seizures, hep C, uncontrolled HTN, Takutsobo cardiomyopathy, COPD, 20 pack-year smoking, HFrEF, polysubstance abuse (heroin and fentanyl on methadone) who presents to [facility] ED with seizures. Acute hypoxic respiratory failure 2/2 Multifocal Pneumonia  - off O2, satting 90s  - sedation: precedex 0.2  - plan to wean sedation  - hx of polysubstance abuse; wean sedation as tolerated   - off fentanyl gtt   - cont home methadone   - cont vistaril for anxiety  - s/p 7 day course of zosyn  - vancomycin discontinued; MRSA negative  - s/p SLP eval - OK for thin liquids/bite sized  - s/p echo to rule out endocarditis or HFrEF decompensation  - EF <20% w/ diffuse hypokinesis. No vegetations. Low EF likely due to active septic shock and myocardial demand ischemia given Troponinemia. Ischemic Hepatitis, improving  At admission, ALT/ /100> 1782/2380. Downtrending  - Trend daily     Acute encephalopathy 2/2 status epilepticus  History of right temporal epilepsy diagnosed in 2019 in the setting of a prior TBI. He has known epilepsy, he is on Oxtellar and Xcopri at home and is known to be noncompliant, UDS was positive for fentanyl, and he was septic on arrival to the ED which could be a reason to put him into status epilepticus.  - neurology following - appreciate recs  - q4h Neuro checks  - Oxcarbazepine 900mg BID  - Fosphenytoin 100mg Q8H IV  - cont Xcopri  - Avoid nimbex    Systolic CHF  New Left Bundle Branch Block  Per Cards: New LBBB/previous history of stress-induced, cardiomyopathy with borderline recovered LV function/elevated troponin/demand ischemia. Likely demand ischemia from sepsis.  Unlikely true ACS given normal cath in 2021.  - Cardiology consulted; appreciate recs   - start jardiance, spironolactone, entresto, coreg     Chronic Problems:  COPD - Duonebs  HTN - cont coreg  HFrEF - cont Entresto  Tobacco use   Polysubstance Abuse - cont home methadone; nicotine patch given     Code Status: Prior  FEN: thin, bite sized  PPX:  SQH and Protonix  DISPO: ICU    Reyna Colby MD, PGY-1  01/19/23  8:25 AM    This patient has been staffed and discussed with Dr. Pacheco Moreno MD    Patient was seen, examined and discussed with Dr. Sohan York. I agree with the interval history. My physical exam confirms the findings listed below  Chart was reviewed including labs and medical records confirm the findings noted    CVC Triple Lumen 01/12/23 Right Internal jugular (Active)   Number of days: 7     Pneumonia. Cx is negative. Finished 7 days course of Zosyn. Status epilepticus - resolved. Known hx of epilepsy   IVDU  Chronic systolic CHF  BP on the higher side today but this is thought to be due to anxiety. Off precedex. On entresto, coreg, spironolactone and Jardiance   OK to transfer to the floor.

## 2023-01-19 NOTE — PLAN OF CARE
Problem: Discharge Planning  Goal: Discharge to home or other facility with appropriate resources  1/19/2023 1027 by Mica Sam RN  Outcome: Progressing     Problem: Pain  Goal: Verbalizes/displays adequate comfort level or baseline comfort level  1/19/2023 1027 by Mica Sam RN  Outcome: Progressing     Problem: Skin/Tissue Integrity  Goal: Absence of new skin breakdown  Description: 1. Monitor for areas of redness and/or skin breakdown  2. Assess vascular access sites hourly  3. Every 4-6 hours minimum:  Change oxygen saturation probe site  4. Every 4-6 hours:  If on nasal continuous positive airway pressure, respiratory therapy assess nares and determine need for appliance change or resting period.   1/19/2023 1027 by Mica Sam RN  Outcome: Progressing     Problem: Safety - Adult  Goal: Free from fall injury  Outcome: Progressing     Problem: ABCDS Injury Assessment  Goal: Absence of physical injury  Outcome: Progressing     Problem: Neurosensory - Adult  Goal: Achieves stable or improved neurological status  Outcome: Progressing  Flowsheets (Taken 1/19/2023 0800)  Achieves stable or improved neurological status: Assess for and report changes in neurological status

## 2023-01-19 NOTE — PROGRESS NOTES
Transfer From ICU to Medical Floor    The patient was seen and examined. Req ICU for agitation from opiate withdrawal. Pt on Methadone tx. Also has AHRF 2/2 PNA, tx with zosyn x7d. MRSA -ve thus vanc d/c. Also noted to have ischemic hepatitis given trend of LFTs and clinical profile, improved. Had new LBBB, less likely ischemic, escalating on GDMT. Transfer to floor, stable    Vitals:    01/19/23 1400   BP: (!) 155/106   Pulse: 94   Resp: 21   Temp:    SpO2:        Physical Exam  General appearance: alert, appears stated age and cooperative  Skin: Skin color, texture, turgor normal.   HEENT: Head: Normocephalic, no lesions, without obvious abnormality. Pharynx: Dental Hygiene adequate. Normal buccal mucosa. Normal pharynx. Neck: no adenopathy, no carotid bruit, no JVD, supple, symmetrical, trachea midline and thyroid not enlarged, symmetric, no tenderness/mass/nodules  Lungs: clear to auscultation bilaterally  Heart: regular rate and rhythm, S1, S2 normal, no murmur, click, rub or gallop  Abdomen: soft, non-tender; bowel sounds normal; no masses,  no organomegaly  Extremities: extremities normal, atraumatic, no cyanosis or edema  Neurologic: CN II-XII grossly intact.   Mental status: Alert, oriented, thought content appropriate    @  Intake/Output Summary (Last 24 hours) at 1/19/2023 1425  Last data filed at 1/19/2023 0600  Gross per 24 hour   Intake 514.55 ml   Output 350 ml   Net 164.55 ml     Lab Results   Component Value Date    CREATININE 0.6 (L) 01/18/2023    BUN 6 (L) 01/18/2023     01/18/2023    K 3.6 01/18/2023     01/18/2023    CO2 25 01/18/2023     Lab Results   Component Value Date    WBC 10.3 01/19/2023    HGB 13.0 (L) 01/19/2023    HCT 38.9 (L) 01/19/2023    MCV 90.5 01/19/2023     01/19/2023          Scheduled Meds:   carvedilol  6.25 mg Oral BID WC    empagliflozin  10 mg Oral Daily    pantoprazole  40 mg Oral Daily    nicotine  1 patch TransDERmal Daily    methadone  110 mg Oral Daily    spironolactone  12.5 mg Oral Daily    Cenobamate  100 mg Oral Nightly    sacubitril-valsartan  1 tablet Oral BID    sodium chloride flush  5-40 mL IntraVENous 2 times per day    heparin (porcine)  5,000 Units SubCUTAneous 3 times per day    OXcarbazepine  900 mg Oral BID     Continuous Infusions:   sodium chloride       PRN Meds:hydrOXYzine pamoate, sodium chloride flush, sodium chloride, polyethylene glycol, acetaminophen **OR** acetaminophen    The objective and subjective findings as well as the ICU course of treatment have been reviewed with the ICU team. The treatment plan has been reviewed with the ICU team. The patient is being transferred to [place] [address] in stable condition. Angie Taylor MD   Internal Medicine Resident, PGY-1  Pager: [phone]  Patient seen and examined, labs and imaging studies reviewed, agree with assessment and plan as outlined above. Continue with current care and plan. Discussed case with patients nurse, discussed case with care team, discussed plan.       MD Edis Alfonso

## 2023-01-19 NOTE — PLAN OF CARE
Problem: Discharge Planning  Goal: Discharge to home or other facility with appropriate resources  1/19/2023 0637 by Aditya Daley RN  Outcome: Progressing  Flowsheets (Taken 1/18/2023 2000)  Discharge to home or other facility with appropriate resources: Identify discharge learning needs (meds, wound care, etc)  1/18/2023 1728 by Candelaria Yoder RN  Outcome: Progressing  Flowsheets (Taken 1/18/2023 0800)  Discharge to home or other facility with appropriate resources: Identify barriers to discharge with patient and caregiver     Problem: Pain  Goal: Verbalizes/displays adequate comfort level or baseline comfort level  1/19/2023 0637 by Aditya Daley RN  Outcome: Progressing  Flowsheets (Taken 1/18/2023 2000)  Verbalizes/displays adequate comfort level or baseline comfort level: Encourage patient to monitor pain and request assistance  1/18/2023 1728 by Candelaria Yoder RN  Outcome: Progressing  Flowsheets  Taken 1/18/2023 1600  Verbalizes/displays adequate comfort level or baseline comfort level: Encourage patient to monitor pain and request assistance  Taken 1/18/2023 1200  Verbalizes/displays adequate comfort level or baseline comfort level: Encourage patient to monitor pain and request assistance  Taken 1/18/2023 0800  Verbalizes/displays adequate comfort level or baseline comfort level: Encourage patient to monitor pain and request assistance     Problem: Skin/Tissue Integrity  Goal: Absence of new skin breakdown  Description: 1. Monitor for areas of redness and/or skin breakdown  2. Assess vascular access sites hourly  3. Every 4-6 hours minimum:  Change oxygen saturation probe site  4. Every 4-6 hours:  If on nasal continuous positive airway pressure, respiratory therapy assess nares and determine need for appliance change or resting period.   1/19/2023 0970 by Aditya Daley RN  Outcome: Progressing  1/18/2023 1728 by Candelaria Yoder RN  Outcome: Progressing     Problem: Safety - Adult  Goal: Free from fall injury  Recent Flowsheet Documentation  Taken 1/18/2023 1933 by Kanika Frank RN  Free From Fall Injury: Instruct family/caregiver on patient safety  1/18/2023 1728 by Mica Sam RN  Outcome: Progressing     Problem: ABCDS Injury Assessment  Goal: Absence of physical injury  Recent Flowsheet Documentation  Taken 1/18/2023 1933 by Kanika Frank RN  Absence of Physical Injury: Implement safety measures based on patient assessment  1/18/2023 1728 by Mica Sam RN  Outcome: Progressing     Problem: Neurosensory - Adult  Goal: Achieves stable or improved neurological status  Recent Flowsheet Documentation  Taken 1/18/2023 2000 by Kanika Frank RN  Achieves stable or improved neurological status: Assess for and report changes in neurological status  1/18/2023 1728 by Mica Sam RN  Outcome: Progressing  Flowsheets  Taken 1/18/2023 1200  Achieves stable or improved neurological status: Assess for and report changes in neurological status  Taken 1/18/2023 0800  Achieves stable or improved neurological status: Assess for and report changes in neurological status  Goal: Absence of seizures  Recent Flowsheet Documentation  Taken 1/18/2023 2000 by Kanika Frank RN  Absence of seizures: Monitor for seizure activity. If seizure occurs, document type and location of movements and any associated apnea  1/18/2023 1728 by Mica Sam RN  Outcome: Progressing  Flowsheets  Taken 1/18/2023 1200  Absence of seizures: Monitor for seizure activity. If seizure occurs, document type and location of movements and any associated apnea  Taken 1/18/2023 0800  Absence of seizures: Monitor for seizure activity.   If seizure occurs, document type and location of movements and any associated apnea  Goal: Remains free of injury related to seizures activity  Recent Flowsheet Documentation  Taken 1/18/2023 2000 by Kanika Frank RN  Remains free of injury related to seizure activity: Maintain airway, patient safety  and administer oxygen as ordered  1/18/2023 1728 by Alisha Acosta RN  Outcome: Progressing  Flowsheets  Taken 1/18/2023 1200  Remains free of injury related to seizure activity: Maintain airway, patient safety  and administer oxygen as ordered  Taken 1/18/2023 0800  Remains free of injury related to seizure activity: Maintain airway, patient safety  and administer oxygen as ordered     Problem: Respiratory - Adult  Goal: Achieves optimal ventilation and oxygenation  Recent Flowsheet Documentation  Taken 1/18/2023 2000 by Yenni Best RN  Achieves optimal ventilation and oxygenation: Assess and instruct to report shortness of breath or any respiratory difficulty  1/18/2023 1728 by Alisha Acosta RN  Outcome: Progressing  Flowsheets (Taken 1/18/2023 0800)  Achieves optimal ventilation and oxygenation: Assess and instruct to report shortness of breath or any respiratory difficulty     Problem: Cardiovascular - Adult  Goal: Maintains optimal cardiac output and hemodynamic stability  Recent Flowsheet Documentation  Taken 1/18/2023 2000 by Yenni Best RN  Maintains optimal cardiac output and hemodynamic stability: Monitor blood pressure and heart rate  1/18/2023 1728 by Alisha Acosta RN  Outcome: Progressing  Flowsheets (Taken 1/18/2023 0800)  Maintains optimal cardiac output and hemodynamic stability: Monitor blood pressure and heart rate     Problem: Nutrition Deficit:  Goal: Optimize nutritional status  1/18/2023 1728 by Alisha Acosta RN  Outcome: Progressing     Problem: Chronic Conditions and Co-morbidities  Goal: Patient's chronic conditions and co-morbidity symptoms are monitored and maintained or improved  Recent Flowsheet Documentation  Taken 1/18/2023 2000 by Yenni Best RN  Care Plan - Patient's Chronic Conditions and Co-Morbidity Symptoms are Monitored and Maintained or Improved: Monitor and assess patient's chronic conditions and comorbid symptoms for stability, deterioration, or improvement  1/18/2023 1728 by Adrianna Michel RN  Outcome: Progressing  Flowsheets (Taken 1/18/2023 0800)  Care Plan - Patient's Chronic Conditions and Co-Morbidity Symptoms are Monitored and Maintained or Improved: Monitor and assess patient's chronic conditions and comorbid symptoms for stability, deterioration, or improvement

## 2023-01-20 ENCOUNTER — TELEPHONE (OUTPATIENT)
Dept: CARDIOLOGY CLINIC | Age: 33
End: 2023-01-20

## 2023-01-20 VITALS
DIASTOLIC BLOOD PRESSURE: 127 MMHG | WEIGHT: 123.9 LBS | HEART RATE: 141 BPM | OXYGEN SATURATION: 100 % | RESPIRATION RATE: 38 BRPM | BODY MASS INDEX: 17.35 KG/M2 | HEIGHT: 71 IN | SYSTOLIC BLOOD PRESSURE: 166 MMHG | TEMPERATURE: 98.5 F

## 2023-01-20 DIAGNOSIS — I51.3 APICAL MURAL THROMBUS: ICD-10-CM

## 2023-01-20 DIAGNOSIS — I51.81 TAKOTSUBO CARDIOMYOPATHY: Primary | ICD-10-CM

## 2023-01-20 LAB
ALBUMIN SERPL-MCNC: 3.9 G/DL (ref 3.4–5)
ALP BLD-CCNC: 104 U/L (ref 40–129)
ALT SERPL-CCNC: 650 U/L (ref 10–40)
ANION GAP SERPL CALCULATED.3IONS-SCNC: 10 MMOL/L (ref 3–16)
AST SERPL-CCNC: 42 U/L (ref 15–37)
BILIRUB SERPL-MCNC: 0.4 MG/DL (ref 0–1)
BILIRUBIN DIRECT: <0.2 MG/DL (ref 0–0.3)
BILIRUBIN, INDIRECT: ABNORMAL MG/DL (ref 0–1)
BUN BLDV-MCNC: 9 MG/DL (ref 7–20)
CALCIUM SERPL-MCNC: 9.3 MG/DL (ref 8.3–10.6)
CHLORIDE BLD-SCNC: 99 MMOL/L (ref 99–110)
CO2: 26 MMOL/L (ref 21–32)
CREAT SERPL-MCNC: 0.6 MG/DL (ref 0.9–1.3)
GFR SERPL CREATININE-BSD FRML MDRD: >60 ML/MIN/{1.73_M2}
GLUCOSE BLD-MCNC: 102 MG/DL (ref 70–99)
MAGNESIUM: 1.9 MG/DL (ref 1.8–2.4)
POTASSIUM SERPL-SCNC: 4.1 MMOL/L (ref 3.5–5.1)
REASON FOR REJECTION: NORMAL
REJECTED TEST: NORMAL
SODIUM BLD-SCNC: 135 MMOL/L (ref 136–145)
TOTAL PROTEIN: 7.2 G/DL (ref 6.4–8.2)

## 2023-01-20 PROCEDURE — 6370000000 HC RX 637 (ALT 250 FOR IP)

## 2023-01-20 PROCEDURE — 80076 HEPATIC FUNCTION PANEL: CPT

## 2023-01-20 PROCEDURE — 2580000003 HC RX 258

## 2023-01-20 PROCEDURE — 6370000000 HC RX 637 (ALT 250 FOR IP): Performed by: INTERNAL MEDICINE

## 2023-01-20 PROCEDURE — 97530 THERAPEUTIC ACTIVITIES: CPT

## 2023-01-20 PROCEDURE — 80048 BASIC METABOLIC PNL TOTAL CA: CPT

## 2023-01-20 PROCEDURE — 97165 OT EVAL LOW COMPLEX 30 MIN: CPT

## 2023-01-20 PROCEDURE — 97535 SELF CARE MNGMENT TRAINING: CPT

## 2023-01-20 PROCEDURE — 97116 GAIT TRAINING THERAPY: CPT

## 2023-01-20 PROCEDURE — 36415 COLL VENOUS BLD VENIPUNCTURE: CPT

## 2023-01-20 PROCEDURE — 6360000002 HC RX W HCPCS

## 2023-01-20 PROCEDURE — 97162 PT EVAL MOD COMPLEX 30 MIN: CPT

## 2023-01-20 PROCEDURE — 83735 ASSAY OF MAGNESIUM: CPT

## 2023-01-20 RX ORDER — HYDROXYZINE HYDROCHLORIDE 25 MG/1
50 TABLET, FILM COATED ORAL ONCE
Status: COMPLETED | OUTPATIENT
Start: 2023-01-20 | End: 2023-01-20

## 2023-01-20 RX ORDER — PANTOPRAZOLE SODIUM 40 MG/1
40 TABLET, DELAYED RELEASE ORAL DAILY
Qty: 30 TABLET | Refills: 3 | Status: SHIPPED | OUTPATIENT
Start: 2023-01-21

## 2023-01-20 RX ORDER — SPIRONOLACTONE 25 MG/1
12.5 TABLET ORAL DAILY
Qty: 30 TABLET | Refills: 3 | Status: SHIPPED | OUTPATIENT
Start: 2023-01-21

## 2023-01-20 RX ORDER — CARVEDILOL 6.25 MG/1
12.5 TABLET ORAL 2 TIMES DAILY WITH MEALS
Status: DISCONTINUED | OUTPATIENT
Start: 2023-01-20 | End: 2023-01-20 | Stop reason: HOSPADM

## 2023-01-20 RX ORDER — HYDROXYZINE PAMOATE 100 MG/1
100 CAPSULE ORAL EVERY 8 HOURS PRN
Qty: 42 CAPSULE | Refills: 0 | Status: SHIPPED | OUTPATIENT
Start: 2023-01-20 | End: 2023-02-03

## 2023-01-20 RX ORDER — CARVEDILOL 6.25 MG/1
6.25 TABLET ORAL 2 TIMES DAILY WITH MEALS
Qty: 60 TABLET | Refills: 3 | Status: CANCELLED | OUTPATIENT
Start: 2023-01-20

## 2023-01-20 RX ORDER — CARVEDILOL 12.5 MG/1
12.5 TABLET ORAL 2 TIMES DAILY WITH MEALS
Qty: 60 TABLET | Refills: 3 | Status: SHIPPED | OUTPATIENT
Start: 2023-01-20

## 2023-01-20 RX ORDER — HYDROXYZINE PAMOATE 25 MG/1
100 CAPSULE ORAL EVERY 6 HOURS PRN
Status: DISCONTINUED | OUTPATIENT
Start: 2023-01-20 | End: 2023-01-20 | Stop reason: HOSPADM

## 2023-01-20 RX ORDER — NICOTINE 21 MG/24HR
1 PATCH, TRANSDERMAL 24 HOURS TRANSDERMAL DAILY
Qty: 30 PATCH | Refills: 3 | Status: SHIPPED | OUTPATIENT
Start: 2023-01-21

## 2023-01-20 RX ADMIN — EMPAGLIFLOZIN 10 MG: 10 TABLET, FILM COATED ORAL at 09:07

## 2023-01-20 RX ADMIN — SACUBITRIL AND VALSARTAN 1 TABLET: 49; 51 TABLET, FILM COATED ORAL at 09:07

## 2023-01-20 RX ADMIN — Medication 110 MG: at 09:21

## 2023-01-20 RX ADMIN — OXCARBAZEPINE 900 MG: 300 TABLET, FILM COATED ORAL at 09:08

## 2023-01-20 RX ADMIN — SPIRONOLACTONE 12.5 MG: 25 TABLET, FILM COATED ORAL at 08:12

## 2023-01-20 RX ADMIN — PANTOPRAZOLE SODIUM 40 MG: 40 TABLET, DELAYED RELEASE ORAL at 08:11

## 2023-01-20 RX ADMIN — CARVEDILOL 6.25 MG: 6.25 TABLET, FILM COATED ORAL at 08:11

## 2023-01-20 RX ADMIN — SODIUM CHLORIDE, PRESERVATIVE FREE 10 ML: 5 INJECTION INTRAVENOUS at 09:27

## 2023-01-20 RX ADMIN — HEPARIN SODIUM 5000 UNITS: 5000 INJECTION INTRAVENOUS; SUBCUTANEOUS at 05:37

## 2023-01-20 RX ADMIN — HYDROXYZINE HYDROCHLORIDE 50 MG: 25 TABLET, FILM COATED ORAL at 11:00

## 2023-01-20 ASSESSMENT — PAIN SCALES - GENERAL
PAINLEVEL_OUTOF10: 0

## 2023-01-20 NOTE — PLAN OF CARE
Problem: Discharge Planning  Goal: Discharge to home or other facility with appropriate resources  Outcome: Progressing  Flowsheets (Taken 1/19/2023 2000)  Discharge to home or other facility with appropriate resources: Identify barriers to discharge with patient and caregiver     Problem: Pain  Goal: Verbalizes/displays adequate comfort level or baseline comfort level  Outcome: Progressing  Flowsheets (Taken 1/19/2023 2039)  Verbalizes/displays adequate comfort level or baseline comfort level: Encourage patient to monitor pain and request assistance     Problem: Skin/Tissue Integrity  Goal: Absence of new skin breakdown  Description: 1. Monitor for areas of redness and/or skin breakdown  2. Assess vascular access sites hourly  3. Every 4-6 hours minimum:  Change oxygen saturation probe site  4. Every 4-6 hours:  If on nasal continuous positive airway pressure, respiratory therapy assess nares and determine need for appliance change or resting period.   Outcome: Progressing

## 2023-01-20 NOTE — PROGRESS NOTES
Physical Therapy  Facility/Department: [facility] ICU  Physical Therapy Initial Assessment/Treatment/DC    Name: Mayela Benitez  : 1990  MRN: [id number]  Date of Service: 2023    Discharge Recommendations: Mayela Benitez scored a 22/24 on the AM-PAC short mobility form. Current research shows that an AM-PAC score of 18 or greater is typically associated with a discharge to the patient's home setting. PT Equipment Recommendations  Equipment Needed: No      Patient Diagnosis(es): The primary encounter diagnosis was Elevated lactic acid level. A diagnosis of Apical mural thrombus was also pertinent to this visit. Past Medical History:  has a past medical history of Aspiration pneumonia ([facility] [address] Hepatitis C, HTN (hypertension), NSTEMI (non-ST elevated myocardial infarction) ([facility] [address] Polysubstance abuse ([facility] [address] Seizures ([facility] [address] Septic shock ([facility] [address] Systolic CHF ([facility] [address] and Takotsubo cardiomyopathy. Past Surgical History:  has a past surgical history that includes Hand surgery; bronchoscopy (N/A, 2021); and bronchoscopy (2021). Assessment   Assessment: 36 yo adm after a witnessed seizure, dx with PNA & had to be intubated. Pt demo very steady gait in room/riley today & no difficulty up & down stairs. HR increased to 150 with few stairs & 140 while walking in riley. Limited mobility due to increased HR with activity. RN aware. No further IP PT required at this time due to pt being independent with mobility. Decision Making: Low Complexity  Requires PT Follow-Up: No  Activity Tolerance  Activity Tolerance: Treatment limited secondary to medical complications  Activity Tolerance Comments: increased HR with activity. approx 118 prior to mobility; increased to 140 with ambulation in riley- multiple standing rest breaks & waited for HR to lower before continuing; after stairs- HR up to 150- RN notified. Pt returned to room/chair at this time due to elev HR.      Plan   Safety Devices  Type of Devices: Call light within reach, Chair alarm in place, Left in chair, Nurse notified     Restrictions  Position Activity Restriction  Other position/activity restrictions: up with A prn     Subjective   General  Chart Reviewed: Yes  Additional Pertinent Hx: Adm 1/12 after a witnessed seizure. Head CT (-). CT chest:1. Negative for pulmonary embolus. 2. Multifocal bilateral pneumonia. Patient is known IV drug user he is on methadone currently. Had to be intubated on 1/12. Transferred from [facility]. Extubated 1/15. Family / Caregiver Present: No  Referring Practitioner: Vanessa Ramirez MD  Diagnosis: seizure  Follows Commands: Within Functional Limits  Subjective  Subjective: Found in bed, agreeable to PT. Very pleasant & cooperative. Social/Functional History  Social/Functional History  Lives With: Family (currently staying with his mother, father and brother)  Type of Home: Trailer  Home Access: Stairs to enter with rails  Entrance Stairs - Number of Steps: 3 HENRIK  Bathroom Shower/Tub: Tub/Shower unit  Bathroom Toilet: Standard  Bathroom Equipment: Grab bars in shower, Shower chair, Hand-held shower  Home Equipment: [email or web address]  Has the patient had two or more falls in the past year or any fall with injury in the past year?: Yes (has had falls related to seizures, no falls related to balance issues)  ADL Assistance: Independent  Homemaking Assistance: Needs assistance (mother primarily completes but pt helps sometimes)  Ambulation Assistance: Independent  Transfer Assistance: Independent  Active : No  Occupation: On disability (hasn't worked since his first seizure at 33 y/o)  Additional Comments: Pt's parents and brother have been providing around the clock supervision due to pt's seizures.  Pt and mother help provide care for pt's father  Vision/Hearing       Cognition   Orientation  Overall Orientation Status: Within Normal Limits     Objective           AROM RLE (degrees)  RLE AROM: WNL  AROM LLE (degrees)  LLE AROM : WNL  Strength RLE  Strength RLE: WFL  Strength LLE  Strength LLE: WFL           Bed mobility  Supine to Sit: Independent  Transfers  Sit to Stand: Supervision  Stand to Sit: Supervision  Ambulation  Surface: Level tile  Device: No Device  Assistance: Supervision  Quality of Gait: steady gait, no LOB  Distance: 15' x 2, 110'  Stairs/Curb  Stairs?: Yes (up/down 4 steps with rail SBA)     Balance  Sitting - Static: Good  Sitting - Dynamic: Good  Standing - Static: Good  Standing - Dynamic: Good   Treatment included gait/transfer training, pt education. AM-PAC Score  AM-PAC Inpatient Mobility Raw Score : 22 (01/20/23 1434)  AM-PAC Inpatient T-Scale Score : 53.28 (01/20/23 1434)  Mobility Inpatient CMS 0-100% Score: 20.91 (01/20/23 1434)  Mobility Inpatient CMS G-Code Modifier : CJ (01/20/23 1434)               Goals  Short Term Goals  Time Frame for Short Term Goals: N/A       Education  Patient Education  Education Given To: Patient; Family  Education Provided: Role of Therapy  Education Provided Comments: easing into activities at home, checking heart rate  Education Method: Verbal  Barriers to Learning: None  Education Outcome: Verbalized understanding      Therapy Time   Individual Concurrent Group Co-treatment   Time In 1138         Time Out 1216         Minutes 38          Timed Code Treatment Minutes:   25    Total Treatment Minutes:  [facility]

## 2023-01-20 NOTE — PLAN OF CARE
Problem: Discharge Planning  Goal: Discharge to home or other facility with appropriate resources  1/20/2023 1223 by Carmela Mccarthy RN  Outcome: Progressing     Problem: Pain  Goal: Verbalizes/displays adequate comfort level or baseline comfort level  1/20/2023 1223 by Carmela Mccarthy RN  Outcome: Progressing     Problem: Skin/Tissue Integrity  Goal: Absence of new skin breakdown  Description: 1. Monitor for areas of redness and/or skin breakdown  2. Assess vascular access sites hourly  3. Every 4-6 hours minimum:  Change oxygen saturation probe site  4. Every 4-6 hours:  If on nasal continuous positive airway pressure, respiratory therapy assess nares and determine need for appliance change or resting period.   1/20/2023 1223 by Carmela Mccarthy RN  Outcome: Progressing     Problem: Safety - Adult  Goal: Free from fall injury  Outcome: Progressing     Problem: ABCDS Injury Assessment  Goal: Absence of physical injury  Outcome: Progressing     Problem: Neurosensory - Adult  Goal: Achieves stable or improved neurological status  Outcome: Progressing     Problem: Neurosensory - Adult  Goal: Absence of seizures  Outcome: Progressing     Problem: Neurosensory - Adult  Goal: Remains free of injury related to seizures activity  Outcome: Progressing     Problem: Respiratory - Adult  Goal: Achieves optimal ventilation and oxygenation  Outcome: Progressing     Problem: Cardiovascular - Adult  Goal: Maintains optimal cardiac output and hemodynamic stability  Outcome: Progressing     Problem: Nutrition Deficit:  Goal: Optimize nutritional status  Outcome: Progressing     Problem: Chronic Conditions and Co-morbidities  Goal: Patient's chronic conditions and co-morbidity symptoms are monitored and maintained or improved  Outcome: Progressing

## 2023-01-20 NOTE — PROGRESS NOTES
Received patient awake on chair during handoff  Transferred to bed safely with minimal assistance  No complaints of pain  BP within range during for the rest of the night  Central line removed  Eating and drinking well  Uses the urinal  All care documented

## 2023-01-20 NOTE — PROGRESS NOTES
CC:  HPI:     S: No cp or sob     Tele: Sinus     O:  Physical Exam:  BP (!) 166/117   Pulse 87   Temp 99.5 °F (37.5 °C) (Temporal)   Resp 18   Ht 5' 11\" (1.803 m)   Wt 123 lb 14.4 oz (56.2 kg)   SpO2 100%   BMI 17.28 kg/m²    General (appearance):  No acute distress. Poor dentition   Eyes: anicteric   Neck: soft, No JVD  Ears/Nose/Mouth/Thorat: No cyanosis  CV: RRR   Respiratory:  normal effort  GI: soft, non-tender, non-distended  Skin: Warm, dry. No rashes  Neuro/Psych: Alert and oriented x 3. Appropriate behavior  Ext:  No c/c. No significant edema  Pulses:  2+ radial     I.O's= +4 L     Weight  Admission: Weight: 135 lb 2.3 oz (61.3 kg)   Today: Weight: 123 lb 14.4 oz (56.2 kg)    CBC:   Recent Labs     23  0406 23  0557   WBC 10.7 10.3   HGB 12.2* 13.0*   HCT 35.7* 38.9*   MCV 88.2 90.5    195     BMP:   Recent Labs     23  0406 23  0534    135*   K 3.6 4.1    99   CO2 25 26   PHOS 3.5  --    BUN 6* 9   CREATININE 0.6* 0.6*     Estimated Creatinine Clearance: 139 mL/min (A) (based on SCr of 0.6 mg/dL (L)). Mag:   Lab Results   Component Value Date/Time    MG 1.90 2023 05:34 AM     LIVER PROFILE:   Recent Labs     23  0406 23  0557 23  0534   * 56* 42*   ALT 1,161* 773* 650*   BILIDIR <0.2 <0.2 <0.2   BILITOT 0.6 0.4 0.4   ALKPHOS 97 90 104     PT/INR:   No results for input(s): PROTIME, INR in the last 72 hours. Pro-BNP:   Lab Results   Component Value Date/Time    PROBNP 20,822 2023 02:57 PM    PROBNP 857 2022 04:04 PM    PROBNP 33 2021 03:25 PM       Imagin2022 Echo   Definity contrast administered. Left ventricular cavity size is mildly dilated. Normal left ventricular wall thickness. Overall left ventricular systolic function appears severely reduced. Ejection fraction is visually estimated to be <20%. There is severe diffuse hypokinesis. Indeterminate diastolic function. Questionable apical thrombus. Moderate mitral regurgitation is present. Systolic pulmonary artery pressure (SPAP) is normal and estimated at 30 mmHg (right atrial pressure 3 mmHg). 9/2022 Echo   Left ventricular systolic function is low normal with a visually estimated   ejection fraction of 45-50 %. EF estimated by Dooley's method at 48 %. The left ventricle is normal in size with normal wall thickness. Paradoxic septal motion   Grade I diastolic dysfunction with normal LV pressure. Systolic pulmonary artery pressure (SPAP) is normal and estimated at 19 mmHg   (right atrial pressure 3 mmHg). 7/21/2022   Technically difficult examination. Normal left ventricle size, wall thickness, and systolic function with a visually estimated ejection fraction of 55%. No regional wall motion abnormalities are seen. Normal left ventricular diastolic filling pressure. The left atrium appears mildly enlarged. Mild pulmonic regurgitation. Systolic pulmonary artery pressure (SPAP) is normal and estimated at 25 mmHg (right atrial pressure 8 mmHg). The IVC is dilated in size (>2.1 cm) but collapses >50% with respiration consistent with elevated right atrial pressures (8 mmHg) . Compared with the previous exam on 02/26/21 (EF 25-30%), left ventricular function is now normal.    Left Heart Cath: 2/23/21    Findings   LVEDP 15   LVEF  20%   LV wall motion  apical and mid ballooning with basal hypokinesis consistent with Takotsubo cardiomyopathy   Gradient across AV  none   Mitral regurg  no significant      Coronary Angiogram:  Artery Findings/Result   LM  no angiographic CAD   LAD  no angiographic CAD   LCx  no angiographic CAD         RCA  dominant, no angiographic CAD      Assessment/Plan  1. Normal coronary arteries,  2. Takotsubo/stress cardiomyopathy  3. Severe QTC prolongation will need to watch medications and follow EKGs    Assessment:  35 y.o. who presented with seizure.  He has known takotsubo cardiomyopathy . Notes to have new LBBB on ECG thought to be due to cardiomyopathy rather than ACS in view of normal coronaries in 2021 per cath. Issues:   -LBBB  -Demand ischemia   -Takotsubo CMP  -Poorly controlled HTN  -Seizures  -HCV  -COPD  -Smoking  -polysubstance abuse  -Septic shock/ PNA  -Hepatitis     Plan:  -Keep K>4, Mg>2.  -Entresto 49/51 mg po bid   -Coreg increased to 12.5 mg po bid.  Hold for SBP< 100 mmHg or HR <60 bpm  -Spironolactone 12.5 mg po daily  -Jardiance 10 mg po daily     Optimize medications and recheck echo  EP referral for ICD consideration as outpatient   No CAD on 2/2021 cath

## 2023-01-20 NOTE — PROGRESS NOTES
Occupational Therapy  Facility/Department: [facility] ICU  Occupational Therapy Initial Assessment/Treatment and Discharge    Name: Lori Cadet  : 1990  MRN: [id number]  Date of Service: 2023    Discharge Recommendations:  Lori Cadet scored a 22/24 on the AM-PAC ADL Inpatient form. At this time, no further OT is recommended upon discharge. Recommend patient returns to prior setting with prior services. OT Equipment Recommendations  Equipment Needed: No     Assessment   Performance deficits / Impairments: Decreased functional mobility ; Decreased ADL status  Assessment: Pt presents slightly below his baseline related to balance and activity tolerance following hospital course. Pt demo safe mobility and ADLs at SBA to supervision level. Anticipate pt will progress well with increased activity. No skilled OT needs identified. Pt plans d/c home with 24hr supervision from family due to seizures. Decision Making: Low Complexity  REQUIRES OT FOLLOW-UP: No  Activity Tolerance  Activity Tolerance: Patient Tolerated treatment well        Plan   Occupational Therapy Plan  Times Per Week: d/c OT     Restrictions  Position Activity Restriction  Other position/activity restrictions: up with A prn    Subjective   General  Chart Reviewed: Yes  Additional Pertinent Hx: 33 yo male with a history of intractable epilepsy (follows with Dr. Maria Stewart) who presented  in status epilepticus in the setting of hyperammonemia (resolved), pneumonia and sepsis. Pt intubated  for acute respiratory failure. Pt exubated 1/15. cardiology following for new L bundle branch block. Pt with opiate dependence (heroine, fentanyl) and withdrawing during hospital course. Family / Caregiver Present: Yes (3 family members present)  Referring Practitioner: Dr. Shy Keita  Diagnosis: status epilepticus  Subjective  Subjective: Pt seated in chair, agreeable to OT evaluation.  Denied pain       Social/Functional History  Social/Functional History  Lives With: Family (currently staying with his mother, father and brother)  Type of Home: Trailer  Home Access: Stairs to enter with rails  Entrance Stairs - Number of Steps: 3 HENRIK  Bathroom Shower/Tub: Tub/Shower unit  Bathroom Toilet: Standard  Bathroom Equipment: Grab bars in shower, Shower chair, Hand-held shower  Home Equipment: [employer]  Has the patient had two or more falls in the past year or any fall with injury in the past year?: Yes (has had falls related to seizures, no falls related to balance issues)  ADL Assistance: Independent  Homemaking Assistance: Needs assistance (mother primarily completes but pt helps sometimes)  Ambulation Assistance: Independent  Transfer Assistance: Independent  Active : No  Occupation: On disability (hasn't worked since his first seizure at 35 y/o)  Additional Comments: Pt's parents and brother have been providing around the clock supervision due to pt's seizures. Pt and mother help provide care for pt's father       Objective     Toilet Transfers  Toilet - Technique: Ambulating  Equipment Used: Standard toilet  Toilet Transfer: Stand by assistance    AROM: Within functional limits  Strength: Within functional limits  Coordination: Within functional limits    ADL  Feeding: Independent  Grooming: Supervision  LE Dressing: Stand by assistance (don pants)  Toileting: Stand by assistance       Activity Tolerance  Activity Tolerance: Treatment limited secondary to medical complications  Activity Tolerance Comments: increased HR with activity. approx 118 prior to mobility; increased to 140 with ambulation in riley- multiple standing rest breaks & waited for HR to lower before continuing; after stairs- HR up to 150- RN notified. Pt returned to room/chair at this time due to elev HR.     Bed mobility  Supine to Sit: Independent    Transfers  Sit to stand: Stand by assistance;Supervision (progressed to supervision)  Stand to sit: Stand by assistance;Supervision (progressed to supervision)    Functional Mobility  Equipment: no device  Level of Assist: SBA   Distance: to/from bathroom, hallway  Comment: pt steady, no LOB observed    Sitting Balance:  Standing Balance:      Vision  Vision: Within Functional Limits  Hearing  Hearing: Within functional limits  Cognition  Overall Cognitive Status: WFL  Orientation  Overall Orientation Status: Within Normal Limits         Safety Devices  Type of Devices: Call light within reach; Chair alarm in place; Left in chair;Nurse notified       AM-PAC Score        AM-[facility] Inpatient Daily Activity Raw Score: 22 (01/20/23 1639)  AM-PAC Inpatient ADL T-Scale Score : 47.1 (01/20/23 1639)  ADL Inpatient CMS 0-100% Score: 25.8 (01/20/23 1639)  ADL Inpatient CMS G-Code Modifier : CJ (01/20/23 1639)    Therapy Time   Individual Concurrent Group Co-treatment   Time In 1138         Time Out 1216         Minutes 38         Timed Code Treatment Minutes: [address], OT

## 2023-01-20 NOTE — PROGRESS NOTES
Pt. Discharged home with mom. Paperwork gone over with both the patient and his mother. No questions at this time.

## 2023-01-20 NOTE — PROGRESS NOTES
Internal Medicine Progress Note    Date: 1/20/2023   Patient: Adam Trujillo   [place] [facility] Day: 8      CC: No chief complaint on file.       Interval Hx     NAEO, denies any pain, but reports that he is \"withdrawing\".  Reports he wants to leave, I attempted to temporize him and for now he is staying at least to the afternoon.     However he understands that if he wants to leave prior to discharge, he would need to sign AMA paperwork.  He endorsed understanding.    BP stable, VSS, on room air saturating well.  Labs show stable BMP/CBC, LFTs slowly downtrending.    On Methadone 110 mg/day    Objective     Vital Signs:  Patient Vitals for the past 8 hrs:   BP Temp Temp src Pulse Resp SpO2 Weight   01/20/23 1000 (!) 147/109 -- -- 77 16 98 % --   01/20/23 0900 (!) 136/109 -- -- 80 20 97 % --   01/20/23 0800 (!) 171/105 99.5 °F (37.5 °C) Temporal 88 18 100 % --   01/20/23 0700 -- -- -- 94 19 100 % --   01/20/23 0600 (!) 151/107 -- -- 87 16 99 % --   01/20/23 0537 -- -- -- -- -- -- 123 lb 14.4 oz (56.2 kg)   01/20/23 0500 134/78 -- -- 69 13 98 % --   01/20/23 0400 (!) 146/82 98 °F (36.7 °C) Axillary 72 15 99 % --   01/20/23 0300 133/72 -- -- 74 13 98 % --       Physical Exam  Constitutional:       Appearance: He is not ill-appearing or toxic-appearing.   HENT:      Head: Atraumatic.      Mouth/Throat:      Mouth: Mucous membranes are dry.      Pharynx: No oropharyngeal exudate or posterior oropharyngeal erythema.   Eyes:      General: No scleral icterus.     Extraocular Movements: Extraocular movements intact.      Pupils: Pupils are equal, round, and reactive to light.   Cardiovascular:      Rate and Rhythm: Normal rate and regular rhythm.      Pulses: Normal pulses.      Heart sounds: Normal heart sounds.   Pulmonary:      Effort: Pulmonary effort is normal.      Breath sounds: Normal breath sounds. No wheezing, rhonchi or rales.   Abdominal:      General: Bowel sounds are normal. There is no distension.      Palpations:  Abdomen is soft. Tenderness: There is no abdominal tenderness. Musculoskeletal:      Cervical back: Normal range of motion. Right lower leg: No edema. Left lower leg: No edema. Neurological:      General: No focal deficit present. Mental Status: He is alert and oriented to person, place, and time. Sensory: No sensory deficit. Motor: No weakness. Psychiatric:      Comments: anxious       Labs:  CBC:   Recent Labs     01/18/23 0406 01/19/23  0557   WBC 10.7 10.3   HGB 12.2* 13.0*   HCT 35.7* 38.9*    195       BMP:   Recent Labs     01/18/23 0406 01/20/23  0534    135*   K 3.6 4.1    99   CO2 25 26   BUN 6* 9   CREATININE 0.6* 0.6*   GLUCOSE 113* 102*   PHOS 3.5  --      Magnesium:   Recent Labs     01/18/23 0406 01/19/23  0557 01/20/23  0534   MG 1.80 2.10 1.90     LFT's:   Recent Labs     01/18/23 0406 01/19/23  0557 01/20/23  0534   * 56* 42*   ALT 1,161* 773* 650*   BILITOT 0.6 0.4 0.4   ALKPHOS 97 90 104         U/A: No results for input(s): NITRITE, COLORU, PHUR, LABCAST, WBCUA, RBCUA, MUCUS, TRICHOMONAS, YEAST, BACTERIA, CLARITYU, SPECGRAV, LEUKOCYTESUR, UROBILINOGEN, BILIRUBINUR, BLOODU, GLUCOSEU, KETONES, AMORPHOUS in the last 72 hours. Radiology:  XR CHEST PORTABLE   Final Result      The tip of the endotracheal tube projects over the mid trachea. Improved left-sided airspace disease and unchanged right mid and basilar airspace disease. Assessment & Plan     AHRF 2/2 Septic shock 2/2 Multifocal B/L PNA (resolved)  AGMA 2/2 Lactic Acidosis in setting of septic shock  Req Intubation and Pressors. Lactic acidoses to 6.9, gradually improved. Completed Zosyn x 7 days. Echo showed no evidence of endocarditis. Extubated after 4 days, Req 3 pressors, was ultimately weaned off.  - now on room air satting well  - PT/OT    Acute Encephalopathy 2/2 Status Epilepticus (resolved)  Has Hx of right temporal lobe epilepsy (2019) 2/2 prior TBI. Status epilepticus likely 2/2 septic shock on arrival. cvEEG showed severe generalized slowing, head imaging negative. Initially req Versed, weaned off. Put on Fosphenytoin, later weaned off, and continues on home oxcarbazepine and Xcorpi (Cenobamate). Req slightly extended course of Fentanyl (known IVDU, +ve UDS OA) and Precedex, ultimately weaned off.  - cont home Oxcarbazepine and Xcorpi  - neuro signed off - f/u outpatient promptly  - counseled on driving instructions, safety precautions, etc    Ischemic hepatitis 2/2 septic shock  Hyperammonemia 2/2 seizure vs less likely long term  ALT and AST initially 100s, increased to 2k/3k, Bili remained stable, INR elev 1.7. Ultimately serial HFTs showed gradual down trending. Ammonia initially elev 120s, improved to wnl.  - cont to monitor HFTs    Worsening HFrEF 2/2 worsening Takotsubo CM  New LBBB on admisison, Echo done showing EF <20% with severe, diffuse HK. Worsened from last Echo 09/2022 which showed 40-45%. Elevated trop, however deemed to be demand > ACS. Normal coronaries 2021. Prev GDMT of Coreg and Entresto  - was started on home Coreg and Entresto  - Jardiance and Aldactone initiated  - Cardio following, appreciate recs  - EP consulted per cardio - possible ICD? Polysubstance Abuse  UDS+ fentanyl, known IVDU. On methadone tx. Req extended course of Precedex and Fentanyl. Ultimately weaned off. Home Methadone tx restarted. - cont home methadone    Chronic Issues  COPD - duonebs  HTN - HF GDMT as above  Tobacco use - nicotine patch in place    DVT PPx: SQH  Diet: ADULT DIET;  Dysphagia - Soft and Bite Sized  ADULT ORAL NUTRITION SUPPLEMENT; Breakfast, Dinner; Standard High Calorie/High Protein Oral Supplement   Code status:  Full Code     ELOS:  Barriers to discharge:  Disposition  - Preadmission: home  - Current: ICU > GMF  - Upon discharge: likely home pending PTOT    Will discuss with attending physician Dr. Cherrie Lnyn MD     _____________________  Kareen Sheppard Liliana Sparrow MD   Internal Medicine Resident, PGY-1  Patient seen and examined, labs and imaging reviewed, agree with assessment and plan as outlined above. patients condition continues to improve doing well, asked patient to monitor side effects of medications which i've discussed at length, recurrence of symptoms or new symptoms including but not limited to chest pain, shortness of breath, nausea, vomiting, fevers or chills and seek immediate medical attention or call 911. Less    than 30 minutes spent on case on day of discharge. Pt wanting to leave ama, however I dont want him to be in that position, he is otherwise stable and doing well.       Brandy Londono MD FACP

## 2023-01-20 NOTE — TELEPHONE ENCOUNTER
Pts mother called asking if [facility] could put cardiac MRI as a stat order due to [facility] not being able to get them in until 02/22, please advise

## 2023-01-21 NOTE — DISCHARGE SUMMARY
INTERNAL MEDICINE DEPARTMENT AT [address] [place] [address]  DISCHARGE SUMMARY    Patient ID: Eros Zaragoza                                             Discharge Date: 1/20/2023   Patient's PCP: No primary care provider on file. Discharge Physician: Doron Vann MD MD  Admit Date: 1/12/2023   Admitting Physician: Denisse Velasquez MD    PROBLEMS DURING HOSPITALIZATION:  Present on Admission:   Status epilepticus ([address]   Demand ischemia ([address]   LBBB (left bundle branch block)   Septic shock ([address]   Pneumonia of both lower lobes due to infectious organism   Primary hypertension   Chronic hepatitis C without hepatic coma ([address]   Encounter for human immunodeficiency virus test      DISCHARGE DIAGNOSES:    HPI:  Austen Fernandez is a 77-year-old M with PMH of seizures, hep C, uncontrolled hypertension, takatsubo cardiomyopathy, COPD, temporal seizures, 20 yr tobacco use, HFrEF, polysubstance abuse (heroine and fentanyl) who presents to [facility] ED with seizures. EMS was called for witnessed seizure. Patient was actively seizing when he arrived to the ED. He received multiple doses of benzos on route that did not improve his seizure. He was actively seizing for about an hour. He never returned to baseline. He was eventually intubated due to hypoxia and not protecting his airway. Other tests remarkable for EKG showed new LBBB concern for underlying ischemia he was taken to the Cath Lab. Cardiology felt it was likely sinus tachycardia with aberrancy. Troponins were negative. Other labs remarkable for lactic acid was 6.9, ammonia 129, UDS was positive for cannabinoids methadone and fentanyl. Patient is a known IV drug user and is on methadone at home. UA was negative for any infection. Imaging studies were done and showed CT PE negative for any PE but showed multifocal pneumonia, CT head showed no intra cranial abnormality.   He was treated with large-volume IV fluids and started on broad-spectrum antibiotics he had some worsening hypoxia with pulmonary edema and ultimately required Lasix. He was put on IV Versed, IV fentanyl and IV ketamine for sedation and requirement of paralysis for management of improved oxygenation on the ventilator    The following issues were addressed during hospitalization:    AHRF 2/2 Septic shock 2/2 Multifocal B/L PNA (resolved)  AGMA 2/2 Lactic Acidosis in setting of septic shock  Initially required ICU stay given requirements of intubation and Pressors. CXR showed bilateral findings of likely pneumonia. Lactic acidosis to 6.9, gradually improved. Completed Zosyn x 7 days. Echo showed no evidence of endocarditis. Extubated after 4 days, Req 3 pressors, was ultimately weaned off. Towards end of stable satting well on room air doing well. Follow-up with PCP in 1 week    Acute Encephalopathy 2/2 Status Epilepticus (resolved)  Neuro consulted. Has Hx of right temporal lobe epilepsy (2019) 2/2 prior TBI. Status epilepticus likely 2/2 septic shock on arrival. cvEEG showed severe generalized slowing, head imaging negative. Initially req Versed, weaned off. Put on Fosphenytoin, later weaned off, and continues on home oxcarbazepine and Xcorpi (Cenobamate). Req slightly extended course of Fentanyl (known IVDU, +ve UDS OA) and Precedex, ultimately weaned off. Towards end of stay, continued on home oxcarbazepine and Xcopri. Counseled on driving instructions at length given seizure. Recommended to follow-up with neurology outpatient as soon as possible upon discharge. Ischemic hepatitis 2/2 septic shock  Hyperammonemia 2/2 seizure vs less likely half-way  ALT and AST initially 100s, increased to 2k/3k, Bili remained stable, INR elev 1.7. Ultimately serial HFTs showed gradual down trending. Ammonia initially elev 120s, improved to wnl. LFTs continued to downtrend throughout admission.     Worsening HFrEF 2/2 worsening Takotsubo CM  Questionable Apical Thrombus  Cardiology consulted new LBBB on admisison, Echo done showing EF <20% with severe, diffuse HK. Worsened from last Echo 09/2022 which showed 40-45%. Elevated trop, however deemed to be demand > ACS. Normal coronaries 2021. Prev GDMT of Coreg and Entresto given, Jardiance and Aldactone also started. Additionally, echo also showed \"questionable apical thrombus. \"  Stat cardiac MRI ordered as outpatient, patient implored to have this conducted as soon as possible, patient endorsed understanding the risks of a blood clot in the heart which can be fatal and lead to stroke/death. Patient to follow-up with outpatient cardiology and PCP. Polysubstance Abuse  UDS+ fentanyl, known IVDU. On methadone tx. Req extended course of Precedex and Fentanyl. Ultimately weaned off. Home Methadone tx restarted which was continued. Patient to follow-up with methadone clinic upon discharge. Recommend that he be switched to Suboxone given history of prolonged QTC. Chronic Issues  COPD - duonebs  HTN - HF GDMT as above  Tobacco use - nicotine patch in place    Physical Exam:  Constitutional:       Appearance: He is not ill-appearing or toxic-appearing. HENT:      Head: Atraumatic. Mouth/Throat:      Mouth: Mucous membranes are dry. Pharynx: No oropharyngeal exudate or posterior oropharyngeal erythema. Eyes:      General: No scleral icterus. Extraocular Movements: Extraocular movements intact. Pupils: Pupils are equal, round, and reactive to light. Cardiovascular:      Rate and Rhythm: Normal rate and regular rhythm. Pulses: Normal pulses. Heart sounds: Normal heart sounds. Pulmonary:      Effort: Pulmonary effort is normal.      Breath sounds: Normal breath sounds. No wheezing, rhonchi or rales. Abdominal:      General: Bowel sounds are normal. There is no distension. Palpations: Abdomen is soft. Tenderness: There is no abdominal tenderness.    Musculoskeletal:      Cervical back: Normal range of motion. Right lower leg: No edema. Left lower leg: No edema. Neurological:      General: No focal deficit present. Mental Status: He is alert and oriented to person, place, and time. Sensory: No sensory deficit. Motor: No weakness.    Psychiatric:      Comments: anxious      Consults: Cardiology, neurology, infectious disease  Significant Diagnostic Studies: CV EEG, CT head, MRI head, echocardiogram, CXR  Treatments: Ativan, fosphenytoin, Zosyn, fentanyl/Precedex, pressors, intubation, home meds  Disposition: Home  Discharged Condition: Stable  Follow Up: Primary Care Physician in 1 week    DISCHARGE MEDICATION:       Medication List        START taking these medications      empagliflozin 10 MG tablet  Commonly known as: JARDIANCE  Take 1 tablet by mouth daily  Start taking on: January 21, 2023     hydrOXYzine pamoate 100 MG capsule  Commonly known as: VISTARIL  Take 1 capsule by mouth every 8 hours as needed for Anxiety     nicotine 21 MG/24HR  Commonly known as: [address] [facility] 1 patch onto the skin daily  Start taking on: January 21, 2023     pantoprazole 40 MG tablet  Commonly known as: PROTONIX  Take 1 tablet by mouth daily  Start taking on: January 21, 2023     spironolactone 25 MG tablet  Commonly known as: ALDACTONE  Take 0.5 tablets by mouth daily  Start taking on: January 21, 2023            CHANGE how you take these medications      carvedilol 12.5 MG tablet  Commonly known as: COREG  Take 1 tablet by mouth 2 times daily (with meals)  What changed:   medication strength  how much to take            CONTINUE taking these medications      aspirin 81 MG chewable tablet     Cenobamate 14 x 50 MG & 14 x100 MG Tbpk     Entresto 49-51 MG per tablet  Generic drug: sacubitril-valsartan  Take 1 tablet by mouth 2 times daily     furosemide 20 MG tablet  Commonly known as: LASIX  Take 1 tablet by mouth daily as needed (for weight gain 3lbs in a day or 5lb in a week with leg swelling.)     methadone 10 MG/ML solution  Commonly known as: DOLOPHINE     Oxtellar  MG Tb24  Generic drug: OXcarbazepine ER               Where to Get Your Medications        These medications were sent to [facility] [id number] Milka Menjivar, [address]  [address] [place] [address], [address]  [address]      Phone: [phone]   carvedilol 12.5 MG tablet  empagliflozin 10 MG tablet  hydrOXYzine pamoate 100 MG capsule  nicotine 21 MG/24HR  pantoprazole 40 MG tablet  spironolactone 25 MG tablet          Activity: activity as tolerated, instructed to abstain from driving given history of seizure. Instructions listed at length and patient's discharge instructions. Diet: regular diet, low-sodium  Wound Care: as directed    Time Spent on discharge is more than 30 minutes    Patient was seen at bedside and has no further complaints. Patient agrees with the plan we created together and will follow up in 1 week outpatient. Patient denies any further symptoms that need further hospitalizations. Physical exam, vitals and labs were all appreciated and negative for any further inpatient hospitalization. Pt is stable and agrees to be discharge. Time was allotted to further  the patient based on his health issues and recent hospitalization. Patient understands the treatments and the next steps relating to making the patient relatively healthy again.      Signed:  Luis Angel Wilkes MD, PGY-1  1/20/2023

## 2023-01-23 NOTE — TELEPHONE ENCOUNTER
It appears this order is already STAT. Please call central scheduling to help facilitate scheduling.

## 2023-01-23 NOTE — TELEPHONE ENCOUNTER
Central called asking if [facility] could put new order in for cardiac MRI due to hospitalist at [facility] putting order in and central unable to schedule due to not being able to get authorization, please advise

## 2023-01-24 ENCOUNTER — HOSPITAL ENCOUNTER (OUTPATIENT)
Dept: MRI IMAGING | Age: 33
Discharge: HOME | End: 2023-01-24
Payer: MEDICARE

## 2023-01-24 DIAGNOSIS — I51.81 TAKOTSUBO CARDIOMYOPATHY: ICD-10-CM

## 2023-01-24 DIAGNOSIS — I51.3 APICAL MURAL THROMBUS: ICD-10-CM

## 2023-01-24 PROCEDURE — 6360000004 HC RX CONTRAST MEDICATION: Performed by: NURSE PRACTITIONER

## 2023-01-24 PROCEDURE — A9585 GADOBUTROL INJECTION: HCPCS | Performed by: NURSE PRACTITIONER

## 2023-01-24 PROCEDURE — 75565 CARD MRI VELOC FLOW MAPPING: CPT

## 2023-01-24 RX ADMIN — GADOBUTROL 10 ML: 604.72 INJECTION INTRAVENOUS at 16:22

## 2023-01-25 ENCOUNTER — OFFICE VISIT (OUTPATIENT)
Dept: CARDIOLOGY CLINIC | Age: 33
End: 2023-01-25
Payer: MEDICARE

## 2023-01-25 VITALS
BODY MASS INDEX: 17.29 KG/M2 | HEIGHT: 71 IN | DIASTOLIC BLOOD PRESSURE: 72 MMHG | OXYGEN SATURATION: 97 % | HEART RATE: 86 BPM | SYSTOLIC BLOOD PRESSURE: 128 MMHG | WEIGHT: 123.5 LBS

## 2023-01-25 DIAGNOSIS — I50.22 CHRONIC SYSTOLIC HEART FAILURE (HCC): ICD-10-CM

## 2023-01-25 DIAGNOSIS — I51.81 TAKOTSUBO CARDIOMYOPATHY: ICD-10-CM

## 2023-01-25 DIAGNOSIS — R07.9 CHEST PAIN, UNSPECIFIED TYPE: Primary | ICD-10-CM

## 2023-01-25 DIAGNOSIS — I51.81 STRESS-INDUCED CARDIOMYOPATHY: ICD-10-CM

## 2023-01-25 LAB
LV EF: 59 %
LVEF MODALITY: NORMAL

## 2023-01-25 PROCEDURE — 75565 CARD MRI VELOC FLOW MAPPING: CPT | Performed by: INTERNAL MEDICINE

## 2023-01-25 PROCEDURE — 99214 OFFICE O/P EST MOD 30 MIN: CPT | Performed by: NURSE PRACTITIONER

## 2023-01-25 PROCEDURE — 75561 CARDIAC MRI FOR MORPH W/DYE: CPT | Performed by: INTERNAL MEDICINE

## 2023-01-25 PROCEDURE — 93000 ELECTROCARDIOGRAM COMPLETE: CPT | Performed by: NURSE PRACTITIONER

## 2023-01-25 NOTE — PATIENT INSTRUCTIONS
Continue current meds  Agree with jardiance  Check labs in 1 week- BMP and BNP  Follow up with your neurologist ASAP  Follow up with primary care as planned; agree with discussing anxiety/depression with primary care  Follow up with CHF team 6-8 weeks or sooner if needed

## 2023-01-25 NOTE — PROGRESS NOTES
[address]   Cardiac Follow-up    Primary Care Doctor:  No primary care provider on file. Chief Complaint   Patient presents with    Follow-up    Cardiomyopathy    Chest Pain          Post-Discharge Transitional Care Management Services      Radha Block   YOB: 1990    Date of Visit:  1/25/2023  30 Day Post-Discharge Date: 2/20/23    No Known Allergies  Outpatient Medications Marked as Taking for the 1/25/23 encounter (Office Visit) with Checo Anglin, LEIGHTON - CNP   Medication Sig Dispense Refill    empagliflozin (JARDIANCE) 10 MG tablet Take 1 tablet by mouth daily 30 tablet 3    spironolactone (ALDACTONE) 25 MG tablet Take 0.5 tablets by mouth daily 30 tablet 3    pantoprazole (PROTONIX) 40 MG tablet Take 1 tablet by mouth daily 30 tablet 3    hydrOXYzine pamoate (VISTARIL) 100 MG capsule Take 1 capsule by mouth every 8 hours as needed for Anxiety 42 capsule 0    carvedilol (COREG) 12.5 MG tablet Take 1 tablet by mouth 2 times daily (with meals) 60 tablet 3    Cenobamate 14 x 50 MG & 14 x100 MG TBPK Take 1 tablet by mouth every evening      sacubitril-valsartan (ENTRESTO) 49-51 MG per tablet Take 1 tablet by mouth 2 times daily 180 tablet 1    OXcarbazepine ER (OXTELLAR XR) 600 MG TB24 Take 1,800 mg by mouth every morning      methadone (DOLOPHINE) 10 MG/ML solution Take 110 mg by mouth daily. aspirin 81 MG chewable tablet Take 81 mg by mouth daily (with breakfast)           Vitals:    01/25/23 1012   BP: 128/72   Pulse: 86   SpO2: 97%   Weight: 123 lb 8 oz (56 kg)   Height: 5' 11\" (1.803 m)     Body mass index is 17.22 kg/m².      Wt Readings from Last 3 Encounters:   01/25/23 123 lb 8 oz (56 kg)   01/20/23 123 lb 14.4 oz (56.2 kg)   01/12/23 122 lb 9 oz (55.6 kg)     BP Readings from Last 3 Encounters:   01/25/23 128/72   01/20/23 (!) 166/127   01/12/23 (!) 109/58         History of Present Illness:   I had the pleasure of seeing Vitalyibeth Block in follow up for stress-induced cardiomyopathy    Admitted 2/21/2021-2/27/2021 with seizures, acute encephalopathy, septic shock, respiratory failure, NSTEMI, aspiration, stress-induced cardiomyopathy with LVEF 25% requiring dobutamine support, status post left heart cath showed normal coronaries. Repeat echocardiogram 7/2021 showed recovered EF 55%    Since last visit, has been admitted 5 times in last 6 months for seizures. Recently admitted 1/12/23-1/20/2023 with status epilepticus, septic shock, pneumonia, markedly elevated troponin, wide complex tachycardia, repeat echocardiogram completed showed LVEF down to less than 20% with severe diffuse hypokinesis, and possible questionable apical thrombus. He had cardiac MRI showing improved LVEF to 59% and mild myocardial edema suggestive of recovering stress induced cardiomyopathy     Inpatient course: Discharge summary reviewed- see chart. Current status: He feels like he is improved. He is here with his mom and his aunt. He continues to have intermittent chest pain randomly, like pressures. No shortness of breath. No lower extremity edema. Reports taking medications as prescribed. Initial post-discharge communication occurred between medical assistant and patient on 1/20/2023- see documentation in chart: telephone encounter. Diagnostic test results reviewed: inpatient labs, EKG, MRI-cardiac, and echocardiogram    Patient risk of morbidity and mortality: high    Medical Decision Making: high complexity    Past Medical History:   has a past medical history of Aspiration pneumonia ([address] Hepatitis C, HTN (hypertension), NSTEMI (non-ST elevated myocardial infarction) ([address] Polysubstance abuse ([facility] [address] Seizures ([address] Septic shock ([facility] [address] Systolic CHF ([address] and Takotsubo cardiomyopathy. Surgical History:   has a past surgical history that includes Hand surgery; bronchoscopy (N/A, 2/22/2021); and bronchoscopy (2/22/2021).    Social History:   reports that he has been smoking cigarettes. He has been smoking an average of 2 packs per day. He has never used smokeless tobacco. He reports current drug use. Drugs: IV, Opiates , Marijuana (Weed), and Methamphetamines (Crystal Meth). He reports that he does not drink alcohol. Family History:   No family history on file. Home Medications:  Prior to Admission medications    Medication Sig Start Date End Date Taking? Authorizing Provider   empagliflozin (JARDIANCE) 10 MG tablet Take 1 tablet by mouth daily 1/21/23   Adrian Mendenhall MD   spironolactone (ALDACTONE) 25 MG tablet Take 0.5 tablets by mouth daily 1/21/23   Adrian Mendenhall MD   nicotine (NICODERM CQ) 21 MG/24HR Place 1 patch onto the skin daily 1/21/23   Adrian Mendenhall MD   pantoprazole (PROTONIX) 40 MG tablet Take 1 tablet by mouth daily 1/21/23   Adrian Mendenhall MD   hydrOXYzine pamoate (VISTARIL) 100 MG capsule Take 1 capsule by mouth every 8 hours as needed for Anxiety 1/20/23 2/3/23  Adrian Mendenhall MD   carvedilol (COREG) 12.5 MG tablet Take 1 tablet by mouth 2 times daily (with meals) 1/20/23   Adrian Mendenhall MD   Cenobamate 14 x 50 MG & 14 x100 MG TBPK Take 1 tablet by mouth every evening    Historical Provider, MD   sacubitril-valsartan (ENTRESTO) 49-51 MG per tablet Take 1 tablet by mouth 2 times daily 9/28/22   LEIGHTON Fox CNP   furosemide (LASIX) 20 MG tablet Take 1 tablet by mouth daily as needed (for weight gain 3lbs in a day or 5lb in a week with leg swelling.) 9/16/22   LEIGHTON Fox CNP   OXcarbazepine ER (OXTELLAR XR) 600 MG TB24 Take 1,800 mg by mouth every morning 5/13/21   Historical Provider, MD   methadone (DOLOPHINE) 10 MG/ML solution Take 110 mg by mouth daily. Historical Provider, MD   aspirin 81 MG chewable tablet Take 81 mg by mouth daily (with breakfast) 1/21/20   Historical Provider, MD      Allergies:  Patient has no known allergies.      Physical Examination:    Vitals:    01/25/23 1012   BP: 128/72   Pulse: 86 SpO2: 97%   Weight: 123 lb 8 oz (56 kg)   Height: 5' 11\" (1.803 m)          Constitutional and General Appearance: no apparent distress, thin  HEENT: non-icteric sclera, mask in place   Neck: JVP less than 8 cm h20   Respiratory:  No use of accessory muscles  Clear breath sounds throughout, no wheezing, no crackles, no rhonchi  Cardiovascular:   The apical impulses not displaced  Heart tones are crisp and normal, no murmur/rub/gallop  Regular rate and rhythm, S1,S2 normal  Radial pulses 2+ and equal bilaterally  No  + BLE  Pedal Pulses: 2+ and equal   Abdomen:  No masses or tenderness  Liver: No Abnormalities Noted  Musculoskeletal/Skin:   Exhibits normal gait balance and coordination  There is no clubbing, cyanosis of the extremities  Skin is warm and dry  Moves all extremities well  Neurological/Psychiatric:  Alert and oriented in all spheres  No abnormalities of mood, affect, memory, mentation, or behavior are noted    Lab Data reviewed and analyzed   CBC:   Lab Results   Component Value Date/Time    WBC 10.3 01/19/2023 05:57 AM    WBC 10.7 01/18/2023 04:06 AM    WBC 9.8 01/17/2023 03:38 AM    RBC 4.30 01/19/2023 05:57 AM    RBC 4.05 01/18/2023 04:06 AM    RBC 3.74 01/17/2023 03:38 AM    HGB 13.0 01/19/2023 05:57 AM    HGB 12.2 01/18/2023 04:06 AM    HGB 11.4 01/17/2023 03:38 AM    HCT 38.9 01/19/2023 05:57 AM    HCT 35.7 01/18/2023 04:06 AM    HCT 33.5 01/17/2023 03:38 AM    MCV 90.5 01/19/2023 05:57 AM    MCV 88.2 01/18/2023 04:06 AM    MCV 89.5 01/17/2023 03:38 AM    RDW 15.1 01/19/2023 05:57 AM    RDW 14.5 01/18/2023 04:06 AM    RDW 14.6 01/17/2023 03:38 AM     01/19/2023 05:57 AM     01/18/2023 04:06 AM     01/17/2023 03:38 AM     Iron: No results found for: IRON, TIBC, FERRITIN  BMP:   Lab Results   Component Value Date/Time     01/20/2023 05:34 AM     01/18/2023 04:06 AM     01/17/2023 03:38 AM    K 4.1 01/20/2023 05:34 AM    K 3.6 01/18/2023 04:06 AM    K 3.6 01/17/2023 03:38 AM    K 4.1 01/12/2023 08:59 AM    K 4.7 06/30/2022 03:22 PM    K 4.1 03/17/2022 08:30 AM    CL 99 01/20/2023 05:34 AM     01/18/2023 04:06 AM     01/17/2023 03:38 AM    CO2 26 01/20/2023 05:34 AM    CO2 25 01/18/2023 04:06 AM    CO2 24 01/17/2023 03:38 AM    PHOS 3.5 01/18/2023 04:06 AM    PHOS 3.0 01/17/2023 03:38 AM    PHOS 4.4 01/16/2023 05:42 AM    BUN 9 01/20/2023 05:34 AM    BUN 6 01/18/2023 04:06 AM    BUN 12 01/17/2023 03:38 AM    CREATININE 0.6 01/20/2023 05:34 AM    CREATININE 0.6 01/18/2023 04:06 AM    CREATININE 0.7 01/17/2023 03:38 AM     BNP:   Lab Results   Component Value Date/Time    PROBNP 20,822 01/13/2023 02:57 PM    PROBNP 857 03/16/2022 04:04 PM    PROBNP 33 05/20/2021 03:25 PM     Lipids:   Lab Results   Component Value Date    CHOL 132 02/24/2021        Lab Results   Component Value Date    TRIG 138 02/24/2021        Lab Results   Component Value Date    HDL 36 (L) 02/24/2021        Lab Results   Component Value Date    LDLCALC 68 02/24/2021        Lab Results   Component Value Date    LABVLDL 28 02/24/2021      No results found for: CHOLHDLRATIO    EF:   Lab Results   Component Value Date    LVEF 20 01/13/2023       Testing reviewed:    Echo: 1/31/2020 [address] [place] [address]  - Left ventricle: The cavity size is normal. Wall thickness is normal. Systolic function was normal.    The estimated ejection fraction was in the range of 55% to 60%. Wall motion was normal; there were no regional wall motion abnormalities. Left ventricular diastolic function parameters were normal.  - Right ventricle: Systolic function was normal by objective interpretation. TAPSE: 2.7cm. Tricuspid    annular systolic velocity: 89FX/Z.  - Pericardium, extracardiac: There is a left pleural effusion. CARDIAC CTA: 3/10/2020  FINDINGS:  The left ventricle appears normal in size. The right ventricle appears normal in size. The atria are normal in size.   The left atrium receives two right and two left pulmonary veins without evidence of stenosis. The left atrial appendage is chicken wing shaped without evidence of thrombus. The aorta is normal in size. The pulmonary artery is incompletely visualized. The IVC is normal in size. The coronary sinus is not well visualized. CORONARY ARTERIES:  The coronary arteries have normal origins and proximal courses. The coronary system is right dominant. The SA node artery originates from the right coronary artery. LEFT:  The left main artery is a moderate size vessel. The left main artery has no significant disease. The left anterior descending artery is a moderate size vessel. The LAD has no significant disease. There is a small, area of shallow bridging in the mid LAD. There are three small and patent diagonal arteries. The left circumflex artery is a moderate size vessel. The LCx has no significant disease. The first obtuse marginal artery is a moderate size, branching vessel that supplies a significant portion of the lateral wall. The first obtuse marginal artery has no significant disease. The second obtuse marginal artery has no significant disease. The distal LCx is a small vessel that terminates in a small and patent OM. RIGHT:  The right coronary artery is a moderate size vessel. The RCA has no significant disease. The PDA is patent without significant disease in the visualized portion. The PLV branch is small and patent. The conus artery has an independent origin. PERICARDIUM:  There is no abnormal pericardial calcification. There is no pericardial effusion. Echo 2/21/21  Summary   -- Left ventricular systolic function is severely reduced with a visually estimated ejection fraction of 20-25%. EF estimated by Dooley's method at 25%. The left ventricle is normal in size with normal wall thickness. Only   all basal wall segments moves well, while the rest wall segments akinesis, consistent with takotsubo syndrome.  Normal diastolic function. -- Right ventricular systolic function is reduced. -- Mitral valve leaflets appear mildly myxomatous but opens adequately. Mild mitral regurgitation. Inadequate tricuspid valve regurgitation to estimate systolic pulmonary artery pressure. Left Heart Cath: 2/23/21    Findings   LVEDP 15   LVEF  20%   LV wall motion  apical and mid ballooning with basal hypokinesis consistent with Takotsubo cardiomyopathy   Gradient across AV  none   Mitral regurg  no significant      Coronary Angiogram:  Artery Findings/Result   LM  no angiographic CAD   LAD  no angiographic CAD   LCx  no angiographic CAD         RCA  dominant, no angiographic CAD      Assessment/Plan  1. Normal coronary arteries,  2. Takotsubo/stress cardiomyopathy  3. Severe QTC prolongation will need to watch medications and follow EKGs    Echo 2/26/21  Summary   Definity is used for myocardial border enhancement and thrombus detection. Left ventricular systolic function is severely reduced with a visually estimated ejection fraction of 25-30%. EF estimated by Dooley's  method at 29%. There is severe hypokinesis of the entire anterior, lateral, and apical segments. The basal segments have near normal function. Findings compatible with apical ballooning syndrome. No thrombus is detected. Compare with last echo on 2/22/21: LVEF is slightly improved. Echo 7/20/21  Summary  Technically difficult examination. Normal left ventricle size, wall thickness, and systolic function with a visually estimated ejection fraction of 55%. No regional wall motion abnormalities are seen. Normal left ventricular diastolic filling pressure. The left atrium appears mildly enlarged. Mild pulmonic regurgitation. Systolic pulmonary artery pressure (SPAP) is normal and estimated at 25 mmHg (right atrial pressure 8 mmHg).   The IVC is dilated in size (>2.1 cm) but collapses >50% with respiration consistent with elevated right atrial pressures (8 mmHg) .  Compared with the previous exam on 02/26/21 (EF 25-30%), left ventricular function is now normal.    Echo 1/13/2023   Summary   Definity contrast administered. Left ventricular cavity size is mildly dilated. Normal left ventricular wall   thickness. Overall left ventricular systolic function appears severely reduced. Ejection fraction is visually estimated to be <20%. There is severe diffuse hypokinesis. Indeterminate diastolic function. Questionable apical thrombus. Moderate mitral regurgitation is present. Systolic pulmonary artery pressure (SPAP) is normal and estimated at 30 mmHg   (right atrial pressure 3 mmHg). Cardiac MRI 1/24/2023     Overall findings are suggestive of resolving stress induced CMP given mild myocardial edema without significant LGE. There is normal LV and RV systolic function.        -Normal left ventricular size and systolic function with a calculated ejection fraction of 59% by Dooley's method.   -Normal LV global longitudinal strain (GLS) -27%   -Normal right ventricular size and systolic function with a calculated ejection fraction of 60% by Dooley's method.   -Borderline criteria for diffuse myocardial edema. (Normal myocardium/skeletal ratio - normal < 1.9). -No significant intracardiac shunt. Calculated Qp:Qs:1 (Phase contrast velocity encoding Ao/Pa)   -Normal myocardial resting perfusion imaging.   -No evidence of LV thrombus.   -On delayed enhancement imaging, there is no abnormal hyperenhancement to suggest myocardial scar/inflammation/infiltration. The MRI sequences and imaging planes in this study were tailored for cardiac imaging and are suboptimal for evaluation of non-cardiac structures. NYHA:   I  ACC/ AHA Stage:    c    Assessment:  Cardiomyopathy stress induced.  LVEF 20-25%-->55%--> 20% improved to 59% on Cardiac MRI   Hypertension   Hx of tobacco abuse  Hx of TBI, Right temporal seizures since 2019; followed by  neuro  History of Hep C   IVDU- on methadone  Anemia  Prolong Qtc      Visit Diagnosis:    1. Chest pain, unspecified type    2. Takotsubo cardiomyopathy    3. Stress-induced cardiomyopathy    4. Chronic systolic heart failure (HCC)      Impression: Patient has had recurrence Takotsubo cardiomyopathy as a result of the uncontrolled seizure disorder, and polysubstance abuse. Discussed in detail with the patient and family today treatments and cardiac MRI results. Would recommend continuation of guideline directed medical therapy. Patient is at high risk for recurrence of stress-induced cardiomyopathy. Long discussion as well today regarding his mental health. He continues to go to the methadone clinic, discussed his EKGs and concerns for prolonged QTc. EKG today reviewed by me shows improved QTc interval.  He continues on 110 mg of methadone a day. I recommended to the patient that he find a program to where he can be weaned off of the methadone safely. Patient plans to follow back up with his neurologist as well to discuss his antiseizure medications to prevent status epilepticus. Plan:     Continue current meds  Agree with jardiance  Check labs in 1 week- BMP and BNP  Follow up with your neurologist ASAP  Follow up with primary care as planned; agree with discussing anxiety/depression with primary care  Follow up with CHF team 6-8 weeks or sooner if needed    I appreciate the opportunity for caring for this patient.      Neto Duckworth, LEIGHTON - KRAIG, 1/25/2023

## 2023-01-25 NOTE — RESULT ENCOUNTER NOTE
Reviewed. Recovering stress induced cardiomyopathy.    Cardiac MRI from 1/24/23- shows mild myocardial edema with improved Ef.

## 2023-03-13 ENCOUNTER — OFFICE VISIT (OUTPATIENT)
Dept: CARDIOLOGY CLINIC | Age: 33
End: 2023-03-13
Payer: COMMERCIAL

## 2023-03-13 VITALS
OXYGEN SATURATION: 99 % | WEIGHT: 124.5 LBS | DIASTOLIC BLOOD PRESSURE: 60 MMHG | HEIGHT: 71 IN | HEART RATE: 57 BPM | SYSTOLIC BLOOD PRESSURE: 118 MMHG | BODY MASS INDEX: 17.43 KG/M2

## 2023-03-13 DIAGNOSIS — I51.81 STRESS-INDUCED CARDIOMYOPATHY: ICD-10-CM

## 2023-03-13 DIAGNOSIS — I10 HYPERTENSION, UNSPECIFIED TYPE: ICD-10-CM

## 2023-03-13 PROCEDURE — 99214 OFFICE O/P EST MOD 30 MIN: CPT | Performed by: NURSE PRACTITIONER

## 2023-03-13 PROCEDURE — 3074F SYST BP LT 130 MM HG: CPT | Performed by: NURSE PRACTITIONER

## 2023-03-13 PROCEDURE — 3078F DIAST BP <80 MM HG: CPT | Performed by: NURSE PRACTITIONER

## 2023-03-13 RX ORDER — SPIRONOLACTONE 25 MG/1
12.5 TABLET ORAL DAILY
Qty: 45 TABLET | Refills: 1 | Status: SHIPPED | OUTPATIENT
Start: 2023-03-13

## 2023-03-13 RX ORDER — TAMSULOSIN HYDROCHLORIDE 0.4 MG/1
CAPSULE ORAL NIGHTLY
COMMUNITY
Start: 2023-03-10

## 2023-03-13 RX ORDER — MIDAZOLAM 5 MG/.1ML
SPRAY NASAL DAILY PRN
COMMUNITY
Start: 2023-02-15 | End: 2023-03-15

## 2023-03-13 RX ORDER — CENOBAMATE 150-200 MG
KIT ORAL
COMMUNITY
Start: 2023-03-01

## 2023-03-13 RX ORDER — SACUBITRIL AND VALSARTAN 49; 51 MG/1; MG/1
1 TABLET, FILM COATED ORAL 2 TIMES DAILY
Qty: 60 TABLET | Refills: 5 | Status: SHIPPED | OUTPATIENT
Start: 2023-03-13

## 2023-03-13 NOTE — PROGRESS NOTES
[address]   Cardiac Follow-up    Primary Care Doctor:  No primary care provider on file. Chief Complaint   Patient presents with    Follow-up    Dizziness    Congestive Heart Failure      History of Present Illness:   I had the pleasure of seeing Marychuy Brothers in follow up for stress-induced cardiomyopathy    Admitted 2/21/2021-2/27/2021 with seizures, acute encephalopathy, septic shock, respiratory failure, NSTEMI, aspiration, stress-induced cardiomyopathy with LVEF 25% requiring dobutamine support, status post left heart cath showed normal coronaries. Repeat echocardiogram 7/2021 showed recovered EF 55%  Amitted 1/12/23-1/20/2023 with status epilepticus, septic shock, pneumonia, markedly elevated troponin, wide complex tachycardia, repeat echocardiogram completed showed LVEF down to less than 20% with severe diffuse hypokinesis, and possible questionable apical thrombus. He had cardiac MRI showing improved LVEF to 59% and mild myocardial edema suggestive of recovering stress induced cardiomyopathy     Since last visit, he has been having a lot of dizziness. Some nausea with the dizziness. Not able to eat much due to the nausea. Dizziness imrpoving over the last 1 week. Has tried cymbalta - had more focal seizures . Seizures meds adjusted per neurology. Methadone reduced to 100mg, EKG completed at the methadone clinic- stable. Hasn't had to take the lasix for some time. No swelling. Breathing has been stable. Continues to smoke. Using Fentanyl IV. Here with his mom. Marychuy Brothers describes symptoms including dizziness but denies chest pain, dyspnea on exertion, orthopnea.      Appetite: down  Fluid intake: 2-3 glasses water, 2 ensures   Diet:     Taking medications as prescribed: Yes  Able to afford medications: Yes    Past Medical History:   has a past medical history of Aspiration pneumonia ([facility] [address] Hepatitis C, HTN (hypertension), NSTEMI (non-ST elevated myocardial infarction) ([facility] [address] Polysubstance abuse ([facility] [address] Seizures ([facility] [address] Septic shock ([facility] [address] Systolic CHF ([facility] [address] and Takotsubo cardiomyopathy. Surgical History:   has a past surgical history that includes Hand surgery; bronchoscopy (N/A, 2/22/2021); and bronchoscopy (2/22/2021). Social History:   reports that he has been smoking cigarettes. He has been smoking an average of 2 packs per day. He has never used smokeless tobacco. He reports current drug use. Drugs: IV, Opiates , Marijuana (Weed), and Methamphetamines (Crystal Meth). He reports that he does not drink alcohol. Family History:   No family history on file. Home Medications:  Prior to Admission medications    Medication Sig Start Date End Date Taking? Authorizing Provider   spironolactone (ALDACTONE) 25 MG tablet Take 1 tablet by mouth daily 2/22/23   LEIGHTON Dawn CNP   empagliflozin (JARDIANCE) 10 MG tablet Take 1 tablet by mouth daily 1/21/23   Balbir Ramirez MD   nicotine (NICODERM CQ) 21 MG/24HR Place 1 patch onto the skin daily  Patient not taking: Reported on 1/25/2023 1/21/23   Balbir Ramirez MD   pantoprazole (PROTONIX) 40 MG tablet Take 1 tablet by mouth daily 1/21/23   Balbir Ramirez MD   carvedilol (COREG) 12.5 MG tablet Take 1 tablet by mouth 2 times daily (with meals) 1/20/23   Balbir Ramirez MD   Cenobamate 14 x 50 MG & 14 x100 MG TBPK Take 1 tablet by mouth every evening    Historical Provider, MD   sacubitril-valsartan (ENTRESTO) 49-51 MG per tablet Take 1 tablet by mouth 2 times daily 9/28/22   LEIGHTON Dawn CNP   furosemide (LASIX) 20 MG tablet Take 1 tablet by mouth daily as needed (for weight gain 3lbs in a day or 5lb in a week with leg swelling.)  Patient not taking: Reported on 1/25/2023 9/16/22   LEIGHTON Dawn CNP   OXcarbazepine ER (OXTELLAR XR) 600 MG TB24 Take 1,800 mg by mouth every morning 5/13/21   Historical Provider, MD   methadone (DOLOPHINE) 10 MG/ML solution Take 110 mg by mouth daily. Historical Provider, MD   aspirin 81 MG chewable tablet Take 81 mg by mouth daily (with breakfast) 1/21/20   Historical Provider, MD      Allergies:  Patient has no known allergies. Physical Examination:    Vitals:    03/13/23 0913   BP: 118/60   Pulse: 57   SpO2: 99%   Weight: 124 lb 8 oz (56.5 kg)   Height: 5' 11\" (1.803 m)       Physical exam was reviewed and updated as below at the time of the visit:       Constitutional and General Appearance: no apparent distress, thin  HEENT: non-icteric sclera, mask in place   Neck: JVP less than 8 cm h20   Respiratory:  No use of accessory muscles  Clear breath sounds throughout, no wheezing, no crackles, no rhonchi  Cardiovascular:   The apical impulses not displaced  Heart tones are crisp and normal, no murmur/rub/gallop  Regular rate and rhythm, S1,S2 normal  Radial pulses 2+ and equal bilaterally  No  + BLE  Pedal Pulses: 2+ and equal   Abdomen:  No masses or tenderness  Liver: No Abnormalities Noted  Musculoskeletal/Skin:   Exhibits normal gait balance and coordination  There is no clubbing, cyanosis of the extremities  Left fingers with nicotine stains  Skin is warm and dry  Moves all extremities well  Neurological/Psychiatric:  Alert and oriented in all spheres  No abnormalities of mood, affect, memory, mentation, or behavior are noted    Lab Data reviewed and analyzed   CBC:   Lab Results   Component Value Date/Time    WBC 10.3 01/19/2023 05:57 AM    WBC 10.7 01/18/2023 04:06 AM    WBC 9.8 01/17/2023 03:38 AM    RBC 4.30 01/19/2023 05:57 AM    RBC 4.05 01/18/2023 04:06 AM    RBC 3.74 01/17/2023 03:38 AM    HGB 13.0 01/19/2023 05:57 AM    HGB 12.2 01/18/2023 04:06 AM    HGB 11.4 01/17/2023 03:38 AM    HCT 38.9 01/19/2023 05:57 AM    HCT 35.7 01/18/2023 04:06 AM    HCT 33.5 01/17/2023 03:38 AM    MCV 90.5 01/19/2023 05:57 AM    MCV 88.2 01/18/2023 04:06 AM    MCV 89.5 01/17/2023 03:38 AM    RDW 15.1 01/19/2023 05:57 AM    RDW 14.5 01/18/2023 04:06 AM    RDW 14.6 01/17/2023 03:38 AM     01/19/2023 05:57 AM     01/18/2023 04:06 AM     01/17/2023 03:38 AM     Iron: No results found for: IRON, TIBC, FERRITIN  BMP:   Lab Results   Component Value Date/Time     01/20/2023 05:34 AM     01/18/2023 04:06 AM     01/17/2023 03:38 AM    K 4.1 01/20/2023 05:34 AM    K 3.6 01/18/2023 04:06 AM    K 3.6 01/17/2023 03:38 AM    K 4.1 01/12/2023 08:59 AM    K 4.7 06/30/2022 03:22 PM    K 4.1 03/17/2022 08:30 AM    CL 99 01/20/2023 05:34 AM     01/18/2023 04:06 AM     01/17/2023 03:38 AM    CO2 26 01/20/2023 05:34 AM    CO2 25 01/18/2023 04:06 AM    CO2 24 01/17/2023 03:38 AM    PHOS 3.5 01/18/2023 04:06 AM    PHOS 3.0 01/17/2023 03:38 AM    PHOS 4.4 01/16/2023 05:42 AM    BUN 9 01/20/2023 05:34 AM    BUN 6 01/18/2023 04:06 AM    BUN 12 01/17/2023 03:38 AM    CREATININE 0.6 01/20/2023 05:34 AM    CREATININE 0.6 01/18/2023 04:06 AM    CREATININE 0.7 01/17/2023 03:38 AM     BNP:   Lab Results   Component Value Date/Time    PROBNP 20,822 01/13/2023 02:57 PM    PROBNP 857 03/16/2022 04:04 PM    PROBNP 33 05/20/2021 03:25 PM     Lipids:   Lab Results   Component Value Date    CHOL 132 02/24/2021        Lab Results   Component Value Date    TRIG 138 02/24/2021        Lab Results   Component Value Date    HDL 36 (L) 02/24/2021        Lab Results   Component Value Date    LDLCALC 68 02/24/2021        Lab Results   Component Value Date    LABVLDL 28 02/24/2021      No results found for: CHOLHDLRKRISTINO    EF:   Lab Results   Component Value Date    LVEF 59 01/24/2023       Testing reviewed:    Echo: 1/31/2020 [facility]  - Left ventricle: The cavity size is normal. Wall thickness is normal. Systolic function was normal.    The estimated ejection fraction was in the range of 55% to 60%. Wall motion was normal; there were no regional wall motion abnormalities.  Left ventricular diastolic function parameters were normal.  - Right ventricle: Systolic function was normal by objective interpretation. TAPSE: 2.7cm. Tricuspid    annular systolic velocity: 16NH/Q.  - Pericardium, extracardiac: There is a left pleural effusion. CARDIAC CTA: 3/10/2020  FINDINGS:  The left ventricle appears normal in size. The right ventricle appears normal in size. The atria are normal in size. The left atrium receives two right and two left pulmonary veins without evidence of stenosis. The left atrial appendage is chicken wing shaped without evidence of thrombus. The aorta is normal in size. The pulmonary artery is incompletely visualized. The IVC is normal in size. The coronary sinus is not well visualized. CORONARY ARTERIES:  The coronary arteries have normal origins and proximal courses. The coronary system is right dominant. The SA node artery originates from the right coronary artery. LEFT:  The left main artery is a moderate size vessel. The left main artery has no significant disease. The left anterior descending artery is a moderate size vessel. The LAD has no significant disease. There is a small, area of shallow bridging in the mid LAD. There are three small and patent diagonal arteries. The left circumflex artery is a moderate size vessel. The LCx has no significant disease. The first obtuse marginal artery is a moderate size, branching vessel that supplies a significant portion of the lateral wall. The first obtuse marginal artery has no significant disease. The second obtuse marginal artery has no significant disease. The distal LCx is a small vessel that terminates in a small and patent OM. RIGHT:  The right coronary artery is a moderate size vessel. The RCA has no significant disease. The PDA is patent without significant disease in the visualized portion. The PLV branch is small and patent. The conus artery has an independent origin. PERICARDIUM:  There is no abnormal pericardial calcification. There is no pericardial effusion.      Echo 2/21/21  Summary   -- Left ventricular systolic function is severely reduced with a visually estimated ejection fraction of 20-25%. EF estimated by Dooley's method at 25%. The left ventricle is normal in size with normal wall thickness. Only   all basal wall segments moves well, while the rest wall segments akinesis, consistent with takotsubo syndrome. Normal diastolic function. -- Right ventricular systolic function is reduced. -- Mitral valve leaflets appear mildly myxomatous but opens adequately. Mild mitral regurgitation. Inadequate tricuspid valve regurgitation to estimate systolic pulmonary artery pressure. Left Heart Cath: 2/23/21    Findings   LVEDP 15   LVEF  20%   LV wall motion  apical and mid ballooning with basal hypokinesis consistent with Takotsubo cardiomyopathy   Gradient across AV  none   Mitral regurg  no significant      Coronary Angiogram:  Artery Findings/Result   LM  no angiographic CAD   LAD  no angiographic CAD   LCx  no angiographic CAD         RCA  dominant, no angiographic CAD      Assessment/Plan  1. Normal coronary arteries,  2. Takotsubo/stress cardiomyopathy  3. Severe QTC prolongation will need to watch medications and follow EKGs    Echo 2/26/21  Summary   Definity is used for myocardial border enhancement and thrombus detection. Left ventricular systolic function is severely reduced with a visually estimated ejection fraction of 25-30%. EF estimated by Dooley's  method at 29%. There is severe hypokinesis of the entire anterior, lateral, and apical segments. The basal segments have near normal function. Findings compatible with apical ballooning syndrome. No thrombus is detected. Compare with last echo on 2/22/21: LVEF is slightly improved. Echo 7/20/21  Summary  Technically difficult examination. Normal left ventricle size, wall thickness, and systolic function with a visually estimated ejection fraction of 55%.   No regional wall motion abnormalities are seen. Normal left ventricular diastolic filling pressure. The left atrium appears mildly enlarged. Mild pulmonic regurgitation. Systolic pulmonary artery pressure (SPAP) is normal and estimated at 25 mmHg (right atrial pressure 8 mmHg). The IVC is dilated in size (>2.1 cm) but collapses >50% with respiration consistent with elevated right atrial pressures (8 mmHg) . Compared with the previous exam on 02/26/21 (EF 25-30%), left ventricular function is now normal.    Echo 1/13/2023   Summary   Definity contrast administered. Left ventricular cavity size is mildly dilated. Normal left ventricular wall   thickness. Overall left ventricular systolic function appears severely reduced. Ejection fraction is visually estimated to be <20%. There is severe diffuse hypokinesis. Indeterminate diastolic function. Questionable apical thrombus. Moderate mitral regurgitation is present. Systolic pulmonary artery pressure (SPAP) is normal and estimated at 30 mmHg   (right atrial pressure 3 mmHg). Cardiac MRI 1/24/2023     Overall findings are suggestive of resolving stress induced CMP given mild myocardial edema without significant LGE. There is normal LV and RV systolic function.        -Normal left ventricular size and systolic function with a calculated ejection fraction of 59% by Dooley's method.   -Normal LV global longitudinal strain (GLS) -27%   -Normal right ventricular size and systolic function with a calculated ejection fraction of 60% by Dooley's method.   -Borderline criteria for diffuse myocardial edema. (Normal myocardium/skeletal ratio - normal < 1.9). -No significant intracardiac shunt. Calculated Qp:Qs:1 (Phase contrast velocity encoding Ao/Pa)   -Normal myocardial resting perfusion imaging.   -No evidence of LV thrombus.   -On delayed enhancement imaging, there is no abnormal hyperenhancement to suggest myocardial scar/inflammation/infiltration.        The MRI sequences and imaging planes in this study were tailored for cardiac imaging and are suboptimal for evaluation of non-cardiac structures. NYHA:   I  ACC/ AHA Stage:    c    Assessment:  Cardiomyopathy stress induced. LVEF 20-25%-->55%--> 20% improved to 59% on Cardiac MRI   Hypertension   Hx of tobacco abuse  Hx of TBI, Right temporal seizures since 2019; followed by  neuro  History of Hep C   IVDU- on methadone  Anemia  Prolong Qtc- improved     Visit Diagnosis:    1. Stress-induced cardiomyopathy    2. Hypertension, unspecified type      Plan:     Continue current meds  Continue to stay as active as possible  Okay to trial Remeron  No changes to cardiac meds today  Follow up 3 months or sooner if needed  If you develop worse heart failure symptoms, please let us know, would need to consider repeating echocardiogram to evaluate heart values as he is high risk for developing endocarditis given ongoing IVDU. I appreciate the opportunity for caring for this patient.      Demetria Herron, APRN - CNP, 3/13/2023

## 2023-03-13 NOTE — PATIENT INSTRUCTIONS
Continue current meds  Continue to stay as active as possible  Okay to trial Remeron  No changes to cardiac meds today  Follow up 3 months or sooner if needed  If you develop worse heart failure symptoms, please let us know, would need to consider repeating echocardiogram

## 2023-03-14 ENCOUNTER — HOSPITAL ENCOUNTER (EMERGENCY)
Age: 33
Discharge: HOME | End: 2023-03-14
Payer: COMMERCIAL

## 2023-03-14 VITALS
WEIGHT: 124 LBS | BODY MASS INDEX: 17.36 KG/M2 | TEMPERATURE: 98 F | HEIGHT: 71 IN | OXYGEN SATURATION: 97 % | DIASTOLIC BLOOD PRESSURE: 63 MMHG | RESPIRATION RATE: 18 BRPM | SYSTOLIC BLOOD PRESSURE: 110 MMHG | HEART RATE: 70 BPM

## 2023-03-14 DIAGNOSIS — F19.10 SUBSTANCE ABUSE (HCC): ICD-10-CM

## 2023-03-14 DIAGNOSIS — G40.919 BREAKTHROUGH SEIZURE (HCC): Primary | ICD-10-CM

## 2023-03-14 LAB
ALBUMIN SERPL-MCNC: 4.1 G/DL (ref 3.4–5)
ALBUMIN/GLOB SERPL: 1.2 {RATIO} (ref 1.1–2.2)
ALP SERPL-CCNC: 130 U/L (ref 40–129)
ALT SERPL-CCNC: 31 U/L (ref 10–40)
AMPHETAMINES UR QL SCN>1000 NG/ML: ABNORMAL
ANION GAP SERPL CALCULATED.3IONS-SCNC: 7 MMOL/L (ref 3–16)
AST SERPL-CCNC: 25 U/L (ref 15–37)
BARBITURATES UR QL SCN>200 NG/ML: ABNORMAL
BASOPHILS # BLD: 0.1 K/UL (ref 0–0.2)
BASOPHILS NFR BLD: 0.6 %
BENZODIAZ UR QL SCN>200 NG/ML: ABNORMAL
BILIRUB SERPL-MCNC: <0.2 MG/DL (ref 0–1)
BILIRUB UR QL STRIP.AUTO: NEGATIVE
BUN SERPL-MCNC: 10 MG/DL (ref 7–20)
CALCIUM SERPL-MCNC: 9.1 MG/DL (ref 8.3–10.6)
CANNABINOIDS UR QL SCN>50 NG/ML: POSITIVE
CHLORIDE SERPL-SCNC: 100 MMOL/L (ref 99–110)
CK SERPL-CCNC: 59 U/L (ref 39–308)
CLARITY UR: CLEAR
CO2 SERPL-SCNC: 25 MMOL/L (ref 21–32)
COCAINE UR QL SCN: ABNORMAL
COLOR UR: YELLOW
CREAT SERPL-MCNC: 0.7 MG/DL (ref 0.9–1.3)
DEPRECATED RDW RBC AUTO: 14.2 % (ref 12.4–15.4)
DRUG SCREEN COMMENT UR-IMP: ABNORMAL
EOSINOPHIL # BLD: 0.4 K/UL (ref 0–0.6)
EOSINOPHIL NFR BLD: 3.1 %
ETHANOLAMINE SERPL-MCNC: NORMAL MG/DL (ref 0–0.08)
FENTANYL SCREEN, URINE: POSITIVE
FLUAV RNA RESP QL NAA+PROBE: NOT DETECTED
FLUBV RNA RESP QL NAA+PROBE: NOT DETECTED
GFR SERPLBLD CREATININE-BSD FMLA CKD-EPI: >60 ML/MIN/{1.73_M2}
GLUCOSE SERPL-MCNC: 99 MG/DL (ref 70–99)
GLUCOSE UR STRIP.AUTO-MCNC: >=1000 MG/DL
HCT VFR BLD AUTO: 38.4 % (ref 40.5–52.5)
HGB BLD-MCNC: 12.8 G/DL (ref 13.5–17.5)
HGB UR QL STRIP.AUTO: NEGATIVE
KETONES UR STRIP.AUTO-MCNC: NEGATIVE MG/DL
LACTATE BLDV-SCNC: 1 MMOL/L (ref 0.4–2)
LEUKOCYTE ESTERASE UR QL STRIP.AUTO: NEGATIVE
LYMPHOCYTES # BLD: 1.8 K/UL (ref 1–5.1)
LYMPHOCYTES NFR BLD: 14.4 %
MCH RBC QN AUTO: 30.3 PG (ref 26–34)
MCHC RBC AUTO-ENTMCNC: 33.3 G/DL (ref 31–36)
MCV RBC AUTO: 91.2 FL (ref 80–100)
METHADONE UR QL SCN>300 NG/ML: POSITIVE
MONOCYTES # BLD: 0.6 K/UL (ref 0–1.3)
MONOCYTES NFR BLD: 5.1 %
NEUTROPHILS # BLD: 9.6 K/UL (ref 1.7–7.7)
NEUTROPHILS NFR BLD: 76.8 %
NITRITE UR QL STRIP.AUTO: NEGATIVE
OPIATES UR QL SCN>300 NG/ML: ABNORMAL
OXYCODONE UR QL SCN: ABNORMAL
PCP UR QL SCN>25 NG/ML: ABNORMAL
PH UR STRIP.AUTO: 7 [PH] (ref 5–8)
PH UR STRIP: 7 [PH]
PLATELET # BLD AUTO: 180 K/UL (ref 135–450)
PMV BLD AUTO: 7.3 FL (ref 5–10.5)
POTASSIUM SERPL-SCNC: 4.3 MMOL/L (ref 3.5–5.1)
PROT SERPL-MCNC: 7.4 G/DL (ref 6.4–8.2)
PROT UR STRIP.AUTO-MCNC: ABNORMAL MG/DL
RBC # BLD AUTO: 4.21 M/UL (ref 4.2–5.9)
RBC #/AREA URNS HPF: NORMAL /HPF (ref 0–4)
SARS-COV-2 RNA RESP QL NAA+PROBE: NOT DETECTED
SODIUM SERPL-SCNC: 132 MMOL/L (ref 136–145)
SP GR UR STRIP.AUTO: 1.02 (ref 1–1.03)
UA COMPLETE W REFLEX CULTURE PNL UR: ABNORMAL
UA DIPSTICK W REFLEX MICRO PNL UR: YES
URN SPEC COLLECT METH UR: ABNORMAL
UROBILINOGEN UR STRIP-ACNC: 0.2 E.U./DL
WBC # BLD AUTO: 12.5 K/UL (ref 4–11)
WBC #/AREA URNS HPF: NORMAL /HPF (ref 0–5)

## 2023-03-14 PROCEDURE — 36415 COLL VENOUS BLD VENIPUNCTURE: CPT

## 2023-03-14 PROCEDURE — 80307 DRUG TEST PRSMV CHEM ANLYZR: CPT

## 2023-03-14 PROCEDURE — 96374 THER/PROPH/DIAG INJ IV PUSH: CPT

## 2023-03-14 PROCEDURE — 82077 ASSAY SPEC XCP UR&BREATH IA: CPT

## 2023-03-14 PROCEDURE — 83605 ASSAY OF LACTIC ACID: CPT

## 2023-03-14 PROCEDURE — 82550 ASSAY OF CK (CPK): CPT

## 2023-03-14 PROCEDURE — 99284 EMERGENCY DEPT VISIT MOD MDM: CPT

## 2023-03-14 PROCEDURE — 2580000003 HC RX 258: Performed by: PHYSICIAN ASSISTANT

## 2023-03-14 PROCEDURE — 81001 URINALYSIS AUTO W/SCOPE: CPT

## 2023-03-14 PROCEDURE — 6360000002 HC RX W HCPCS: Performed by: PHYSICIAN ASSISTANT

## 2023-03-14 PROCEDURE — 80053 COMPREHEN METABOLIC PANEL: CPT

## 2023-03-14 PROCEDURE — 85025 COMPLETE CBC W/AUTO DIFF WBC: CPT

## 2023-03-14 PROCEDURE — 6370000000 HC RX 637 (ALT 250 FOR IP): Performed by: PHYSICIAN ASSISTANT

## 2023-03-14 PROCEDURE — 87636 SARSCOV2 & INF A&B AMP PRB: CPT

## 2023-03-14 PROCEDURE — 96361 HYDRATE IV INFUSION ADD-ON: CPT

## 2023-03-14 RX ORDER — ACETAMINOPHEN 500 MG
1000 TABLET ORAL ONCE
Status: COMPLETED | OUTPATIENT
Start: 2023-03-14 | End: 2023-03-14

## 2023-03-14 RX ORDER — KETOROLAC TROMETHAMINE 30 MG/ML
15 INJECTION, SOLUTION INTRAMUSCULAR; INTRAVENOUS ONCE
Status: COMPLETED | OUTPATIENT
Start: 2023-03-14 | End: 2023-03-14

## 2023-03-14 RX ORDER — 0.9 % SODIUM CHLORIDE 0.9 %
1000 INTRAVENOUS SOLUTION INTRAVENOUS ONCE
Status: COMPLETED | OUTPATIENT
Start: 2023-03-14 | End: 2023-03-14

## 2023-03-14 RX ADMIN — ACETAMINOPHEN 1000 MG: 500 TABLET ORAL at 20:59

## 2023-03-14 RX ADMIN — SODIUM CHLORIDE 1000 ML: 9 INJECTION, SOLUTION INTRAVENOUS at 19:08

## 2023-03-14 RX ADMIN — KETOROLAC TROMETHAMINE 15 MG: 30 INJECTION, SOLUTION INTRAMUSCULAR at 19:09

## 2023-03-14 RX ADMIN — SODIUM CHLORIDE 1000 ML: 9 INJECTION, SOLUTION INTRAVENOUS at 21:00

## 2023-03-14 ASSESSMENT — PAIN - FUNCTIONAL ASSESSMENT: PAIN_FUNCTIONAL_ASSESSMENT: NONE - DENIES PAIN

## 2023-03-14 ASSESSMENT — PAIN SCALES - GENERAL
PAINLEVEL_OUTOF10: 7
PAINLEVEL_OUTOF10: 8

## 2023-03-14 ASSESSMENT — PAIN DESCRIPTION - LOCATION: LOCATION: GENERALIZED

## 2023-03-14 NOTE — ED PROVIDER NOTES
[address] ED  EMERGENCY DEPARTMENT ENCOUNTER        Pt Name: Jeb Boggs  MRN: [id number]  [place] 1990  Date of evaluation: 3/14/2023  Provider: JR Coelho  PCP: No primary care provider on file. Note Started: 6:17 PM EDT 3/14/23      ALBIAN. I have evaluated this patient. My supervising physician was available for consultation. CHIEF COMPLAINT       Chief Complaint   Patient presents with    Seizures     Had a seizure today. Hx of seizures. Is taking his medicine like he is supposed to. HISTORY OF PRESENT ILLNESS: 1 or more Elements     History from : Patient    Limitations to history : None    Jeb Boggs is a 35 y.o. male with past medical history of polysubstance abuse, seizures, history of septic shock with Takotsubo cardiomyopathy and systolic congestive heart failure, hepatitis C, hypertension, who presents for evaluation of breakthrough seizure. His breakthrough seizure happened a couple hours prior to arrival.  It lasted for about 5 minutes, was tonic-clonic in nature, he broke through from it without any assistance of other medications. He used fentanyl IV yesterday. Currently complaining of headache which he typically gets postictally. Denies hitting his head. Nursing Notes were all reviewed and agreed with or any disagreements were addressed in the HPI. REVIEW OF SYSTEMS :      Review of Systems    Positives and Pertinent negatives as per HPI.      SURGICAL HISTORY     Past Surgical History:   Procedure Laterality Date    BRONCHOSCOPY N/A 2/22/2021    BRONCHOSCOPY DIAGNOSTIC OR CELL [address] ONLY performed by Jacqui Lorenzo MD at [facility]  2/22/2021    BRONCHOSCOPY THERAPUTIC ASPIRATION INITIAL performed by Jacqui Lorenzo MD at [address], PARTIAL AMPUTATIONS       [address]       Discharge Medication List as of 3/14/2023  9:48 PM        CONTINUE these medications which have NOT CHANGED    Details   sacubitril-valsartan (ENTRESTO) 49-51 MG per tablet Take 1 tablet by mouth 2 times daily, Disp-60 tablet, R-5Normal      tamsulosin (FLOMAX) 0.4 MG capsule Take by mouth nightlyHistorical Med      Midazolam (NAYZILAM) 5 MG/0.1ML SOLN by Nasal route daily as neededHistorical Med      !! XCOPRI 14 x 150 MG & 14 x200 MG TBPK DAWHistorical Med      spironolactone (ALDACTONE) 25 MG tablet Take 0.5 tablets by mouth daily, Disp-45 tablet, R-1Normal      empagliflozin (JARDIANCE) 10 MG tablet Take 1 tablet by mouth daily, Disp-30 tablet, R-3Normal      nicotine (NICODERM CQ) 21 MG/24HR Place 1 patch onto the skin daily, Disp-30 patch, R-3Normal      pantoprazole (PROTONIX) 40 MG tablet Take 1 tablet by mouth daily, Disp-30 tablet, R-3Normal      carvedilol (COREG) 12.5 MG tablet Take 1 tablet by mouth 2 times daily (with meals), Disp-60 tablet, R-3Normal      !! Cenobamate 14 x 50 MG & 14 x100 MG TBPK Take 1 tablet by mouth every eveningHistorical Med      furosemide (LASIX) 20 MG tablet Take 1 tablet by mouth daily as needed (for weight gain 3lbs in a day or 5lb in a week with leg swelling.), Disp-90 tablet, R-1Normal      OXcarbazepine ER (OXTELLAR XR) 600 MG TB24 Take 1,800 mg by mouth every morningHistorical Med      methadone (DOLOPHINE) 10 MG/ML solution Take 100 mg by mouth daily. Historical Med      aspirin 81 MG chewable tablet Take 81 mg by mouth daily (with breakfast)Historical Med       !! - Potential duplicate medications found. Please discuss with provider. ALLERGIES     Keppra [levetiracetam]    FAMILYHISTORY     History reviewed. No pertinent family history.      SOCIAL HISTORY       Social History     Tobacco Use    Smoking status: Every Day     Packs/day: 2.00     Types: Cigarettes    Smokeless tobacco: Never   Vaping Use    Vaping Use: Never used   Substance Use Topics    Alcohol use: No    Drug use: Yes     Types: IV, Opiates , Marijuana (Enmanuel Moots), Methamphetamines (Crystal Meth)     Comment: last used heroin 6/29-6/30 -01/25/23- goes to Methadone clinic, 03/13/23- uses some fentanyl, methadone       SCREENINGS        Kristen Coma Scale  Eye Opening: Spontaneous  Best Verbal Response: Oriented  Best Motor Response: Obeys commands  Bernard Coma Scale Score: 15                CIWA Assessment  BP: 110/63  Heart Rate: 70           PHYSICAL EXAM  1 or more Elements     ED Triage Vitals [03/14/23 1743]   BP Temp Temp Source Heart Rate Resp SpO2 Height Weight   124/87 98 °F (36.7 °C) Oral 57 18 99 % 5' 11\" (1.803 m) 124 lb (56.2 kg)       Physical Exam    PHYSICAL EXAM  /63   Pulse 70   Temp 98 °F (36.7 °C) (Oral)   Resp 18   Ht 5' 11\" (1.803 m)   Wt 124 lb (56.2 kg)   SpO2 97%   BMI 17.29 kg/m²   GENERAL APPEARANCE: Awake and alert. Cooperative. Chronically ill-appearing adult male laying supine in exam bed nondiaphoretic breathing comfortably on room air show no sign of acute distress. Seen and evaluated by myself in room 18   HEAD: Normocephalic. Atraumatic. EYES: PERRL. EOM's grossly intact. ENT: Mucous membranes are dry. .  No lingual injury. TMs intact, no hemotympanum. Negative raccoon eyes or gutierrez sign. NECK: Supple. HEART: RRR. No murmurs. Radial pulses 2+ symmetric, PT pulses 2+, symmetric   LUNGS: Respirations unlabored. CTAB. Good air exchange. Speaking comfortably in full sentences. ABDOMEN: Soft. Non-distended. Non-tender. No masses. EXTREMITIES: No peripheral edema. Moves all extremities equally without assistance or difficulty. All extremities neurovascularly intact. SKIN: Warm and dry. No acute rashes. There are track marks to bl UE   NEUROLOGICAL: Alert and oriented x 4. GCS 15. CN 2-9, 11, 12 intact. No pronator drift. No gross facial drooping. Power intact to UE and LE, sensation intact x 4. No tremors or ataxia. No seizure activity    PSYCHIATRIC: Normal mood and affect.       DIAGNOSTIC RESULTS   LABS:    Labs Reviewed   CBC WITH AUTO DIFFERENTIAL - Abnormal; Notable for the following components:       Result Value    WBC 12.5 (*)     Hemoglobin 12.8 (*)     Hematocrit 38.4 (*)     Neutrophils Absolute 9.6 (*)     All other components within normal limits   COMPREHENSIVE METABOLIC PANEL W/ REFLEX TO MG FOR LOW K - Abnormal; Notable for the following components:    Sodium 132 (*)     Creatinine 0.7 (*)     Alkaline Phosphatase 130 (*)     All other components within normal limits   URINALYSIS WITH REFLEX TO CULTURE - Abnormal; Notable for the following components:    Glucose, Ur >=1000 (*)     Protein, UA TRACE (*)     All other components within normal limits   URINE DRUG SCREEN - Abnormal; Notable for the following components:    Cannabinoid Scrn, Ur POSITIVE (*)     Methadone Screen, Urine POSITIVE (*)     FENTANYL SCREEN, URINE POSITIVE (*)     All other components within normal limits   COVID-19 & INFLUENZA COMBO   CK   LACTIC ACID   ETHANOL   MICROSCOPIC URINALYSIS       When ordered only abnormal lab results are displayed. All other labs were within normal range or not returned as of this dictation. EKG: When ordered, EKG's are interpreted by the Emergency Department Physician in the absence of a cardiologist.  Please see their note for interpretation of EKG. RADIOLOGY:   Non-plain film images such as CT, Ultrasound and MRI are read by the radiologist. Plain radiographic images are visualized and preliminarily interpreted by the ED Provider with the below findings:        Interpretation per the Radiologist below, if available at the time of this note:    No orders to display     No results found. No results found.     PROCEDURES   Unless otherwise noted below, none     Procedures    CRITICAL CARE TIME (.cctime)       PAST MEDICAL HISTORY      has a past medical history of Aspiration pneumonia ([address] Hepatitis C, HTN (hypertension), NSTEMI (non-ST elevated myocardial infarction) ([address] Polysubstance abuse ([address] Seizures ([address] Septic shock ([address] Systolic CHF ([address] and Takotsubo cardiomyopathy. Chronic Conditions affecting Care: above    EMERGENCY DEPARTMENT COURSE and DIFFERENTIAL DIAGNOSIS/MDM:   Vitals:    Vitals:    03/14/23 1933 03/14/23 2032 03/14/23 2102 03/14/23 2132   BP: 115/75 116/73 111/75 110/63   Pulse: 63 53 63 70   Resp: 10 13 11 18   Temp:       TempSrc:       SpO2: 99% 98% 100% 97%   Weight:       Height:           Patient was given the following medications:  Medications   0.9 % sodium chloride bolus (0 mLs IntraVENous Stopped 3/14/23 2020)   ketorolac (TORADOL) injection 15 mg (15 mg IntraVENous Given 3/14/23 1909)   acetaminophen (TYLENOL) tablet 1,000 mg (1,000 mg Oral Given 3/14/23 2059)   0.9 % sodium chloride bolus (0 mLs IntraVENous Stopped 3/14/23 2148)             Is this patient to be included in the SEP-1 Core Measure due to severe sepsis or septic shock? No   Exclusion criteria - the patient is NOT to be included for SEP-1 Core Measure due to: Infection is not suspected    CONSULTS: (Who and What was discussed)  None          Records Reviewed : Outpatient Notes      CC/HPI Summary, DDx, ED Course, and Reassessment: Patient seen and evaluated. Old records reviewed. Diagnostic testing reviewed and results discussed. I have independently evaluated this patient based upon my scope of practice. Supervising physician was in the department for consultation as needed. Patient is a pleasant 29-year-old male who presents for evaluation of breakthrough seizure. Patient seen and evaluated by myself history obtained from patient and his mother who presents to bedside. Exam is notable for nontoxic adult male, vital signs stable throughout his ER stay. He is presently alert and oriented x4 GCS 15. He is complaining of a headache which is typical for his postictal state. He was given IV fluids and Toradol for his headache with interval improvement.   He had basic work-up to look for electrolyte derangements or signs of acute infection. Ethanol negative. Lactic within normal limits CK within normal limits. While this does not definitively rule out tonic-clonic seizure it is overall reassuring. Metabolic panel is remarkable for mild hyponatremia I do not believe this is the source of his breakthrough seizure, IV fluids were initiated. No other electrolyte derangements. CBC reveals mild acute leukocytosis this could actually be reflective of a recent seizure-like activity. I have no concern for sepsis. UA is negative for sign of infection. Urine drug screen is positive for cannabis, methadone and fentanyl, I have concerned that patient's polysubstance abuse is contributing to his breakthrough seizures. Patient is well aware of this. I reviewed patient's outpatient medical record from his neurology office, he has a plan, and his neurologist is aware that patient continues to struggle with substance abuse problems which is the rate limiting step. Patient was offered additional resources in the department for substance abuse however he declined noting that he already has a substance abuse counselor and sees a therapist at his methadone clinic. Patient was monitored for several hours in the department without any additional seizure-like activity. At this time I believe patient is a reasonable candidate for discharge home with outpatient follow-up with his treating specialists and I encouraged the patient to discontinue his use of street drugs. Disposition Considerations (include 1 Tests not done, Shared Decision Making, Pt Expectation of Test or Tx.): Pt is in agreement with the current plan and all questions were addressed. Appropriate for outpatient management        I am the Primary Clinician of Record. FINAL IMPRESSION      1.  Breakthrough seizure ([address]    2. Substance abuse [facility])          DISPOSITION/PLAN     DISPOSITION Decision To Discharge 03/14/2023 09:53:32 PM      PATIENT REFERRED TO:  Your Neurologist    Schedule an appointment as soon as possible for a visit       [place] [facility]  [address]  Go to   If symptoms worsen    DISCHARGE MEDICATIONS:  Discharge Medication List as of 3/14/2023  9:48 PM          DISCONTINUED MEDICATIONS:  Discharge Medication List as of 3/14/2023  9:48 PM                 (Please note that portions of this note were completed with a voice recognition program.  Efforts were made to edit the dictations but occasionally words are mis-transcribed.)    Jordan Goel (electronically signed)            Jordan Goel  03/17/23 1558

## 2023-03-15 NOTE — DISCHARGE INSTRUCTIONS
Do not take illicit substances as this can lower your seizure threshold.     SEEK CARE IMMEDIATELY IF:   You have chest pain that spreads to your arms, jaw, or back.    You have one or more of the following signs or symptoms of a stroke:    A very bad headache. This may feel like the worst headache of your life.    Confusion and problems speaking or understanding things.    Not able to see out of one or both of your eyes.    Too dizzy to stand, trouble walking, or loss of balance.    Weakness or numbness of your face, arm, or leg, especially on one side of your body.   You have trouble breathing.    Your blood pressure is higher then what your caregiver has told you it should be.     This is an emergency. Call 911 or 0 () to activate the EMS (emergency medical service). Ask for an ambulance to take you to the nearest hospital. Do not drive yourself.

## 2023-04-24 ENCOUNTER — HOSPITAL ENCOUNTER (EMERGENCY)
Age: 33
Discharge: HOME | End: 2023-04-24
Attending: EMERGENCY MEDICINE
Payer: COMMERCIAL

## 2023-04-24 ENCOUNTER — APPOINTMENT (OUTPATIENT)
Dept: CT IMAGING | Age: 33
End: 2023-04-24
Payer: COMMERCIAL

## 2023-04-24 VITALS
SYSTOLIC BLOOD PRESSURE: 106 MMHG | HEART RATE: 64 BPM | OXYGEN SATURATION: 100 % | RESPIRATION RATE: 15 BRPM | DIASTOLIC BLOOD PRESSURE: 63 MMHG | TEMPERATURE: 97.5 F

## 2023-04-24 DIAGNOSIS — R42 LIGHTHEADEDNESS: ICD-10-CM

## 2023-04-24 DIAGNOSIS — R51.9 ACUTE NONINTRACTABLE HEADACHE, UNSPECIFIED HEADACHE TYPE: Primary | ICD-10-CM

## 2023-04-24 LAB
ALBUMIN SERPL-MCNC: 4.2 G/DL (ref 3.4–5)
ALBUMIN/GLOB SERPL: 1.3 {RATIO} (ref 1.1–2.2)
ALP SERPL-CCNC: 125 U/L (ref 40–129)
ALT SERPL-CCNC: 14 U/L (ref 10–40)
ANION GAP SERPL CALCULATED.3IONS-SCNC: 8 MMOL/L (ref 3–16)
AST SERPL-CCNC: 15 U/L (ref 15–37)
BASOPHILS # BLD: 0.1 K/UL (ref 0–0.2)
BASOPHILS NFR BLD: 0.7 %
BILIRUB SERPL-MCNC: <0.2 MG/DL (ref 0–1)
BUN SERPL-MCNC: 34 MG/DL (ref 7–20)
CALCIUM SERPL-MCNC: 9.1 MG/DL (ref 8.3–10.6)
CHLORIDE SERPL-SCNC: 99 MMOL/L (ref 99–110)
CO2 SERPL-SCNC: 29 MMOL/L (ref 21–32)
CREAT SERPL-MCNC: 1.2 MG/DL (ref 0.9–1.3)
DEPRECATED RDW RBC AUTO: 14.7 % (ref 12.4–15.4)
EOSINOPHIL # BLD: 0.5 K/UL (ref 0–0.6)
EOSINOPHIL NFR BLD: 5.4 %
GFR SERPLBLD CREATININE-BSD FMLA CKD-EPI: >60 ML/MIN/{1.73_M2}
GLUCOSE SERPL-MCNC: 110 MG/DL (ref 70–99)
HCT VFR BLD AUTO: 38.5 % (ref 40.5–52.5)
HGB BLD-MCNC: 13.1 G/DL (ref 13.5–17.5)
LYMPHOCYTES # BLD: 2.8 K/UL (ref 1–5.1)
LYMPHOCYTES NFR BLD: 29.7 %
MAGNESIUM SERPL-MCNC: 2.3 MG/DL (ref 1.8–2.4)
MCH RBC QN AUTO: 30.7 PG (ref 26–34)
MCHC RBC AUTO-ENTMCNC: 33.9 G/DL (ref 31–36)
MCV RBC AUTO: 90.5 FL (ref 80–100)
MONOCYTES # BLD: 0.4 K/UL (ref 0–1.3)
MONOCYTES NFR BLD: 4.3 %
NEUTROPHILS # BLD: 5.7 K/UL (ref 1.7–7.7)
NEUTROPHILS NFR BLD: 59.9 %
PLATELET # BLD AUTO: 261 K/UL (ref 135–450)
PMV BLD AUTO: 7.4 FL (ref 5–10.5)
POTASSIUM SERPL-SCNC: 4.4 MMOL/L (ref 3.5–5.1)
PROT SERPL-MCNC: 7.4 G/DL (ref 6.4–8.2)
RBC # BLD AUTO: 4.25 M/UL (ref 4.2–5.9)
SODIUM SERPL-SCNC: 136 MMOL/L (ref 136–145)
TROPONIN, HIGH SENSITIVITY: 7 NG/L (ref 0–22)
WBC # BLD AUTO: 9.6 K/UL (ref 4–11)

## 2023-04-24 PROCEDURE — 80053 COMPREHEN METABOLIC PANEL: CPT

## 2023-04-24 PROCEDURE — 84484 ASSAY OF TROPONIN QUANT: CPT

## 2023-04-24 PROCEDURE — 70450 CT HEAD/BRAIN W/O DYE: CPT

## 2023-04-24 PROCEDURE — 96374 THER/PROPH/DIAG INJ IV PUSH: CPT

## 2023-04-24 PROCEDURE — 6360000002 HC RX W HCPCS: Performed by: NURSE PRACTITIONER

## 2023-04-24 PROCEDURE — 85025 COMPLETE CBC W/AUTO DIFF WBC: CPT

## 2023-04-24 PROCEDURE — 99284 EMERGENCY DEPT VISIT MOD MDM: CPT

## 2023-04-24 PROCEDURE — 2580000003 HC RX 258: Performed by: NURSE PRACTITIONER

## 2023-04-24 PROCEDURE — 96375 TX/PRO/DX INJ NEW DRUG ADDON: CPT

## 2023-04-24 PROCEDURE — 83735 ASSAY OF MAGNESIUM: CPT

## 2023-04-24 PROCEDURE — 93005 ELECTROCARDIOGRAM TRACING: CPT | Performed by: NURSE PRACTITIONER

## 2023-04-24 RX ORDER — KETOROLAC TROMETHAMINE 30 MG/ML
15 INJECTION, SOLUTION INTRAMUSCULAR; INTRAVENOUS ONCE
Status: COMPLETED | OUTPATIENT
Start: 2023-04-24 | End: 2023-04-24

## 2023-04-24 RX ORDER — 0.9 % SODIUM CHLORIDE 0.9 %
1000 INTRAVENOUS SOLUTION INTRAVENOUS ONCE
Status: COMPLETED | OUTPATIENT
Start: 2023-04-24 | End: 2023-04-24

## 2023-04-24 RX ORDER — DIPHENHYDRAMINE HYDROCHLORIDE 50 MG/ML
12.5 INJECTION INTRAMUSCULAR; INTRAVENOUS ONCE
Status: COMPLETED | OUTPATIENT
Start: 2023-04-24 | End: 2023-04-24

## 2023-04-24 RX ORDER — METOCLOPRAMIDE HYDROCHLORIDE 5 MG/ML
10 INJECTION INTRAMUSCULAR; INTRAVENOUS ONCE
Status: COMPLETED | OUTPATIENT
Start: 2023-04-24 | End: 2023-04-24

## 2023-04-24 RX ADMIN — KETOROLAC TROMETHAMINE 15 MG: 30 INJECTION, SOLUTION INTRAMUSCULAR at 18:16

## 2023-04-24 RX ADMIN — DIPHENHYDRAMINE HYDROCHLORIDE 12.5 MG: 50 INJECTION, SOLUTION INTRAMUSCULAR; INTRAVENOUS at 18:16

## 2023-04-24 RX ADMIN — METOCLOPRAMIDE 10 MG: 5 INJECTION, SOLUTION INTRAMUSCULAR; INTRAVENOUS at 18:16

## 2023-04-24 RX ADMIN — SODIUM CHLORIDE 1000 ML: 9 INJECTION, SOLUTION INTRAVENOUS at 18:18

## 2023-04-24 ASSESSMENT — PAIN - FUNCTIONAL ASSESSMENT: PAIN_FUNCTIONAL_ASSESSMENT: 0-10

## 2023-04-24 ASSESSMENT — PAIN DESCRIPTION - LOCATION: LOCATION: HEAD

## 2023-04-24 ASSESSMENT — PAIN SCALES - GENERAL: PAINLEVEL_OUTOF10: 8

## 2023-04-24 NOTE — DISCHARGE INSTRUCTIONS
You were seen today for dizziness/lightheadedness and headaches. Your work-up was negative for any signs of infection, anemia, electrolyte abnormalities. CT of your head was negative for any acute findings. I do believe this is a component of some dehydration and decreased food intake. I would recommend you try to hydrate is much as possible with fluids and Ensure drinks for calorie and protein intake. I would also recommend close follow-up that you call your cardiologist and neurologist tomorrow. They can continue monitoring the symptoms and make any changes to your medications if needed.   Worsening symptoms including fevers chills, headaches that will not go away, nausea vomiting, you may return to the ER for reevaluation

## 2023-04-24 NOTE — ED PROVIDER NOTES
[facility]  ED  Emergency Department Encounter    Patient Name: Chani Johnston  MRN: [id number]  YOB: 1990  Date of Evaluation: 4/24/2023  Provider: No primary care provider on file. Note Started: 6:24 PM EDT 4/24/23    CHIEF COMPLAINT  Headache, Dizziness (On going two weeks. States not getting any better ), and Nausea    SHARED SERVICE VISIT  I have seen and evaluated this patient with my supervising physician, Dr. Dani Jara. HISTORY OF PRESENT ILLNESS  Chani Johnston is a 35 y.o. male who presents to the ED approximately 2 weeks of lightheadedness and intermittent headaches. Patient reports he was recently seen at [facility] ER for similar complaints however he did have abdominal pain and nausea vomiting at that time and had a full work-up. States he was able to go home. Patient states he is not been eating or drinking well at home and since has been having some episodes of lightheadedness. He states his headaches are intermittent and do resolve with Tylenol and ibuprofen. States his lightheadedness does resolve when he lies down. It is worsening when he is getting up from a lying or sitting position. He denies any actual syncope. No one-sided weakness, slurred speech or difficulty speaking or swallowing, no changes in mentation or confusion. No other complaints, modifying factors or associated symptoms. Nursing notes reviewed were all reviewed and agreed with or any disagreements were addressed in the HPI.     PMH:  Past Medical History:   Diagnosis Date    Aspiration pneumonia (HCC)     Hepatitis C     HTN (hypertension)     NSTEMI (non-ST elevated myocardial infarction) ([address]     Polysubstance abuse ([address]     Seizures ([address]     Septic shock (HCC)     Systolic CHF ([address]     Takotsubo cardiomyopathy      Surgical History:  Past Surgical History:   Procedure Laterality Date    BRONCHOSCOPY N/A 2/22/2021    BRONCHOSCOPY DIAGNOSTIC OR CELL [address] ONLY performed

## 2023-04-24 NOTE — ED PROVIDER NOTES
I independently performed a history and physical on Abby Grider. All diagnostic, treatment, and disposition decisions were made by myself in conjunction with the advanced practice provider/resident. For [facility], please see the ALBINA/resident's documentation. I personally saw the patient and performed a substantive portion of the visit including all aspects of the medical decision making. Briefly, this is a 77-year-old male with history of seizure disorder, polysubstance abuse presenting for evaluation of headache. He states that he has had a headache, dizziness for the past 2 weeks. He denies any weakness or numbness in the extremities. On exam no focal neurological deficits. No respiratory distress. Intermittent CT head imaging which shows no acute process. Patient underwent treatment with migraine cocktail and does report improvement of his headache and is appropriate discharge home. Advised on follow-up with PCP and neurologist.      EKG  The Ekg interpreted by myself in the emergency department in the absence of a cardiologist.  sinus bradycardia, rate=56  with a rate of 56  Axis is   Normal  QTc is   422  Intervals and Durations are unremarkable. QRS duration is mildly prolonged at 126 ms  No specific ST-T wave changes appreciated. No evidence of acute ischemia. No significant change from prior EKG dated January 13, 2023      I, Dr. Earlene Alvarado, am the primary clinician of record. Comment: Please note this report has been produced using speech recognition software and may contain errors related to that system including errors in grammar, punctuation, and spelling, as well as words and phrases that may be inappropriate. If there are any questions or concerns please feel free to contact the dictating provider for clarification.      Violeta Solomon MD  04/24/23 2056

## 2023-04-25 LAB
EKG ATRIAL RATE: 56 BPM
EKG DIAGNOSIS: NORMAL
EKG P AXIS: 54 DEGREES
EKG P-R INTERVAL: 182 MS
EKG Q-T INTERVAL: 438 MS
EKG QRS DURATION: 126 MS
EKG QTC CALCULATION (BAZETT): 422 MS
EKG R AXIS: 41 DEGREES
EKG T AXIS: 71 DEGREES
EKG VENTRICULAR RATE: 56 BPM

## 2023-04-25 PROCEDURE — 93010 ELECTROCARDIOGRAM REPORT: CPT | Performed by: INTERNAL MEDICINE

## 2023-04-30 ENCOUNTER — HOSPITAL ENCOUNTER (EMERGENCY)
Age: 33
Discharge: HOME | End: 2023-04-30
Payer: COMMERCIAL

## 2023-04-30 VITALS
TEMPERATURE: 98 F | RESPIRATION RATE: 8 BRPM | OXYGEN SATURATION: 99 % | WEIGHT: 125 LBS | DIASTOLIC BLOOD PRESSURE: 51 MMHG | HEART RATE: 52 BPM | HEIGHT: 71 IN | BODY MASS INDEX: 17.5 KG/M2 | SYSTOLIC BLOOD PRESSURE: 96 MMHG

## 2023-04-30 DIAGNOSIS — G40.919 BREAKTHROUGH SEIZURE (HCC): Primary | ICD-10-CM

## 2023-04-30 DIAGNOSIS — R42 LIGHTHEADEDNESS: ICD-10-CM

## 2023-04-30 DIAGNOSIS — R11.2 NAUSEA AND VOMITING, UNSPECIFIED VOMITING TYPE: ICD-10-CM

## 2023-04-30 DIAGNOSIS — F19.10 POLYSUBSTANCE ABUSE (HCC): ICD-10-CM

## 2023-04-30 LAB
ALBUMIN SERPL-MCNC: 4 G/DL (ref 3.4–5)
ALBUMIN/GLOB SERPL: 1.4 {RATIO} (ref 1.1–2.2)
ALP SERPL-CCNC: 114 U/L (ref 40–129)
ALT SERPL-CCNC: 22 U/L (ref 10–40)
AMPHETAMINES UR QL SCN>1000 NG/ML: ABNORMAL
ANION GAP SERPL CALCULATED.3IONS-SCNC: 8 MMOL/L (ref 3–16)
AST SERPL-CCNC: 20 U/L (ref 15–37)
BARBITURATES UR QL SCN>200 NG/ML: ABNORMAL
BASOPHILS # BLD: 0.1 K/UL (ref 0–0.2)
BASOPHILS NFR BLD: 0.8 %
BENZODIAZ UR QL SCN>200 NG/ML: ABNORMAL
BILIRUB SERPL-MCNC: <0.2 MG/DL (ref 0–1)
BILIRUB UR QL STRIP.AUTO: NEGATIVE
BUN SERPL-MCNC: 25 MG/DL (ref 7–20)
CALCIUM SERPL-MCNC: 8.4 MG/DL (ref 8.3–10.6)
CANNABINOIDS UR QL SCN>50 NG/ML: POSITIVE
CHLORIDE SERPL-SCNC: 99 MMOL/L (ref 99–110)
CLARITY UR: CLEAR
CO2 SERPL-SCNC: 28 MMOL/L (ref 21–32)
COCAINE UR QL SCN: POSITIVE
COLOR UR: YELLOW
CREAT SERPL-MCNC: 0.8 MG/DL (ref 0.9–1.3)
DEPRECATED RDW RBC AUTO: 15 % (ref 12.4–15.4)
DRUG SCREEN COMMENT UR-IMP: ABNORMAL
EKG ATRIAL RATE: 61 BPM
EKG DIAGNOSIS: NORMAL
EKG P AXIS: 52 DEGREES
EKG P-R INTERVAL: 174 MS
EKG Q-T INTERVAL: 448 MS
EKG QRS DURATION: 140 MS
EKG QTC CALCULATION (BAZETT): 450 MS
EKG R AXIS: -28 DEGREES
EKG T AXIS: 93 DEGREES
EKG VENTRICULAR RATE: 61 BPM
EOSINOPHIL # BLD: 0.7 K/UL (ref 0–0.6)
EOSINOPHIL NFR BLD: 7.2 %
FENTANYL SCREEN, URINE: POSITIVE
GFR SERPLBLD CREATININE-BSD FMLA CKD-EPI: >60 ML/MIN/{1.73_M2}
GLUCOSE SERPL-MCNC: 115 MG/DL (ref 70–99)
GLUCOSE UR STRIP.AUTO-MCNC: >=1000 MG/DL
HCT VFR BLD AUTO: 40 % (ref 40.5–52.5)
HGB BLD-MCNC: 13.3 G/DL (ref 13.5–17.5)
HGB UR QL STRIP.AUTO: NEGATIVE
KETONES UR STRIP.AUTO-MCNC: NEGATIVE MG/DL
LEUKOCYTE ESTERASE UR QL STRIP.AUTO: NEGATIVE
LYMPHOCYTES # BLD: 2.4 K/UL (ref 1–5.1)
LYMPHOCYTES NFR BLD: 23.2 %
MCH RBC QN AUTO: 30.4 PG (ref 26–34)
MCHC RBC AUTO-ENTMCNC: 33.3 G/DL (ref 31–36)
MCV RBC AUTO: 91.5 FL (ref 80–100)
METHADONE UR QL SCN>300 NG/ML: POSITIVE
MONOCYTES # BLD: 0.6 K/UL (ref 0–1.3)
MONOCYTES NFR BLD: 5.8 %
NEUTROPHILS # BLD: 6.6 K/UL (ref 1.7–7.7)
NEUTROPHILS NFR BLD: 63 %
NITRITE UR QL STRIP.AUTO: NEGATIVE
OPIATES UR QL SCN>300 NG/ML: POSITIVE
OXYCODONE UR QL SCN: ABNORMAL
PCP UR QL SCN>25 NG/ML: ABNORMAL
PH UR STRIP.AUTO: 8 [PH] (ref 5–8)
PH UR STRIP: 8 [PH]
PLATELET # BLD AUTO: 274 K/UL (ref 135–450)
PMV BLD AUTO: 7.2 FL (ref 5–10.5)
POTASSIUM SERPL-SCNC: 4.8 MMOL/L (ref 3.5–5.1)
PROT SERPL-MCNC: 6.9 G/DL (ref 6.4–8.2)
PROT UR STRIP.AUTO-MCNC: NEGATIVE MG/DL
RBC # BLD AUTO: 4.37 M/UL (ref 4.2–5.9)
SODIUM SERPL-SCNC: 135 MMOL/L (ref 136–145)
SP GR UR STRIP.AUTO: 1.01 (ref 1–1.03)
TROPONIN, HIGH SENSITIVITY: <6 NG/L (ref 0–22)
UA COMPLETE W REFLEX CULTURE PNL UR: ABNORMAL
UA DIPSTICK W REFLEX MICRO PNL UR: ABNORMAL
URN SPEC COLLECT METH UR: ABNORMAL
UROBILINOGEN UR STRIP-ACNC: 0.2 E.U./DL
WBC # BLD AUTO: 10.5 K/UL (ref 4–11)

## 2023-04-30 PROCEDURE — 6360000002 HC RX W HCPCS: Performed by: PHYSICIAN ASSISTANT

## 2023-04-30 PROCEDURE — 2580000003 HC RX 258: Performed by: PHYSICIAN ASSISTANT

## 2023-04-30 PROCEDURE — 85025 COMPLETE CBC W/AUTO DIFF WBC: CPT

## 2023-04-30 PROCEDURE — 93010 ELECTROCARDIOGRAM REPORT: CPT | Performed by: INTERNAL MEDICINE

## 2023-04-30 PROCEDURE — 96375 TX/PRO/DX INJ NEW DRUG ADDON: CPT

## 2023-04-30 PROCEDURE — 84484 ASSAY OF TROPONIN QUANT: CPT

## 2023-04-30 PROCEDURE — 81003 URINALYSIS AUTO W/O SCOPE: CPT

## 2023-04-30 PROCEDURE — 99284 EMERGENCY DEPT VISIT MOD MDM: CPT

## 2023-04-30 PROCEDURE — 96372 THER/PROPH/DIAG INJ SC/IM: CPT

## 2023-04-30 PROCEDURE — 80307 DRUG TEST PRSMV CHEM ANLYZR: CPT

## 2023-04-30 PROCEDURE — 96374 THER/PROPH/DIAG INJ IV PUSH: CPT

## 2023-04-30 PROCEDURE — 80053 COMPREHEN METABOLIC PANEL: CPT

## 2023-04-30 PROCEDURE — 93005 ELECTROCARDIOGRAM TRACING: CPT | Performed by: PHYSICIAN ASSISTANT

## 2023-04-30 PROCEDURE — 6370000000 HC RX 637 (ALT 250 FOR IP): Performed by: PHYSICIAN ASSISTANT

## 2023-04-30 RX ORDER — PROMETHAZINE HYDROCHLORIDE 25 MG/ML
25 INJECTION, SOLUTION INTRAMUSCULAR; INTRAVENOUS ONCE
Status: COMPLETED | OUTPATIENT
Start: 2023-04-30 | End: 2023-04-30

## 2023-04-30 RX ORDER — DROPERIDOL 2.5 MG/ML
0.62 INJECTION, SOLUTION INTRAMUSCULAR; INTRAVENOUS ONCE
Status: COMPLETED | OUTPATIENT
Start: 2023-04-30 | End: 2023-04-30

## 2023-04-30 RX ORDER — ONDANSETRON 2 MG/ML
4 INJECTION INTRAMUSCULAR; INTRAVENOUS ONCE
Status: COMPLETED | OUTPATIENT
Start: 2023-04-30 | End: 2023-04-30

## 2023-04-30 RX ORDER — OXCARBAZEPINE 150 MG/1
600 TABLET, FILM COATED ORAL ONCE
Status: COMPLETED | OUTPATIENT
Start: 2023-04-30 | End: 2023-04-30

## 2023-04-30 RX ORDER — 0.9 % SODIUM CHLORIDE 0.9 %
1000 INTRAVENOUS SOLUTION INTRAVENOUS ONCE
Status: COMPLETED | OUTPATIENT
Start: 2023-04-30 | End: 2023-04-30

## 2023-04-30 RX ORDER — MECLIZINE HCL 12.5 MG/1
25 TABLET ORAL ONCE
Status: COMPLETED | OUTPATIENT
Start: 2023-04-30 | End: 2023-04-30

## 2023-04-30 RX ADMIN — MECLIZINE 25 MG: 12.5 TABLET ORAL at 10:58

## 2023-04-30 RX ADMIN — PROMETHAZINE HYDROCHLORIDE 25 MG: 25 INJECTION INTRAMUSCULAR; INTRAVENOUS at 09:52

## 2023-04-30 RX ADMIN — OXCARBAZEPINE 600 MG: 150 TABLET, FILM COATED ORAL at 09:54

## 2023-04-30 RX ADMIN — DROPERIDOL 0.62 MG: 2.5 INJECTION, SOLUTION INTRAMUSCULAR; INTRAVENOUS at 10:57

## 2023-04-30 RX ADMIN — SODIUM CHLORIDE 1000 ML: 9 INJECTION, SOLUTION INTRAVENOUS at 08:19

## 2023-04-30 RX ADMIN — ONDANSETRON 4 MG: 2 INJECTION INTRAMUSCULAR; INTRAVENOUS at 08:18

## 2023-04-30 RX ADMIN — SODIUM CHLORIDE 1000 ML: 9 INJECTION, SOLUTION INTRAVENOUS at 10:58

## 2023-04-30 ASSESSMENT — PAIN - FUNCTIONAL ASSESSMENT: PAIN_FUNCTIONAL_ASSESSMENT: NONE - DENIES PAIN

## 2023-06-09 RX ORDER — EMPAGLIFLOZIN 10 MG/1
TABLET, FILM COATED ORAL
Qty: 30 TABLET | Refills: 3 | OUTPATIENT
Start: 2023-06-09

## 2023-06-19 ENCOUNTER — PATIENT MESSAGE (OUTPATIENT)
Dept: CARDIOLOGY CLINIC | Age: 33
End: 2023-06-19

## 2023-06-21 NOTE — TELEPHONE ENCOUNTER
Last OV 3/13/23  Next OV 8/3/23  CMP 4/30/23  CBC 4/30/23    NPRB OOT     Attempted to call pt, to check that the pharmacy listed is correct. No answer, LMOVM.

## 2023-06-21 NOTE — TELEPHONE ENCOUNTER
From: Timi Pabon  To: Jimmy Dodson  Sent: 6/19/2023 3:21 PM EDT  Subject: Refill     I just ran out of my jardiance medication today and I wasn't able to get them filled.

## 2023-07-25 ENCOUNTER — HOSPITAL ENCOUNTER (EMERGENCY)
Age: 33
Discharge: HOME | End: 2023-07-25
Attending: EMERGENCY MEDICINE
Payer: MEDICAID

## 2023-07-25 ENCOUNTER — APPOINTMENT (OUTPATIENT)
Dept: GENERAL RADIOLOGY | Age: 33
End: 2023-07-25
Payer: MEDICAID

## 2023-07-25 VITALS
TEMPERATURE: 96.9 F | DIASTOLIC BLOOD PRESSURE: 76 MMHG | HEART RATE: 74 BPM | RESPIRATION RATE: 14 BRPM | WEIGHT: 125 LBS | OXYGEN SATURATION: 98 % | SYSTOLIC BLOOD PRESSURE: 113 MMHG | BODY MASS INDEX: 17.5 KG/M2 | HEIGHT: 71 IN

## 2023-07-25 DIAGNOSIS — R56.9 SEIZURE (HCC): Primary | ICD-10-CM

## 2023-07-25 LAB
ALBUMIN SERPL-MCNC: 5.2 G/DL (ref 3.4–5)
ALBUMIN/GLOB SERPL: 1.4 {RATIO} (ref 1.1–2.2)
ALP SERPL-CCNC: 161 U/L (ref 40–129)
ALT SERPL-CCNC: 21 U/L (ref 10–40)
ANION GAP SERPL CALCULATED.3IONS-SCNC: 15 MMOL/L (ref 3–16)
AST SERPL-CCNC: 22 U/L (ref 15–37)
BASE EXCESS BLDV CALC-SCNC: -0.6 MMOL/L (ref -3–3)
BASOPHILS # BLD: 0.1 K/UL (ref 0–0.2)
BASOPHILS NFR BLD: 0.6 %
BILIRUB SERPL-MCNC: <0.2 MG/DL (ref 0–1)
BUN SERPL-MCNC: 20 MG/DL (ref 7–20)
CALCIUM SERPL-MCNC: 10 MG/DL (ref 8.3–10.6)
CHLORIDE SERPL-SCNC: 94 MMOL/L (ref 99–110)
CO2 BLDV-SCNC: 28 MMOL/L
CO2 SERPL-SCNC: 25 MMOL/L (ref 21–32)
COHGB MFR BLDV: 11.5 % (ref 0–1.5)
CREAT SERPL-MCNC: 1 MG/DL (ref 0.9–1.3)
DEPRECATED RDW RBC AUTO: 13.2 % (ref 12.4–15.4)
EOSINOPHIL # BLD: 0.3 K/UL (ref 0–0.6)
EOSINOPHIL NFR BLD: 2.9 %
GFR SERPLBLD CREATININE-BSD FMLA CKD-EPI: >60 ML/MIN/{1.73_M2}
GLUCOSE SERPL-MCNC: 89 MG/DL (ref 70–99)
HCO3 BLDV-SCNC: 26.2 MMOL/L (ref 23–29)
HCT VFR BLD AUTO: 43.2 % (ref 40.5–52.5)
HGB BLD-MCNC: 15 G/DL (ref 13.5–17.5)
LYMPHOCYTES # BLD: 1.3 K/UL (ref 1–5.1)
LYMPHOCYTES NFR BLD: 14.2 %
MCH RBC QN AUTO: 32.2 PG (ref 26–34)
MCHC RBC AUTO-ENTMCNC: 34.6 G/DL (ref 31–36)
MCV RBC AUTO: 93 FL (ref 80–100)
METHGB MFR BLDV: 0.1 %
MONOCYTES # BLD: 0.4 K/UL (ref 0–1.3)
MONOCYTES NFR BLD: 5 %
NEUTROPHILS # BLD: 6.9 K/UL (ref 1.7–7.7)
NEUTROPHILS NFR BLD: 77.3 %
O2 THERAPY: ABNORMAL
PCO2 BLDV: 50.8 MMHG (ref 40–50)
PH BLDV: 7.33 [PH] (ref 7.35–7.45)
PLATELET # BLD AUTO: 231 K/UL (ref 135–450)
PMV BLD AUTO: 7.2 FL (ref 5–10.5)
PO2 BLDV: 23.1 MMHG (ref 25–40)
POTASSIUM SERPL-SCNC: 4.4 MMOL/L (ref 3.5–5.1)
PROT SERPL-MCNC: 9 G/DL (ref 6.4–8.2)
RBC # BLD AUTO: 4.65 M/UL (ref 4.2–5.9)
SAO2 % BLDV: 47 %
SODIUM SERPL-SCNC: 134 MMOL/L (ref 136–145)
WBC # BLD AUTO: 8.9 K/UL (ref 4–11)

## 2023-07-25 PROCEDURE — 99285 EMERGENCY DEPT VISIT HI MDM: CPT

## 2023-07-25 PROCEDURE — 6360000002 HC RX W HCPCS: Performed by: EMERGENCY MEDICINE

## 2023-07-25 PROCEDURE — 82803 BLOOD GASES ANY COMBINATION: CPT

## 2023-07-25 PROCEDURE — 85025 COMPLETE CBC W/AUTO DIFF WBC: CPT

## 2023-07-25 PROCEDURE — 71045 X-RAY EXAM CHEST 1 VIEW: CPT

## 2023-07-25 PROCEDURE — 93005 ELECTROCARDIOGRAM TRACING: CPT | Performed by: EMERGENCY MEDICINE

## 2023-07-25 PROCEDURE — 80053 COMPREHEN METABOLIC PANEL: CPT

## 2023-07-25 PROCEDURE — 80183 DRUG SCRN QUANT OXCARBAZEPIN: CPT

## 2023-07-25 PROCEDURE — 96374 THER/PROPH/DIAG INJ IV PUSH: CPT

## 2023-07-25 RX ORDER — KETOROLAC TROMETHAMINE 30 MG/ML
15 INJECTION, SOLUTION INTRAMUSCULAR; INTRAVENOUS ONCE
Status: COMPLETED | OUTPATIENT
Start: 2023-07-25 | End: 2023-07-25

## 2023-07-25 RX ADMIN — KETOROLAC TROMETHAMINE 15 MG: 30 INJECTION, SOLUTION INTRAMUSCULAR; INTRAVENOUS at 20:23

## 2023-07-25 ASSESSMENT — PAIN SCALES - GENERAL
PAINLEVEL_OUTOF10: 9
PAINLEVEL_OUTOF10: 9

## 2023-07-25 NOTE — ED NOTES
Pt now responding to questions appropriately at this time. A/Ox3. Pt appears more responsive than when patient arrived. Mother at bedside. Pt remains on RA at this time. Cardiac monitor in place.       173 Tiffanie Membreno RN  07/25/23 1921

## 2023-07-26 LAB
EKG ATRIAL RATE: 91 BPM
EKG DIAGNOSIS: NORMAL
EKG P AXIS: 63 DEGREES
EKG P-R INTERVAL: 162 MS
EKG Q-T INTERVAL: 394 MS
EKG QRS DURATION: 134 MS
EKG QTC CALCULATION (BAZETT): 484 MS
EKG R AXIS: -10 DEGREES
EKG T AXIS: 96 DEGREES
EKG VENTRICULAR RATE: 91 BPM

## 2023-07-26 PROCEDURE — 93010 ELECTROCARDIOGRAM REPORT: CPT | Performed by: INTERNAL MEDICINE

## 2023-07-26 NOTE — ED NOTES
Discharge instructions reviewed with Pt. Pt verbalizes understanding at this time. medications reviewed with pt at this time. VS as noted. Pt verbalized that headache hasnt improved but states he would like to be discharged vs seeing if there was additional medication we could provide to patient. Pt condition stable at this time. No concerns voiced. Pt discharged at this time with his mother.       Mati Rob RN  07/25/23 5776

## 2023-07-26 NOTE — DISCHARGE INSTRUCTIONS
The blood work overall looks normal.  A primary care doctor referral has been placed. Please call your neurology office tomorrow to let them know that you had a seizure despite taking your medicine.   Return for worsening symptoms

## 2023-07-26 NOTE — ED NOTES
Pt requesting something for headache. MD aware.       [facility], RN  07/25/23 2015

## 2023-07-26 NOTE — ED NOTES
Verbal report given to Claudetta Quant, RN.       173 [facility], RN  07/25/23 2015

## 2023-07-26 NOTE — ED PROVIDER NOTES
[address] [place] [address]  ED  EMERGENCY DEPARTMENT ENCOUNTER        Patient Name: Ankur Mariano  MRN: [id number]  [address] [facility] [place] 1990  Date of evaluation: 7/25/2023  Provider: Rehan Kurtz MD  PCP: No primary care provider on file. Note Started: 8:59 PM EDT 7/25/23    CHIEF COMPLAINT       Seizures (Pt is coming from home, with a chief complaints of seizures. Pt had a witnessed seizures that lasted 7 min. Pt has been postictal for the last 25 minutes; Pt is responsive to pain (Sternal rub) pt has a seizure history.)      HISTORY OF PRESENT ILLNESS: 1 or more Elements     History from : Family mother and EMS    Limitations to history : Altered Mental Status    Ankur Mariano is a 35 y.o. male who presents to ED via squad for witnessed convulsive seizure while at home. EMS states that the patient had an approximately 7 minute long seizure while with family. They reportedly administered 10mg of midazolam IN. He was post-ictal on transport. Mother states he typically has focal seizures and that they occur every couple of months. Nursing Notes were all reviewed and agreed with or any disagreements were addressed in the HPI. REVIEW OF SYSTEMS :      Review of Systems   Neurological:  Positive for seizures. Positives and Pertinent negatives as per HPI.      SURGICAL HISTORY     Past Surgical History:   Procedure Laterality Date    BRONCHOSCOPY N/A 2/22/2021    BRONCHOSCOPY DIAGNOSTIC OR CELL [address] ONLY performed by Patrick Lorenz MD at [address]  2/22/2021    BRONCHOSCOPY THERAPUTIC ASPIRATION INITIAL performed by Patrick Lorenz MD at [address] HAND FINGERS INJURY, PARTIAL AMPUTATIONS       CURRENTMEDICATIONS       Previous Medications    ASPIRIN 81 MG CHEWABLE TABLET    Take 81 mg by mouth daily (with breakfast)    CARVEDILOL (COREG) 12.5 MG TABLET    Take 1 tablet by mouth 2 times daily (with meals)    CENOBAMATE 14 X 50 MG & 14 X100 MG

## 2023-07-27 ENCOUNTER — TELEPHONE (OUTPATIENT)
Dept: PRIMARY CARE CLINIC | Age: 33
End: 2023-07-27

## 2023-07-27 NOTE — TELEPHONE ENCOUNTER
Please call to schedule new patient visit/ED f/u    Please document call and then close encounter.   thanks

## 2023-07-28 LAB — 10OH-CARBAZEPINE SERPL-MCNC: 29 UG/ML (ref 3–35)

## 2023-07-31 RX ORDER — CARVEDILOL 12.5 MG/1
TABLET ORAL
Qty: 60 TABLET | Refills: 3 | OUTPATIENT
Start: 2023-07-31

## 2023-08-02 NOTE — PROGRESS NOTES
[facility]   Cardiac Follow-up    Primary Care Doctor:  No primary care provider on file. Chief Complaint   Patient presents with    Follow-up    Cardiomyopathy      History of Present Illness:   I had the pleasure of seeing Candelario Motley in follow up for stress-induced cardiomyopathy      Admitted 2/21/2021-2/27/2021 with seizures, acute encephalopathy, septic shock, respiratory failure, NSTEMI, aspiration, stress-induced cardiomyopathy with LVEF 25% requiring dobutamine support, status post left heart cath showed normal coronaries. Repeat echocardiogram 7/2021 showed recovered EF 55%  Amitted 1/12/23-1/20/2023 with status epilepticus, septic shock, pneumonia, markedly elevated troponin, wide complex tachycardia, repeat echocardiogram completed showed LVEF down to less than 20% with severe diffuse hypokinesis, and possible questionable apical thrombus. He had cardiac MRI showing improved LVEF to 59% and mild myocardial edema suggestive of recovering stress induced cardiomyopathy     Since last visit, he had another long seizure 7 minutes long. Seen in the ER. He is having a lot of dizziness. At times he feels like the dizziness is related to his seizure medicines versus cardiac medicines versus drug abuse. He had been injecting in the foot and the hand   Concerns about the right 1st knuckle- red and swollen and tender to touch. Also concerns about multiple injection site in the foot. No shortness of breath. Denies any chest pain. He has not been on his diuretics for some time. As he has not had any lower extremity edema. Taking medications as prescribed: No  Able to afford medications: No    Past Medical History:   has a past medical history of Aspiration pneumonia ([address]), Hepatitis C, HTN (hypertension), NSTEMI (non-ST elevated myocardial infarction) ([address]), Polysubstance abuse ([address]), Seizures ([address]), Septic shock ([address]), Systolic CHF ([address]), and Takotsubo cardiomyopathy.   Surgical

## 2023-08-03 ENCOUNTER — OFFICE VISIT (OUTPATIENT)
Dept: CARDIOLOGY CLINIC | Age: 33
End: 2023-08-03
Payer: MEDICAID

## 2023-08-03 VITALS
BODY MASS INDEX: 16.1 KG/M2 | WEIGHT: 115 LBS | OXYGEN SATURATION: 96 % | HEART RATE: 74 BPM | HEIGHT: 71 IN | DIASTOLIC BLOOD PRESSURE: 76 MMHG | SYSTOLIC BLOOD PRESSURE: 124 MMHG

## 2023-08-03 DIAGNOSIS — F19.10 IV DRUG ABUSE (HCC): ICD-10-CM

## 2023-08-03 DIAGNOSIS — I51.81 STRESS-INDUCED CARDIOMYOPATHY: Primary | ICD-10-CM

## 2023-08-03 DIAGNOSIS — I51.81 TAKOTSUBO CARDIOMYOPATHY: ICD-10-CM

## 2023-08-03 DIAGNOSIS — Z72.0 TOBACCO ABUSE: ICD-10-CM

## 2023-08-03 DIAGNOSIS — I10 HYPERTENSION, UNSPECIFIED TYPE: ICD-10-CM

## 2023-08-03 DIAGNOSIS — M00.9 JOINT INFECTION (HCC): ICD-10-CM

## 2023-08-03 PROCEDURE — 99214 OFFICE O/P EST MOD 30 MIN: CPT | Performed by: NURSE PRACTITIONER

## 2023-08-03 PROCEDURE — 3078F DIAST BP <80 MM HG: CPT | Performed by: NURSE PRACTITIONER

## 2023-08-03 PROCEDURE — 3074F SYST BP LT 130 MM HG: CPT | Performed by: NURSE PRACTITIONER

## 2023-08-03 RX ORDER — SULFAMETHOXAZOLE AND TRIMETHOPRIM 800; 160 MG/1; MG/1
1 TABLET ORAL 2 TIMES DAILY
Qty: 10 TABLET | Refills: 0 | Status: SHIPPED | OUTPATIENT
Start: 2023-08-03 | End: 2023-08-08

## 2023-08-03 RX ORDER — MIDAZOLAM 5 MG/.1ML
SPRAY NASAL
COMMUNITY
Start: 2023-07-28

## 2023-08-03 RX ORDER — ONDANSETRON 4 MG/1
4 TABLET, FILM COATED ORAL EVERY 6 HOURS PRN
COMMUNITY
Start: 2023-04-21

## 2023-08-03 RX ORDER — SACUBITRIL AND VALSARTAN 49; 51 MG/1; MG/1
0.5 TABLET, FILM COATED ORAL 2 TIMES DAILY
Qty: 60 TABLET | Refills: 5 | Status: SHIPPED
Start: 2023-08-03

## 2023-08-03 NOTE — PATIENT INSTRUCTIONS
Continue current meds  Continue to stay as active as possible  Continue coreg   Adjust the Entresto to 1/2 tab twice a day for now - and monitor your dizziness   Continue jardiance 10mg daily   Bactrim for skin/finger infection  Follow up in 3 months

## 2023-08-08 ENCOUNTER — APPOINTMENT (OUTPATIENT)
Dept: GENERAL RADIOLOGY | Age: 33
End: 2023-08-08
Payer: MEDICAID

## 2023-08-08 ENCOUNTER — APPOINTMENT (OUTPATIENT)
Dept: CT IMAGING | Age: 33
End: 2023-08-08
Payer: MEDICAID

## 2023-08-08 ENCOUNTER — HOSPITAL ENCOUNTER (INPATIENT)
Age: 33
LOS: 8 days | Discharge: HOME | End: 2023-08-16
Attending: STUDENT IN AN ORGANIZED HEALTH CARE EDUCATION/TRAINING PROGRAM | Admitting: INTERNAL MEDICINE
Payer: MEDICAID

## 2023-08-08 DIAGNOSIS — R56.9 SEIZURE (HCC): Primary | ICD-10-CM

## 2023-08-08 DIAGNOSIS — J96.02 ACUTE RESPIRATORY FAILURE WITH HYPERCAPNIA (HCC): ICD-10-CM

## 2023-08-08 DIAGNOSIS — F19.10 POLYSUBSTANCE ABUSE (HCC): ICD-10-CM

## 2023-08-08 DIAGNOSIS — R74.01 TRANSAMINITIS: ICD-10-CM

## 2023-08-08 DIAGNOSIS — I46.9 CARDIAC ARREST (HCC): ICD-10-CM

## 2023-08-08 LAB
ALBUMIN SERPL-MCNC: 2.6 G/DL (ref 3.4–5)
ALBUMIN SERPL-MCNC: 3.4 G/DL (ref 3.4–5)
ALBUMIN/GLOB SERPL: 1.1 {RATIO} (ref 1.1–2.2)
ALP SERPL-CCNC: 229 U/L (ref 40–129)
ALP SERPL-CCNC: 329 U/L (ref 40–129)
ALT SERPL-CCNC: 314 U/L (ref 10–40)
ALT SERPL-CCNC: 369 U/L (ref 10–40)
AMMONIA PLAS-SCNC: 89 UMOL/L (ref 16–60)
AMPHETAMINES UR QL SCN>1000 NG/ML: ABNORMAL
ANION GAP SERPL CALCULATED.3IONS-SCNC: 12 MMOL/L (ref 3–16)
ANION GAP SERPL CALCULATED.3IONS-SCNC: 18 MMOL/L (ref 3–16)
APAP SERPL-MCNC: <5 UG/ML (ref 10–30)
AST SERPL-CCNC: 327 U/L (ref 15–37)
AST SERPL-CCNC: 359 U/L (ref 15–37)
BARBITURATES UR QL SCN>200 NG/ML: ABNORMAL
BASE EXCESS BLDA CALC-SCNC: -1 MMOL/L (ref -3–3)
BASE EXCESS BLDA CALC-SCNC: -7 MMOL/L (ref -3–3)
BASE EXCESS BLDA CALC-SCNC: 6 MMOL/L (ref -3–3)
BASE EXCESS BLDV CALC-SCNC: -10.7 MMOL/L (ref -3–3)
BASOPHILS # BLD: 0.1 K/UL (ref 0–0.2)
BASOPHILS NFR BLD: 0.4 %
BENZODIAZ UR QL SCN>200 NG/ML: POSITIVE
BILIRUB DIRECT SERPL-MCNC: 0.7 MG/DL (ref 0–0.3)
BILIRUB INDIRECT SERPL-MCNC: 0.2 MG/DL (ref 0–1)
BILIRUB SERPL-MCNC: 0.5 MG/DL (ref 0–1)
BILIRUB SERPL-MCNC: 0.9 MG/DL (ref 0–1)
BUN SERPL-MCNC: 16 MG/DL (ref 7–20)
BUN SERPL-MCNC: 23 MG/DL (ref 7–20)
CA-I BLD-SCNC: 1.22 MMOL/L (ref 1.12–1.32)
CA-I BLD-SCNC: 1.86 MMOL/L (ref 1.12–1.32)
CALCIUM SERPL-MCNC: 8.4 MG/DL (ref 8.3–10.6)
CALCIUM SERPL-MCNC: 9.3 MG/DL (ref 8.3–10.6)
CANNABINOIDS UR QL SCN>50 NG/ML: POSITIVE
CHLORIDE SERPL-SCNC: 101 MMOL/L (ref 99–110)
CHLORIDE SERPL-SCNC: 102 MMOL/L (ref 99–110)
CO2 BLDA-SCNC: 25 MMOL/L
CO2 BLDA-SCNC: 26 MMOL/L
CO2 BLDA-SCNC: 37 MMOL/L
CO2 BLDV-SCNC: 22 MMOL/L
CO2 SERPL-SCNC: 21 MMOL/L (ref 21–32)
CO2 SERPL-SCNC: 25 MMOL/L (ref 21–32)
COCAINE UR QL SCN: POSITIVE
COHGB MFR BLDV: 5.1 % (ref 0–1.5)
CREAT SERPL-MCNC: 1 MG/DL (ref 0.9–1.3)
CREAT SERPL-MCNC: 1.6 MG/DL (ref 0.9–1.3)
DEPRECATED RDW RBC AUTO: 13.5 % (ref 12.4–15.4)
DEPRECATED RDW RBC AUTO: 13.5 % (ref 12.4–15.4)
DRUG SCREEN COMMENT UR-IMP: ABNORMAL
EKG ATRIAL RATE: 119 BPM
EKG ATRIAL RATE: 151 BPM
EKG DIAGNOSIS: NORMAL
EKG DIAGNOSIS: NORMAL
EKG P-R INTERVAL: 152 MS
EKG P-R INTERVAL: 96 MS
EKG Q-T INTERVAL: 344 MS
EKG Q-T INTERVAL: 370 MS
EKG QRS DURATION: 140 MS
EKG QRS DURATION: 170 MS
EKG QTC CALCULATION (BAZETT): 520 MS
EKG QTC CALCULATION (BAZETT): 545 MS
EKG R AXIS: -55 DEGREES
EKG R AXIS: -70 DEGREES
EKG T AXIS: 94 DEGREES
EKG T AXIS: 95 DEGREES
EKG VENTRICULAR RATE: 119 BPM
EKG VENTRICULAR RATE: 151 BPM
EOSINOPHIL # BLD: 0.3 K/UL (ref 0–0.6)
EOSINOPHIL NFR BLD: 1.1 %
ETHANOLAMINE SERPL-MCNC: NORMAL MG/DL (ref 0–0.08)
FENTANYL SCREEN, URINE: POSITIVE
FLUAV RNA RESP QL NAA+PROBE: NOT DETECTED
FLUBV RNA RESP QL NAA+PROBE: NOT DETECTED
GFR SERPLBLD CREATININE-BSD FMLA CKD-EPI: 58 ML/MIN/{1.73_M2}
GFR SERPLBLD CREATININE-BSD FMLA CKD-EPI: >60 ML/MIN/{1.73_M2}
GLUCOSE BLD-MCNC: 120 MG/DL (ref 70–99)
GLUCOSE BLD-MCNC: 142 MG/DL (ref 70–99)
GLUCOSE BLD-MCNC: 150 MG/DL (ref 70–99)
GLUCOSE BLD-MCNC: 158 MG/DL (ref 70–99)
GLUCOSE BLD-MCNC: 183 MG/DL (ref 70–99)
GLUCOSE BLD-MCNC: 32 MG/DL (ref 70–99)
GLUCOSE SERPL-MCNC: 186 MG/DL (ref 70–99)
GLUCOSE SERPL-MCNC: 202 MG/DL (ref 70–99)
HCO3 BLDA-SCNC: 23.3 MMOL/L (ref 21–29)
HCO3 BLDA-SCNC: 24.2 MMOL/L (ref 21–29)
HCO3 BLDA-SCNC: 33.9 MMOL/L (ref 21–29)
HCO3 BLDV-SCNC: 20.3 MMOL/L (ref 23–29)
HCT VFR BLD AUTO: 35 % (ref 40.5–52.5)
HCT VFR BLD AUTO: 37.7 % (ref 40.5–52.5)
HCT VFR BLD AUTO: 38 % (ref 40.5–52.5)
HCT VFR BLD AUTO: 39.8 % (ref 40.5–52.5)
HGB BLD CALC-MCNC: 12 GM/DL (ref 13.5–17.5)
HGB BLD CALC-MCNC: 12.9 GM/DL (ref 13.5–17.5)
HGB BLD-MCNC: 12.4 G/DL (ref 13.5–17.5)
HGB BLD-MCNC: 13.3 G/DL (ref 13.5–17.5)
INR PPP: 1.05 (ref 0.84–1.16)
LACTATE BLD-SCNC: 2.15 MMOL/L (ref 0.4–2)
LACTATE BLD-SCNC: 6.71 MMOL/L (ref 0.4–2)
LACTATE BLDV-SCNC: 3.6 MMOL/L (ref 0.4–1.9)
LACTATE BLDV-SCNC: 3.9 MMOL/L (ref 0.4–2)
LACTATE BLDV-SCNC: 4.5 MMOL/L (ref 0.4–1.9)
LIPASE SERPL-CCNC: 27 U/L (ref 13–60)
LYMPHOCYTES # BLD: 1.4 K/UL (ref 1–5.1)
LYMPHOCYTES NFR BLD: 5 %
MAGNESIUM SERPL-MCNC: 2.7 MG/DL (ref 1.8–2.4)
MCH RBC QN AUTO: 32.1 PG (ref 26–34)
MCH RBC QN AUTO: 32.2 PG (ref 26–34)
MCHC RBC AUTO-ENTMCNC: 33 G/DL (ref 31–36)
MCHC RBC AUTO-ENTMCNC: 33.4 G/DL (ref 31–36)
MCV RBC AUTO: 96.1 FL (ref 80–100)
MCV RBC AUTO: 97.7 FL (ref 80–100)
METHADONE UR QL SCN>300 NG/ML: POSITIVE
METHGB MFR BLDV: 0.7 %
MONOCYTES # BLD: 0.2 K/UL (ref 0–1.3)
MONOCYTES NFR BLD: 0.7 %
NEUTROPHILS # BLD: 25.5 K/UL (ref 1.7–7.7)
NEUTROPHILS NFR BLD: 92.8 %
NT-PROBNP SERPL-MCNC: 6701 PG/ML (ref 0–124)
O2 THERAPY: ABNORMAL
OPIATES UR QL SCN>300 NG/ML: POSITIVE
OXYCODONE UR QL SCN: ABNORMAL
PCO2 BLDA: 40.6 MM HG (ref 35–45)
PCO2 BLDA: 81.8 MM HG (ref 35–45)
PCO2 BLDA: 85.2 MM HG (ref 35–45)
PCO2 BLDV: 69.2 MMHG (ref 40–50)
PCP UR QL SCN>25 NG/ML: ABNORMAL
PERFORMED ON: ABNORMAL
PH BLDA: 7.05 [PH] (ref 7.35–7.45)
PH BLDA: 7.23 [PH] (ref 7.35–7.45)
PH BLDA: 7.38 [PH] (ref 7.35–7.45)
PH BLDV: 7.08 [PH] (ref 7.35–7.45)
PH UR STRIP: 6 [PH]
PLATELET # BLD AUTO: 208 K/UL (ref 135–450)
PLATELET # BLD AUTO: 305 K/UL (ref 135–450)
PMV BLD AUTO: 7.4 FL (ref 5–10.5)
PMV BLD AUTO: 7.8 FL (ref 5–10.5)
PO2 BLDA: 168.2 MM HG (ref 75–108)
PO2 BLDA: 69.9 MM HG (ref 75–108)
PO2 BLDA: 92.6 MM HG (ref 75–108)
PO2 BLDV: 136.6 MMHG (ref 25–40)
POC SAMPLE TYPE: ABNORMAL
POTASSIUM BLD-SCNC: 3.8 MMOL/L (ref 3.5–5.1)
POTASSIUM BLD-SCNC: 5.7 MMOL/L (ref 3.5–5.1)
POTASSIUM SERPL-SCNC: 3.9 MMOL/L (ref 3.5–5.1)
POTASSIUM SERPL-SCNC: 4 MMOL/L (ref 3.5–5.1)
PROCALCITONIN SERPL IA-MCNC: 14.9 NG/ML (ref 0–0.15)
PROT SERPL-MCNC: 5 G/DL (ref 6.4–8.2)
PROT SERPL-MCNC: 6.5 G/DL (ref 6.4–8.2)
PROTHROMBIN TIME: 13.7 SEC (ref 11.5–14.8)
RBC # BLD AUTO: 3.86 M/UL (ref 4.2–5.9)
RBC # BLD AUTO: 4.14 M/UL (ref 4.2–5.9)
SALICYLATES SERPL-MCNC: <0.3 MG/DL (ref 15–30)
SAO2 % BLDA: 100 % (ref 93–100)
SAO2 % BLDA: 83 % (ref 93–100)
SAO2 % BLDA: 95 % (ref 93–100)
SAO2 % BLDV: 98 %
SARS-COV-2 RNA RESP QL NAA+PROBE: NOT DETECTED
SODIUM BLD-SCNC: 138 MMOL/L (ref 136–145)
SODIUM BLD-SCNC: 147 MMOL/L (ref 136–145)
SODIUM SERPL-SCNC: 135 MMOL/L (ref 136–145)
SODIUM SERPL-SCNC: 144 MMOL/L (ref 136–145)
T4 FREE SERPL-MCNC: 0.5 NG/DL (ref 0.9–1.8)
TROPONIN, HIGH SENSITIVITY: 116 NG/L (ref 0–22)
TROPONIN, HIGH SENSITIVITY: 308 NG/L (ref 0–22)
TROPONIN, HIGH SENSITIVITY: 924 NG/L (ref 0–22)
TSH SERPL DL<=0.005 MIU/L-ACNC: 4.03 UIU/ML (ref 0.27–4.2)
TSH SERPL DL<=0.005 MIU/L-ACNC: 4.26 UIU/ML (ref 0.27–4.2)
VANCOMYCIN SERPL-MCNC: 27.6 UG/ML
WBC # BLD AUTO: 27.4 K/UL (ref 4–11)
WBC # BLD AUTO: 40.3 K/UL (ref 4–11)

## 2023-08-08 PROCEDURE — 36600 WITHDRAWAL OF ARTERIAL BLOOD: CPT

## 2023-08-08 PROCEDURE — 83880 ASSAY OF NATRIURETIC PEPTIDE: CPT

## 2023-08-08 PROCEDURE — 02HV33Z INSERTION OF INFUSION DEVICE INTO SUPERIOR VENA CAVA, PERCUTANEOUS APPROACH: ICD-10-PCS | Performed by: INTERNAL MEDICINE

## 2023-08-08 PROCEDURE — 6370000000 HC RX 637 (ALT 250 FOR IP): Performed by: NURSE PRACTITIONER

## 2023-08-08 PROCEDURE — 93010 ELECTROCARDIOGRAM REPORT: CPT | Performed by: INTERNAL MEDICINE

## 2023-08-08 PROCEDURE — 2580000003 HC RX 258: Performed by: HOSPITALIST

## 2023-08-08 PROCEDURE — 85610 PROTHROMBIN TIME: CPT

## 2023-08-08 PROCEDURE — 94002 VENT MGMT INPAT INIT DAY: CPT

## 2023-08-08 PROCEDURE — 93005 ELECTROCARDIOGRAM TRACING: CPT | Performed by: HOSPITALIST

## 2023-08-08 PROCEDURE — 83690 ASSAY OF LIPASE: CPT

## 2023-08-08 PROCEDURE — C1894 INTRO/SHEATH, NON-LASER: HCPCS | Performed by: INTERNAL MEDICINE

## 2023-08-08 PROCEDURE — 0BH17EZ INSERTION OF ENDOTRACHEAL AIRWAY INTO TRACHEA, VIA NATURAL OR ARTIFICIAL OPENING: ICD-10-PCS | Performed by: INTERNAL MEDICINE

## 2023-08-08 PROCEDURE — 80179 DRUG ASSAY SALICYLATE: CPT

## 2023-08-08 PROCEDURE — 87150 DNA/RNA AMPLIFIED PROBE: CPT

## 2023-08-08 PROCEDURE — 36680 INSERT NEEDLE BONE CAVITY: CPT

## 2023-08-08 PROCEDURE — 2720000010 HC SURG SUPPLY STERILE: Performed by: INTERNAL MEDICINE

## 2023-08-08 PROCEDURE — C9254 INJECTION, LACOSAMIDE: HCPCS | Performed by: PSYCHIATRY & NEUROLOGY

## 2023-08-08 PROCEDURE — 87636 SARSCOV2 & INF A&B AMP PRB: CPT

## 2023-08-08 PROCEDURE — 2580000003 HC RX 258: Performed by: NURSE PRACTITIONER

## 2023-08-08 PROCEDURE — 2580000003 HC RX 258

## 2023-08-08 PROCEDURE — 74018 RADEX ABDOMEN 1 VIEW: CPT

## 2023-08-08 PROCEDURE — 85014 HEMATOCRIT: CPT

## 2023-08-08 PROCEDURE — 96372 THER/PROPH/DIAG INJ SC/IM: CPT

## 2023-08-08 PROCEDURE — 2580000003 HC RX 258: Performed by: INTERNAL MEDICINE

## 2023-08-08 PROCEDURE — 5A12012 PERFORMANCE OF CARDIAC OUTPUT, SINGLE, MANUAL: ICD-10-PCS | Performed by: INTERNAL MEDICINE

## 2023-08-08 PROCEDURE — 92950 HEART/LUNG RESUSCITATION CPR: CPT

## 2023-08-08 PROCEDURE — 5A1223Z PERFORMANCE OF CARDIAC PACING, CONTINUOUS: ICD-10-PCS | Performed by: INTERNAL MEDICINE

## 2023-08-08 PROCEDURE — 82947 ASSAY GLUCOSE BLOOD QUANT: CPT

## 2023-08-08 PROCEDURE — 99223 1ST HOSP IP/OBS HIGH 75: CPT | Performed by: PSYCHIATRY & NEUROLOGY

## 2023-08-08 PROCEDURE — 6360000002 HC RX W HCPCS: Performed by: INTERNAL MEDICINE

## 2023-08-08 PROCEDURE — 99291 CRITICAL CARE FIRST HOUR: CPT | Performed by: INTERNAL MEDICINE

## 2023-08-08 PROCEDURE — 36592 COLLECT BLOOD FROM PICC: CPT

## 2023-08-08 PROCEDURE — 80143 DRUG ASSAY ACETAMINOPHEN: CPT

## 2023-08-08 PROCEDURE — 6360000002 HC RX W HCPCS: Performed by: NURSE PRACTITIONER

## 2023-08-08 PROCEDURE — 2500000003 HC RX 250 WO HCPCS: Performed by: NURSE PRACTITIONER

## 2023-08-08 PROCEDURE — 82330 ASSAY OF CALCIUM: CPT

## 2023-08-08 PROCEDURE — 85025 COMPLETE CBC W/AUTO DIFF WBC: CPT

## 2023-08-08 PROCEDURE — 6360000004 HC RX CONTRAST MEDICATION: Performed by: STUDENT IN AN ORGANIZED HEALTH CARE EDUCATION/TRAINING PROGRAM

## 2023-08-08 PROCEDURE — 80053 COMPREHEN METABOLIC PANEL: CPT

## 2023-08-08 PROCEDURE — 83735 ASSAY OF MAGNESIUM: CPT

## 2023-08-08 PROCEDURE — 71260 CT THORAX DX C+: CPT

## 2023-08-08 PROCEDURE — 6360000002 HC RX W HCPCS: Performed by: PSYCHIATRY & NEUROLOGY

## 2023-08-08 PROCEDURE — 87186 SC STD MICRODIL/AGAR DIL: CPT

## 2023-08-08 PROCEDURE — 70450 CT HEAD/BRAIN W/O DYE: CPT

## 2023-08-08 PROCEDURE — 2500000003 HC RX 250 WO HCPCS: Performed by: STUDENT IN AN ORGANIZED HEALTH CARE EDUCATION/TRAINING PROGRAM

## 2023-08-08 PROCEDURE — 33210 INSERT ELECTRD/PM CATH SNGL: CPT | Performed by: INTERNAL MEDICINE

## 2023-08-08 PROCEDURE — 31500 INSERT EMERGENCY AIRWAY: CPT

## 2023-08-08 PROCEDURE — 84484 ASSAY OF TROPONIN QUANT: CPT

## 2023-08-08 PROCEDURE — 80307 DRUG TEST PRSMV CHEM ANLYZR: CPT

## 2023-08-08 PROCEDURE — 82803 BLOOD GASES ANY COMBINATION: CPT

## 2023-08-08 PROCEDURE — 6360000002 HC RX W HCPCS

## 2023-08-08 PROCEDURE — 6370000000 HC RX 637 (ALT 250 FOR IP): Performed by: INTERNAL MEDICINE

## 2023-08-08 PROCEDURE — 2700000000 HC OXYGEN THERAPY PER DAY

## 2023-08-08 PROCEDURE — 93005 ELECTROCARDIOGRAM TRACING: CPT | Performed by: STUDENT IN AN ORGANIZED HEALTH CARE EDUCATION/TRAINING PROGRAM

## 2023-08-08 PROCEDURE — 87040 BLOOD CULTURE FOR BACTERIA: CPT

## 2023-08-08 PROCEDURE — 6360000002 HC RX W HCPCS: Performed by: HOSPITALIST

## 2023-08-08 PROCEDURE — 80202 ASSAY OF VANCOMYCIN: CPT

## 2023-08-08 PROCEDURE — 85027 COMPLETE CBC AUTOMATED: CPT

## 2023-08-08 PROCEDURE — 2580000003 HC RX 258: Performed by: PSYCHIATRY & NEUROLOGY

## 2023-08-08 PROCEDURE — 83605 ASSAY OF LACTIC ACID: CPT

## 2023-08-08 PROCEDURE — 82140 ASSAY OF AMMONIA: CPT

## 2023-08-08 PROCEDURE — 51702 INSERT TEMP BLADDER CATH: CPT

## 2023-08-08 PROCEDURE — 84295 ASSAY OF SERUM SODIUM: CPT

## 2023-08-08 PROCEDURE — 6360000002 HC RX W HCPCS: Performed by: STUDENT IN AN ORGANIZED HEALTH CARE EDUCATION/TRAINING PROGRAM

## 2023-08-08 PROCEDURE — 2500000003 HC RX 250 WO HCPCS: Performed by: INTERNAL MEDICINE

## 2023-08-08 PROCEDURE — 2580000003 HC RX 258: Performed by: STUDENT IN AN ORGANIZED HEALTH CARE EDUCATION/TRAINING PROGRAM

## 2023-08-08 PROCEDURE — 96361 HYDRATE IV INFUSION ADD-ON: CPT

## 2023-08-08 PROCEDURE — 71045 X-RAY EXAM CHEST 1 VIEW: CPT

## 2023-08-08 PROCEDURE — 82077 ASSAY SPEC XCP UR&BREATH IA: CPT

## 2023-08-08 PROCEDURE — 99285 EMERGENCY DEPT VISIT HI MDM: CPT

## 2023-08-08 PROCEDURE — 33210 INSERT ELECTRD/PM CATH SNGL: CPT

## 2023-08-08 PROCEDURE — 2000000000 HC ICU R&B

## 2023-08-08 PROCEDURE — 84443 ASSAY THYROID STIM HORMONE: CPT

## 2023-08-08 PROCEDURE — 2500000003 HC RX 250 WO HCPCS: Performed by: RADIOLOGY

## 2023-08-08 PROCEDURE — 96365 THER/PROPH/DIAG IV INF INIT: CPT

## 2023-08-08 PROCEDURE — 96375 TX/PRO/DX INJ NEW DRUG ADDON: CPT

## 2023-08-08 PROCEDURE — 94761 N-INVAS EAR/PLS OXIMETRY MLT: CPT

## 2023-08-08 PROCEDURE — 84132 ASSAY OF SERUM POTASSIUM: CPT

## 2023-08-08 PROCEDURE — 36556 INSERT NON-TUNNEL CV CATH: CPT

## 2023-08-08 PROCEDURE — 84439 ASSAY OF FREE THYROXINE: CPT

## 2023-08-08 PROCEDURE — 84145 PROCALCITONIN (PCT): CPT

## 2023-08-08 PROCEDURE — 33216 INSERT 1 ELECTRODE PM-DEFIB: CPT

## 2023-08-08 PROCEDURE — 5A1945Z RESPIRATORY VENTILATION, 24-96 CONSECUTIVE HOURS: ICD-10-PCS | Performed by: INTERNAL MEDICINE

## 2023-08-08 RX ORDER — SODIUM CHLORIDE 9 MG/ML
INJECTION, SOLUTION INTRAVENOUS PRN
Status: DISCONTINUED | OUTPATIENT
Start: 2023-08-08 | End: 2023-08-16 | Stop reason: HOSPADM

## 2023-08-08 RX ORDER — DEXTROSE MONOHYDRATE 100 MG/ML
INJECTION, SOLUTION INTRAVENOUS CONTINUOUS
Status: DISCONTINUED | OUTPATIENT
Start: 2023-08-08 | End: 2023-08-10

## 2023-08-08 RX ORDER — FUROSEMIDE 10 MG/ML
INJECTION INTRAMUSCULAR; INTRAVENOUS
Status: COMPLETED
Start: 2023-08-08 | End: 2023-08-08

## 2023-08-08 RX ORDER — MIDAZOLAM HYDROCHLORIDE 1 MG/ML
1-10 INJECTION, SOLUTION INTRAVENOUS CONTINUOUS
Status: DISCONTINUED | OUTPATIENT
Start: 2023-08-08 | End: 2023-08-10

## 2023-08-08 RX ORDER — ACETAMINOPHEN 325 MG/1
650 TABLET ORAL EVERY 6 HOURS PRN
Status: DISCONTINUED | OUTPATIENT
Start: 2023-08-08 | End: 2023-08-09

## 2023-08-08 RX ORDER — SODIUM CHLORIDE 9 MG/ML
INJECTION, SOLUTION INTRAVENOUS CONTINUOUS
Status: DISCONTINUED | OUTPATIENT
Start: 2023-08-08 | End: 2023-08-09

## 2023-08-08 RX ORDER — SODIUM CHLORIDE 0.9 % (FLUSH) 0.9 %
5-40 SYRINGE (ML) INJECTION EVERY 12 HOURS SCHEDULED
Status: DISCONTINUED | OUTPATIENT
Start: 2023-08-08 | End: 2023-08-16 | Stop reason: HOSPADM

## 2023-08-08 RX ORDER — LORAZEPAM 2 MG/ML
2 INJECTION INTRAMUSCULAR EVERY 4 HOURS PRN
Status: DISCONTINUED | OUTPATIENT
Start: 2023-08-08 | End: 2023-08-10

## 2023-08-08 RX ORDER — FAMOTIDINE 10 MG/ML
20 INJECTION, SOLUTION INTRAVENOUS 2 TIMES DAILY
Status: DISCONTINUED | OUTPATIENT
Start: 2023-08-08 | End: 2023-08-11

## 2023-08-08 RX ORDER — LORAZEPAM 2 MG/ML
2 INJECTION INTRAMUSCULAR ONCE
Status: COMPLETED | OUTPATIENT
Start: 2023-08-08 | End: 2023-08-08

## 2023-08-08 RX ORDER — METRONIDAZOLE 500 MG/100ML
500 INJECTION, SOLUTION INTRAVENOUS EVERY 8 HOURS
Status: DISCONTINUED | OUTPATIENT
Start: 2023-08-08 | End: 2023-08-09

## 2023-08-08 RX ORDER — PROPOFOL 10 MG/ML
5-50 INJECTION, EMULSION INTRAVENOUS CONTINUOUS
Status: DISCONTINUED | OUTPATIENT
Start: 2023-08-08 | End: 2023-08-08

## 2023-08-08 RX ORDER — SODIUM CHLORIDE 0.9 % (FLUSH) 0.9 %
5-40 SYRINGE (ML) INJECTION PRN
Status: DISCONTINUED | OUTPATIENT
Start: 2023-08-08 | End: 2023-08-16 | Stop reason: HOSPADM

## 2023-08-08 RX ORDER — ETOMIDATE 2 MG/ML
INJECTION INTRAVENOUS DAILY PRN
Status: COMPLETED | OUTPATIENT
Start: 2023-08-08 | End: 2023-08-08

## 2023-08-08 RX ORDER — MINERAL OIL AND WHITE PETROLATUM 150; 830 MG/G; MG/G
OINTMENT OPHTHALMIC EVERY 4 HOURS
Status: DISCONTINUED | OUTPATIENT
Start: 2023-08-08 | End: 2023-08-10

## 2023-08-08 RX ORDER — OXCARBAZEPINE 150 MG/1
900 TABLET, FILM COATED ORAL 2 TIMES DAILY
Status: DISCONTINUED | OUTPATIENT
Start: 2023-08-08 | End: 2023-08-16 | Stop reason: HOSPADM

## 2023-08-08 RX ORDER — FUROSEMIDE 10 MG/ML
40 INJECTION INTRAMUSCULAR; INTRAVENOUS ONCE
Status: COMPLETED | OUTPATIENT
Start: 2023-08-08 | End: 2023-08-08

## 2023-08-08 RX ORDER — METOPROLOL TARTRATE 5 MG/5ML
5 INJECTION INTRAVENOUS EVERY 6 HOURS
Status: DISCONTINUED | OUTPATIENT
Start: 2023-08-08 | End: 2023-08-08

## 2023-08-08 RX ORDER — ROCURONIUM BROMIDE 10 MG/ML
INJECTION, SOLUTION INTRAVENOUS DAILY PRN
Status: COMPLETED | OUTPATIENT
Start: 2023-08-08 | End: 2023-08-08

## 2023-08-08 RX ORDER — 0.9 % SODIUM CHLORIDE 0.9 %
1000 INTRAVENOUS SOLUTION INTRAVENOUS ONCE
Status: COMPLETED | OUTPATIENT
Start: 2023-08-08 | End: 2023-08-08

## 2023-08-08 RX ORDER — ENOXAPARIN SODIUM 100 MG/ML
40 INJECTION SUBCUTANEOUS DAILY
Status: DISCONTINUED | OUTPATIENT
Start: 2023-08-09 | End: 2023-08-16 | Stop reason: HOSPADM

## 2023-08-08 RX ORDER — ACETAMINOPHEN 650 MG/1
650 SUPPOSITORY RECTAL EVERY 6 HOURS PRN
Status: DISCONTINUED | OUTPATIENT
Start: 2023-08-08 | End: 2023-08-09

## 2023-08-08 RX ORDER — DEXTROSE MONOHYDRATE 100 MG/ML
INJECTION, SOLUTION INTRAVENOUS
Status: COMPLETED
Start: 2023-08-08 | End: 2023-08-08

## 2023-08-08 RX ORDER — DEXTROSE MONOHYDRATE 25 G/50ML
INJECTION, SOLUTION INTRAVENOUS
Status: COMPLETED | OUTPATIENT
Start: 2023-08-08 | End: 2023-08-08

## 2023-08-08 RX ORDER — PROPOFOL 10 MG/ML
5-50 INJECTION, EMULSION INTRAVENOUS ONCE
Status: COMPLETED | OUTPATIENT
Start: 2023-08-08 | End: 2023-08-08

## 2023-08-08 RX ORDER — CHLORHEXIDINE GLUCONATE 0.12 MG/ML
15 RINSE ORAL 2 TIMES DAILY
Status: DISCONTINUED | OUTPATIENT
Start: 2023-08-08 | End: 2023-08-10

## 2023-08-08 RX ORDER — PROPOFOL 10 MG/ML
INJECTION, EMULSION INTRAVENOUS
Status: COMPLETED
Start: 2023-08-08 | End: 2023-08-08

## 2023-08-08 RX ADMIN — IOPAMIDOL 75 ML: 755 INJECTION, SOLUTION INTRAVENOUS at 10:51

## 2023-08-08 RX ADMIN — PROPOFOL 20 MCG/KG/MIN: 10 INJECTION, EMULSION INTRAVENOUS at 15:10

## 2023-08-08 RX ADMIN — MUPIROCIN: 20 OINTMENT TOPICAL at 21:24

## 2023-08-08 RX ADMIN — SODIUM CHLORIDE 1000 ML: 9 INJECTION, SOLUTION INTRAVENOUS at 09:00

## 2023-08-08 RX ADMIN — DEXTROSE MONOHYDRATE 25 G: 25 INJECTION, SOLUTION INTRAVENOUS at 17:20

## 2023-08-08 RX ADMIN — FAMOTIDINE 20 MG: 10 INJECTION INTRAVENOUS at 12:01

## 2023-08-08 RX ADMIN — OXCARBAZEPINE 900 MG: 150 TABLET, FILM COATED ORAL at 21:09

## 2023-08-08 RX ADMIN — METOPROLOL TARTRATE 5 MG: 5 INJECTION INTRAVENOUS at 16:35

## 2023-08-08 RX ADMIN — Medication 10 ML: at 21:25

## 2023-08-08 RX ADMIN — FUROSEMIDE 40 MG: 10 INJECTION, SOLUTION INTRAMUSCULAR; INTRAVENOUS at 12:45

## 2023-08-08 RX ADMIN — LACOSAMIDE 150 MG: 10 INJECTION INTRAVENOUS at 21:18

## 2023-08-08 RX ADMIN — SODIUM BICARBONATE: 84 INJECTION, SOLUTION INTRAVENOUS at 11:28

## 2023-08-08 RX ADMIN — LORAZEPAM 2 MG: 2 INJECTION INTRAMUSCULAR; INTRAVENOUS at 08:32

## 2023-08-08 RX ADMIN — CEFEPIME 2000 MG: 2 INJECTION, POWDER, FOR SOLUTION INTRAVENOUS at 10:11

## 2023-08-08 RX ADMIN — MIDAZOLAM HYDROCHLORIDE 2 MG/HR: 5 INJECTION, SOLUTION INTRAMUSCULAR; INTRAVENOUS at 19:06

## 2023-08-08 RX ADMIN — PROPOFOL 5 MCG/KG/MIN: 10 INJECTION, EMULSION INTRAVENOUS at 08:53

## 2023-08-08 RX ADMIN — WHITE PETROLATUM 57.7 %-MINERAL OIL 31.9 % EYE OINTMENT: at 21:17

## 2023-08-08 RX ADMIN — ROCURONIUM BROMIDE 100 MG: 50 INJECTION, SOLUTION INTRAVENOUS at 08:38

## 2023-08-08 RX ADMIN — ETOMIDATE 20 MG: 2 INJECTION INTRAVENOUS at 08:37

## 2023-08-08 RX ADMIN — WHITE PETROLATUM 57.7 %-MINERAL OIL 31.9 % EYE OINTMENT: at 15:24

## 2023-08-08 RX ADMIN — SODIUM BICARBONATE 50 MEQ: 84 INJECTION, SOLUTION INTRAVENOUS at 17:23

## 2023-08-08 RX ADMIN — METRONIDAZOLE 500 MG: 500 INJECTION, SOLUTION INTRAVENOUS at 21:22

## 2023-08-08 RX ADMIN — SODIUM CHLORIDE 1000 ML: 9 INJECTION, SOLUTION INTRAVENOUS at 10:13

## 2023-08-08 RX ADMIN — VANCOMYCIN HYDROCHLORIDE 1250 MG: 10 INJECTION, POWDER, LYOPHILIZED, FOR SOLUTION INTRAVENOUS at 11:17

## 2023-08-08 RX ADMIN — DEXTROSE MONOHYDRATE: 100 INJECTION, SOLUTION INTRAVENOUS at 17:29

## 2023-08-08 RX ADMIN — WHITE PETROLATUM 57.7 %-MINERAL OIL 31.9 % EYE OINTMENT: at 23:08

## 2023-08-08 RX ADMIN — CHLORHEXIDINE GLUCONATE 15 ML: 1.2 RINSE ORAL at 21:08

## 2023-08-08 RX ADMIN — VANCOMYCIN HYDROCHLORIDE 750 MG: 750 INJECTION, POWDER, LYOPHILIZED, FOR SOLUTION INTRAVENOUS at 18:07

## 2023-08-08 RX ADMIN — METRONIDAZOLE 500 MG: 500 INJECTION, SOLUTION INTRAVENOUS at 12:01

## 2023-08-08 RX ADMIN — FAMOTIDINE 20 MG: 10 INJECTION INTRAVENOUS at 21:07

## 2023-08-08 RX ADMIN — CEFEPIME 2000 MG: 2 INJECTION, POWDER, FOR SOLUTION INTRAVENOUS at 22:14

## 2023-08-08 RX ADMIN — FUROSEMIDE 40 MG: 10 INJECTION INTRAMUSCULAR; INTRAVENOUS at 12:45

## 2023-08-08 ASSESSMENT — PULMONARY FUNCTION TESTS
PIF_VALUE: 27
PIF_VALUE: 27
PIF_VALUE: 25
PIF_VALUE: 16
PIF_VALUE: 27
PIF_VALUE: 24
PIF_VALUE: 18
PIF_VALUE: 25
PIF_VALUE: 27
PIF_VALUE: 23
PIF_VALUE: 31
PIF_VALUE: 32
PIF_VALUE: 23
PIF_VALUE: 37
PIF_VALUE: 30
PIF_VALUE: 24
PIF_VALUE: 32
PIF_VALUE: 20
PIF_VALUE: 25
PIF_VALUE: 23
PIF_VALUE: 29
PIF_VALUE: 29
PIF_VALUE: 18
PIF_VALUE: 35
PIF_VALUE: 24
PIF_VALUE: 19
PIF_VALUE: 28

## 2023-08-08 ASSESSMENT — PAIN - FUNCTIONAL ASSESSMENT: PAIN_FUNCTIONAL_ASSESSMENT: 0-10

## 2023-08-08 ASSESSMENT — PAIN SCALES - GENERAL
PAINLEVEL_OUTOF10: 0

## 2023-08-08 NOTE — CARE COORDINATION
Pt is 34 YO S/P arrest- Cardiomyopathy, Sz disorder, PNA, IVDU. Currently intub on vent 100/5. Discussed w Pulm NP. CM following clinical course for any needs.  Doug Sibley RN

## 2023-08-08 NOTE — OP NOTE
[facility] Operative Note     PROCEDURE SUMMARY   Procedure TVP   Indication Asystole   Proc Finding Asystole   Postop Finding TVP secured with acceptable thresholds   Consent Obtained   Access RIJ   US US guidance used to access RIJ. Bleed Risk Low   Contrast 0cc   Flouro 1.3min   EBL <38UK   Complicat None   Specimens None   Procedure Detail Taken to lab in emergent condition. RIJ prepped and draped. 6F sheath placed. TVP placed in RV under fluoro. Threshold acceptable. Pacer depth 37cm. No

## 2023-08-08 NOTE — ED NOTES
Dr. Mary Medel at bedside for CVC placement,  updating family at this time.      Telma Luis RN  08/08/23 7589

## 2023-08-08 NOTE — ED PROVIDER NOTES
(H) 0.4 - 1.9 mmol/L   Blood Gas, Venous   Result Value Ref Range    pH, Rico 7.085 (LL) 7.350 - 7.450    pCO2, Rico 69.2 (H) 40.0 - 50.0 mmHg    pO2, Rico 136.6 (H) 25.0 - 40.0 mmHg    HCO3, Venous 20.3 (L) 23.0 - 29.0 mmol/L    Base Excess, Rico -10.7 (L) -3.0 - 3.0 mmol/L    O2 Sat, Rico 98 Not Established %    Carboxyhemoglobin 5.1 (H) 0.0 - 1.5 %    MetHgb, Rico 0.7 <1.5 %    TC02 (Calc), Rico 22 Not Established mmol/L    O2 Therapy Unknown    Troponin   Result Value Ref Range    Troponin, High Sensitivity 116 (H) 0 - 22 ng/L   Troponin   Result Value Ref Range    Troponin, High Sensitivity 308 (H) 0 - 22 ng/L   Protime-INR   Result Value Ref Range    Protime 13.7 11.5 - 14.8 sec    INR 1.05 0.84 - 1.16   ETOH   Result Value Ref Range    Ethanol Lvl None Detected mg/dL   Urine Drug Screen   Result Value Ref Range    Amphetamine Screen, Urine Neg Negative <1000ng/mL    Barbiturate Screen, Ur Neg Negative <200 ng/mL    Benzodiazepine Screen, Urine POSITIVE (A) Negative <200 ng/mL    Cannabinoid Scrn, Ur POSITIVE (A) Negative <50 ng/mL    Cocaine Metabolite Screen, Urine POSITIVE (A) Negative <300 ng/mL    Opiate Scrn, Ur POSITIVE (A) Negative <300 ng/mL    PCP Screen, Urine Neg Negative <25 ng/mL    Methadone Screen, Urine POSITIVE (A) Negative <300 ng/mL    Oxycodone Urine Neg Negative <100 ng/ml    FENTANYL SCREEN, URINE POSITIVE (A) Negative <5 ng/mL    pH, UA 6.0     Drug Screen Comment: see below    Salicylate   Result Value Ref Range    Salicylate, Serum <5.5 (L) 15.0 - 30.0 mg/dL   Acetaminophen Level   Result Value Ref Range    Acetaminophen Level <5 (L) 10 - 30 ug/mL   Procalcitonin   Result Value Ref Range    Procalcitonin 14.90 (H) 0.00 - 0.15 ng/mL   Brain Natriuretic Peptide   Result Value Ref Range    Pro-BNP 6,701 (H) 0 - 124 pg/mL   POCT Arterial   Result Value Ref Range    POC Sodium 138 136 - 145 mmol/L    POC Potassium 3.8 3.5 - 5.1 mmol/L    POC Glucose 142 (H) 70 - 99 mg/dl    Calcium, Ionized 1.22

## 2023-08-08 NOTE — CODE DOCUMENTATION
Responded to CODE BLUE,  Patient bradycardia down and developed asystole. CPR initiated per ACLS protocol, received multiple doses of epinephrine, bicarb, heart rate, blood pressure restored (see code sheet for details on the timeline) noted type hypoglycemia blood sugar in the 30s, treated with D50, D10 infusion repeated ABG showed improvement of the previous ABG obtained in the ED. Respiratory rate increased to combat the respiratory acidosis elevation and lactic acidosis as expected, EKG showed sinus tachycardia with left bundle branch block, chest x-ray showed right airspace disease likely secondary to aspiration pneumonitis. Continue antibiotics continue monitoring blood sugar. Discontinue metoprolol. Plan of care discussed with the bedside nurse, code team, cardiology team on-call. Family updated at bedside. Overall prognosis is poor. Total critical care time caring for this patient with life threatening illness, including direct patient contact, management of life support systems, review of data including imaging and labs, discussions with other team members and physicians at least 35 minutes so far today, excluding procedures    Addendum  Troponin elevation expected, will follow the trend. Will monitor lactic acid level. Continue antibiotics, changed Ipravent to Versed drip. Cardiology team seeing the patient. Impressive WBC elevation noted.   Already on on antibiotics    Electronically signed by Anshul Lebron MD on 8/8/2023 at 6:01 PM

## 2023-08-08 NOTE — ED NOTES
Upon return from CT, patient became asystolic. ACLS initiated. 1052- CPR initiated. 1054- EPI given by Thomas Chadwick RN.  1055- pulse checked. No pulse palpated. PEA on monitor per Dr. Marlene Holman. 1057- EPI Given by Thomas Chadwick, [facility] given by Thomas Chadwick RN.  1058- pulse checked. Pulse palpated by Dr. Marlene Holman.       William Cruz RN  08/08/23 1101

## 2023-08-08 NOTE — H&P
V2.0  History and Physical      Name:  Calista Tripp /Age/Sex: 1990  (35 y.o. male)   MRN & CSN:  [id number] & [id number] Encounter Date/Time: 2023 12:34 PM EDT   Location:  [id number] PCP: No primary care provider on file. Hospital Day: 1    Assessment and Plan:   Calista Tripp is a 35 y.o. male with a pmh of hypertension, Takotsubo cardiomyopathy (EF<20% in ), COPD, history of polysubstance abuse (including IV heroin and fentanyl), chronic hepatitis C and history of seizure disorder presenting with seizure attack followed by cardiac arrest.        Hospital Problems             Last Modified POA    * (Principal) Cardiac arrest ([address]) 2023 Yes       1. Cardiac arrest: Patient had bradycardia prior to cardiac arrest.  He is at risk because of cardiomyopathy and drug abuse. Consult cardiology. Continue Levophed drip. 2.  Multifocal pneumonia: Continue IV vancomycin and cefepime. He is on mechanical ventilation-consult pulmonology/critical care team.    3.  Seizure disorder: CT head-no acute findings. Will resume oxcarbazepine. Consult neurology. Seizure precaution. 4.  Elevated troponin: Likely related to cardiomyopathy. Cardiology consult. 5.  Abnormal LFT: Patient has a history of hepatitis C but the acute rise in transaminases suggest shock liver. CT abdomen showed large area of hypodensity in the left hepatic lobe-recommended MRI of the liver. Monitor LFT. 6.  Takotsubo cardiomyopathy: Last EF<20% in . Hold Coreg, HUNDSHAGEN and Fiez for now. 7.  Polysubstance abuse/chronic hepatitis C: Urine toxicology positive for methadone, cocaine, fentanyl and opiates. This will be addressed prior to discharge. Chronic problems include hypertension, COPD and malnutrition. Disposition:   Current Living situation: Home  Expected Disposition: To be determined.   Estimated D/C: 4 to 7 days    Diet Diet NPO   DVT Prophylaxis [x] Lovenox, []  Heparin, [] SCDs,

## 2023-08-08 NOTE — ED NOTES
Patient continues to be combative. Several attempts for IV access unsuccessful. Dr Ruchi Raygoza placed an IO in left tibia. Pt prepped for intubation.       Dinorah Gore  08/08/23 0981

## 2023-08-08 NOTE — ED NOTES
Patient went bradycardic ~37 after CT completed and patient moved back to ER stretcher, pulse still palpable at that time. Writer stopped propofol at CT. Upon entering ER bed #4 with patient went asystole and lost pulse, refer to prior ER note of code charting.      Kim Alvarez RN  08/08/23 1103

## 2023-08-09 PROBLEM — J96.02 ACUTE RESPIRATORY FAILURE WITH HYPERCAPNIA (HCC): Status: ACTIVE | Noted: 2023-08-09

## 2023-08-09 LAB
ALBUMIN SERPL-MCNC: 2.6 G/DL (ref 3.4–5)
ALBUMIN SERPL-MCNC: 2.6 G/DL (ref 3.4–5)
ALBUMIN/GLOB SERPL: 1 {RATIO} (ref 1.1–2.2)
ALP SERPL-CCNC: 167 U/L (ref 40–129)
ALP SERPL-CCNC: 176 U/L (ref 40–129)
ALT SERPL-CCNC: 325 U/L (ref 10–40)
ALT SERPL-CCNC: 339 U/L (ref 10–40)
ANION GAP SERPL CALCULATED.3IONS-SCNC: 8 MMOL/L (ref 3–16)
AST SERPL-CCNC: 265 U/L (ref 15–37)
AST SERPL-CCNC: 317 U/L (ref 15–37)
BASE EXCESS BLDA CALC-SCNC: 0.5 MMOL/L (ref -3–3)
BASOPHILS # BLD: 0 K/UL (ref 0–0.2)
BASOPHILS NFR BLD: 0.2 %
BILIRUB DIRECT SERPL-MCNC: <0.2 MG/DL (ref 0–0.3)
BILIRUB INDIRECT SERPL-MCNC: ABNORMAL MG/DL (ref 0–1)
BILIRUB SERPL-MCNC: 0.4 MG/DL (ref 0–1)
BILIRUB SERPL-MCNC: 0.4 MG/DL (ref 0–1)
BUN SERPL-MCNC: 26 MG/DL (ref 7–20)
CALCIUM SERPL-MCNC: 7.8 MG/DL (ref 8.3–10.6)
CHLORIDE SERPL-SCNC: 102 MMOL/L (ref 99–110)
CO2 BLDA-SCNC: 24.8 MMOL/L
CO2 SERPL-SCNC: 27 MMOL/L (ref 21–32)
COHGB MFR BLDA: 0.3 % (ref 0–1.5)
CREAT SERPL-MCNC: 1.4 MG/DL (ref 0.9–1.3)
DEPRECATED RDW RBC AUTO: 13.7 % (ref 12.4–15.4)
EKG ATRIAL RATE: 102 BPM
EKG DIAGNOSIS: NORMAL
EKG P AXIS: 54 DEGREES
EKG P-R INTERVAL: 140 MS
EKG Q-T INTERVAL: 418 MS
EKG QRS DURATION: 134 MS
EKG QTC CALCULATION (BAZETT): 544 MS
EKG R AXIS: -56 DEGREES
EKG T AXIS: 43 DEGREES
EKG VENTRICULAR RATE: 102 BPM
EOSINOPHIL # BLD: 0 K/UL (ref 0–0.6)
EOSINOPHIL NFR BLD: 0 %
GFR SERPLBLD CREATININE-BSD FMLA CKD-EPI: >60 ML/MIN/{1.73_M2}
GLUCOSE BLD-MCNC: 109 MG/DL (ref 70–99)
GLUCOSE BLD-MCNC: 115 MG/DL (ref 70–99)
GLUCOSE BLD-MCNC: 121 MG/DL (ref 70–99)
GLUCOSE BLD-MCNC: 82 MG/DL (ref 70–99)
GLUCOSE SERPL-MCNC: 120 MG/DL (ref 70–99)
HCO3 BLDA-SCNC: 23.7 MMOL/L (ref 21–29)
HCT VFR BLD AUTO: 31.3 % (ref 40.5–52.5)
HGB BLD-MCNC: 10.7 G/DL (ref 13.5–17.5)
HGB BLDA-MCNC: 11.8 G/DL (ref 13.5–17.5)
LACTATE BLDV-SCNC: 2.8 MMOL/L (ref 0.4–2)
LV EF: 20 %
LVEF MODALITY: NORMAL
LYMPHOCYTES # BLD: 1.5 K/UL (ref 1–5.1)
LYMPHOCYTES NFR BLD: 7 %
MCH RBC QN AUTO: 32.1 PG (ref 26–34)
MCHC RBC AUTO-ENTMCNC: 34.1 G/DL (ref 31–36)
MCV RBC AUTO: 94 FL (ref 80–100)
METHGB MFR BLDA: 0.4 %
MONOCYTES # BLD: 1.3 K/UL (ref 0–1.3)
MONOCYTES NFR BLD: 6.3 %
NEUTROPHILS # BLD: 18.5 K/UL (ref 1.7–7.7)
NEUTROPHILS NFR BLD: 86.5 %
O2 THERAPY: ABNORMAL
PCO2 BLDA: 33.5 MMHG (ref 35–45)
PERFORMED ON: ABNORMAL
PERFORMED ON: NORMAL
PH BLDA: 7.47 [PH] (ref 7.35–7.45)
PLATELET # BLD AUTO: 113 K/UL (ref 135–450)
PMV BLD AUTO: 8.1 FL (ref 5–10.5)
PO2 BLDA: 104 MMHG (ref 75–108)
POTASSIUM SERPL-SCNC: 4 MMOL/L (ref 3.5–5.1)
PROT SERPL-MCNC: 5.1 G/DL (ref 6.4–8.2)
PROT SERPL-MCNC: 5.2 G/DL (ref 6.4–8.2)
RBC # BLD AUTO: 3.33 M/UL (ref 4.2–5.9)
REASON FOR REJECTION: NORMAL
REJECTED TEST: NORMAL
REPORT: NORMAL
SAO2 % BLDA: 97.5 %
SODIUM SERPL-SCNC: 137 MMOL/L (ref 136–145)
TROPONIN, HIGH SENSITIVITY: 354 NG/L (ref 0–22)
VANCOMYCIN SERPL-MCNC: 13.6 UG/ML
WBC # BLD AUTO: 21.4 K/UL (ref 4–11)

## 2023-08-09 PROCEDURE — C9254 INJECTION, LACOSAMIDE: HCPCS | Performed by: PSYCHIATRY & NEUROLOGY

## 2023-08-09 PROCEDURE — 83605 ASSAY OF LACTIC ACID: CPT

## 2023-08-09 PROCEDURE — 80053 COMPREHEN METABOLIC PANEL: CPT

## 2023-08-09 PROCEDURE — 85025 COMPLETE CBC W/AUTO DIFF WBC: CPT

## 2023-08-09 PROCEDURE — 6360000002 HC RX W HCPCS: Performed by: PSYCHIATRY & NEUROLOGY

## 2023-08-09 PROCEDURE — 6360000002 HC RX W HCPCS: Performed by: NURSE PRACTITIONER

## 2023-08-09 PROCEDURE — 6360000002 HC RX W HCPCS: Performed by: INTERNAL MEDICINE

## 2023-08-09 PROCEDURE — 2000000000 HC ICU R&B

## 2023-08-09 PROCEDURE — 87070 CULTURE OTHR SPECIMN AEROBIC: CPT

## 2023-08-09 PROCEDURE — 2580000003 HC RX 258: Performed by: NURSE PRACTITIONER

## 2023-08-09 PROCEDURE — 99291 CRITICAL CARE FIRST HOUR: CPT | Performed by: INTERNAL MEDICINE

## 2023-08-09 PROCEDURE — 2580000003 HC RX 258: Performed by: HOSPITALIST

## 2023-08-09 PROCEDURE — 82803 BLOOD GASES ANY COMBINATION: CPT

## 2023-08-09 PROCEDURE — 6370000000 HC RX 637 (ALT 250 FOR IP): Performed by: NURSE PRACTITIONER

## 2023-08-09 PROCEDURE — 2580000003 HC RX 258: Performed by: INTERNAL MEDICINE

## 2023-08-09 PROCEDURE — 87205 SMEAR GRAM STAIN: CPT

## 2023-08-09 PROCEDURE — 84484 ASSAY OF TROPONIN QUANT: CPT

## 2023-08-09 PROCEDURE — 93005 ELECTROCARDIOGRAM TRACING: CPT | Performed by: INTERNAL MEDICINE

## 2023-08-09 PROCEDURE — 2580000003 HC RX 258

## 2023-08-09 PROCEDURE — 93010 ELECTROCARDIOGRAM REPORT: CPT | Performed by: INTERNAL MEDICINE

## 2023-08-09 PROCEDURE — 94003 VENT MGMT INPAT SUBQ DAY: CPT

## 2023-08-09 PROCEDURE — 2700000000 HC OXYGEN THERAPY PER DAY

## 2023-08-09 PROCEDURE — 93306 TTE W/DOPPLER COMPLETE: CPT

## 2023-08-09 PROCEDURE — 87641 MR-STAPH DNA AMP PROBE: CPT

## 2023-08-09 PROCEDURE — 95822 EEG COMA OR SLEEP ONLY: CPT

## 2023-08-09 PROCEDURE — 6360000002 HC RX W HCPCS: Performed by: HOSPITALIST

## 2023-08-09 PROCEDURE — 80202 ASSAY OF VANCOMYCIN: CPT

## 2023-08-09 PROCEDURE — 80183 DRUG SCRN QUANT OXCARBAZEPIN: CPT

## 2023-08-09 PROCEDURE — 99233 SBSQ HOSP IP/OBS HIGH 50: CPT | Performed by: PSYCHIATRY & NEUROLOGY

## 2023-08-09 PROCEDURE — 36592 COLLECT BLOOD FROM PICC: CPT

## 2023-08-09 PROCEDURE — 94761 N-INVAS EAR/PLS OXIMETRY MLT: CPT

## 2023-08-09 PROCEDURE — 2500000003 HC RX 250 WO HCPCS: Performed by: INTERNAL MEDICINE

## 2023-08-09 PROCEDURE — 2580000003 HC RX 258: Performed by: PSYCHIATRY & NEUROLOGY

## 2023-08-09 RX ORDER — PROPOFOL 10 MG/ML
5-50 INJECTION, EMULSION INTRAVENOUS CONTINUOUS
Status: DISCONTINUED | OUTPATIENT
Start: 2023-08-09 | End: 2023-08-10

## 2023-08-09 RX ORDER — MILRINONE LACTATE 0.2 MG/ML
0.12 INJECTION, SOLUTION INTRAVENOUS CONTINUOUS
Status: DISCONTINUED | OUTPATIENT
Start: 2023-08-09 | End: 2023-08-10

## 2023-08-09 RX ORDER — DEXTROSE MONOHYDRATE 100 MG/ML
INJECTION, SOLUTION INTRAVENOUS
Status: COMPLETED
Start: 2023-08-09 | End: 2023-08-09

## 2023-08-09 RX ORDER — FENTANYL CITRATE-0.9 % NACL/PF 10 MCG/ML
25-200 PLASTIC BAG, INJECTION (ML) INTRAVENOUS CONTINUOUS
Status: DISCONTINUED | OUTPATIENT
Start: 2023-08-09 | End: 2023-08-10

## 2023-08-09 RX ORDER — ACETAMINOPHEN 650 MG/1
650 SUPPOSITORY RECTAL EVERY 8 HOURS PRN
Status: DISCONTINUED | OUTPATIENT
Start: 2023-08-09 | End: 2023-08-16 | Stop reason: HOSPADM

## 2023-08-09 RX ORDER — FENTANYL CITRATE-0.9 % NACL/PF 10 MCG/ML
25-200 PLASTIC BAG, INJECTION (ML) INTRAVENOUS CONTINUOUS
Status: DISCONTINUED | OUTPATIENT
Start: 2023-08-09 | End: 2023-08-09

## 2023-08-09 RX ORDER — CIPROFLOXACIN 2 MG/ML
400 INJECTION, SOLUTION INTRAVENOUS EVERY 12 HOURS
Status: DISCONTINUED | OUTPATIENT
Start: 2023-08-09 | End: 2023-08-09

## 2023-08-09 RX ORDER — ACETAMINOPHEN 325 MG/1
650 TABLET ORAL EVERY 8 HOURS PRN
Status: DISCONTINUED | OUTPATIENT
Start: 2023-08-09 | End: 2023-08-16 | Stop reason: HOSPADM

## 2023-08-09 RX ADMIN — FENTANYL CITRATE 150 MCG/HR: 0.05 INJECTION, SOLUTION INTRAMUSCULAR; INTRAVENOUS at 17:12

## 2023-08-09 RX ADMIN — LACOSAMIDE 150 MG: 10 INJECTION INTRAVENOUS at 22:06

## 2023-08-09 RX ADMIN — ENOXAPARIN SODIUM 40 MG: 100 INJECTION SUBCUTANEOUS at 08:15

## 2023-08-09 RX ADMIN — FENTANYL CITRATE 50 MCG/HR: 0.05 INJECTION, SOLUTION INTRAMUSCULAR; INTRAVENOUS at 03:23

## 2023-08-09 RX ADMIN — Medication 10 ML: at 20:23

## 2023-08-09 RX ADMIN — WHITE PETROLATUM 57.7 %-MINERAL OIL 31.9 % EYE OINTMENT: at 23:03

## 2023-08-09 RX ADMIN — PROPOFOL 20 MCG/KG/MIN: 10 INJECTION, EMULSION INTRAVENOUS at 09:32

## 2023-08-09 RX ADMIN — WHITE PETROLATUM 57.7 %-MINERAL OIL 31.9 % EYE OINTMENT: at 20:23

## 2023-08-09 RX ADMIN — VANCOMYCIN HYDROCHLORIDE 750 MG: 750 INJECTION, POWDER, LYOPHILIZED, FOR SOLUTION INTRAVENOUS at 12:43

## 2023-08-09 RX ADMIN — DEXTROSE MONOHYDRATE: 100 INJECTION, SOLUTION INTRAVENOUS at 23:04

## 2023-08-09 RX ADMIN — FENTANYL CITRATE 125 MCG/HR: 0.05 INJECTION, SOLUTION INTRAMUSCULAR; INTRAVENOUS at 09:36

## 2023-08-09 RX ADMIN — DEXTROSE MONOHYDRATE: 100 INJECTION, SOLUTION INTRAVENOUS at 05:26

## 2023-08-09 RX ADMIN — PROPOFOL 40 MCG/KG/MIN: 10 INJECTION, EMULSION INTRAVENOUS at 23:12

## 2023-08-09 RX ADMIN — MIDAZOLAM HYDROCHLORIDE 10 MG/HR: 5 INJECTION, SOLUTION INTRAMUSCULAR; INTRAVENOUS at 05:51

## 2023-08-09 RX ADMIN — CEFEPIME 2000 MG: 2 INJECTION, POWDER, FOR SOLUTION INTRAVENOUS at 08:14

## 2023-08-09 RX ADMIN — FAMOTIDINE 20 MG: 10 INJECTION INTRAVENOUS at 08:15

## 2023-08-09 RX ADMIN — FAMOTIDINE 20 MG: 10 INJECTION INTRAVENOUS at 20:46

## 2023-08-09 RX ADMIN — WHITE PETROLATUM 57.7 %-MINERAL OIL 31.9 % EYE OINTMENT: at 02:27

## 2023-08-09 RX ADMIN — FENTANYL CITRATE 175 MCG/HR: 0.05 INJECTION, SOLUTION INTRAMUSCULAR; INTRAVENOUS at 23:51

## 2023-08-09 RX ADMIN — OXCARBAZEPINE 900 MG: 150 TABLET, FILM COATED ORAL at 08:15

## 2023-08-09 RX ADMIN — WHITE PETROLATUM 57.7 %-MINERAL OIL 31.9 % EYE OINTMENT: at 15:20

## 2023-08-09 RX ADMIN — LACOSAMIDE 150 MG: 10 INJECTION INTRAVENOUS at 17:11

## 2023-08-09 RX ADMIN — CIPROFLOXACIN 400 MG: 2 INJECTION, SOLUTION INTRAVENOUS at 05:32

## 2023-08-09 RX ADMIN — CHLORHEXIDINE GLUCONATE 15 ML: 1.2 RINSE ORAL at 20:24

## 2023-08-09 RX ADMIN — MUPIROCIN: 20 OINTMENT TOPICAL at 20:24

## 2023-08-09 RX ADMIN — MILRINONE LACTATE IN DEXTROSE 0.12 MCG/KG/MIN: 200 INJECTION, SOLUTION INTRAVENOUS at 15:16

## 2023-08-09 RX ADMIN — OXCARBAZEPINE 900 MG: 150 TABLET, FILM COATED ORAL at 20:46

## 2023-08-09 RX ADMIN — CEFEPIME 2000 MG: 2 INJECTION, POWDER, FOR SOLUTION INTRAVENOUS at 20:52

## 2023-08-09 RX ADMIN — PROPOFOL 35 MCG/KG/MIN: 10 INJECTION, EMULSION INTRAVENOUS at 15:19

## 2023-08-09 RX ADMIN — METRONIDAZOLE 500 MG: 500 INJECTION, SOLUTION INTRAVENOUS at 03:13

## 2023-08-09 ASSESSMENT — PULMONARY FUNCTION TESTS
PIF_VALUE: 18
PIF_VALUE: 17
PIF_VALUE: 18
PIF_VALUE: 19
PIF_VALUE: 18
PIF_VALUE: 16
PIF_VALUE: 16
PIF_VALUE: 20
PIF_VALUE: 20
PIF_VALUE: 18
PIF_VALUE: 19
PIF_VALUE: 18
PIF_VALUE: 20
PIF_VALUE: 19
PIF_VALUE: 23
PIF_VALUE: 18
PIF_VALUE: 21
PIF_VALUE: 17
PIF_VALUE: 20
PIF_VALUE: 16
PIF_VALUE: 18
PIF_VALUE: 18
PIF_VALUE: 16
PIF_VALUE: 17
PIF_VALUE: 13
PIF_VALUE: 18

## 2023-08-09 ASSESSMENT — PAIN SCALES - GENERAL
PAINLEVEL_OUTOF10: 0
PAINLEVEL_OUTOF10: 3
PAINLEVEL_OUTOF10: 0
PAINLEVEL_OUTOF10: 0

## 2023-08-09 NOTE — PROCEDURES
INTERPRETATION:  This 25-minute, computer-assisted video EEG recording is abnormal.  It showed moderate to severe continuous generalized slowing background activity. There was no clear-cut epileptiform discharge in the study. The EEG findings were consistent with moderate to severe nonspecific generalized cerebral dysfunction or medication effect. CLASSIFICATION:  Dysrhythmia grade 2, generalized. EKG channel. DESCRIPTION:    BACKGROUND: The EEG background showed continuous generalized low amplitude intermixed 2 to 7 Hz theta/delta activity with occasional EEG attenuation. The EEG showed fair variability and reactivity. There was no significant change on the EEG background with photic stimulation. Hyperventilation was omitted due to patient's condition. INTERICTAL DISCHARGES: None    CLINICAL EVENTS:  None    The EKG channel was unremarkable.

## 2023-08-09 NOTE — CARE COORDINATION
S/P Cardiac arrest. Remains intubated and sedated on Vent. Developed significant bradycardia post arrest yesterday, requiring temporary pacemaker insertion. Followed by [address] [place] [address] Ave, pulmonary, Cardiology and Neurology. Will continue to follow.     Adriel Rolon RN

## 2023-08-10 ENCOUNTER — APPOINTMENT (OUTPATIENT)
Dept: GENERAL RADIOLOGY | Age: 33
End: 2023-08-10
Payer: MEDICAID

## 2023-08-10 LAB
ALBUMIN SERPL-MCNC: 2.6 G/DL (ref 3.4–5)
ALP SERPL-CCNC: 148 U/L (ref 40–129)
ALT SERPL-CCNC: 375 U/L (ref 10–40)
AMMONIA PLAS-SCNC: 19 UMOL/L (ref 16–60)
ANION GAP SERPL CALCULATED.3IONS-SCNC: 7 MMOL/L (ref 3–16)
AST SERPL-CCNC: 230 U/L (ref 15–37)
BASE EXCESS BLDA CALC-SCNC: 0.4 MMOL/L (ref -3–3)
BILIRUB DIRECT SERPL-MCNC: <0.2 MG/DL (ref 0–0.3)
BILIRUB INDIRECT SERPL-MCNC: ABNORMAL MG/DL (ref 0–1)
BILIRUB SERPL-MCNC: 0.3 MG/DL (ref 0–1)
BUN SERPL-MCNC: 22 MG/DL (ref 7–20)
CALCIUM SERPL-MCNC: 8 MG/DL (ref 8.3–10.6)
CHLORIDE SERPL-SCNC: 103 MMOL/L (ref 99–110)
CO2 BLDA-SCNC: 23.5 MMOL/L
CO2 SERPL-SCNC: 25 MMOL/L (ref 21–32)
COHGB MFR BLDA: 0.3 % (ref 0–1.5)
CREAT SERPL-MCNC: 1.2 MG/DL (ref 0.9–1.3)
DEPRECATED RDW RBC AUTO: 13.4 % (ref 12.4–15.4)
GFR SERPLBLD CREATININE-BSD FMLA CKD-EPI: >60 ML/MIN/{1.73_M2}
GLUCOSE BLD-MCNC: 128 MG/DL (ref 70–99)
GLUCOSE BLD-MCNC: 136 MG/DL (ref 70–99)
GLUCOSE BLD-MCNC: 141 MG/DL (ref 70–99)
GLUCOSE SERPL-MCNC: 163 MG/DL (ref 70–99)
HCO3 BLDA-SCNC: 22.6 MMOL/L (ref 21–29)
HCT VFR BLD AUTO: 29.6 % (ref 40.5–52.5)
HGB BLD-MCNC: 10.5 G/DL (ref 13.5–17.5)
HGB BLDA-MCNC: 11.8 G/DL (ref 13.5–17.5)
LACTATE BLDV-SCNC: 2.3 MMOL/L (ref 0.4–2)
MAGNESIUM SERPL-MCNC: 2 MG/DL (ref 1.8–2.4)
MCH RBC QN AUTO: 32.8 PG (ref 26–34)
MCHC RBC AUTO-ENTMCNC: 35.3 G/DL (ref 31–36)
MCV RBC AUTO: 93.1 FL (ref 80–100)
METHGB MFR BLDA: 0.2 %
MRSA DNA SPEC QL NAA+PROBE: NORMAL
NT-PROBNP SERPL-MCNC: ABNORMAL PG/ML (ref 0–124)
O2 THERAPY: ABNORMAL
PCO2 BLDA: 28.9 MMHG (ref 35–45)
PERFORMED ON: ABNORMAL
PH BLDA: 7.51 [PH] (ref 7.35–7.45)
PHOSPHATE SERPL-MCNC: 2.5 MG/DL (ref 2.5–4.9)
PLATELET # BLD AUTO: 100 K/UL (ref 135–450)
PMV BLD AUTO: 8.1 FL (ref 5–10.5)
PO2 BLDA: 73.8 MMHG (ref 75–108)
POTASSIUM SERPL-SCNC: 3.7 MMOL/L (ref 3.5–5.1)
PROT SERPL-MCNC: 5.2 G/DL (ref 6.4–8.2)
RBC # BLD AUTO: 3.19 M/UL (ref 4.2–5.9)
SAO2 % BLDA: 95 %
SODIUM SERPL-SCNC: 135 MMOL/L (ref 136–145)
TRIGL SERPL-MCNC: 524 MG/DL (ref 0–150)
WBC # BLD AUTO: 16.6 K/UL (ref 4–11)

## 2023-08-10 PROCEDURE — 80048 BASIC METABOLIC PNL TOTAL CA: CPT

## 2023-08-10 PROCEDURE — 99233 SBSQ HOSP IP/OBS HIGH 50: CPT | Performed by: PSYCHIATRY & NEUROLOGY

## 2023-08-10 PROCEDURE — 2580000003 HC RX 258: Performed by: PSYCHIATRY & NEUROLOGY

## 2023-08-10 PROCEDURE — 84100 ASSAY OF PHOSPHORUS: CPT

## 2023-08-10 PROCEDURE — 2580000003 HC RX 258: Performed by: INTERNAL MEDICINE

## 2023-08-10 PROCEDURE — 85027 COMPLETE CBC AUTOMATED: CPT

## 2023-08-10 PROCEDURE — 6370000000 HC RX 637 (ALT 250 FOR IP): Performed by: NURSE PRACTITIONER

## 2023-08-10 PROCEDURE — 6360000002 HC RX W HCPCS: Performed by: INTERNAL MEDICINE

## 2023-08-10 PROCEDURE — 83605 ASSAY OF LACTIC ACID: CPT

## 2023-08-10 PROCEDURE — 84478 ASSAY OF TRIGLYCERIDES: CPT

## 2023-08-10 PROCEDURE — 6360000002 HC RX W HCPCS: Performed by: NURSE PRACTITIONER

## 2023-08-10 PROCEDURE — 2580000003 HC RX 258: Performed by: NURSE PRACTITIONER

## 2023-08-10 PROCEDURE — 82803 BLOOD GASES ANY COMBINATION: CPT

## 2023-08-10 PROCEDURE — 2500000003 HC RX 250 WO HCPCS: Performed by: INTERNAL MEDICINE

## 2023-08-10 PROCEDURE — C9254 INJECTION, LACOSAMIDE: HCPCS | Performed by: PSYCHIATRY & NEUROLOGY

## 2023-08-10 PROCEDURE — 99291 CRITICAL CARE FIRST HOUR: CPT | Performed by: INTERNAL MEDICINE

## 2023-08-10 PROCEDURE — 6370000000 HC RX 637 (ALT 250 FOR IP): Performed by: PSYCHIATRY & NEUROLOGY

## 2023-08-10 PROCEDURE — 2700000000 HC OXYGEN THERAPY PER DAY

## 2023-08-10 PROCEDURE — 83735 ASSAY OF MAGNESIUM: CPT

## 2023-08-10 PROCEDURE — 94003 VENT MGMT INPAT SUBQ DAY: CPT

## 2023-08-10 PROCEDURE — 6360000002 HC RX W HCPCS: Performed by: PSYCHIATRY & NEUROLOGY

## 2023-08-10 PROCEDURE — 82140 ASSAY OF AMMONIA: CPT

## 2023-08-10 PROCEDURE — 71045 X-RAY EXAM CHEST 1 VIEW: CPT

## 2023-08-10 PROCEDURE — 2000000000 HC ICU R&B

## 2023-08-10 PROCEDURE — 94761 N-INVAS EAR/PLS OXIMETRY MLT: CPT

## 2023-08-10 PROCEDURE — 80076 HEPATIC FUNCTION PANEL: CPT

## 2023-08-10 PROCEDURE — 83880 ASSAY OF NATRIURETIC PEPTIDE: CPT

## 2023-08-10 PROCEDURE — 99233 SBSQ HOSP IP/OBS HIGH 50: CPT | Performed by: INTERNAL MEDICINE

## 2023-08-10 RX ORDER — SENNOSIDES A AND B 8.6 MG/1
2 TABLET, FILM COATED ORAL DAILY
Status: DISCONTINUED | OUTPATIENT
Start: 2023-08-10 | End: 2023-08-16 | Stop reason: HOSPADM

## 2023-08-10 RX ORDER — LORAZEPAM 2 MG/ML
2 INJECTION INTRAMUSCULAR
Status: DISCONTINUED | OUTPATIENT
Start: 2023-08-10 | End: 2023-08-11

## 2023-08-10 RX ORDER — FUROSEMIDE 10 MG/ML
20 INJECTION INTRAMUSCULAR; INTRAVENOUS ONCE
Status: COMPLETED | OUTPATIENT
Start: 2023-08-10 | End: 2023-08-10

## 2023-08-10 RX ORDER — LACOSAMIDE 50 MG/1
150 TABLET ORAL 2 TIMES DAILY
Status: DISCONTINUED | OUTPATIENT
Start: 2023-08-10 | End: 2023-08-16 | Stop reason: HOSPADM

## 2023-08-10 RX ADMIN — LACOSAMIDE 150 MG: 10 INJECTION INTRAVENOUS at 10:28

## 2023-08-10 RX ADMIN — MUPIROCIN: 20 OINTMENT TOPICAL at 20:44

## 2023-08-10 RX ADMIN — SENNOSIDES 17.2 MG: 8.6 TABLET, COATED ORAL at 12:45

## 2023-08-10 RX ADMIN — FENTANYL CITRATE 125 MCG/HR: 0.05 INJECTION, SOLUTION INTRAMUSCULAR; INTRAVENOUS at 10:29

## 2023-08-10 RX ADMIN — FUROSEMIDE 20 MG: 10 INJECTION, SOLUTION INTRAMUSCULAR; INTRAVENOUS at 12:45

## 2023-08-10 RX ADMIN — LORAZEPAM 2 MG: 2 INJECTION INTRAMUSCULAR; INTRAVENOUS at 19:48

## 2023-08-10 RX ADMIN — OXCARBAZEPINE 900 MG: 150 TABLET, FILM COATED ORAL at 09:39

## 2023-08-10 RX ADMIN — FAMOTIDINE 20 MG: 10 INJECTION INTRAVENOUS at 09:40

## 2023-08-10 RX ADMIN — OXCARBAZEPINE 900 MG: 150 TABLET, FILM COATED ORAL at 20:42

## 2023-08-10 RX ADMIN — LORAZEPAM 2 MG: 2 INJECTION INTRAMUSCULAR; INTRAVENOUS at 14:33

## 2023-08-10 RX ADMIN — PROPOFOL 50 MCG/KG/MIN: 10 INJECTION, EMULSION INTRAVENOUS at 03:50

## 2023-08-10 RX ADMIN — FAMOTIDINE 20 MG: 10 INJECTION INTRAVENOUS at 20:43

## 2023-08-10 RX ADMIN — PROPOFOL 35 MCG/KG/MIN: 10 INJECTION, EMULSION INTRAVENOUS at 10:21

## 2023-08-10 RX ADMIN — ENOXAPARIN SODIUM 40 MG: 100 INJECTION SUBCUTANEOUS at 09:40

## 2023-08-10 RX ADMIN — WHITE PETROLATUM 57.7 %-MINERAL OIL 31.9 % EYE OINTMENT: at 02:24

## 2023-08-10 RX ADMIN — LACOSAMIDE 150 MG: 50 TABLET, FILM COATED ORAL at 20:43

## 2023-08-10 RX ADMIN — CEFEPIME 2000 MG: 2 INJECTION, POWDER, FOR SOLUTION INTRAVENOUS at 09:44

## 2023-08-10 RX ADMIN — DOCUSATE SODIUM LIQUID 100 MG: 100 LIQUID ORAL at 22:13

## 2023-08-10 RX ADMIN — Medication 10 ML: at 20:43

## 2023-08-10 RX ADMIN — DEXTROSE MONOHYDRATE: 100 INJECTION, SOLUTION INTRAVENOUS at 04:12

## 2023-08-10 RX ADMIN — FENTANYL CITRATE 200 MCG/HR: 0.05 INJECTION, SOLUTION INTRAMUSCULAR; INTRAVENOUS at 05:07

## 2023-08-10 RX ADMIN — CEFEPIME 2000 MG: 2 INJECTION, POWDER, FOR SOLUTION INTRAVENOUS at 18:15

## 2023-08-10 ASSESSMENT — PULMONARY FUNCTION TESTS
PIF_VALUE: 18
PIF_VALUE: 19
PIF_VALUE: 18
PIF_VALUE: 18
PIF_VALUE: 16
PIF_VALUE: 18
PIF_VALUE: 14
PIF_VALUE: 18
PIF_VALUE: 17
PIF_VALUE: 19
PIF_VALUE: 19
PIF_VALUE: 17

## 2023-08-10 ASSESSMENT — PAIN SCALES - GENERAL
PAINLEVEL_OUTOF10: 0
PAINLEVEL_OUTOF10: 0

## 2023-08-11 LAB
10OH-CARBAZEPINE SERPL-MCNC: 16 UG/ML (ref 3–35)
ALBUMIN SERPL-MCNC: 2.7 G/DL (ref 3.4–5)
ALP SERPL-CCNC: 137 U/L (ref 40–129)
ALT SERPL-CCNC: 332 U/L (ref 10–40)
ANION GAP SERPL CALCULATED.3IONS-SCNC: 7 MMOL/L (ref 3–16)
AST SERPL-CCNC: 130 U/L (ref 15–37)
BACTERIA BLD CULT ORG #2: ABNORMAL
BACTERIA BLD CULT ORG #2: ABNORMAL
BACTERIA BLD CULT: ABNORMAL
BACTERIA SPEC RESP CULT: NORMAL
BILIRUB DIRECT SERPL-MCNC: <0.2 MG/DL (ref 0–0.3)
BILIRUB INDIRECT SERPL-MCNC: ABNORMAL MG/DL (ref 0–1)
BILIRUB SERPL-MCNC: 0.4 MG/DL (ref 0–1)
BUN SERPL-MCNC: 21 MG/DL (ref 7–20)
CALCIUM SERPL-MCNC: 8.2 MG/DL (ref 8.3–10.6)
CHLORIDE SERPL-SCNC: 106 MMOL/L (ref 99–110)
CO2 SERPL-SCNC: 26 MMOL/L (ref 21–32)
CREAT SERPL-MCNC: 1.1 MG/DL (ref 0.9–1.3)
DEPRECATED RDW RBC AUTO: 13.5 % (ref 12.4–15.4)
GFR SERPLBLD CREATININE-BSD FMLA CKD-EPI: >60 ML/MIN/{1.73_M2}
GLUCOSE SERPL-MCNC: 105 MG/DL (ref 70–99)
GRAM STN SPEC: NORMAL
HCT VFR BLD AUTO: 28.9 % (ref 40.5–52.5)
HGB BLD-MCNC: 9.8 G/DL (ref 13.5–17.5)
MCH RBC QN AUTO: 31.6 PG (ref 26–34)
MCHC RBC AUTO-ENTMCNC: 33.8 G/DL (ref 31–36)
MCV RBC AUTO: 93.5 FL (ref 80–100)
ORGANISM: ABNORMAL
PLATELET # BLD AUTO: 100 K/UL (ref 135–450)
PMV BLD AUTO: 8 FL (ref 5–10.5)
POTASSIUM SERPL-SCNC: 3.9 MMOL/L (ref 3.5–5.1)
PROT SERPL-MCNC: 5.5 G/DL (ref 6.4–8.2)
RBC # BLD AUTO: 3.09 M/UL (ref 4.2–5.9)
SODIUM SERPL-SCNC: 139 MMOL/L (ref 136–145)
WBC # BLD AUTO: 11.9 K/UL (ref 4–11)

## 2023-08-11 PROCEDURE — 99233 SBSQ HOSP IP/OBS HIGH 50: CPT | Performed by: PSYCHIATRY & NEUROLOGY

## 2023-08-11 PROCEDURE — 85027 COMPLETE CBC AUTOMATED: CPT

## 2023-08-11 PROCEDURE — 2580000003 HC RX 258: Performed by: INTERNAL MEDICINE

## 2023-08-11 PROCEDURE — 6370000000 HC RX 637 (ALT 250 FOR IP): Performed by: NURSE PRACTITIONER

## 2023-08-11 PROCEDURE — 6360000002 HC RX W HCPCS: Performed by: NURSE PRACTITIONER

## 2023-08-11 PROCEDURE — 6370000000 HC RX 637 (ALT 250 FOR IP): Performed by: PSYCHIATRY & NEUROLOGY

## 2023-08-11 PROCEDURE — 6370000000 HC RX 637 (ALT 250 FOR IP): Performed by: INTERNAL MEDICINE

## 2023-08-11 PROCEDURE — 2700000000 HC OXYGEN THERAPY PER DAY

## 2023-08-11 PROCEDURE — 95816 EEG AWAKE AND DROWSY: CPT | Performed by: PSYCHIATRY & NEUROLOGY

## 2023-08-11 PROCEDURE — 94761 N-INVAS EAR/PLS OXIMETRY MLT: CPT

## 2023-08-11 PROCEDURE — 95819 EEG AWAKE AND ASLEEP: CPT

## 2023-08-11 PROCEDURE — 2000000000 HC ICU R&B

## 2023-08-11 PROCEDURE — 6360000002 HC RX W HCPCS: Performed by: INTERNAL MEDICINE

## 2023-08-11 PROCEDURE — 80076 HEPATIC FUNCTION PANEL: CPT

## 2023-08-11 PROCEDURE — 80048 BASIC METABOLIC PNL TOTAL CA: CPT

## 2023-08-11 RX ORDER — LORAZEPAM 2 MG/ML
4 INJECTION INTRAMUSCULAR EVERY 4 HOURS PRN
Status: DISCONTINUED | OUTPATIENT
Start: 2023-08-11 | End: 2023-08-12

## 2023-08-11 RX ORDER — PANTOPRAZOLE SODIUM 40 MG/1
40 TABLET, DELAYED RELEASE ORAL
Status: DISCONTINUED | OUTPATIENT
Start: 2023-08-11 | End: 2023-08-16 | Stop reason: HOSPADM

## 2023-08-11 RX ADMIN — CEFEPIME 2000 MG: 2 INJECTION, POWDER, FOR SOLUTION INTRAVENOUS at 10:08

## 2023-08-11 RX ADMIN — LACOSAMIDE 150 MG: 50 TABLET, FILM COATED ORAL at 10:09

## 2023-08-11 RX ADMIN — PANTOPRAZOLE SODIUM 40 MG: 40 TABLET, DELAYED RELEASE ORAL at 10:17

## 2023-08-11 RX ADMIN — LACOSAMIDE 150 MG: 50 TABLET, FILM COATED ORAL at 20:41

## 2023-08-11 RX ADMIN — OXCARBAZEPINE 900 MG: 150 TABLET, FILM COATED ORAL at 20:41

## 2023-08-11 RX ADMIN — MUPIROCIN: 20 OINTMENT TOPICAL at 10:16

## 2023-08-11 RX ADMIN — SENNOSIDES 17.2 MG: 8.6 TABLET, COATED ORAL at 10:09

## 2023-08-11 RX ADMIN — LORAZEPAM 4 MG: 2 INJECTION INTRAMUSCULAR; INTRAVENOUS at 10:09

## 2023-08-11 RX ADMIN — DOCUSATE SODIUM LIQUID 100 MG: 100 LIQUID ORAL at 10:09

## 2023-08-11 RX ADMIN — LORAZEPAM 2 MG: 2 INJECTION INTRAMUSCULAR; INTRAVENOUS at 03:33

## 2023-08-11 RX ADMIN — OXCARBAZEPINE 900 MG: 150 TABLET, FILM COATED ORAL at 10:09

## 2023-08-11 RX ADMIN — Medication 10 ML: at 10:14

## 2023-08-11 RX ADMIN — LORAZEPAM 4 MG: 2 INJECTION INTRAMUSCULAR; INTRAVENOUS at 19:53

## 2023-08-11 RX ADMIN — LORAZEPAM 2 MG: 2 INJECTION INTRAMUSCULAR; INTRAVENOUS at 05:38

## 2023-08-11 RX ADMIN — CEFEPIME 2000 MG: 2 INJECTION, POWDER, FOR SOLUTION INTRAVENOUS at 02:09

## 2023-08-11 RX ADMIN — LORAZEPAM 4 MG: 2 INJECTION INTRAMUSCULAR; INTRAVENOUS at 15:18

## 2023-08-11 RX ADMIN — CEFEPIME 2000 MG: 2 INJECTION, POWDER, FOR SOLUTION INTRAVENOUS at 18:49

## 2023-08-11 RX ADMIN — MUPIROCIN: 20 OINTMENT TOPICAL at 20:41

## 2023-08-11 RX ADMIN — Medication 10 ML: at 20:41

## 2023-08-11 RX ADMIN — ENOXAPARIN SODIUM 40 MG: 100 INJECTION SUBCUTANEOUS at 10:09

## 2023-08-11 NOTE — CARE COORDINATION
LOS 3. Care managed by [facility],[address], Neuro, Card. S/P Arrest- extub yesterday-now on RA. Has temp pacer at this time. Still w NG. H/O IVDU w suspected recent use- Methadone clinic at [facility]. Has Sz disorder. From home w parents and brother in trailer- has ramp. CM following.   Loree Greer, RN

## 2023-08-12 ENCOUNTER — APPOINTMENT (OUTPATIENT)
Dept: GENERAL RADIOLOGY | Age: 33
End: 2023-08-12
Payer: MEDICAID

## 2023-08-12 PROBLEM — R00.1 BRADYCARDIA: Status: ACTIVE | Noted: 2023-08-12

## 2023-08-12 PROBLEM — I50.22 SYSTOLIC CHF, CHRONIC (HCC): Status: ACTIVE | Noted: 2023-08-12

## 2023-08-12 LAB — NT-PROBNP SERPL-MCNC: ABNORMAL PG/ML (ref 0–124)

## 2023-08-12 PROCEDURE — 6360000002 HC RX W HCPCS: Performed by: INTERNAL MEDICINE

## 2023-08-12 PROCEDURE — 71045 X-RAY EXAM CHEST 1 VIEW: CPT

## 2023-08-12 PROCEDURE — 99233 SBSQ HOSP IP/OBS HIGH 50: CPT | Performed by: PSYCHIATRY & NEUROLOGY

## 2023-08-12 PROCEDURE — 6360000002 HC RX W HCPCS: Performed by: NURSE PRACTITIONER

## 2023-08-12 PROCEDURE — 6370000000 HC RX 637 (ALT 250 FOR IP): Performed by: NURSE PRACTITIONER

## 2023-08-12 PROCEDURE — 2580000003 HC RX 258: Performed by: INTERNAL MEDICINE

## 2023-08-12 PROCEDURE — 83880 ASSAY OF NATRIURETIC PEPTIDE: CPT

## 2023-08-12 PROCEDURE — 87040 BLOOD CULTURE FOR BACTERIA: CPT

## 2023-08-12 PROCEDURE — 36415 COLL VENOUS BLD VENIPUNCTURE: CPT

## 2023-08-12 PROCEDURE — 6370000000 HC RX 637 (ALT 250 FOR IP): Performed by: INTERNAL MEDICINE

## 2023-08-12 PROCEDURE — 99233 SBSQ HOSP IP/OBS HIGH 50: CPT | Performed by: INTERNAL MEDICINE

## 2023-08-12 PROCEDURE — 6370000000 HC RX 637 (ALT 250 FOR IP): Performed by: PSYCHIATRY & NEUROLOGY

## 2023-08-12 PROCEDURE — 2000000000 HC ICU R&B

## 2023-08-12 PROCEDURE — 02PA3MZ REMOVAL OF CARDIAC LEAD FROM HEART, PERCUTANEOUS APPROACH: ICD-10-PCS | Performed by: INTERNAL MEDICINE

## 2023-08-12 PROCEDURE — 99291 CRITICAL CARE FIRST HOUR: CPT | Performed by: INTERNAL MEDICINE

## 2023-08-12 RX ORDER — METHADONE HYDROCHLORIDE 10 MG/1
105 TABLET ORAL DAILY
Status: DISCONTINUED | OUTPATIENT
Start: 2023-08-12 | End: 2023-08-16 | Stop reason: HOSPADM

## 2023-08-12 RX ORDER — LORAZEPAM 2 MG/ML
2 INJECTION INTRAMUSCULAR EVERY 4 HOURS PRN
Status: DISCONTINUED | OUTPATIENT
Start: 2023-08-12 | End: 2023-08-16 | Stop reason: HOSPADM

## 2023-08-12 RX ADMIN — Medication 10 ML: at 08:36

## 2023-08-12 RX ADMIN — MUPIROCIN: 20 OINTMENT TOPICAL at 21:11

## 2023-08-12 RX ADMIN — METHADONE HYDROCHLORIDE 105 MG: 10 TABLET ORAL at 10:09

## 2023-08-12 RX ADMIN — LACOSAMIDE 150 MG: 50 TABLET, FILM COATED ORAL at 10:09

## 2023-08-12 RX ADMIN — MUPIROCIN: 20 OINTMENT TOPICAL at 08:29

## 2023-08-12 RX ADMIN — Medication 10 ML: at 21:07

## 2023-08-12 RX ADMIN — LACOSAMIDE 150 MG: 50 TABLET, FILM COATED ORAL at 21:07

## 2023-08-12 RX ADMIN — SENNOSIDES 17.2 MG: 8.6 TABLET, COATED ORAL at 08:28

## 2023-08-12 RX ADMIN — OXCARBAZEPINE 900 MG: 150 TABLET, FILM COATED ORAL at 08:28

## 2023-08-12 RX ADMIN — LORAZEPAM 2 MG: 2 INJECTION INTRAMUSCULAR; INTRAVENOUS at 21:53

## 2023-08-12 RX ADMIN — CEFEPIME 2000 MG: 2 INJECTION, POWDER, FOR SOLUTION INTRAVENOUS at 17:05

## 2023-08-12 RX ADMIN — LORAZEPAM 4 MG: 2 INJECTION INTRAMUSCULAR; INTRAVENOUS at 06:49

## 2023-08-12 RX ADMIN — PANTOPRAZOLE SODIUM 40 MG: 40 TABLET, DELAYED RELEASE ORAL at 06:49

## 2023-08-12 RX ADMIN — OXCARBAZEPINE 900 MG: 150 TABLET, FILM COATED ORAL at 21:07

## 2023-08-12 RX ADMIN — ENOXAPARIN SODIUM 40 MG: 100 INJECTION SUBCUTANEOUS at 08:28

## 2023-08-12 RX ADMIN — LORAZEPAM 4 MG: 2 INJECTION INTRAMUSCULAR; INTRAVENOUS at 01:46

## 2023-08-12 RX ADMIN — LORAZEPAM 2 MG: 2 INJECTION INTRAMUSCULAR; INTRAVENOUS at 17:45

## 2023-08-12 RX ADMIN — CEFEPIME 2000 MG: 2 INJECTION, POWDER, FOR SOLUTION INTRAVENOUS at 08:33

## 2023-08-12 RX ADMIN — CEFEPIME 2000 MG: 2 INJECTION, POWDER, FOR SOLUTION INTRAVENOUS at 01:50

## 2023-08-12 NOTE — PROCEDURES
Patient's telemetry reviewed. Rhythm has been sinus with LBBB. He has not had any further significant bradycardia. Transvenous pacing wire was removed at bedside without issue. OK to remove right IJ sheath.         Indira Holm, [address]

## 2023-08-13 LAB
ALBUMIN SERPL-MCNC: 2.7 G/DL (ref 3.4–5)
ALP SERPL-CCNC: 115 U/L (ref 40–129)
ALT SERPL-CCNC: 148 U/L (ref 10–40)
ANION GAP SERPL CALCULATED.3IONS-SCNC: 10 MMOL/L (ref 3–16)
AST SERPL-CCNC: 26 U/L (ref 15–37)
BILIRUB DIRECT SERPL-MCNC: <0.2 MG/DL (ref 0–0.3)
BILIRUB INDIRECT SERPL-MCNC: ABNORMAL MG/DL (ref 0–1)
BILIRUB SERPL-MCNC: 0.4 MG/DL (ref 0–1)
BUN SERPL-MCNC: 15 MG/DL (ref 7–20)
CALCIUM SERPL-MCNC: 8.1 MG/DL (ref 8.3–10.6)
CHLORIDE SERPL-SCNC: 106 MMOL/L (ref 99–110)
CO2 SERPL-SCNC: 21 MMOL/L (ref 21–32)
CREAT SERPL-MCNC: 0.9 MG/DL (ref 0.9–1.3)
DEPRECATED RDW RBC AUTO: 13 % (ref 12.4–15.4)
GFR SERPLBLD CREATININE-BSD FMLA CKD-EPI: >60 ML/MIN/{1.73_M2}
GLUCOSE SERPL-MCNC: 101 MG/DL (ref 70–99)
HCT VFR BLD AUTO: 32.5 % (ref 40.5–52.5)
HGB BLD-MCNC: 11 G/DL (ref 13.5–17.5)
MAGNESIUM SERPL-MCNC: 2.1 MG/DL (ref 1.8–2.4)
MCH RBC QN AUTO: 31.8 PG (ref 26–34)
MCHC RBC AUTO-ENTMCNC: 33.9 G/DL (ref 31–36)
MCV RBC AUTO: 93.9 FL (ref 80–100)
PHOSPHATE SERPL-MCNC: 3.6 MG/DL (ref 2.5–4.9)
PLATELET # BLD AUTO: 148 K/UL (ref 135–450)
PMV BLD AUTO: 8 FL (ref 5–10.5)
POTASSIUM SERPL-SCNC: 5 MMOL/L (ref 3.5–5.1)
PROT SERPL-MCNC: 5.3 G/DL (ref 6.4–8.2)
RBC # BLD AUTO: 3.46 M/UL (ref 4.2–5.9)
SODIUM SERPL-SCNC: 137 MMOL/L (ref 136–145)
WBC # BLD AUTO: 9.2 K/UL (ref 4–11)

## 2023-08-13 PROCEDURE — 6360000002 HC RX W HCPCS: Performed by: INTERNAL MEDICINE

## 2023-08-13 PROCEDURE — 80076 HEPATIC FUNCTION PANEL: CPT

## 2023-08-13 PROCEDURE — 83735 ASSAY OF MAGNESIUM: CPT

## 2023-08-13 PROCEDURE — 6370000000 HC RX 637 (ALT 250 FOR IP): Performed by: INTERNAL MEDICINE

## 2023-08-13 PROCEDURE — 84100 ASSAY OF PHOSPHORUS: CPT

## 2023-08-13 PROCEDURE — 6370000000 HC RX 637 (ALT 250 FOR IP): Performed by: NURSE PRACTITIONER

## 2023-08-13 PROCEDURE — 2580000003 HC RX 258: Performed by: INTERNAL MEDICINE

## 2023-08-13 PROCEDURE — 85027 COMPLETE CBC AUTOMATED: CPT

## 2023-08-13 PROCEDURE — 36415 COLL VENOUS BLD VENIPUNCTURE: CPT

## 2023-08-13 PROCEDURE — 99232 SBSQ HOSP IP/OBS MODERATE 35: CPT | Performed by: NURSE PRACTITIONER

## 2023-08-13 PROCEDURE — 99233 SBSQ HOSP IP/OBS HIGH 50: CPT | Performed by: INTERNAL MEDICINE

## 2023-08-13 PROCEDURE — 6370000000 HC RX 637 (ALT 250 FOR IP): Performed by: PSYCHIATRY & NEUROLOGY

## 2023-08-13 PROCEDURE — 2000000000 HC ICU R&B

## 2023-08-13 PROCEDURE — 80048 BASIC METABOLIC PNL TOTAL CA: CPT

## 2023-08-13 RX ORDER — NICOTINE 21 MG/24HR
1 PATCH, TRANSDERMAL 24 HOURS TRANSDERMAL DAILY
Status: DISCONTINUED | OUTPATIENT
Start: 2023-08-13 | End: 2023-08-16 | Stop reason: HOSPADM

## 2023-08-13 RX ORDER — CARVEDILOL 3.12 MG/1
3.12 TABLET ORAL 2 TIMES DAILY WITH MEALS
Status: DISCONTINUED | OUTPATIENT
Start: 2023-08-14 | End: 2023-08-16 | Stop reason: HOSPADM

## 2023-08-13 RX ADMIN — ENOXAPARIN SODIUM 40 MG: 100 INJECTION SUBCUTANEOUS at 07:56

## 2023-08-13 RX ADMIN — OXCARBAZEPINE 900 MG: 150 TABLET, FILM COATED ORAL at 20:34

## 2023-08-13 RX ADMIN — PANTOPRAZOLE SODIUM 40 MG: 40 TABLET, DELAYED RELEASE ORAL at 05:59

## 2023-08-13 RX ADMIN — LORAZEPAM 2 MG: 2 INJECTION INTRAMUSCULAR; INTRAVENOUS at 20:41

## 2023-08-13 RX ADMIN — CEFEPIME 2000 MG: 2 INJECTION, POWDER, FOR SOLUTION INTRAVENOUS at 09:30

## 2023-08-13 RX ADMIN — OXCARBAZEPINE 900 MG: 150 TABLET, FILM COATED ORAL at 07:56

## 2023-08-13 RX ADMIN — MUPIROCIN: 20 OINTMENT TOPICAL at 08:00

## 2023-08-13 RX ADMIN — Medication 10 ML: at 07:57

## 2023-08-13 RX ADMIN — LACOSAMIDE 150 MG: 50 TABLET, FILM COATED ORAL at 20:34

## 2023-08-13 RX ADMIN — LACOSAMIDE 150 MG: 50 TABLET, FILM COATED ORAL at 07:56

## 2023-08-13 RX ADMIN — Medication 10 ML: at 20:35

## 2023-08-13 RX ADMIN — LORAZEPAM 2 MG: 2 INJECTION INTRAMUSCULAR; INTRAVENOUS at 02:02

## 2023-08-13 RX ADMIN — CEFEPIME 2000 MG: 2 INJECTION, POWDER, FOR SOLUTION INTRAVENOUS at 02:06

## 2023-08-13 RX ADMIN — SENNOSIDES 17.2 MG: 8.6 TABLET, COATED ORAL at 07:56

## 2023-08-13 RX ADMIN — LORAZEPAM 2 MG: 2 INJECTION INTRAMUSCULAR; INTRAVENOUS at 05:59

## 2023-08-13 RX ADMIN — METHADONE HYDROCHLORIDE 105 MG: 10 TABLET ORAL at 07:56

## 2023-08-13 RX ADMIN — ACETAMINOPHEN 650 MG: 325 TABLET ORAL at 02:07

## 2023-08-13 RX ADMIN — CEFEPIME 2000 MG: 2 INJECTION, POWDER, FOR SOLUTION INTRAVENOUS at 17:47

## 2023-08-13 ASSESSMENT — PAIN DESCRIPTION - ORIENTATION: ORIENTATION: ANTERIOR

## 2023-08-13 ASSESSMENT — PAIN SCALES - GENERAL: PAINLEVEL_OUTOF10: 3

## 2023-08-13 ASSESSMENT — PAIN DESCRIPTION - DESCRIPTORS: DESCRIPTORS: ACHING

## 2023-08-13 ASSESSMENT — PAIN DESCRIPTION - LOCATION: LOCATION: CHEST

## 2023-08-14 ENCOUNTER — APPOINTMENT (OUTPATIENT)
Dept: GENERAL RADIOLOGY | Age: 33
End: 2023-08-14
Payer: MEDICAID

## 2023-08-14 PROCEDURE — 6360000002 HC RX W HCPCS: Performed by: INTERNAL MEDICINE

## 2023-08-14 PROCEDURE — 6370000000 HC RX 637 (ALT 250 FOR IP): Performed by: NURSE PRACTITIONER

## 2023-08-14 PROCEDURE — 6370000000 HC RX 637 (ALT 250 FOR IP): Performed by: INTERNAL MEDICINE

## 2023-08-14 PROCEDURE — 2580000003 HC RX 258: Performed by: INTERNAL MEDICINE

## 2023-08-14 PROCEDURE — 73120 X-RAY EXAM OF HAND: CPT

## 2023-08-14 PROCEDURE — 2000000000 HC ICU R&B

## 2023-08-14 PROCEDURE — 99232 SBSQ HOSP IP/OBS MODERATE 35: CPT | Performed by: NURSE PRACTITIONER

## 2023-08-14 PROCEDURE — 6370000000 HC RX 637 (ALT 250 FOR IP): Performed by: PSYCHIATRY & NEUROLOGY

## 2023-08-14 RX ADMIN — OXCARBAZEPINE 900 MG: 150 TABLET, FILM COATED ORAL at 09:09

## 2023-08-14 RX ADMIN — OXCARBAZEPINE 900 MG: 150 TABLET, FILM COATED ORAL at 20:41

## 2023-08-14 RX ADMIN — SODIUM CHLORIDE: 9 INJECTION, SOLUTION INTRAVENOUS at 18:35

## 2023-08-14 RX ADMIN — LACOSAMIDE 150 MG: 50 TABLET, FILM COATED ORAL at 09:10

## 2023-08-14 RX ADMIN — CARVEDILOL 3.12 MG: 3.12 TABLET, FILM COATED ORAL at 09:11

## 2023-08-14 RX ADMIN — METHADONE HYDROCHLORIDE 105 MG: 10 TABLET ORAL at 09:07

## 2023-08-14 RX ADMIN — CEFEPIME 2000 MG: 2 INJECTION, POWDER, FOR SOLUTION INTRAVENOUS at 18:36

## 2023-08-14 RX ADMIN — ENOXAPARIN SODIUM 40 MG: 100 INJECTION SUBCUTANEOUS at 20:41

## 2023-08-14 RX ADMIN — CEFEPIME 2000 MG: 2 INJECTION, POWDER, FOR SOLUTION INTRAVENOUS at 09:15

## 2023-08-14 RX ADMIN — CEFEPIME 2000 MG: 2 INJECTION, POWDER, FOR SOLUTION INTRAVENOUS at 01:35

## 2023-08-14 RX ADMIN — Medication 10 ML: at 20:42

## 2023-08-14 RX ADMIN — CARVEDILOL 3.12 MG: 3.12 TABLET, FILM COATED ORAL at 16:57

## 2023-08-14 RX ADMIN — LACOSAMIDE 150 MG: 50 TABLET, FILM COATED ORAL at 20:41

## 2023-08-14 RX ADMIN — PANTOPRAZOLE SODIUM 40 MG: 40 TABLET, DELAYED RELEASE ORAL at 06:38

## 2023-08-14 RX ADMIN — SENNOSIDES 17.2 MG: 8.6 TABLET, COATED ORAL at 09:10

## 2023-08-14 NOTE — CARE COORDINATION
PD #6 - S/P cardiac arrest (accidental OD) - and seizure disorder. From home with parents. IV -  Methadone Clinic. Extubated 8/10 - on RA. Temp pacer has been removed. Continue following and assist as needed with discharge plans.       Leeann Donovan RN

## 2023-08-15 PROCEDURE — 6370000000 HC RX 637 (ALT 250 FOR IP): Performed by: NURSE PRACTITIONER

## 2023-08-15 PROCEDURE — 97162 PT EVAL MOD COMPLEX 30 MIN: CPT

## 2023-08-15 PROCEDURE — 99232 SBSQ HOSP IP/OBS MODERATE 35: CPT | Performed by: NURSE PRACTITIONER

## 2023-08-15 PROCEDURE — 6370000000 HC RX 637 (ALT 250 FOR IP): Performed by: INTERNAL MEDICINE

## 2023-08-15 PROCEDURE — 6370000000 HC RX 637 (ALT 250 FOR IP): Performed by: PSYCHIATRY & NEUROLOGY

## 2023-08-15 PROCEDURE — 97166 OT EVAL MOD COMPLEX 45 MIN: CPT

## 2023-08-15 PROCEDURE — 6360000002 HC RX W HCPCS: Performed by: INTERNAL MEDICINE

## 2023-08-15 PROCEDURE — 97535 SELF CARE MNGMENT TRAINING: CPT

## 2023-08-15 PROCEDURE — 2580000003 HC RX 258: Performed by: INTERNAL MEDICINE

## 2023-08-15 PROCEDURE — 97116 GAIT TRAINING THERAPY: CPT

## 2023-08-15 PROCEDURE — 1200000000 HC SEMI PRIVATE

## 2023-08-15 PROCEDURE — 97530 THERAPEUTIC ACTIVITIES: CPT

## 2023-08-15 RX ADMIN — CARVEDILOL 3.12 MG: 3.12 TABLET, FILM COATED ORAL at 09:27

## 2023-08-15 RX ADMIN — CARVEDILOL 3.12 MG: 3.12 TABLET, FILM COATED ORAL at 17:15

## 2023-08-15 RX ADMIN — SENNOSIDES 17.2 MG: 8.6 TABLET, COATED ORAL at 09:27

## 2023-08-15 RX ADMIN — LACOSAMIDE 150 MG: 50 TABLET, FILM COATED ORAL at 21:06

## 2023-08-15 RX ADMIN — ENOXAPARIN SODIUM 40 MG: 100 INJECTION SUBCUTANEOUS at 09:26

## 2023-08-15 RX ADMIN — LACOSAMIDE 150 MG: 50 TABLET, FILM COATED ORAL at 09:27

## 2023-08-15 RX ADMIN — Medication 10 ML: at 21:06

## 2023-08-15 RX ADMIN — ACETAMINOPHEN 650 MG: 325 TABLET ORAL at 15:24

## 2023-08-15 RX ADMIN — SACUBITRIL AND VALSARTAN 1 TABLET: 24; 26 TABLET, FILM COATED ORAL at 21:06

## 2023-08-15 RX ADMIN — OXCARBAZEPINE 900 MG: 150 TABLET, FILM COATED ORAL at 21:05

## 2023-08-15 RX ADMIN — METHADONE HYDROCHLORIDE 105 MG: 10 TABLET ORAL at 09:27

## 2023-08-15 RX ADMIN — SACUBITRIL AND VALSARTAN 1 TABLET: 24; 26 TABLET, FILM COATED ORAL at 11:19

## 2023-08-15 RX ADMIN — CEFEPIME 2000 MG: 2 INJECTION, POWDER, FOR SOLUTION INTRAVENOUS at 02:23

## 2023-08-15 RX ADMIN — Medication 10 ML: at 09:29

## 2023-08-15 RX ADMIN — OXCARBAZEPINE 900 MG: 150 TABLET, FILM COATED ORAL at 09:37

## 2023-08-15 RX ADMIN — DOCUSATE SODIUM LIQUID 100 MG: 100 LIQUID ORAL at 09:49

## 2023-08-15 RX ADMIN — PANTOPRAZOLE SODIUM 40 MG: 40 TABLET, DELAYED RELEASE ORAL at 09:27

## 2023-08-15 RX ADMIN — CEFEPIME 2000 MG: 2 INJECTION, POWDER, FOR SOLUTION INTRAVENOUS at 17:20

## 2023-08-15 RX ADMIN — CEFEPIME 2000 MG: 2 INJECTION, POWDER, FOR SOLUTION INTRAVENOUS at 09:43

## 2023-08-15 ASSESSMENT — PAIN SCALES - GENERAL
PAINLEVEL_OUTOF10: 6
PAINLEVEL_OUTOF10: 3
PAINLEVEL_OUTOF10: 5
PAINLEVEL_OUTOF10: 9
PAINLEVEL_OUTOF10: 3

## 2023-08-15 NOTE — CARE COORDINATION
CM met with pt and mother at bedside. Pt lives with parents, plans to return. Active with  methadone clinic. Does not anticipate any needs for dc.

## 2023-08-16 VITALS
SYSTOLIC BLOOD PRESSURE: 137 MMHG | DIASTOLIC BLOOD PRESSURE: 87 MMHG | BODY MASS INDEX: 16.6 KG/M2 | HEIGHT: 71 IN | WEIGHT: 118.61 LBS | RESPIRATION RATE: 16 BRPM | TEMPERATURE: 99 F | HEART RATE: 89 BPM | OXYGEN SATURATION: 98 %

## 2023-08-16 LAB
ANION GAP SERPL CALCULATED.3IONS-SCNC: 8 MMOL/L (ref 3–16)
BACTERIA BLD CULT ORG #2: NORMAL
BACTERIA BLD CULT: NORMAL
BUN SERPL-MCNC: 12 MG/DL (ref 7–20)
CALCIUM SERPL-MCNC: 8.7 MG/DL (ref 8.3–10.6)
CHLORIDE SERPL-SCNC: 101 MMOL/L (ref 99–110)
CO2 SERPL-SCNC: 23 MMOL/L (ref 21–32)
CREAT SERPL-MCNC: 0.7 MG/DL (ref 0.9–1.3)
GFR SERPLBLD CREATININE-BSD FMLA CKD-EPI: >60 ML/MIN/{1.73_M2}
GLUCOSE SERPL-MCNC: 88 MG/DL (ref 70–99)
NT-PROBNP SERPL-MCNC: 1119 PG/ML (ref 0–124)
POTASSIUM SERPL-SCNC: 4.7 MMOL/L (ref 3.5–5.1)
SODIUM SERPL-SCNC: 132 MMOL/L (ref 136–145)

## 2023-08-16 PROCEDURE — 2580000003 HC RX 258: Performed by: INTERNAL MEDICINE

## 2023-08-16 PROCEDURE — 97116 GAIT TRAINING THERAPY: CPT

## 2023-08-16 PROCEDURE — 6370000000 HC RX 637 (ALT 250 FOR IP): Performed by: PSYCHIATRY & NEUROLOGY

## 2023-08-16 PROCEDURE — 36415 COLL VENOUS BLD VENIPUNCTURE: CPT

## 2023-08-16 PROCEDURE — 97110 THERAPEUTIC EXERCISES: CPT

## 2023-08-16 PROCEDURE — 6370000000 HC RX 637 (ALT 250 FOR IP): Performed by: INTERNAL MEDICINE

## 2023-08-16 PROCEDURE — 6370000000 HC RX 637 (ALT 250 FOR IP): Performed by: NURSE PRACTITIONER

## 2023-08-16 PROCEDURE — 6360000002 HC RX W HCPCS: Performed by: INTERNAL MEDICINE

## 2023-08-16 PROCEDURE — 83880 ASSAY OF NATRIURETIC PEPTIDE: CPT

## 2023-08-16 PROCEDURE — 80048 BASIC METABOLIC PNL TOTAL CA: CPT

## 2023-08-16 PROCEDURE — 99232 SBSQ HOSP IP/OBS MODERATE 35: CPT | Performed by: NURSE PRACTITIONER

## 2023-08-16 PROCEDURE — 97530 THERAPEUTIC ACTIVITIES: CPT

## 2023-08-16 RX ORDER — OXCARBAZEPINE 300 MG/1
900 TABLET, FILM COATED ORAL 2 TIMES DAILY
Qty: 60 TABLET | Refills: 0 | Status: SHIPPED | OUTPATIENT
Start: 2023-08-16

## 2023-08-16 RX ORDER — LACOSAMIDE 150 MG/1
150 TABLET ORAL 2 TIMES DAILY
Qty: 60 TABLET | Refills: 0 | Status: SHIPPED | OUTPATIENT
Start: 2023-08-16 | End: 2023-09-15

## 2023-08-16 RX ORDER — LACOSAMIDE 150 MG/1
150 TABLET ORAL 2 TIMES DAILY
Qty: 60 TABLET | Refills: 0 | Status: SHIPPED | OUTPATIENT
Start: 2023-08-16 | End: 2023-08-16 | Stop reason: SDUPTHER

## 2023-08-16 RX ORDER — OXCARBAZEPINE 300 MG/1
900 TABLET, FILM COATED ORAL 2 TIMES DAILY
Qty: 60 TABLET | Refills: 0 | Status: SHIPPED | OUTPATIENT
Start: 2023-08-16 | End: 2023-08-16 | Stop reason: SDUPTHER

## 2023-08-16 RX ORDER — CARVEDILOL 3.12 MG/1
3.12 TABLET ORAL 2 TIMES DAILY WITH MEALS
Qty: 60 TABLET | Refills: 3 | Status: SHIPPED | OUTPATIENT
Start: 2023-08-16

## 2023-08-16 RX ORDER — LEVOFLOXACIN 500 MG/1
500 TABLET, FILM COATED ORAL DAILY
Qty: 5 TABLET | Refills: 0 | Status: SHIPPED | OUTPATIENT
Start: 2023-08-17 | End: 2023-08-22

## 2023-08-16 RX ADMIN — SENNOSIDES 17.2 MG: 8.6 TABLET, COATED ORAL at 08:17

## 2023-08-16 RX ADMIN — METHADONE HYDROCHLORIDE 105 MG: 10 TABLET ORAL at 08:15

## 2023-08-16 RX ADMIN — ENOXAPARIN SODIUM 40 MG: 100 INJECTION SUBCUTANEOUS at 08:26

## 2023-08-16 RX ADMIN — LACOSAMIDE 150 MG: 50 TABLET, FILM COATED ORAL at 08:58

## 2023-08-16 RX ADMIN — PANTOPRAZOLE SODIUM 40 MG: 40 TABLET, DELAYED RELEASE ORAL at 06:28

## 2023-08-16 RX ADMIN — CEFEPIME 2000 MG: 2 INJECTION, POWDER, FOR SOLUTION INTRAVENOUS at 10:06

## 2023-08-16 RX ADMIN — CARVEDILOL 3.12 MG: 3.12 TABLET, FILM COATED ORAL at 17:23

## 2023-08-16 RX ADMIN — CEFEPIME 2000 MG: 2 INJECTION, POWDER, FOR SOLUTION INTRAVENOUS at 01:36

## 2023-08-16 RX ADMIN — CARVEDILOL 3.12 MG: 3.12 TABLET, FILM COATED ORAL at 08:16

## 2023-08-16 RX ADMIN — Medication 10 ML: at 08:59

## 2023-08-16 RX ADMIN — OXCARBAZEPINE 900 MG: 150 TABLET, FILM COATED ORAL at 08:16

## 2023-08-16 RX ADMIN — SACUBITRIL AND VALSARTAN 1 TABLET: 24; 26 TABLET, FILM COATED ORAL at 08:15

## 2023-08-16 ASSESSMENT — PAIN SCALES - GENERAL
PAINLEVEL_OUTOF10: 0

## 2023-08-16 NOTE — DISCHARGE SUMMARY
V2.0  Discharge Summary    Name:  Joaquina Blanco /Age/Sex: 1990 (35 y.o. male)   Admit Date: 2023  Discharge Date: 23    MRN & CSN:  [id number] & [id number] Encounter Date and Time 23 4:32 PM EDT    Attending:  Bayron Alvarado MD Discharging Provider: Bayron Alvarado MD       Hospital Course:     Brief HPI: Joaquina Blanco is a 35 y.o. male who presented with seizure attack and cardiac arrest.    Brief Problem Based Course:   Joaquina Blanco is a 35 y.o. male with pmh of hypertension, Takotsubo cardiomyopathy (EF<20% in ), COPD, history of polysubstance abuse (including IV heroin and fentanyl), chronic hepatitis C and history of TBI/right temporal seizure disorder presenting with seizure attack followed by cardiac arrest.      According to the ER physician, patient was brought in after a reported seizure attack requiring 5 mg IM midazolam.     At the ER, he was disorientated and combative. He was intubated in the ER and started on propofol drip. CT head, chest and pelvis ordered. On the way from CT scan unit, he became bradycardic and then had a cardiac arrest.  He had 2 rounds of CPR including epi and bicarbonate. ROSC was obtained. Right femoral line placed and patient was started on bicarbonate drip. CT chest left later revealed several bilateral lung airspace disease consistent with multifocal pneumonia. CT abdomen/pelvis-large area of hypodensity in left hepatic lobe 7.5 x 4.1 cm, no abscess. Lactic acid 4.5 and 3.6. Troponins were elevated at 16 and 305. EKG-wide-complex tachycardia at 80 (old left bundle branch block), left axis deviation. LFT-, , alkaline phosphatase 329. U tox positive for benzodiazepines, cannabinoids, methadone, opiate, cocaine and fentanyl. Given IV vancomycin, cefepime and Flagyl in ER. Started on Levophed drip.     Admitted as:  Joaquina Blanco is a 35 y.o. male with a pmh of hypertension, Takotsubo cardiomyopathy (EF<20%

## 2023-08-16 NOTE — PLAN OF CARE
Increase function to baseline.
Increase patients ADLs/functional status to baseline.
PLAN OF CARE    Received request for inpatient admission. Admitting provider Dr. Gilmer Denver notified.     Kayleen Moss MD  8/8/2023  11:28 AM
Patient's EF (Ejection Fraction) is less than 40%    Heart Failure Medications:  Diuretics[de-identified] None    (One of the following REQUIRED for EF </= 40%/SYSTOLIC FAILURE but MAY be used in EF% >40%/DIASTOLIC FAILURE)        ACE[de-identified] None        ARB[de-identified] None         ARNI[de-identified] None    (Beta Blockers)  NON- Evidenced Based Beta Blocker (for EF% >40%/DIASTOLIC FAILURE): None    Evidenced Based Beta Blocker::(REQUIRED for EF% <40%/SYSTOLIC FAILURE) Coreg. ................................................................................... Healthy Weight Tracking - BMI + Meds 8/10/2023 8/11/2023 8/12/2023 8/13/2023 8/14/2023 8/15/2023 8/16/2023   Weight 124 lb 5.4 oz 123 lb 123 lb 3.8 oz 125 lb 3.5 oz 117 lb 11.6 oz 117 lb 15.1 oz 118 lb 9.7 oz   Height - - - - - - -   Body Mass Index 17.34 kg/m2 17.16 kg/m2 17.19 kg/m2 17.46 kg/m2 16.42 kg/m2 16.45 kg/m2 16.54 kg/m2   Some recent data might be hidden         Patient's weights and intake/output reviewed: Yes    Daily Weight log at bedside, patient/family participation in use of log: \"yes    Patient's Last Weight:  53.4 kg obtained by bed scale. Difference of 3.63 KG less than last documented weight. Intake/Output Summary (Last 24 hours) at 8/16/2023 0719  Last data filed at 8/16/2023 0600  Gross per 24 hour   Intake 780 ml   Output 950 ml   Net -170 ml         Education Booklet Provided: yes    Comorbidities Reviewed Yes    Patient has a past medical history of Aspiration pneumonia ([address]), CAD (coronary artery disease), Hepatitis C, HTN (hypertension), NSTEMI (non-ST elevated myocardial infarction) ([address]), Polysubstance abuse ([address]), Seizures ([address]), Septic shock ([address]), Systolic CHF ([address]), and Takotsubo cardiomyopathy. >>For CHF and Comorbidity documentation on Education Time and Topics, please see Education Tab    Progressive Mobility Assessment:  What is this patient's Current Level of Mobility?: ambulated to BR and chair today  How was this patient Mobilized today?  X1 assist
Patient's EF (Ejection Fraction) is less than 40%    Heart Failure Medications:  Diuretics[de-identified] None    (One of the following REQUIRED for EF </= 40%/SYSTOLIC FAILURE but MAY be used in EF% >40%/DIASTOLIC FAILURE)        ACE[de-identified] None        ARB[de-identified] None         ARNI[de-identified] None    (Beta Blockers)  NON- Evidenced Based Beta Blocker (for EF% >40%/DIASTOLIC FAILURE): None    Evidenced Based Beta Blocker::(REQUIRED for EF% <40%/SYSTOLIC FAILURE) Coreg. ................................................................................... Healthy Weight Tracking - BMI + Meds 8/8/2023 8/8/2023 8/9/2023 8/10/2023 8/11/2023 8/12/2023 8/13/2023   Weight 115 lb 120 lb 5.9 oz 121 lb 14.6 oz 124 lb 5.4 oz 123 lb 123 lb 3.8 oz 125 lb 3.5 oz   Height 5' 11\" - 5' 11\" - - - -   Body Mass Index 16.04 kg/m2 16.79 kg/m2 17 kg/m2 17.34 kg/m2 17.16 kg/m2 17.19 kg/m2 17.46 kg/m2   Some recent data might be hidden         Patient's weights and intake/output reviewed: Yes    Daily Weight log at bedside, patient/family participation in use of log: \"yes    Patient's Last Weight:  53.4 kg obtained by bed scale. Difference of 3.63 KG less than last documented weight. Intake/Output Summary (Last 24 hours) at 8/14/2023 1640  Last data filed at 8/14/2023 0935  Gross per 24 hour   Intake 1643.58 ml   Output 1200 ml   Net 443.58 ml         Education Booklet Provided: yes    Comorbidities Reviewed Yes    Patient has a past medical history of Aspiration pneumonia ([address]), CAD (coronary artery disease), Hepatitis C, HTN (hypertension), NSTEMI (non-ST elevated myocardial infarction) ([address]), Polysubstance abuse ([address]), Seizures ([address]), Septic shock ([address]), Systolic CHF ([address]), and Takotsubo cardiomyopathy. >>For CHF and Comorbidity documentation on Education Time and Topics, please see Education Tab    Progressive Mobility Assessment:  What is this patient's Current Level of Mobility?: ambulated to BR and chair today  How was this patient Mobilized today?  X1
Patient's EF (Ejection Fraction) is less than 40%    Heart Failure Medications:  Diuretics[de-identified] None    (One of the following REQUIRED for EF </= 40%/SYSTOLIC FAILURE but MAY be used in EF% >40%/DIASTOLIC FAILURE)        ACE[de-identified] None        ARB[de-identified] None         ARNI[de-identified] None    (Beta Blockers)  NON- Evidenced Based Beta Blocker (for EF% >40%/DIASTOLIC FAILURE): None    Evidenced Based Beta Blocker::(REQUIRED for EF% <40%/SYSTOLIC FAILURE) Coreg. ................................................................................... Healthy Weight Tracking - BMI + Meds 8/9/2023 8/10/2023 8/11/2023 8/12/2023 8/13/2023 8/14/2023 8/15/2023   Weight 121 lb 14.6 oz 124 lb 5.4 oz 123 lb 123 lb 3.8 oz 125 lb 3.5 oz 117 lb 11.6 oz 117 lb 15.1 oz   Height 5' 11\" - - - - - -   Body Mass Index 17 kg/m2 17.34 kg/m2 17.16 kg/m2 17.19 kg/m2 17.46 kg/m2 16.42 kg/m2 16.45 kg/m2   Some recent data might be hidden         Patient's weights and intake/output reviewed: Yes    Daily Weight log at bedside, patient/family participation in use of log: \"yes    Patient's Last Weight:  53.4 kg obtained by bed scale. Difference of 3.63 KG less than last documented weight. Intake/Output Summary (Last 24 hours) at 8/15/2023 1817  Last data filed at 8/15/2023 0013  Gross per 24 hour   Intake 2760.18 ml   Output 1590 ml   Net 1170.18 ml         Education Booklet Provided: yes    Comorbidities Reviewed Yes    Patient has a past medical history of Aspiration pneumonia ([address]), CAD (coronary artery disease), Hepatitis C, HTN (hypertension), NSTEMI (non-ST elevated myocardial infarction) ([address]), Polysubstance abuse ([address]), Seizures ([address]), Septic shock ([address]), Systolic CHF ([address]), and Takotsubo cardiomyopathy. >>For CHF and Comorbidity documentation on Education Time and Topics, please see Education Tab    Progressive Mobility Assessment:  What is this patient's Current Level of Mobility?: ambulated to BR and chair today  How was this patient Mobilized today?
Problem: Discharge Planning  Goal: Discharge to home or other facility with appropriate resources  Outcome: Completed     Problem: Pain  Goal: Verbalizes/displays adequate comfort level or baseline comfort level  Outcome: Completed     Problem: Safety - Medical Restraint  Goal: Remains free of injury from restraints (Restraint for Interference with Medical Device)  Description: INTERVENTIONS:  1. Determine that other, less restrictive measures have been tried or would not be effective before applying the restraint  2. Evaluate the patient's condition at the time of restraint application  3. Inform patient/family regarding the reason for restraint  4. Q2H: Monitor safety, psychosocial status, comfort, nutrition and hydration  Outcome: Completed     Problem: Safety - Adult  Goal: Free from fall injury  Outcome: Completed     Problem: Skin/Tissue Integrity  Goal: Absence of new skin breakdown  Description: 1. Monitor for areas of redness and/or skin breakdown  2. Assess vascular access sites hourly  3. Every 4-6 hours minimum:  Change oxygen saturation probe site  4. Every 4-6 hours:  If on nasal continuous positive airway pressure, respiratory therapy assess nares and determine need for appliance change or resting period.   Outcome: Completed     Problem: ABCDS Injury Assessment  Goal: Absence of physical injury  Outcome: Completed     Problem: Nutrition Deficit:  Goal: Optimize nutritional status  Outcome: Completed
Problem: Discharge Planning  Goal: Discharge to home or other facility with appropriate resources  Outcome: Progressing  Flowsheets (Taken 8/8/2023 2000)  Discharge to home or other facility with appropriate resources: Arrange for needed discharge resources and transportation as appropriate     Problem: Pain  Goal: Verbalizes/displays adequate comfort level or baseline comfort level  Outcome: Progressing  Flowsheets (Taken 8/8/2023 2020)  Verbalizes/displays adequate comfort level or baseline comfort level: Assess pain using appropriate pain scale     Problem: Safety - Medical Restraint  Goal: Remains free of injury from restraints (Restraint for Interference with Medical Device)  Description: INTERVENTIONS:  1. Determine that other, less restrictive measures have been tried or would not be effective before applying the restraint  2. Evaluate the patient's condition at the time of restraint application  3. Inform patient/family regarding the reason for restraint  4. Q2H: Monitor safety, psychosocial status, comfort, nutrition and hydration  8/9/2023 0006 by Jeffrey Brewer RN  Outcome: Progressing  Flowsheets (Taken 8/8/2023 2000)  Remains free of injury from restraints (restraint for interference with medical device): Every 2 hours: Monitor safety, psychosocial status, comfort, nutrition and hydration  8/8/2023 1449 by Chasity Osman RN  Outcome: Progressing     Problem: Safety - Adult  Goal: Free from fall injury  Outcome: Progressing     Problem: Skin/Tissue Integrity  Goal: Absence of new skin breakdown  Description: 1. Monitor for areas of redness and/or skin breakdown  2. Assess vascular access sites hourly  3. Every 4-6 hours minimum:  Change oxygen saturation probe site  4. Every 4-6 hours:  If on nasal continuous positive airway pressure, respiratory therapy assess nares and determine need for appliance change or resting period.   Outcome: Progressing
Problem: Discharge Planning  Goal: Discharge to home or other facility with appropriate resources  Recent Flowsheet Documentation  Taken 8/10/2023 2000 by Rex Dolan RN  Discharge to home or other facility with appropriate resources:   Identify barriers to discharge with patient and caregiver   Arrange for needed discharge resources and transportation as appropriate   Identify discharge learning needs (meds, wound care, etc)   Refer to discharge planning if patient needs post-hospital services based on physician order or complex needs related to functional status, cognitive ability or social support system     Problem: Nutrition Deficit:  Goal: Optimize nutritional status  Recent Flowsheet Documentation  Taken 8/10/2023 1058 by Michael Aggarwal, MS, RD, LD  Nutrient intake appropriate for improving, restoring, or maintaining nutritional needs:   Assess nutritional status and recommend course of action   Recommend, monitor, and adjust tube feedings and TPN/PPN based on assessed needs
Problem: Discharge Planning  Goal: Discharge to home or other facility with appropriate resources  Recent Flowsheet Documentation  Taken 8/11/2023 2000 by Paulette Watt RN  Discharge to home or other facility with appropriate resources:   Identify barriers to discharge with patient and caregiver   Arrange for needed discharge resources and transportation as appropriate   Identify discharge learning needs (meds, wound care, etc)   Refer to discharge planning if patient needs post-hospital services based on physician order or complex needs related to functional status, cognitive ability or social support system     Problem: Pain  Goal: Verbalizes/displays adequate comfort level or baseline comfort level  Recent Flowsheet Documentation  Taken 8/11/2023 2000 by Paulette Watt RN  Verbalizes/displays adequate comfort level or baseline comfort level:   Encourage patient to monitor pain and request assistance   Assess pain using appropriate pain scale   Administer analgesics based on type and severity of pain and evaluate response   Implement non-pharmacological measures as appropriate and evaluate response   Consider cultural and social influences on pain and pain management   Notify Licensed Independent Practitioner if interventions unsuccessful or patient reports new pain     Problem: Nutrition Deficit:  Goal: Optimize nutritional status  Recent Flowsheet Documentation  Taken 8/11/2023 1836 by Antonia Watters RN  Nutrient intake appropriate for improving, restoring, or maintaining nutritional needs:   Assess nutritional status and recommend course of action   Recommend appropriate diets, oral nutritional supplements, and vitamin/mineral supplements   Order, calculate, and assess calorie counts as needed   Recommend, monitor, and adjust tube feedings and TPN/PPN based on assessed needs   Provide specific nutrition education to patient or family as appropriate
Problem: Discharge Planning  Goal: Discharge to home or other facility with appropriate resources  Recent Flowsheet Documentation  Taken 8/12/2023 2000 by Uma Neely RN  Discharge to home or other facility with appropriate resources:   Identify barriers to discharge with patient and caregiver   Arrange for needed discharge resources and transportation as appropriate   Identify discharge learning needs (meds, wound care, etc)   Refer to discharge planning if patient needs post-hospital services based on physician order or complex needs related to functional status, cognitive ability or social support system     Problem: Pain  Goal: Verbalizes/displays adequate comfort level or baseline comfort level  Recent Flowsheet Documentation  Taken 8/12/2023 2000 by Uma Neely RN  Verbalizes/displays adequate comfort level or baseline comfort level:   Encourage patient to monitor pain and request assistance   Assess pain using appropriate pain scale   Administer analgesics based on type and severity of pain and evaluate response   Implement non-pharmacological measures as appropriate and evaluate response   Consider cultural and social influences on pain and pain management   Notify Licensed Independent Practitioner if interventions unsuccessful or patient reports new pain
Problem: Discharge Planning  Goal: Discharge to home or other facility with appropriate resources  Recent Flowsheet Documentation  Taken 8/13/2023 2000 by Leobardo Santizo RN  Discharge to home or other facility with appropriate resources:   Identify barriers to discharge with patient and caregiver   Arrange for needed discharge resources and transportation as appropriate   Identify discharge learning needs (meds, wound care, etc)   Refer to discharge planning if patient needs post-hospital services based on physician order or complex needs related to functional status, cognitive ability or social support system     Problem: Pain  Goal: Verbalizes/displays adequate comfort level or baseline comfort level  Recent Flowsheet Documentation  Taken 8/13/2023 2000 by Leobardo Santizo RN  Verbalizes/displays adequate comfort level or baseline comfort level:   Encourage patient to monitor pain and request assistance   Assess pain using appropriate pain scale   Administer analgesics based on type and severity of pain and evaluate response   Implement non-pharmacological measures as appropriate and evaluate response   Consider cultural and social influences on pain and pain management   Notify Licensed Independent Practitioner if interventions unsuccessful or patient reports new pain
Problem: Discharge Planning  Goal: Discharge to home or other facility with appropriate resources  Recent Flowsheet Documentation  Taken 8/14/2023 2000 by Arlen Machado RN  Discharge to home or other facility with appropriate resources:   Identify barriers to discharge with patient and caregiver   Arrange for needed discharge resources and transportation as appropriate   Identify discharge learning needs (meds, wound care, etc)   Refer to discharge planning if patient needs post-hospital services based on physician order or complex needs related to functional status, cognitive ability or social support system     Problem: Pain  Goal: Verbalizes/displays adequate comfort level or baseline comfort level  Recent Flowsheet Documentation  Taken 8/14/2023 2000 by Arlen Machado RN  Verbalizes/displays adequate comfort level or baseline comfort level:   Encourage patient to monitor pain and request assistance   Assess pain using appropriate pain scale   Administer analgesics based on type and severity of pain and evaluate response   Implement non-pharmacological measures as appropriate and evaluate response   Consider cultural and social influences on pain and pain management   Notify Licensed Independent Practitioner if interventions unsuccessful or patient reports new pain     Problem: Nutrition Deficit:  Goal: Optimize nutritional status  Recent Flowsheet Documentation  Taken 8/14/2023 0902 by Derrick Whitlock, MS, RD, LD  Nutrient intake appropriate for improving, restoring, or maintaining nutritional needs:   Assess nutritional status and recommend course of action   Monitor oral intake, labs, and treatment plans   Recommend appropriate diets, oral nutritional supplements, and vitamin/mineral supplements     Problem: Neurosensory - Adult  Goal: Achieves stable or improved neurological status  Recent Flowsheet Documentation  Taken 8/14/2023 2000 by Arlen Machado RN  Achieves stable or improved neurological status:   Assess
Problem: Discharge Planning  Goal: Discharge to home or other facility with appropriate resources  Recent Flowsheet Documentation  Taken 8/15/2023 2000 by Magali Middleton RN  Discharge to home or other facility with appropriate resources:   Identify barriers to discharge with patient and caregiver   Arrange for needed discharge resources and transportation as appropriate   Identify discharge learning needs (meds, wound care, etc)   Refer to discharge planning if patient needs post-hospital services based on physician order or complex needs related to functional status, cognitive ability or social support system     Problem: Pain  Goal: Verbalizes/displays adequate comfort level or baseline comfort level  Recent Flowsheet Documentation  Taken 8/15/2023 2000 by Magali Middleton RN  Verbalizes/displays adequate comfort level or baseline comfort level:   Encourage patient to monitor pain and request assistance   Assess pain using appropriate pain scale   Administer analgesics based on type and severity of pain and evaluate response   Implement non-pharmacological measures as appropriate and evaluate response   Consider cultural and social influences on pain and pain management   Notify Licensed Independent Practitioner if interventions unsuccessful or patient reports new pain     Problem: Neurosensory - Adult  Goal: Achieves stable or improved neurological status  Recent Flowsheet Documentation  Taken 8/15/2023 2000 by Magali Middleton RN  Achieves stable or improved neurological status:   Assess for and report changes in neurological status   Initiate measures to prevent increased intracranial pressure   Maintain blood pressure and fluid volume within ordered parameters to optimize cerebral perfusion and minimize risk of hemorrhage   Monitor temperature, glucose, and sodium.  Initiate appropriate interventions as ordered     Problem: Neurosensory - Adult  Goal: Absence of seizures  Recent Flowsheet Documentation  Taken
Problem: Nutrition Deficit:  Goal: Optimize nutritional status  Outcome: Progressing  Flowsheets (Taken 8/11/2023 9026)  Nutrient intake appropriate for improving, restoring, or maintaining nutritional needs:   Assess nutritional status and recommend course of action   Recommend appropriate diets, oral nutritional supplements, and vitamin/mineral supplements   Order, calculate, and assess calorie counts as needed   Recommend, monitor, and adjust tube feedings and TPN/PPN based on assessed needs   Provide specific nutrition education to patient or family as appropriate
Problem: Nutrition Deficit:  Goal: Optimize nutritional status  Recent Flowsheet Documentation  Taken 8/14/2023 0902 by Angi Torrez MS, RD, LD  Nutrient intake appropriate for improving, restoring, or maintaining nutritional needs:   Assess nutritional status and recommend course of action   Monitor oral intake, labs, and treatment plans   Recommend appropriate diets, oral nutritional supplements, and vitamin/mineral supplements     Problem: Nutrition Deficit:  Goal: Optimize nutritional status  Outcome: Progressing  Flowsheets (Taken 8/14/2023 0902 by Angi Torrez MS, RD, LD)  Nutrient intake appropriate for improving, restoring, or maintaining nutritional needs:   Assess nutritional status and recommend course of action   Monitor oral intake, labs, and treatment plans   Recommend appropriate diets, oral nutritional supplements, and vitamin/mineral supplements     Problem: Neurosensory - Adult  Goal: Achieves stable or improved neurological status  Outcome: Progressing     Problem: Neurosensory - Adult  Goal: Absence of seizures  Outcome: Progressing     Problem: Neurosensory - Adult  Goal: Remains free of injury related to seizures activity  Outcome: Progressing     Problem: Neurosensory - Adult  Goal: Achieves maximal functionality and self care  Outcome: Progressing     Problem: Cardiovascular - Adult  Goal: Maintains optimal cardiac output and hemodynamic stability  Outcome: Progressing     Problem: Cardiovascular - Adult  Goal: Absence of cardiac dysrhythmias or at baseline  Outcome: Progressing     Problem: Skin/Tissue Integrity - Adult  Goal: Skin integrity remains intact  Outcome: Progressing     Problem: Skin/Tissue Integrity - Adult  Goal: Incisions, wounds, or drain sites healing without S/S of infection  Outcome: Progressing     Problem: Skin/Tissue Integrity - Adult  Goal: Oral mucous membranes remain intact  Outcome: Progressing     Problem: Musculoskeletal - Adult  Goal: Return mobility to safest
Problem: Safety - Medical Restraint  Goal: Remains free of injury from restraints (Restraint for Interference with Medical Device)  Description: INTERVENTIONS:  1. Determine that other, less restrictive measures have been tried or would not be effective before applying the restraint  2. Evaluate the patient's condition at the time of restraint application  3. Inform patient/family regarding the reason for restraint  4.  Q2H: Monitor safety, psychosocial status, comfort, nutrition and hydration  Outcome: Progressing
Lana Hilliard RN  Maintains optimal cardiac output and hemodynamic stability:   Monitor blood pressure and heart rate   Monitor urine output and notify Licensed Independent Practitioner for values outside of normal range  Goal: Absence of cardiac dysrhythmias or at baseline  Recent Flowsheet Documentation  Taken 8/16/2023 0800 by Sigifredo Domingo RN  Absence of cardiac dysrhythmias or at baseline: Monitor cardiac rate and rhythm     Problem: Skin/Tissue Integrity - Adult  Goal: Skin integrity remains intact  Recent Flowsheet Documentation  Taken 8/16/2023 1011 by Sigifredo Domingo RN  Skin Integrity Remains Intact: Monitor for areas of redness and/or skin breakdown  Taken 8/16/2023 0800 by Sigifredo Domingo RN  Skin Integrity Remains Intact: Assess vascular access sites hourly  Goal: Incisions, wounds, or drain sites healing without S/S of infection  Recent Flowsheet Documentation  Taken 8/16/2023 1011 by Sigifredo Domingo RN  Incisions, Wounds, or Drain Sites Healing Without Sign and Symptoms of Infection: ADMISSION and DAILY: Assess and document risk factors for pressure ulcer development  Taken 8/16/2023 0800 by Sigifredo Domingo RN  Incisions, Wounds, or Drain Sites Healing Without Sign and Symptoms of Infection: ADMISSION and DAILY: Assess and document risk factors for pressure ulcer development  Goal: Oral mucous membranes remain intact  Recent Flowsheet Documentation  Taken 8/16/2023 1011 by Sigifredo Domingo RN  Oral Mucous Membranes Remain Intact: Assess oral mucosa and hygiene practices  Taken 8/16/2023 0800 by Sigifredo Domingo RN  Oral Mucous Membranes Remain Intact: Assess oral mucosa and hygiene practices     Problem: Musculoskeletal - Adult  Goal: Return mobility to safest level of function  Recent Flowsheet Documentation  Taken 8/16/2023 0800 by Sigifredo Domingo RN  Return Mobility to Safest Level of Function: Assess patient stability and activity tolerance for standing, transferring and ambulating

## 2023-08-18 ENCOUNTER — FOLLOWUP TELEPHONE ENCOUNTER (OUTPATIENT)
Dept: TELEMETRY | Age: 33
End: 2023-08-18

## 2023-08-30 ENCOUNTER — OFFICE VISIT (OUTPATIENT)
Dept: CARDIOLOGY CLINIC | Age: 33
End: 2023-08-30
Payer: MEDICAID

## 2023-08-30 VITALS
HEIGHT: 71 IN | DIASTOLIC BLOOD PRESSURE: 40 MMHG | BODY MASS INDEX: 17.92 KG/M2 | HEART RATE: 68 BPM | SYSTOLIC BLOOD PRESSURE: 87 MMHG | OXYGEN SATURATION: 96 % | WEIGHT: 128 LBS

## 2023-08-30 DIAGNOSIS — S91.302A OPEN WOUND OF LEFT FOOT, INITIAL ENCOUNTER: ICD-10-CM

## 2023-08-30 DIAGNOSIS — I51.81 STRESS-INDUCED CARDIOMYOPATHY: Primary | ICD-10-CM

## 2023-08-30 DIAGNOSIS — I50.22 SYSTOLIC CHF, CHRONIC (HCC): ICD-10-CM

## 2023-08-30 PROCEDURE — 3078F DIAST BP <80 MM HG: CPT | Performed by: NURSE PRACTITIONER

## 2023-08-30 PROCEDURE — 99214 OFFICE O/P EST MOD 30 MIN: CPT | Performed by: NURSE PRACTITIONER

## 2023-08-30 PROCEDURE — 3074F SYST BP LT 130 MM HG: CPT | Performed by: NURSE PRACTITIONER

## 2023-08-30 RX ORDER — MIDAZOLAM 5 MG/.1ML
SPRAY NASAL
COMMUNITY
Start: 2023-08-16

## 2023-08-30 NOTE — PROGRESS NOTES
[facility]   Cardiac Follow-up    Primary Care Doctor:  No primary care provider on file. Chief Complaint   Patient presents with    Follow-up    Cardiomyopathy      History of Present Illness:   I had the pleasure of seeing Celso Livingston in follow up for stress-induced cardiomyopathy      Admitted 2/21/2021-2/27/2021 with seizures, acute encephalopathy, septic shock, respiratory failure, NSTEMI, aspiration, stress-induced cardiomyopathy with LVEF 25% requiring dobutamine support, status post left heart cath showed normal coronaries. Repeat echocardiogram 7/2021 showed recovered EF 55%  Amitted 1/12/23-1/20/2023 with status epilepticus, septic shock, pneumonia, markedly elevated troponin, wide complex tachycardia, repeat echocardiogram completed showed LVEF down to less than 20% with severe diffuse hypokinesis, and possible questionable apical thrombus. He had cardiac MRI showing improved LVEF to 59% and mild myocardial edema suggestive of recovering stress induced cardiomyopathy     Since last visit, admitted 8/8/2023-8/16/2023 with seizures and cardiac arrest, required transvenous pacer. LVEF 20%. Also treated for bacteremia No issues with breathing. Having chest soreness from CPR. Not walking much. He reports being drug free; here with mom. Remains on methadone   A few times of dizziness with standing up quickly. No sycnope, no seizures    Concerns about one of the wounds on the left foot not healing     Celso Livingston describes symptoms including chest pain, fatigue but denies lower extremity edema, palpitations, syncope. Home weights: 120 lbs  Appetite: up and improving   Fluid intake: drinking juice.      Taking medications as prescribed: Yes  Able to afford medications: Yes    Past Medical History:   has a past medical history of Aspiration pneumonia ([address]), CAD (coronary artery disease), Hepatitis C, HTN (hypertension), NSTEMI (non-ST elevated myocardial infarction) ([address]),

## 2023-08-30 NOTE — PATIENT INSTRUCTIONS
Referral to wound care for left lateral foot wound   Continue to stay as active as possible  Continue coreg 3.125mg twice a day- take with food- monitor dizziness  Continue Entresto to 1/2 tab twice a day for now - and monitor your dizziness   Continue jardiance 10mg daily   Repeat BMP and BNP in 2 weeks  Follow up 3 weeks

## 2023-09-18 ENCOUNTER — HOSPITAL ENCOUNTER (OUTPATIENT)
Age: 33
Discharge: HOME | End: 2023-09-18
Payer: MEDICAID

## 2023-09-18 ENCOUNTER — HOSPITAL ENCOUNTER (OUTPATIENT)
Dept: GENERAL RADIOLOGY | Age: 33
Discharge: HOME | End: 2023-09-18
Payer: MEDICAID

## 2023-09-18 ENCOUNTER — OFFICE VISIT (OUTPATIENT)
Dept: CARDIOLOGY CLINIC | Age: 33
End: 2023-09-18
Payer: MEDICAID

## 2023-09-18 VITALS
DIASTOLIC BLOOD PRESSURE: 78 MMHG | HEIGHT: 71 IN | SYSTOLIC BLOOD PRESSURE: 120 MMHG | WEIGHT: 122 LBS | HEART RATE: 64 BPM | BODY MASS INDEX: 17.08 KG/M2 | OXYGEN SATURATION: 99 %

## 2023-09-18 DIAGNOSIS — R06.01 ORTHOPNEA: ICD-10-CM

## 2023-09-18 DIAGNOSIS — I51.81 STRESS-INDUCED CARDIOMYOPATHY: ICD-10-CM

## 2023-09-18 DIAGNOSIS — S91.302A OPEN WOUND OF LEFT FOOT, INITIAL ENCOUNTER: ICD-10-CM

## 2023-09-18 DIAGNOSIS — I50.22 SYSTOLIC CHF, CHRONIC (HCC): ICD-10-CM

## 2023-09-18 DIAGNOSIS — R07.9 CHEST PAIN, UNSPECIFIED TYPE: ICD-10-CM

## 2023-09-18 DIAGNOSIS — R07.9 CHEST PAIN, UNSPECIFIED TYPE: Primary | ICD-10-CM

## 2023-09-18 LAB
ANION GAP SERPL CALCULATED.3IONS-SCNC: 9 MMOL/L (ref 3–16)
BUN SERPL-MCNC: 16 MG/DL (ref 7–20)
CALCIUM SERPL-MCNC: 8.9 MG/DL (ref 8.3–10.6)
CHLORIDE SERPL-SCNC: 103 MMOL/L (ref 99–110)
CO2 SERPL-SCNC: 26 MMOL/L (ref 21–32)
CREAT SERPL-MCNC: 0.7 MG/DL (ref 0.9–1.3)
GFR SERPLBLD CREATININE-BSD FMLA CKD-EPI: >60 ML/MIN/{1.73_M2}
GLUCOSE SERPL-MCNC: 95 MG/DL (ref 70–99)
NT-PROBNP SERPL-MCNC: 242 PG/ML (ref 0–124)
POTASSIUM SERPL-SCNC: 4.9 MMOL/L (ref 3.5–5.1)
SODIUM SERPL-SCNC: 138 MMOL/L (ref 136–145)

## 2023-09-18 PROCEDURE — 71046 X-RAY EXAM CHEST 2 VIEWS: CPT

## 2023-09-18 PROCEDURE — 93000 ELECTROCARDIOGRAM COMPLETE: CPT | Performed by: NURSE PRACTITIONER

## 2023-09-18 PROCEDURE — 36415 COLL VENOUS BLD VENIPUNCTURE: CPT

## 2023-09-18 PROCEDURE — 99214 OFFICE O/P EST MOD 30 MIN: CPT | Performed by: NURSE PRACTITIONER

## 2023-09-18 PROCEDURE — 3074F SYST BP LT 130 MM HG: CPT | Performed by: NURSE PRACTITIONER

## 2023-09-18 PROCEDURE — 3078F DIAST BP <80 MM HG: CPT | Performed by: NURSE PRACTITIONER

## 2023-09-18 PROCEDURE — 83880 ASSAY OF NATRIURETIC PEPTIDE: CPT

## 2023-09-18 PROCEDURE — 80048 BASIC METABOLIC PNL TOTAL CA: CPT

## 2023-09-18 RX ORDER — CARVEDILOL 3.12 MG/1
3.12 TABLET ORAL 2 TIMES DAILY WITH MEALS
Qty: 60 TABLET | Refills: 5 | Status: SHIPPED | OUTPATIENT
Start: 2023-09-18

## 2023-09-18 NOTE — PATIENT INSTRUCTIONS
Repeat labs today  Repeat CXR   Continue to stay as active as possible  Continue coreg 3.125mg twice a day- take with food- monitor dizziness  Continue Entresto 1/2 tab twice a day until labs resulted- then will consider increasing to 1 tab twice a day   Continue jardiance 10mg daily  Follow up in 4 weeks

## 2023-09-18 NOTE — PROGRESS NOTES
tobacco abuse  Hx of TBI, Right temporal seizures since 2019; followed by  neuro  History of Hep C   IVDU- on methadone  Anemia  Prolong Qtc    Visit Diagnosis:    1. Chest pain, unspecified type    2. Systolic CHF, chronic ([address])    3. Orthopnea        Impression[de-identified] EKG- similar to prior EKG with BBB. His chest pain is related to recovering from the prior CPR from last month. Will obtain CXR given the orthpnea he is having. Will continue to increase GDMT as labs and symptoms tolerate. Pending renal funciton, will plan to restart Spironolactone (aldactone) and adjust entresto. Plan:     Repeat labs today  Repeat CXR   Continue to stay as active as possible  Continue coreg 3.125mg twice a day- take with food- monitor dizziness  Continue Entresto 1/2 tab twice a day until labs resulted- then will consider increasing to 1 tab twice a day   Continue jardiance 10mg daily  Follow up in 4 weeks       I appreciate the opportunity for caring for this patient.      Mariola Lawrence, LEIGHTON - CNP, 9/18/2023

## 2023-09-19 ENCOUNTER — TELEPHONE (OUTPATIENT)
Dept: CARDIOLOGY CLINIC | Age: 33
End: 2023-09-19

## 2023-09-19 NOTE — TELEPHONE ENCOUNTER
Attempted to call pt. Patient's phone number is no longer in service.  Will retry to contact pt again

## 2023-09-19 NOTE — TELEPHONE ENCOUNTER
----- Message from LEIGHTON Arroyo CNP sent at 9/19/2023 12:46 PM EDT -----  ProBNP markedly improved and close to normal. BMP stable and kidney function and electrolytes look good. Continue same medications. Please call. Thanks!

## 2023-09-20 ENCOUNTER — TELEPHONE (OUTPATIENT)
Dept: CARDIOLOGY CLINIC | Age: 33
End: 2023-09-20

## 2023-09-20 DIAGNOSIS — I50.22 SYSTOLIC CHF, CHRONIC (HCC): Primary | ICD-10-CM

## 2023-09-20 NOTE — TELEPHONE ENCOUNTER
Labs reviewed. Please have him increase the entresto to 1 tab twice a day.    Repeat BMP and BNP prior to next appt 10/30

## 2023-10-15 ENCOUNTER — HOSPITAL ENCOUNTER (INPATIENT)
Age: 33
LOS: 1 days | Discharge: HOME | DRG: 053 | End: 2023-10-16
Attending: STUDENT IN AN ORGANIZED HEALTH CARE EDUCATION/TRAINING PROGRAM | Admitting: HOSPITALIST
Payer: MEDICAID

## 2023-10-15 DIAGNOSIS — R56.9 SEIZURE (HCC): Primary | ICD-10-CM

## 2023-10-15 DIAGNOSIS — F19.90 IVDU (INTRAVENOUS DRUG USER): ICD-10-CM

## 2023-10-15 LAB
ALBUMIN SERPL-MCNC: 4.2 G/DL (ref 3.4–5)
ALBUMIN/GLOB SERPL: 1.2 {RATIO} (ref 1.1–2.2)
ALP SERPL-CCNC: 158 U/L (ref 40–129)
ALT SERPL-CCNC: 20 U/L (ref 10–40)
AMPHETAMINES UR QL SCN>1000 NG/ML: ABNORMAL
ANION GAP SERPL CALCULATED.3IONS-SCNC: 19 MMOL/L (ref 3–16)
AST SERPL-CCNC: 24 U/L (ref 15–37)
BARBITURATES UR QL SCN>200 NG/ML: ABNORMAL
BASOPHILS # BLD: 0.1 K/UL (ref 0–0.2)
BASOPHILS NFR BLD: 0.7 %
BENZODIAZ UR QL SCN>200 NG/ML: ABNORMAL
BILIRUB SERPL-MCNC: <0.2 MG/DL (ref 0–1)
BILIRUB UR QL STRIP.AUTO: NEGATIVE
BUN SERPL-MCNC: 13 MG/DL (ref 7–20)
CALCIUM SERPL-MCNC: 8.7 MG/DL (ref 8.3–10.6)
CANNABINOIDS UR QL SCN>50 NG/ML: POSITIVE
CHLORIDE SERPL-SCNC: 98 MMOL/L (ref 99–110)
CLARITY UR: CLEAR
CO2 SERPL-SCNC: 18 MMOL/L (ref 21–32)
COCAINE UR QL SCN: ABNORMAL
COLOR UR: ABNORMAL
CREAT SERPL-MCNC: 0.8 MG/DL (ref 0.9–1.3)
DEPRECATED RDW RBC AUTO: 14.9 % (ref 12.4–15.4)
DRUG SCREEN COMMENT UR-IMP: ABNORMAL
EKG ATRIAL RATE: 91 BPM
EKG DIAGNOSIS: NORMAL
EKG P AXIS: 45 DEGREES
EKG P-R INTERVAL: 160 MS
EKG Q-T INTERVAL: 404 MS
EKG QRS DURATION: 134 MS
EKG QTC CALCULATION (BAZETT): 496 MS
EKG R AXIS: -48 DEGREES
EKG T AXIS: 92 DEGREES
EKG VENTRICULAR RATE: 91 BPM
EOSINOPHIL # BLD: 0.4 K/UL (ref 0–0.6)
EOSINOPHIL NFR BLD: 3 %
EPI CELLS #/AREA URNS HPF: NORMAL /HPF (ref 0–5)
FENTANYL SCREEN, URINE: ABNORMAL
GFR SERPLBLD CREATININE-BSD FMLA CKD-EPI: >60 ML/MIN/{1.73_M2}
GLUCOSE SERPL-MCNC: 179 MG/DL (ref 70–99)
GLUCOSE UR STRIP.AUTO-MCNC: 500 MG/DL
HCT VFR BLD AUTO: 44.3 % (ref 40.5–52.5)
HGB BLD-MCNC: 14.9 G/DL (ref 13.5–17.5)
HGB UR QL STRIP.AUTO: NEGATIVE
KETONES UR STRIP.AUTO-MCNC: NEGATIVE MG/DL
LACTATE BLDV-SCNC: 2.7 MMOL/L (ref 0.4–1.9)
LACTATE BLDV-SCNC: 7.7 MMOL/L (ref 0.4–1.9)
LEUKOCYTE ESTERASE UR QL STRIP.AUTO: NEGATIVE
LIPASE SERPL-CCNC: 49 U/L (ref 13–60)
LYMPHOCYTES # BLD: 4 K/UL (ref 1–5.1)
LYMPHOCYTES NFR BLD: 32.6 %
MCH RBC QN AUTO: 31.4 PG (ref 26–34)
MCHC RBC AUTO-ENTMCNC: 33.7 G/DL (ref 31–36)
MCV RBC AUTO: 93.2 FL (ref 80–100)
METHADONE UR QL SCN>300 NG/ML: POSITIVE
MONOCYTES # BLD: 0.7 K/UL (ref 0–1.3)
MONOCYTES NFR BLD: 5.4 %
NEUTROPHILS # BLD: 7.2 K/UL (ref 1.7–7.7)
NEUTROPHILS NFR BLD: 58.3 %
NITRITE UR QL STRIP.AUTO: NEGATIVE
NT-PROBNP SERPL-MCNC: 241 PG/ML (ref 0–124)
OPIATES UR QL SCN>300 NG/ML: ABNORMAL
OXYCODONE UR QL SCN: ABNORMAL
PCP UR QL SCN>25 NG/ML: ABNORMAL
PH UR STRIP.AUTO: 7 [PH] (ref 5–8)
PH UR STRIP: 7 [PH]
PLATELET # BLD AUTO: 300 K/UL (ref 135–450)
PMV BLD AUTO: 6.8 FL (ref 5–10.5)
POTASSIUM SERPL-SCNC: 3.6 MMOL/L (ref 3.5–5.1)
PROT SERPL-MCNC: 7.7 G/DL (ref 6.4–8.2)
PROT UR STRIP.AUTO-MCNC: ABNORMAL MG/DL
RBC # BLD AUTO: 4.75 M/UL (ref 4.2–5.9)
RBC #/AREA URNS HPF: NORMAL /HPF (ref 0–4)
SODIUM SERPL-SCNC: 135 MMOL/L (ref 136–145)
SP GR UR STRIP.AUTO: 1.01 (ref 1–1.03)
TROPONIN, HIGH SENSITIVITY: 14 NG/L (ref 0–22)
UA COMPLETE W REFLEX CULTURE PNL UR: ABNORMAL
UA DIPSTICK W REFLEX MICRO PNL UR: YES
URN SPEC COLLECT METH UR: ABNORMAL
UROBILINOGEN UR STRIP-ACNC: 0.2 E.U./DL
WBC # BLD AUTO: 12.4 K/UL (ref 4–11)
WBC #/AREA URNS HPF: NORMAL /HPF (ref 0–5)

## 2023-10-15 PROCEDURE — 99285 EMERGENCY DEPT VISIT HI MDM: CPT

## 2023-10-15 PROCEDURE — 6370000000 HC RX 637 (ALT 250 FOR IP): Performed by: STUDENT IN AN ORGANIZED HEALTH CARE EDUCATION/TRAINING PROGRAM

## 2023-10-15 PROCEDURE — 96365 THER/PROPH/DIAG IV INF INIT: CPT

## 2023-10-15 PROCEDURE — 80053 COMPREHEN METABOLIC PANEL: CPT

## 2023-10-15 PROCEDURE — 2580000003 HC RX 258: Performed by: INTERNAL MEDICINE

## 2023-10-15 PROCEDURE — G0378 HOSPITAL OBSERVATION PER HR: HCPCS

## 2023-10-15 PROCEDURE — 93010 ELECTROCARDIOGRAM REPORT: CPT | Performed by: INTERNAL MEDICINE

## 2023-10-15 PROCEDURE — 6360000002 HC RX W HCPCS

## 2023-10-15 PROCEDURE — 96375 TX/PRO/DX INJ NEW DRUG ADDON: CPT

## 2023-10-15 PROCEDURE — 84484 ASSAY OF TROPONIN QUANT: CPT

## 2023-10-15 PROCEDURE — 83605 ASSAY OF LACTIC ACID: CPT

## 2023-10-15 PROCEDURE — 83880 ASSAY OF NATRIURETIC PEPTIDE: CPT

## 2023-10-15 PROCEDURE — 96372 THER/PROPH/DIAG INJ SC/IM: CPT

## 2023-10-15 PROCEDURE — 6360000002 HC RX W HCPCS: Performed by: STUDENT IN AN ORGANIZED HEALTH CARE EDUCATION/TRAINING PROGRAM

## 2023-10-15 PROCEDURE — 93005 ELECTROCARDIOGRAM TRACING: CPT | Performed by: STUDENT IN AN ORGANIZED HEALTH CARE EDUCATION/TRAINING PROGRAM

## 2023-10-15 PROCEDURE — 6370000000 HC RX 637 (ALT 250 FOR IP): Performed by: HOSPITALIST

## 2023-10-15 PROCEDURE — 80235 DRUG ASSAY LACOSAMIDE: CPT

## 2023-10-15 PROCEDURE — 83690 ASSAY OF LIPASE: CPT

## 2023-10-15 PROCEDURE — 96361 HYDRATE IV INFUSION ADD-ON: CPT

## 2023-10-15 PROCEDURE — 6370000000 HC RX 637 (ALT 250 FOR IP): Performed by: NURSE PRACTITIONER

## 2023-10-15 PROCEDURE — 80307 DRUG TEST PRSMV CHEM ANLYZR: CPT

## 2023-10-15 PROCEDURE — 1200000000 HC SEMI PRIVATE

## 2023-10-15 PROCEDURE — 81001 URINALYSIS AUTO W/SCOPE: CPT

## 2023-10-15 PROCEDURE — 80183 DRUG SCRN QUANT OXCARBAZEPIN: CPT

## 2023-10-15 PROCEDURE — 6360000002 HC RX W HCPCS: Performed by: INTERNAL MEDICINE

## 2023-10-15 PROCEDURE — 85025 COMPLETE CBC W/AUTO DIFF WBC: CPT

## 2023-10-15 PROCEDURE — 6360000002 HC RX W HCPCS: Performed by: HOSPITALIST

## 2023-10-15 PROCEDURE — 2580000003 HC RX 258: Performed by: HOSPITALIST

## 2023-10-15 RX ORDER — CARVEDILOL 3.12 MG/1
3.12 TABLET ORAL 2 TIMES DAILY WITH MEALS
Status: DISCONTINUED | OUTPATIENT
Start: 2023-10-15 | End: 2023-10-16 | Stop reason: HOSPADM

## 2023-10-15 RX ORDER — LORAZEPAM 2 MG/ML
2 INJECTION INTRAMUSCULAR EVERY 6 HOURS PRN
Status: DISCONTINUED | OUTPATIENT
Start: 2023-10-15 | End: 2023-10-16 | Stop reason: HOSPADM

## 2023-10-15 RX ORDER — OXCARBAZEPINE 150 MG/1
900 TABLET, FILM COATED ORAL 2 TIMES DAILY
Status: DISCONTINUED | OUTPATIENT
Start: 2023-10-15 | End: 2023-10-16

## 2023-10-15 RX ORDER — ENOXAPARIN SODIUM 100 MG/ML
40 INJECTION SUBCUTANEOUS DAILY
Status: DISCONTINUED | OUTPATIENT
Start: 2023-10-15 | End: 2023-10-16 | Stop reason: HOSPADM

## 2023-10-15 RX ORDER — PANTOPRAZOLE SODIUM 40 MG/1
40 TABLET, DELAYED RELEASE ORAL DAILY
Status: DISCONTINUED | OUTPATIENT
Start: 2023-10-15 | End: 2023-10-16 | Stop reason: HOSPADM

## 2023-10-15 RX ORDER — LORAZEPAM 2 MG/ML
INJECTION INTRAMUSCULAR
Status: COMPLETED
Start: 2023-10-15 | End: 2023-10-15

## 2023-10-15 RX ORDER — 0.9 % SODIUM CHLORIDE 0.9 %
250 INTRAVENOUS SOLUTION INTRAVENOUS ONCE
Status: DISCONTINUED | OUTPATIENT
Start: 2023-10-15 | End: 2023-10-16 | Stop reason: HOSPADM

## 2023-10-15 RX ORDER — ONDANSETRON 4 MG/1
4 TABLET, ORALLY DISINTEGRATING ORAL EVERY 8 HOURS PRN
Status: DISCONTINUED | OUTPATIENT
Start: 2023-10-15 | End: 2023-10-16 | Stop reason: HOSPADM

## 2023-10-15 RX ORDER — LORAZEPAM 0.5 MG/1
0.5 TABLET ORAL ONCE
Status: DISCONTINUED | OUTPATIENT
Start: 2023-10-15 | End: 2023-10-15

## 2023-10-15 RX ORDER — LORAZEPAM 1 MG/1
1 TABLET ORAL 2 TIMES DAILY
Status: DISCONTINUED | OUTPATIENT
Start: 2023-10-15 | End: 2023-10-16 | Stop reason: HOSPADM

## 2023-10-15 RX ORDER — ACETAMINOPHEN 325 MG/1
650 TABLET ORAL EVERY 6 HOURS PRN
Status: DISCONTINUED | OUTPATIENT
Start: 2023-10-15 | End: 2023-10-16 | Stop reason: HOSPADM

## 2023-10-15 RX ORDER — METHADONE HYDROCHLORIDE 10 MG/1
105 TABLET ORAL DAILY
Status: DISCONTINUED | OUTPATIENT
Start: 2023-10-15 | End: 2023-10-16 | Stop reason: HOSPADM

## 2023-10-15 RX ORDER — DIPHENHYDRAMINE HYDROCHLORIDE 50 MG/ML
25 INJECTION INTRAMUSCULAR; INTRAVENOUS ONCE
Status: COMPLETED | OUTPATIENT
Start: 2023-10-15 | End: 2023-10-15

## 2023-10-15 RX ORDER — ACETAMINOPHEN 650 MG/1
650 SUPPOSITORY RECTAL EVERY 6 HOURS PRN
Status: DISCONTINUED | OUTPATIENT
Start: 2023-10-15 | End: 2023-10-16 | Stop reason: HOSPADM

## 2023-10-15 RX ORDER — LORAZEPAM 1 MG/1
1 TABLET ORAL ONCE
Status: COMPLETED | OUTPATIENT
Start: 2023-10-15 | End: 2023-10-15

## 2023-10-15 RX ORDER — SODIUM CHLORIDE 9 MG/ML
INJECTION, SOLUTION INTRAVENOUS PRN
Status: DISCONTINUED | OUTPATIENT
Start: 2023-10-15 | End: 2023-10-16 | Stop reason: HOSPADM

## 2023-10-15 RX ORDER — BUTALBITAL, ACETAMINOPHEN AND CAFFEINE 50; 325; 40 MG/1; MG/1; MG/1
1 TABLET ORAL EVERY 4 HOURS PRN
Status: DISCONTINUED | OUTPATIENT
Start: 2023-10-15 | End: 2023-10-16 | Stop reason: HOSPADM

## 2023-10-15 RX ORDER — NICOTINE 21 MG/24HR
1 PATCH, TRANSDERMAL 24 HOURS TRANSDERMAL DAILY
Status: DISCONTINUED | OUTPATIENT
Start: 2023-10-15 | End: 2023-10-16 | Stop reason: HOSPADM

## 2023-10-15 RX ORDER — PROCHLORPERAZINE EDISYLATE 5 MG/ML
10 INJECTION INTRAMUSCULAR; INTRAVENOUS ONCE
Status: COMPLETED | OUTPATIENT
Start: 2023-10-15 | End: 2023-10-15

## 2023-10-15 RX ORDER — ONDANSETRON 2 MG/ML
4 INJECTION INTRAMUSCULAR; INTRAVENOUS EVERY 6 HOURS PRN
Status: DISCONTINUED | OUTPATIENT
Start: 2023-10-15 | End: 2023-10-16 | Stop reason: HOSPADM

## 2023-10-15 RX ORDER — SODIUM CHLORIDE 0.9 % (FLUSH) 0.9 %
5-40 SYRINGE (ML) INJECTION EVERY 12 HOURS SCHEDULED
Status: DISCONTINUED | OUTPATIENT
Start: 2023-10-15 | End: 2023-10-16 | Stop reason: HOSPADM

## 2023-10-15 RX ORDER — POLYETHYLENE GLYCOL 3350 17 G/17G
17 POWDER, FOR SOLUTION ORAL DAILY PRN
Status: DISCONTINUED | OUTPATIENT
Start: 2023-10-15 | End: 2023-10-16 | Stop reason: HOSPADM

## 2023-10-15 RX ORDER — SODIUM CHLORIDE 0.9 % (FLUSH) 0.9 %
5-40 SYRINGE (ML) INJECTION PRN
Status: DISCONTINUED | OUTPATIENT
Start: 2023-10-15 | End: 2023-10-16 | Stop reason: HOSPADM

## 2023-10-15 RX ORDER — SODIUM CHLORIDE 9 MG/ML
INJECTION, SOLUTION INTRAVENOUS CONTINUOUS
Status: DISCONTINUED | OUTPATIENT
Start: 2023-10-15 | End: 2023-10-16 | Stop reason: HOSPADM

## 2023-10-15 RX ADMIN — LEVETIRACETAM 1000 MG: 100 INJECTION, SOLUTION INTRAVENOUS at 18:16

## 2023-10-15 RX ADMIN — PROCHLORPERAZINE EDISYLATE 10 MG: 5 INJECTION INTRAMUSCULAR; INTRAVENOUS at 14:46

## 2023-10-15 RX ADMIN — ENOXAPARIN SODIUM 40 MG: 100 INJECTION SUBCUTANEOUS at 21:18

## 2023-10-15 RX ADMIN — DIPHENHYDRAMINE HYDROCHLORIDE 25 MG: 50 INJECTION INTRAMUSCULAR; INTRAVENOUS at 14:45

## 2023-10-15 RX ADMIN — OXCARBAZEPINE 900 MG: 150 TABLET, FILM COATED ORAL at 21:58

## 2023-10-15 RX ADMIN — BUTALBITAL, ACETAMINOPHEN AND CAFFEINE 1 TABLET: 325; 50; 40 TABLET ORAL at 12:53

## 2023-10-15 RX ADMIN — LORAZEPAM 2 MG: 2 INJECTION INTRAMUSCULAR; INTRAVENOUS at 16:59

## 2023-10-15 RX ADMIN — SODIUM CHLORIDE: 9 INJECTION, SOLUTION INTRAVENOUS at 18:15

## 2023-10-15 RX ADMIN — LORAZEPAM 1 MG: 1 TABLET ORAL at 13:47

## 2023-10-15 RX ADMIN — LORAZEPAM 2 MG: 2 INJECTION INTRAMUSCULAR at 16:59

## 2023-10-15 ASSESSMENT — LIFESTYLE VARIABLES
HOW OFTEN DO YOU HAVE A DRINK CONTAINING ALCOHOL: NEVER
HOW MANY STANDARD DRINKS CONTAINING ALCOHOL DO YOU HAVE ON A TYPICAL DAY: PATIENT DOES NOT DRINK

## 2023-10-15 ASSESSMENT — PAIN SCALES - GENERAL
PAINLEVEL_OUTOF10: 6
PAINLEVEL_OUTOF10: 7
PAINLEVEL_OUTOF10: 0
PAINLEVEL_OUTOF10: 9

## 2023-10-15 ASSESSMENT — PAIN - FUNCTIONAL ASSESSMENT: PAIN_FUNCTIONAL_ASSESSMENT: 0-10

## 2023-10-15 NOTE — ED PROVIDER NOTES
[address] [place] [address]  ED  EMERGENCY DEPARTMENT ENCOUNTER        Patient Name: Surya Dunne  MRN: [id number]  [address] [facility] [place] 1990  Date of evaluation: 10/15/2023  Provider: Joyce Monson MD  PCP: No primary care provider on file. Note Started: 2:07 PM EDT 10/15/23      CHIEF COMPLAINT  Seizures         HISTORY & PHYSICAL     HISTORY OF PRESENT ILLNESS  History from : Patient    Limitations to history : None    Surya Dunne is a 35 y.o. male  has a past medical history of Aspiration pneumonia ([address]), CAD (coronary artery disease), Hepatitis C, HTN (hypertension), NSTEMI (non-ST elevated myocardial infarction) ([address]), Polysubstance abuse ([address]), Seizures ([address]), Septic shock ([address]), Systolic CHF ([address]), and Takotsubo cardiomyopathy. , who presents to the ED complaining of seizure. Patient has a history of partial seizures, family reports he has grand mal seizures every couple months. Last time patient was admitted was in August when he had multiple seizures, required intubation, and had a cardiac arrest.  Patient and family report compliance with his medications. Patient had been discharged on Vimpat but this has been subsequently discontinued by neurology due to concern for cardiac complications. He had two grand mal seizures today. Per family's had another partial seizure in the emergency department. Patient was laying down at the time, no concern for trauma. Patient does report headache, consistent with how he typically feels after seizures    Old records reviewed:  Reviewed previous admission when patient had seizure-like activity, was intubated, and had a cardiac arrest    No other complaints, modifying factors or associated symptoms. Nursing Notes were all reviewed and agreed with or any disagreements were addressed in the HPI. I have reviewed the following from the nursing documentation.     Past Medical History:   Diagnosis Date    Aspiration pneumonia (HCC)     CAD (coronary artery

## 2023-10-16 VITALS
HEART RATE: 69 BPM | DIASTOLIC BLOOD PRESSURE: 77 MMHG | HEIGHT: 71 IN | WEIGHT: 122 LBS | RESPIRATION RATE: 16 BRPM | SYSTOLIC BLOOD PRESSURE: 123 MMHG | OXYGEN SATURATION: 98 % | BODY MASS INDEX: 17.08 KG/M2 | TEMPERATURE: 98 F

## 2023-10-16 LAB
10OH-CARBAZEPINE SERPL-MCNC: 14 UG/ML (ref 3–35)
LACOSAMIDE SERPL-MCNC: <0.5 UG/ML (ref 1–10)

## 2023-10-16 PROCEDURE — 6370000000 HC RX 637 (ALT 250 FOR IP): Performed by: NURSE PRACTITIONER

## 2023-10-16 PROCEDURE — 2580000003 HC RX 258: Performed by: HOSPITALIST

## 2023-10-16 PROCEDURE — 6360000002 HC RX W HCPCS: Performed by: HOSPITALIST

## 2023-10-16 PROCEDURE — 6370000000 HC RX 637 (ALT 250 FOR IP): Performed by: HOSPITALIST

## 2023-10-16 PROCEDURE — 96372 THER/PROPH/DIAG INJ SC/IM: CPT

## 2023-10-16 PROCEDURE — 99223 1ST HOSP IP/OBS HIGH 75: CPT | Performed by: PSYCHIATRY & NEUROLOGY

## 2023-10-16 PROCEDURE — 99222 1ST HOSP IP/OBS MODERATE 55: CPT | Performed by: INTERNAL MEDICINE

## 2023-10-16 PROCEDURE — G0378 HOSPITAL OBSERVATION PER HR: HCPCS

## 2023-10-16 PROCEDURE — 95816 EEG AWAKE AND DROWSY: CPT

## 2023-10-16 PROCEDURE — 95816 EEG AWAKE AND DROWSY: CPT | Performed by: PSYCHIATRY & NEUROLOGY

## 2023-10-16 PROCEDURE — 96361 HYDRATE IV INFUSION ADD-ON: CPT

## 2023-10-16 RX ORDER — OXCARBAZEPINE 150 MG/1
900 TABLET, FILM COATED ORAL 2 TIMES DAILY
Status: DISCONTINUED | OUTPATIENT
Start: 2023-10-16 | End: 2023-10-16 | Stop reason: HOSPADM

## 2023-10-16 RX ORDER — OXCARBAZEPINE 150 MG/1
1200 TABLET, FILM COATED ORAL 2 TIMES DAILY
Status: DISCONTINUED | OUTPATIENT
Start: 2023-10-16 | End: 2023-10-16

## 2023-10-16 RX ADMIN — OXCARBAZEPINE 1200 MG: 150 TABLET, FILM COATED ORAL at 09:44

## 2023-10-16 RX ADMIN — PANTOPRAZOLE SODIUM 40 MG: 40 TABLET, DELAYED RELEASE ORAL at 08:39

## 2023-10-16 RX ADMIN — ENOXAPARIN SODIUM 40 MG: 100 INJECTION SUBCUTANEOUS at 08:40

## 2023-10-16 RX ADMIN — CARVEDILOL 3.12 MG: 3.12 TABLET, FILM COATED ORAL at 08:40

## 2023-10-16 RX ADMIN — SODIUM CHLORIDE: 9 INJECTION, SOLUTION INTRAVENOUS at 14:07

## 2023-10-16 RX ADMIN — SACUBITRIL AND VALSARTAN 1 TABLET: 24; 26 TABLET, FILM COATED ORAL at 08:39

## 2023-10-16 RX ADMIN — LORAZEPAM 1 MG: 1 TABLET ORAL at 08:39

## 2023-10-16 RX ADMIN — METHADONE HYDROCHLORIDE 105 MG: 10 TABLET ORAL at 09:44

## 2023-10-16 RX ADMIN — CARVEDILOL 3.12 MG: 3.12 TABLET, FILM COATED ORAL at 17:33

## 2023-10-16 ASSESSMENT — PAIN SCALES - GENERAL
PAINLEVEL_OUTOF10: 0
PAINLEVEL_OUTOF10: 0

## 2023-10-16 NOTE — PLAN OF CARE
Problem: Risk for Elopement  Goal: Patient will not exit the unit/facility without proper excort  10/16/2023 1755 by Salvador Rome RN  Outcome: Adequate for Discharge  10/16/2023 1030 by Salvador Rome RN  Outcome: Progressing     Problem: Discharge Planning  Goal: Discharge to home or other facility with appropriate resources  10/16/2023 1755 by Salvador Rome RN  Outcome: Adequate for Discharge  10/16/2023 1030 by Salvador Rome RN  Outcome: Progressing  Flowsheets (Taken 10/16/2023 1023)  Discharge to home or other facility with appropriate resources: Identify barriers to discharge with patient and caregiver     Problem: Pain  Goal: Verbalizes/displays adequate comfort level or baseline comfort level  10/16/2023 1755 by Salvador Rome RN  Outcome: Adequate for Discharge  10/16/2023 1030 by Salvador Rome RN  Outcome: Progressing  Flowsheets (Taken 10/16/2023 0830)  Verbalizes/displays adequate comfort level or baseline comfort level: Encourage patient to monitor pain and request assistance     Problem: Safety - Adult  Goal: Free from fall injury  10/16/2023 1755 by Salvador Rome RN  Outcome: Adequate for Discharge  10/16/2023 1030 by Salvador Rome RN  Outcome: Progressing     Problem: ABCDS Injury Assessment  Goal: Absence of physical injury  Outcome: Adequate for Discharge     Problem: Chronic Conditions and Co-morbidities  Goal: Patient's chronic conditions and co-morbidity symptoms are monitored and maintained or improved  Outcome: Adequate for Discharge

## 2023-10-16 NOTE — CONSULTS
[facility], [address]                                  CONSULTATION    PATIENT NAME: Shlomo Lugo                    :        1990  MED REC NO:   [id number]                          ROOM:       [id number]  ACCOUNT NO:   [de-identified]                           ADMIT DATE: 10/15/2023  PROVIDER:     Zainab Arguelles. Dennise Zayas MD    CONSULT DATE:  10/16/2023    REASON FOR CONSULTATION:  Cardiomyopathy, seizure disorder,  recommendations regarding antiseizure medication, VIMPAT. HISTORY OF PRESENT ILLNESS:  A 43-year-old male presented to the  hospital with recurrent seizure. Seizures are chronic and has been  present for months to years, they are intermittent. Seem to occur at  least every couple of months. Seizures are severe and in the past has  resulted in intubation and severe decompensation including Takotsubo  cardiomyopathy. It is reported that he had two grand mal seizure on the  day of admission, witnessed by his family similar to his prior event. No precipitating events, resolved spontaneously. No recurrence. PAST MEDICAL HISTORY:  1. Severe cardiomyopathy, he has had recurrent cardiomyopathies, which  have appeared to have occurred when he has had prior severe medical  decompensation including seizures and sepsis. He has had recovery on  previous events, his most recent event was in 2023. He has not  demonstrated recovery since this event. 2.  Hypertension. 3.  Hepatitis C.  4.  Polysubstance abuse. 5.  Seizure disorder. 6.  Hypertension. 7.  Left bundle-branch block. 8.  Anemia. 9.  Reported normal coronary arteries by cardiac catheterization in  . SOCIAL HISTORY:  He smokes, has history polysubstance abuse. FAMILY HISTORY:  Positive for heart disease. REVIEW OF SYSTEMS:  No fevers or chills. He has a cough, nonproductive. No persistent focal neurologic symptoms. No headache.   No

## 2023-10-16 NOTE — PROGRESS NOTES
2275G - Informed Mario Alberto Ruggiero regarding the blood pressure of the patient it was low and he has due medications for heart and seizure. If she still wants me to give the medications. 6960H - She said to give the medication for seizure but hold the medicine for BP.
4 Eyes Skin Assessment     NAME:  Celso Livingston  YOB: 1990  MEDICAL RECORD NUMBER:  [id number]    The patient is being assessed for  Admission    I agree that at least one RN has performed a thorough Head to Toe Skin Assessment on the patient. ALL assessment sites listed below have been assessed. Areas assessed by both nurses:    Head, Face, Ears, Shoulders, Back, Chest, Arms, Elbows, Hands, Sacrum. Buttock, Coccyx, Ischium, Legs. Feet and Heels, and Under Medical Devices         Does the Patient have a Wound?  No noted wound(s)       Luke Prevention initiated by RN: No  Wound Care Orders initiated by RN: No    Pressure Injury (Stage 3,4, Unstageable, DTI, NWPT, and Complex wounds) if present, place Wound referral order by RN under : No    New Ostomies, if present place, Ostomy referral order under : No     Nurse 1 eSignature: Electronically signed by Enio Howell RN on 10/15/23 at 6:44 PM EDT    **SHARE this note so that the co-signing nurse can place an eSignature**    Nurse 2 eSignature: Electronically signed by Kelsey Brush LPN on 36/37/68 at 8:21 PM EDT
Admission questions unable to be completed at this time due to patient being in post ictal phase of seizure.
Called to see patient who has just had a witnessed seizure-no jerking movements but he stared blankly. No biting of the tongue. He is currently drowsy after he was given IV Ativan. His brother at bedside. He takes cenobamate and Trileptal at home. He takes Ativan 1 mg twice daily at home. Reported to be compliant with his medications. Plan:  1. Give IV Keppra 1 g- 1 dose (according to his brother at bedside, Fakarlee Diaz makes him mad, reported as allergy). Continue cenobamate and Trileptal.  Seizure precaution. Neurology consult.
Nurse in room attempting to complete admission questions, during that time this nurse witnessed pt having what looked to be a focal seizure. At that time, I called for staff assist. Rachelle Watson RN and Lisa Gates RN in room. Ativan administered by Lisa Gates RN. S taken - /92, HR 93, RR 12, O2 94. Perfectserve sent to Dr. Charles Cevallos MD covering; this MD forwarded message to Dr. Lala Lopez MD.     1710: Dr. Lala Lopez MD at bedside, see orders.
Patient admitted to room 95 [id number] from ED. Patient oriented to room, call light, bed rails, phone, lights and bathroom. Bed alarm, avasure, and seizure pads in place, patient aware of placement and reason. Bed locked, in lowest position, call light within reach.
DIET; Regular    DVT Prophylaxis: [x]PPx LMWH  []SQ Heparin  []IPC/SCDs  []Eliquis  []Xarelto  []Coumadin  []Other -      Code status: Full Code    PT/OT Eval Status:   [x]NOT yet ordered  []Ordered and Pending   []Seen with Recommendations for:  []Home independently  []Home w/ assist  MEDICAL CENTER [facility]  []SNF  []Acute Rehab    Anticipated Discharge Day/Date:  Patient is likely to remain in-house at least until [facility] 16/17 October pending clinical course, subspecialty recs and PT/OT eval/recs if/when medically appropriate. Anticipated Discharge Location: [x]Home  []HHC  []SNF  []Acute Rehab  []ECF  []LTAC  []Hospice  []Other -      Consults:      IP CONSULT TO NEUROLOGY  IP CONSULT TO NEUROLOGY  IP CONSULT TO HOSPITALIST  IP CONSULT TO NEUROLOGY  IP CONSULT TO CARDIOLOGY      This patient has a high likelihood of being discharged tomorrow and is appropriate for A1 Discharge Unit in AM pending clinical course overnight: []Yes  [x]No    ------------------------------------------------------------------------------------------------------------------------------------------------------------------------    MDM      [] High (any 2)    A. Problems (any 1)  [] Acute/Chronic Illness/injury posing threat to life or bodily function:    [] Severe exacerbation of chronic illness:    ---------------------------------------------------------------------  B.  Risk of Treatment (any 1)   [] Drugs/treatments that require intensive monitoring for toxicity include:    [] IV ABX requiring serial renal monitoring for nephrotoxicity:     [] Post-Cath serial renal monitoring for Contrast Induced Nephropathy  [] IV Narcotic analgesia for ADR   [] Aggressive IV diuresis requiring serial renal monitoring for electrolyte derangements  [] Hypertonic Saline requiring serial renal monitoring for appropriate electrolyte correction rate   [] Critical electrolyte abnormalities requiring IV replacement and close serial monitoring  [] Other -    []

## 2023-10-16 NOTE — PLAN OF CARE
Problem: Risk for Elopement  Goal: Patient will not exit the unit/facility without proper excort  10/16/2023 1030 by Gume Stephens RN  Outcome: Progressing  10/15/2023 2244 by Thony Torrez RN  Outcome: Progressing       Problem: Discharge Planning  Goal: Discharge to home or other facility with appropriate resources  10/16/2023 1030 by Gume Stephens RN  Outcome: Progressing  Flowsheets (Taken 10/16/2023 1023)  Discharge to home or other facility with appropriate resources: Identify barriers to discharge with patient and caregiver       Problem: Pain  Goal: Verbalizes/displays adequate comfort level or baseline comfort level  10/16/2023 1030 by Gume Stephens RN  Outcome: Progressing  Flowsheets (Taken 10/16/2023 0830)  Verbalizes/displays adequate comfort level or baseline comfort level: Encourage patient to monitor pain and request assistance       Problem: Safety - Adult  Goal: Free from fall injury  10/16/2023 1030 by Gume Stephens RN  Outcome: Progressing

## 2023-10-16 NOTE — CONSULTS
Consult Call Back    Who: Dr. Ezekiel Correa  Date:10/16/2023,  Time:1:20 PM    Electronically signed by Rut Mendez on 10/16/23 at 1:20 PM EDT

## 2023-10-16 NOTE — CONSULTS
Consult Call Back    Who: Cruz Martinez  Date:10/16/2023,  Time:2:08 PM    Electronically signed by Sal Salinas on 10/16/23 at 2:08 PM EDT

## 2023-10-16 NOTE — CARE COORDINATION
Case Management Assessment  Initial Evaluation    Date/Time of Evaluation: 10/16/2023 10:21 AM  Assessment Completed by: Catarino France RN    If patient is discharged prior to next notation, then this note serves as note for discharge by case management. Patient Name: Adwoa Adames                   YOB: 1990  Diagnosis: Seizure ([address]) [R56.9]  IVDU (intravenous drug user) [F19.90]                   Date / Time: 10/15/2023 11:32 AM    Patient Admission Status: Inpatient   Readmission Risk (Low < 19, Mod (19-27), High > 27): Readmission Risk Score: 18.1    Current PCP: No primary care provider on file. PCP verified by CM? Yes    Chart Reviewed: Yes      History Provided by: Patient, Child/Family (Mother)  Patient Orientation: Alert and Oriented    Patient Cognition: Alert    Hospitalization in the last 30 days (Readmission):  No    If yes, Readmission Assessment in CM Navigator will be completed. Advance Directives:      Code Status: Full Code   Patient's Primary Decision Maker is: Legal Next of Kin    Primary Decision Maker: Luana Anaya - Parent - [phone]    Discharge Planning:    Patient lives with: Parent Type of Home: Trailer/Mobile Home  Primary Care Giver: Self  Patient Support Systems include: Parent, Friends/Neighbors   Current Financial resources: Medicaid  Current community resources: None  Current services prior to admission: None            Current DME:              Type of Home Care services:  None    ADLS  Prior functional level: Independent in ADLs/IADLs  Current functional level: Independent in ADLs/IADLs    PT AM-PAC:   /24  OT AM-PAC:   /24    Family can provide assistance at DC: Yes  Would you like Case Management to discuss the discharge plan with any other family members/significant others, and if so, who?  No  Plans to Return to Present Housing: Yes  Other Identified Issues/Barriers to RETURNING to current housing: none  Potential Assistance needed at discharge:

## 2023-10-16 NOTE — DISCHARGE SUMMARY
Discharged PT home with family. All belongings sent home. Follow up and discharge instructions explained. Pt denies any further questions at this time.

## 2023-10-16 NOTE — PLAN OF CARE
Problem: Risk for Elopement  Goal: Patient will not exit the unit/facility without proper excort  Outcome: Progressing  Flowsheets  Taken 10/15/2023 2106 by Thony Torrez RN  Nursing Interventions for Elopement Risk: Communicate/escalate to charge nurse the risk of elopement  Taken 10/15/2023 1149 by Caryle Perking, RN  Nursing Interventions for Elopement Risk: Communicate/escalate to charge nurse the risk of elopement     Problem: Discharge Planning  Goal: Discharge to home or other facility with appropriate resources  Outcome: Progressing     Problem: Pain  Goal: Verbalizes/displays adequate comfort level or baseline comfort level  Outcome: Progressing     Problem: Safety - Adult  Goal: Free from fall injury  Outcome: Progressing

## 2023-10-16 NOTE — PROCEDURES
INTERPRETATION:  This 25-minute, computer-assisted video EEG recording is mildly abnormal.  It showed mild to moderate degree of generalized slowing background activity. No potentially epileptiform activity was present during the recording. The EEG findings were consistent with mild to moderate degree of generalized non-specific cerebral dysfunction. CLASSIFICATION:  Dysrhythmia grade 2, generalized. Sleep - unsuccessful. EKG channel. DESCRIPTION:     BACKGROUND:  The awake recording revealed 9 Hz alpha activity over the posterior head region. There were increased 2 to 7 Hz theta/delta activity into the EEG background. Given the extensive study, the patient still did not sleep. The EEG showed normal V waves during drowsiness. There was no significant change on the EEG background with photic stimulation. Hyperventilation was omitted due to the patient's condition. INTERICTAL DISCHARGES: None     CLINICAL EVENTS:  None     The EKG channel was unremarkable.

## 2023-10-16 NOTE — CONSULTS
[id number]    Cardiomyopathy stress induced. LVEF 20-25%  Hx of Cardiac arrest 8/2023, asystole   Hypertension  Hx of TBI  Anemia  Seizure D/O   PSA  LBBB - chronic   Hep C  Smoker  Normal cors 2021    Cardiac status stable  Vimpat has been associated with arrhythmias, including asystole. Patients with proarrhythmic conditions including conduction abnormalities and cardiomyopathies are at increased risk. Patients on concomitant medications that affect cardiac conduction, including beta-blockers, are also at increased risk. Defer decision regarding Vimpat to neurology. If used, close monitoring with EKGs should be performed.

## 2023-10-30 ENCOUNTER — OFFICE VISIT (OUTPATIENT)
Dept: CARDIOLOGY CLINIC | Age: 33
End: 2023-10-30
Payer: MEDICAID

## 2023-10-30 VITALS
OXYGEN SATURATION: 100 % | HEART RATE: 68 BPM | HEIGHT: 71 IN | BODY MASS INDEX: 16.31 KG/M2 | SYSTOLIC BLOOD PRESSURE: 128 MMHG | DIASTOLIC BLOOD PRESSURE: 80 MMHG | WEIGHT: 116.5 LBS

## 2023-10-30 DIAGNOSIS — F19.10 IV DRUG ABUSE (HCC): ICD-10-CM

## 2023-10-30 DIAGNOSIS — Z72.0 TOBACCO ABUSE: ICD-10-CM

## 2023-10-30 DIAGNOSIS — I50.22 SYSTOLIC CHF, CHRONIC (HCC): Primary | ICD-10-CM

## 2023-10-30 DIAGNOSIS — I51.81 STRESS-INDUCED CARDIOMYOPATHY: ICD-10-CM

## 2023-10-30 PROCEDURE — 3074F SYST BP LT 130 MM HG: CPT | Performed by: NURSE PRACTITIONER

## 2023-10-30 PROCEDURE — 3078F DIAST BP <80 MM HG: CPT | Performed by: NURSE PRACTITIONER

## 2023-10-30 PROCEDURE — 99214 OFFICE O/P EST MOD 30 MIN: CPT | Performed by: NURSE PRACTITIONER

## 2023-10-30 RX ORDER — METOPROLOL SUCCINATE 25 MG/1
25 TABLET, EXTENDED RELEASE ORAL DAILY
Qty: 30 TABLET | Refills: 4 | Status: SHIPPED | OUTPATIENT
Start: 2023-10-30

## 2023-10-30 RX ORDER — SPIRONOLACTONE 25 MG/1
TABLET ORAL
Qty: 45 TABLET | Refills: 1 | Status: SHIPPED | OUTPATIENT
Start: 2023-10-30

## 2023-10-30 RX ORDER — OXCARBAZEPINE 600 MG/1
TABLET ORAL
COMMUNITY
Start: 2023-09-30

## 2023-10-30 NOTE — PROGRESS NOTES
[facility]   Cardiac Follow-up    Primary Care Doctor:  No primary care provider on file. Chief Complaint   Patient presents with    Follow-up      History of Present Illness:   I had the pleasure of seeing Candelario Motley in follow up for stress-induced cardiomyopathy      Admitted 2/21/2021-2/27/2021 with seizures, acute encephalopathy, septic shock, respiratory failure, NSTEMI, aspiration, stress-induced cardiomyopathy with LVEF 25% requiring dobutamine support, status post left heart cath showed normal coronaries. Repeat echocardiogram 7/2021 showed recovered EF 55%  Amitted 1/12/23-1/20/2023 with status epilepticus, septic shock, pneumonia, markedly elevated troponin, wide complex tachycardia, repeat echocardiogram completed showed LVEF down to less than 20% with severe diffuse hypokinesis, and possible questionable apical thrombus. He had cardiac MRI showing improved LVEF to 59% and mild myocardial edema suggestive of recovering stress induced cardiomyopathy   Admitted 8/8/2023-8/16/2023 with seizures and cardiac arrest, required CPR x2 and transvenous pacer. LVEF 20%. Also treated for bacteremia     Since last visit, had some dizziness. Was at 105mg before now on Methadone at 110mg a day. Verbalizes wanting to get off of the methadone. He is having depression/worse mood. Very low energy, significant fatigue which may be related to the depression. Seems to be tolerating meds as dizziness is about the same. Remains on seizure meds. Verbalized injecting again, left arm with infiltrate that is improving. Here with mom. Candelario Motley describes symptoms including dizziness, fatigue but denies chest pain, dyspnea, lower extremity edema.      Appetite: down, not eating much maybe once a day     Past Medical History:   has a past medical history of Aspiration pneumonia ([address]), CAD (coronary artery disease), Hepatitis C, HTN (hypertension), NSTEMI (non-ST elevated myocardial infarction)

## 2023-10-30 NOTE — PATIENT INSTRUCTIONS
Continue to stay as active as possible  Stop coreg for now and change to metoprolol 25mg daily   Restart Spironolactone (aldactone) 12.5 mg (1/2 tab of the 25mg tablet) 3 times a week   Continue entresto continue 1 tab twice a day  Continue jardiance 10mg daily  Follow up in 8-10 weeks

## 2023-10-31 PROBLEM — A41.9 SEPTIC SHOCK (HCC): Status: RESOLVED | Noted: 2023-01-13 | Resolved: 2023-10-31

## 2023-10-31 PROBLEM — J96.02 ACUTE RESPIRATORY FAILURE WITH HYPERCAPNIA (HCC): Status: RESOLVED | Noted: 2023-08-09 | Resolved: 2023-10-31

## 2023-10-31 PROBLEM — I46.9 CARDIAC ARREST (HCC): Status: RESOLVED | Noted: 2023-08-08 | Resolved: 2023-10-31

## 2023-10-31 PROBLEM — E87.29 HIGH ANION GAP METABOLIC ACIDOSIS: Status: RESOLVED | Noted: 2021-12-05 | Resolved: 2023-10-31

## 2023-10-31 PROBLEM — E72.20 HYPERAMMONEMIA (HCC): Status: RESOLVED | Noted: 2023-01-12 | Resolved: 2023-10-31

## 2023-10-31 PROBLEM — Z11.4 ENCOUNTER FOR HUMAN IMMUNODEFICIENCY VIRUS TEST: Status: RESOLVED | Noted: 2023-01-18 | Resolved: 2023-10-31

## 2023-10-31 PROBLEM — R00.1 BRADYCARDIA: Status: RESOLVED | Noted: 2023-08-12 | Resolved: 2023-10-31

## 2023-10-31 PROBLEM — D72.829 LEUKOCYTOSIS: Status: RESOLVED | Noted: 2023-01-12 | Resolved: 2023-10-31

## 2023-10-31 PROBLEM — R91.8 PULMONARY INFILTRATES: Status: RESOLVED | Noted: 2023-01-12 | Resolved: 2023-10-31

## 2023-10-31 PROBLEM — J96.02 ACUTE RESPIRATORY FAILURE WITH HYPOXIA AND HYPERCAPNIA (HCC): Status: RESOLVED | Noted: 2023-01-12 | Resolved: 2023-10-31

## 2023-10-31 PROBLEM — R79.89 ELEVATED LACTIC ACID LEVEL: Status: RESOLVED | Noted: 2023-01-12 | Resolved: 2023-10-31

## 2023-10-31 PROBLEM — I24.89 DEMAND ISCHEMIA: Status: RESOLVED | Noted: 2023-01-13 | Resolved: 2023-10-31

## 2023-10-31 PROBLEM — J18.9 PNEUMONIA OF BOTH LOWER LOBES DUE TO INFECTIOUS ORGANISM: Status: RESOLVED | Noted: 2023-01-12 | Resolved: 2023-10-31

## 2023-10-31 PROBLEM — R65.21 SEPTIC SHOCK (HCC): Status: RESOLVED | Noted: 2023-01-13 | Resolved: 2023-10-31

## 2023-10-31 PROBLEM — E87.20 NORMAL ANION GAP METABOLIC ACIDOSIS: Status: RESOLVED | Noted: 2023-01-12 | Resolved: 2023-10-31

## 2023-10-31 PROBLEM — J96.01 ACUTE RESPIRATORY FAILURE WITH HYPOXIA AND HYPERCAPNIA (HCC): Status: RESOLVED | Noted: 2023-01-12 | Resolved: 2023-10-31

## 2023-11-14 RX ORDER — CARVEDILOL 12.5 MG/1
12.5 TABLET ORAL 2 TIMES DAILY WITH MEALS
Qty: 60 TABLET | Refills: 3 | OUTPATIENT
Start: 2023-11-14

## 2023-12-30 ENCOUNTER — HOSPITAL ENCOUNTER (OUTPATIENT)
Age: 33
Setting detail: OBSERVATION
Discharge: HOME | End: 2024-01-01
Attending: STUDENT IN AN ORGANIZED HEALTH CARE EDUCATION/TRAINING PROGRAM
Payer: MEDICAID

## 2023-12-30 ENCOUNTER — APPOINTMENT (OUTPATIENT)
Dept: GENERAL RADIOLOGY | Age: 33
End: 2023-12-30
Payer: MEDICAID

## 2023-12-30 ENCOUNTER — APPOINTMENT (OUTPATIENT)
Dept: CT IMAGING | Age: 33
End: 2023-12-30
Payer: MEDICAID

## 2023-12-30 DIAGNOSIS — F19.90 ACTIVE INTRAVENOUS DRUG USE: ICD-10-CM

## 2023-12-30 DIAGNOSIS — R42 VERTIGO: Primary | ICD-10-CM

## 2023-12-30 DIAGNOSIS — H55.09 VERTICAL NYSTAGMUS: ICD-10-CM

## 2023-12-30 LAB
ALBUMIN SERPL-MCNC: 4.1 G/DL (ref 3.4–5)
ALBUMIN/GLOB SERPL: 1.6 {RATIO} (ref 1.1–2.2)
ALP SERPL-CCNC: 122 U/L (ref 40–129)
ALT SERPL-CCNC: 17 U/L (ref 10–40)
ANION GAP SERPL CALCULATED.3IONS-SCNC: 7 MMOL/L (ref 3–16)
APTT BLD: 34.4 SEC (ref 22.7–35.9)
AST SERPL-CCNC: 15 U/L (ref 15–37)
BASOPHILS # BLD: 0.2 K/UL (ref 0–0.2)
BASOPHILS NFR BLD: 1.3 %
BILIRUB SERPL-MCNC: 0.3 MG/DL (ref 0–1)
BUN SERPL-MCNC: 13 MG/DL (ref 7–20)
CALCIUM SERPL-MCNC: 8.7 MG/DL (ref 8.3–10.6)
CHLORIDE SERPL-SCNC: 101 MMOL/L (ref 99–110)
CO2 SERPL-SCNC: 30 MMOL/L (ref 21–32)
CREAT SERPL-MCNC: 0.8 MG/DL (ref 0.9–1.3)
DEPRECATED RDW RBC AUTO: 15.5 % (ref 12.4–15.4)
EOSINOPHIL # BLD: 0.3 K/UL (ref 0–0.6)
EOSINOPHIL NFR BLD: 2.3 %
GFR SERPLBLD CREATININE-BSD FMLA CKD-EPI: >60 ML/MIN/{1.73_M2}
GLUCOSE SERPL-MCNC: 78 MG/DL (ref 70–99)
HCT VFR BLD AUTO: 40.5 % (ref 40.5–52.5)
HGB BLD-MCNC: 13.6 G/DL (ref 13.5–17.5)
INR PPP: 1.22 (ref 0.84–1.16)
LYMPHOCYTES # BLD: 2.5 K/UL (ref 1–5.1)
LYMPHOCYTES NFR BLD: 18.4 %
MCH RBC QN AUTO: 30.5 PG (ref 26–34)
MCHC RBC AUTO-ENTMCNC: 33.5 G/DL (ref 31–36)
MCV RBC AUTO: 91.2 FL (ref 80–100)
MONOCYTES # BLD: 0.6 K/UL (ref 0–1.3)
MONOCYTES NFR BLD: 4.8 %
NEUTROPHILS # BLD: 9.9 K/UL (ref 1.7–7.7)
NEUTROPHILS NFR BLD: 73.2 %
NT-PROBNP SERPL-MCNC: 142 PG/ML (ref 0–124)
PLATELET # BLD AUTO: 225 K/UL (ref 135–450)
PMV BLD AUTO: 7 FL (ref 5–10.5)
POTASSIUM SERPL-SCNC: 3.6 MMOL/L (ref 3.5–5.1)
PROT SERPL-MCNC: 6.7 G/DL (ref 6.4–8.2)
PROTHROMBIN TIME: 15.4 SEC (ref 11.5–14.8)
RBC # BLD AUTO: 4.44 M/UL (ref 4.2–5.9)
SODIUM SERPL-SCNC: 138 MMOL/L (ref 136–145)
TROPONIN, HIGH SENSITIVITY: 8 NG/L (ref 0–22)
TROPONIN, HIGH SENSITIVITY: 9 NG/L (ref 0–22)
WBC # BLD AUTO: 13.5 K/UL (ref 4–11)

## 2023-12-30 PROCEDURE — 87040 BLOOD CULTURE FOR BACTERIA: CPT

## 2023-12-30 PROCEDURE — 80307 DRUG TEST PRSMV CHEM ANLYZR: CPT

## 2023-12-30 PROCEDURE — 80183 DRUG SCRN QUANT OXCARBAZEPIN: CPT

## 2023-12-30 PROCEDURE — 84484 ASSAY OF TROPONIN QUANT: CPT

## 2023-12-30 PROCEDURE — 81001 URINALYSIS AUTO W/SCOPE: CPT

## 2023-12-30 PROCEDURE — 83880 ASSAY OF NATRIURETIC PEPTIDE: CPT

## 2023-12-30 PROCEDURE — 85610 PROTHROMBIN TIME: CPT

## 2023-12-30 PROCEDURE — 2580000003 HC RX 258: Performed by: STUDENT IN AN ORGANIZED HEALTH CARE EDUCATION/TRAINING PROGRAM

## 2023-12-30 PROCEDURE — 93005 ELECTROCARDIOGRAM TRACING: CPT | Performed by: STUDENT IN AN ORGANIZED HEALTH CARE EDUCATION/TRAINING PROGRAM

## 2023-12-30 PROCEDURE — 80053 COMPREHEN METABOLIC PANEL: CPT

## 2023-12-30 PROCEDURE — 85730 THROMBOPLASTIN TIME PARTIAL: CPT

## 2023-12-30 PROCEDURE — 96374 THER/PROPH/DIAG INJ IV PUSH: CPT

## 2023-12-30 PROCEDURE — 70450 CT HEAD/BRAIN W/O DYE: CPT

## 2023-12-30 PROCEDURE — 6370000000 HC RX 637 (ALT 250 FOR IP): Performed by: STUDENT IN AN ORGANIZED HEALTH CARE EDUCATION/TRAINING PROGRAM

## 2023-12-30 PROCEDURE — 71045 X-RAY EXAM CHEST 1 VIEW: CPT

## 2023-12-30 PROCEDURE — 6360000002 HC RX W HCPCS: Performed by: STUDENT IN AN ORGANIZED HEALTH CARE EDUCATION/TRAINING PROGRAM

## 2023-12-30 PROCEDURE — 6360000004 HC RX CONTRAST MEDICATION: Performed by: STUDENT IN AN ORGANIZED HEALTH CARE EDUCATION/TRAINING PROGRAM

## 2023-12-30 PROCEDURE — 70496 CT ANGIOGRAPHY HEAD: CPT

## 2023-12-30 PROCEDURE — 85025 COMPLETE CBC W/AUTO DIFF WBC: CPT

## 2023-12-30 PROCEDURE — 99285 EMERGENCY DEPT VISIT HI MDM: CPT

## 2023-12-30 RX ORDER — OXCARBAZEPINE 150 MG/1
300 TABLET, FILM COATED ORAL ONCE
Status: COMPLETED | OUTPATIENT
Start: 2023-12-30 | End: 2023-12-30

## 2023-12-30 RX ORDER — ONDANSETRON 2 MG/ML
4 INJECTION INTRAMUSCULAR; INTRAVENOUS ONCE
Status: COMPLETED | OUTPATIENT
Start: 2023-12-30 | End: 2023-12-30

## 2023-12-30 RX ORDER — SODIUM CHLORIDE, SODIUM LACTATE, POTASSIUM CHLORIDE, AND CALCIUM CHLORIDE .6; .31; .03; .02 G/100ML; G/100ML; G/100ML; G/100ML
1000 INJECTION, SOLUTION INTRAVENOUS ONCE
Status: COMPLETED | OUTPATIENT
Start: 2023-12-30 | End: 2023-12-30

## 2023-12-30 RX ADMIN — ONDANSETRON 4 MG: 2 INJECTION INTRAMUSCULAR; INTRAVENOUS at 21:37

## 2023-12-30 RX ADMIN — SODIUM CHLORIDE, POTASSIUM CHLORIDE, SODIUM LACTATE AND CALCIUM CHLORIDE 1000 ML: 600; 310; 30; 20 INJECTION, SOLUTION INTRAVENOUS at 21:27

## 2023-12-30 RX ADMIN — OXCARBAZEPINE 300 MG: 150 TABLET, FILM COATED ORAL at 21:38

## 2023-12-30 RX ADMIN — IOPAMIDOL 75 ML: 755 INJECTION, SOLUTION INTRAVENOUS at 23:01

## 2023-12-30 ASSESSMENT — PAIN - FUNCTIONAL ASSESSMENT: PAIN_FUNCTIONAL_ASSESSMENT: NONE - DENIES PAIN

## 2023-12-31 ENCOUNTER — APPOINTMENT (OUTPATIENT)
Dept: MRI IMAGING | Age: 33
End: 2023-12-31
Payer: MEDICAID

## 2023-12-31 PROBLEM — R42 VERTIGO: Status: ACTIVE | Noted: 2023-12-31

## 2023-12-31 LAB
AMPHETAMINES UR QL SCN>1000 NG/ML: ABNORMAL
BARBITURATES UR QL SCN>200 NG/ML: ABNORMAL
BENZODIAZ UR QL SCN>200 NG/ML: POSITIVE
BILIRUB UR QL STRIP.AUTO: ABNORMAL
CANNABINOIDS UR QL SCN>50 NG/ML: POSITIVE
CLARITY UR: CLEAR
COCAINE UR QL SCN: POSITIVE
COLOR UR: YELLOW
DRUG SCREEN COMMENT UR-IMP: ABNORMAL
EKG ATRIAL RATE: 54 BPM
EKG DIAGNOSIS: NORMAL
EKG P AXIS: 62 DEGREES
EKG P-R INTERVAL: 172 MS
EKG Q-T INTERVAL: 458 MS
EKG QRS DURATION: 126 MS
EKG QTC CALCULATION (BAZETT): 434 MS
EKG R AXIS: 10 DEGREES
EKG T AXIS: 83 DEGREES
EKG VENTRICULAR RATE: 54 BPM
EPI CELLS #/AREA URNS HPF: ABNORMAL /HPF (ref 0–5)
FENTANYL SCREEN, URINE: POSITIVE
GLUCOSE UR STRIP.AUTO-MCNC: NEGATIVE MG/DL
HGB UR QL STRIP.AUTO: NEGATIVE
KETONES UR STRIP.AUTO-MCNC: ABNORMAL MG/DL
LEUKOCYTE ESTERASE UR QL STRIP.AUTO: NEGATIVE
METHADONE UR QL SCN>300 NG/ML: POSITIVE
MUCOUS THREADS #/AREA URNS LPF: ABNORMAL /LPF
NITRITE UR QL STRIP.AUTO: NEGATIVE
OPIATES UR QL SCN>300 NG/ML: POSITIVE
OXYCODONE UR QL SCN: ABNORMAL
PCP UR QL SCN>25 NG/ML: ABNORMAL
PH UR STRIP.AUTO: 6.5 [PH] (ref 5–8)
PH UR STRIP: 6.5 [PH]
PROT UR STRIP.AUTO-MCNC: ABNORMAL MG/DL
RBC #/AREA URNS HPF: ABNORMAL /HPF (ref 0–4)
SP GR UR STRIP.AUTO: 1.02 (ref 1–1.03)
UA COMPLETE W REFLEX CULTURE PNL UR: ABNORMAL
UA DIPSTICK W REFLEX MICRO PNL UR: YES
URN SPEC COLLECT METH UR: ABNORMAL
UROBILINOGEN UR STRIP-ACNC: 0.2 E.U./DL
WBC #/AREA URNS HPF: ABNORMAL /HPF (ref 0–5)

## 2023-12-31 PROCEDURE — 2580000003 HC RX 258: Performed by: STUDENT IN AN ORGANIZED HEALTH CARE EDUCATION/TRAINING PROGRAM

## 2023-12-31 PROCEDURE — 70551 MRI BRAIN STEM W/O DYE: CPT

## 2023-12-31 PROCEDURE — G0378 HOSPITAL OBSERVATION PER HR: HCPCS

## 2023-12-31 PROCEDURE — 97530 THERAPEUTIC ACTIVITIES: CPT

## 2023-12-31 PROCEDURE — 93010 ELECTROCARDIOGRAM REPORT: CPT | Performed by: INTERNAL MEDICINE

## 2023-12-31 PROCEDURE — 6370000000 HC RX 637 (ALT 250 FOR IP): Performed by: STUDENT IN AN ORGANIZED HEALTH CARE EDUCATION/TRAINING PROGRAM

## 2023-12-31 PROCEDURE — 6360000002 HC RX W HCPCS: Performed by: STUDENT IN AN ORGANIZED HEALTH CARE EDUCATION/TRAINING PROGRAM

## 2023-12-31 PROCEDURE — 97165 OT EVAL LOW COMPLEX 30 MIN: CPT

## 2023-12-31 PROCEDURE — 96374 THER/PROPH/DIAG INJ IV PUSH: CPT

## 2023-12-31 PROCEDURE — 96372 THER/PROPH/DIAG INJ SC/IM: CPT

## 2023-12-31 PROCEDURE — 6370000000 HC RX 637 (ALT 250 FOR IP): Performed by: HOSPITALIST

## 2023-12-31 PROCEDURE — 97535 SELF CARE MNGMENT TRAINING: CPT

## 2023-12-31 PROCEDURE — 97161 PT EVAL LOW COMPLEX 20 MIN: CPT

## 2023-12-31 RX ORDER — ONDANSETRON 4 MG/1
4 TABLET, ORALLY DISINTEGRATING ORAL EVERY 8 HOURS PRN
Status: DISCONTINUED | OUTPATIENT
Start: 2023-12-31 | End: 2024-01-01 | Stop reason: HOSPADM

## 2023-12-31 RX ORDER — METHADONE HYDROCHLORIDE 10 MG/1
110 TABLET ORAL DAILY
Status: DISCONTINUED | OUTPATIENT
Start: 2023-12-31 | End: 2024-01-01 | Stop reason: HOSPADM

## 2023-12-31 RX ORDER — IBUPROFEN 600 MG/1
600 TABLET ORAL 3 TIMES DAILY PRN
Status: DISCONTINUED | OUTPATIENT
Start: 2023-12-31 | End: 2023-12-31

## 2023-12-31 RX ORDER — ROSUVASTATIN CALCIUM 10 MG/1
40 TABLET, COATED ORAL NIGHTLY
Status: DISCONTINUED | OUTPATIENT
Start: 2023-12-31 | End: 2024-01-01 | Stop reason: HOSPADM

## 2023-12-31 RX ORDER — BUPRENORPHINE HYDROCHLORIDE AND NALOXONE HYDROCHLORIDE DIHYDRATE 2; .5 MG/1; MG/1
2 TABLET SUBLINGUAL PRN
Status: DISCONTINUED | OUTPATIENT
Start: 2023-12-31 | End: 2023-12-31

## 2023-12-31 RX ORDER — ACETAMINOPHEN 650 MG/1
650 SUPPOSITORY RECTAL EVERY 4 HOURS PRN
Status: DISCONTINUED | OUTPATIENT
Start: 2023-12-31 | End: 2024-01-01 | Stop reason: HOSPADM

## 2023-12-31 RX ORDER — HYDROXYZINE PAMOATE 25 MG/1
50 CAPSULE ORAL EVERY 8 HOURS PRN
Status: DISCONTINUED | OUTPATIENT
Start: 2023-12-31 | End: 2024-01-01

## 2023-12-31 RX ORDER — METHOCARBAMOL 500 MG/1
750 TABLET, FILM COATED ORAL EVERY 6 HOURS PRN
Status: DISCONTINUED | OUTPATIENT
Start: 2023-12-31 | End: 2024-01-01 | Stop reason: HOSPADM

## 2023-12-31 RX ORDER — ASPIRIN 81 MG/1
81 TABLET, CHEWABLE ORAL DAILY
Status: DISCONTINUED | OUTPATIENT
Start: 2023-12-31 | End: 2024-01-01 | Stop reason: HOSPADM

## 2023-12-31 RX ORDER — DICYCLOMINE HCL 20 MG
20 TABLET ORAL EVERY 6 HOURS PRN
Status: DISCONTINUED | OUTPATIENT
Start: 2023-12-31 | End: 2024-01-01

## 2023-12-31 RX ORDER — SODIUM CHLORIDE 9 MG/ML
INJECTION, SOLUTION INTRAVENOUS PRN
Status: DISCONTINUED | OUTPATIENT
Start: 2023-12-31 | End: 2024-01-01 | Stop reason: HOSPADM

## 2023-12-31 RX ORDER — SODIUM CHLORIDE 0.9 % (FLUSH) 0.9 %
5-40 SYRINGE (ML) INJECTION EVERY 12 HOURS SCHEDULED
Status: DISCONTINUED | OUTPATIENT
Start: 2023-12-31 | End: 2024-01-01 | Stop reason: HOSPADM

## 2023-12-31 RX ORDER — LABETALOL HYDROCHLORIDE 5 MG/ML
10 INJECTION, SOLUTION INTRAVENOUS EVERY 10 MIN PRN
Status: DISCONTINUED | OUTPATIENT
Start: 2023-12-31 | End: 2024-01-01

## 2023-12-31 RX ORDER — METOPROLOL SUCCINATE 50 MG/1
25 TABLET, EXTENDED RELEASE ORAL DAILY
Status: DISCONTINUED | OUTPATIENT
Start: 2023-12-31 | End: 2024-01-01 | Stop reason: HOSPADM

## 2023-12-31 RX ORDER — ASPIRIN 300 MG/1
300 SUPPOSITORY RECTAL DAILY
Status: DISCONTINUED | OUTPATIENT
Start: 2023-12-31 | End: 2023-12-31

## 2023-12-31 RX ORDER — LOPERAMIDE HYDROCHLORIDE 2 MG/1
2 CAPSULE ORAL 4 TIMES DAILY PRN
Status: DISCONTINUED | OUTPATIENT
Start: 2023-12-31 | End: 2024-01-01

## 2023-12-31 RX ORDER — CLONIDINE HYDROCHLORIDE 0.1 MG/1
0.1 TABLET ORAL EVERY 6 HOURS PRN
Status: DISCONTINUED | OUTPATIENT
Start: 2023-12-31 | End: 2024-01-01

## 2023-12-31 RX ORDER — POLYETHYLENE GLYCOL 3350 17 G/17G
17 POWDER, FOR SOLUTION ORAL DAILY PRN
Status: DISCONTINUED | OUTPATIENT
Start: 2023-12-31 | End: 2024-01-01 | Stop reason: HOSPADM

## 2023-12-31 RX ORDER — ENOXAPARIN SODIUM 100 MG/ML
40 INJECTION SUBCUTANEOUS DAILY
Status: DISCONTINUED | OUTPATIENT
Start: 2023-12-31 | End: 2024-01-01 | Stop reason: HOSPADM

## 2023-12-31 RX ORDER — ONDANSETRON 2 MG/ML
4 INJECTION INTRAMUSCULAR; INTRAVENOUS EVERY 6 HOURS PRN
Status: DISCONTINUED | OUTPATIENT
Start: 2023-12-31 | End: 2024-01-01 | Stop reason: HOSPADM

## 2023-12-31 RX ORDER — LANOLIN ALCOHOL/MO/W.PET/CERES
3 CREAM (GRAM) TOPICAL NIGHTLY
Status: DISCONTINUED | OUTPATIENT
Start: 2023-12-31 | End: 2024-01-01 | Stop reason: HOSPADM

## 2023-12-31 RX ORDER — PANTOPRAZOLE SODIUM 40 MG/1
40 TABLET, DELAYED RELEASE ORAL DAILY
Status: DISCONTINUED | OUTPATIENT
Start: 2023-12-31 | End: 2024-01-01 | Stop reason: HOSPADM

## 2023-12-31 RX ORDER — SODIUM CHLORIDE 0.9 % (FLUSH) 0.9 %
5-40 SYRINGE (ML) INJECTION PRN
Status: DISCONTINUED | OUTPATIENT
Start: 2023-12-31 | End: 2024-01-01 | Stop reason: HOSPADM

## 2023-12-31 RX ORDER — ACETAMINOPHEN 325 MG/1
650 TABLET ORAL EVERY 4 HOURS PRN
Status: DISCONTINUED | OUTPATIENT
Start: 2023-12-31 | End: 2024-01-01 | Stop reason: HOSPADM

## 2023-12-31 RX ADMIN — METHADONE HYDROCHLORIDE 110 MG: 10 TABLET ORAL at 10:54

## 2023-12-31 RX ADMIN — SODIUM CHLORIDE, PRESERVATIVE FREE 10 ML: 5 INJECTION INTRAVENOUS at 21:56

## 2023-12-31 RX ADMIN — ASPIRIN 81 MG: 81 TABLET, CHEWABLE ORAL at 07:08

## 2023-12-31 RX ADMIN — ONDANSETRON 4 MG: 2 INJECTION INTRAMUSCULAR; INTRAVENOUS at 14:25

## 2023-12-31 RX ADMIN — ENOXAPARIN SODIUM 40 MG: 100 INJECTION SUBCUTANEOUS at 07:09

## 2023-12-31 RX ADMIN — METOPROLOL SUCCINATE 25 MG: 50 TABLET, EXTENDED RELEASE ORAL at 10:54

## 2023-12-31 RX ADMIN — EMPAGLIFLOZIN 10 MG: 10 TABLET, FILM COATED ORAL at 13:56

## 2023-12-31 RX ADMIN — Medication 3 MG: at 21:56

## 2023-12-31 RX ADMIN — SACUBITRIL AND VALSARTAN 1 TABLET: 24; 26 TABLET, FILM COATED ORAL at 10:54

## 2023-12-31 RX ADMIN — SODIUM CHLORIDE, PRESERVATIVE FREE 10 ML: 5 INJECTION INTRAVENOUS at 07:09

## 2023-12-31 RX ADMIN — ROSUVASTATIN 40 MG: 10 TABLET, FILM COATED ORAL at 21:56

## 2023-12-31 RX ADMIN — PANTOPRAZOLE SODIUM 40 MG: 40 TABLET, DELAYED RELEASE ORAL at 10:54

## 2023-12-31 ASSESSMENT — PAIN DESCRIPTION - LOCATION: LOCATION: ARM

## 2023-12-31 ASSESSMENT — PAIN DESCRIPTION - ORIENTATION: ORIENTATION: RIGHT

## 2023-12-31 ASSESSMENT — PAIN SCALES - GENERAL: PAINLEVEL_OUTOF10: 7

## 2023-12-31 NOTE — ED NOTES
Pt left ED via wheelchair in stable condition with MRI tech. Pt going to MRI and then inpatient room. All belongings with pts Mom. [facility] tele with MRI tech. Care transferred.

## 2023-12-31 NOTE — H&P
release tablet Take 1 tablet by mouth daily 10/30/23   Samantha Zaragoza APRN - CNP   sacubitril-valsartan (ENTRESTO) 24-26 MG per tablet Take 1 tablet by mouth 2 times daily 9/18/23   Samantha Zaragoza APRN - CNP   NAYZILAM 5 MG/0.1ML SOLN  8/16/23   ProviderCassius MD   Cenobamate 200 MG TABS Take 200 mg by mouth daily Max Daily Amount: 200 mg  Patient taking differently: Take 250 mg by mouth daily 8/17/23   Liz Blair MD   OXcarbazepine (TRILEPTAL) 300 MG tablet Take 3 tablets by mouth 2 times daily  Patient not taking: Reported on 12/31/2023 8/16/23   Liz Blair MD   empagliflozin (JARDIANCE) 10 MG tablet Take 1 tablet by mouth daily 6/21/23   Enzweiler, Diane M, APRN - CNP   pantoprazole (PROTONIX) 40 MG tablet Take 1 tablet by mouth daily 1/21/23   Edi Padron MD   methadone (DOLOPHINE) 10 MG/ML solution Take 11 mLs by mouth daily.    Provider, MD Cassius       Physical Exam:      General: NAD  Eyes: EOMI  ENT: neck supple  Cardiovascular: Regular rate.  Respiratory: Clear to auscultation  Gastrointestinal: Soft, non tender  Genitourinary: no suprapubic tenderness  Musculoskeletal: No edema  Skin: warm, dry  Neuro: Alert. Oriented x3, no nystagmus  Psych: Mood appropriate.     Past Medical History:   PMHx   Past Medical History:   Diagnosis Date    Acute respiratory failure with hypoxia and hypercapnia (HCC) 1/12/2023    Aspiration pneumonia (HCC)     Aspiration pneumonia of both lower lobes (HCC)     CAD (coronary artery disease)     Cardiac arrest ([facility]) 8/8/2023    Demand ischemia 1/13/2023    Hepatitis C     HTN (hypertension)     NSTEMI (non-ST elevated myocardial infarction) (HCC)     Polysubstance abuse (HCC)     Seizures (HCC)     Septic shock (HCC)     Systolic CHF (HCC)     Takotsubo cardiomyopathy      PSHX:  has a past surgical history that includes bronchoscopy (N/A, 02/22/2021) and bronchoscopy (02/22/2021).  Allergies:   Allergies   Allergen Reactions    Keppra

## 2023-12-31 NOTE — ED NOTES
MRI checklist completed with information provided by pt. All information thought to be true and accurate.

## 2023-12-31 NOTE — ED NOTES
JUAN SANCHEZ and Lillian JONES completed orthostatic blood pressures with patient. Patient was able to follow through and complete without issue.

## 2023-12-31 NOTE — PLAN OF CARE
33-year-old male with past medical history of nonischemic cardiomyopathy/chronic systolic heart failure, IVDU currently on methadone, tobacco use, history of cardiac arrest presented with dizziness.  ER provider noticed vertical nystagmus although not present on my examination hence concerned about posterior circulation CVA.    Vertigo rule out posterior circulation CVA  I have high suspicion for autonomic dysfunction and orthostatic hypotension in the setting of cardiomyopathy, opioids and GDMT.    May need to adjust GDMT/opioid therapy  MRI brain

## 2023-12-31 NOTE — PLAN OF CARE
Brief note    33-year-old male, history of nonischemic cardiomyopathy, chronic systolic heart failure, IV drug use, on methadone, tobacco use, presented with dizziness  Admitted earlier in the morning    Workup including CT head, CTA head and neck MRI negative, last echo August, EF 20%, labs reviewed.  Drug screen+for benzodiazepine, marijuana, methadone, opiates, cocaine, and fentanyl  Holding Aldactone, continue metoprolol, Entresto, continue seizure medication.  Discussed with nursing staff, will check orthostasis discussed with patient, admits to using fentanyl increase activity, likely home tomorrow

## 2023-12-31 NOTE — ED NOTES
Patient brought to ED18 via triage stretcher. Patient following instructions and answering questions, placed in gown and on monitor. Seizure precautions in place. Family at bedside talking with patient. IV saline locked.

## 2023-12-31 NOTE — ED NOTES
Patient examined and evaluated with NNP   See NNP note for additional details     Visit Vitals   • BP 73/35   • Pulse 158   • Temp 98 °F (36.7 °C)   • Resp 48   • Ht 18\" (45.7 cm)   • Wt 2806 g   • HC 31 cm (12.21\")   • SpO2 97%   • BMI 13.42 kg/m2     General:   Quiet, arousable   infant.    Skin: Warm, moist, intact, pink, no edema  Head: fontanelles soft. Nondysmorphic  Chest: Clear breath sounds, no retractions.   Cardiovascular: Normal rate and rhythm, no murmur, pulses 2+ UE/LE  Gastrointestinal: Soft, non-tender, no hernia, normal umbilicus.   Genitourinary: Normal male left hydrocele  Neurologic: Normal tone and activity    VS, I&O, Lab Data reviewed    A:    infant  Chronic lung disease  Occasional apnea and bradycardia.  Immature suck - Nippling gradually improving.    P:   Trial off nasal cannula  Trial of ad giulia feedings  Monitor respiratory status  I called baby's mother - left message inviting her to call for update   Patient resting on left side, family at bedside. IV saline locked. Patient alert and answering questions. Seizure precautions in place.

## 2023-12-31 NOTE — ED NOTES
Pt is an inpatient hold in the ED at this time. Changed pt to Q4 VS per inpatient VS policy. Pt remains on bedside telemetry. Seizure pads in place. All inpatient orders released.

## 2023-12-31 NOTE — CONSULTS
- will enter  nyazilam spray order as taken at home when home supply is available .  Lilliana Padron/Francine. 12/31/23 2:46 PM EST

## 2024-01-01 ENCOUNTER — HOSPITAL ENCOUNTER (EMERGENCY)
Age: 34
Discharge: HOME | End: 2024-01-01
Attending: EMERGENCY MEDICINE
Payer: MEDICAID

## 2024-01-01 VITALS
SYSTOLIC BLOOD PRESSURE: 119 MMHG | RESPIRATION RATE: 16 BRPM | TEMPERATURE: 98.1 F | HEART RATE: 62 BPM | OXYGEN SATURATION: 100 % | DIASTOLIC BLOOD PRESSURE: 87 MMHG

## 2024-01-01 VITALS
HEIGHT: 71 IN | TEMPERATURE: 98.2 F | BODY MASS INDEX: 16.1 KG/M2 | DIASTOLIC BLOOD PRESSURE: 76 MMHG | SYSTOLIC BLOOD PRESSURE: 135 MMHG | HEART RATE: 56 BPM | OXYGEN SATURATION: 98 % | WEIGHT: 115 LBS | RESPIRATION RATE: 18 BRPM

## 2024-01-01 DIAGNOSIS — G40.909 SEIZURE DISORDER (HCC): Primary | ICD-10-CM

## 2024-01-01 LAB
EKG ATRIAL RATE: 45 BPM
EKG DIAGNOSIS: NORMAL
EKG P AXIS: 61 DEGREES
EKG P-R INTERVAL: 176 MS
EKG Q-T INTERVAL: 470 MS
EKG QRS DURATION: 92 MS
EKG QTC CALCULATION (BAZETT): 406 MS
EKG R AXIS: 80 DEGREES
EKG T AXIS: 50 DEGREES
EKG VENTRICULAR RATE: 45 BPM

## 2024-01-01 PROCEDURE — 6370000000 HC RX 637 (ALT 250 FOR IP): Performed by: STUDENT IN AN ORGANIZED HEALTH CARE EDUCATION/TRAINING PROGRAM

## 2024-01-01 PROCEDURE — G0378 HOSPITAL OBSERVATION PER HR: HCPCS

## 2024-01-01 PROCEDURE — 99284 EMERGENCY DEPT VISIT MOD MDM: CPT

## 2024-01-01 PROCEDURE — 6360000002 HC RX W HCPCS: Performed by: STUDENT IN AN ORGANIZED HEALTH CARE EDUCATION/TRAINING PROGRAM

## 2024-01-01 PROCEDURE — 6370000000 HC RX 637 (ALT 250 FOR IP): Performed by: EMERGENCY MEDICINE

## 2024-01-01 PROCEDURE — 6370000000 HC RX 637 (ALT 250 FOR IP): Performed by: HOSPITALIST

## 2024-01-01 PROCEDURE — 93005 ELECTROCARDIOGRAM TRACING: CPT | Performed by: NURSE PRACTITIONER

## 2024-01-01 PROCEDURE — 96376 TX/PRO/DX INJ SAME DRUG ADON: CPT

## 2024-01-01 PROCEDURE — 93010 ELECTROCARDIOGRAM REPORT: CPT | Performed by: INTERNAL MEDICINE

## 2024-01-01 PROCEDURE — 96372 THER/PROPH/DIAG INJ SC/IM: CPT

## 2024-01-01 RX ORDER — OXCARBAZEPINE 150 MG/1
300 TABLET, FILM COATED ORAL ONCE
Status: COMPLETED | OUTPATIENT
Start: 2024-01-01 | End: 2024-01-01

## 2024-01-01 RX ORDER — 0.9 % SODIUM CHLORIDE 0.9 %
1000 INTRAVENOUS SOLUTION INTRAVENOUS ONCE
Status: DISCONTINUED | OUTPATIENT
Start: 2024-01-01 | End: 2024-01-01

## 2024-01-01 RX ADMIN — ASPIRIN 81 MG: 81 TABLET, CHEWABLE ORAL at 08:53

## 2024-01-01 RX ADMIN — ENOXAPARIN SODIUM 40 MG: 100 INJECTION SUBCUTANEOUS at 09:11

## 2024-01-01 RX ADMIN — METHADONE HYDROCHLORIDE 110 MG: 10 TABLET ORAL at 05:51

## 2024-01-01 RX ADMIN — SACUBITRIL AND VALSARTAN 1 TABLET: 24; 26 TABLET, FILM COATED ORAL at 08:52

## 2024-01-01 RX ADMIN — METHOCARBAMOL 750 MG: 500 TABLET ORAL at 05:51

## 2024-01-01 RX ADMIN — OXCARBAZEPINE 300 MG: 150 TABLET, FILM COATED ORAL at 23:13

## 2024-01-01 RX ADMIN — METOPROLOL SUCCINATE 25 MG: 50 TABLET, EXTENDED RELEASE ORAL at 08:52

## 2024-01-01 RX ADMIN — PANTOPRAZOLE SODIUM 40 MG: 40 TABLET, DELAYED RELEASE ORAL at 08:52

## 2024-01-01 RX ADMIN — EMPAGLIFLOZIN 10 MG: 10 TABLET, FILM COATED ORAL at 08:59

## 2024-01-01 RX ADMIN — HYDROXYZINE PAMOATE 50 MG: 25 CAPSULE ORAL at 05:51

## 2024-01-01 RX ADMIN — ONDANSETRON 4 MG: 2 INJECTION INTRAMUSCULAR; INTRAVENOUS at 05:51

## 2024-01-01 ASSESSMENT — PAIN SCALES - GENERAL: PAINLEVEL_OUTOF10: 6

## 2024-01-01 NOTE — ED PROVIDER NOTES
All other components within normal limits   URINALYSIS WITH REFLEX TO CULTURE - Abnormal; Notable for the following components:    Bilirubin Urine SMALL (*)     Ketones, Urine TRACE (*)     Protein, UA TRACE (*)     All other components within normal limits   URINE DRUG SCREEN - Abnormal; Notable for the following components:    Benzodiazepine Screen, Urine POSITIVE (*)     Cannabinoid Scrn, Ur POSITIVE (*)     Cocaine Metabolite Screen, Urine POSITIVE (*)     Opiate Scrn, Ur POSITIVE (*)     Methadone Screen, Urine POSITIVE (*)     FENTANYL SCREEN, URINE POSITIVE (*)     All other components within normal limits   PROTIME-INR - Abnormal; Notable for the following components:    Protime 15.4 (*)     INR 1.22 (*)     All other components within normal limits   MICROSCOPIC URINALYSIS - Abnormal; Notable for the following components:    Mucus, UA 2+ (*)     All other components within normal limits   CULTURE, BLOOD 1    Narrative:     ORDER#: [id number]                          ORDERED BY: JASPER CHAPIN  SOURCE: Blood Hand, Left                   COLLECTED:  12/30/23 21:20  ANTIBIOTICS AT CHARLA.:                      RECEIVED :  12/31/23 10:22  If child <=2 yrs old please draw pediatric bottle.~Blood Culture 1   CULTURE, BLOOD 2    Narrative:     ORDER#: [id number]                          ORDERED BY: JASPER CHAPIN  SOURCE: Blood No site given                COLLECTED:  12/30/23 21:28  ANTIBIOTICS AT CHARLA.:                      RECEIVED :  12/31/23 10:22  If child <=2 yrs old please draw pediatric bottle.~Blood Culture #2   TROPONIN   TROPONIN   APTT   OXCARBAZEPINE LEVEL       When ordered only abnormal lab results are displayed. All other labs were within normal range or not returned as of this dictation.    EKG:     The Ekg interpreted by me shows  Rhythm sinus bradycaria  Rate of 54 bpm  Axis is  normal  Intervals and durations wide complex QRS  ST Segments: LBBB  Compared to prior EKG dated 10/15/23, no significant

## 2024-01-01 NOTE — DISCHARGE SUMMARY
Decision Support Exception - unselect if not a suspected or confirmed emergency medical condition->Emergency Medical Condition (MA) Reason for Exam: Vertigo, dizziness Relevant Medical/Surgical History: hx of IVDA FINDINGS: CTA NECK: AORTIC ARCH/ARCH VESSELS: No dissection or arterial injury.  No significant stenosis of the brachiocephalic or subclavian arteries. CAROTID ARTERIES: No dissection, arterial injury, or hemodynamically significant stenosis by NASCET criteria. VERTEBRAL ARTERIES: No dissection, arterial injury, or significant stenosis. SOFT TISSUES: The lung apices are clear.  No cervical or superior mediastinal lymphadenopathy.  The larynx and pharynx are unremarkable.  No acute abnormality of the salivary and thyroid glands. BONES: No acute osseous abnormality. CTA HEAD: ANTERIOR CIRCULATION: No significant stenosis of the intracranial internal carotid, anterior cerebral, or middle cerebral arteries. No aneurysm. POSTERIOR CIRCULATION: No significant stenosis of the vertebral, basilar, or posterior cerebral arteries. No aneurysm. OTHER: No dural venous sinus thrombosis on this non-dedicated study. BRAIN: No mass effect or midline shift. No extra-axial fluid collection. The gray-white differentiation is maintained.     Unremarkable CTA of the head and neck.     CT Head W/O Contrast    Result Date: 12/30/2023  EXAMINATION: CT OF THE HEAD WITHOUT CONTRAST  12/30/2023 11:02 pm TECHNIQUE: CT of the head was performed without the administration of intravenous contrast. Automated exposure control, iterative reconstruction, and/or weight based adjustment of the mA/kV was utilized to reduce the radiation dose to as low as reasonably achievable. COMPARISON: 08/08/2023 HISTORY: ORDERING SYSTEM PROVIDED HISTORY: vertigo, vertical nystagmus, IVDA TECHNOLOGIST PROVIDED HISTORY: Reason for exam:->vertigo, vertical nystagmus, IVDA Has a \"code stroke\" or \"stroke alert\" been called?->No Decision Support Exception -

## 2024-01-01 NOTE — PROGRESS NOTES
4 Eyes Skin Assessment     NAME:  Adam Trujillo  YOB: 1990  MEDICAL RECORD NUMBER:  [id number]    The patient is being assessed for  Admission    I agree that at least one RN has performed a thorough Head to Toe Skin Assessment on the patient. ALL assessment sites listed below have been assessed.      Areas assessed by both nurses:    Head, Face, Ears, Shoulders, Back, Chest, Arms, Elbows, Hands, Sacrum. Buttock, Coccyx, Ischium, and Legs. Feet and Heels        Does the Patient have a Wound? No noted wound(s)       Luke Prevention initiated by RN: No  Wound Care Orders initiated by RN: No    Pressure Injury (Stage 3,4, Unstageable, DTI, NWPT, and Complex wounds) if present, place Wound referral order by RN under : No    New Ostomies, if present place, Ostomy referral order under : No     Nurse 1 eSignature: Electronically signed by Tiera White RN on 12/31/23 at 5:37 PM EST    **SHARE this note so that the co-signing nurse can place an eSignature**    Nurse 2 eSignature: Electronically signed by Lilibeth Jones RN on 12/31/23 at 5:41 PM EST    
BP 80/64 map 69, R arm, 90/52 map 65 L arm, HR 44 SB on tele, pt denies symptoms. Ok to hold scheduled entresto 24/26mg per Khadar Walter.   
Orthostatic vital signs per PT note: \"HR 52 BPM, SPO2 98% on room air, /70 in supine, /85 HR 55 in seated, /89 HR 58 in standing.\"  
Physical Therapy  Facility/Department: [place] [address] [facility]  Physical Therapy Initial Assessment/discharge summary.    Name: Adam Trujillo  : 1990  MRN: [id number]  Date of Service: 2023    Discharge Recommendations:  Home with assist PRN   PT Equipment Recommendations  Equipment Needed: No  Other: do not anticipate any needs a tthis time.      Patient Diagnosis(es): The primary encounter diagnosis was Vertigo. Diagnoses of Active intravenous drug use and Vertical nystagmus were also pertinent to this visit.  Past Medical History:  has a past medical history of Acute respiratory failure with hypoxia and hypercapnia (HCC), Aspiration pneumonia (HCC), Aspiration pneumonia of both lower lobes (HCC), CAD (coronary artery disease), Cardiac arrest (HCC), Demand ischemia, Hepatitis C, HTN (hypertension), NSTEMI (non-ST elevated myocardial infarction) (HCC), Polysubstance abuse (HCC), Seizures (HCC), Septic shock (HCC), Systolic CHF (HCC), and Takotsubo cardiomyopathy.  Past Surgical History:  has a past surgical history that includes bronchoscopy (N/A, 2021) and bronchoscopy (2021).    Assessment   Body Structures, Functions, Activity Limitations Requiring Skilled Therapeutic Intervention: Decreased endurance (pt performs movements mildly slowly, states this helps to ease the feelings of nausea and dizziness.)  Assessment: Pt presents to [facility] with primary diagnosis of vertigo.  PTA pt lived in mobile home with 2 brothers with ramped entrance, reports IND with mobility without AD.  Currently pt demonstrates IND for bed mobility, IND for functional transfers without AD, IND for ambulation up to 40 feet without AD.  pt demonstrates no balance deficits, no increase in trunk sway, no path deviaitons, appears steady on feet, performs transfers and ambulation somewhat slowly, states this helps his nausea.  RN informed that pt is experiencing nausea and asked if there was medication availible to 
pt noted to zenaida down to 30's briefly on tele, BP 76/47 map 47, HR 45 SB on tele currently. Pt denies symptoms currently. Khadar Walter notified via perfect serve, see N.O. stat EKG.   
loss    Nutrition Interventions:   Food and/or Nutrient Delivery: Continue Current Diet, Start Oral Nutrition Supplement  Nutrition Education/Counseling: No recommendation at this time  Coordination of Nutrition Care: Continue to monitor while inpatient       Goals:     Goals: PO intake 50% or greater, prior to discharge       Nutrition Monitoring and Evaluation:   Behavioral-Environmental Outcomes: None Identified  Food/Nutrient Intake Outcomes: Food and Nutrient Intake, Supplement Intake  Physical Signs/Symptoms Outcomes: Biochemical Data, Nutrition Focused Physical Findings, Weight    Discharge Planning:    Continue current diet, Continue Oral Nutrition Supplement     Hawa Rizo MS, RD, LD  Contact: [phone]    
Intact  ADL  Feeding: Independent  Feeding Skilled Clinical Factors: take drink of juice with medication provided by RN  Grooming: Independent  Grooming Skilled Clinical Factors: wash hands at sink  Toileting: Independent  Toileting Skilled Clinical Factors: pt able to complete toileting in stance  Functional Mobility: Independent  Functional Mobility Skilled Clinical Factors: no AD              Vision  Vision: Within Functional Limits  Hearing  Hearing: Within functional limits  Cognition  Overall Cognitive Status: WFL  Orientation  Overall Orientation Status: Within Functional Limits  Orientation Level: Oriented to person;Oriented to place;Oriented to situation;Oriented to time                  Education Given To: Patient  Education Provided: Role of Therapy;Transfer Training;Plan of Care;Precautions  Education Method: Demonstration;Verbal  Barriers to Learning: None  Education Outcome: Demonstrated understanding;Verbalized understanding         Disease Specific Education: Pt educated on importance of OOB mobility, prevention of complications of bedrest, and general safety during hospitalization. Pt verbalized understanding    AM-PAC - ADL  AM-PAC Daily Activity - Inpatient   How much help is needed for putting on and taking off regular lower body clothing?: None  How much help is needed for bathing (which includes washing, rinsing, drying)?: None  How much help is needed for toileting (which includes using toilet, bedpan, or urinal)?: None  How much help is needed for putting on and taking off regular upper body clothing?: None  How much help is needed for taking care of personal grooming?: None  How much help for eating meals?: None  AM-PAC Inpatient Daily Activity Raw Score: 24  AM-PAC Inpatient ADL T-Scale Score : 57.54  ADL Inpatient CMS 0-100% Score: 0  ADL Inpatient CMS G-Code Modifier : CH    Goals  Short Term Goals  Time Frame for Short Term Goals: 01/3, unless otherwise noted  Short Term Goal 1: Pt will

## 2024-01-01 NOTE — CARE COORDINATION
D/c order noted. Spoke with patient. No concerns for d/c mother will transport home. Therapy with NN. Dav Bradley RN

## 2024-01-02 LAB — 10OH-CARBAZEPINE SERPL-MCNC: 57 UG/ML (ref 3–35)

## 2024-01-02 NOTE — ED NOTES
Seizure precautions in place. Bed in lowest position, bed alarm active. Family member at bedside.

## 2024-01-02 NOTE — ED PROVIDER NOTES
[facility]  ED    EMERGENCY DEPARTMENT ENCOUNTER        Patient Name: Adam Trujillo  MRN: [id number]  Birthdate 1990  Date of evaluation: 1/1/2024  PCP: No primary care provider on file.  Note Started: 10:25 PM EST 1/1/24    CHIEF COMPLAINT       Seizures (Squad was called for seizure activity, when squad showed up pt was walking and talking, but not oriented to time or place./Hx of seizure activity.)      HISTORY OF PRESENT ILLNESS: 1 or more Elements       Adam Trujillo is a 33 y.o. male who presents to the emergency department for evaluation of seizure-like activity.  Per squad, when they showed up on scene, patient was walking and talking but not oriented to time or place.  On arrival to the ER, patient is answering questions appropriately.  He reports having a seizure because he did not take his nighttime medication.  Says he was admitted to the hospital and just met him today.  Denies injury to head.  Denies having any headache.  Denies nausea or vomiting.  Denies having any complaints.  Denies having chest pain, shortness of breath, or lightheadedness.    No other complaints, modifying factors or associated symptoms.     History obtained by the patient unless stated otherwise as above on HPI.   No limitations unless specified as above on HPI.     Past medical history:   Past Medical History:   Diagnosis Date    Acute respiratory failure with hypoxia and hypercapnia (HCC) 1/12/2023    Aspiration pneumonia (HCC)     Aspiration pneumonia of both lower lobes (HCC)     CAD (coronary artery disease)     Cardiac arrest (HCC) 8/8/2023    Demand ischemia 1/13/2023    Hepatitis C     HTN (hypertension)     NSTEMI (non-ST elevated myocardial infarction) (HCC)     Polysubstance abuse (HCC)     Seizures (HCC)     Septic shock (HCC)     Systolic CHF (HCC)     Takotsubo cardiomyopathy        Past surgical history:   Past Surgical History:   Procedure Laterality Date    BRONCHOSCOPY N/A 02/22/2021

## 2024-01-02 NOTE — DISCHARGE INSTRUCTIONS
Please follow up with your neurologist for further evaluation and treatment.   If persistent or worsening symptoms, or if you have any concerns, return to ED immediately.

## 2024-01-03 LAB
BACTERIA BLD CULT ORG #2: NORMAL
BACTERIA BLD CULT: NORMAL

## 2024-02-15 ENCOUNTER — OFFICE VISIT (OUTPATIENT)
Dept: CARDIOLOGY CLINIC | Age: 34
End: 2024-02-15
Payer: MEDICAID

## 2024-02-15 VITALS
BODY MASS INDEX: 16.24 KG/M2 | WEIGHT: 116 LBS | SYSTOLIC BLOOD PRESSURE: 90 MMHG | DIASTOLIC BLOOD PRESSURE: 50 MMHG | OXYGEN SATURATION: 95 % | HEIGHT: 71 IN | HEART RATE: 68 BPM

## 2024-02-15 DIAGNOSIS — F19.10 IV DRUG ABUSE (HCC): ICD-10-CM

## 2024-02-15 DIAGNOSIS — I51.81 STRESS-INDUCED CARDIOMYOPATHY: ICD-10-CM

## 2024-02-15 DIAGNOSIS — I50.22 SYSTOLIC CHF, CHRONIC (HCC): ICD-10-CM

## 2024-02-15 DIAGNOSIS — R07.9 CHEST PAIN, UNSPECIFIED TYPE: Primary | ICD-10-CM

## 2024-02-15 PROCEDURE — 3074F SYST BP LT 130 MM HG: CPT | Performed by: NURSE PRACTITIONER

## 2024-02-15 PROCEDURE — 3078F DIAST BP <80 MM HG: CPT | Performed by: NURSE PRACTITIONER

## 2024-02-15 PROCEDURE — 99214 OFFICE O/P EST MOD 30 MIN: CPT | Performed by: NURSE PRACTITIONER

## 2024-02-15 PROCEDURE — 93000 ELECTROCARDIOGRAM COMPLETE: CPT | Performed by: NURSE PRACTITIONER

## 2024-02-15 RX ORDER — SPIRONOLACTONE 25 MG/1
TABLET ORAL
Qty: 45 TABLET | Refills: 1 | Status: SHIPPED | OUTPATIENT
Start: 2024-02-15

## 2024-02-15 RX ORDER — TAMSULOSIN HYDROCHLORIDE 0.4 MG/1
0.4 CAPSULE ORAL NIGHTLY
COMMUNITY
Start: 2023-11-14

## 2024-02-15 RX ORDER — METOPROLOL SUCCINATE 25 MG/1
25 TABLET, EXTENDED RELEASE ORAL DAILY
Qty: 90 TABLET | Refills: 1 | Status: SHIPPED | OUTPATIENT
Start: 2024-02-15

## 2024-02-15 NOTE — PROGRESS NOTES
[facility]   Cardiac Follow-up    Primary Care Doctor:  No primary care provider on file.    Chief Complaint   Patient presents with    Follow-up    Congestive Heart Failure    Chest Pain      History of Present Illness:   I had the pleasure of seeing Adam Trujillo in follow up for stress-induced cardiomyopathy      Admitted 2/21/2021-2/27/2021 with seizures, acute encephalopathy, septic shock, respiratory failure, NSTEMI, aspiration, stress-induced cardiomyopathy with LVEF 25% requiring dobutamine support, status post left heart cath showed normal coronaries.  Repeat echocardiogram 7/2021 showed recovered EF 55%  Amitted 1/12/23-1/20/2023 with status epilepticus, septic shock, pneumonia, markedly elevated troponin, wide complex tachycardia, repeat echocardiogram completed showed LVEF down to less than 20% with severe diffuse hypokinesis, and possible questionable apical thrombus. He had cardiac MRI showing improved LVEF to 59% and mild myocardial edema suggestive of recovering stress induced cardiomyopathy   Admitted 8/8/2023-8/16/2023 with seizures and cardiac arrest, required CPR x2 and transvenous pacer.  LVEF 20%. Also treated for bacteremia     Since last visit, restarted Spironolactone (aldactone). Coreg stopped and changed to metoprolol.   Having chest pain severe last few days. Pleuritic sharp pain in nature  Shortness of breath. Worse with coughing, and deep breathing.   No fevers. Occasional cough.   Using fentanyl, carfentanyl crack. Continues to go to methadone clinic   No hemoptysis.   Depression a little bit better with med changes.   Has had few episodes of dizziness at times. Will see double at times.     Adam Trujillo describes symptoms including chest pain     Past Medical History:   has a past medical history of Acute respiratory failure with hypoxia and hypercapnia (HCC), Aspiration pneumonia (HCC), Aspiration pneumonia of both lower lobes (HCC), CAD (coronary artery disease),

## 2024-03-04 ENCOUNTER — APPOINTMENT (OUTPATIENT)
Dept: GENERAL RADIOLOGY | Age: 34
End: 2024-03-04
Payer: MEDICAID

## 2024-03-04 ENCOUNTER — APPOINTMENT (OUTPATIENT)
Dept: CT IMAGING | Age: 34
End: 2024-03-04
Payer: MEDICAID

## 2024-03-04 ENCOUNTER — HOSPITAL ENCOUNTER (EMERGENCY)
Age: 34
Discharge: HOME | End: 2024-03-05
Attending: EMERGENCY MEDICINE
Payer: MEDICAID

## 2024-03-04 DIAGNOSIS — R56.9 POST-ICTAL STATE (HCC): Primary | ICD-10-CM

## 2024-03-04 DIAGNOSIS — R41.82 ALTERED MENTAL STATUS, UNSPECIFIED ALTERED MENTAL STATUS TYPE: ICD-10-CM

## 2024-03-04 DIAGNOSIS — F19.10 POLYSUBSTANCE ABUSE (HCC): ICD-10-CM

## 2024-03-04 DIAGNOSIS — G40.909 SEIZURE DISORDER (HCC): ICD-10-CM

## 2024-03-04 LAB
ALBUMIN SERPL-MCNC: 4.8 G/DL (ref 3.4–5)
ALBUMIN/GLOB SERPL: 1.1 {RATIO} (ref 1.1–2.2)
ALP SERPL-CCNC: 179 U/L (ref 40–129)
ALT SERPL-CCNC: 19 U/L (ref 10–40)
AMPHETAMINES UR QL SCN>1000 NG/ML: ABNORMAL
ANION GAP SERPL CALCULATED.3IONS-SCNC: 17 MMOL/L (ref 3–16)
APAP SERPL-MCNC: <5 UG/ML (ref 10–30)
AST SERPL-CCNC: 22 U/L (ref 15–37)
BARBITURATES UR QL SCN>200 NG/ML: ABNORMAL
BASOPHILS # BLD: 0.1 K/UL (ref 0–0.2)
BASOPHILS NFR BLD: 0.7 %
BENZODIAZ UR QL SCN>200 NG/ML: POSITIVE
BILIRUB SERPL-MCNC: 0.3 MG/DL (ref 0–1)
BUN SERPL-MCNC: 13 MG/DL (ref 7–20)
CALCIUM SERPL-MCNC: 9.8 MG/DL (ref 8.3–10.6)
CANNABINOIDS UR QL SCN>50 NG/ML: POSITIVE
CHLORIDE SERPL-SCNC: 95 MMOL/L (ref 99–110)
CO2 SERPL-SCNC: 22 MMOL/L (ref 21–32)
COCAINE UR QL SCN: POSITIVE
CREAT SERPL-MCNC: 0.8 MG/DL (ref 0.9–1.3)
DEPRECATED RDW RBC AUTO: 13.9 % (ref 12.4–15.4)
DRUG SCREEN COMMENT UR-IMP: ABNORMAL
EOSINOPHIL # BLD: 0.2 K/UL (ref 0–0.6)
EOSINOPHIL NFR BLD: 1.3 %
ETHANOLAMINE SERPL-MCNC: NORMAL MG/DL (ref 0–0.08)
FENTANYL SCREEN, URINE: POSITIVE
GFR SERPLBLD CREATININE-BSD FMLA CKD-EPI: >60 ML/MIN/{1.73_M2}
GLUCOSE BLD-MCNC: 111 MG/DL (ref 70–99)
GLUCOSE SERPL-MCNC: 79 MG/DL (ref 70–99)
HCT VFR BLD AUTO: 43.1 % (ref 40.5–52.5)
HGB BLD-MCNC: 14.6 G/DL (ref 13.5–17.5)
LACTATE BLDV-SCNC: 0.7 MMOL/L (ref 0.4–2)
LACTATE BLDV-SCNC: 5.4 MMOL/L (ref 0.4–2)
LYMPHOCYTES # BLD: 1.5 K/UL (ref 1–5.1)
LYMPHOCYTES NFR BLD: 10.4 %
MCH RBC QN AUTO: 32 PG (ref 26–34)
MCHC RBC AUTO-ENTMCNC: 33.9 G/DL (ref 31–36)
MCV RBC AUTO: 94.6 FL (ref 80–100)
METHADONE UR QL SCN>300 NG/ML: POSITIVE
MONOCYTES # BLD: 0.7 K/UL (ref 0–1.3)
MONOCYTES NFR BLD: 4.8 %
NEUTROPHILS # BLD: 12 K/UL (ref 1.7–7.7)
NEUTROPHILS NFR BLD: 82.8 %
OPIATES UR QL SCN>300 NG/ML: ABNORMAL
OXYCODONE UR QL SCN: ABNORMAL
PCP UR QL SCN>25 NG/ML: ABNORMAL
PERFORMED ON: ABNORMAL
PH UR STRIP: 7 [PH]
PLATELET # BLD AUTO: 300 K/UL (ref 135–450)
PMV BLD AUTO: 6.6 FL (ref 5–10.5)
POTASSIUM SERPL-SCNC: 4.2 MMOL/L (ref 3.5–5.1)
PROT SERPL-MCNC: 9.2 G/DL (ref 6.4–8.2)
RBC # BLD AUTO: 4.56 M/UL (ref 4.2–5.9)
SALICYLATES SERPL-MCNC: <0.3 MG/DL (ref 15–30)
SODIUM SERPL-SCNC: 134 MMOL/L (ref 136–145)
WBC # BLD AUTO: 14.5 K/UL (ref 4–11)

## 2024-03-04 PROCEDURE — 85025 COMPLETE CBC W/AUTO DIFF WBC: CPT

## 2024-03-04 PROCEDURE — 96375 TX/PRO/DX INJ NEW DRUG ADDON: CPT

## 2024-03-04 PROCEDURE — 96374 THER/PROPH/DIAG INJ IV PUSH: CPT

## 2024-03-04 PROCEDURE — 6360000002 HC RX W HCPCS

## 2024-03-04 PROCEDURE — 96372 THER/PROPH/DIAG INJ SC/IM: CPT

## 2024-03-04 PROCEDURE — 80179 DRUG ASSAY SALICYLATE: CPT

## 2024-03-04 PROCEDURE — 84443 ASSAY THYROID STIM HORMONE: CPT

## 2024-03-04 PROCEDURE — 71045 X-RAY EXAM CHEST 1 VIEW: CPT

## 2024-03-04 PROCEDURE — 82077 ASSAY SPEC XCP UR&BREATH IA: CPT

## 2024-03-04 PROCEDURE — 70450 CT HEAD/BRAIN W/O DYE: CPT

## 2024-03-04 PROCEDURE — 6360000002 HC RX W HCPCS: Performed by: EMERGENCY MEDICINE

## 2024-03-04 PROCEDURE — 2580000003 HC RX 258: Performed by: EMERGENCY MEDICINE

## 2024-03-04 PROCEDURE — 96376 TX/PRO/DX INJ SAME DRUG ADON: CPT

## 2024-03-04 PROCEDURE — 80053 COMPREHEN METABOLIC PANEL: CPT

## 2024-03-04 PROCEDURE — 80143 DRUG ASSAY ACETAMINOPHEN: CPT

## 2024-03-04 PROCEDURE — 99285 EMERGENCY DEPT VISIT HI MDM: CPT

## 2024-03-04 PROCEDURE — 80307 DRUG TEST PRSMV CHEM ANLYZR: CPT

## 2024-03-04 PROCEDURE — 83605 ASSAY OF LACTIC ACID: CPT

## 2024-03-04 RX ORDER — ONDANSETRON 2 MG/ML
4 INJECTION INTRAMUSCULAR; INTRAVENOUS ONCE
Status: COMPLETED | OUTPATIENT
Start: 2024-03-04 | End: 2024-03-04

## 2024-03-04 RX ORDER — DIPHENHYDRAMINE HYDROCHLORIDE 50 MG/ML
INJECTION INTRAMUSCULAR; INTRAVENOUS
Status: COMPLETED
Start: 2024-03-04 | End: 2024-03-04

## 2024-03-04 RX ORDER — ONDANSETRON 2 MG/ML
INJECTION INTRAMUSCULAR; INTRAVENOUS
Status: COMPLETED
Start: 2024-03-04 | End: 2024-03-04

## 2024-03-04 RX ORDER — LORAZEPAM 2 MG/ML
2 INJECTION INTRAMUSCULAR ONCE
Status: COMPLETED | OUTPATIENT
Start: 2024-03-04 | End: 2024-03-04

## 2024-03-04 RX ORDER — MIDAZOLAM HYDROCHLORIDE 5 MG/ML
INJECTION INTRAMUSCULAR; INTRAVENOUS
Status: COMPLETED
Start: 2024-03-04 | End: 2024-03-04

## 2024-03-04 RX ORDER — LORAZEPAM 2 MG/ML
INJECTION INTRAMUSCULAR
Status: COMPLETED
Start: 2024-03-04 | End: 2024-03-04

## 2024-03-04 RX ORDER — HALOPERIDOL 5 MG/ML
5 INJECTION INTRAMUSCULAR ONCE
Status: COMPLETED | OUTPATIENT
Start: 2024-03-04 | End: 2024-03-04

## 2024-03-04 RX ORDER — 0.9 % SODIUM CHLORIDE 0.9 %
1000 INTRAVENOUS SOLUTION INTRAVENOUS ONCE
Status: COMPLETED | OUTPATIENT
Start: 2024-03-04 | End: 2024-03-04

## 2024-03-04 RX ORDER — HALOPERIDOL 5 MG/ML
INJECTION INTRAMUSCULAR
Status: COMPLETED
Start: 2024-03-04 | End: 2024-03-04

## 2024-03-04 RX ORDER — MIDAZOLAM HYDROCHLORIDE 5 MG/ML
5 INJECTION, SOLUTION INTRAMUSCULAR; INTRAVENOUS ONCE
Status: COMPLETED | OUTPATIENT
Start: 2024-03-04 | End: 2024-03-04

## 2024-03-04 RX ORDER — DIPHENHYDRAMINE HYDROCHLORIDE 50 MG/ML
50 INJECTION INTRAMUSCULAR; INTRAVENOUS ONCE
Status: COMPLETED | OUTPATIENT
Start: 2024-03-04 | End: 2024-03-04

## 2024-03-04 RX ADMIN — HALOPERIDOL 5 MG: 5 INJECTION INTRAMUSCULAR at 19:40

## 2024-03-04 RX ADMIN — MIDAZOLAM HYDROCHLORIDE 5 MG: 5 INJECTION, SOLUTION INTRAMUSCULAR; INTRAVENOUS at 19:54

## 2024-03-04 RX ADMIN — ONDANSETRON 4 MG: 2 INJECTION INTRAMUSCULAR; INTRAVENOUS at 19:43

## 2024-03-04 RX ADMIN — LORAZEPAM 2 MG: 2 INJECTION INTRAMUSCULAR at 20:58

## 2024-03-04 RX ADMIN — DIPHENHYDRAMINE HYDROCHLORIDE 50 MG: 50 INJECTION INTRAMUSCULAR; INTRAVENOUS at 19:39

## 2024-03-04 RX ADMIN — LORAZEPAM 2 MG: 2 INJECTION INTRAMUSCULAR; INTRAVENOUS at 20:09

## 2024-03-04 RX ADMIN — LORAZEPAM 2 MG: 2 INJECTION INTRAMUSCULAR at 19:38

## 2024-03-04 RX ADMIN — LORAZEPAM 2 MG: 2 INJECTION INTRAMUSCULAR; INTRAVENOUS at 20:58

## 2024-03-04 RX ADMIN — LORAZEPAM 2 MG: 2 INJECTION INTRAMUSCULAR; INTRAVENOUS at 19:38

## 2024-03-04 RX ADMIN — SODIUM CHLORIDE 1000 ML: 9 INJECTION, SOLUTION INTRAVENOUS at 20:44

## 2024-03-04 RX ADMIN — HALOPERIDOL LACTATE 5 MG: 5 INJECTION, SOLUTION INTRAMUSCULAR at 19:40

## 2024-03-04 ASSESSMENT — LIFESTYLE VARIABLES
HOW MANY STANDARD DRINKS CONTAINING ALCOHOL DO YOU HAVE ON A TYPICAL DAY: PATIENT UNABLE TO ANSWER
HOW OFTEN DO YOU HAVE A DRINK CONTAINING ALCOHOL: PATIENT UNABLE TO ANSWER

## 2024-03-04 ASSESSMENT — PAIN - FUNCTIONAL ASSESSMENT: PAIN_FUNCTIONAL_ASSESSMENT: NONE - DENIES PAIN

## 2024-03-05 VITALS
TEMPERATURE: 99.3 F | WEIGHT: 120 LBS | HEART RATE: 76 BPM | DIASTOLIC BLOOD PRESSURE: 96 MMHG | OXYGEN SATURATION: 96 % | BODY MASS INDEX: 16.8 KG/M2 | HEIGHT: 71 IN | RESPIRATION RATE: 17 BRPM | SYSTOLIC BLOOD PRESSURE: 136 MMHG

## 2024-03-05 LAB — TSH SERPL DL<=0.005 MIU/L-ACNC: 3.67 UIU/ML (ref 0.27–4.2)

## 2024-03-05 NOTE — DISCHARGE INSTRUCTIONS
Please follow-up with his neurologist.  Please continue taking your seizure medications as prescribed.  The blood work and CAT scan otherwise did not show any concerns.  Return for worsening symptoms

## 2024-03-05 NOTE — ED NOTES
Pt is able to walk in room with a steady gait. Pt refused to have any water. PT states \" I just want to fucking getting out of here\" MD made aware. Discharge paperwork gone over with patient and mother. Pt changed into clothes, IV removed. And patient left with mom.

## 2024-03-05 NOTE — ED TRIAGE NOTES
Pt arrived to ed with c/o seizure at home. Known hx of seizures. Per ems, family gave 10mg nasal versed at home. Unknown how long seizure lasted or if any injury. Pt has multiple sores covering arms. Ems states pt was GCS 3 in route, pt was awake, confused, and combative on arrival to ed.

## 2024-03-05 NOTE — ED NOTES
Family brought back to bedside, states pt was \"just sitting there\" when seizure occurred, reporting it lasted \"20 minutes.\"

## 2024-03-05 NOTE — ED NOTES
Pt vomiting, combative, screaming, and spitting at staff. Staff attempting to provide monitoring equipment.

## 2024-03-05 NOTE — ED PROVIDER NOTES
Emergency Department Encounter  Location: [facility]  ED    Patient: Adam Trujillo  MRN: [id number]  : 1990  Date of evaluation: 3/4/2024  ED Provider: Ileana Shah MD      Adam Trujillo was checked out to me by Dr. Elver Andrade. Please see his/her initial documentation for details of the patient's initial ED presentation, physical exam and completed studies.    In brief, Adam Trujillo is a 34 y.o. male who presented to the emergency department seizure activity.    Currently studies pending:     I have reviewed and interpreted all of the currently available lab results and diagnostics from this visit:  Results for orders placed or performed during the hospital encounter of 24   CBC with Auto Differential   Result Value Ref Range    WBC 14.5 (H) 4.0 - 11.0 K/uL    RBC 4.56 4.20 - 5.90 M/uL    Hemoglobin 14.6 13.5 - 17.5 g/dL    Hematocrit 43.1 40.5 - 52.5 %    MCV 94.6 80.0 - 100.0 fL    MCH 32.0 26.0 - 34.0 pg    MCHC 33.9 31.0 - 36.0 g/dL    RDW 13.9 12.4 - 15.4 %    Platelets 300 135 - 450 K/uL    MPV 6.6 5.0 - 10.5 fL    Neutrophils % 82.8 %    Lymphocytes % 10.4 %    Monocytes % 4.8 %    Eosinophils % 1.3 %    Basophils % 0.7 %    Neutrophils Absolute 12.0 (H) 1.7 - 7.7 K/uL    Lymphocytes Absolute 1.5 1.0 - 5.1 K/uL    Monocytes Absolute 0.7 0.0 - 1.3 K/uL    Eosinophils Absolute 0.2 0.0 - 0.6 K/uL    Basophils Absolute 0.1 0.0 - 0.2 K/uL   CMP w/ Reflex to MG   Result Value Ref Range    Sodium 134 (L) 136 - 145 mmol/L    Potassium reflex Magnesium 4.2 3.5 - 5.1 mmol/L    Chloride 95 (L) 99 - 110 mmol/L    CO2 22 21 - 32 mmol/L    Anion Gap 17 (H) 3 - 16    Glucose 79 70 - 99 mg/dL    BUN 13 7 - 20 mg/dL    Creatinine 0.8 (L) 0.9 - 1.3 mg/dL    Est, Glom Filt Rate >60 >60    Calcium 9.8 8.3 - 10.6 mg/dL    Total Protein 9.2 (H) 6.4 - 8.2 g/dL    Albumin 4.8 3.4 - 5.0 g/dL    Albumin/Globulin Ratio 1.1 1.1 - 2.2    Total Bilirubin 0.3 0.0 - 1.0 mg/dL    Alkaline Phosphatase 179 
  Extremities:No significant lower extremity edema. Lower extremities are symmetric.  There is multiple lesions and scars of the extremities bilaterally  Neuro: Moving all extremities. No focal deficits. Speech is clear.   Skin:No rash, no erythema  Psych: Belligerent, agitated, swinging at staff      DIAGNOSTIC RESULTS   LABS:    Labs Reviewed   CBC WITH AUTO DIFFERENTIAL - Abnormal; Notable for the following components:       Result Value    WBC 14.5 (*)     Neutrophils Absolute 12.0 (*)     All other components within normal limits   COMPREHENSIVE METABOLIC PANEL W/ REFLEX TO MG FOR LOW K - Abnormal; Notable for the following components:    Sodium 134 (*)     Chloride 95 (*)     Anion Gap 17 (*)     Creatinine 0.8 (*)     Total Protein 9.2 (*)     Alkaline Phosphatase 179 (*)     All other components within normal limits   LACTIC ACID - Abnormal; Notable for the following components:    Lactic Acid 5.4 (*)     All other components within normal limits    Narrative:     CALL  [place]  [facility] tel. [phone],  Chemistry results called to and read back by JESSICA Root, 03/04/2024  20:38, by CONNIE   URINE DRUG SCREEN - Abnormal; Notable for the following components:    Benzodiazepine Screen, Urine POSITIVE (*)     Cannabinoid Scrn, Ur POSITIVE (*)     Cocaine Metabolite Screen, Urine POSITIVE (*)     Methadone Screen, Urine POSITIVE (*)     FENTANYL SCREEN, URINE POSITIVE (*)     All other components within normal limits   ACETAMINOPHEN LEVEL - Abnormal; Notable for the following components:    Acetaminophen Level <5 (*)     All other components within normal limits   SALICYLATE LEVEL - Abnormal; Notable for the following components:    Salicylate, Serum <0.3 (*)     All other components within normal limits   POCT GLUCOSE - Abnormal; Notable for the following components:    POC Glucose 111 (*)     All other components within normal limits   LACTIC ACID   ETHANOL   TSH WITH REFLEX   POCT GLUCOSE       When ordered

## 2024-03-05 NOTE — ED NOTES
Pt now resting in bed with eyes closed, equal and bilateral chest rise and fall, pt still figity in bed and attempting to pull at monitoring equipment.

## 2024-03-31 RX ORDER — CLINDAMYCIN HYDROCHLORIDE 300 MG/1
300 CAPSULE ORAL 4 TIMES DAILY
Qty: 20 CAPSULE | Refills: 0 | OUTPATIENT
Start: 2024-03-31 | End: 2024-04-05

## 2024-04-17 RX ORDER — SPIRONOLACTONE 25 MG/1
TABLET ORAL
Qty: 45 TABLET | Refills: 2 | Status: SHIPPED | OUTPATIENT
Start: 2024-04-17

## 2024-04-25 RX ORDER — PANTOPRAZOLE SODIUM 40 MG/1
40 TABLET, DELAYED RELEASE ORAL DAILY
Qty: 30 TABLET | Refills: 3 | OUTPATIENT
Start: 2024-04-25

## 2024-05-28 RX ORDER — PANTOPRAZOLE SODIUM 40 MG/1
40 TABLET, DELAYED RELEASE ORAL DAILY
Qty: 30 TABLET | Refills: 3 | OUTPATIENT
Start: 2024-05-28

## 2024-05-28 NOTE — TELEPHONE ENCOUNTER
Requested Prescriptions     Pending Prescriptions Disp Refills    pantoprazole (PROTONIX) 40 MG tablet [Pharmacy Med Name: PANTOPRAZOLE SOD DR 40 MG TAB] 30 tablet 3     Sig: TAKE ONE TABLET BY MOUTH DAILY       Last Clinic Visit:  Visit date not found     Next Clinic Appointment:  Visit date not found

## 2024-08-30 ENCOUNTER — HOSPITAL ENCOUNTER (EMERGENCY)
Age: 34
Discharge: LEFT BEFORE DISCHARGE | End: 2024-08-30
Attending: STUDENT IN AN ORGANIZED HEALTH CARE EDUCATION/TRAINING PROGRAM
Payer: MEDICAID

## 2024-08-30 ENCOUNTER — APPOINTMENT (OUTPATIENT)
Dept: GENERAL RADIOLOGY | Age: 34
End: 2024-08-30
Payer: MEDICAID

## 2024-08-30 ENCOUNTER — APPOINTMENT (OUTPATIENT)
Dept: CT IMAGING | Age: 34
End: 2024-08-30
Payer: MEDICAID

## 2024-08-30 VITALS
HEART RATE: 164 BPM | SYSTOLIC BLOOD PRESSURE: 177 MMHG | RESPIRATION RATE: 17 BRPM | TEMPERATURE: 99.2 F | DIASTOLIC BLOOD PRESSURE: 98 MMHG | WEIGHT: 119.05 LBS | BODY MASS INDEX: 16.6 KG/M2 | OXYGEN SATURATION: 100 %

## 2024-08-30 DIAGNOSIS — Z53.29 LEFT AGAINST MEDICAL ADVICE: ICD-10-CM

## 2024-08-30 DIAGNOSIS — A41.9 SEPSIS, DUE TO UNSPECIFIED ORGANISM, UNSPECIFIED WHETHER ACUTE ORGAN DYSFUNCTION PRESENT (HCC): ICD-10-CM

## 2024-08-30 DIAGNOSIS — F19.90 POLYSUBSTANCE USE DISORDER: ICD-10-CM

## 2024-08-30 DIAGNOSIS — R94.31 ABNORMAL ECG: ICD-10-CM

## 2024-08-30 DIAGNOSIS — R56.9 SEIZURE (HCC): Primary | ICD-10-CM

## 2024-08-30 DIAGNOSIS — R00.0 TACHYCARDIA: ICD-10-CM

## 2024-08-30 DIAGNOSIS — Z87.39 HX OF OSTEOMYELITIS: ICD-10-CM

## 2024-08-30 LAB
ALBUMIN SERPL-MCNC: 3.9 G/DL (ref 3.4–5)
ALBUMIN/GLOB SERPL: 1.1 {RATIO} (ref 1.1–2.2)
ALP SERPL-CCNC: 144 U/L (ref 40–129)
ALT SERPL-CCNC: 14 U/L (ref 10–40)
AMPHETAMINES UR QL SCN>1000 NG/ML: ABNORMAL
ANION GAP SERPL CALCULATED.3IONS-SCNC: 18 MMOL/L (ref 3–16)
AST SERPL-CCNC: 15 U/L (ref 15–37)
BARBITURATES UR QL SCN>200 NG/ML: ABNORMAL
BASOPHILS # BLD: 0.1 K/UL (ref 0–0.2)
BASOPHILS NFR BLD: 0.5 %
BENZODIAZ UR QL SCN>200 NG/ML: POSITIVE
BILIRUB SERPL-MCNC: <0.2 MG/DL (ref 0–1)
BILIRUB UR QL STRIP.AUTO: NEGATIVE
BUN SERPL-MCNC: 15 MG/DL (ref 7–20)
CALCIUM SERPL-MCNC: 9.7 MG/DL (ref 8.3–10.6)
CANNABINOIDS UR QL SCN>50 NG/ML: POSITIVE
CHLORIDE SERPL-SCNC: 99 MMOL/L (ref 99–110)
CLARITY UR: CLEAR
CO2 SERPL-SCNC: 18 MMOL/L (ref 21–32)
COCAINE UR QL SCN: ABNORMAL
COLOR UR: YELLOW
CREAT SERPL-MCNC: 0.9 MG/DL (ref 0.9–1.3)
DEPRECATED RDW RBC AUTO: 14 % (ref 12.4–15.4)
DRUG SCREEN COMMENT UR-IMP: ABNORMAL
EKG ATRIAL RATE: 80 BPM
EKG DIAGNOSIS: NORMAL
EKG P AXIS: 58 DEGREES
EKG P-R INTERVAL: 174 MS
EKG Q-T INTERVAL: 396 MS
EKG QRS DURATION: 124 MS
EKG QTC CALCULATION (BAZETT): 456 MS
EKG R AXIS: -20 DEGREES
EKG T AXIS: 95 DEGREES
EKG VENTRICULAR RATE: 80 BPM
EOSINOPHIL # BLD: 0.5 K/UL (ref 0–0.6)
EOSINOPHIL NFR BLD: 2.9 %
EPI CELLS #/AREA URNS HPF: ABNORMAL /HPF (ref 0–5)
ETHANOLAMINE SERPL-MCNC: NORMAL MG/DL (ref 0–0.08)
FENTANYL SCREEN, URINE: POSITIVE
GFR SERPLBLD CREATININE-BSD FMLA CKD-EPI: >90 ML/MIN/{1.73_M2}
GLUCOSE SERPL-MCNC: 149 MG/DL (ref 70–99)
GLUCOSE UR STRIP.AUTO-MCNC: NEGATIVE MG/DL
HCT VFR BLD AUTO: 40.3 % (ref 40.5–52.5)
HGB BLD-MCNC: 13.4 G/DL (ref 13.5–17.5)
HGB UR QL STRIP.AUTO: ABNORMAL
HYALINE CASTS #/AREA URNS LPF: ABNORMAL /LPF (ref 0–2)
INR PPP: 0.96 (ref 0.85–1.15)
KETONES UR STRIP.AUTO-MCNC: NEGATIVE MG/DL
LACTATE BLDV-SCNC: 1.5 MMOL/L (ref 0.4–2)
LACTATE BLDV-SCNC: 8.7 MMOL/L (ref 0.4–2)
LEUKOCYTE ESTERASE UR QL STRIP.AUTO: NEGATIVE
LYMPHOCYTES # BLD: 3.4 K/UL (ref 1–5.1)
LYMPHOCYTES NFR BLD: 19.6 %
MAGNESIUM SERPL-MCNC: 2.4 MG/DL (ref 1.8–2.4)
MCH RBC QN AUTO: 30.2 PG (ref 26–34)
MCHC RBC AUTO-ENTMCNC: 33.2 G/DL (ref 31–36)
MCV RBC AUTO: 90.9 FL (ref 80–100)
METHADONE UR QL SCN>300 NG/ML: ABNORMAL
MONOCYTES # BLD: 0.6 K/UL (ref 0–1.3)
MONOCYTES NFR BLD: 3.6 %
MUCOUS THREADS #/AREA URNS LPF: ABNORMAL /LPF
NEUTROPHILS # BLD: 12.7 K/UL (ref 1.7–7.7)
NEUTROPHILS NFR BLD: 73.4 %
NITRITE UR QL STRIP.AUTO: NEGATIVE
OPIATES UR QL SCN>300 NG/ML: ABNORMAL
OXYCODONE UR QL SCN: ABNORMAL
PCP UR QL SCN>25 NG/ML: ABNORMAL
PH UR STRIP.AUTO: 6 [PH] (ref 5–8)
PH UR STRIP: 6 [PH]
PLATELET # BLD AUTO: 342 K/UL (ref 135–450)
PMV BLD AUTO: 6.6 FL (ref 5–10.5)
POTASSIUM SERPL-SCNC: 4.2 MMOL/L (ref 3.5–5.1)
PROT SERPL-MCNC: 7.4 G/DL (ref 6.4–8.2)
PROT UR STRIP.AUTO-MCNC: ABNORMAL MG/DL
PROTHROMBIN TIME: 13 SEC (ref 11.9–14.9)
RBC # BLD AUTO: 4.44 M/UL (ref 4.2–5.9)
RBC #/AREA URNS HPF: ABNORMAL /HPF (ref 0–4)
SODIUM SERPL-SCNC: 135 MMOL/L (ref 136–145)
SP GR UR STRIP.AUTO: >=1.03 (ref 1–1.03)
TROPONIN, HIGH SENSITIVITY: <6 NG/L (ref 0–22)
TROPONIN, HIGH SENSITIVITY: <6 NG/L (ref 0–22)
UA COMPLETE W REFLEX CULTURE PNL UR: ABNORMAL
UA DIPSTICK W REFLEX MICRO PNL UR: YES
URN SPEC COLLECT METH UR: ABNORMAL
UROBILINOGEN UR STRIP-ACNC: 0.2 E.U./DL
WBC # BLD AUTO: 17.3 K/UL (ref 4–11)
WBC #/AREA URNS HPF: ABNORMAL /HPF (ref 0–5)

## 2024-08-30 PROCEDURE — 70450 CT HEAD/BRAIN W/O DYE: CPT

## 2024-08-30 PROCEDURE — C1751 CATH, INF, PER/CENT/MIDLINE: HCPCS

## 2024-08-30 PROCEDURE — 73630 X-RAY EXAM OF FOOT: CPT

## 2024-08-30 PROCEDURE — 85025 COMPLETE CBC W/AUTO DIFF WBC: CPT

## 2024-08-30 PROCEDURE — 6370000000 HC RX 637 (ALT 250 FOR IP): Performed by: STUDENT IN AN ORGANIZED HEALTH CARE EDUCATION/TRAINING PROGRAM

## 2024-08-30 PROCEDURE — 99285 EMERGENCY DEPT VISIT HI MDM: CPT

## 2024-08-30 PROCEDURE — 93005 ELECTROCARDIOGRAM TRACING: CPT | Performed by: STUDENT IN AN ORGANIZED HEALTH CARE EDUCATION/TRAINING PROGRAM

## 2024-08-30 PROCEDURE — 2580000003 HC RX 258: Performed by: STUDENT IN AN ORGANIZED HEALTH CARE EDUCATION/TRAINING PROGRAM

## 2024-08-30 PROCEDURE — 96361 HYDRATE IV INFUSION ADD-ON: CPT

## 2024-08-30 PROCEDURE — 83735 ASSAY OF MAGNESIUM: CPT

## 2024-08-30 PROCEDURE — 82077 ASSAY SPEC XCP UR&BREATH IA: CPT

## 2024-08-30 PROCEDURE — 73130 X-RAY EXAM OF HAND: CPT

## 2024-08-30 PROCEDURE — 83605 ASSAY OF LACTIC ACID: CPT

## 2024-08-30 PROCEDURE — 84484 ASSAY OF TROPONIN QUANT: CPT

## 2024-08-30 PROCEDURE — 81001 URINALYSIS AUTO W/SCOPE: CPT

## 2024-08-30 PROCEDURE — 6360000002 HC RX W HCPCS: Performed by: STUDENT IN AN ORGANIZED HEALTH CARE EDUCATION/TRAINING PROGRAM

## 2024-08-30 PROCEDURE — 80307 DRUG TEST PRSMV CHEM ANLYZR: CPT

## 2024-08-30 PROCEDURE — 96367 TX/PROPH/DG ADDL SEQ IV INF: CPT

## 2024-08-30 PROCEDURE — 80053 COMPREHEN METABOLIC PANEL: CPT

## 2024-08-30 PROCEDURE — 85610 PROTHROMBIN TIME: CPT

## 2024-08-30 PROCEDURE — 93010 ELECTROCARDIOGRAM REPORT: CPT | Performed by: INTERNAL MEDICINE

## 2024-08-30 PROCEDURE — 36410 VNPNXR 3YR/> PHY/QHP DX/THER: CPT

## 2024-08-30 PROCEDURE — 71045 X-RAY EXAM CHEST 1 VIEW: CPT

## 2024-08-30 PROCEDURE — 36415 COLL VENOUS BLD VENIPUNCTURE: CPT

## 2024-08-30 PROCEDURE — 96366 THER/PROPH/DIAG IV INF ADDON: CPT

## 2024-08-30 PROCEDURE — 87040 BLOOD CULTURE FOR BACTERIA: CPT

## 2024-08-30 PROCEDURE — 96375 TX/PRO/DX INJ NEW DRUG ADDON: CPT

## 2024-08-30 PROCEDURE — 80183 DRUG SCRN QUANT OXCARBAZEPIN: CPT

## 2024-08-30 PROCEDURE — 96365 THER/PROPH/DIAG IV INF INIT: CPT

## 2024-08-30 RX ORDER — SULFAMETHOXAZOLE/TRIMETHOPRIM 800-160 MG
1 TABLET ORAL 2 TIMES DAILY
Qty: 20 TABLET | Refills: 0 | Status: SHIPPED | OUTPATIENT
Start: 2024-08-30 | End: 2024-09-09

## 2024-08-30 RX ORDER — METHADONE HYDROCHLORIDE 10 MG/1
110 TABLET ORAL ONCE
Status: COMPLETED | OUTPATIENT
Start: 2024-08-30 | End: 2024-08-30

## 2024-08-30 RX ORDER — CEPHALEXIN 500 MG/1
500 CAPSULE ORAL 4 TIMES DAILY
Qty: 28 CAPSULE | Refills: 0 | Status: SHIPPED | OUTPATIENT
Start: 2024-08-30 | End: 2024-09-06

## 2024-08-30 RX ORDER — 0.9 % SODIUM CHLORIDE 0.9 %
250 INTRAVENOUS SOLUTION INTRAVENOUS ONCE
Status: COMPLETED | OUTPATIENT
Start: 2024-08-30 | End: 2024-08-30

## 2024-08-30 RX ORDER — LORAZEPAM 2 MG/ML
INJECTION INTRAMUSCULAR
Status: DISCONTINUED
Start: 2024-08-30 | End: 2024-08-31 | Stop reason: HOSPADM

## 2024-08-30 RX ORDER — 0.9 % SODIUM CHLORIDE 0.9 %
500 INTRAVENOUS SOLUTION INTRAVENOUS ONCE
Status: COMPLETED | OUTPATIENT
Start: 2024-08-30 | End: 2024-08-30

## 2024-08-30 RX ORDER — SODIUM CHLORIDE 9 MG/ML
30 INJECTION, SOLUTION INTRAVENOUS ONCE
Status: DISCONTINUED | OUTPATIENT
Start: 2024-08-30 | End: 2024-08-30

## 2024-08-30 RX ORDER — LORAZEPAM 2 MG/ML
2 INJECTION INTRAMUSCULAR ONCE
Status: COMPLETED | OUTPATIENT
Start: 2024-08-30 | End: 2024-08-30

## 2024-08-30 RX ADMIN — FOSPHENYTOIN SODIUM 1000 MG PE: 50 INJECTION, SOLUTION INTRAMUSCULAR; INTRAVENOUS at 15:50

## 2024-08-30 RX ADMIN — SODIUM CHLORIDE 250 ML: 9 INJECTION, SOLUTION INTRAVENOUS at 20:55

## 2024-08-30 RX ADMIN — METHADONE HYDROCHLORIDE 110 MG: 10 TABLET ORAL at 20:44

## 2024-08-30 RX ADMIN — LORAZEPAM 2 MG: 2 INJECTION, SOLUTION INTRAMUSCULAR; INTRAVENOUS at 15:15

## 2024-08-30 RX ADMIN — VANCOMYCIN HYDROCHLORIDE 1250 MG: 10 INJECTION, POWDER, LYOPHILIZED, FOR SOLUTION INTRAVENOUS at 16:21

## 2024-08-30 RX ADMIN — SODIUM CHLORIDE 500 ML: 9 INJECTION, SOLUTION INTRAVENOUS at 18:50

## 2024-08-30 ASSESSMENT — PAIN DESCRIPTION - LOCATION: LOCATION: GENERALIZED

## 2024-08-30 ASSESSMENT — PAIN SCALES - GENERAL: PAINLEVEL_OUTOF10: 10

## 2024-08-30 ASSESSMENT — PAIN - FUNCTIONAL ASSESSMENT: PAIN_FUNCTIONAL_ASSESSMENT: NONE - DENIES PAIN
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
## 2024-08-31 LAB
BACTERIA BLD CULT ORG #2: NORMAL
BACTERIA BLD CULT: NORMAL
EKG ATRIAL RATE: 102 BPM
EKG ATRIAL RATE: 90 BPM
EKG DIAGNOSIS: NORMAL
EKG DIAGNOSIS: NORMAL
EKG P AXIS: 57 DEGREES
EKG P AXIS: 63 DEGREES
EKG P-R INTERVAL: 140 MS
EKG P-R INTERVAL: 148 MS
EKG Q-T INTERVAL: 366 MS
EKG Q-T INTERVAL: 370 MS
EKG QRS DURATION: 114 MS
EKG QRS DURATION: 118 MS
EKG QTC CALCULATION (BAZETT): 452 MS
EKG QTC CALCULATION (BAZETT): 477 MS
EKG R AXIS: -29 DEGREES
EKG R AXIS: -56 DEGREES
EKG T AXIS: 87 DEGREES
EKG T AXIS: 95 DEGREES
EKG VENTRICULAR RATE: 102 BPM
EKG VENTRICULAR RATE: 90 BPM

## 2024-08-31 PROCEDURE — 93010 ELECTROCARDIOGRAM REPORT: CPT | Performed by: INTERNAL MEDICINE

## 2024-08-31 NOTE — DISCHARGE INSTRUCTIONS
You were seen today for seizure, sepsis, concern for osteomyelitis, and abnormal EKGs.  We recommended admission to [facility] for further management but you have decided to leave AGAINST MEDICAL ADVICE.  Please follow-up as soon as possible and please return the emergency department if you change your mind about admission.  You have been prescribed antibiotics, however these may not be effective against your osteomyelitis and you need IV antibiotics.  Your seizure medication refills were also sent to [facility] pharmacy.

## 2024-08-31 NOTE — ED NOTES
Pt noted to be using an unknown substance in room after pt had visitor. When this RN entered the room pt admitted to using substance and stated that his visitor did not bring the drugs to him. Red bottle cap with white substance on it removed from pt and thrown in the sharps container. Visitor removed from room and asked to leave. Security and MD notified. Pt stated that he would like to leave AMA and MD went over all risks with the pt. PT still stated he would like to leave.

## 2024-08-31 NOTE — ED NOTES
Midline to R brachial removed per nursing clinical bleeding controlled and dressing applied. IV removed and dressing and bleeding controlled. PT hodges catheter removed. PT given gown to put on since shirt was cut off during the triage process. PT on the phone with mother telling her that \"we were not doing anything for him\" despite him leaving AMA and signing papers and verbalizing that he understood the risks of leaving. Pt sat in room on the phone for 5 minutes after dc  when this writer went in to clean room. Pt told that there is a phone in the lobby that he could use and the mother of pt on the phone stated,\"Come outside and talk to me like you are to him.\" Phone hung up and pt was educated that threats towards staff would not be tolerated and that he is free to leave. PT ambulated out of unit with steady gait with all belongings with him.

## 2024-08-31 NOTE — PROGRESS NOTES
Arrived to place Midline with bedside RN Ignacia. Pre-procedure and timeout done with RN, discussed limitations of placement and allergies. Cleaned with chloraprep, catheter trimmed and  guidewire inserted and advanced smoothly blood was free flowing and non-pulsatile from introducer.  Please use new IV tubing when connecting to the newly placed line.  Post procedure - reorganized pt table, placed pt in lowest position, with call light and educated on line care. Reported off to bedside RN.      Please call if you have any questions about the PICC or ML. The  will direct you to the PICC RN that is on call.      [phone]

## 2024-09-03 LAB
10OH-CARBAZEPINE SERPL-MCNC: <1 UG/ML (ref 3–35)
BACTERIA BLD CULT ORG #2: NORMAL
BACTERIA BLD CULT: NORMAL

## 2024-09-20 RX ORDER — EMPAGLIFLOZIN 10 MG/1
10 TABLET, FILM COATED ORAL DAILY
Qty: 90 TABLET | Refills: 1 | Status: SHIPPED | OUTPATIENT
Start: 2024-09-20

## 2024-10-09 ENCOUNTER — APPOINTMENT (OUTPATIENT)
Dept: CT IMAGING | Age: 34
End: 2024-10-09
Attending: STUDENT IN AN ORGANIZED HEALTH CARE EDUCATION/TRAINING PROGRAM
Payer: MEDICAID

## 2024-10-09 ENCOUNTER — APPOINTMENT (OUTPATIENT)
Dept: GENERAL RADIOLOGY | Age: 34
End: 2024-10-09
Payer: MEDICAID

## 2024-10-09 ENCOUNTER — HOSPITAL ENCOUNTER (EMERGENCY)
Age: 34
Discharge: ANOTHER HOSPITAL | End: 2024-10-09
Attending: STUDENT IN AN ORGANIZED HEALTH CARE EDUCATION/TRAINING PROGRAM
Payer: MEDICAID

## 2024-10-09 VITALS
TEMPERATURE: 97.6 F | OXYGEN SATURATION: 100 % | HEIGHT: 71 IN | DIASTOLIC BLOOD PRESSURE: 108 MMHG | BODY MASS INDEX: 16.66 KG/M2 | WEIGHT: 119 LBS | HEART RATE: 106 BPM | RESPIRATION RATE: 18 BRPM | SYSTOLIC BLOOD PRESSURE: 141 MMHG

## 2024-10-09 DIAGNOSIS — R65.10 SIRS (SYSTEMIC INFLAMMATORY RESPONSE SYNDROME) (HCC): ICD-10-CM

## 2024-10-09 DIAGNOSIS — F19.10 POLYSUBSTANCE ABUSE (HCC): ICD-10-CM

## 2024-10-09 DIAGNOSIS — R56.9 SEIZURES (HCC): Primary | ICD-10-CM

## 2024-10-09 DIAGNOSIS — E87.20 LACTIC ACIDOSIS: ICD-10-CM

## 2024-10-09 LAB
ALBUMIN SERPL-MCNC: 4.9 G/DL (ref 3.4–5)
ALBUMIN/GLOB SERPL: 1.3 {RATIO} (ref 1.1–2.2)
ALP SERPL-CCNC: 177 U/L (ref 40–129)
ALT SERPL-CCNC: 46 U/L (ref 10–40)
AMPHETAMINES UR QL SCN>1000 NG/ML: ABNORMAL
ANION GAP SERPL CALCULATED.3IONS-SCNC: 26 MMOL/L (ref 3–16)
ANISOCYTOSIS BLD QL SMEAR: ABNORMAL
AST SERPL-CCNC: 39 U/L (ref 15–37)
BARBITURATES UR QL SCN>200 NG/ML: ABNORMAL
BASOPHILS # BLD: 0.2 K/UL (ref 0–0.2)
BASOPHILS NFR BLD: 1 %
BENZODIAZ UR QL SCN>200 NG/ML: ABNORMAL
BILIRUB SERPL-MCNC: <0.2 MG/DL (ref 0–1)
BILIRUB UR QL STRIP.AUTO: NEGATIVE
BUN SERPL-MCNC: 18 MG/DL (ref 7–20)
CALCIUM SERPL-MCNC: 10 MG/DL (ref 8.3–10.6)
CANNABINOIDS UR QL SCN>50 NG/ML: POSITIVE
CHLORIDE SERPL-SCNC: 95 MMOL/L (ref 99–110)
CLARITY UR: CLEAR
CO2 SERPL-SCNC: 11 MMOL/L (ref 21–32)
COCAINE UR QL SCN: POSITIVE
COLOR UR: YELLOW
CREAT SERPL-MCNC: 1 MG/DL (ref 0.9–1.3)
DACRYOCYTES BLD QL SMEAR: ABNORMAL
DEPRECATED RDW RBC AUTO: 15.6 % (ref 12.4–15.4)
DRUG SCREEN COMMENT UR-IMP: ABNORMAL
EKG ATRIAL RATE: 133 BPM
EKG DIAGNOSIS: NORMAL
EKG P AXIS: 56 DEGREES
EKG P-R INTERVAL: 128 MS
EKG Q-T INTERVAL: 344 MS
EKG QRS DURATION: 148 MS
EKG QTC CALCULATION (BAZETT): 511 MS
EKG R AXIS: -41 DEGREES
EKG T AXIS: 97 DEGREES
EKG VENTRICULAR RATE: 133 BPM
EOSINOPHIL # BLD: 0.9 K/UL (ref 0–0.6)
EOSINOPHIL NFR BLD: 4 %
EPI CELLS #/AREA URNS HPF: NORMAL /HPF (ref 0–5)
ETHANOLAMINE SERPL-MCNC: NORMAL MG/DL (ref 0–0.08)
FENTANYL SCREEN, URINE: POSITIVE
GFR SERPLBLD CREATININE-BSD FMLA CKD-EPI: >90 ML/MIN/{1.73_M2}
GLUCOSE SERPL-MCNC: 251 MG/DL (ref 70–99)
GLUCOSE UR STRIP.AUTO-MCNC: NEGATIVE MG/DL
HCT VFR BLD AUTO: 47.2 % (ref 40.5–52.5)
HGB BLD-MCNC: 14.8 G/DL (ref 13.5–17.5)
HGB UR QL STRIP.AUTO: ABNORMAL
KETONES UR STRIP.AUTO-MCNC: NEGATIVE MG/DL
LACTATE BLDV-SCNC: 15.4 MMOL/L (ref 0.4–1.9)
LACTATE BLDV-SCNC: 2.4 MMOL/L (ref 0.4–1.9)
LEUKOCYTE ESTERASE UR QL STRIP.AUTO: NEGATIVE
LYMPHOCYTES # BLD: 8.6 K/UL (ref 1–5.1)
LYMPHOCYTES NFR BLD: 40 %
MACROCYTES BLD QL SMEAR: ABNORMAL
MCH RBC QN AUTO: 30.4 PG (ref 26–34)
MCHC RBC AUTO-ENTMCNC: 31.3 G/DL (ref 31–36)
MCV RBC AUTO: 97.1 FL (ref 80–100)
METHADONE UR QL SCN>300 NG/ML: POSITIVE
MICROCYTES BLD QL SMEAR: ABNORMAL
MONOCYTES # BLD: 0.6 K/UL (ref 0–1.3)
MONOCYTES NFR BLD: 3 %
NEUTROPHILS # BLD: 11.2 K/UL (ref 1.7–7.7)
NEUTROPHILS NFR BLD: 52 %
NITRITE UR QL STRIP.AUTO: NEGATIVE
NT-PROBNP SERPL-MCNC: 62 PG/ML (ref 0–124)
OPIATES UR QL SCN>300 NG/ML: ABNORMAL
OVALOCYTES BLD QL SMEAR: ABNORMAL
OXYCODONE UR QL SCN: ABNORMAL
PCP UR QL SCN>25 NG/ML: ABNORMAL
PH UR STRIP.AUTO: 7 [PH] (ref 5–8)
PH UR STRIP: 7 [PH]
PLATELET # BLD AUTO: 287 K/UL (ref 135–450)
PLATELET BLD QL SMEAR: ADEQUATE
PMV BLD AUTO: 7.4 FL (ref 5–10.5)
POIKILOCYTOSIS BLD QL SMEAR: ABNORMAL
POTASSIUM SERPL-SCNC: 4.6 MMOL/L (ref 3.5–5.1)
PROT SERPL-MCNC: 8.8 G/DL (ref 6.4–8.2)
PROT UR STRIP.AUTO-MCNC: 30 MG/DL
RBC # BLD AUTO: 4.86 M/UL (ref 4.2–5.9)
RBC #/AREA URNS HPF: NORMAL /HPF (ref 0–4)
SLIDE REVIEW: ABNORMAL
SODIUM SERPL-SCNC: 132 MMOL/L (ref 136–145)
SP GR UR STRIP.AUTO: 1.02 (ref 1–1.03)
TROPONIN, HIGH SENSITIVITY: 40 NG/L (ref 0–22)
TROPONIN, HIGH SENSITIVITY: 6 NG/L (ref 0–22)
UA COMPLETE W REFLEX CULTURE PNL UR: ABNORMAL
UA DIPSTICK W REFLEX MICRO PNL UR: YES
URN SPEC COLLECT METH UR: ABNORMAL
UROBILINOGEN UR STRIP-ACNC: 0.2 E.U./DL
WBC # BLD AUTO: 21.5 K/UL (ref 4–11)
WBC #/AREA URNS HPF: NORMAL /HPF (ref 0–5)

## 2024-10-09 PROCEDURE — 81001 URINALYSIS AUTO W/SCOPE: CPT

## 2024-10-09 PROCEDURE — 70450 CT HEAD/BRAIN W/O DYE: CPT

## 2024-10-09 PROCEDURE — 83605 ASSAY OF LACTIC ACID: CPT

## 2024-10-09 PROCEDURE — 96368 THER/DIAG CONCURRENT INF: CPT

## 2024-10-09 PROCEDURE — 85025 COMPLETE CBC W/AUTO DIFF WBC: CPT

## 2024-10-09 PROCEDURE — 80053 COMPREHEN METABOLIC PANEL: CPT

## 2024-10-09 PROCEDURE — 6360000002 HC RX W HCPCS: Performed by: STUDENT IN AN ORGANIZED HEALTH CARE EDUCATION/TRAINING PROGRAM

## 2024-10-09 PROCEDURE — 2500000003 HC RX 250 WO HCPCS: Performed by: STUDENT IN AN ORGANIZED HEALTH CARE EDUCATION/TRAINING PROGRAM

## 2024-10-09 PROCEDURE — 74018 RADEX ABDOMEN 1 VIEW: CPT

## 2024-10-09 PROCEDURE — 84484 ASSAY OF TROPONIN QUANT: CPT

## 2024-10-09 PROCEDURE — 93010 ELECTROCARDIOGRAM REPORT: CPT | Performed by: INTERNAL MEDICINE

## 2024-10-09 PROCEDURE — 96365 THER/PROPH/DIAG IV INF INIT: CPT

## 2024-10-09 PROCEDURE — 31500 INSERT EMERGENCY AIRWAY: CPT

## 2024-10-09 PROCEDURE — 93005 ELECTROCARDIOGRAM TRACING: CPT | Performed by: STUDENT IN AN ORGANIZED HEALTH CARE EDUCATION/TRAINING PROGRAM

## 2024-10-09 PROCEDURE — 80307 DRUG TEST PRSMV CHEM ANLYZR: CPT

## 2024-10-09 PROCEDURE — 96375 TX/PRO/DX INJ NEW DRUG ADDON: CPT

## 2024-10-09 PROCEDURE — 83880 ASSAY OF NATRIURETIC PEPTIDE: CPT

## 2024-10-09 PROCEDURE — 2580000003 HC RX 258: Performed by: STUDENT IN AN ORGANIZED HEALTH CARE EDUCATION/TRAINING PROGRAM

## 2024-10-09 PROCEDURE — 87040 BLOOD CULTURE FOR BACTERIA: CPT

## 2024-10-09 PROCEDURE — 6360000002 HC RX W HCPCS

## 2024-10-09 PROCEDURE — 96372 THER/PROPH/DIAG INJ SC/IM: CPT

## 2024-10-09 PROCEDURE — 99285 EMERGENCY DEPT VISIT HI MDM: CPT

## 2024-10-09 PROCEDURE — 71045 X-RAY EXAM CHEST 1 VIEW: CPT

## 2024-10-09 PROCEDURE — 82077 ASSAY SPEC XCP UR&BREATH IA: CPT

## 2024-10-09 PROCEDURE — 36556 INSERT NON-TUNNEL CV CATH: CPT

## 2024-10-09 RX ORDER — MIDAZOLAM HYDROCHLORIDE 5 MG/ML
5 INJECTION INTRAMUSCULAR; INTRAVENOUS ONCE
Status: COMPLETED | OUTPATIENT
Start: 2024-10-09 | End: 2024-10-09

## 2024-10-09 RX ORDER — PROPOFOL 10 MG/ML
5-50 INJECTION, EMULSION INTRAVENOUS CONTINUOUS
Status: DISCONTINUED | OUTPATIENT
Start: 2024-10-09 | End: 2024-10-09 | Stop reason: HOSPADM

## 2024-10-09 RX ORDER — MIDAZOLAM HYDROCHLORIDE 1 MG/ML
1-10 INJECTION, SOLUTION INTRAVENOUS CONTINUOUS
Status: DISCONTINUED | OUTPATIENT
Start: 2024-10-09 | End: 2024-10-09 | Stop reason: HOSPADM

## 2024-10-09 RX ORDER — MAGNESIUM SULFATE IN WATER 40 MG/ML
2000 INJECTION, SOLUTION INTRAVENOUS ONCE
Status: DISCONTINUED | OUTPATIENT
Start: 2024-10-09 | End: 2024-10-09

## 2024-10-09 RX ORDER — VANCOMYCIN 1.75 G/350ML
1250 INJECTION, SOLUTION INTRAVENOUS ONCE
Status: COMPLETED | OUTPATIENT
Start: 2024-10-09 | End: 2024-10-09

## 2024-10-09 RX ORDER — FENTANYL CITRATE-0.9 % NACL/PF 20 MCG/2ML
50 SYRINGE (ML) INTRAVENOUS EVERY 30 MIN PRN
Status: DISCONTINUED | OUTPATIENT
Start: 2024-10-09 | End: 2024-10-09 | Stop reason: HOSPADM

## 2024-10-09 RX ORDER — PROPOFOL 10 MG/ML
50 INJECTION, EMULSION INTRAVENOUS
Status: DISCONTINUED | OUTPATIENT
Start: 2024-10-09 | End: 2024-10-09 | Stop reason: HOSPADM

## 2024-10-09 RX ORDER — ETOMIDATE 2 MG/ML
INJECTION INTRAVENOUS DAILY PRN
Status: COMPLETED | OUTPATIENT
Start: 2024-10-09 | End: 2024-10-09

## 2024-10-09 RX ORDER — PROPOFOL 10 MG/ML
50 INJECTION, EMULSION INTRAVENOUS ONCE
Status: COMPLETED | OUTPATIENT
Start: 2024-10-09 | End: 2024-10-09

## 2024-10-09 RX ORDER — FENTANYL CITRATE-0.9 % NACL/PF 10 MCG/ML
25-200 PLASTIC BAG, INJECTION (ML) INTRAVENOUS CONTINUOUS
Status: DISCONTINUED | OUTPATIENT
Start: 2024-10-09 | End: 2024-10-09 | Stop reason: HOSPADM

## 2024-10-09 RX ORDER — MAGNESIUM SULFATE HEPTAHYDRATE 500 MG/ML
2000 INJECTION, SOLUTION INTRAMUSCULAR; INTRAVENOUS ONCE
Status: COMPLETED | OUTPATIENT
Start: 2024-10-09 | End: 2024-10-09

## 2024-10-09 RX ORDER — FENTANYL CITRATE-0.9 % NACL/PF 10 MCG/ML
25-200 PLASTIC BAG, INJECTION (ML) INTRAVENOUS CONTINUOUS
Status: DISCONTINUED | OUTPATIENT
Start: 2024-10-09 | End: 2024-10-09

## 2024-10-09 RX ORDER — 0.9 % SODIUM CHLORIDE 0.9 %
1000 INTRAVENOUS SOLUTION INTRAVENOUS ONCE
Status: COMPLETED | OUTPATIENT
Start: 2024-10-09 | End: 2024-10-09

## 2024-10-09 RX ORDER — ROCURONIUM BROMIDE 10 MG/ML
INJECTION, SOLUTION INTRAVENOUS DAILY PRN
Status: COMPLETED | OUTPATIENT
Start: 2024-10-09 | End: 2024-10-09

## 2024-10-09 RX ORDER — PROPOFOL 10 MG/ML
INJECTION, EMULSION INTRAVENOUS
Status: COMPLETED
Start: 2024-10-09 | End: 2024-10-09

## 2024-10-09 RX ADMIN — VANCOMYCIN 1250 MG: 1.75 INJECTION, SOLUTION INTRAVENOUS at 19:14

## 2024-10-09 RX ADMIN — PROPOFOL 20 MCG/KG/MIN: 10 INJECTION, EMULSION INTRAVENOUS at 14:45

## 2024-10-09 RX ADMIN — Medication 50 MCG: at 15:35

## 2024-10-09 RX ADMIN — DEXMEDETOMIDINE HYDROCHLORIDE 0.2 MCG/KG/HR: 100 INJECTION, SOLUTION, CONCENTRATE INTRAVENOUS at 18:59

## 2024-10-09 RX ADMIN — MIDAZOLAM 5 MG: 5 INJECTION INTRAMUSCULAR; INTRAVENOUS at 15:39

## 2024-10-09 RX ADMIN — CEFEPIME 1000 MG: 1 INJECTION, POWDER, FOR SOLUTION INTRAMUSCULAR; INTRAVENOUS at 17:54

## 2024-10-09 RX ADMIN — MIDAZOLAM 5 MG: 5 INJECTION INTRAMUSCULAR; INTRAVENOUS at 16:10

## 2024-10-09 RX ADMIN — SODIUM CHLORIDE 1000 ML: 9 INJECTION, SOLUTION INTRAVENOUS at 16:09

## 2024-10-09 RX ADMIN — MAGNESIUM SULFATE HEPTAHYDRATE 2000 MG: 500 INJECTION, SOLUTION INTRAMUSCULAR; INTRAVENOUS at 15:02

## 2024-10-09 RX ADMIN — Medication 50 MCG/HR: at 15:35

## 2024-10-09 RX ADMIN — MIDAZOLAM IN SODIUM CHLORIDE 2 MG/HR: 1 INJECTION INTRAVENOUS at 16:13

## 2024-10-09 RX ADMIN — ROCURONIUM BROMIDE 54 MG: 50 INJECTION, SOLUTION INTRAVENOUS at 14:37

## 2024-10-09 RX ADMIN — PROPOFOL 50 MG: 10 INJECTION, EMULSION INTRAVENOUS at 17:15

## 2024-10-09 RX ADMIN — PROPOFOL 20 MCG/KG/MIN: 10 INJECTION, EMULSION INTRAVENOUS at 16:58

## 2024-10-09 RX ADMIN — ETOMIDATE 16.2 MG: 2 INJECTION INTRAVENOUS at 14:37

## 2024-10-09 ASSESSMENT — PULMONARY FUNCTION TESTS
PIF_VALUE: 21
PIF_VALUE: 13

## 2024-10-09 ASSESSMENT — PAIN SCALES - GENERAL
PAINLEVEL_OUTOF10: 0
PAINLEVEL_OUTOF10: 0

## 2024-10-09 NOTE — PROGRESS NOTES
10/09/24 1716   Patient Observation   Pulse (!) 110   Respirations 16   SpO2 100 %   Vent Information   Vent Mode AC/PRVC   Ventilator Settings   FiO2  70 %   Insp Time (sec) 1 sec   Resp Rate (Set) 16 bpm   Target Vt 500   PEEP/CPAP (cmH2O) 8   Vent Patient Data (Readings)   Vt (Measured) 617 mL   Peak Inspiratory Pressure (cmH2O) 13 cmH2O   Rate Measured 17 br/min   Minute Volume (L/min) 9.82 Liters   Mean Airway Pressure (cmH2O) 10 cmH20   I:E Ratio 1:2.6   Flow Sensitivity 3 L/min   Backup Apnea On   Backup Rate 16 Breaths Per Minute   Backup Vt 500   Vent Alarm Settings   High Pressure (cmH2O) 40 cmH2O   Low Minute Volume (lpm) 2 L/min   High Minute Volume (lpm) 20 L/min   Low Exhaled Vt (ml) 200 mL   High Exhaled Vt (ml) 1000 mL   Apnea (secs) 20 secs   Additional Respiratoray Assessments   Humidification Source HME   Ambu Bag With Mask At Bedside Yes   ETT    Placement Date/Time: 10/09/24 1439   Present on Admission/Arrival: No  Placed By: In ED  Airway Tube Size: 7.5 mm  Location: Oral  Secured At: 23 cm  Measured From: Lips   Secured At 23 cm   Measured From Lips   ETT Placement Right   Secured By Commercial tube young   Site Assessment Dry   Cuff Pressure 34 cm H2O   Tie/Young Changed No   Patient Transport   Time Spent Transporting 16-30   Transport Ventillation Type Transport vent   Transport From ER   Transport Destination CT scan   Transport Destination ER   Supplemental Gases   Supplemental Gases None   Respiratory   Respiratory Interventions Suction - oral

## 2024-10-09 NOTE — PROGRESS NOTES
10/09/24 1445   Breath Sounds   Right Upper Lobe Diminished   Right Middle Lobe Diminished   Right Lower Lobe Diminished   Left Upper Lobe Diminished   Left Lower Lobe Diminished   Vent Information   Ventilator Initiate Yes   Vent Mode AC/PRVC   $Ventilation $Initial Day   Ventilator Settings   FiO2  100 %   Insp Time (sec) 1 sec   Resp Rate (Set) 16 bpm   Target Vt 500   PEEP/CPAP (cmH2O) 10   Vent Patient Data (Readings)   Vt (Measured) 486 mL   Peak Inspiratory Pressure (cmH2O) 21 cmH2O   Rate Measured 16 br/min   Minute Volume (L/min) 7.82 Liters   Mean Airway Pressure (cmH2O) 13 cmH20   I:E Ratio 1:2.8   Flow Sensitivity 3 L/min   Backup Apnea On   Backup Rate 16 Breaths Per Minute   Backup Vt 500   Vent Alarm Settings   High Pressure (cmH2O) 40 cmH2O   Low Minute Volume (lpm) 2 L/min   High Minute Volume (lpm) 20 L/min   Low Exhaled Vt (ml) 200 mL   High Exhaled Vt (ml) 1000 mL   Apnea (secs) 20 secs   Additional Respiratoray Assessments   Humidification Source HME   Ambu Bag With Mask At Bedside Yes   Airway Clearance   Suction   (iniotially intubation)   ETT    Placement Date/Time: 10/09/24 1439   Present on Admission/Arrival: No  Placed By: In ED  Airway Tube Size: 7.5 mm  Location: Oral  Secured At: 23 cm  Measured From: Lips   Secured At 23 cm   Measured From Lips   ETT Placement Center   Secured By Commercial tube young   Site Assessment Dry   Cuff Pressure 34 cm H2O   Tie/Young Changed No   Supplemental Gases   Supplemental Gases None   Respiratory   Respiratory Interventions   (initially intubation)
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
## 2024-10-09 NOTE — ED NOTES
@4110 paged Dr. Husain per Dr. Reardon   RE: breakthrough seizure, not back to baseline  @1668 Dr. Husain called back and spoke with Dr. Reardon

## 2024-10-10 LAB
EKG ATRIAL RATE: 153 BPM
EKG DIAGNOSIS: NORMAL
EKG P-R INTERVAL: 96 MS
EKG Q-T INTERVAL: 332 MS
EKG QRS DURATION: 134 MS
EKG QTC CALCULATION (BAZETT): 530 MS
EKG R AXIS: -40 DEGREES
EKG T AXIS: 100 DEGREES
EKG VENTRICULAR RATE: 153 BPM
PATH INTERP BLD-IMP: NORMAL

## 2024-10-10 PROCEDURE — 93010 ELECTROCARDIOGRAM REPORT: CPT | Performed by: INTERNAL MEDICINE

## 2024-10-12 NOTE — ED NOTES
Attempted to call  neuro to update staff on blood culture. Staff states pt was discharged and no representative is available to take results on a discharged pt. Attempted to call pt to check on his status and was unable to reach pt. Message left with manager for follow up with pt

## 2024-10-13 LAB
BACTERIA BLD CULT ORG #2: NORMAL
BACTERIA BLD CULT: ABNORMAL
BACTERIA BLD CULT: ABNORMAL
ORGANISM: ABNORMAL

## 2024-10-14 LAB
BACTERIA BLD CULT: ABNORMAL
ORGANISM: ABNORMAL

## 2024-10-22 ENCOUNTER — HOSPITAL ENCOUNTER (EMERGENCY)
Age: 34
Discharge: LEFT BEFORE DISCHARGE | End: 2024-10-22
Attending: EMERGENCY MEDICINE
Payer: MEDICAID

## 2024-10-22 ENCOUNTER — APPOINTMENT (OUTPATIENT)
Dept: GENERAL RADIOLOGY | Age: 34
End: 2024-10-22
Payer: MEDICAID

## 2024-10-22 VITALS
RESPIRATION RATE: 23 BRPM | OXYGEN SATURATION: 92 % | WEIGHT: 119 LBS | DIASTOLIC BLOOD PRESSURE: 83 MMHG | SYSTOLIC BLOOD PRESSURE: 133 MMHG | BODY MASS INDEX: 16.6 KG/M2 | TEMPERATURE: 97.9 F | HEART RATE: 102 BPM

## 2024-10-22 DIAGNOSIS — G40.909 SEIZURE DISORDER (HCC): Primary | ICD-10-CM

## 2024-10-22 DIAGNOSIS — R79.89 ELEVATED TROPONIN: ICD-10-CM

## 2024-10-22 DIAGNOSIS — R51.9 ACUTE NONINTRACTABLE HEADACHE, UNSPECIFIED HEADACHE TYPE: ICD-10-CM

## 2024-10-22 DIAGNOSIS — Z53.29 LEFT AGAINST MEDICAL ADVICE: ICD-10-CM

## 2024-10-22 LAB
ALBUMIN SERPL-MCNC: 4 G/DL (ref 3.4–5)
ALBUMIN/GLOB SERPL: 1.3 {RATIO} (ref 1.1–2.2)
ALP SERPL-CCNC: 117 U/L (ref 40–129)
ALT SERPL-CCNC: 28 U/L (ref 10–40)
AMPHETAMINES UR QL SCN>1000 NG/ML: ABNORMAL
ANION GAP SERPL CALCULATED.3IONS-SCNC: 10 MMOL/L (ref 3–16)
APAP SERPL-MCNC: <5 UG/ML (ref 10–30)
AST SERPL-CCNC: 37 U/L (ref 15–37)
BARBITURATES UR QL SCN>200 NG/ML: ABNORMAL
BASE EXCESS BLDV CALC-SCNC: -2.1 MMOL/L (ref -3–3)
BASOPHILS # BLD: 0.1 K/UL (ref 0–0.2)
BASOPHILS NFR BLD: 0.3 %
BENZODIAZ UR QL SCN>200 NG/ML: POSITIVE
BILIRUB SERPL-MCNC: <0.2 MG/DL (ref 0–1)
BILIRUB UR QL STRIP.AUTO: NEGATIVE
BUN SERPL-MCNC: 14 MG/DL (ref 7–20)
CALCIUM SERPL-MCNC: 9 MG/DL (ref 8.3–10.6)
CANNABINOIDS UR QL SCN>50 NG/ML: POSITIVE
CHLORIDE SERPL-SCNC: 104 MMOL/L (ref 99–110)
CLARITY UR: CLEAR
CO2 BLDV-SCNC: 24 MMOL/L
CO2 SERPL-SCNC: 24 MMOL/L (ref 21–32)
COCAINE UR QL SCN: ABNORMAL
COHGB MFR BLDV: 3.3 % (ref 0–1.5)
COLOR UR: ABNORMAL
CREAT SERPL-MCNC: 0.9 MG/DL (ref 0.9–1.3)
DEPRECATED RDW RBC AUTO: 15.6 % (ref 12.4–15.4)
DRUG SCREEN COMMENT UR-IMP: ABNORMAL
EOSINOPHIL # BLD: 0.1 K/UL (ref 0–0.6)
EOSINOPHIL NFR BLD: 0.2 %
EPI CELLS #/AREA URNS HPF: NORMAL /HPF (ref 0–5)
ETHANOLAMINE SERPL-MCNC: NORMAL MG/DL (ref 0–0.08)
FENTANYL SCREEN, URINE: ABNORMAL
GFR SERPLBLD CREATININE-BSD FMLA CKD-EPI: >90 ML/MIN/{1.73_M2}
GLUCOSE SERPL-MCNC: 96 MG/DL (ref 70–99)
GLUCOSE UR STRIP.AUTO-MCNC: NEGATIVE MG/DL
HCO3 BLDV-SCNC: 22.9 MMOL/L (ref 23–29)
HCT VFR BLD AUTO: 40.1 % (ref 40.5–52.5)
HGB BLD-MCNC: 13.2 G/DL (ref 13.5–17.5)
HGB UR QL STRIP.AUTO: ABNORMAL
KETONES UR STRIP.AUTO-MCNC: NEGATIVE MG/DL
LACTATE BLDV-SCNC: 2.2 MMOL/L (ref 0.4–2)
LACTATE BLDV-SCNC: 3.2 MMOL/L (ref 0.4–2)
LEUKOCYTE ESTERASE UR QL STRIP.AUTO: NEGATIVE
LYMPHOCYTES # BLD: 1.3 K/UL (ref 1–5.1)
LYMPHOCYTES NFR BLD: 5.6 %
MAGNESIUM SERPL-MCNC: 2.04 MG/DL (ref 1.8–2.4)
MCH RBC QN AUTO: 30.2 PG (ref 26–34)
MCHC RBC AUTO-ENTMCNC: 32.9 G/DL (ref 31–36)
MCV RBC AUTO: 91.9 FL (ref 80–100)
METHADONE UR QL SCN>300 NG/ML: POSITIVE
METHGB MFR BLDV: 0.1 %
MONOCYTES # BLD: 0.6 K/UL (ref 0–1.3)
MONOCYTES NFR BLD: 2.3 %
NEUTROPHILS # BLD: 21.9 K/UL (ref 1.7–7.7)
NEUTROPHILS NFR BLD: 91.6 %
NITRITE UR QL STRIP.AUTO: NEGATIVE
O2 THERAPY: ABNORMAL
OPIATES UR QL SCN>300 NG/ML: ABNORMAL
OXYCODONE UR QL SCN: ABNORMAL
PCO2 BLDV: 39.7 MMHG (ref 40–50)
PCP UR QL SCN>25 NG/ML: ABNORMAL
PH BLDV: 7.38 [PH] (ref 7.35–7.45)
PH UR STRIP.AUTO: 6 [PH] (ref 5–8)
PH UR STRIP: 6 [PH]
PLATELET # BLD AUTO: 316 K/UL (ref 135–450)
PMV BLD AUTO: 7.6 FL (ref 5–10.5)
PO2 BLDV: 60.1 MMHG (ref 25–40)
POTASSIUM SERPL-SCNC: 4.4 MMOL/L (ref 3.5–5.1)
PROT SERPL-MCNC: 7 G/DL (ref 6.4–8.2)
PROT UR STRIP.AUTO-MCNC: 30 MG/DL
RBC # BLD AUTO: 4.36 M/UL (ref 4.2–5.9)
RBC #/AREA URNS HPF: NORMAL /HPF (ref 0–4)
SALICYLATES SERPL-MCNC: 0.5 MG/DL (ref 15–30)
SAO2 % BLDV: 91 %
SODIUM SERPL-SCNC: 138 MMOL/L (ref 136–145)
SP GR UR STRIP.AUTO: >=1.03 (ref 1–1.03)
TROPONIN, HIGH SENSITIVITY: 29 NG/L (ref 0–22)
TROPONIN, HIGH SENSITIVITY: 32 NG/L (ref 0–22)
UA COMPLETE W REFLEX CULTURE PNL UR: ABNORMAL
UA DIPSTICK W REFLEX MICRO PNL UR: YES
URN SPEC COLLECT METH UR: ABNORMAL
UROBILINOGEN UR STRIP-ACNC: 0.2 E.U./DL
WBC # BLD AUTO: 23.9 K/UL (ref 4–11)
WBC #/AREA URNS HPF: NORMAL /HPF (ref 0–5)

## 2024-10-22 PROCEDURE — 99285 EMERGENCY DEPT VISIT HI MDM: CPT

## 2024-10-22 PROCEDURE — 82077 ASSAY SPEC XCP UR&BREATH IA: CPT

## 2024-10-22 PROCEDURE — 80179 DRUG ASSAY SALICYLATE: CPT

## 2024-10-22 PROCEDURE — 96372 THER/PROPH/DIAG INJ SC/IM: CPT

## 2024-10-22 PROCEDURE — 6360000002 HC RX W HCPCS: Performed by: EMERGENCY MEDICINE

## 2024-10-22 PROCEDURE — 80053 COMPREHEN METABOLIC PANEL: CPT

## 2024-10-22 PROCEDURE — 93005 ELECTROCARDIOGRAM TRACING: CPT | Performed by: EMERGENCY MEDICINE

## 2024-10-22 PROCEDURE — 83735 ASSAY OF MAGNESIUM: CPT

## 2024-10-22 PROCEDURE — 83605 ASSAY OF LACTIC ACID: CPT

## 2024-10-22 PROCEDURE — 80307 DRUG TEST PRSMV CHEM ANLYZR: CPT

## 2024-10-22 PROCEDURE — 80143 DRUG ASSAY ACETAMINOPHEN: CPT

## 2024-10-22 PROCEDURE — 84484 ASSAY OF TROPONIN QUANT: CPT

## 2024-10-22 PROCEDURE — 82803 BLOOD GASES ANY COMBINATION: CPT

## 2024-10-22 PROCEDURE — 85025 COMPLETE CBC W/AUTO DIFF WBC: CPT

## 2024-10-22 PROCEDURE — 81001 URINALYSIS AUTO W/SCOPE: CPT

## 2024-10-22 RX ORDER — MIDAZOLAM HYDROCHLORIDE 1 MG/ML
2 INJECTION, SOLUTION INTRAMUSCULAR; INTRAVENOUS ONCE
Status: DISCONTINUED | OUTPATIENT
Start: 2024-10-22 | End: 2024-10-22 | Stop reason: HOSPADM

## 2024-10-22 RX ORDER — LORAZEPAM 2 MG/ML
2 INJECTION INTRAMUSCULAR ONCE
Status: COMPLETED | OUTPATIENT
Start: 2024-10-22 | End: 2024-10-22

## 2024-10-22 RX ORDER — DIPHENHYDRAMINE HYDROCHLORIDE 50 MG/ML
25 INJECTION INTRAMUSCULAR; INTRAVENOUS ONCE
Status: COMPLETED | OUTPATIENT
Start: 2024-10-22 | End: 2024-10-22

## 2024-10-22 RX ORDER — MIDAZOLAM HYDROCHLORIDE 2 MG/ML
4 SYRUP ORAL ONCE
Status: DISCONTINUED | OUTPATIENT
Start: 2024-10-22 | End: 2024-10-22

## 2024-10-22 RX ORDER — HALOPERIDOL 5 MG/ML
5 INJECTION INTRAMUSCULAR ONCE
Status: COMPLETED | OUTPATIENT
Start: 2024-10-22 | End: 2024-10-22

## 2024-10-22 RX ORDER — MIDAZOLAM HYDROCHLORIDE 5 MG/ML
2 INJECTION, SOLUTION INTRAMUSCULAR; INTRAVENOUS ONCE
Status: COMPLETED | OUTPATIENT
Start: 2024-10-22 | End: 2024-10-22

## 2024-10-22 RX ORDER — MORPHINE SULFATE 4 MG/ML
4 INJECTION, SOLUTION INTRAMUSCULAR; INTRAVENOUS ONCE
Status: DISCONTINUED | OUTPATIENT
Start: 2024-10-22 | End: 2024-10-22 | Stop reason: HOSPADM

## 2024-10-22 RX ORDER — 0.9 % SODIUM CHLORIDE 0.9 %
500 INTRAVENOUS SOLUTION INTRAVENOUS ONCE
Status: DISCONTINUED | OUTPATIENT
Start: 2024-10-22 | End: 2024-10-22 | Stop reason: HOSPADM

## 2024-10-22 RX ORDER — MIDAZOLAM HYDROCHLORIDE 1 MG/ML
4 INJECTION, SOLUTION INTRAMUSCULAR; INTRAVENOUS ONCE
Status: DISCONTINUED | OUTPATIENT
Start: 2024-10-22 | End: 2024-10-22 | Stop reason: HOSPADM

## 2024-10-22 RX ADMIN — HALOPERIDOL LACTATE 5 MG: 5 INJECTION, SOLUTION INTRAMUSCULAR at 16:29

## 2024-10-22 RX ADMIN — DIPHENHYDRAMINE HYDROCHLORIDE 25 MG: 50 INJECTION INTRAMUSCULAR; INTRAVENOUS at 16:29

## 2024-10-22 RX ADMIN — MIDAZOLAM 2 MG: 5 INJECTION INTRAMUSCULAR; INTRAVENOUS at 17:49

## 2024-10-22 RX ADMIN — LORAZEPAM 2 MG: 2 INJECTION INTRAMUSCULAR; INTRAVENOUS at 16:01

## 2024-10-22 NOTE — ED NOTES
Patient currently sleeping, eyes open on stimulus but will not stay alert to follow directions. Family at bedside at this time.
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
## 2024-10-22 NOTE — ED NOTES
Unable to get IV in patient even in 4 point restraints d/t violent behavior and unable to redirect patient, pt arm held by ER staff x2 but unable to hold pt's arm still enough to do US IV, pt has poor peripheral venous access. Pt attempting to bite and spit on staff. Dr. Perera aware.

## 2024-10-22 NOTE — ED NOTES
Patient still noncompliant with IV start by Dr. Perera, medications ordered. Family still at bedside
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
## 2024-10-22 NOTE — ED NOTES
Dr. Perera and 4 RN staff at bedside to help hold patient for IM haldol and benadryl. When stuck pt stated \"I hate all you fuckers here at [facility]\" but will not answer LOC questions.   Pt nodded head yes to performing EKG at this time.
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
## 2024-10-22 NOTE — ED NOTES
Collected urine sample from pt, pt's mom stating \"you are just drug testing him to prove us wrong he does not do drugs\"

## 2024-10-23 LAB
EKG ATRIAL RATE: 104 BPM
EKG ATRIAL RATE: 104 BPM
EKG DIAGNOSIS: NORMAL
EKG DIAGNOSIS: NORMAL
EKG P AXIS: 58 DEGREES
EKG P AXIS: 58 DEGREES
EKG P-R INTERVAL: 144 MS
EKG P-R INTERVAL: 148 MS
EKG Q-T INTERVAL: 372 MS
EKG Q-T INTERVAL: 376 MS
EKG QRS DURATION: 124 MS
EKG QRS DURATION: 126 MS
EKG QTC CALCULATION (BAZETT): 489 MS
EKG QTC CALCULATION (BAZETT): 494 MS
EKG R AXIS: -21 DEGREES
EKG R AXIS: -43 DEGREES
EKG T AXIS: 101 DEGREES
EKG T AXIS: 93 DEGREES
EKG VENTRICULAR RATE: 104 BPM
EKG VENTRICULAR RATE: 104 BPM

## 2024-10-23 PROCEDURE — 93010 ELECTROCARDIOGRAM REPORT: CPT | Performed by: INTERNAL MEDICINE
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
## 2024-10-23 NOTE — DISCHARGE INSTRUCTIONS
We did recommend staying for repeat troponin and possible admission to make sure your heart was okay but at this time you are refused.    You are always welcome back to the emergency department if you change your mind or can go to another hospital.    Follow-up with your doctor tomorrow for further lab work as discussed with your mother.

## 2024-10-30 RX ORDER — PANTOPRAZOLE SODIUM 40 MG/1
40 TABLET, DELAYED RELEASE ORAL DAILY
Qty: 30 TABLET | Refills: 3 | OUTPATIENT
Start: 2024-10-30

## 2024-12-03 ENCOUNTER — OFFICE VISIT (OUTPATIENT)
Dept: CARDIOLOGY CLINIC | Age: 34
End: 2024-12-03
Payer: MEDICAID

## 2024-12-03 VITALS
HEART RATE: 61 BPM | BODY MASS INDEX: 17.5 KG/M2 | DIASTOLIC BLOOD PRESSURE: 58 MMHG | OXYGEN SATURATION: 98 % | SYSTOLIC BLOOD PRESSURE: 102 MMHG | HEIGHT: 71 IN | WEIGHT: 125 LBS

## 2024-12-03 DIAGNOSIS — Z72.0 TOBACCO ABUSE: ICD-10-CM

## 2024-12-03 DIAGNOSIS — I42.9 CARDIOMYOPATHY, UNSPECIFIED TYPE (HCC): Primary | ICD-10-CM

## 2024-12-03 DIAGNOSIS — I50.22 SYSTOLIC CHF, CHRONIC (HCC): ICD-10-CM

## 2024-12-03 DIAGNOSIS — R01.1 HEART MURMUR: ICD-10-CM

## 2024-12-03 PROCEDURE — 99214 OFFICE O/P EST MOD 30 MIN: CPT | Performed by: NURSE PRACTITIONER

## 2024-12-03 PROCEDURE — 3078F DIAST BP <80 MM HG: CPT | Performed by: NURSE PRACTITIONER

## 2024-12-03 PROCEDURE — 3074F SYST BP LT 130 MM HG: CPT | Performed by: NURSE PRACTITIONER

## 2024-12-03 RX ORDER — METOPROLOL SUCCINATE 25 MG/1
12.5 TABLET, EXTENDED RELEASE ORAL DAILY
Qty: 90 TABLET | Refills: 1 | Status: SHIPPED | OUTPATIENT
Start: 2024-12-03

## 2024-12-03 RX ORDER — SULFAMETHOXAZOLE AND TRIMETHOPRIM 800; 160 MG/1; MG/1
1 TABLET ORAL 2 TIMES DAILY
COMMUNITY
Start: 2024-11-20

## 2024-12-03 NOTE — PROGRESS NOTES
[facility]   Cardiac Follow-up    Primary Care Doctor:  No primary care provider on file.    Chief Complaint   Patient presents with    1 Year Follow Up    Hypertension    Cardiomyopathy    Congestive Heart Failure      History of Present Illness:   I had the pleasure of seeing Adam Trujillo in follow up for stress-induced cardiomyopathy      Admitted 2/21/2021-2/27/2021 with seizures, acute encephalopathy, septic shock, respiratory failure, NSTEMI, aspiration, stress-induced cardiomyopathy with LVEF 25% requiring dobutamine support, status post left heart cath showed normal coronaries.  Repeat echocardiogram 7/2021 showed recovered EF 55%  Amitted 1/12/23-1/20/2023 with status epilepticus, septic shock, pneumonia, markedly elevated troponin, wide complex tachycardia, repeat echocardiogram completed showed LVEF down to less than 20% with severe diffuse hypokinesis, and possible questionable apical thrombus. He had cardiac MRI showing improved LVEF to 59% and mild myocardial edema suggestive of recovering stress induced cardiomyopathy   Admitted 8/8/2023-8/16/2023 with seizures and cardiac arrest, required CPR x2 and transvenous pacer.  LVEF 20%. Also treated for bacteremia     Since last visit, he has had left finger osteomylitis. Seeing plastic surgery for this through .   He is off of Fentanyl  and cocaine.   Taking 120mg from methadone clinic  He has been on his entresto and his metoprolol  Has had a few epsidoes of the dizziness with position changes in the last 1 week, no syncope.  Has some ongoing nausea.   Some left side chest pain when he goes to get up from laying down.   Some shortness of breath with exertion.   He had some leg edema last week, but improved this week.     Appetite: up    Living with mom and dad now; no longer living with girlfriend.    Past Medical History:   has a past medical history of Acute respiratory failure with hypoxia and hypercapnia, Aspiration pneumonia (HCC),

## 2024-12-03 NOTE — PATIENT INSTRUCTIONS
Repeat echocardiogram - call to schedule [phone]   Adjust metoprolol to 1/2 tab daily   Spironolactone (aldactone) 12.5 mg (1/2 tab of the 25mg tablet) 3 times a week   Continue entresto 1 tab twice a day  Continue jardiance 10mg daily  Check b/p if you are feeling weak and dizzy  Follow up in 8-10 weeks

## 2024-12-23 NOTE — TELEPHONE ENCOUNTER
Last Office Visit: 12/3/2024 Provider: SHE  **Is provider OOT? Yes    Next Office Visit: 2/3/2025 Provider: SHE    Lab orders needed? no   Encounter provider correct? Yes If not, change provider  Script changes since last refill? no    LAST LABS:   BMP:   Lab Results   Component Value Date/Time     10/22/2024 06:25 PM    K 4.4 10/22/2024 06:25 PM    K 4.6 10/09/2024 02:02 PM     10/22/2024 06:25 PM    CO2 24 10/22/2024 06:25 PM    BUN 14 10/22/2024 06:25 PM    CREATININE 0.9 10/22/2024 06:25 PM    GLUCOSE 96 10/22/2024 06:25 PM    CALCIUM 9.0 10/22/2024 06:25 PM    LABGLOM >90 10/22/2024 06:25 PM    LABGLOM >60 03/04/2024 08:09 PM

## 2024-12-26 ENCOUNTER — HOSPITAL ENCOUNTER (EMERGENCY)
Age: 34
Discharge: HOME | End: 2024-12-27
Attending: EMERGENCY MEDICINE
Payer: MEDICAID

## 2024-12-26 ENCOUNTER — APPOINTMENT (OUTPATIENT)
Dept: GENERAL RADIOLOGY | Age: 34
End: 2024-12-26
Payer: MEDICAID

## 2024-12-26 DIAGNOSIS — R56.9 SEIZURE (HCC): Primary | ICD-10-CM

## 2024-12-26 LAB
ALBUMIN SERPL-MCNC: 4.4 G/DL (ref 3.4–5)
ALBUMIN/GLOB SERPL: 1.4 {RATIO} (ref 1.1–2.2)
ALP SERPL-CCNC: 127 U/L (ref 40–129)
ALT SERPL-CCNC: 57 U/L (ref 10–40)
ANION GAP SERPL CALCULATED.3IONS-SCNC: 16 MMOL/L (ref 3–16)
AST SERPL-CCNC: 43 U/L (ref 15–37)
BASE EXCESS BLDV CALC-SCNC: -3.4 MMOL/L (ref -3–3)
BASOPHILS # BLD: 0.1 K/UL (ref 0–0.2)
BASOPHILS NFR BLD: 0.5 %
BILIRUB SERPL-MCNC: <0.2 MG/DL (ref 0–1)
BUN SERPL-MCNC: 18 MG/DL (ref 7–20)
CALCIUM SERPL-MCNC: 9.3 MG/DL (ref 8.3–10.6)
CHLORIDE SERPL-SCNC: 100 MMOL/L (ref 99–110)
CO2 BLDV-SCNC: 24 MMOL/L
CO2 SERPL-SCNC: 22 MMOL/L (ref 21–32)
COHGB MFR BLDV: 7.8 % (ref 0–1.5)
CREAT SERPL-MCNC: 0.9 MG/DL (ref 0.9–1.3)
DEPRECATED RDW RBC AUTO: 14.2 % (ref 12.4–15.4)
EOSINOPHIL # BLD: 0.2 K/UL (ref 0–0.6)
EOSINOPHIL NFR BLD: 1.1 %
GFR SERPLBLD CREATININE-BSD FMLA CKD-EPI: >90 ML/MIN/{1.73_M2}
GLUCOSE SERPL-MCNC: 146 MG/DL (ref 70–99)
HCO3 BLDV-SCNC: 22.2 MMOL/L (ref 23–29)
HCT VFR BLD AUTO: 41.9 % (ref 40.5–52.5)
HGB BLD-MCNC: 14.1 G/DL (ref 13.5–17.5)
HIV1+2 AB SERPLBLD QL IA.RAPID: NORMAL
LACTATE BLDV-SCNC: 1.7 MMOL/L (ref 0.4–2)
LACTATE BLDV-SCNC: 5.6 MMOL/L (ref 0.4–2)
LYMPHOCYTES # BLD: 2.5 K/UL (ref 1–5.1)
LYMPHOCYTES NFR BLD: 15.7 %
MCH RBC QN AUTO: 31.8 PG (ref 26–34)
MCHC RBC AUTO-ENTMCNC: 33.7 G/DL (ref 31–36)
MCV RBC AUTO: 94.3 FL (ref 80–100)
METHGB MFR BLDV: 0.3 %
MONOCYTES # BLD: 0.6 K/UL (ref 0–1.3)
MONOCYTES NFR BLD: 3.8 %
NEUTROPHILS # BLD: 12.7 K/UL (ref 1.7–7.7)
NEUTROPHILS NFR BLD: 78.9 %
O2 THERAPY: ABNORMAL
PCO2 BLDV: 42 MMHG (ref 40–50)
PH BLDV: 7.34 [PH] (ref 7.35–7.45)
PLATELET # BLD AUTO: 234 K/UL (ref 135–450)
PMV BLD AUTO: 8 FL (ref 5–10.5)
PO2 BLDV: 36 MMHG (ref 25–40)
POTASSIUM SERPL-SCNC: 4.1 MMOL/L (ref 3.5–5.1)
PROT SERPL-MCNC: 7.5 G/DL (ref 6.4–8.2)
RBC # BLD AUTO: 4.45 M/UL (ref 4.2–5.9)
SAO2 % BLDV: 72 %
SODIUM SERPL-SCNC: 138 MMOL/L (ref 136–145)
TROPONIN, HIGH SENSITIVITY: 16 NG/L (ref 0–22)
WBC # BLD AUTO: 16.1 K/UL (ref 4–11)

## 2024-12-26 PROCEDURE — 85025 COMPLETE CBC W/AUTO DIFF WBC: CPT

## 2024-12-26 PROCEDURE — 80053 COMPREHEN METABOLIC PANEL: CPT

## 2024-12-26 PROCEDURE — 84484 ASSAY OF TROPONIN QUANT: CPT

## 2024-12-26 PROCEDURE — 83605 ASSAY OF LACTIC ACID: CPT

## 2024-12-26 PROCEDURE — 71045 X-RAY EXAM CHEST 1 VIEW: CPT

## 2024-12-26 PROCEDURE — 2500000003 HC RX 250 WO HCPCS: Performed by: EMERGENCY MEDICINE

## 2024-12-26 PROCEDURE — 36415 COLL VENOUS BLD VENIPUNCTURE: CPT

## 2024-12-26 PROCEDURE — 96374 THER/PROPH/DIAG INJ IV PUSH: CPT

## 2024-12-26 PROCEDURE — 99285 EMERGENCY DEPT VISIT HI MDM: CPT

## 2024-12-26 PROCEDURE — 81001 URINALYSIS AUTO W/SCOPE: CPT

## 2024-12-26 PROCEDURE — 87522 HEPATITIS C REVRS TRNSCRPJ: CPT

## 2024-12-26 PROCEDURE — 86701 HIV-1ANTIBODY: CPT

## 2024-12-26 PROCEDURE — 93005 ELECTROCARDIOGRAM TRACING: CPT | Performed by: EMERGENCY MEDICINE

## 2024-12-26 PROCEDURE — 96372 THER/PROPH/DIAG INJ SC/IM: CPT

## 2024-12-26 PROCEDURE — 80074 ACUTE HEPATITIS PANEL: CPT

## 2024-12-26 PROCEDURE — 6360000002 HC RX W HCPCS: Performed by: EMERGENCY MEDICINE

## 2024-12-26 PROCEDURE — 82803 BLOOD GASES ANY COMBINATION: CPT

## 2024-12-26 RX ORDER — HALOPERIDOL 5 MG/ML
5 INJECTION INTRAMUSCULAR ONCE
Status: COMPLETED | OUTPATIENT
Start: 2024-12-26 | End: 2024-12-26

## 2024-12-26 RX ORDER — KETAMINE HYDROCHLORIDE 100 MG/ML
20 INJECTION, SOLUTION INTRAMUSCULAR; INTRAVENOUS ONCE
Status: COMPLETED | OUTPATIENT
Start: 2024-12-26 | End: 2024-12-26

## 2024-12-26 RX ORDER — LORAZEPAM 2 MG/ML
2 INJECTION INTRAMUSCULAR ONCE
Status: COMPLETED | OUTPATIENT
Start: 2024-12-26 | End: 2024-12-26

## 2024-12-26 RX ORDER — DIPHENHYDRAMINE HYDROCHLORIDE 50 MG/ML
50 INJECTION INTRAMUSCULAR; INTRAVENOUS ONCE
Status: COMPLETED | OUTPATIENT
Start: 2024-12-26 | End: 2024-12-26

## 2024-12-26 RX ADMIN — DIPHENHYDRAMINE HYDROCHLORIDE 50 MG: 50 INJECTION INTRAMUSCULAR; INTRAVENOUS at 21:46

## 2024-12-26 RX ADMIN — HALOPERIDOL LACTATE 5 MG: 5 INJECTION, SOLUTION INTRAMUSCULAR at 21:47

## 2024-12-26 RX ADMIN — LORAZEPAM 2 MG: 2 INJECTION INTRAMUSCULAR; INTRAVENOUS at 21:46

## 2024-12-26 RX ADMIN — KETAMINE HYDROCHLORIDE 20 MG: 100 INJECTION, SOLUTION, CONCENTRATE INTRAMUSCULAR; INTRAVENOUS at 22:05

## 2024-12-27 VITALS
RESPIRATION RATE: 15 BRPM | BODY MASS INDEX: 17.43 KG/M2 | OXYGEN SATURATION: 95 % | SYSTOLIC BLOOD PRESSURE: 127 MMHG | WEIGHT: 125 LBS | TEMPERATURE: 97.6 F | HEART RATE: 79 BPM | DIASTOLIC BLOOD PRESSURE: 77 MMHG

## 2024-12-27 LAB
AMORPH SED URNS QL MICRO: ABNORMAL /HPF
BACTERIA URNS QL MICRO: ABNORMAL /HPF
BILIRUB UR QL STRIP.AUTO: NEGATIVE
CLARITY UR: ABNORMAL
COLOR UR: YELLOW
EKG ATRIAL RATE: 106 BPM
EKG DIAGNOSIS: NORMAL
EKG P AXIS: 65 DEGREES
EKG P-R INTERVAL: 158 MS
EKG Q-T INTERVAL: 384 MS
EKG QRS DURATION: 126 MS
EKG QTC CALCULATION (BAZETT): 510 MS
EKG R AXIS: -30 DEGREES
EKG T AXIS: 107 DEGREES
EKG VENTRICULAR RATE: 106 BPM
GLUCOSE UR STRIP.AUTO-MCNC: 250 MG/DL
HAV IGM SERPL QL IA: ABNORMAL
HBV CORE IGM SERPL QL IA: ABNORMAL
HBV SURFACE AG SERPL QL IA: ABNORMAL
HCV AB SERPL QL IA: REACTIVE
HGB UR QL STRIP.AUTO: NEGATIVE
KETONES UR STRIP.AUTO-MCNC: NEGATIVE MG/DL
LEUKOCYTE ESTERASE UR QL STRIP.AUTO: NEGATIVE
NITRITE UR QL STRIP.AUTO: NEGATIVE
PH UR STRIP.AUTO: 7.5 [PH] (ref 5–8)
PROT UR STRIP.AUTO-MCNC: 100 MG/DL
RBC #/AREA URNS HPF: ABNORMAL /HPF (ref 0–4)
SP GR UR STRIP.AUTO: 1.02 (ref 1–1.03)
UA COMPLETE W REFLEX CULTURE PNL UR: ABNORMAL
UA DIPSTICK W REFLEX MICRO PNL UR: YES
URN SPEC COLLECT METH UR: ABNORMAL
UROBILINOGEN UR STRIP-ACNC: 0.2 E.U./DL
WBC #/AREA URNS HPF: ABNORMAL /HPF (ref 0–5)

## 2024-12-27 PROCEDURE — 93010 ELECTROCARDIOGRAM REPORT: CPT | Performed by: INTERNAL MEDICINE
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
## 2024-12-27 NOTE — ED NOTES
Placed on 4 point restraint due to danger to self and others. Due meds given as ordered MD at bedside
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
## 2024-12-30 LAB
HCV RNA SERPL NAA+PROBE-ACNC: ABNORMAL IU/ML
HCV RNA SERPL NAA+PROBE-LOG IU: 7.19 LOG IU/ML
HCV RNA SERPL QL NAA+PROBE: DETECTED

## 2025-01-28 ENCOUNTER — HOSPITAL ENCOUNTER (OUTPATIENT)
Dept: CARDIOLOGY | Age: 35
Discharge: HOME | End: 2025-01-30
Payer: MEDICAID

## 2025-01-28 VITALS
BODY MASS INDEX: 17.5 KG/M2 | HEIGHT: 71 IN | SYSTOLIC BLOOD PRESSURE: 132 MMHG | WEIGHT: 125 LBS | DIASTOLIC BLOOD PRESSURE: 74 MMHG

## 2025-01-28 DIAGNOSIS — R01.1 HEART MURMUR: ICD-10-CM

## 2025-01-28 DIAGNOSIS — I42.9 CARDIOMYOPATHY, UNSPECIFIED TYPE (HCC): ICD-10-CM

## 2025-01-28 LAB
ECHO AO ASC DIAM: 2.8 CM
ECHO AO ASCENDING AORTA INDEX: 1.62 CM/M2
ECHO AO ROOT DIAM: 2.6 CM
ECHO AO ROOT INDEX: 1.5 CM/M2
ECHO AV MEAN GRADIENT: 6 MMHG
ECHO AV MEAN VELOCITY: 1.1 M/S
ECHO AV PEAK GRADIENT: 11 MMHG
ECHO AV PEAK VELOCITY: 1.7 M/S
ECHO AV VELOCITY RATIO: 0.59
ECHO AV VTI: 32.7 CM
ECHO BSA: 1.69 M2
ECHO EST RA PRESSURE: 3 MMHG
ECHO LA AREA 2C: 17.7 CM2
ECHO LA AREA 4C: 16.8 CM2
ECHO LA DIAMETER INDEX: 1.73 CM/M2
ECHO LA DIAMETER: 3 CM
ECHO LA MAJOR AXIS: 5.5 CM
ECHO LA MINOR AXIS: 4.6 CM
ECHO LA TO AORTIC ROOT RATIO: 1.15
ECHO LA VOL BP: 48 ML (ref 18–58)
ECHO LA VOL MOD A2C: 51 ML (ref 18–58)
ECHO LA VOL MOD A4C: 39 ML (ref 18–58)
ECHO LA VOL/BSA BIPLANE: 28 ML/M2 (ref 16–34)
ECHO LA VOLUME INDEX MOD A2C: 29 ML/M2 (ref 16–34)
ECHO LA VOLUME INDEX MOD A4C: 23 ML/M2 (ref 16–34)
ECHO LV E' LATERAL VELOCITY: 20.9 CM/S
ECHO LV E' SEPTAL VELOCITY: 12.5 CM/S
ECHO LV EDV 3D: 150 ML
ECHO LV EDV INDEX 3D: 87 ML/M2
ECHO LV EJECTION FRACTION 3D: 48 %
ECHO LV ESV 3D: 79 ML
ECHO LV ESV INDEX 3D: 46 ML/M2
ECHO LV FRACTIONAL SHORTENING: 27 % (ref 28–44)
ECHO LV GLOBAL LONGITUDINAL STRAIN (GLS): -17.8 %
ECHO LV GLOBAL LONGITUDINAL STRAIN (GLS): -18.2 %
ECHO LV GLOBAL LONGITUDINAL STRAIN (GLS): -18.5 %
ECHO LV GLOBAL LONGITUDINAL STRAIN (GLS): -19.4 %
ECHO LV INTERNAL DIMENSION DIASTOLE INDEX: 2.54 CM/M2
ECHO LV INTERNAL DIMENSION DIASTOLIC: 4.4 CM (ref 4.2–5.9)
ECHO LV INTERNAL DIMENSION SYSTOLIC INDEX: 1.85 CM/M2
ECHO LV INTERNAL DIMENSION SYSTOLIC: 3.2 CM
ECHO LV IVSD: 0.7 CM (ref 0.6–1)
ECHO LV MASS 2D: 92.1 G (ref 88–224)
ECHO LV MASS 3D INDEX: 86.1 G/M2
ECHO LV MASS 3D: 149 G
ECHO LV MASS INDEX 2D: 53.2 G/M2 (ref 49–115)
ECHO LV POSTERIOR WALL DIASTOLIC: 0.7 CM (ref 0.6–1)
ECHO LV RELATIVE WALL THICKNESS RATIO: 0.32
ECHO LVOT AV VTI INDEX: 0.54
ECHO LVOT MEAN GRADIENT: 2 MMHG
ECHO LVOT PEAK GRADIENT: 4 MMHG
ECHO LVOT PEAK VELOCITY: 1 M/S
ECHO LVOT VTI: 17.8 CM
ECHO MV A VELOCITY: 0.55 M/S
ECHO MV E DECELERATION TIME (DT): 206 MS
ECHO MV E VELOCITY: 0.76 M/S
ECHO MV E/A RATIO: 1.38
ECHO MV E/E' LATERAL: 3.64
ECHO MV E/E' RATIO (AVERAGED): 4.86
ECHO MV E/E' SEPTAL: 6.08
ECHO RA AREA 4C: 8.2 CM2
ECHO RA END SYSTOLIC VOLUME APICAL 4 CHAMBER INDEX BSA: 9 ML/M2
ECHO RA VOLUME: 16 ML
ECHO RIGHT VENTRICULAR SYSTOLIC PRESSURE (RVSP): 21 MMHG
ECHO RV FREE WALL PEAK S': 12.6 CM/S
ECHO RV TAPSE: 2 CM (ref 1.7–?)
ECHO TV E WAVE: 2.1 M/S
ECHO TV REGURGITANT MAX VELOCITY: 2.1 M/S
ECHO TV REGURGITANT PEAK GRADIENT: 17 MMHG

## 2025-01-28 PROCEDURE — 93306 TTE W/DOPPLER COMPLETE: CPT

## 2025-01-28 PROCEDURE — 93356 MYOCRD STRAIN IMG SPCKL TRCK: CPT | Performed by: INTERNAL MEDICINE

## 2025-01-28 PROCEDURE — 93306 TTE W/DOPPLER COMPLETE: CPT | Performed by: INTERNAL MEDICINE

## 2025-01-30 NOTE — RESULT ENCOUNTER NOTE
Please call patient with echocardiogram results.  Echo shows heart function has improved.    Continue current regimen.  Keep follow-up to discuss further

## 2025-06-02 ENCOUNTER — APPOINTMENT (OUTPATIENT)
Dept: CT IMAGING | Age: 35
End: 2025-06-02
Payer: MEDICAID

## 2025-06-02 ENCOUNTER — APPOINTMENT (OUTPATIENT)
Dept: GENERAL RADIOLOGY | Age: 35
End: 2025-06-02
Payer: MEDICAID

## 2025-06-02 ENCOUNTER — HOSPITAL ENCOUNTER (EMERGENCY)
Age: 35
Discharge: ANOTHER HOSPITAL | End: 2025-06-03
Attending: STUDENT IN AN ORGANIZED HEALTH CARE EDUCATION/TRAINING PROGRAM
Payer: MEDICAID

## 2025-06-02 DIAGNOSIS — G40.919 BREAKTHROUGH SEIZURE (HCC): Primary | ICD-10-CM

## 2025-06-02 LAB
ALBUMIN SERPL-MCNC: 4 G/DL (ref 3.4–5)
ALBUMIN/GLOB SERPL: 1.1 {RATIO} (ref 1.1–2.2)
ALP SERPL-CCNC: 121 U/L (ref 40–129)
ALT SERPL-CCNC: 29 U/L (ref 10–40)
AMPHETAMINES UR QL SCN>1000 NG/ML: ABNORMAL
ANION GAP SERPL CALCULATED.3IONS-SCNC: 23 MMOL/L (ref 3–16)
AST SERPL-CCNC: 31 U/L (ref 15–37)
BARBITURATES UR QL SCN>200 NG/ML: ABNORMAL
BASOPHILS # BLD: 0.1 K/UL (ref 0–0.2)
BASOPHILS NFR BLD: 0.6 %
BENZODIAZ UR QL SCN>200 NG/ML: POSITIVE
BILIRUB SERPL-MCNC: <0.2 MG/DL (ref 0–1)
BILIRUB UR QL STRIP.AUTO: NEGATIVE
BUN SERPL-MCNC: 11 MG/DL (ref 7–20)
CALCIUM SERPL-MCNC: 8.1 MG/DL (ref 8.3–10.6)
CANNABINOIDS UR QL SCN>50 NG/ML: POSITIVE
CHLORIDE SERPL-SCNC: 100 MMOL/L (ref 99–110)
CLARITY UR: CLEAR
CO2 SERPL-SCNC: 12 MMOL/L (ref 21–32)
COCAINE UR QL SCN: ABNORMAL
COLOR UR: YELLOW
CREAT SERPL-MCNC: 0.9 MG/DL (ref 0.9–1.3)
CRYSTALS URNS MICRO: ABNORMAL /HPF
DEPRECATED RDW RBC AUTO: 14.4 % (ref 12.4–15.4)
DRUG SCREEN COMMENT UR-IMP: ABNORMAL
EOSINOPHIL # BLD: 0.1 K/UL (ref 0–0.6)
EOSINOPHIL NFR BLD: 0.4 %
EPI CELLS #/AREA URNS HPF: ABNORMAL /HPF (ref 0–5)
ETHANOLAMINE SERPL-MCNC: NORMAL MG/DL (ref 0–0.08)
FENTANYL SCREEN, URINE: POSITIVE
GFR SERPLBLD CREATININE-BSD FMLA CKD-EPI: >90 ML/MIN/{1.73_M2}
GLUCOSE SERPL-MCNC: 139 MG/DL (ref 70–99)
GLUCOSE UR STRIP.AUTO-MCNC: NEGATIVE MG/DL
HCT VFR BLD AUTO: 46.1 % (ref 40.5–52.5)
HGB BLD-MCNC: 15 G/DL (ref 13.5–17.5)
HGB UR QL STRIP.AUTO: ABNORMAL
KETONES UR STRIP.AUTO-MCNC: NEGATIVE MG/DL
LEUKOCYTE ESTERASE UR QL STRIP.AUTO: NEGATIVE
LYMPHOCYTES # BLD: 3.4 K/UL (ref 1–5.1)
LYMPHOCYTES NFR BLD: 14.1 %
MCH RBC QN AUTO: 31.4 PG (ref 26–34)
MCHC RBC AUTO-ENTMCNC: 32.6 G/DL (ref 31–36)
MCV RBC AUTO: 96.4 FL (ref 80–100)
METHADONE UR QL SCN>300 NG/ML: POSITIVE
MONOCYTES # BLD: 1.4 K/UL (ref 0–1.3)
MONOCYTES NFR BLD: 5.8 %
NEUTROPHILS # BLD: 19.1 K/UL (ref 1.7–7.7)
NEUTROPHILS NFR BLD: 79.1 %
NITRITE UR QL STRIP.AUTO: NEGATIVE
OPIATES UR QL SCN>300 NG/ML: ABNORMAL
OXYCODONE UR QL SCN: ABNORMAL
PCP UR QL SCN>25 NG/ML: ABNORMAL
PH UR STRIP.AUTO: 6 [PH] (ref 5–8)
PH UR STRIP: 6 [PH]
PLATELET # BLD AUTO: 273 K/UL (ref 135–450)
PMV BLD AUTO: 7.4 FL (ref 5–10.5)
POTASSIUM SERPL-SCNC: 4 MMOL/L (ref 3.5–5.1)
PROT SERPL-MCNC: 7.5 G/DL (ref 6.4–8.2)
PROT UR STRIP.AUTO-MCNC: 30 MG/DL
RBC # BLD AUTO: 4.78 M/UL (ref 4.2–5.9)
RBC #/AREA URNS HPF: ABNORMAL /HPF (ref 0–4)
REASON FOR REJECTION: NORMAL
REJECTED TEST: NORMAL
SODIUM SERPL-SCNC: 135 MMOL/L (ref 136–145)
SP GR UR STRIP.AUTO: >=1.03 (ref 1–1.03)
TSH SERPL DL<=0.005 MIU/L-ACNC: 3.56 UIU/ML (ref 0.27–4.2)
UA DIPSTICK W REFLEX MICRO PNL UR: YES
URN SPEC COLLECT METH UR: ABNORMAL
UROBILINOGEN UR STRIP-ACNC: 0.2 E.U./DL
WBC # BLD AUTO: 24.1 K/UL (ref 4–11)
WBC #/AREA URNS HPF: ABNORMAL /HPF (ref 0–5)

## 2025-06-02 PROCEDURE — 96372 THER/PROPH/DIAG INJ SC/IM: CPT

## 2025-06-02 PROCEDURE — 36415 COLL VENOUS BLD VENIPUNCTURE: CPT

## 2025-06-02 PROCEDURE — 70450 CT HEAD/BRAIN W/O DYE: CPT

## 2025-06-02 PROCEDURE — 2500000003 HC RX 250 WO HCPCS

## 2025-06-02 PROCEDURE — 2580000003 HC RX 258: Performed by: STUDENT IN AN ORGANIZED HEALTH CARE EDUCATION/TRAINING PROGRAM

## 2025-06-02 PROCEDURE — 82077 ASSAY SPEC XCP UR&BREATH IA: CPT

## 2025-06-02 PROCEDURE — 2500000003 HC RX 250 WO HCPCS: Performed by: STUDENT IN AN ORGANIZED HEALTH CARE EDUCATION/TRAINING PROGRAM

## 2025-06-02 PROCEDURE — 99285 EMERGENCY DEPT VISIT HI MDM: CPT

## 2025-06-02 PROCEDURE — 96375 TX/PRO/DX INJ NEW DRUG ADDON: CPT

## 2025-06-02 PROCEDURE — 85025 COMPLETE CBC W/AUTO DIFF WBC: CPT

## 2025-06-02 PROCEDURE — 6360000002 HC RX W HCPCS: Performed by: STUDENT IN AN ORGANIZED HEALTH CARE EDUCATION/TRAINING PROGRAM

## 2025-06-02 PROCEDURE — 81001 URINALYSIS AUTO W/SCOPE: CPT

## 2025-06-02 PROCEDURE — 93005 ELECTROCARDIOGRAM TRACING: CPT | Performed by: STUDENT IN AN ORGANIZED HEALTH CARE EDUCATION/TRAINING PROGRAM

## 2025-06-02 PROCEDURE — 80307 DRUG TEST PRSMV CHEM ANLYZR: CPT

## 2025-06-02 PROCEDURE — 80053 COMPREHEN METABOLIC PANEL: CPT

## 2025-06-02 PROCEDURE — 71045 X-RAY EXAM CHEST 1 VIEW: CPT

## 2025-06-02 PROCEDURE — 96376 TX/PRO/DX INJ SAME DRUG ADON: CPT

## 2025-06-02 PROCEDURE — 84443 ASSAY THYROID STIM HORMONE: CPT

## 2025-06-02 PROCEDURE — 80183 DRUG SCRN QUANT OXCARBAZEPIN: CPT

## 2025-06-02 PROCEDURE — 96361 HYDRATE IV INFUSION ADD-ON: CPT

## 2025-06-02 RX ORDER — KETAMINE HYDROCHLORIDE 100 MG/ML
250 INJECTION, SOLUTION INTRAMUSCULAR; INTRAVENOUS ONCE
Status: COMPLETED | OUTPATIENT
Start: 2025-06-02 | End: 2025-06-02

## 2025-06-02 RX ORDER — DROPERIDOL 2.5 MG/ML
2.5 INJECTION, SOLUTION INTRAMUSCULAR; INTRAVENOUS ONCE
Status: COMPLETED | OUTPATIENT
Start: 2025-06-02 | End: 2025-06-02

## 2025-06-02 RX ORDER — LORAZEPAM 2 MG/ML
2 INJECTION INTRAMUSCULAR ONCE
Status: COMPLETED | OUTPATIENT
Start: 2025-06-02 | End: 2025-06-02

## 2025-06-02 RX ORDER — KETAMINE HYDROCHLORIDE 100 MG/ML
INJECTION, SOLUTION INTRAMUSCULAR; INTRAVENOUS
Status: COMPLETED
Start: 2025-06-02 | End: 2025-06-02

## 2025-06-02 RX ORDER — VALPROATE SODIUM 100 MG/ML
20 INJECTION, SOLUTION INTRAVENOUS ONCE
Status: DISCONTINUED | OUTPATIENT
Start: 2025-06-02 | End: 2025-06-02 | Stop reason: DRUGHIGH

## 2025-06-02 RX ORDER — LORAZEPAM 2 MG/ML
2 INJECTION INTRAMUSCULAR ONCE
Status: DISCONTINUED | OUTPATIENT
Start: 2025-06-02 | End: 2025-06-03 | Stop reason: HOSPADM

## 2025-06-02 RX ADMIN — KETAMINE HYDROCHLORIDE 250 MG: 100 INJECTION INTRAMUSCULAR; INTRAVENOUS at 21:17

## 2025-06-02 RX ADMIN — LORAZEPAM 2 MG: 2 INJECTION INTRAMUSCULAR; INTRAVENOUS at 21:15

## 2025-06-02 RX ADMIN — SODIUM BICARBONATE: 84 INJECTION, SOLUTION INTRAVENOUS at 20:51

## 2025-06-02 RX ADMIN — DROPERIDOL 2.5 MG: 2.5 INJECTION, SOLUTION INTRAMUSCULAR; INTRAVENOUS at 21:39

## 2025-06-02 RX ADMIN — KETAMINE HYDROCHLORIDE 250 MG: 100 INJECTION, SOLUTION INTRAMUSCULAR; INTRAVENOUS at 21:17

## 2025-06-02 RX ADMIN — LORAZEPAM 2 MG: 2 INJECTION INTRAMUSCULAR; INTRAVENOUS at 19:10

## 2025-06-02 ASSESSMENT — LIFESTYLE VARIABLES
HOW MANY STANDARD DRINKS CONTAINING ALCOHOL DO YOU HAVE ON A TYPICAL DAY: PATIENT DOES NOT DRINK
HOW OFTEN DO YOU HAVE A DRINK CONTAINING ALCOHOL: NEVER

## 2025-06-03 ENCOUNTER — HOSPITAL ENCOUNTER (INPATIENT)
Age: 35
LOS: 1 days | Discharge: HOME | DRG: 053 | End: 2025-06-04
Attending: INTERNAL MEDICINE | Admitting: INTERNAL MEDICINE
Payer: MEDICAID

## 2025-06-03 VITALS
DIASTOLIC BLOOD PRESSURE: 69 MMHG | SYSTOLIC BLOOD PRESSURE: 120 MMHG | TEMPERATURE: 98.4 F | OXYGEN SATURATION: 93 % | HEART RATE: 90 BPM | RESPIRATION RATE: 20 BRPM

## 2025-06-03 DIAGNOSIS — G40.909 RECURRENT SEIZURES (HCC): Primary | ICD-10-CM

## 2025-06-03 LAB
ANION GAP SERPL CALCULATED.3IONS-SCNC: 13 MMOL/L (ref 3–16)
BUN SERPL-MCNC: 15 MG/DL (ref 7–20)
CALCIUM SERPL-MCNC: 9.3 MG/DL (ref 8.3–10.6)
CHLORIDE SERPL-SCNC: 102 MMOL/L (ref 99–110)
CO2 SERPL-SCNC: 26 MMOL/L (ref 21–32)
CREAT SERPL-MCNC: 1.4 MG/DL (ref 0.9–1.3)
EKG ATRIAL RATE: 87 BPM
EKG DIAGNOSIS: NORMAL
EKG P AXIS: 71 DEGREES
EKG P-R INTERVAL: 164 MS
EKG Q-T INTERVAL: 416 MS
EKG QRS DURATION: 136 MS
EKG QTC CALCULATION (BAZETT): 500 MS
EKG R AXIS: -55 DEGREES
EKG T AXIS: 97 DEGREES
EKG VENTRICULAR RATE: 87 BPM
GFR SERPLBLD CREATININE-BSD FMLA CKD-EPI: 67 ML/MIN/{1.73_M2}
GLUCOSE SERPL-MCNC: 119 MG/DL (ref 70–99)
POTASSIUM SERPL-SCNC: 4 MMOL/L (ref 3.5–5.1)
SODIUM SERPL-SCNC: 141 MMOL/L (ref 136–145)

## 2025-06-03 PROCEDURE — 2580000003 HC RX 258: Performed by: STUDENT IN AN ORGANIZED HEALTH CARE EDUCATION/TRAINING PROGRAM

## 2025-06-03 PROCEDURE — 93010 ELECTROCARDIOGRAM REPORT: CPT | Performed by: INTERNAL MEDICINE

## 2025-06-03 PROCEDURE — 6360000002 HC RX W HCPCS: Performed by: INTERNAL MEDICINE

## 2025-06-03 PROCEDURE — 96375 TX/PRO/DX INJ NEW DRUG ADDON: CPT

## 2025-06-03 PROCEDURE — 2500000003 HC RX 250 WO HCPCS: Performed by: PSYCHIATRY & NEUROLOGY

## 2025-06-03 PROCEDURE — G0379 DIRECT REFER HOSPITAL OBSERV: HCPCS

## 2025-06-03 PROCEDURE — 2500000003 HC RX 250 WO HCPCS

## 2025-06-03 PROCEDURE — 6370000000 HC RX 637 (ALT 250 FOR IP): Performed by: INTERNAL MEDICINE

## 2025-06-03 PROCEDURE — 96376 TX/PRO/DX INJ SAME DRUG ADON: CPT

## 2025-06-03 PROCEDURE — 96366 THER/PROPH/DIAG IV INF ADDON: CPT

## 2025-06-03 PROCEDURE — 96372 THER/PROPH/DIAG INJ SC/IM: CPT

## 2025-06-03 PROCEDURE — 2580000003 HC RX 258: Performed by: INTERNAL MEDICINE

## 2025-06-03 PROCEDURE — 96365 THER/PROPH/DIAG IV INF INIT: CPT

## 2025-06-03 PROCEDURE — 36415 COLL VENOUS BLD VENIPUNCTURE: CPT

## 2025-06-03 PROCEDURE — 96374 THER/PROPH/DIAG INJ IV PUSH: CPT

## 2025-06-03 PROCEDURE — 2500000003 HC RX 250 WO HCPCS: Performed by: STUDENT IN AN ORGANIZED HEALTH CARE EDUCATION/TRAINING PROGRAM

## 2025-06-03 PROCEDURE — 2060000000 HC ICU INTERMEDIATE R&B

## 2025-06-03 PROCEDURE — 80048 BASIC METABOLIC PNL TOTAL CA: CPT

## 2025-06-03 PROCEDURE — G0378 HOSPITAL OBSERVATION PER HR: HCPCS

## 2025-06-03 RX ORDER — ENOXAPARIN SODIUM 100 MG/ML
40 INJECTION SUBCUTANEOUS DAILY
Status: DISCONTINUED | OUTPATIENT
Start: 2025-06-03 | End: 2025-06-04 | Stop reason: HOSPADM

## 2025-06-03 RX ORDER — METOPROLOL SUCCINATE 25 MG/1
12.5 TABLET, EXTENDED RELEASE ORAL DAILY
Status: DISCONTINUED | OUTPATIENT
Start: 2025-06-03 | End: 2025-06-04 | Stop reason: HOSPADM

## 2025-06-03 RX ORDER — SPIRONOLACTONE 25 MG/1
25 TABLET ORAL DAILY
Status: DISCONTINUED | OUTPATIENT
Start: 2025-06-03 | End: 2025-06-04 | Stop reason: HOSPADM

## 2025-06-03 RX ORDER — ONDANSETRON 2 MG/ML
4 INJECTION INTRAMUSCULAR; INTRAVENOUS EVERY 6 HOURS PRN
Status: DISCONTINUED | OUTPATIENT
Start: 2025-06-03 | End: 2025-06-04 | Stop reason: HOSPADM

## 2025-06-03 RX ORDER — POTASSIUM CHLORIDE 1500 MG/1
40 TABLET, EXTENDED RELEASE ORAL PRN
Status: DISCONTINUED | OUTPATIENT
Start: 2025-06-03 | End: 2025-06-04 | Stop reason: HOSPADM

## 2025-06-03 RX ORDER — ACETAMINOPHEN 650 MG/1
650 SUPPOSITORY RECTAL EVERY 6 HOURS PRN
Status: DISCONTINUED | OUTPATIENT
Start: 2025-06-03 | End: 2025-06-04 | Stop reason: HOSPADM

## 2025-06-03 RX ORDER — OXCARBAZEPINE 300 MG/1
1200 TABLET, FILM COATED ORAL 2 TIMES DAILY
Status: DISCONTINUED | OUTPATIENT
Start: 2025-06-03 | End: 2025-06-04 | Stop reason: HOSPADM

## 2025-06-03 RX ORDER — METHADONE HYDROCHLORIDE 10 MG/ML
160 CONCENTRATE ORAL DAILY
Refills: 0 | Status: DISCONTINUED | OUTPATIENT
Start: 2025-06-03 | End: 2025-06-04 | Stop reason: HOSPADM

## 2025-06-03 RX ORDER — POTASSIUM CHLORIDE 7.45 MG/ML
10 INJECTION INTRAVENOUS PRN
Status: DISCONTINUED | OUTPATIENT
Start: 2025-06-03 | End: 2025-06-04 | Stop reason: HOSPADM

## 2025-06-03 RX ORDER — OXCARBAZEPINE 300 MG/1
900 TABLET, FILM COATED ORAL 2 TIMES DAILY
Status: DISCONTINUED | OUTPATIENT
Start: 2025-06-03 | End: 2025-06-03

## 2025-06-03 RX ORDER — LORAZEPAM 2 MG/ML
4 INJECTION INTRAMUSCULAR EVERY 5 MIN PRN
Status: DISCONTINUED | OUTPATIENT
Start: 2025-06-03 | End: 2025-06-04 | Stop reason: HOSPADM

## 2025-06-03 RX ORDER — POLYETHYLENE GLYCOL 3350 17 G/17G
17 POWDER, FOR SOLUTION ORAL DAILY PRN
Status: DISCONTINUED | OUTPATIENT
Start: 2025-06-03 | End: 2025-06-04 | Stop reason: HOSPADM

## 2025-06-03 RX ORDER — TAMSULOSIN HYDROCHLORIDE 0.4 MG/1
0.4 CAPSULE ORAL NIGHTLY
Status: DISCONTINUED | OUTPATIENT
Start: 2025-06-03 | End: 2025-06-04 | Stop reason: HOSPADM

## 2025-06-03 RX ORDER — ACETAMINOPHEN 325 MG/1
650 TABLET ORAL EVERY 6 HOURS PRN
Status: DISCONTINUED | OUTPATIENT
Start: 2025-06-03 | End: 2025-06-04 | Stop reason: HOSPADM

## 2025-06-03 RX ORDER — SODIUM CHLORIDE 9 MG/ML
INJECTION, SOLUTION INTRAVENOUS PRN
Status: DISCONTINUED | OUTPATIENT
Start: 2025-06-03 | End: 2025-06-04 | Stop reason: HOSPADM

## 2025-06-03 RX ORDER — SODIUM CHLORIDE 9 MG/ML
INJECTION, SOLUTION INTRAVENOUS CONTINUOUS
Status: ACTIVE | OUTPATIENT
Start: 2025-06-03 | End: 2025-06-03

## 2025-06-03 RX ORDER — SODIUM CHLORIDE 0.9 % (FLUSH) 0.9 %
5-40 SYRINGE (ML) INJECTION PRN
Status: DISCONTINUED | OUTPATIENT
Start: 2025-06-03 | End: 2025-06-04 | Stop reason: HOSPADM

## 2025-06-03 RX ORDER — CENOBAMATE 150 MG/1
150 TABLET, FILM COATED ORAL 2 TIMES DAILY
COMMUNITY
Start: 2025-04-09

## 2025-06-03 RX ORDER — ONDANSETRON 4 MG/1
4 TABLET, ORALLY DISINTEGRATING ORAL EVERY 8 HOURS PRN
Status: DISCONTINUED | OUTPATIENT
Start: 2025-06-03 | End: 2025-06-04 | Stop reason: HOSPADM

## 2025-06-03 RX ORDER — SODIUM CHLORIDE 0.9 % (FLUSH) 0.9 %
5-40 SYRINGE (ML) INJECTION EVERY 12 HOURS SCHEDULED
Status: DISCONTINUED | OUTPATIENT
Start: 2025-06-03 | End: 2025-06-04 | Stop reason: HOSPADM

## 2025-06-03 RX ORDER — PANTOPRAZOLE SODIUM 40 MG/1
40 TABLET, DELAYED RELEASE ORAL DAILY
Status: DISCONTINUED | OUTPATIENT
Start: 2025-06-03 | End: 2025-06-04 | Stop reason: HOSPADM

## 2025-06-03 RX ORDER — MAGNESIUM SULFATE IN WATER 40 MG/ML
2000 INJECTION, SOLUTION INTRAVENOUS PRN
Status: DISCONTINUED | OUTPATIENT
Start: 2025-06-03 | End: 2025-06-04 | Stop reason: HOSPADM

## 2025-06-03 RX ADMIN — PANTOPRAZOLE SODIUM 40 MG: 40 TABLET, DELAYED RELEASE ORAL at 10:00

## 2025-06-03 RX ADMIN — BRIVARACETAM 50 MG: 50 INJECTION, SUSPENSION INTRAVENOUS at 19:21

## 2025-06-03 RX ADMIN — TAMSULOSIN HYDROCHLORIDE 0.4 MG: 0.4 CAPSULE ORAL at 21:46

## 2025-06-03 RX ADMIN — SODIUM CHLORIDE: 0.9 INJECTION, SOLUTION INTRAVENOUS at 06:02

## 2025-06-03 RX ADMIN — SPIRONOLACTONE 25 MG: 25 TABLET ORAL at 10:00

## 2025-06-03 RX ADMIN — ENOXAPARIN SODIUM 40 MG: 100 INJECTION SUBCUTANEOUS at 10:00

## 2025-06-03 RX ADMIN — SACUBITRIL AND VALSARTAN 1 TABLET: 24; 26 TABLET, FILM COATED ORAL at 21:46

## 2025-06-03 RX ADMIN — OXCARBAZEPINE 1200 MG: 300 TABLET, FILM COATED ORAL at 21:45

## 2025-06-03 RX ADMIN — METOPROLOL SUCCINATE 12.5 MG: 25 TABLET, FILM COATED, EXTENDED RELEASE ORAL at 10:00

## 2025-06-03 RX ADMIN — VALPROATE SODIUM 1000 MG: 100 INJECTION, SOLUTION INTRAVENOUS at 00:03

## 2025-06-03 RX ADMIN — SACUBITRIL AND VALSARTAN 1 TABLET: 24; 26 TABLET, FILM COATED ORAL at 10:00

## 2025-06-03 RX ADMIN — ONDANSETRON 4 MG: 2 INJECTION, SOLUTION INTRAMUSCULAR; INTRAVENOUS at 11:31

## 2025-06-03 RX ADMIN — BRIVARACETAM 100 MG: 50 INJECTION, SUSPENSION INTRAVENOUS at 14:01

## 2025-06-03 RX ADMIN — OXCARBAZEPINE 1200 MG: 300 TABLET, FILM COATED ORAL at 10:08

## 2025-06-03 RX ADMIN — Medication 160 MG: at 10:13

## 2025-06-03 NOTE — H&P
Hospital Medicine History & Physical      Date of Admission: 6/3/2025    Date of Service:  Pt seen/examined on 06/03/25     [x]Admitted to Inpatient with expected LOS greater than two midnights due to medical therapy.  []Placed in Observation status.    Chief Admission Complaint: Recurrent breakthrough seizure    Presenting Admission History:      35 y.o. male with PMHx significant for seizure disorder, IV drug use, opioid dependence, cardiomyopathy, essential hypertension, tobacco use disorder who presented to ED of [facility] with a complaint of recurrent seizure.  Patient had about 4 episodes of seizure at home and 1 episode of seizure in ED.  After the seizure patient was postictal with increased confusion and agitation.  Patient was given valproic acid and IV Versed.  Patient calm down.  Patient has history of IV drug use and according to his mother he was clean for about 3 months however he relapsed last week and used IV fentanyl.  Patient is active smoker.  Mother) at the bedside and provides most information denies that the patient uses alcohol.  Patient cannot provide any adequate history due to postictal state patient was transferred to the [facility] for further evaluation and possible need for continuous EEG.    ROS:     Patient cannot provide due to postictal state and sedation    Assessment:  Breakthrough seizure.  Cardiomyopathy.  Essential hypertension.  IV drug use.  Opioid dependence on methadone.  Leukocytosis, due to margination from seizure.  Tobacco use disorder    Plan:    Patient is admitted under inpatient status.  Continue home Trileptal  As needed IV Ativan/Valium for breakthrough seizure  Seizure precautions.  Keep n.p.o. for now while the patient is heavily sedated.  Neurology is consulted.  Defer decision for EEG and any need for further imaging to the neurology team.  Need to confirm the dose of methadone from patient's methadone program  Resume the rest of home medications  Liz DONG MD       Labs: Personally reviewed and interpreted for clinical significance.   Recent Labs     06/02/25 1915   WBC 24.1*   HGB 15.0   HCT 46.1        Recent Labs     06/02/25 1930   *   K 4.0      CO2 12*   BUN 11   CREATININE 0.9   CALCIUM 8.1*     No results for input(s): \"PROBNP\", \"TROPHS\" in the last 72 hours.  No results for input(s): \"LABA1C\" in the last 72 hours.  Recent Labs     06/02/25 1930   AST 31   ALT 29   BILITOT <0.2   ALKPHOS 121     Recent Labs     06/02/25 1930   TSH 3.56        Vishal Mistry MD

## 2025-06-03 NOTE — PROGRESS NOTES
Patient is A&Ox 4. VSS on RA. No seizure activity this shift, seizure precautions are in place. Pt denies pain. Patient is tolerating PO diet. Tolerating ambulation x 1 assist with GB. Voiding via BRP. Patient updated on plan of care. Fall and safety precautions in place, call light within reach.

## 2025-06-03 NOTE — PROGRESS NOTES
Multiple calls made to mom about whereabouts on pt's Xcopri, has not answered or come back today to drop off medication.
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
## 2025-06-03 NOTE — PLAN OF CARE
Neurology Plan of Care:  Consult received for breakthrough seizure    According to most recent Neurology note AEDs are   XCOPRI 300 mg daily (150 mg BID per pharmacy note)  Oxtellar XR 2400 mg daily    Patient presented to ER on 6/2 for breakthrough seizure, reported by mother to have 5 that day. Brother at bedside reports he would come out of them quickly. Was post ictal for EMS on arrival and received 5 mg of versed. On arrival to ER was flailing and non responsive. Got 2 mg of ativan. Has a known history of post ictal aggression and was given ketamine in the ER. He does receive his neurologic care at , but due to capacity they recommended transfer to facility with EEG capabilities.     Per chart review most breakthrough events in the past have been in the setting of substance abuse or medication non compliance. On my visit patient drowsy but awake. Reports recent compliance wth AEDs but brother at bedside reports he has had nausea/vomiting for the past few days and has had trouble keeping anything (including medications) down. He denies any recent drug/alcohol use though his UDS is positive for marijuana and fentanyl. His brother reports he has about 10 - 15 seizures per year, some focal and some tonic clonic.     Exam:  -Mental status: Drowsy, A&O x4;  -Speech & Language: Delayed responses but no aphasia; no dysarthria  -Cranial nerves: pupils symmetric; no notable dysconjugate gaze; eyes midline; no facial asymmetry  -Motor: moving all extremities symmetrically and fully with good strength throughout  -Sensory: full sensation throughout  - Normal tone throughout    Impression/Plan:  36 yo with history of seizures and IVDU who presents with breakthrough seizure. Family reports it is likely he has missed a few doses of AEDs given nausea/vomiting. He reports increased stress lately. Denies drug/alcohol use but UDS positive for fentanyl, and substance use could certainly lead to breakthrough seizure    - Continue  home oxtelar (not formulary here, on Oxcarb BID at equivalent dose)  - Continue home Xcopri  - If family is not able to bring in Xcopri soon, then will give dose of IV Briviact  - No need for EEG at this time as patient waking up appropriately. If further events will plan for EEG  - Last MRI 2023, given known seizure history no need to repeat. CT head in ER without abnormality  - Patient to be seen by attending Neurologist today, see consult note for further/updated recs    LEIGHTON William-CNP  Neurology & Neurocritical Care   Neurology Line: [phone]  [employer]: [facility] NPs
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
## 2025-06-03 NOTE — PLAN OF CARE
Problem: Safety - Adult  Goal: Free from fall injury  6/3/2025 1812 by Zuleima Thakur RN  Outcome: Progressing  Note: Pt free of fall injury this shift. All proper safety precautions are in place. Call light is within reach. Bed alarm is on. Seizure precautions are in place. Video monitoring is in use.     Problem: Confusion  Goal: Confusion, delirium, dementia, or psychosis is improved or at baseline  Description: INTERVENTIONS:1. Assess for possible contributors to thought disturbance, including medications, impaired vision or hearing, underlying metabolic abnormalities, dehydration, psychiatric diagnoses, and notify attending LIP2. [place] high risk fall precautions, as indicated3. Provide frequent short contacts to provide reality reorientation, refocusing and direction4. Decrease environmental stimuli, including noise as appropriate5. Monitor and intervene to maintain adequate nutrition, hydration, elimination, sleep and activity6. If unable to ensure safety without constant attention obtain sitter and review sitter guidelines with assigned personnel7. Initiate Psychosocial CNS and Spiritual Care consult, as indicated  INTERVENTIONS:1. Assess for possible contributors to thought disturbance, including medications, impaired vision or hearing, underlying metabolic abnormalities, dehydration, psychiatric diagnoses, and notify attending LIP2. [place] high risk fall precautions, as indicated3. Provide frequent short contacts to provide reality reorientation, refocusing and direction4. Decrease environmental stimuli, including noise as appropriate5. Monitor and intervene to maintain adequate nutrition, hydration, elimination, sleep and activity6. If unable to ensure safety without constant attention obtain sitter and review sitter guidelines with assigned personnel7. Initiate Psychosocial CNS and Spiritual Care consult, as indicated  INTERVENTIONS:1. Assess for possible contributors to thought disturbance,  including medications, impaired vision or hearing, underlying metabolic abnormalities, dehydration, psychiatric diagnoses, and notify attending LIP2. [place] high risk fall precautions, as indicated3. Provide frequent short contacts to provide reality reorientation, refocusing and direction4. Decrease environmental stimuli, including noise as appropriate5. Monitor and intervene to maintain adequate nutrition, hydration, elimination, sleep and activity6. If unable to ensure safety without constant attention obtain sitter and review sitter guidelines with assigned personnel7. Initiate Psychosocial CNS and Spiritual Care consult, as indicated  INTERVENTIONS:1. Assess for possible contributors to thought disturbance, including medications, impaired vision or hearing, underlying metabolic abnormalities, dehydration, psychiatric diagnoses, and notify attending LIP2. [place] high risk fall precautions, as indicated3. Provide frequent short contacts to provide reality reorientation, refocusing and direction4. Decrease environmental stimuli, including noise as appropriate5. Monitor and intervene to maintain adequate nutrition, hydration, elimination, sleep and activity6. If unable to ensure safety without constant attention obtain sitter and review sitter guidelines with assigned personnel7. Initiate Psychosocial CNS and Spiritual Care consult, as indicated  6/3/2025 1812 by Zuleima Thakur, RN  Outcome: Progressing  Note: Pt A&Ox4 this shift, neuro status assessed frequently.

## 2025-06-03 NOTE — ED NOTES
2141 adt20 sent to begin new pt transfer to    [facility] called back with neurology on the line speaking with Dr. Rossi

## 2025-06-03 NOTE — ED NOTES
Droperidol 2.5 mg and Ativan 2 mgs was given IM at 2115, This is a correction note on the MAR and Dr alvarez is aware that it was given IM due to infiltrated IV access

## 2025-06-03 NOTE — PROGRESS NOTES
4 Eyes Admission Assessment     I agree as the admission nurse that 2 RN's have performed a thorough Head to Toe Skin Assessment on the patient. ALL assessment sites listed below have been assessed on admission.       Areas assessed by both nurses:   [x]   Head, Face, and Ears   [x]   Shoulders, Back, and Chest  [x]   Arms, Elbows, and Hands   [x]   Coccyx, Sacrum, and Ischium  [x]   Legs, Feet, and Heels        Does the Patient have Skin Breakdown?    Scattered abrasions and bruising; multiple healed wounds to bilateral feet; blanchable redness to right foot.        Luke Prevention initiated:  Yes   Wound Care Orders initiated:  No      WOC nurse consulted for Pressure Injury (Stage 3,4, Unstageable, DTI, NWPT, and Complex wounds) or Luke score 18 or lower:  NA      Nurse 1 eSignature: Electronically signed by Amarjit Griffin RN on 6/3/25 at 4:54 AM EDT    **SHARE this note so that the co-signing nurse is able to place an eSignature**    Nurse 2 eSignature: Electronically signed by Zuleima Thakur RN on 6/3/25 at 5:11 PM EDT

## 2025-06-03 NOTE — PROGRESS NOTES
Patient admitted to room 5526. Patient is lethargic. Vital signs ar stable. Mother at bedside. Seizure and fall precautions in place.

## 2025-06-03 NOTE — PROGRESS NOTES
Clinical Pharmacy Progress Note  Medication History     Admit Date: 6/3/2025    List of current medications patient is taking is complete. Home Medication list in Epic updated to reflect changes noted below.    Source of information: Rx dispense history; interview with patient's mother    Patient's home pharmacy:   [facility] [id number] - [place], [place] - [address] - P [phone] - F [phone]  [address]  [place] [address]  Phone: [phone] Fax: [phone]      Changes made to medication list:   Medications removed: (include reason, ex: therapy completed, patient no longer taking, etc.)  Nayzilam - pt does not have any at home   Nicotine lozenge - not using  Pantoprazole - not taking  Bactrim - therapy completed  Tamsulosin - no longer taking  Medication doses adjusted:   Cenobamate (Xcopri)? 150mg po BID  Pt's mother aware that we do not stock this, she has supply here  Oxtellar (oxcarbazepine) XR? 2400mg po daily  Pt's mother aware that we do not stock this - she can likely get home supply here (has to  at pharmacy first)  Spironolactone - only takes PRN swelling / edema  Other notes:   Pt is on methadone 160mg po daily -- gets this through [facility] ([phone]). Verified dosing with Rafaela Suarez RN at [facility]; last dose was given 6/2/25 and pt had a take-home dose dispensed for today.    Current Outpatient Medications   Medication Instructions    Jardiance 10 mg, Oral, DAILY    methadone (DOLOPHINE) 160 mg, DAILY    metoprolol succinate (TOPROL XL) 12.5 mg, Oral, DAILY    Oxtellar XR 2,400 mg, Oral, DAILY    sacubitril-valsartan (ENTRESTO) 24-26 MG per tablet 1 tablet, Oral, 2 TIMES DAILY    spironolactone (ALDACTONE) 25 MG tablet 1/2 tab three times a week    Xcopri 150 mg, 2 times daily     Please call with questions--  Janette Wylie, [place], [facility]  Wireless: [phone]   6/3/2025 9:36 AM

## 2025-06-03 NOTE — ED PROVIDER NOTES
[facility] EMERGENCY DEPARTMENT      CHIEF COMPLAINT  Seizures (Brought in by EMS stated that Pt had 5 episodes of seizure at home, on arrival pt is post ictal state)       HISTORY OF PRESENT ILLNESS  Adam Trujillo is a 35 y.o. male  who presents to the ED complaining of seizure-like activity.  EMS reports that they responded to the patient's home where he was having 5 reported seizures by mother.  He apparently was having nausea and vomiting earlier today and did not take his medications because of this.  Was postictal for EMS on arrival.  They did give 5 mg of midazolam prior to arrival.  On arrival patient is flailing and unresponsive, not obvious tonic-clonic activity.    No other complaints, modifying factors or associated symptoms.     I have reviewed the following from the nursing documentation.    Past Medical History:   Diagnosis Date    Acute respiratory failure with hypoxia and hypercapnia (HCC) 1/12/2023    Aspiration pneumonia (HCC)     Aspiration pneumonia of both lower lobes (HCC)     CAD (coronary artery disease)     Cardiac arrest (HCC) 8/8/2023    Demand ischemia (HCC) 1/13/2023    Hepatitis C     HTN (hypertension)     NSTEMI (non-ST elevated myocardial infarction) (HCC)     Polysubstance abuse (HCC)     Seizures (HCC)     Septic shock (HCC)     Systolic CHF (HCC)     Takotsubo cardiomyopathy      Past Surgical History:   Procedure Laterality Date    BRONCHOSCOPY N/A 02/22/2021    BRONCHOSCOPY DIAGNOSTIC OR CELL WASH ONLY performed by Joseline Tomlinson MD at [facility] ENDOSCOPY    BRONCHOSCOPY  02/22/2021    BRONCHOSCOPY THERAPUTIC ASPIRATION INITIAL performed by Joseline Tomlinson MD at [facility] ENDOSCOPY     Family History   Problem Relation Age of Onset    Hypertension Mother     Diabetes Mother     No Known Problems Brother      Social History     Socioeconomic History    Marital status: Single     Spouse name: Not on file    Number of children: Not on file    Years of education: Not on file  Zygomaticofacial Flap Text: Given the location of the defect, shape of the defect and the proximity to free margins a zygomaticofacial flap was deemed most appropriate for repair.  Using a sterile surgical marker, the appropriate flap was drawn incorporating the defect and placing the expected incisions within the relaxed skin tension lines where possible. The area thus outlined was incised deep to adipose tissue with a #15 scalpel blade with preservation of a vascular pedicle.  The skin margins were undermined to an appropriate distance in all directions utilizing iris scissors.  The flap was then placed into the defect and anchored with interrupted buried subcutaneous sutures.

## 2025-06-03 NOTE — PROGRESS NOTES
Comprehensive Nutrition Assessment    RECOMMENDATIONS:  PO Diet: Continue Regular  Nutrition Supplement: Begin Ensure +HP bid  Nutrition Education: Education/Counseling not indicated     NUTRITION ASSESSMENT:   Nutritional summary & status: Positive nutrition screen for low BMI. Pt admitted to [facility] due to c/o's seizure like activity. Transferred to [facility] for continuous EEG monitoring. Family reports missed medication d/t nausea and vomiting. Pt with hx of polysubstance abuse and recent relapse. BMI of 16 is indicative of underweight status, however, no recent wt loss per EMR. Pt is on a Regular diet. No meal intake data due to new admit. Will add Ensure supplement for increased kcal/pro due to underweight status. Will monitor intake adequacy and need for addtional nutrition interventions.   Admission // PMH: Recurrent seizure//ARF, CAD,HTNm NSTEMI, polysubstance abuse    MALNUTRITION ASSESSMENT  Context of Malnutrition: Chronic Illness   Malnutrition Status: Insufficient data  Findings of the 6 clinical characteristics of malnutrition (Minimum of 2 out of 6 clinical characteristics is required to make the diagnosis of moderate or severe Protein Calorie Malnutrition based on AND/ASPEN Guidelines):  Energy Intake:  Unable to assess (unable to see pt)  Weight Loss:  No weight loss     Body Fat Loss:  Unable to assess (unable to see pt; defer NFPE to follow up)     Muscle Mass Loss:  Unable to assess    Fluid Accumulation:  No fluid accumulation     Strength:  Not Performed    NUTRITION DIAGNOSIS   Inadequate oral intake related to inadequate protein-energy intake as evidenced by BMI    Nutrition Monitoring and Evaluation:   Food/Nutrient Intake Outcomes:  Food and Nutrient Intake, Supplement Intake  Physical Signs/Symptoms Outcomes:  Biochemical Data, Nutrition Focused Physical Findings, Weight     OBJECTIVE DATA: Significant to nutrition assessment  Nutrition Related Findings: No edema. No BM documented.

## 2025-06-03 NOTE — CONSULTS
Neurology Consult Note  Reason for Consult: seizures  Referring Provider:Navid Rossi DO Naqvi, Imran, MD asked me to see Adam Trujillo in consultation.    History of Present Illness:  Adam Trujillo is a 35 y.o. male who presents with recurrent seizures. Patient with history of seizure disorder and is treated at  epilepsy department.   He also has a h/o drug abuse. He says he does use marijuana and is trying to cut back on Fentanyl use.     HE had witnessed GTC per notes and had 5 prior to admission on 6/2/25.  Family had also report that he normally has about 10 seizures per year.       He takes Oxtellar and Xcopri 150mg bid.  He had been having nausea and vomiting for a few spells and did not take his medication.      His family was to bring his home Xcopri and they have not returned with his medication and not available in the hospital.     EMS gave 5mg of Midazolam prior to arrival to ED.   PER ED note-flailing, unresponsive, no obvious tonic-clonic seizure activity.    In ED had to have 1gm Depacon, Ativan 4mg  and also Ketamine 250mg .    Medical History:  Past Medical History:   Diagnosis Date    Acute respiratory failure with hypoxia and hypercapnia (HCC) 1/12/2023    Aspiration pneumonia (HCC)     Aspiration pneumonia of both lower lobes (HCC)     CAD (coronary artery disease)     Cardiac arrest (HCC) 8/8/2023    Demand ischemia (HCC) 1/13/2023    Hepatitis C     HTN (hypertension)     NSTEMI (non-ST elevated myocardial infarction) (HCC)     Polysubstance abuse (HCC)     Seizures (HCC)     Septic shock (HCC)     Systolic CHF (HCC)     Takotsubo cardiomyopathy      Past Surgical History:   Procedure Laterality Date    BRONCHOSCOPY N/A 02/22/2021    BRONCHOSCOPY DIAGNOSTIC OR CELL WASH ONLY performed by Joseline Tomlinson MD at [facility] ENDOSCOPY    BRONCHOSCOPY  02/22/2021    BRONCHOSCOPY THERAPUTIC ASPIRATION INITIAL performed by Joseline Tomlinson MD at [facility] ENDOSCOPY     Medications Prior to  if not a suspected or confirmed  emergency medical condition->Emergency Medical Condition (MA)  Reason for Exam: seizures    CT BRAIN FINDINGS:  BRAIN/VENTRICLES:    The cerebral hemispheres, brainstem, and cerebellum have a senescent  appearance for the patient's age.  The falx is midline. The ventricles and  peripheral sulci are normal.  There is no sign of a space occupying lesion,  infarction, or hemorrhage.    Orbits: Portion of the orbits demonstrate no acute abnormality.    SINUSES: . The remaining imaged portions of the paranasal sinuses are clear.  The mastoids and the middle ear chambers are clear.    SOFT TISSUES/SKULL:  No acute abnormality of the visualized skull or soft  tissues.    Impression  No acute intracranial abnormalities are noted.        Impression:  Adam Trujillo is a 35 y.o. male who presented with recurrent seizures with aggressive behavior.  He has now settled down and no aggression.  Possible post-ictal aggression.   Since he has had behavioral issues post ictal or secondary to withdrawal from drugs of abuse discussed that Keppra would not be best option.   Also told patient to call his family to bring Xcopri.    Discussed with RN and she has tried to call family and no response.    Recommendations:  Briviact since not able to get home Xcopri  Continue Oxtellar  Check Oxtellar level and determine if adjusted are needed  EEG  Seizure precaution    Thank you for allowing me to participate in the care of your patient with high medical copmlexity requiring a high level of medical decision making.  If I can be of further assitance, please do not hesitate to call.  A copy of this note was provided for Yasmani Sorto MD.     Electronically signed by Bettye Lund MD on 6/3/2025 at 6:22 PM     [phone]  Evenings, weekends, and off weeks please discuss neurologist on-call.

## 2025-06-03 NOTE — PROGRESS NOTES
Pt seen and examined resting comfortably friend at bedside.  No new issues or events he has no complaints he is tired wants to sleep.  Vitals:    06/03/25 1600   BP: 116/81   Pulse: 68   Resp: 16   Temp: 97.7 °F (36.5 °C)   SpO2: 99%     Gen: a & O no distress just tired  Chest: clear  Cvs S2s1 no murmurs  Abd: soft nd nt bs normal actrive  Ext; no edema positive pulses    36 yo male admitted with seizure and anion gap metabolic acidosis which has improved, clinically doing ok, reviewed neuro eval and recs, appreciate help.  Non billable rounding.

## 2025-06-03 NOTE — ED TRIAGE NOTES
Writer spoke to the Mom and stated that Pt is known to have seizures, he is on seizures meds taking regularly according to Mom. Today had 5 witnessed seizures at home, pt was fine before seizures, like he moves around without any support, alert  oriented.

## 2025-06-03 NOTE — CARE COORDINATION
Case Management Assessment  Initial Evaluation    Date/Time of Evaluation: 6/3/2025 12:01 PM  Assessment Completed by: ALDO Morin    If patient is discharged prior to next notation, then this note serves as note for discharge by case management.    Patient Name: Adam Trujillo                   YOB: 1990  Diagnosis: Recurrent seizures (HCC) [G40.909]                   Date / Time: 6/3/2025  3:23 AM    Patient Admission Status: Inpatient   Readmission Risk (Low < 19, Mod (19-27), High > 27): Readmission Risk Score: 9.3    Current PCP: No primary care provider on file.  PCP verified by CM? No (No PCP)    Chart Reviewed: Yes      History Provided by: Child/Family, Medical Record  Patient Orientation: Alert and Oriented (Pt was asleep for assessment.)    Patient Cognition: Alert (Pt was asleep for assessment.)    Hospitalization in the last 30 days (Readmission):  No    If yes, Readmission Assessment in  Navigator will be completed.    Advance Directives:      Code Status: Full Code   Patient's Primary Decision Maker is: Legal Next of Kin    Primary Decision Maker: AnayaLuana APRIL - Parent - [phone]    Discharge Planning:    Patient lives with: Parent Type of Home: House  Primary Care Giver: Self  Patient Support Systems include: Spouse/Significant Other, Parent, Family Members   Current Financial resources: Medicaid  Current community resources: None  Current services prior to admission: None            Current DME:              Type of Home Care services:  None    ADLS  Prior functional level: Independent in ADLs/IADLs  Current functional level: Independent in ADLs/IADLs    PT AM-PAC:   /24  OT AM-PAC:   /24    Family can provide assistance at DC: Yes  Would you like Case Management to discuss the discharge plan with any other family members/significant others, and if so, who? No  Plans to Return to Present Housing: Yes  Other Identified Issues/Barriers to RETURNING to current housing:

## 2025-06-03 NOTE — PLAN OF CARE
Problem: Chronic Conditions and Co-morbidities  Goal: Patient's chronic conditions and co-morbidity symptoms are monitored and maintained or improved  Outcome: Progressing     Problem: Safety - Adult  Goal: Free from fall injury  Outcome: Progressing     Problem: Confusion  Goal: Confusion, delirium, dementia, or psychosis is improved or at baseline  Description: INTERVENTIONS:1. Assess for possible contributors to thought disturbance, including medications, impaired vision or hearing, underlying metabolic abnormalities, dehydration, psychiatric diagnoses, and notify attending LIP2. [place] high risk fall precautions, as indicated3. Provide frequent short contacts to provide reality reorientation, refocusing and direction4. Decrease environmental stimuli, including noise as appropriate5. Monitor and intervene to maintain adequate nutrition, hydration, elimination, sleep and activity6. If unable to ensure safety without constant attention obtain sitter and review sitter guidelines with assigned personnel7. Initiate Psychosocial CNS and Spiritual Care consult, as indicated  Outcome: Progressing

## 2025-06-04 VITALS
WEIGHT: 119.05 LBS | HEIGHT: 71 IN | TEMPERATURE: 98.4 F | OXYGEN SATURATION: 100 % | DIASTOLIC BLOOD PRESSURE: 85 MMHG | BODY MASS INDEX: 16.67 KG/M2 | HEART RATE: 68 BPM | RESPIRATION RATE: 16 BRPM | SYSTOLIC BLOOD PRESSURE: 132 MMHG

## 2025-06-04 LAB
ALBUMIN SERPL-MCNC: 3.9 G/DL (ref 3.4–5)
ALP SERPL-CCNC: 88 U/L (ref 40–129)
ALT SERPL-CCNC: 39 U/L (ref 10–40)
ANION GAP SERPL CALCULATED.3IONS-SCNC: 10 MMOL/L (ref 3–16)
AST SERPL-CCNC: 125 U/L (ref 15–37)
BILIRUB DIRECT SERPL-MCNC: <0.1 MG/DL (ref 0–0.3)
BILIRUB INDIRECT SERPL-MCNC: 0.3 MG/DL (ref 0–1)
BILIRUB SERPL-MCNC: 0.4 MG/DL (ref 0–1)
BUN SERPL-MCNC: 12 MG/DL (ref 7–20)
CALCIUM SERPL-MCNC: 8.6 MG/DL (ref 8.3–10.6)
CHLORIDE SERPL-SCNC: 104 MMOL/L (ref 99–110)
CO2 SERPL-SCNC: 23 MMOL/L (ref 21–32)
CREAT SERPL-MCNC: 1.2 MG/DL (ref 0.9–1.3)
GFR SERPLBLD CREATININE-BSD FMLA CKD-EPI: 81 ML/MIN/{1.73_M2}
GLUCOSE SERPL-MCNC: 108 MG/DL (ref 70–99)
POTASSIUM SERPL-SCNC: 4.3 MMOL/L (ref 3.5–5.1)
PROT SERPL-MCNC: 6.7 G/DL (ref 6.4–8.2)
SODIUM SERPL-SCNC: 137 MMOL/L (ref 136–145)

## 2025-06-04 PROCEDURE — 6370000000 HC RX 637 (ALT 250 FOR IP): Performed by: INTERNAL MEDICINE

## 2025-06-04 PROCEDURE — 95816 EEG AWAKE AND DROWSY: CPT

## 2025-06-04 PROCEDURE — 96372 THER/PROPH/DIAG INJ SC/IM: CPT

## 2025-06-04 PROCEDURE — 2500000003 HC RX 250 WO HCPCS: Performed by: PSYCHIATRY & NEUROLOGY

## 2025-06-04 PROCEDURE — 6360000002 HC RX W HCPCS: Performed by: INTERNAL MEDICINE

## 2025-06-04 PROCEDURE — 4A10X4Z MONITORING OF CENTRAL NERVOUS ELECTRICAL ACTIVITY, EXTERNAL APPROACH: ICD-10-PCS | Performed by: STUDENT IN AN ORGANIZED HEALTH CARE EDUCATION/TRAINING PROGRAM

## 2025-06-04 PROCEDURE — G0378 HOSPITAL OBSERVATION PER HR: HCPCS

## 2025-06-04 PROCEDURE — 96376 TX/PRO/DX INJ SAME DRUG ADON: CPT

## 2025-06-04 PROCEDURE — 80076 HEPATIC FUNCTION PANEL: CPT

## 2025-06-04 PROCEDURE — 99232 SBSQ HOSP IP/OBS MODERATE 35: CPT

## 2025-06-04 PROCEDURE — 80048 BASIC METABOLIC PNL TOTAL CA: CPT

## 2025-06-04 RX ORDER — TAMSULOSIN HYDROCHLORIDE 0.4 MG/1
0.4 CAPSULE ORAL NIGHTLY
Qty: 30 CAPSULE | Refills: 0
Start: 2025-06-04

## 2025-06-04 RX ORDER — OXCARBAZEPINE 600 MG/1
2400 TABLET ORAL DAILY
Qty: 60 TABLET | Refills: 2 | Status: SHIPPED | OUTPATIENT
Start: 2025-06-04

## 2025-06-04 RX ADMIN — PANTOPRAZOLE SODIUM 40 MG: 40 TABLET, DELAYED RELEASE ORAL at 10:05

## 2025-06-04 RX ADMIN — METOPROLOL SUCCINATE 12.5 MG: 25 TABLET, FILM COATED, EXTENDED RELEASE ORAL at 10:05

## 2025-06-04 RX ADMIN — OXCARBAZEPINE 1200 MG: 300 TABLET, FILM COATED ORAL at 10:06

## 2025-06-04 RX ADMIN — ENOXAPARIN SODIUM 40 MG: 100 INJECTION SUBCUTANEOUS at 10:06

## 2025-06-04 RX ADMIN — SPIRONOLACTONE 25 MG: 25 TABLET ORAL at 10:05

## 2025-06-04 RX ADMIN — Medication 160 MG: at 07:31

## 2025-06-04 RX ADMIN — SACUBITRIL AND VALSARTAN 1 TABLET: 24; 26 TABLET, FILM COATED ORAL at 10:05

## 2025-06-04 RX ADMIN — BRIVARACETAM 50 MG: 50 INJECTION, SUSPENSION INTRAVENOUS at 06:30

## 2025-06-04 NOTE — PLAN OF CARE
Problem: Safety - Adult  Goal: Free from fall injury  Outcome: Adequate for Discharge  Note: Pt free of fall injury. Seizure precautions are in place. Call light is within reach. Bed alarm is on. Video monitoring is in use.     Problem: Confusion  Goal: Confusion, delirium, dementia, or psychosis is improved or at baseline  Description: INTERVENTIONS:1. Assess for possible contributors to thought disturbance, including medications, impaired vision or hearing, underlying metabolic abnormalities, dehydration, psychiatric diagnoses, and notify attending LIP2. [place] high risk fall precautions, as indicated3. Provide frequent short contacts to provide reality reorientation, refocusing and direction4. Decrease environmental stimuli, including noise as appropriate5. Monitor and intervene to maintain adequate nutrition, hydration, elimination, sleep and activity6. If unable to ensure safety without constant attention obtain sitter and review sitter guidelines with assigned personnel7. Initiate Psychosocial CNS and Spiritual Care consult, as indicated  INTERVENTIONS:1. Assess for possible contributors to thought disturbance, including medications, impaired vision or hearing, underlying metabolic abnormalities, dehydration, psychiatric diagnoses, and notify attending LIP2. [place] high risk fall precautions, as indicated3. Provide frequent short contacts to provide reality reorientation, refocusing and direction4. Decrease environmental stimuli, including noise as appropriate5. Monitor and intervene to maintain adequate nutrition, hydration, elimination, sleep and activity6. If unable to ensure safety without constant attention obtain sitter and review sitter guidelines with assigned personnel7. Initiate Psychosocial CNS and Spiritual Care consult, as indicated  INTERVENTIONS:1. Assess for possible contributors to thought disturbance, including medications, impaired vision or hearing, underlying metabolic

## 2025-06-04 NOTE — PROGRESS NOTES
Patient is alert and oriented x4. VSS on room air. Medications given per MAR, no side effects noted. Patient ambulating x1 with gait belt and walker. No complaints of pain, continuing to monitor and manage per MAR. Voiding well via BRP/urinal. No bowel movements this shift. Seizure precautions in place. Tolerating PO diet and fluids, no complaints of nausea/vomiting.      Patient is currently resting in bed with bed alarm on for safety. Call light within reach and all fall precautions in place. Plan of care continues.

## 2025-06-04 NOTE — PLAN OF CARE
Problem: Chronic Conditions and Co-morbidities  Goal: Patient's chronic conditions and co-morbidity symptoms are monitored and maintained or improved  6/3/2025 2210 by Veronique Stein RN  Outcome: Progressing  Flowsheets (Taken 6/3/2025 1934)  Care Plan - Patient's Chronic Conditions and Co-Morbidity Symptoms are Monitored and Maintained or Improved: Monitor and assess patient's chronic conditions and comorbid symptoms for stability, deterioration, or improvement     Problem: Discharge Planning  Goal: Discharge to home or other facility with appropriate resources  6/3/2025 2210 by Veronique Stein RN  Outcome: Progressing  Flowsheets (Taken 6/3/2025 1934)  Discharge to home or other facility with appropriate resources: Identify barriers to discharge with patient and caregiver  Pt involved in discharge planning. Barriers to discharge discussed with patient. Discharge learning needs identified. Discuss with patient any additional needed resources and transportation plans. Case management following plan of care.      Problem: Safety - Adult  Goal: Free from fall injury  6/3/2025 2210 by Veronique Stein RN  Outcome: Progressing  All fall precautions in place. Bed locked and in lowest position with alarm on. Overbed table and personal belonings within reach. Call light within reach and patient instructed to use call light for assistance. Non-skid socks on.  Problem: Confusion  Goal: Confusion, delirium, dementia, or psychosis is improved or at baseline  Description: INTERVENTIONS:1. Assess for possible contributors to thought disturbance, including medications, impaired vision or hearing, underlying metabolic abnormalities, dehydration, psychiatric diagnoses, and notify attending LIP2. [place] high risk fall precautions, as indicated3. Provide frequent short contacts to provide reality reorientation, refocusing and direction4. Decrease environmental stimuli, including noise as appropriate5. Monitor and intervene to  high risk fall precautions, as indicated3. Provide frequent short contacts to provide reality reorientation, refocusing and direction4. Decrease environmental stimuli, including noise as appropriate5. Monitor and intervene to maintain adequate nutrition, hydration, elimination, sleep and activity6. If unable to ensure safety without constant attention obtain sitter and review sitter guidelines with assigned personnel7. Initiate Psychosocial CNS and Spiritual Care consult, as indicated  6/3/2025 2210 by Veronique Stein RN  Outcome: Progressing  Flowsheets (Taken 6/3/2025 1934)  Effect of thought disturbance (confusion, delirium, dementia, or psychosis) are managed with adequate functional status: Assess for contributors to thought disturbance, including medications, impaired vision or hearing, underlying metabolic abnormalities, dehydration, psychiatric diagnoses, notify LIP  Alert and oriented x4.

## 2025-06-04 NOTE — PROGRESS NOTES
EEG completed and available for interpretation on the [facility] database .

## 2025-06-04 NOTE — PROGRESS NOTES
NEUROLOGY PROGRESS NOTE       Patient Name: Adam Trujillo YOB: 1990   Sex: Male Age: 35 yrs     Presenting CC: No chief complaint on file.    Reason for Consult: Seizures    Changes over last 24 hours:   Exam remains stable, less drowsy today  Xcopri brought in from home, nurse to get label from pharmacy      ROS: No complaints    ASSESSMENT & RECOMMENDATIONS   Assessment:  Adam Trujillo is a 35 y.o. male who presented with recurrent seizures with aggressive behavior. Possible post-ictal aggression but has been calm this admission.  Since he has had behavioral issues post ictal or secondary to withdrawal from drugs of abuse discussed that Keppra would not be best option.   Also told patient to call his family to bring Xcopri.      Family (brother) had reported likely missed a few days of medications due to N/V. Mother reports potentially a dose or two were missed because of this. UDS was positive for fentanyl. While mother reports he has had some breakthrough seizures while not using fentanyl, feel he would be best served to     Plan:  - If able to restart home Xcopri, then discontinue briviact  - Continue home oxcarb, awaiting level  - Routine EEG  - Needs follow up with Neurology, specifically an epileptologist. He was recently released from his epileptologist for inappropriate behavior  - Discussed with family that while he can follow up with [facility] in the [facility], ultimately will need to re establish with an epileptologist  - Patient should not drive until seizure free for 3 months, or until cleared by Neurologist    Will sign off, please reach out if any further concerns arise    Case discussed with Dr. Riggs, bedside RN, patient, patient's family, Dr. Noreen Henry, LEIGHTON - CNP   Neurology  6/4/2025 10:48 AM  [facility]: [facility] Neu[facility]    HISTORY     Initial HPI: Adam Trujillo is a 35 y.o. male who presents with recurrent seizures. Patient with history of seizure

## 2025-06-04 NOTE — DISCHARGE SUMMARY
Hospital Discharge Summary    Patient's PCP: No primary care provider on file.  Admit Date: 6/3/2025   Discharge Date: 6/4/2025    Admitting Physician: Dr. Vishal Mistry MD  Discharge Physician: Dr. Yasmani Sorto MD   Consults: neuro    HPI: 34 yo admitted with seizure  Brief hospital course:  Given the concern of the patients presentation and the concern of the possible multi-factorial etiology contributing to patients symptomatology. Patient was admitted and evaluated and found to have seizure metabolic acidosis.  The patient has a hx of seizures, he presented again with seizrure.  The patient had been doing well with treatment, sober for months, and then relapsed.  After his injgestion of fentanyl the patient experienced a seizure.  Case discussed with mother at bedside, I think the patient is overall doing well from a recovery standpoint he understands the connection with use of narcotics his underlying cardiomyopathy and his seizure threshold.  The patient is being discharged with follow up with neurology.  I have asked patient [facility] patients [id number] to [facility] for recurrence of symptoms or new symptoms including but not limited to chest pain shrotness of breath nausea vomiting fevers or chills and seek immediate medical attention or call 911.  Patient and mother state he recently was dismissed from the prior neuro practice at  I have given referral to neurology [facility] and have given refill for the seizure medications so he can have coverage.      Discharge Diagnoses:   Patient Active Problem List   Diagnosis    Seizure (HCC)    Status epilepticus (HCC)    Abnormal ECG    Takotsubo cardiomyopathy    Tobacco abuse    IV drug abuse (HCC)    Partial idiopathic epilepsy with seizures of localized onset, intractable, with status epilepticus (HCC)    Polysubstance abuse (HCC)    Breakthrough seizure (HCC)    Polypharmacy    Myonecrosis (HCC)    LBBB (left bundle branch block)    Primary hypertension

## 2025-06-04 NOTE — CARE COORDINATION
11:06 AM  Discharge Note:   BARB observed DC order. Pt will be DC-ing back home and will be transported by his mother.   No further CM needs.   Pt's O2 is 98% on RA.     No IMM needed.     BARB informed RN pt is ready for DC from CM standpoint. RN stated pt is getting an EEG before he DC's so he will DC later PM.    Electronically signed by ALDO Morin on 6/4/2025 at 11:09 AM

## 2025-06-04 NOTE — PROGRESS NOTES
Xcorpi and Oxtelar brought in by patients mother. Placed in secure lock box in med room with second nurse as witness.

## 2025-06-05 LAB — 10OH-CARBAZEPINE SERPL-MCNC: 9.1 UG/ML (ref 10–35)

## 2025-06-05 NOTE — PROCEDURES
PROCEDURE NOTE  Date: 2025   Name: Adam Trujillo  YOB: 1990    Name: Adam Trujillo   : 1990   Interpreting Physician: Bettye Lund MD   Referring Physician: Navid Rossi DO   Date of EE2025      Clinical History:History of seizures      Technical Summary:  The EEG was recorded in a digital format on a patient who is reported to be awake and drowsy state during the recording. The patient was not sleep deprived prior to the EEG.    The recording revealed an abnormal background rhythm in the beta frequency range that was without posterior dominance.  Myogenic artifact intermittently.    The record was remarkable for the absence of epileptiform discharges.    Photic stimulation was performed at various flash frequencies and without photoparoxysmal response.    Hyperventilation was not performed.    During the recording stage II sleep  was not seen.     The EKG lead revealed no rhythm abnormalties.    EEG Interpretation:   The EEG was abnormal due to the presence of: beta activity.  No epileptiform activity.     excessive beta activity is associated with the use of certain medications including benzodiazepines and barbiturates, and is otherwise of little diagnostic significance.    No focal, lateralizing, or epileptiform features were seen during the recording.  Clinical correlation is recommended.  The absence of epileptiform discharges on a single EEG does not rule out a diagnosis of  epilepsy.      Electronically signed by Bettye Lund MD on 2025 at 10:19 PM

## 2025-06-06 NOTE — PROGRESS NOTES
Physician Progress Note      PATIENT:               DELONTE CARDENAS  CSN #:                  [id number]  :                       1990  ADMIT DATE:       6/3/2025 3:23 AM  DISCH DATE:        2025 4:06 PM  RESPONDING  PROVIDER #:        Yasmani Sorto MD          QUERY TEXT:    Based on your medical judgment, please clarify these findings and document if   any of the following are being evaluated and/or treated:    The clinical indicators include:  35 y.o. male with PMHx significant for seizure disorder, IV drug use, opioid   dependence, cardiomyopathy, essential hypertension    Lab results  Creatinine level= 0.9 and 6/3 Creatinine level = 1.4    CMP x 1, supportive care, Flomax 0.4mg po nightly  Options provided:  -- Acute kidney failure  -- Acute kidney injury  -- Other - I will add my own diagnosis  -- Disagree - Not applicable / Not valid  -- Disagree - Clinically unable to determine / Unknown  -- Refer to Clinical Documentation Reviewer    PROVIDER RESPONSE TEXT:    This patient is in Acute kidney failure.    Query created by: Magalis Shelby on 2025 7:16 AM      Electronically signed by:  Yasmani Sorto MD 2025 2:58 PM

## 2025-06-09 ENCOUNTER — TELEPHONE (OUTPATIENT)
Dept: CARDIOLOGY CLINIC | Age: 35
End: 2025-06-09

## 2025-06-11 NOTE — TELEPHONE ENCOUNTER
LMOM for patient to call back.  
Pt returned call.  Pt given message.  Pt v/u  
Pt was in [facility] for seizures and was scheduled July 15th for f/u. Is this date okay to see pt or does pt need over book date and time. Please advise  
Reviewed his chart.    He was hospitalized for seizure. Not seen by cardiology and no evidence of CHF. Recommend he follow up with neurology and PCP. Keep scheduled July 15th appointment with ANNIE Zaragoza CNP.  
your test results and keep a list of the medicines you take. How can you care for yourself at home? · Ask your family, friends, and coworkers for support. You have a better chance of quitting if you have help and support. · Join a support group, such as Nicotine Anonymous, for people who are trying to quit smoking. · Consider signing up for a smoking cessation program, such as the American Lung Association's Freedom from Smoking program.  · Get text messaging support. Go to the website at www.smokefree. gov to sign up for the [facility] program.  · Set a quit date. Pick your date carefully so that it is not right in the middle of a big deadline or stressful time. Once you quit, do not even take a puff. Get rid of all ashtrays and lighters after your last cigarette. Clean your house and your clothes so that they do not smell of smoke. · Learn how to be a nonsmoker. Think about ways you can avoid those things that make you reach for a cigarette. ¨ Avoid situations that put you at greatest risk for smoking. For some people, it is hard to have a drink with friends without smoking. For others, they might skip a coffee break with coworkers who smoke. ¨ Change your daily routine. Take a different route to work or eat a meal in a different place. · Cut down on stress. Calm yourself or release tension by doing an activity you enjoy, such as reading a book, taking a hot bath, or gardening. · Talk to your doctor or pharmacist about nicotine replacement therapy, which replaces the nicotine in your body. You still get nicotine but you do not use tobacco. Nicotine replacement products help you slowly reduce the amount of nicotine you need. These products come in several forms, many of them available over-the-counter:  ¨ Nicotine patches  ¨ Nicotine gum and lozenges  ¨ Nicotine inhaler  · Ask your doctor about bupropion (Wellbutrin) or varenicline (Chantix), which are prescription medicines. They do not contain nicotine.

## 2025-07-02 RX ORDER — SPIRONOLACTONE 25 MG/1
TABLET ORAL
Qty: 45 TABLET | Refills: 2 | Status: SHIPPED | OUTPATIENT
Start: 2025-07-02

## 2025-07-02 NOTE — TELEPHONE ENCOUNTER
Last Office Visit: 12/3/2024 Provider: SHE  **Is provider OOT? No    Next Office Visit: 7/15/2025 Provider: SHE      LAST LABS:   BMP:  Lab Results   Component Value Date/Time     06/04/2025 07:29 AM    K 4.3 06/04/2025 07:29 AM     06/04/2025 07:29 AM    CO2 23 06/04/2025 07:29 AM    BUN 12 06/04/2025 07:29 AM    CREATININE 1.2 06/04/2025 07:29 AM    GLUCOSE 108 06/04/2025 07:29 AM    CALCIUM 8.6 06/04/2025 07:29 AM    LABGLOM 81 06/04/2025 07:29 AM    LABGLOM >60 03/04/2024 08:09 PM      Lab orders needed? no

## 2025-07-16 ENCOUNTER — APPOINTMENT (OUTPATIENT)
Dept: GENERAL RADIOLOGY | Age: 35
DRG: 053 | End: 2025-07-16
Payer: MEDICAID

## 2025-07-16 ENCOUNTER — APPOINTMENT (OUTPATIENT)
Dept: CT IMAGING | Age: 35
DRG: 053 | End: 2025-07-16
Payer: MEDICAID

## 2025-07-16 ENCOUNTER — HOSPITAL ENCOUNTER (INPATIENT)
Age: 35
LOS: 5 days | Discharge: HOME | DRG: 053 | End: 2025-07-21
Attending: STUDENT IN AN ORGANIZED HEALTH CARE EDUCATION/TRAINING PROGRAM | Admitting: HOSPITALIST
Payer: MEDICAID

## 2025-07-16 DIAGNOSIS — F11.29 OPIOID DEPENDENCE WITH OPIOID-INDUCED DISORDER (HCC): ICD-10-CM

## 2025-07-16 DIAGNOSIS — I21.4 NSTEMI (NON-ST ELEVATED MYOCARDIAL INFARCTION) (HCC): ICD-10-CM

## 2025-07-16 DIAGNOSIS — R56.9 SEIZURE (HCC): ICD-10-CM

## 2025-07-16 DIAGNOSIS — F14.959: Primary | ICD-10-CM

## 2025-07-16 DIAGNOSIS — E72.20 HYPERAMMONEMIA: ICD-10-CM

## 2025-07-16 PROBLEM — G92.9 TOXIC ENCEPHALOPATHY: Status: ACTIVE | Noted: 2025-07-16

## 2025-07-16 LAB
ALBUMIN SERPL-MCNC: 4.9 G/DL (ref 3.4–5)
ALBUMIN/GLOB SERPL: 1.2 {RATIO} (ref 1.1–2.2)
ALP SERPL-CCNC: 149 U/L (ref 40–129)
ALT SERPL-CCNC: 66 U/L (ref 10–40)
ALT SERPL-CCNC: ABNORMAL U/L (ref 10–40)
AMMONIA PLAS-SCNC: 101 UMOL/L (ref 16–60)
AMPHETAMINES UR QL SCN>1000 NG/ML: ABNORMAL
ANION GAP SERPL CALCULATED.3IONS-SCNC: 32 MMOL/L (ref 3–16)
AST SERPL-CCNC: 67 U/L (ref 15–37)
BACTERIA URNS QL MICRO: NORMAL /HPF
BARBITURATES UR QL SCN>200 NG/ML: ABNORMAL
BASE EXCESS BLDV CALC-SCNC: -8.1 MMOL/L (ref -3–3)
BASOPHILS # BLD: 0.1 K/UL (ref 0–0.2)
BASOPHILS NFR BLD: 0.5 %
BENZODIAZ UR QL SCN>200 NG/ML: ABNORMAL
BILIRUB SERPL-MCNC: 0.3 MG/DL (ref 0–1)
BILIRUB UR QL STRIP.AUTO: NEGATIVE
BUN SERPL-MCNC: 18 MG/DL (ref 7–20)
CALCIUM SERPL-MCNC: 10.2 MG/DL (ref 8.3–10.6)
CANNABINOIDS UR QL SCN>50 NG/ML: POSITIVE
CHLORIDE SERPL-SCNC: 98 MMOL/L (ref 99–110)
CLARITY UR: CLEAR
CO2 BLDV-SCNC: 44 MMOL/L
CO2 SERPL-SCNC: 9 MMOL/L (ref 21–32)
COCAINE UR QL SCN: POSITIVE
COHGB MFR BLDV: 5.1 % (ref 0–1.5)
COLOR UR: YELLOW
CREAT SERPL-MCNC: 1.1 MG/DL (ref 0.9–1.3)
DEPRECATED RDW RBC AUTO: 14.4 % (ref 12.4–15.4)
DO-HGB MFR BLDV: 34 %
DRUG SCREEN COMMENT UR-IMP: ABNORMAL
EOSINOPHIL # BLD: 0.1 K/UL (ref 0–0.6)
EOSINOPHIL NFR BLD: 0.5 %
EPI CELLS #/AREA URNS AUTO: 0 /HPF (ref 0–5)
ETHANOLAMINE SERPL-MCNC: NORMAL MG/DL (ref 0–0.08)
FENTANYL SCREEN, URINE: POSITIVE
GFR SERPLBLD CREATININE-BSD FMLA CKD-EPI: 89 ML/MIN/{1.73_M2}
GLUCOSE SERPL-MCNC: 164 MG/DL (ref 70–99)
GLUCOSE UR STRIP.AUTO-MCNC: >=1000 MG/DL
HCO3 BLDV-SCNC: 18.5 MMOL/L (ref 23–29)
HCT VFR BLD AUTO: 47.2 % (ref 40.5–52.5)
HGB BLD-MCNC: 15.6 G/DL (ref 13.5–17.5)
HGB UR QL STRIP.AUTO: ABNORMAL
HYALINE CASTS #/AREA URNS AUTO: 1 /LPF (ref 0–8)
KETONES UR STRIP.AUTO-MCNC: ABNORMAL MG/DL
LACTATE BLDV-SCNC: 13.9 MMOL/L (ref 0.4–1.9)
LACTATE BLDV-SCNC: 2.9 MMOL/L (ref 0.4–1.9)
LEUKOCYTE ESTERASE UR QL STRIP.AUTO: NEGATIVE
LIPASE SERPL-CCNC: 22 U/L (ref 13–60)
LYMPHOCYTES # BLD: 3.8 K/UL (ref 1–5.1)
LYMPHOCYTES NFR BLD: 21.9 %
MCH RBC QN AUTO: 31.9 PG (ref 26–34)
MCHC RBC AUTO-ENTMCNC: 33 G/DL (ref 31–36)
MCV RBC AUTO: 96.7 FL (ref 80–100)
METHADONE UR QL SCN>300 NG/ML: POSITIVE
METHGB MFR BLDV: 0.2 %
MONOCYTES # BLD: 0.8 K/UL (ref 0–1.3)
MONOCYTES NFR BLD: 4.9 %
NEUTROPHILS # BLD: 12.5 K/UL (ref 1.7–7.7)
NEUTROPHILS NFR BLD: 72.2 %
NITRITE UR QL STRIP.AUTO: NEGATIVE
NT-PROBNP SERPL-MCNC: 266 PG/ML (ref 0–124)
O2 CT VFR BLDV CALC: 13 VOL %
O2 THERAPY: ABNORMAL
OPIATES UR QL SCN>300 NG/ML: ABNORMAL
OXYCODONE UR QL SCN: ABNORMAL
PCO2 BLDV: 40.9 MMHG (ref 40–50)
PCP UR QL SCN>25 NG/ML: ABNORMAL
PH BLDV: 7.26 [PH] (ref 7.35–7.45)
PH UR STRIP.AUTO: 5.5 [PH] (ref 5–8)
PH UR STRIP: 4 [PH]
PLATELET # BLD AUTO: 297 K/UL (ref 135–450)
PMV BLD AUTO: 8.3 FL (ref 5–10.5)
PO2 BLDV: 37.9 MMHG (ref 25–40)
POTASSIUM SERPL-SCNC: 3.5 MMOL/L (ref 3.5–5.1)
POTASSIUM SERPL-SCNC: ABNORMAL MMOL/L (ref 3.5–5.1)
PROT SERPL-MCNC: 9.1 G/DL (ref 6.4–8.2)
PROT UR STRIP.AUTO-MCNC: 100 MG/DL
RBC # BLD AUTO: 4.88 M/UL (ref 4.2–5.9)
RBC CLUMPS #/AREA URNS AUTO: 4 /HPF (ref 0–4)
REASON FOR REJECTION: NORMAL
REJECTED TEST: NORMAL
SAO2 % BLDV: 64 %
SODIUM SERPL-SCNC: 139 MMOL/L (ref 136–145)
SP GR UR STRIP.AUTO: 1.02 (ref 1–1.03)
TROPONIN, HIGH SENSITIVITY: 12 NG/L (ref 0–22)
TROPONIN, HIGH SENSITIVITY: 13 NG/L (ref 0–22)
UA COMPLETE W REFLEX CULTURE PNL UR: ABNORMAL
UA DIPSTICK W REFLEX MICRO PNL UR: YES
URN SPEC COLLECT METH UR: ABNORMAL
UROBILINOGEN UR STRIP-ACNC: 0.2 E.U./DL
WBC # BLD AUTO: 17.3 K/UL (ref 4–11)
WBC #/AREA URNS AUTO: 1 /HPF (ref 0–5)

## 2025-07-16 PROCEDURE — 6360000002 HC RX W HCPCS: Performed by: NURSE PRACTITIONER

## 2025-07-16 PROCEDURE — 85025 COMPLETE CBC W/AUTO DIFF WBC: CPT

## 2025-07-16 PROCEDURE — 2500000003 HC RX 250 WO HCPCS: Performed by: STUDENT IN AN ORGANIZED HEALTH CARE EDUCATION/TRAINING PROGRAM

## 2025-07-16 PROCEDURE — 74018 RADEX ABDOMEN 1 VIEW: CPT

## 2025-07-16 PROCEDURE — 80053 COMPREHEN METABOLIC PANEL: CPT

## 2025-07-16 PROCEDURE — 99285 EMERGENCY DEPT VISIT HI MDM: CPT

## 2025-07-16 PROCEDURE — 99291 CRITICAL CARE FIRST HOUR: CPT

## 2025-07-16 PROCEDURE — 82803 BLOOD GASES ANY COMBINATION: CPT

## 2025-07-16 PROCEDURE — 5A1935Z RESPIRATORY VENTILATION, LESS THAN 24 CONSECUTIVE HOURS: ICD-10-PCS | Performed by: INTERNAL MEDICINE

## 2025-07-16 PROCEDURE — 0BH17EZ INSERTION OF ENDOTRACHEAL AIRWAY INTO TRACHEA, VIA NATURAL OR ARTIFICIAL OPENING: ICD-10-PCS | Performed by: STUDENT IN AN ORGANIZED HEALTH CARE EDUCATION/TRAINING PROGRAM

## 2025-07-16 PROCEDURE — 36415 COLL VENOUS BLD VENIPUNCTURE: CPT

## 2025-07-16 PROCEDURE — 96375 TX/PRO/DX INJ NEW DRUG ADDON: CPT

## 2025-07-16 PROCEDURE — 83690 ASSAY OF LIPASE: CPT

## 2025-07-16 PROCEDURE — 82140 ASSAY OF AMMONIA: CPT

## 2025-07-16 PROCEDURE — 83880 ASSAY OF NATRIURETIC PEPTIDE: CPT

## 2025-07-16 PROCEDURE — 70450 CT HEAD/BRAIN W/O DYE: CPT

## 2025-07-16 PROCEDURE — 83605 ASSAY OF LACTIC ACID: CPT

## 2025-07-16 PROCEDURE — 2500000003 HC RX 250 WO HCPCS

## 2025-07-16 PROCEDURE — 72125 CT NECK SPINE W/O DYE: CPT

## 2025-07-16 PROCEDURE — 80307 DRUG TEST PRSMV CHEM ANLYZR: CPT

## 2025-07-16 PROCEDURE — 6360000002 HC RX W HCPCS: Performed by: STUDENT IN AN ORGANIZED HEALTH CARE EDUCATION/TRAINING PROGRAM

## 2025-07-16 PROCEDURE — 71045 X-RAY EXAM CHEST 1 VIEW: CPT

## 2025-07-16 PROCEDURE — 2500000003 HC RX 250 WO HCPCS: Performed by: HOSPITALIST

## 2025-07-16 PROCEDURE — 94002 VENT MGMT INPAT INIT DAY: CPT

## 2025-07-16 PROCEDURE — 84484 ASSAY OF TROPONIN QUANT: CPT

## 2025-07-16 PROCEDURE — 31500 INSERT EMERGENCY AIRWAY: CPT

## 2025-07-16 PROCEDURE — 2000000000 HC ICU R&B

## 2025-07-16 PROCEDURE — 2580000003 HC RX 258: Performed by: HOSPITALIST

## 2025-07-16 PROCEDURE — 82077 ASSAY SPEC XCP UR&BREATH IA: CPT

## 2025-07-16 PROCEDURE — 96372 THER/PROPH/DIAG INJ SC/IM: CPT

## 2025-07-16 PROCEDURE — 2700000000 HC OXYGEN THERAPY PER DAY

## 2025-07-16 PROCEDURE — 93005 ELECTROCARDIOGRAM TRACING: CPT | Performed by: STUDENT IN AN ORGANIZED HEALTH CARE EDUCATION/TRAINING PROGRAM

## 2025-07-16 PROCEDURE — 81001 URINALYSIS AUTO W/SCOPE: CPT

## 2025-07-16 PROCEDURE — 94761 N-INVAS EAR/PLS OXIMETRY MLT: CPT

## 2025-07-16 PROCEDURE — 96365 THER/PROPH/DIAG IV INF INIT: CPT

## 2025-07-16 PROCEDURE — 2580000003 HC RX 258: Performed by: STUDENT IN AN ORGANIZED HEALTH CARE EDUCATION/TRAINING PROGRAM

## 2025-07-16 RX ORDER — LORAZEPAM 2 MG/ML
2 INJECTION INTRAMUSCULAR ONCE
Status: COMPLETED | OUTPATIENT
Start: 2025-07-16 | End: 2025-07-16

## 2025-07-16 RX ORDER — SODIUM CHLORIDE 9 MG/ML
INJECTION, SOLUTION INTRAVENOUS PRN
Status: DISCONTINUED | OUTPATIENT
Start: 2025-07-16 | End: 2025-07-21 | Stop reason: HOSPADM

## 2025-07-16 RX ORDER — SODIUM CHLORIDE 0.9 % (FLUSH) 0.9 %
5-40 SYRINGE (ML) INJECTION EVERY 12 HOURS SCHEDULED
Status: DISCONTINUED | OUTPATIENT
Start: 2025-07-16 | End: 2025-07-21 | Stop reason: HOSPADM

## 2025-07-16 RX ORDER — MIDAZOLAM HYDROCHLORIDE 1 MG/ML
1-10 INJECTION, SOLUTION INTRAVENOUS CONTINUOUS
Status: DISCONTINUED | OUTPATIENT
Start: 2025-07-16 | End: 2025-07-17

## 2025-07-16 RX ORDER — THIAMINE HYDROCHLORIDE 100 MG/ML
200 INJECTION, SOLUTION INTRAMUSCULAR; INTRAVENOUS DAILY
Status: DISCONTINUED | OUTPATIENT
Start: 2025-07-17 | End: 2025-07-21 | Stop reason: HOSPADM

## 2025-07-16 RX ORDER — PROPOFOL 10 MG/ML
5-50 INJECTION, EMULSION INTRAVENOUS CONTINUOUS
Status: DISCONTINUED | OUTPATIENT
Start: 2025-07-16 | End: 2025-07-17

## 2025-07-16 RX ORDER — LORAZEPAM 2 MG/ML
INJECTION INTRAMUSCULAR
Status: DISPENSED
Start: 2025-07-16 | End: 2025-07-17

## 2025-07-16 RX ORDER — MIDAZOLAM 5 MG/.1ML
5 SPRAY NASAL EVERY 10 MIN PRN
COMMUNITY

## 2025-07-16 RX ORDER — FOLIC ACID 5 MG/ML
1 INJECTION, SOLUTION INTRAMUSCULAR; INTRAVENOUS; SUBCUTANEOUS DAILY
Status: DISCONTINUED | OUTPATIENT
Start: 2025-07-16 | End: 2025-07-16 | Stop reason: SDUPTHER

## 2025-07-16 RX ORDER — ACETAMINOPHEN 325 MG/1
650 TABLET ORAL EVERY 6 HOURS PRN
Status: DISCONTINUED | OUTPATIENT
Start: 2025-07-16 | End: 2025-07-21 | Stop reason: HOSPADM

## 2025-07-16 RX ORDER — POLYETHYLENE GLYCOL 3350 17 G/17G
17 POWDER, FOR SOLUTION ORAL DAILY PRN
Status: DISCONTINUED | OUTPATIENT
Start: 2025-07-16 | End: 2025-07-21 | Stop reason: HOSPADM

## 2025-07-16 RX ORDER — FOLIC ACID 1 MG/1
1 TABLET ORAL DAILY
Status: DISCONTINUED | OUTPATIENT
Start: 2025-07-17 | End: 2025-07-21 | Stop reason: HOSPADM

## 2025-07-16 RX ORDER — LACTULOSE 10 G/15ML
20 SOLUTION ORAL 3 TIMES DAILY
Status: DISCONTINUED | OUTPATIENT
Start: 2025-07-17 | End: 2025-07-21 | Stop reason: HOSPADM

## 2025-07-16 RX ORDER — ACETAMINOPHEN 650 MG/1
650 SUPPOSITORY RECTAL EVERY 6 HOURS PRN
Status: DISCONTINUED | OUTPATIENT
Start: 2025-07-16 | End: 2025-07-21 | Stop reason: HOSPADM

## 2025-07-16 RX ORDER — MIDAZOLAM HYDROCHLORIDE 5 MG/ML
10 INJECTION INTRAMUSCULAR; INTRAVENOUS ONCE
Status: COMPLETED | OUTPATIENT
Start: 2025-07-16 | End: 2025-07-16

## 2025-07-16 RX ORDER — ETOMIDATE 2 MG/ML
20 INJECTION INTRAVENOUS ONCE
Status: COMPLETED | OUTPATIENT
Start: 2025-07-16 | End: 2025-07-16

## 2025-07-16 RX ORDER — THIAMINE HYDROCHLORIDE 100 MG/ML
100 INJECTION, SOLUTION INTRAMUSCULAR; INTRAVENOUS DAILY
Status: DISCONTINUED | OUTPATIENT
Start: 2025-07-16 | End: 2025-07-16 | Stop reason: ALTCHOICE

## 2025-07-16 RX ORDER — POTASSIUM CHLORIDE 29.8 MG/ML
20 INJECTION INTRAVENOUS PRN
Status: DISCONTINUED | OUTPATIENT
Start: 2025-07-16 | End: 2025-07-21 | Stop reason: HOSPADM

## 2025-07-16 RX ORDER — ROCURONIUM BROMIDE 50 MG/5 ML
SYRINGE (ML) INTRAVENOUS
Status: COMPLETED
Start: 2025-07-16 | End: 2025-07-16

## 2025-07-16 RX ORDER — ETOMIDATE 2 MG/ML
INJECTION INTRAVENOUS
Status: COMPLETED
Start: 2025-07-16 | End: 2025-07-16

## 2025-07-16 RX ORDER — ROCURONIUM BROMIDE 50 MG/5 ML
60 SYRINGE (ML) INTRAVENOUS ONCE
Status: COMPLETED | OUTPATIENT
Start: 2025-07-16 | End: 2025-07-16

## 2025-07-16 RX ORDER — SODIUM CHLORIDE 0.9 % (FLUSH) 0.9 %
5-40 SYRINGE (ML) INJECTION PRN
Status: DISCONTINUED | OUTPATIENT
Start: 2025-07-16 | End: 2025-07-21 | Stop reason: HOSPADM

## 2025-07-16 RX ORDER — ONDANSETRON 2 MG/ML
4 INJECTION INTRAMUSCULAR; INTRAVENOUS EVERY 6 HOURS PRN
Status: DISCONTINUED | OUTPATIENT
Start: 2025-07-16 | End: 2025-07-21 | Stop reason: HOSPADM

## 2025-07-16 RX ORDER — LACTULOSE 10 G/15ML
30 SOLUTION ORAL ONCE
Status: DISCONTINUED | OUTPATIENT
Start: 2025-07-16 | End: 2025-07-17

## 2025-07-16 RX ORDER — HALOPERIDOL 5 MG/ML
5 INJECTION INTRAMUSCULAR ONCE
Status: COMPLETED | OUTPATIENT
Start: 2025-07-16 | End: 2025-07-16

## 2025-07-16 RX ORDER — MAGNESIUM SULFATE IN WATER 40 MG/ML
2000 INJECTION, SOLUTION INTRAVENOUS PRN
Status: DISCONTINUED | OUTPATIENT
Start: 2025-07-16 | End: 2025-07-21 | Stop reason: HOSPADM

## 2025-07-16 RX ORDER — ONDANSETRON 4 MG/1
4 TABLET, ORALLY DISINTEGRATING ORAL EVERY 8 HOURS PRN
Status: DISCONTINUED | OUTPATIENT
Start: 2025-07-16 | End: 2025-07-21 | Stop reason: HOSPADM

## 2025-07-16 RX ORDER — ETOMIDATE 2 MG/ML
INJECTION INTRAVENOUS
Status: DISPENSED
Start: 2025-07-16 | End: 2025-07-17

## 2025-07-16 RX ORDER — POTASSIUM CHLORIDE 7.45 MG/ML
10 INJECTION INTRAVENOUS PRN
Status: DISCONTINUED | OUTPATIENT
Start: 2025-07-16 | End: 2025-07-21 | Stop reason: HOSPADM

## 2025-07-16 RX ORDER — ROCURONIUM BROMIDE 10 MG/ML
60 INJECTION, SOLUTION INTRAVENOUS ONCE
Status: DISCONTINUED | OUTPATIENT
Start: 2025-07-16 | End: 2025-07-16 | Stop reason: SDUPTHER

## 2025-07-16 RX ORDER — ENOXAPARIN SODIUM 100 MG/ML
30 INJECTION SUBCUTANEOUS DAILY
Status: DISCONTINUED | OUTPATIENT
Start: 2025-07-17 | End: 2025-07-17

## 2025-07-16 RX ADMIN — LORAZEPAM 2 MG: 2 INJECTION INTRAMUSCULAR; INTRAVENOUS at 13:18

## 2025-07-16 RX ADMIN — Medication 60 MG: at 14:46

## 2025-07-16 RX ADMIN — MIDAZOLAM HYDROCHLORIDE 2 MG/HR: 1 INJECTION, SOLUTION INTRAVENOUS at 16:01

## 2025-07-16 RX ADMIN — FOLIC ACID 1 MG: 5 INJECTION, SOLUTION INTRAMUSCULAR; INTRAVENOUS; SUBCUTANEOUS at 16:46

## 2025-07-16 RX ADMIN — SODIUM CHLORIDE 1000 MG: 9 INJECTION, SOLUTION INTRAVENOUS at 15:38

## 2025-07-16 RX ADMIN — HALOPERIDOL LACTATE 5 MG: 5 INJECTION, SOLUTION INTRAMUSCULAR at 13:50

## 2025-07-16 RX ADMIN — ETOMIDATE 20 MG: 2 INJECTION INTRAVENOUS at 15:29

## 2025-07-16 RX ADMIN — PROPOFOL 30 MCG/KG/MIN: 10 INJECTION, EMULSION INTRAVENOUS at 14:50

## 2025-07-16 RX ADMIN — MIDAZOLAM 10 MG: 5 INJECTION INTRAMUSCULAR; INTRAVENOUS at 17:35

## 2025-07-16 RX ADMIN — SODIUM BICARBONATE: 84 INJECTION, SOLUTION INTRAVENOUS at 21:31

## 2025-07-16 RX ADMIN — THIAMINE HYDROCHLORIDE 100 MG: 100 INJECTION, SOLUTION INTRAMUSCULAR; INTRAVENOUS at 18:07

## 2025-07-16 RX ADMIN — PROPOFOL 50 MCG/KG/MIN: 10 INJECTION, EMULSION INTRAVENOUS at 19:46

## 2025-07-16 RX ADMIN — ETOMIDATE 20 MG: 2 INJECTION, SOLUTION INTRAVENOUS at 15:29

## 2025-07-16 ASSESSMENT — PULMONARY FUNCTION TESTS
PIF_VALUE: 19
PIF_VALUE: 22
PIF_VALUE: 23

## 2025-07-16 NOTE — ED NOTES
Assumed care at this time. Patient biting tub and coughing. More sedation requested. New order for Versed drip. Awaiting pharmacy approval.

## 2025-07-16 NOTE — PLAN OF CARE
Admit to ICU. Intubated at the ED. Continue versed and propofol. Lactulose; thiamine; folic acid; daily ammonia checks.

## 2025-07-16 NOTE — ED PROVIDER NOTES
I have personally performed a face to face diagnostic evaluation on this patient. I have fully participated in the care of this patient I personally saw the patient and performed a substantive portion of the visit including all aspects of the medical decision making.  I have reviewed and agree with all pertinent clinical information including history, physical exam, diagnostic tests, and the plan.    Medical Decision Making  Patient found down in the parking lot of the methadone clinic, confused and agitated.  He is not cooperative with questioning upon arrival.  Per squad he did not receive his methadone dose today.  He provides no medical history.  Chart was reviewed has history of IV drug abuse, chronic hepatitis C, positive HIV test, status epilepticus prescribed oxcarbazepine and zycopri    Exam patient is thin appearing, cachectic, disheveled.  Witnessed to have a 1 minute tonic-clonic seizure.  Track marks over bilateral upper extremities.  Cardiac regular rate and rhythm.  Moves all 4 extremities spontaneously.  Confused speech.  Not cooperative with questioning.    Patient treated for seizure on arrival with Ativan.  Will load with valproic acid, unable to clarify if he has received antiepileptic medication today.  We do not have xcopri available at this facility.  Patient severely agitated, spitting on staff, attempting to get out of bed and is posing a danger to himself and others.  We are unable to obtain an IV on him and pursue further workup.  I gave the patient Haldol but this did not have any significant improvement.  Multiple staff members and anxiolytic medication required to obtain IV.  Decision made to intubate for safety and extreme agitation.  Intubation without complication, please see below intubation note.  Urine tox is showing fentanyl and cocaine.  Differential includes cocaine induced psychosis, postictal status, delirium tremens.  Patient also treated with thiamine and folate.  Upon

## 2025-07-16 NOTE — ED NOTES
PT presents as disoriented, disorganized verbalization, flight of ideas, spitting, scratching, punching, kicking.  Unable to follow directions.  Large amounts of intermittent vomit.

## 2025-07-16 NOTE — ED PROVIDER NOTES
[facility] EMERGENCY DEPARTMENT  EMERGENCY DEPARTMENT ENCOUNTER        Pt Name: Adam Trujillo  MRN: [id number]  Birthdate 1990  Date of evaluation: 7/16/2025  Provider: LEIGHTON Devries CNP  PCP: No primary care provider on file.  Note Started: 5:07 PM EDT 7/16/25       I have seen and evaluated this patient with my supervising physician Samantha Tan MD.      CHIEF COMPLAINT       Chief Complaint   Patient presents with    Altered Mental Status     PT found down in parking lot of methadone clinic, confused.       HISTORY OF PRESENT ILLNESS: 1 or more Elements     History From: ems  Limitations to history : Altered Mental Status    Adam Trujillo is a 35 y.o. male who presents to the emergency department with report of witnessed seizure while in parking lot of methadone clinic before receiving dose of methadone.  Patient is unable to offer history.  He is agitated, screaming profanity, and does not follow commands.  Patient is spitting.  It is unclear if he took his seizure treatment medications today.    Nursing Notes were all reviewed and agreed with or any disagreements were addressed in the HPI.    REVIEW OF SYSTEMS :      Review of Systems   Unable to perform ROS: Mental status change   Neurological:  Positive for seizures.   Psychiatric/Behavioral:  Positive for agitation.        Positives and Pertinent negatives as per HPI.     SURGICAL HISTORY     Past Surgical History:   Procedure Laterality Date    BRONCHOSCOPY N/A 02/22/2021    BRONCHOSCOPY DIAGNOSTIC OR CELL WASH ONLY performed by Joseline Tomlinson MD at [facility] ENDOSCOPY    BRONCHOSCOPY  02/22/2021    BRONCHOSCOPY THERAPUTIC ASPIRATION INITIAL performed by Joseline Tomlinson MD at [facility] ENDOSCOPY       CURRENTMEDICATIONS       Previous Medications    JARDIANCE 10 MG TABLET    TAKE 1 TABLET BY MOUTH DAILY    METHADONE (DOLOPHINE) 10 MG/ML SOLUTION    Take by mouth daily.    METOPROLOL SUCCINATE (TOPROL XL) 25 MG EXTENDED

## 2025-07-17 ENCOUNTER — APPOINTMENT (OUTPATIENT)
Age: 35
DRG: 053 | End: 2025-07-17
Attending: INTERNAL MEDICINE
Payer: MEDICAID

## 2025-07-17 PROBLEM — I20.1 CORONARY VASOSPASM: Status: ACTIVE | Noted: 2025-07-17

## 2025-07-17 PROBLEM — F14.959 COCAINE-INDUCED PSYCHOTIC DISORDER (HCC): Status: ACTIVE | Noted: 2025-07-17

## 2025-07-17 PROBLEM — I24.89 DEMAND ISCHEMIA (HCC): Status: ACTIVE | Noted: 2025-07-17

## 2025-07-17 PROBLEM — G92.8 TOXIC METABOLIC ENCEPHALOPATHY: Status: ACTIVE | Noted: 2025-07-16

## 2025-07-17 LAB
AMMONIA PLAS-SCNC: 48 UMOL/L (ref 16–60)
ANION GAP SERPL CALCULATED.3IONS-SCNC: 16 MMOL/L (ref 3–16)
ANTI-XA UNFRAC HEPARIN: <0.1 IU/ML (ref 0.3–0.7)
APTT BLD: 28.6 SEC (ref 22.8–35.8)
BASE EXCESS BLDA CALC-SCNC: 1.5 MMOL/L (ref -3–3)
BASOPHILS # BLD: 0.1 K/UL (ref 0–0.2)
BASOPHILS NFR BLD: 0.4 %
BUN SERPL-MCNC: 33 MG/DL (ref 7–20)
CALCIUM SERPL-MCNC: 9 MG/DL (ref 8.3–10.6)
CHLORIDE SERPL-SCNC: 99 MMOL/L (ref 99–110)
CK SERPL-CCNC: 7785 U/L (ref 39–308)
CO2 BLDA-SCNC: 59 MMOL/L
CO2 SERPL-SCNC: 22 MMOL/L (ref 21–32)
COHGB MFR BLDA: 0.9 % (ref 0–1.5)
CREAT SERPL-MCNC: 2.4 MG/DL (ref 0.9–1.3)
DEPRECATED RDW RBC AUTO: 14.1 % (ref 12.4–15.4)
DEPRECATED RDW RBC AUTO: 14.3 % (ref 12.4–15.4)
ECHO AO ROOT DIAM: 2.8 CM
ECHO AO ROOT INDEX: 1.65 CM/M2
ECHO AV AREA PEAK VELOCITY: 2.4 CM2
ECHO AV AREA VTI: 2.1 CM2
ECHO AV AREA/BSA PEAK VELOCITY: 1.4 CM2/M2
ECHO AV AREA/BSA VTI: 1.2 CM2/M2
ECHO AV MEAN GRADIENT: 5 MMHG
ECHO AV MEAN VELOCITY: 1.1 M/S
ECHO AV PEAK GRADIENT: 9 MMHG
ECHO AV PEAK VELOCITY: 1.5 M/S
ECHO AV VELOCITY RATIO: 1
ECHO AV VTI: 21.7 CM
ECHO BSA: 1.65 M2
ECHO LA AREA 2C: 13.7 CM2
ECHO LA AREA 4C: 12.7 CM2
ECHO LA DIAMETER INDEX: 1.65 CM/M2
ECHO LA DIAMETER: 2.8 CM
ECHO LA MAJOR AXIS: 4.7 CM
ECHO LA MINOR AXIS: 4.9 CM
ECHO LA TO AORTIC ROOT RATIO: 1
ECHO LA VOL BP: 28 ML (ref 18–58)
ECHO LA VOL MOD A2C: 31 ML (ref 18–58)
ECHO LA VOL MOD A4C: 26 ML (ref 18–58)
ECHO LA VOL/BSA BIPLANE: 16 ML/M2 (ref 16–34)
ECHO LA VOLUME INDEX MOD A2C: 18 ML/M2 (ref 16–34)
ECHO LA VOLUME INDEX MOD A4C: 15 ML/M2 (ref 16–34)
ECHO LV E' LATERAL VELOCITY: 20.1 CM/S
ECHO LV E' SEPTAL VELOCITY: 11.1 CM/S
ECHO LV EDV A2C: 109 ML
ECHO LV EDV A4C: 109 ML
ECHO LV EDV INDEX A4C: 64 ML/M2
ECHO LV EDV NDEX A2C: 64 ML/M2
ECHO LV EF PHYSICIAN: 55 %
ECHO LV EJECTION FRACTION A2C: 55 %
ECHO LV EJECTION FRACTION A4C: 55 %
ECHO LV EJECTION FRACTION BIPLANE: 55 % (ref 55–100)
ECHO LV ESV A2C: 49 ML
ECHO LV ESV A4C: 49 ML
ECHO LV ESV INDEX A2C: 29 ML/M2
ECHO LV ESV INDEX A4C: 29 ML/M2
ECHO LV FRACTIONAL SHORTENING: 33 % (ref 28–44)
ECHO LV INTERNAL DIMENSION DIASTOLE INDEX: 2.47 CM/M2
ECHO LV INTERNAL DIMENSION DIASTOLIC: 4.2 CM (ref 4.2–5.9)
ECHO LV INTERNAL DIMENSION SYSTOLIC INDEX: 1.65 CM/M2
ECHO LV INTERNAL DIMENSION SYSTOLIC: 2.8 CM
ECHO LV IVSD: 1 CM (ref 0.6–1)
ECHO LV MASS 2D: 127.8 G (ref 88–224)
ECHO LV MASS INDEX 2D: 75.2 G/M2 (ref 49–115)
ECHO LV POSTERIOR WALL DIASTOLIC: 0.9 CM (ref 0.6–1)
ECHO LV RELATIVE WALL THICKNESS RATIO: 0.43
ECHO LVOT AREA: 2.5 CM2
ECHO LVOT AV VTI INDEX: 0.83
ECHO LVOT DIAM: 1.8 CM
ECHO LVOT MEAN GRADIENT: 5 MMHG
ECHO LVOT PEAK GRADIENT: 9 MMHG
ECHO LVOT PEAK VELOCITY: 1.5 M/S
ECHO LVOT STROKE VOLUME INDEX: 27.1 ML/M2
ECHO LVOT SV: 46 ML
ECHO LVOT VTI: 18.1 CM
ECHO MV A VELOCITY: 1.04 M/S
ECHO MV E VELOCITY: 0.96 M/S
ECHO MV E/A RATIO: 0.92
ECHO MV E/E' LATERAL: 4.78
ECHO MV E/E' RATIO (AVERAGED): 6.71
ECHO MV E/E' SEPTAL: 8.65
ECHO PV MAX VELOCITY: 1.3 M/S
ECHO PV PEAK GRADIENT: 7 MMHG
ECHO RA AREA 4C: 9 CM2
ECHO RA END SYSTOLIC VOLUME APICAL 4 CHAMBER INDEX BSA: 11 ML/M2
ECHO RA VOLUME: 19 ML
ECHO RV BASAL DIMENSION: 3.3 CM
ECHO RV FREE WALL PEAK S': 17.9 CM/S
ECHO RV LONGITUDINAL DIMENSION: 7.5 CM
ECHO RV MID DIMENSION: 2.1 CM
ECHO RV TAPSE: 2.5 CM (ref 1.7–?)
ECHO TV REGURGITANT MAX VELOCITY: 2.64 M/S
ECHO TV REGURGITANT PEAK GRADIENT: 28 MMHG
EKG ATRIAL RATE: 48 BPM
EKG DIAGNOSIS: NORMAL
EKG P AXIS: 39 DEGREES
EKG P-R INTERVAL: 164 MS
EKG Q-T INTERVAL: 458 MS
EKG QRS DURATION: 96 MS
EKG QTC CALCULATION (BAZETT): 409 MS
EKG R AXIS: 53 DEGREES
EKG T AXIS: 46 DEGREES
EKG VENTRICULAR RATE: 48 BPM
EOSINOPHIL # BLD: 0 K/UL (ref 0–0.6)
EOSINOPHIL NFR BLD: 0.1 %
GFR SERPLBLD CREATININE-BSD FMLA CKD-EPI: 35 ML/MIN/{1.73_M2}
GLUCOSE SERPL-MCNC: 114 MG/DL (ref 70–99)
HCO3 BLDA-SCNC: 25.2 MMOL/L (ref 21–29)
HCT VFR BLD AUTO: 40.5 % (ref 40.5–52.5)
HCT VFR BLD AUTO: 41.9 % (ref 40.5–52.5)
HGB BLD-MCNC: 14.1 G/DL (ref 13.5–17.5)
HGB BLD-MCNC: 14.6 G/DL (ref 13.5–17.5)
HGB BLDA-MCNC: 14.2 G/DL (ref 13.5–17.5)
INR PPP: 1.06 (ref 0.86–1.14)
LACTATE BLDV-SCNC: 1.3 MMOL/L (ref 0.4–2)
LYMPHOCYTES # BLD: 1.5 K/UL (ref 1–5.1)
LYMPHOCYTES NFR BLD: 9.7 %
MCH RBC QN AUTO: 31.9 PG (ref 26–34)
MCH RBC QN AUTO: 32 PG (ref 26–34)
MCHC RBC AUTO-ENTMCNC: 34.8 G/DL (ref 31–36)
MCHC RBC AUTO-ENTMCNC: 34.9 G/DL (ref 31–36)
MCV RBC AUTO: 91.3 FL (ref 80–100)
MCV RBC AUTO: 92.1 FL (ref 80–100)
METHGB MFR BLDA: 0.5 %
MONOCYTES # BLD: 1.3 K/UL (ref 0–1.3)
MONOCYTES NFR BLD: 8.9 %
NEUTROPHILS # BLD: 12.3 K/UL (ref 1.7–7.7)
NEUTROPHILS NFR BLD: 80.9 %
O2 THERAPY: ABNORMAL
PCO2 BLDA: 36.2 MMHG (ref 35–45)
PH BLDA: 7.45 [PH] (ref 7.35–7.45)
PLATELET # BLD AUTO: 174 K/UL (ref 135–450)
PLATELET # BLD AUTO: 183 K/UL (ref 135–450)
PMV BLD AUTO: 7.7 FL (ref 5–10.5)
PMV BLD AUTO: 7.9 FL (ref 5–10.5)
PO2 BLDA: 57.7 MMHG (ref 75–108)
POTASSIUM SERPL-SCNC: 3.8 MMOL/L (ref 3.5–5.1)
PROCALCITONIN SERPL IA-MCNC: 1.21 NG/ML (ref 0–0.15)
PROCALCITONIN SERPL IA-MCNC: 5.63 NG/ML (ref 0–0.15)
PROTHROMBIN TIME: 14.1 SEC (ref 12.1–14.9)
RBC # BLD AUTO: 4.4 M/UL (ref 4.2–5.9)
RBC # BLD AUTO: 4.59 M/UL (ref 4.2–5.9)
REASON FOR REJECTION: NORMAL
REJECTED TEST: NORMAL
SAO2 % BLDA: 90.9 %
SODIUM SERPL-SCNC: 137 MMOL/L (ref 136–145)
TROPONIN, HIGH SENSITIVITY: 54 NG/L (ref 0–22)
TROPONIN, HIGH SENSITIVITY: 75 NG/L (ref 0–22)
WBC # BLD AUTO: 15.1 K/UL (ref 4–11)
WBC # BLD AUTO: 20.9 K/UL (ref 4–11)

## 2025-07-17 PROCEDURE — 83605 ASSAY OF LACTIC ACID: CPT

## 2025-07-17 PROCEDURE — 85025 COMPLETE CBC W/AUTO DIFF WBC: CPT

## 2025-07-17 PROCEDURE — 2580000003 HC RX 258: Performed by: INTERNAL MEDICINE

## 2025-07-17 PROCEDURE — 6360000002 HC RX W HCPCS: Performed by: HOSPITALIST

## 2025-07-17 PROCEDURE — 85027 COMPLETE CBC AUTOMATED: CPT

## 2025-07-17 PROCEDURE — 6370000000 HC RX 637 (ALT 250 FOR IP): Performed by: HOSPITALIST

## 2025-07-17 PROCEDURE — 93005 ELECTROCARDIOGRAM TRACING: CPT | Performed by: INTERNAL MEDICINE

## 2025-07-17 PROCEDURE — 85610 PROTHROMBIN TIME: CPT

## 2025-07-17 PROCEDURE — 84484 ASSAY OF TROPONIN QUANT: CPT

## 2025-07-17 PROCEDURE — C8929 TTE W OR WO FOL WCON,DOPPLER: HCPCS

## 2025-07-17 PROCEDURE — 6370000000 HC RX 637 (ALT 250 FOR IP): Performed by: INTERNAL MEDICINE

## 2025-07-17 PROCEDURE — 82140 ASSAY OF AMMONIA: CPT

## 2025-07-17 PROCEDURE — 2500000003 HC RX 250 WO HCPCS: Performed by: HOSPITALIST

## 2025-07-17 PROCEDURE — 2000000000 HC ICU R&B

## 2025-07-17 PROCEDURE — 99291 CRITICAL CARE FIRST HOUR: CPT | Performed by: INTERNAL MEDICINE

## 2025-07-17 PROCEDURE — 80048 BASIC METABOLIC PNL TOTAL CA: CPT

## 2025-07-17 PROCEDURE — 2700000000 HC OXYGEN THERAPY PER DAY

## 2025-07-17 PROCEDURE — 93010 ELECTROCARDIOGRAM REPORT: CPT | Performed by: INTERNAL MEDICINE

## 2025-07-17 PROCEDURE — 82550 ASSAY OF CK (CPK): CPT

## 2025-07-17 PROCEDURE — 94761 N-INVAS EAR/PLS OXIMETRY MLT: CPT

## 2025-07-17 PROCEDURE — 93306 TTE W/DOPPLER COMPLETE: CPT | Performed by: INTERNAL MEDICINE

## 2025-07-17 PROCEDURE — 87040 BLOOD CULTURE FOR BACTERIA: CPT

## 2025-07-17 PROCEDURE — 82803 BLOOD GASES ANY COMBINATION: CPT

## 2025-07-17 PROCEDURE — 36415 COLL VENOUS BLD VENIPUNCTURE: CPT

## 2025-07-17 PROCEDURE — 2580000003 HC RX 258: Performed by: HOSPITALIST

## 2025-07-17 PROCEDURE — 84145 PROCALCITONIN (PCT): CPT

## 2025-07-17 PROCEDURE — 85520 HEPARIN ASSAY: CPT

## 2025-07-17 PROCEDURE — 6360000004 HC RX CONTRAST MEDICATION: Performed by: INTERNAL MEDICINE

## 2025-07-17 PROCEDURE — 94003 VENT MGMT INPAT SUBQ DAY: CPT

## 2025-07-17 PROCEDURE — 85730 THROMBOPLASTIN TIME PARTIAL: CPT

## 2025-07-17 RX ORDER — HEPARIN SODIUM 1000 [USP'U]/ML
30 INJECTION, SOLUTION INTRAVENOUS; SUBCUTANEOUS PRN
Status: DISCONTINUED | OUTPATIENT
Start: 2025-07-17 | End: 2025-07-17

## 2025-07-17 RX ORDER — SODIUM CHLORIDE 450 MG/100ML
INJECTION, SOLUTION INTRAVENOUS CONTINUOUS
Status: DISCONTINUED | OUTPATIENT
Start: 2025-07-17 | End: 2025-07-17

## 2025-07-17 RX ORDER — HEPARIN SODIUM 10000 [USP'U]/100ML
5-30 INJECTION, SOLUTION INTRAVENOUS CONTINUOUS
Status: DISCONTINUED | OUTPATIENT
Start: 2025-07-17 | End: 2025-07-17

## 2025-07-17 RX ORDER — HEPARIN SODIUM 1000 [USP'U]/ML
60 INJECTION, SOLUTION INTRAVENOUS; SUBCUTANEOUS PRN
Status: DISCONTINUED | OUTPATIENT
Start: 2025-07-17 | End: 2025-07-17

## 2025-07-17 RX ORDER — HEPARIN SODIUM 1000 [USP'U]/ML
60 INJECTION, SOLUTION INTRAVENOUS; SUBCUTANEOUS ONCE
Status: DISCONTINUED | OUTPATIENT
Start: 2025-07-17 | End: 2025-07-17

## 2025-07-17 RX ORDER — SODIUM CHLORIDE, SODIUM LACTATE, POTASSIUM CHLORIDE, CALCIUM CHLORIDE 600; 310; 30; 20 MG/100ML; MG/100ML; MG/100ML; MG/100ML
INJECTION, SOLUTION INTRAVENOUS CONTINUOUS
Status: DISCONTINUED | OUTPATIENT
Start: 2025-07-17 | End: 2025-07-17

## 2025-07-17 RX ORDER — OXCARBAZEPINE 300 MG/1
1200 TABLET, FILM COATED ORAL 2 TIMES DAILY
Status: DISCONTINUED | OUTPATIENT
Start: 2025-07-17 | End: 2025-07-21 | Stop reason: HOSPADM

## 2025-07-17 RX ORDER — SODIUM CHLORIDE, SODIUM LACTATE, POTASSIUM CHLORIDE, CALCIUM CHLORIDE 600; 310; 30; 20 MG/100ML; MG/100ML; MG/100ML; MG/100ML
INJECTION, SOLUTION INTRAVENOUS CONTINUOUS
Status: DISCONTINUED | OUTPATIENT
Start: 2025-07-17 | End: 2025-07-21 | Stop reason: HOSPADM

## 2025-07-17 RX ORDER — ATORVASTATIN CALCIUM 40 MG/1
40 TABLET, FILM COATED ORAL NIGHTLY
Status: DISCONTINUED | OUTPATIENT
Start: 2025-07-17 | End: 2025-07-21 | Stop reason: HOSPADM

## 2025-07-17 RX ORDER — DEXTROSE MONOHYDRATE 100 MG/ML
INJECTION, SOLUTION INTRAVENOUS CONTINUOUS PRN
Status: DISCONTINUED | OUTPATIENT
Start: 2025-07-17 | End: 2025-07-21 | Stop reason: HOSPADM

## 2025-07-17 RX ORDER — GLUCAGON 1 MG/ML
1 KIT INJECTION PRN
Status: DISCONTINUED | OUTPATIENT
Start: 2025-07-17 | End: 2025-07-21 | Stop reason: HOSPADM

## 2025-07-17 RX ORDER — ISOSORBIDE MONONITRATE 30 MG/1
30 TABLET, EXTENDED RELEASE ORAL DAILY
Status: DISCONTINUED | OUTPATIENT
Start: 2025-07-17 | End: 2025-07-21 | Stop reason: HOSPADM

## 2025-07-17 RX ORDER — ASPIRIN 81 MG/1
81 TABLET, CHEWABLE ORAL DAILY
Status: DISCONTINUED | OUTPATIENT
Start: 2025-07-17 | End: 2025-07-21 | Stop reason: HOSPADM

## 2025-07-17 RX ADMIN — NITROGLYCERIN 0.5 INCH: 20 OINTMENT TOPICAL at 11:10

## 2025-07-17 RX ADMIN — PROPOFOL 50 MCG/KG/MIN: 10 INJECTION, EMULSION INTRAVENOUS at 03:07

## 2025-07-17 RX ADMIN — SODIUM CHLORIDE, SODIUM LACTATE, POTASSIUM CHLORIDE, AND CALCIUM CHLORIDE: .6; .31; .03; .02 INJECTION, SOLUTION INTRAVENOUS at 13:28

## 2025-07-17 RX ADMIN — MIDAZOLAM HYDROCHLORIDE 8 MG/HR: 1 INJECTION, SOLUTION INTRAVENOUS at 07:50

## 2025-07-17 RX ADMIN — THIAMINE HYDROCHLORIDE 200 MG: 100 INJECTION, SOLUTION INTRAMUSCULAR; INTRAVENOUS at 07:51

## 2025-07-17 RX ADMIN — LACTULOSE 20 G: 10 SOLUTION ORAL at 14:32

## 2025-07-17 RX ADMIN — FOLIC ACID 1 MG: 1 TABLET ORAL at 07:51

## 2025-07-17 RX ADMIN — LACTULOSE 20 G: 10 SOLUTION ORAL at 07:51

## 2025-07-17 RX ADMIN — SODIUM CHLORIDE, SODIUM LACTATE, POTASSIUM CHLORIDE, AND CALCIUM CHLORIDE: .6; .31; .03; .02 INJECTION, SOLUTION INTRAVENOUS at 09:50

## 2025-07-17 RX ADMIN — SULFUR HEXAFLUORIDE 2 ML: 60.7; .19; .19 INJECTION, POWDER, LYOPHILIZED, FOR SUSPENSION INTRAVENOUS; INTRAVESICAL at 10:38

## 2025-07-17 RX ADMIN — SODIUM BICARBONATE: 84 INJECTION, SOLUTION INTRAVENOUS at 07:42

## 2025-07-17 RX ADMIN — OXCARBAZEPINE 1200 MG: 300 TABLET, FILM COATED ORAL at 09:39

## 2025-07-17 RX ADMIN — NITROGLYCERIN 0.5 INCH: 20 OINTMENT TOPICAL at 17:35

## 2025-07-17 RX ADMIN — ASPIRIN 81 MG: 81 TABLET, CHEWABLE ORAL at 07:51

## 2025-07-17 RX ADMIN — ISOSORBIDE MONONITRATE 30 MG: 30 TABLET, EXTENDED RELEASE ORAL at 14:32

## 2025-07-17 ASSESSMENT — PULMONARY FUNCTION TESTS
PIF_VALUE: 21
PIF_VALUE: 22
PIF_VALUE: 21

## 2025-07-17 NOTE — ED NOTES
This RN assumed care of pt, pt is in bilateral soft restraints, pt in intubated on cardiac monitor

## 2025-07-17 NOTE — PLAN OF CARE
Problem: Chronic Conditions and Co-morbidities  Goal: Patient's chronic conditions and co-morbidity symptoms are monitored and maintained or improved  Outcome: Progressing     Problem: Discharge Planning  Goal: Discharge to home or other facility with appropriate resources  Outcome: Progressing     Problem: Safety - Medical Restraint  Goal: Remains free of injury from restraints (Restraint for Interference with Medical Device)  Description: INTERVENTIONS:  1. Determine that other, less restrictive measures have been tried or would not be effective before applying the restraint  2. Evaluate the patient's condition at the time of restraint application  3. Inform patient/family regarding the reason for restraint  4. Q2H: Monitor safety, psychosocial status, comfort, nutrition and hydration  7/17/2025 1043 by Samuel Murphy RN  Outcome: Progressing  Flowsheets (Taken 7/17/2025 1000)  Remains free of injury from restraints (restraint for interference with medical device):   Every 2 hours: Monitor safety, psychosocial status, comfort, nutrition and hydration   Determine that other, less restrictive measures have been tried or would not be effective before applying the restraint   Evaluate the patient's condition at the time of restraint application   Inform patient/family regarding the reason for restraint  Problem: Neurosensory - Adult  Goal: Achieves stable or improved neurological status  Outcome: Progressing  Goal: Absence of seizures  Outcome: Progressing     Problem: Respiratory - Adult  Goal: Achieves optimal ventilation and oxygenation  Outcome: Progressing     Problem: Cardiovascular - Adult  Goal: Maintains optimal cardiac output and hemodynamic stability  Outcome: Progressing     Problem: Musculoskeletal - Adult  Goal: Return mobility to safest level of function  Outcome: Progressing     Problem: Metabolic/Fluid and Electrolytes - Adult  Goal: Electrolytes maintained within normal limits  Outcome:

## 2025-07-17 NOTE — PLAN OF CARE
Problem: Safety - Medical Restraint  Goal: Remains free of injury from restraints (Restraint for Interference with Medical Device)  Description: INTERVENTIONS:  1. Determine that other, less restrictive measures have been tried or would not be effective before applying the restraint  2. Evaluate the patient's condition at the time of restraint application  3. Inform patient/family regarding the reason for restraint  4. Q2H: Monitor safety, psychosocial status, comfort, nutrition and hydration  7/17/2025 1251 by Samuel Murphy RN  Outcome: Completed

## 2025-07-17 NOTE — H&P
needle Track marks over bilateral upper extremities.  Skin: warm, dry  Neuro: Sedated on the vent.  Psych: Mood appropriate.       Past Medical History:   PMHx   Past Medical History:   Diagnosis Date   • Acute respiratory failure with hypoxia and hypercapnia (HCC) 01/12/2023   • Aspiration pneumonia (HCC)    • Aspiration pneumonia of both lower lobes (HCC)    • CAD (coronary artery disease)    • Cardiac arrest (HCC) 08/08/2023   • Demand ischemia (HCC) 01/13/2023   • Hepatitis C    • HTN (hypertension)    • NSTEMI (non-ST elevated myocardial infarction) (HCC)    • Polysubstance abuse (HCC)    • Seizures (HCC)    • Septic shock (HCC)    • Systolic CHF (HCC)    • Takotsubo cardiomyopathy      PSHX:  has a past surgical history that includes bronchoscopy (N/A, 02/22/2021) and bronchoscopy (02/22/2021).  Allergies:   Allergies   Allergen Reactions   • Keppra [Levetiracetam] Other (See Comments)     Pt states he gets into severe rage      Fam HX: family history includes Diabetes in his mother; Hypertension in his mother; No Known Problems in his brother.  Soc HX:   Social History     Socioeconomic History   • Marital status: Single   Tobacco Use   • Smoking status: Every Day     Current packs/day: 3.00     Types: Cigarettes   • Smokeless tobacco: Never   Vaping Use   • Vaping status: Never Used   Substance and Sexual Activity   • Alcohol use: No   • Drug use: Yes     Frequency: 7.0 times per week     Types: IV, Marijuana (Weed), Heroin     Comment: fentanyl, heroin, cocaine, marijuana, meth (from time to time)   • Sexual activity: Yes     Partners: Female     Social Drivers of Health     Financial Resource Strain: High Risk (6/19/2024)    Received from [facility]    Overall Financial Resource Strain (CARDIA)    • Difficulty of Paying Living Expenses: Very hard   Food Insecurity: No Food Insecurity (6/3/2025)    Hunger Vital Sign    • Worried About Running Out of Food in the Last Year: Never true    • Ran Out of Food in

## 2025-07-17 NOTE — CONSULTS
[facility]   CONSULTATION  [phone]      Chief Complaint   Patient presents with    Altered Mental Status     PT found down in parking lot of methadone clinic, confused.            History of Present Illness:  Adam Trujillo is a 35 y.o. patient who presented to the hospital with complaints of Altered Mental Status. I have been asked to provide consultation regarding further management and testing.  Cardiology is being consulted due to ECG changes and troponins.    The patient is intubated, his mother was at bedside to give the patient's history.     The patient reports was reported to have a witnessed seizure in the parking lot of a methadone clinic before receiving a dose of methadone.  The patient's mother states that he had been clean for about 3 months but then relapsed on 7/14/2025.  He has had numerous hospital admissions due to seizure activities and polysubstance abuse. The patient had developed congestive heart failure due to his drug use and is currently on GDMT. The mother states he has been compliant with his heart failure medications. Unclear if he took his seizure medications.       In the ED: Hypertensive 153/61, respiratory rate 22, elevated WBC 17.3, UDS positive for cannabinoid, cocaine, methadone, fentanyl.  Venous ABG pH 7.26, HCO3 18.5, lactic acid 13.9, proBNP 266, CXR no acute process, 2 mg of Ativan given in ER due to seizure lasting less than 1 minute patient.     Past Medical History:   has a past medical history of Acute respiratory failure with hypoxia and hypercapnia ([facility]), Aspiration pneumonia ([facility]), Aspiration pneumonia of both lower lobes ([facility]), CAD (coronary artery disease), Cardiac arrest ([facility]), Demand ischemia ([facility]), Hepatitis C, HTN (hypertension), NSTEMI (non-ST elevated myocardial infarction) ([facility]), Polysubstance abuse ([facility]), Seizures ([facility]), Septic shock ([facility]), Systolic CHF ([facility]), and Takotsubo cardiomyopathy.    Surgical History:   has a past surgical history

## 2025-07-17 NOTE — CONSULTS
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 male with past medical history significant for drug abuse on methadone, cardiomyopathy, seizure disorder, chronic hepatitis C who had a witnessed seizure in the parking lot of methadone clinic before receiving methadone and was brought to the ER.  He was very confused and agitated and not cooperating.  Patient was severely agitated in the ER, spitting on staff and attempting to get out of bed.  Decision was made to intubate the patient for safety and extreme agitation in the ER.  His urine tox screen was noted to be positive for marijuana, cocaine, fentanyl and methadone. Patient has had multiple admissions at [facility] for the same.  He has left the hospital multiple times AGAINST MEDICAL ADVICE.    He has history of cardiomyopathy with last EF noted to be 45 to 50% on echo from 2025.  He is on cardiac medications although it is unclear whether he is taking his medications correctly.  He has had left heart catheterization in  that showed normal coronaries but did showed Takotsubo cardiomyopathy with EF of 20% at that time.  He also has history of drug abuse.  He follows at methadone clinic.  Per mother, patient has been clean for roughly 3 months and then relapsed recently.    Lab work showed WILBERT with a creatinine of 2.4, elevated CK, transaminitis and leukocytosis.    Sedated with propofol and Versed.    14 point ROS could not be obtained due to patient factors.       Past Medical History:   Diagnosis Date    Acute respiratory failure with hypoxia and hypercapnia (HCC) 2023    Aspiration pneumonia

## 2025-07-17 NOTE — CARE COORDINATION
Case Management Assessment  Initial Evaluation    Date/Time of Evaluation: 7/17/2025 10:59 AM  Assessment Completed by: Jaylyn Palacios RN    If patient is discharged prior to next notation, then this note serves as note for discharge by case management.    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  PCP verified by CM? (P) Yes    Chart Reviewed: Yes      History Provided by: Medical Record, Other (see comment) (Luana (parent))  Patient Orientation: Sedated, Other (see comment) (Intubated/Sedated)    Patient Cognition: Other (see comment) (Intubated/Sedated)    Hospitalization in the last 30 days (Readmission):  No    If yes, Readmission Assessment in  Navigator will be completed.    Advance Directives:      Code Status: Full Code   Patient's Primary Decision Maker is: Legal Next of Kin    Primary Decision Maker: AnayaLuana - Parent - [phone]    Discharge Planning:    Patient lives with: (P) Parent Type of Home: (P) House  Primary Care Giver: Self  Patient Support Systems include: Parent   Current Financial resources: (P) Medicaid  Current community resources: (P) [facility]  Current services prior to admission: (P) Other (Comment) ([facility] for [facility])            Current DME:              Type of Home Care services:  (P) None    ADLS  Prior functional level: (P) Independent in ADLs/IADLs  Current functional level: (P) Assistance with the following:, Bathing, Dressing, Toileting, Mobility (Intubated/Sedated)    PT

## 2025-07-18 ENCOUNTER — APPOINTMENT (OUTPATIENT)
Dept: ULTRASOUND IMAGING | Age: 35
DRG: 053 | End: 2025-07-18
Payer: MEDICAID

## 2025-07-18 LAB
ALBUMIN SERPL-MCNC: 3.6 G/DL (ref 3.4–5)
ALBUMIN/GLOB SERPL: 1.1 {RATIO} (ref 1.1–2.2)
ALP SERPL-CCNC: 93 U/L (ref 40–129)
ALT SERPL-CCNC: 64 U/L (ref 10–40)
AMMONIA PLAS-SCNC: 38 UMOL/L (ref 16–60)
ANION GAP SERPL CALCULATED.3IONS-SCNC: 18 MMOL/L (ref 3–16)
AST SERPL-CCNC: 138 U/L (ref 15–37)
BASOPHILS # BLD: 0 K/UL (ref 0–0.2)
BASOPHILS NFR BLD: 0.3 %
BILIRUB SERPL-MCNC: 0.5 MG/DL (ref 0–1)
BUN SERPL-MCNC: 49 MG/DL (ref 7–20)
CALCIUM SERPL-MCNC: 8.9 MG/DL (ref 8.3–10.6)
CHLORIDE SERPL-SCNC: 99 MMOL/L (ref 99–110)
CK SERPL-CCNC: 7179 U/L (ref 39–308)
CO2 SERPL-SCNC: 21 MMOL/L (ref 21–32)
CREAT SERPL-MCNC: 3.9 MG/DL (ref 0.9–1.3)
CREAT UR-MCNC: 50.9 MG/DL (ref 39–259)
CREAT UR-MCNC: 50.9 MG/DL (ref 39–259)
DEPRECATED RDW RBC AUTO: 14.2 % (ref 12.4–15.4)
EKG ATRIAL RATE: 100 BPM
EKG DIAGNOSIS: NORMAL
EKG P AXIS: 54 DEGREES
EKG P-R INTERVAL: 144 MS
EKG Q-T INTERVAL: 428 MS
EKG QRS DURATION: 132 MS
EKG QTC CALCULATION (BAZETT): 552 MS
EKG R AXIS: -22 DEGREES
EKG T AXIS: 80 DEGREES
EKG VENTRICULAR RATE: 100 BPM
EOSINOPHIL # BLD: 0 K/UL (ref 0–0.6)
EOSINOPHIL NFR BLD: 0.1 %
GFR SERPLBLD CREATININE-BSD FMLA CKD-EPI: 20 ML/MIN/{1.73_M2}
GLUCOSE SERPL-MCNC: 125 MG/DL (ref 70–99)
HCT VFR BLD AUTO: 37.3 % (ref 40.5–52.5)
HGB BLD-MCNC: 13 G/DL (ref 13.5–17.5)
LYMPHOCYTES # BLD: 1 K/UL (ref 1–5.1)
LYMPHOCYTES NFR BLD: 6 %
MAGNESIUM SERPL-MCNC: 2.34 MG/DL (ref 1.8–2.4)
MCH RBC QN AUTO: 31.6 PG (ref 26–34)
MCHC RBC AUTO-ENTMCNC: 34.8 G/DL (ref 31–36)
MCV RBC AUTO: 91.1 FL (ref 80–100)
MICROALBUMIN UR DL<=1MG/L-MCNC: 13.6 MG/DL
MICROALBUMIN/CREAT UR: 267.2 MG/G (ref 0–30)
MONOCYTES # BLD: 0.6 K/UL (ref 0–1.3)
MONOCYTES NFR BLD: 3.8 %
NEUTROPHILS # BLD: 15.1 K/UL (ref 1.7–7.7)
NEUTROPHILS NFR BLD: 89.8 %
PHOSPHATE SERPL-MCNC: 5.8 MG/DL (ref 2.5–4.9)
PLATELET # BLD AUTO: 115 K/UL (ref 135–450)
PLATELET BLD QL SMEAR: ABNORMAL
PMV BLD AUTO: 8.1 FL (ref 5–10.5)
POTASSIUM SERPL-SCNC: 3.8 MMOL/L (ref 3.5–5.1)
PROT SERPL-MCNC: 6.8 G/DL (ref 6.4–8.2)
PROT UR-MCNC: 48.1 MG/DL
PROT/CREAT UR-RTO: 0.9 MG/DL
RBC # BLD AUTO: 4.1 M/UL (ref 4.2–5.9)
SLIDE REVIEW: ABNORMAL
SODIUM SERPL-SCNC: 138 MMOL/L (ref 136–145)
WBC # BLD AUTO: 16.8 K/UL (ref 4–11)

## 2025-07-18 PROCEDURE — 82550 ASSAY OF CK (CPK): CPT

## 2025-07-18 PROCEDURE — 6370000000 HC RX 637 (ALT 250 FOR IP): Performed by: INTERNAL MEDICINE

## 2025-07-18 PROCEDURE — 2500000003 HC RX 250 WO HCPCS: Performed by: HOSPITALIST

## 2025-07-18 PROCEDURE — 36415 COLL VENOUS BLD VENIPUNCTURE: CPT

## 2025-07-18 PROCEDURE — 82570 ASSAY OF URINE CREATININE: CPT

## 2025-07-18 PROCEDURE — 82043 UR ALBUMIN QUANTITATIVE: CPT

## 2025-07-18 PROCEDURE — 2000000000 HC ICU R&B

## 2025-07-18 PROCEDURE — 82140 ASSAY OF AMMONIA: CPT

## 2025-07-18 PROCEDURE — 76770 US EXAM ABDO BACK WALL COMP: CPT

## 2025-07-18 PROCEDURE — 83735 ASSAY OF MAGNESIUM: CPT

## 2025-07-18 PROCEDURE — 2580000003 HC RX 258: Performed by: INTERNAL MEDICINE

## 2025-07-18 PROCEDURE — 6360000002 HC RX W HCPCS: Performed by: HOSPITALIST

## 2025-07-18 PROCEDURE — 6370000000 HC RX 637 (ALT 250 FOR IP): Performed by: HOSPITALIST

## 2025-07-18 PROCEDURE — 6360000002 HC RX W HCPCS: Performed by: INTERNAL MEDICINE

## 2025-07-18 PROCEDURE — 85025 COMPLETE CBC W/AUTO DIFF WBC: CPT

## 2025-07-18 PROCEDURE — 93010 ELECTROCARDIOGRAM REPORT: CPT | Performed by: INTERNAL MEDICINE

## 2025-07-18 PROCEDURE — 84156 ASSAY OF PROTEIN URINE: CPT

## 2025-07-18 PROCEDURE — 84100 ASSAY OF PHOSPHORUS: CPT

## 2025-07-18 PROCEDURE — 80053 COMPREHEN METABOLIC PANEL: CPT

## 2025-07-18 PROCEDURE — 99291 CRITICAL CARE FIRST HOUR: CPT | Performed by: INTERNAL MEDICINE

## 2025-07-18 PROCEDURE — 99232 SBSQ HOSP IP/OBS MODERATE 35: CPT | Performed by: INTERNAL MEDICINE

## 2025-07-18 RX ORDER — NICOTINE 21 MG/24HR
1 PATCH, TRANSDERMAL 24 HOURS TRANSDERMAL DAILY
Status: DISCONTINUED | OUTPATIENT
Start: 2025-07-18 | End: 2025-07-21 | Stop reason: HOSPADM

## 2025-07-18 RX ORDER — METHOCARBAMOL 500 MG/1
750 TABLET, FILM COATED ORAL EVERY 6 HOURS PRN
Status: DISCONTINUED | OUTPATIENT
Start: 2025-07-18 | End: 2025-07-21 | Stop reason: HOSPADM

## 2025-07-18 RX ORDER — METHADONE HYDROCHLORIDE 10 MG/ML
160 CONCENTRATE ORAL DAILY
Refills: 0 | Status: DISCONTINUED | OUTPATIENT
Start: 2025-07-18 | End: 2025-07-21 | Stop reason: HOSPADM

## 2025-07-18 RX ORDER — HYDROXYZINE PAMOATE 25 MG/1
50 CAPSULE ORAL EVERY 8 HOURS PRN
Status: DISCONTINUED | OUTPATIENT
Start: 2025-07-18 | End: 2025-07-21 | Stop reason: HOSPADM

## 2025-07-18 RX ORDER — HEPARIN SODIUM 5000 [USP'U]/ML
5000 INJECTION, SOLUTION INTRAVENOUS; SUBCUTANEOUS EVERY 8 HOURS SCHEDULED
Status: DISCONTINUED | OUTPATIENT
Start: 2025-07-18 | End: 2025-07-21 | Stop reason: HOSPADM

## 2025-07-18 RX ADMIN — ISOSORBIDE MONONITRATE 30 MG: 30 TABLET, EXTENDED RELEASE ORAL at 08:23

## 2025-07-18 RX ADMIN — FOLIC ACID 1 MG: 1 TABLET ORAL at 08:23

## 2025-07-18 RX ADMIN — OXCARBAZEPINE 1200 MG: 300 TABLET, FILM COATED ORAL at 08:23

## 2025-07-18 RX ADMIN — ATORVASTATIN CALCIUM 40 MG: 40 TABLET, FILM COATED ORAL at 21:58

## 2025-07-18 RX ADMIN — MELATONIN TAB 3 MG 3 MG: 3 TAB at 21:58

## 2025-07-18 RX ADMIN — LACTULOSE 20 G: 10 SOLUTION ORAL at 21:59

## 2025-07-18 RX ADMIN — HYDROXYZINE PAMOATE 50 MG: 25 CAPSULE ORAL at 21:58

## 2025-07-18 RX ADMIN — HEPARIN SODIUM 5000 UNITS: 5000 INJECTION INTRAVENOUS; SUBCUTANEOUS at 21:58

## 2025-07-18 RX ADMIN — SODIUM CHLORIDE, PRESERVATIVE FREE 10 ML: 5 INJECTION INTRAVENOUS at 08:23

## 2025-07-18 RX ADMIN — ASPIRIN 81 MG: 81 TABLET, CHEWABLE ORAL at 08:23

## 2025-07-18 RX ADMIN — THIAMINE HYDROCHLORIDE 200 MG: 100 INJECTION, SOLUTION INTRAMUSCULAR; INTRAVENOUS at 08:23

## 2025-07-18 RX ADMIN — METHOCARBAMOL 750 MG: 750 TABLET ORAL at 21:58

## 2025-07-18 RX ADMIN — LACTULOSE 20 G: 10 SOLUTION ORAL at 08:23

## 2025-07-18 RX ADMIN — SODIUM CHLORIDE, SODIUM LACTATE, POTASSIUM CHLORIDE, AND CALCIUM CHLORIDE: .6; .31; .03; .02 INJECTION, SOLUTION INTRAVENOUS at 11:53

## 2025-07-18 RX ADMIN — METHADONE HYDROCHLORIDE 160 MG: 10 CONCENTRATE ORAL at 09:18

## 2025-07-18 RX ADMIN — OXCARBAZEPINE 1200 MG: 300 TABLET, FILM COATED ORAL at 21:57

## 2025-07-18 NOTE — CONSULTS
[facility] ([facility])   Nephrology Consult Note  [phone]  [email or web address]        Patient: Adam Trujillo    MRN: [id number]    : 1990     Reason for Consult:    Acute Kidney Injury (WILBERT)       Requesting Provider: Dr. Mayes     History of Present Illness / Subjective:    Adam Trujillo is a 35 y.o. male with a PMHx of polysubstance abuse including IV drug use, chronic hepatitis C, negative HIV status Dec 2024, seizure disorder, Non ischemic cardiomyopathy (EF 40-50% 2025) , multiple admissions to [facility] for AMS and each time leaving AMA.   He was brought to the ED On  after witnessed seizure while in the parking lot of methadone clinic before receiving dose of methadone.   Per mother, he was clear for about 3-4 months, and relapsed about a week ago.   He was confused,  agitated and not cooperating on presentation in the ED, and was intubated for safety and extreme agitation. He was later extubated.   His urine tox screen was noted to be positive for marijuana, cocaine, fentanyl and methadone.   Labs relevant for Cr of 1.1 on presentation with lactic acidosis ( Lactic acid 13.9 down to 2.9, bicarbonate of 9 up to 22 with IV fluids).   Noted elevated CK 7,789 the following day, with rising Cr 1.1  --> 2.4 --> 3.9.   CK slightly lower today at 7,179.   ALT and AST elevated , AST > ALT    We were consulted in the setting of WILBERT.   Awake and alert when seen this am. Mother at bedside.   Breathing comfortably on room air.   Stable hemodynamics today. Did have hypotensive episodes yesterday.   Non oliguric with 1100 ml urine output documented from overnight.   No recent IV contrast study.     UA (): trace ketones, large blood, 100 protein.   4 RBC, 1 WBC    TTE (25: EF 55-60%. No significant valvular disease noted.         Past Medical History   Active Ambulatory Problems     Diagnosis Date Noted    Seizure (HCC) 2021    Status epilepticus (HCC)

## 2025-07-18 NOTE — PLAN OF CARE
Problem: Chronic Conditions and Co-morbidities  Goal: Patient's chronic conditions and co-morbidity symptoms are monitored and maintained or improved  7/18/2025 0951 by Samuel Murphy RN  Outcome: Progressing     Problem: Discharge Planning  Goal: Discharge to home or other facility with appropriate resources  7/18/2025 0951 by Samuel Murphy, RN  Outcome: Progressing     Problem: Neurosensory - Adult  Goal: Achieves stable or improved neurological status  Outcome: Progressing  Goal: Absence of seizures  Outcome: Progressing     Problem: Respiratory - Adult  Goal: Achieves optimal ventilation and oxygenation  Outcome: Progressing     Problem: Cardiovascular - Adult  Goal: Maintains optimal cardiac output and hemodynamic stability  Outcome: Progressing     Problem: Musculoskeletal - Adult  Goal: Return mobility to safest level of function  Outcome: Progressing     Problem: Metabolic/Fluid and Electrolytes - Adult  Goal: Electrolytes maintained within normal limits  Outcome: Progressing  Goal: Hemodynamic stability and optimal renal function maintained  Outcome: Progressing  Goal: Glucose maintained within prescribed range  Outcome: Progressing     Problem: Hematologic - Adult  Goal: Maintains hematologic stability  Outcome: Progressing     Problem: Skin/Tissue Integrity - Adult  Goal: Skin integrity remains intact  Outcome: Progressing

## 2025-07-19 LAB
ALBUMIN SERPL-MCNC: 3.1 G/DL (ref 3.4–5)
ALBUMIN/GLOB SERPL: 1.1 {RATIO} (ref 1.1–2.2)
ALP SERPL-CCNC: 74 U/L (ref 40–129)
ALT SERPL-CCNC: 56 U/L (ref 10–40)
AMMONIA PLAS-SCNC: 17 UMOL/L (ref 16–60)
ANA SER QL IA: NEGATIVE
ANION GAP SERPL CALCULATED.3IONS-SCNC: 12 MMOL/L (ref 3–16)
AST SERPL-CCNC: 88 U/L (ref 15–37)
BASOPHILS # BLD: 0 K/UL (ref 0–0.2)
BASOPHILS NFR BLD: 0.2 %
BILIRUB SERPL-MCNC: 0.4 MG/DL (ref 0–1)
BUN SERPL-MCNC: 46 MG/DL (ref 7–20)
C3 SERPL-MCNC: 99.7 MG/DL (ref 90–180)
C4 SERPL-MCNC: 13.7 MG/DL (ref 10–40)
CALCIUM SERPL-MCNC: 8.4 MG/DL (ref 8.3–10.6)
CHLORIDE SERPL-SCNC: 102 MMOL/L (ref 99–110)
CK SERPL-CCNC: 3050 U/L (ref 39–308)
CO2 SERPL-SCNC: 24 MMOL/L (ref 21–32)
CREAT SERPL-MCNC: 3.3 MG/DL (ref 0.9–1.3)
DEPRECATED RDW RBC AUTO: 14.1 % (ref 12.4–15.4)
EOSINOPHIL # BLD: 0.1 K/UL (ref 0–0.6)
EOSINOPHIL NFR BLD: 1.6 %
GFR SERPLBLD CREATININE-BSD FMLA CKD-EPI: 24 ML/MIN/{1.73_M2}
GLUCOSE SERPL-MCNC: 90 MG/DL (ref 70–99)
HAV IGM SERPL QL IA: ABNORMAL
HBV CORE IGM SERPL QL IA: ABNORMAL
HBV SURFACE AG SERPL QL IA: ABNORMAL
HCT VFR BLD AUTO: 33.8 % (ref 40.5–52.5)
HCV AB SERPL QL IA: REACTIVE
HGB BLD-MCNC: 11.6 G/DL (ref 13.5–17.5)
LYMPHOCYTES # BLD: 1.2 K/UL (ref 1–5.1)
LYMPHOCYTES NFR BLD: 14.1 %
MAGNESIUM SERPL-MCNC: 2.12 MG/DL (ref 1.8–2.4)
MCH RBC QN AUTO: 32 PG (ref 26–34)
MCHC RBC AUTO-ENTMCNC: 34.5 G/DL (ref 31–36)
MCV RBC AUTO: 92.7 FL (ref 80–100)
MONOCYTES # BLD: 0.4 K/UL (ref 0–1.3)
MONOCYTES NFR BLD: 4.4 %
NEUTROPHILS # BLD: 6.6 K/UL (ref 1.7–7.7)
NEUTROPHILS NFR BLD: 79.7 %
PHOSPHATE SERPL-MCNC: 5.5 MG/DL (ref 2.5–4.9)
PLATELET # BLD AUTO: 119 K/UL (ref 135–450)
PMV BLD AUTO: 8.1 FL (ref 5–10.5)
POTASSIUM SERPL-SCNC: 3.5 MMOL/L (ref 3.5–5.1)
POTASSIUM SERPL-SCNC: 3.5 MMOL/L (ref 3.5–5.1)
PROT SERPL-MCNC: 5.8 G/DL (ref 6.4–8.2)
RBC # BLD AUTO: 3.65 M/UL (ref 4.2–5.9)
REPORT: NORMAL
RESP PATH DNA+RNA PNL NPH NAA+NON-PROBE: NORMAL
SODIUM SERPL-SCNC: 138 MMOL/L (ref 136–145)
WBC # BLD AUTO: 8.3 K/UL (ref 4–11)

## 2025-07-19 PROCEDURE — 87390 HIV-1 AG IA: CPT

## 2025-07-19 PROCEDURE — 0202U NFCT DS 22 TRGT SARS-COV-2: CPT

## 2025-07-19 PROCEDURE — 99233 SBSQ HOSP IP/OBS HIGH 50: CPT | Performed by: INTERNAL MEDICINE

## 2025-07-19 PROCEDURE — 82550 ASSAY OF CK (CPK): CPT

## 2025-07-19 PROCEDURE — 87522 HEPATITIS C REVRS TRNSCRPJ: CPT

## 2025-07-19 PROCEDURE — 2580000003 HC RX 258: Performed by: INTERNAL MEDICINE

## 2025-07-19 PROCEDURE — 86160 COMPLEMENT ANTIGEN: CPT

## 2025-07-19 PROCEDURE — 36415 COLL VENOUS BLD VENIPUNCTURE: CPT

## 2025-07-19 PROCEDURE — 86038 ANTINUCLEAR ANTIBODIES: CPT

## 2025-07-19 PROCEDURE — 6370000000 HC RX 637 (ALT 250 FOR IP): Performed by: INTERNAL MEDICINE

## 2025-07-19 PROCEDURE — 86701 HIV-1ANTIBODY: CPT

## 2025-07-19 PROCEDURE — 2500000003 HC RX 250 WO HCPCS: Performed by: INTERNAL MEDICINE

## 2025-07-19 PROCEDURE — 2500000003 HC RX 250 WO HCPCS: Performed by: HOSPITALIST

## 2025-07-19 PROCEDURE — 80074 ACUTE HEPATITIS PANEL: CPT

## 2025-07-19 PROCEDURE — 83735 ASSAY OF MAGNESIUM: CPT

## 2025-07-19 PROCEDURE — 6360000002 HC RX W HCPCS: Performed by: INTERNAL MEDICINE

## 2025-07-19 PROCEDURE — 86702 HIV-2 ANTIBODY: CPT

## 2025-07-19 PROCEDURE — 2000000000 HC ICU R&B

## 2025-07-19 PROCEDURE — 82140 ASSAY OF AMMONIA: CPT

## 2025-07-19 PROCEDURE — 83516 IMMUNOASSAY NONANTIBODY: CPT

## 2025-07-19 PROCEDURE — 80053 COMPREHEN METABOLIC PANEL: CPT

## 2025-07-19 PROCEDURE — 6360000002 HC RX W HCPCS: Performed by: HOSPITALIST

## 2025-07-19 PROCEDURE — 84100 ASSAY OF PHOSPHORUS: CPT

## 2025-07-19 PROCEDURE — 82595 ASSAY OF CRYOGLOBULIN: CPT

## 2025-07-19 PROCEDURE — 6370000000 HC RX 637 (ALT 250 FOR IP): Performed by: HOSPITALIST

## 2025-07-19 PROCEDURE — 85025 COMPLETE CBC W/AUTO DIFF WBC: CPT

## 2025-07-19 RX ORDER — TOPIRAMATE 25 MG/1
25 TABLET, FILM COATED ORAL DAILY
Status: DISCONTINUED | OUTPATIENT
Start: 2025-07-19 | End: 2025-07-19

## 2025-07-19 RX ORDER — PROCHLORPERAZINE EDISYLATE 5 MG/ML
10 INJECTION INTRAMUSCULAR; INTRAVENOUS EVERY 6 HOURS PRN
Status: DISCONTINUED | OUTPATIENT
Start: 2025-07-19 | End: 2025-07-21 | Stop reason: HOSPADM

## 2025-07-19 RX ADMIN — SODIUM CHLORIDE, SODIUM LACTATE, POTASSIUM CHLORIDE, AND CALCIUM CHLORIDE: .6; .31; .03; .02 INJECTION, SOLUTION INTRAVENOUS at 13:03

## 2025-07-19 RX ADMIN — METHOCARBAMOL 750 MG: 750 TABLET ORAL at 06:47

## 2025-07-19 RX ADMIN — HEPARIN SODIUM 5000 UNITS: 5000 INJECTION INTRAVENOUS; SUBCUTANEOUS at 21:40

## 2025-07-19 RX ADMIN — HYDROXYZINE PAMOATE 50 MG: 25 CAPSULE ORAL at 20:59

## 2025-07-19 RX ADMIN — OXCARBAZEPINE 1200 MG: 300 TABLET, FILM COATED ORAL at 08:20

## 2025-07-19 RX ADMIN — ASPIRIN 81 MG: 81 TABLET, CHEWABLE ORAL at 08:20

## 2025-07-19 RX ADMIN — LORAZEPAM 1 MG: 2 INJECTION INTRAMUSCULAR; INTRAVENOUS at 14:08

## 2025-07-19 RX ADMIN — METHADONE HYDROCHLORIDE 160 MG: 10 CONCENTRATE ORAL at 13:05

## 2025-07-19 RX ADMIN — LACTULOSE 20 G: 10 SOLUTION ORAL at 13:04

## 2025-07-19 RX ADMIN — LACTULOSE 20 G: 10 SOLUTION ORAL at 21:01

## 2025-07-19 RX ADMIN — METHOCARBAMOL 750 MG: 750 TABLET ORAL at 20:59

## 2025-07-19 RX ADMIN — LACTULOSE 20 G: 10 SOLUTION ORAL at 08:20

## 2025-07-19 RX ADMIN — HYDROXYZINE PAMOATE 50 MG: 25 CAPSULE ORAL at 06:47

## 2025-07-19 RX ADMIN — THIAMINE HYDROCHLORIDE 200 MG: 100 INJECTION, SOLUTION INTRAMUSCULAR; INTRAVENOUS at 08:20

## 2025-07-19 RX ADMIN — OXCARBAZEPINE 1200 MG: 300 TABLET, FILM COATED ORAL at 20:59

## 2025-07-19 RX ADMIN — SODIUM CHLORIDE, SODIUM LACTATE, POTASSIUM CHLORIDE, AND CALCIUM CHLORIDE: .6; .31; .03; .02 INJECTION, SOLUTION INTRAVENOUS at 04:37

## 2025-07-19 RX ADMIN — ISOSORBIDE MONONITRATE 30 MG: 30 TABLET, EXTENDED RELEASE ORAL at 08:20

## 2025-07-19 RX ADMIN — PROCHLORPERAZINE EDISYLATE 10 MG: 5 INJECTION INTRAMUSCULAR; INTRAVENOUS at 08:58

## 2025-07-19 RX ADMIN — SODIUM CHLORIDE, PRESERVATIVE FREE 1 MG: 5 INJECTION INTRAVENOUS at 08:48

## 2025-07-19 RX ADMIN — SODIUM CHLORIDE, PRESERVATIVE FREE 10 ML: 5 INJECTION INTRAVENOUS at 08:21

## 2025-07-19 RX ADMIN — HEPARIN SODIUM 5000 UNITS: 5000 INJECTION INTRAVENOUS; SUBCUTANEOUS at 13:04

## 2025-07-19 RX ADMIN — SODIUM CHLORIDE, PRESERVATIVE FREE 1 MG: 5 INJECTION INTRAVENOUS at 22:02

## 2025-07-19 RX ADMIN — SODIUM CHLORIDE, PRESERVATIVE FREE 1 MG: 5 INJECTION INTRAVENOUS at 17:41

## 2025-07-19 RX ADMIN — ONDANSETRON 4 MG: 2 INJECTION, SOLUTION INTRAMUSCULAR; INTRAVENOUS at 08:41

## 2025-07-19 RX ADMIN — FOLIC ACID 1 MG: 1 TABLET ORAL at 08:20

## 2025-07-19 RX ADMIN — MELATONIN TAB 3 MG 3 MG: 3 TAB at 20:59

## 2025-07-19 NOTE — PLAN OF CARE
Problem: Chronic Conditions and Co-morbidities  Goal: Patient's chronic conditions and co-morbidity symptoms are monitored and maintained or improved  7/19/2025 0946 by Samuel Murphy RN  Outcome: Progressing     Problem: Discharge Planning  Goal: Discharge to home or other facility with appropriate resources  Outcome: Progressing     Problem: Neurosensory - Adult  Goal: Achieves stable or improved neurological status  Outcome: Progressing  Goal: Absence of seizures  Outcome: Progressing     Problem: Respiratory - Adult  Goal: Achieves optimal ventilation and oxygenation  Outcome: Progressing     Problem: Musculoskeletal - Adult  Goal: Return mobility to safest level of function  Outcome: Progressing     Problem: Metabolic/Fluid and Electrolytes - Adult  Goal: Electrolytes maintained within normal limits  Outcome: Progressing  Goal: Hemodynamic stability and optimal renal function maintained  Outcome: Progressing  Goal: Glucose maintained within prescribed range  Outcome: Progressing     Problem: Hematologic - Adult  Goal: Maintains hematologic stability  Outcome: Progressing     Problem: Skin/Tissue Integrity - Adult  Goal: Skin integrity remains intact  Outcome: Progressing

## 2025-07-20 LAB
ALBUMIN SERPL-MCNC: 3.2 G/DL (ref 3.4–5)
ANION GAP SERPL CALCULATED.3IONS-SCNC: 12 MMOL/L (ref 3–16)
BUN SERPL-MCNC: 31 MG/DL (ref 7–20)
CALCIUM SERPL-MCNC: 8.7 MG/DL (ref 8.3–10.6)
CHLORIDE SERPL-SCNC: 105 MMOL/L (ref 99–110)
CK SERPL-CCNC: 869 U/L (ref 39–308)
CO2 SERPL-SCNC: 21 MMOL/L (ref 21–32)
CREAT SERPL-MCNC: 2 MG/DL (ref 0.9–1.3)
GFR SERPLBLD CREATININE-BSD FMLA CKD-EPI: 44 ML/MIN/{1.73_M2}
GLUCOSE BLD-MCNC: 127 MG/DL (ref 70–99)
GLUCOSE SERPL-MCNC: 118 MG/DL (ref 70–99)
HIV 1+2 AB+HIV1 P24 AG SERPL QL IA: NORMAL
HIV 2 AB SERPL QL IA: NORMAL
HIV1 AB SERPL QL IA: NORMAL
HIV1 P24 AG SERPL QL IA: NORMAL
PERFORMED ON: ABNORMAL
PHOSPHATE SERPL-MCNC: 2.7 MG/DL (ref 2.5–4.9)
POTASSIUM SERPL-SCNC: 3.9 MMOL/L (ref 3.5–5.1)
SODIUM SERPL-SCNC: 138 MMOL/L (ref 136–145)

## 2025-07-20 PROCEDURE — 2580000003 HC RX 258: Performed by: INTERNAL MEDICINE

## 2025-07-20 PROCEDURE — 36415 COLL VENOUS BLD VENIPUNCTURE: CPT

## 2025-07-20 PROCEDURE — 6370000000 HC RX 637 (ALT 250 FOR IP): Performed by: INTERNAL MEDICINE

## 2025-07-20 PROCEDURE — 6360000002 HC RX W HCPCS: Performed by: INTERNAL MEDICINE

## 2025-07-20 PROCEDURE — 6370000000 HC RX 637 (ALT 250 FOR IP): Performed by: HOSPITALIST

## 2025-07-20 PROCEDURE — 2500000003 HC RX 250 WO HCPCS: Performed by: HOSPITALIST

## 2025-07-20 PROCEDURE — 80069 RENAL FUNCTION PANEL: CPT

## 2025-07-20 PROCEDURE — 6360000002 HC RX W HCPCS

## 2025-07-20 PROCEDURE — 6360000002 HC RX W HCPCS: Performed by: HOSPITALIST

## 2025-07-20 PROCEDURE — 1200000000 HC SEMI PRIVATE

## 2025-07-20 PROCEDURE — 82550 ASSAY OF CK (CPK): CPT

## 2025-07-20 RX ORDER — DIAZEPAM 5 MG/1
5 TABLET ORAL ONCE
Status: DISCONTINUED | OUTPATIENT
Start: 2025-07-20 | End: 2025-07-20

## 2025-07-20 RX ORDER — DIAZEPAM 10 MG/2ML
5 INJECTION, SOLUTION INTRAMUSCULAR; INTRAVENOUS ONCE
Status: DISCONTINUED | OUTPATIENT
Start: 2025-07-20 | End: 2025-07-20

## 2025-07-20 RX ORDER — MIDAZOLAM HYDROCHLORIDE 2 MG/2ML
1 INJECTION, SOLUTION INTRAMUSCULAR; INTRAVENOUS ONCE
Status: COMPLETED | OUTPATIENT
Start: 2025-07-20 | End: 2025-07-20

## 2025-07-20 RX ORDER — MIDAZOLAM HYDROCHLORIDE 2 MG/2ML
2.5 INJECTION, SOLUTION INTRAMUSCULAR; INTRAVENOUS ONCE
Status: DISCONTINUED | OUTPATIENT
Start: 2025-07-20 | End: 2025-07-20 | Stop reason: ALTCHOICE

## 2025-07-20 RX ADMIN — ISOSORBIDE MONONITRATE 30 MG: 30 TABLET, EXTENDED RELEASE ORAL at 08:22

## 2025-07-20 RX ADMIN — METHOCARBAMOL 750 MG: 750 TABLET ORAL at 06:04

## 2025-07-20 RX ADMIN — SODIUM CHLORIDE, PRESERVATIVE FREE 1 MG: 5 INJECTION INTRAVENOUS at 01:34

## 2025-07-20 RX ADMIN — NITROGLYCERIN 0.5 INCH: 20 OINTMENT TOPICAL at 23:02

## 2025-07-20 RX ADMIN — HEPARIN SODIUM 5000 UNITS: 5000 INJECTION INTRAVENOUS; SUBCUTANEOUS at 14:24

## 2025-07-20 RX ADMIN — LACTULOSE 20 G: 10 SOLUTION ORAL at 20:41

## 2025-07-20 RX ADMIN — SODIUM CHLORIDE, SODIUM LACTATE, POTASSIUM CHLORIDE, AND CALCIUM CHLORIDE: .6; .31; .03; .02 INJECTION, SOLUTION INTRAVENOUS at 13:52

## 2025-07-20 RX ADMIN — OXCARBAZEPINE 1200 MG: 300 TABLET, FILM COATED ORAL at 20:42

## 2025-07-20 RX ADMIN — ASPIRIN 81 MG: 81 TABLET, CHEWABLE ORAL at 08:10

## 2025-07-20 RX ADMIN — OXCARBAZEPINE 1200 MG: 300 TABLET, FILM COATED ORAL at 08:10

## 2025-07-20 RX ADMIN — SODIUM CHLORIDE, PRESERVATIVE FREE 1 MG: 5 INJECTION INTRAVENOUS at 18:01

## 2025-07-20 RX ADMIN — SODIUM CHLORIDE, PRESERVATIVE FREE 1 MG: 5 INJECTION INTRAVENOUS at 23:05

## 2025-07-20 RX ADMIN — ONDANSETRON 4 MG: 4 TABLET, ORALLY DISINTEGRATING ORAL at 06:04

## 2025-07-20 RX ADMIN — METHADONE HYDROCHLORIDE 160 MG: 10 CONCENTRATE ORAL at 08:42

## 2025-07-20 RX ADMIN — ACETAMINOPHEN 650 MG: 325 TABLET ORAL at 18:03

## 2025-07-20 RX ADMIN — HYDROXYZINE PAMOATE 50 MG: 25 CAPSULE ORAL at 06:04

## 2025-07-20 RX ADMIN — SODIUM CHLORIDE, SODIUM LACTATE, POTASSIUM CHLORIDE, AND CALCIUM CHLORIDE: .6; .31; .03; .02 INJECTION, SOLUTION INTRAVENOUS at 20:32

## 2025-07-20 RX ADMIN — SODIUM CHLORIDE, PRESERVATIVE FREE 10 ML: 5 INJECTION INTRAVENOUS at 20:44

## 2025-07-20 RX ADMIN — ATORVASTATIN CALCIUM 40 MG: 40 TABLET, FILM COATED ORAL at 20:43

## 2025-07-20 RX ADMIN — LACTULOSE 20 G: 10 SOLUTION ORAL at 13:58

## 2025-07-20 RX ADMIN — METHOCARBAMOL 750 MG: 750 TABLET ORAL at 20:44

## 2025-07-20 RX ADMIN — ONDANSETRON 4 MG: 2 INJECTION, SOLUTION INTRAMUSCULAR; INTRAVENOUS at 01:34

## 2025-07-20 RX ADMIN — FOLIC ACID 1 MG: 1 TABLET ORAL at 08:10

## 2025-07-20 RX ADMIN — HEPARIN SODIUM 5000 UNITS: 5000 INJECTION INTRAVENOUS; SUBCUTANEOUS at 20:43

## 2025-07-20 RX ADMIN — MELATONIN TAB 3 MG 3 MG: 3 TAB at 20:43

## 2025-07-20 RX ADMIN — MIDAZOLAM 1 MG: 1 INJECTION INTRAMUSCULAR; INTRAVENOUS at 04:53

## 2025-07-20 RX ADMIN — LACTULOSE 20 G: 10 SOLUTION ORAL at 08:10

## 2025-07-20 ASSESSMENT — PAIN DESCRIPTION - LOCATION
LOCATION: GENERALIZED
LOCATION: HEAD

## 2025-07-20 ASSESSMENT — PAIN SCALES - GENERAL
PAINLEVEL_OUTOF10: 10
PAINLEVEL_OUTOF10: 7
PAINLEVEL_OUTOF10: 0

## 2025-07-20 ASSESSMENT — PAIN DESCRIPTION - DESCRIPTORS: DESCRIPTORS: ACHING

## 2025-07-20 NOTE — PLAN OF CARE
Problem: Chronic Conditions and Co-morbidities  Goal: Patient's chronic conditions and co-morbidity symptoms are monitored and maintained or improved  7/20/2025 1048 by Vivi Garcia RN  Outcome: Progressing  Flowsheets (Taken 7/20/2025 0800)  Care Plan - Patient's Chronic Conditions and Co-Morbidity Symptoms are Monitored and Maintained or Improved: Monitor and assess patient's chronic conditions and comorbid symptoms for stability, deterioration, or improvement  7/20/2025 0154 by Stephen Mortensen RN  Outcome: Progressing     Problem: Discharge Planning  Goal: Discharge to home or other facility with appropriate resources  7/20/2025 1048 by Vivi Garcia RN  Outcome: Progressing  Flowsheets (Taken 7/20/2025 0800)  Discharge to home or other facility with appropriate resources: Arrange for needed discharge resources and transportation as appropriate  7/20/2025 0154 by Stephen Mortensen RN  Outcome: Progressing     Problem: Neurosensory - Adult  Goal: Achieves stable or improved neurological status  Outcome: Progressing  Flowsheets (Taken 7/20/2025 0800)  Achieves stable or improved neurological status: Assess for and report changes in neurological status  Goal: Absence of seizures  Outcome: Progressing  Flowsheets (Taken 7/20/2025 0800)  Absence of seizures: Monitor for seizure activity.  If seizure occurs, document type and location of movements and any associated apnea

## 2025-07-20 NOTE — DISCHARGE SUMMARY
V2.0  Discharge Summary    Name:  Adam Trujillo /Age/Sex: 1990 (35 y.o. male)   Admit Date: 2025  Discharge Date: 25    MRN & CSN:  [id number] & [id number] Encounter Date and Time 25 11:23 AM EDT    Attending:  Meg Mayes MD Discharging Provider: Meg Mayes MD       Hospital Course:     Brief HPI: Adam Trujillo is a 35 y.o. male with pmh of with a pmh of polysubstance abuse including IV drug use, chronic hepatitis C, indeterminate HIV status, seizure disorder, NICM, multiple admissions to [facility] for AMS and each time leaving AGAINST MEDICAL ADVICE, found down in the parking lot of the methadone clinic confused and agitated.  Admitted for further management of Toxic metabolic encephalopathy.    TME: Resolved.  CT head negative for acute abnormality.  Mental status currently at baseline.     Breakthrough seizures:   Seizure precautions reiterated. Resumed AEDs.  No further seizures observed.  Outpatient follow-up with neurologist.     Mechanical intubation:  Intubated due to extreme agitation.  Intensivist consulted; extubated 2025.     Active polysubstance & IV drug abuse:   Urine toxicology positive for cocaine, methadone, fentanyl and cannabinoid.  Resumed methadone; follow-up at the methadone clinic.  Cessation counseling reiterated.     Acute kidney injury: Resolving  Scr on admission 1.1, peaked at 3.9.  Trended down to 2.0 with IVF.       Rhabdomyolysis: CK 7,785.    Continue IVF and monitor CK levels daily.     Lactic acidosis: 13.9 on admission.  Resolved with IV hydration.     Leukocytosis: Likely reactive.  Resolved.  Urinalysis and chest x-ray unremarkable.  Blood cultures NGTD.  Monitor off antibiotics for now.     Elevated troponin:  History of drug-induced cardiomyopathy:  TTE 2025: LVEF 55 to 60%.  HS-troponin trend not c/w ACS.  Continue aspirin and Lipitor.  On Aldactone, Entresto, Jardiance and Toprol-XL outpatient.  Continue Imdur for

## 2025-07-20 NOTE — CONSULTS
PSYCHIATRY CONSULT, INITIAL EVALUATION      ReReferring Provider:  Meg Mayes MD    CC/Reason for Consult: substance abuse     HPI:   context: Adam Trujillo is a 35 y.o. male  with  has a past medical history of Acute respiratory failure with hypoxia and hypercapnia (HCC), Aspiration pneumonia (HCC), Aspiration pneumonia of both lower lobes (HCC), CAD (coronary artery disease), Cardiac arrest (HCC), Demand ischemia (HCC), Hepatitis C, HTN (hypertension), NSTEMI (non-ST elevated myocardial infarction) (HCC), Polysubstance abuse (HCC), Seizures (HCC), Septic shock (HCC), Systolic CHF (HCC), and Takotsubo cardiomyopathy presented with Altered Mental Status. Patient found down in parking lot of methadone clinic.     associated symptoms: He was intubated extreme agitation/and  air way protection and extubated. He is on room air when at the interview. He is alert and oriented x3.  Reports he has been using different illicit drugs for about 13-15  years. His UDS up on admission was positive Fentanyl, cannabinoid, fentanyl, and cocaine. Reports he wants his life back to \" normal.\" States use of drugs \" overtaken \" his life. States he has been to multiple detox facilities many times. He feels he does not to go any more as he wants to be his family. States, he does not know what to do. Endorses he feels anxious and gets upset with whole situations in his life. Lack of progress with his substance abuse upsets him daily. He worries he is going to die from seizure. Also gets upset with his brother also using drugs which affects his journey of recovery. Uses inappropriate words to describe his brother. States \" My brother messed up my life.\" He repeatedly uses f words. He denies having SI/HI/AVH. States he has been having nausea, sweating, body aches, tearful during the visit. Patient denies having SI/HI. Denies having delusions or paranoid symptoms. Patient has been uncooperative and pulled out his IV access. He has been
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
## 2025-07-21 VITALS
TEMPERATURE: 98.2 F | WEIGHT: 120.81 LBS | DIASTOLIC BLOOD PRESSURE: 93 MMHG | HEIGHT: 71 IN | OXYGEN SATURATION: 93 % | SYSTOLIC BLOOD PRESSURE: 169 MMHG | RESPIRATION RATE: 18 BRPM | BODY MASS INDEX: 16.91 KG/M2 | HEART RATE: 84 BPM

## 2025-07-21 LAB
ALBUMIN SERPL-MCNC: 3 G/DL (ref 3.4–5)
ALBUMIN/GLOB SERPL: 1 {RATIO} (ref 1.1–2.2)
ALP SERPL-CCNC: 76 U/L (ref 40–129)
ALT SERPL-CCNC: 38 U/L (ref 10–40)
ANION GAP SERPL CALCULATED.3IONS-SCNC: 10 MMOL/L (ref 3–16)
AST SERPL-CCNC: 34 U/L (ref 15–37)
BACTERIA BLD CULT ORG #2: NORMAL
BACTERIA BLD CULT: NORMAL
BILIRUB SERPL-MCNC: 0.5 MG/DL (ref 0–1)
BUN SERPL-MCNC: 23 MG/DL (ref 7–20)
CALCIUM SERPL-MCNC: 8.5 MG/DL (ref 8.3–10.6)
CHLORIDE SERPL-SCNC: 108 MMOL/L (ref 99–110)
CO2 SERPL-SCNC: 22 MMOL/L (ref 21–32)
CREAT SERPL-MCNC: 1.4 MG/DL (ref 0.9–1.3)
DEPRECATED RDW RBC AUTO: 14.1 % (ref 12.4–15.4)
GFR SERPLBLD CREATININE-BSD FMLA CKD-EPI: 67 ML/MIN/{1.73_M2}
GLUCOSE BLD-MCNC: 107 MG/DL (ref 70–99)
GLUCOSE BLD-MCNC: 116 MG/DL (ref 70–99)
GLUCOSE SERPL-MCNC: 93 MG/DL (ref 70–99)
HCT VFR BLD AUTO: 31.7 % (ref 40.5–52.5)
HCV RNA SERPL NAA+PROBE-ACNC: ABNORMAL IU/ML
HCV RNA SERPL NAA+PROBE-LOG IU: 6.65 LOG IU/ML
HCV RNA SERPL QL NAA+PROBE: DETECTED
HGB BLD-MCNC: 10.9 G/DL (ref 13.5–17.5)
MCH RBC QN AUTO: 31.8 PG (ref 26–34)
MCHC RBC AUTO-ENTMCNC: 34.3 G/DL (ref 31–36)
MCV RBC AUTO: 92.7 FL (ref 80–100)
PERFORMED ON: ABNORMAL
PERFORMED ON: ABNORMAL
PLATELET # BLD AUTO: 153 K/UL (ref 135–450)
PMV BLD AUTO: 7.5 FL (ref 5–10.5)
POTASSIUM SERPL-SCNC: 4 MMOL/L (ref 3.5–5.1)
PROT SERPL-MCNC: 6 G/DL (ref 6.4–8.2)
RBC # BLD AUTO: 3.42 M/UL (ref 4.2–5.9)
SODIUM SERPL-SCNC: 140 MMOL/L (ref 136–145)
WBC # BLD AUTO: 7.7 K/UL (ref 4–11)

## 2025-07-21 PROCEDURE — 6370000000 HC RX 637 (ALT 250 FOR IP): Performed by: INTERNAL MEDICINE

## 2025-07-21 PROCEDURE — 80053 COMPREHEN METABOLIC PANEL: CPT

## 2025-07-21 PROCEDURE — 36415 COLL VENOUS BLD VENIPUNCTURE: CPT

## 2025-07-21 PROCEDURE — 2500000003 HC RX 250 WO HCPCS: Performed by: HOSPITALIST

## 2025-07-21 PROCEDURE — 6370000000 HC RX 637 (ALT 250 FOR IP): Performed by: HOSPITALIST

## 2025-07-21 PROCEDURE — 6360000002 HC RX W HCPCS: Performed by: HOSPITALIST

## 2025-07-21 PROCEDURE — 85027 COMPLETE CBC AUTOMATED: CPT

## 2025-07-21 RX ORDER — HYDROXYZINE PAMOATE 50 MG/1
50 CAPSULE ORAL EVERY 8 HOURS PRN
Qty: 30 CAPSULE | Refills: 0 | Status: SHIPPED | OUTPATIENT
Start: 2025-07-21 | End: 2025-07-31

## 2025-07-21 RX ORDER — ISOSORBIDE MONONITRATE 30 MG/1
30 TABLET, EXTENDED RELEASE ORAL DAILY
Qty: 30 TABLET | Refills: 3 | Status: SHIPPED | OUTPATIENT
Start: 2025-07-22

## 2025-07-21 RX ORDER — ATORVASTATIN CALCIUM 40 MG/1
40 TABLET, FILM COATED ORAL NIGHTLY
Qty: 30 TABLET | Refills: 3 | Status: SHIPPED | OUTPATIENT
Start: 2025-07-21

## 2025-07-21 RX ORDER — METHOCARBAMOL 750 MG/1
750 TABLET, FILM COATED ORAL EVERY 6 HOURS PRN
Qty: 20 TABLET | Refills: 0 | Status: SHIPPED | OUTPATIENT
Start: 2025-07-21 | End: 2025-07-26

## 2025-07-21 RX ORDER — FOLIC ACID 1 MG/1
1 TABLET ORAL DAILY
Qty: 30 TABLET | Refills: 3 | Status: SHIPPED | OUTPATIENT
Start: 2025-07-22

## 2025-07-21 RX ORDER — METOPROLOL SUCCINATE 25 MG/1
12.5 TABLET, EXTENDED RELEASE ORAL DAILY
Qty: 90 TABLET | Refills: 1 | Status: SHIPPED | OUTPATIENT
Start: 2025-07-21

## 2025-07-21 RX ORDER — LACTULOSE 10 G/15ML
20 SOLUTION ORAL 3 TIMES DAILY
Qty: 2700 ML | Refills: 0 | Status: SHIPPED | OUTPATIENT
Start: 2025-07-21 | End: 2025-08-20

## 2025-07-21 RX ORDER — ASPIRIN 81 MG/1
81 TABLET, CHEWABLE ORAL DAILY
Qty: 30 TABLET | Refills: 3 | Status: SHIPPED | OUTPATIENT
Start: 2025-07-22

## 2025-07-21 RX ORDER — ONDANSETRON 4 MG/1
4 TABLET, ORALLY DISINTEGRATING ORAL EVERY 8 HOURS PRN
Qty: 10 TABLET | Refills: 0 | Status: SHIPPED | OUTPATIENT
Start: 2025-07-21

## 2025-07-21 RX ADMIN — FOLIC ACID 1 MG: 1 TABLET ORAL at 08:29

## 2025-07-21 RX ADMIN — ASPIRIN 81 MG: 81 TABLET, CHEWABLE ORAL at 08:28

## 2025-07-21 RX ADMIN — OXCARBAZEPINE 1200 MG: 300 TABLET, FILM COATED ORAL at 08:28

## 2025-07-21 RX ADMIN — SODIUM CHLORIDE, PRESERVATIVE FREE 10 ML: 5 INJECTION INTRAVENOUS at 08:33

## 2025-07-21 RX ADMIN — ISOSORBIDE MONONITRATE 30 MG: 30 TABLET, EXTENDED RELEASE ORAL at 08:28

## 2025-07-21 RX ADMIN — LACTULOSE 20 G: 10 SOLUTION ORAL at 08:28

## 2025-07-21 RX ADMIN — THIAMINE HYDROCHLORIDE 200 MG: 100 INJECTION, SOLUTION INTRAMUSCULAR; INTRAVENOUS at 08:29

## 2025-07-21 RX ADMIN — METHADONE HYDROCHLORIDE 160 MG: 10 CONCENTRATE ORAL at 09:18

## 2025-07-21 ASSESSMENT — PAIN DESCRIPTION - DESCRIPTORS: DESCRIPTORS: ACHING

## 2025-07-21 ASSESSMENT — PAIN DESCRIPTION - LOCATION: LOCATION: GENERALIZED

## 2025-07-21 ASSESSMENT — PAIN SCALES - GENERAL: PAINLEVEL_OUTOF10: 8

## 2025-07-21 NOTE — CARE COORDINATION
Case Management -  Discharge Note      Patient Name: Adam Trujillo                   YOB: 1990  Room: [facility]5576/5576-01            Readmission Risk (Low < 19, Mod (19-27), High > 27): Readmission Risk Score: 14    Current PCP: No primary care provider on file.      (IMM) Important Message from Medicare:    Has pt received appropriate compliance notices before being discharged if required: N/A  Compliance doc:  [] 2nd IMM; [] Code 44 [] [place]  Date Given:  Given By:     PT AM-PAC:   /24  OT AM-PAC:   /24    CM called Tasha substance abuse navigator [phone] and she will call pt to see if he is interested yet in any services.    Pt discharging home. No needs.    Financial    Payor: [employer] MEDICAID / Plan: [facility] MEDICAID / Product Type: *No Product type* /     Pharmacy:  Potential assistance Purchasing Medications: No  Meds-to-Beds request: Yes      [facility] [id number] - [place], [place] - [address] -  [phone] - F [phone]  [address] [address]  Phone: [phone] Fax: [phone]    [facility] - [place], [place] - [address] - P [phone] - F [phone]  [address] [address]  Phone: [phone] Fax: [phone]      Notes:    Additional Case Management Notes: Patient discharged 7/21/2025 to home.  All discharge needs met per case management.    France Iraheta RN, BSN  [phone]

## 2025-07-21 NOTE — PLAN OF CARE
Problem: Chronic Conditions and Co-morbidities  Goal: Patient's chronic conditions and co-morbidity symptoms are monitored and maintained or improved  Outcome: Progressing     Problem: Discharge Planning  Goal: Discharge to home or other facility with appropriate resources  Outcome: Progressing     Problem: Neurosensory - Adult  Goal: Achieves stable or improved neurological status  Outcome: Progressing  Goal: Absence of seizures  Outcome: Progressing     Problem: Respiratory - Adult  Goal: Achieves optimal ventilation and oxygenation  Outcome: Progressing     Problem: Cardiovascular - Adult  Goal: Maintains optimal cardiac output and hemodynamic stability  Outcome: Progressing     Problem: Musculoskeletal - Adult  Goal: Return mobility to safest level of function  Outcome: Progressing     Problem: Metabolic/Fluid and Electrolytes - Adult  Goal: Electrolytes maintained within normal limits  Outcome: Progressing  Goal: Hemodynamic stability and optimal renal function maintained  Outcome: Progressing  Goal: Glucose maintained within prescribed range  Outcome: Progressing     Problem: Hematologic - Adult  Goal: Maintains hematologic stability  Outcome: Progressing     Problem: Skin/Tissue Integrity - Adult  Goal: Skin integrity remains intact  Outcome: Progressing     Problem: Gastrointestinal - Adult  Goal: Minimal or absence of nausea and vomiting  Outcome: Progressing     Problem: Safety - Adult  Goal: Free from fall injury  Outcome: Progressing     Problem: Pain  Goal: Verbalizes/displays adequate comfort level or baseline comfort level  Outcome: Progressing

## 2025-07-21 NOTE — PROGRESS NOTES
ICU Resident  Progress Note    Name:  Adam Trujillo    /Age/Sex: 1990  (35 y.o. male)  MRN & CSN:  [id number] & [id number]    Date of Admission: 2025    Chief Complaint:   Chief Complaint   Patient presents with    Altered Mental Status     PT found down in parking lot of methadone clinic, confused.         Hospital Course:   35 y.o male PMHx of cardiomyopathy (echo  EF 55-60%), epilepsy (follows  Neurology), chronic hepatitis C, polysubstance abuse (on methadone). The patient presented after having a seizure in the parking lot of his methadone clinic prior to receiving methadone. The patient was confused and agitated in the ED, spitting on staff, attempting to get out of bed and not cooperating with treatment. UDS positive for cannabis, cocaine, methadone and fentanyl. The patient was intubated for safety and transferred to the ICU. Patient was extubated yesterday. Patient has been refusing most medications and labs      Subjective:  Today is:  Hospital Day: 3.  Patient seen and examined in ICU-3904/3904-.     Patient seen at bedside this morning. He is pleasant and cooperative with the exam. He has been afebrile so far. He does endorse a productive cough, but can't describe sputum color as he swallows it. He has some mild chest discomfort and overall feels unwell. He denies any abdominal pain, shortness of breath, nausea or vomiting. He reported to night shift that he wanted to go home. After discussing with him the risks of leaving against medical advice he agreed to stay for treatment for the time being.      Lines  Right and left PIV   Urinary catheter     Drips  LR 200mL/hr    Microbiology  Blood cultures from  pending  UA  1 WBC, 4 RBC, no bacteria present    Medications:  Reviewed    Infusion Medications    lactated ringers 200 mL/hr at 25 1040    dextrose      sodium chloride       Scheduled Medications    methadone  160 mg Oral Daily    aspirin  81 mg Oral 
    ICU Resident  Progress Note    Name:  Adam Trujillo    /Age/Sex: 1990  (35 y.o. male)  MRN & CSN:  [id number] & [id number]    Date of Admission: 2025    Chief Complaint:   Chief Complaint   Patient presents with    Altered Mental Status     PT found down in parking lot of methadone clinic, confused.         Hospital Course:   35 y.o male PMHx of cardiomyopathy (echo  EF 55-60%), epilepsy (follows  Neurology), chronic hepatitis C, polysubstance abuse (on methadone). The patient presented after having a seizure in the parking lot of his methadone clinic prior to receiving methadone. The patient was confused and agitated in the ED, spitting on staff, attempting to get out of bed and not cooperating with treatment. UDS positive for cannabis, cocaine, methadone and fentanyl. The patient was intubated for safety and transferred to the ICU. Patient was extubated  to room air.      Subjective:  Today is:  Hospital Day: 4.  Patient seen and examined in ICU-3904/3904-01.     Patient seen at bedside this AM. Reports feeling bad overall due to his withdrawal symptoms. He denies abdominal pain but endorses joint pain. He is nauseous and vomiting. He has chills and a slight tremor in his hand when outstretched.      Lines  Right and left PIV   Urinary catheter     Drips  LR 200mL/hr    Microbiology  Blood cultures from  NGTD  UA  1 WBC, 4 RBC, no bacteria present    Medications:  Reviewed    Infusion Medications    lactated ringers 100 mL/hr at 25 0649    dextrose      sodium chloride       Scheduled Medications    topiramate  25 mg Oral Daily    methadone  160 mg Oral Daily    heparin (porcine)  5,000 Units SubCUTAneous 3 times per day    nicotine  1 patch TransDERmal Daily    melatonin  3 mg Oral Nightly    aspirin  81 mg Oral Daily    atorvastatin  40 mg Oral Nightly    OXcarbazepine  1,200 mg Oral BID    nitroglycerin  0.5 inch Topical 4 times per day    isosorbide mononitrate 
    PULMONARY AND CRITICAL CARE MEDICINE PROGRESS NOTE    Subjective: Patient is anxious and restless.  Has been having episodes of nausea and vomiting.    REVIEW OF SYSTEMS:   Constitutional symptoms: The patient denies fever, fatigue, night sweats, weight loss or weight gain.   HEENT: No vision changes. No tinnitus, Denies sinus pain. No hoarseness, or dysphagia.   Neck: Patient denies swelling in the neck.   Cardiovascular: Denies chest pain, palpitation, syncope.  Respiratory: Denies shortness of breath or cough.   Gastrointestinal: Denies abdominal pain or change in bowel function.  Genitourinary: Denies obstructive symptoms. No history of incontinence.  Skin: No rashes or itching.   Muskuloskeletal: Denies weakness or bone pain.   Neurological: No headaches or seizures.   Psychiatric: Denies mood swings or depression.     MEDICATIONS:     Scheduled Meds:   methadone  160 mg Oral Daily    heparin (porcine)  5,000 Units SubCUTAneous 3 times per day    nicotine  1 patch TransDERmal Daily    melatonin  3 mg Oral Nightly    aspirin  81 mg Oral Daily    atorvastatin  40 mg Oral Nightly    OXcarbazepine  1,200 mg Oral BID    nitroglycerin  0.5 inch Topical 4 times per day    isosorbide mononitrate  30 mg Oral Daily    sodium chloride flush  5-40 mL IntraVENous 2 times per day    thiamine  200 mg IntraVENous Daily    folic acid  1 mg Oral Daily    lactulose  20 g Oral TID       Current Infusions:    lactated ringers 100 mL/hr at 07/19/25 0649    dextrose      sodium chloride         PRN meds:  LORazepam, prochlorperazine, methocarbamol, hydrOXYzine pamoate, dextrose bolus **OR** dextrose bolus, glucagon (rDNA), dextrose, sodium chloride flush, sodium chloride, potassium chloride **OR** potassium chloride, magnesium sulfate, ondansetron **OR** ondansetron, polyethylene glycol, acetaminophen **OR** acetaminophen    PHYSICAL EXAM:  /80   Pulse 64   Temp 97.9 °F (36.6 °C) (Oral)   Resp 13   Ht 1.803 m (5' 11\")  
    PULMONARY AND CRITICAL CARE MEDICINE PROGRESS NOTE    Subjective: Patient is awake and alert.  Lab work showed worsening creatinine.  CK remains elevated.  Improving leukocytosis.    REVIEW OF SYSTEMS:   Constitutional symptoms: The patient denies fever, fatigue, night sweats, weight loss or weight gain.   HEENT: No vision changes. No tinnitus, Denies sinus pain. No hoarseness, or dysphagia.   Neck: Patient denies swelling in the neck.   Cardiovascular: Denies chest pain, palpitation, syncope.  Respiratory: Denies shortness of breath or cough.   Gastrointestinal: Denies nausea, abdominal pain or change in bowel function.  Genitourinary: Denies obstructive symptoms. No history of incontinence.  Skin: No rashes or itching.   Muskuloskeletal: Denies weakness or bone pain.   Neurological: No headaches or seizures.   Psychiatric: Denies mood swings or depression.     MEDICATIONS:     Scheduled Meds:   methadone  160 mg Oral Daily    aspirin  81 mg Oral Daily    atorvastatin  40 mg Oral Nightly    OXcarbazepine  1,200 mg Oral BID    nitroglycerin  0.5 inch Topical 4 times per day    isosorbide mononitrate  30 mg Oral Daily    sodium chloride flush  5-40 mL IntraVENous 2 times per day    thiamine  200 mg IntraVENous Daily    folic acid  1 mg Oral Daily    lactulose  20 g Oral TID       Current Infusions:    lactated ringers 200 mL/hr at 07/18/25 1153    dextrose      sodium chloride         PRN meds:  dextrose bolus **OR** dextrose bolus, glucagon (rDNA), dextrose, sodium chloride flush, sodium chloride, potassium chloride **OR** potassium chloride, magnesium sulfate, ondansetron **OR** ondansetron, polyethylene glycol, acetaminophen **OR** acetaminophen    PHYSICAL EXAM:  BP (!) 101/59   Pulse 80   Temp 98.1 °F (36.7 °C) (Oral)   Resp 15   Ht 1.803 m (5' 11\")   Wt 54.7 kg (120 lb 9.5 oz)   SpO2 94%   BMI 16.82 kg/m²  I/O last 3 completed shifts:  In: 2960.7 [P.O.:150; I.V.:2661.8; IV Piggyback:149]  Out: 1400 
    V2.0    [facility] Progress Note      Name:  Adam Trujillo /Age/Sex: 1990  (35 y.o. male)   MRN & CSN:  [id number] & [id number] Encounter Date/Time: 2025 10:24 AM EDT   [facility]  [facility]3904/3904-01 PCP: No primary care provider on file.     Attending:Meg Mayes MD       Hospital Day: 5    Assessment and Recommendations   Adam Trujillo is a 35 y.o. male with pmh of with a pmh of polysubstance abuse including IV drug use, chronic hepatitis C, indeterminate HIV status, seizure disorder, NICM, multiple admissions to [facility] for AMS and each time leaving AGAINST MEDICAL ADVICE, found down in the parking lot of the methadone clinic confused and agitated.  Admitted for further management of Toxic metabolic encephalopathy.      Plan:     TME: Resolved.  CT head negative for acute abnormality.  Mental status currently at baseline.     Breakthrough seizures:   Seizure precautions reiterated. Resumed AEDs.  No further seizures observed.  Outpatient follow-up with neurologist.     Mechanical intubation:  Intubated due to extreme agitation.  Intensivist consulted; extubated 2025.     Active polysubstance & IV drug abuse:   Urine toxicology positive for cocaine, methadone, fentanyl and cannabinoid.  Resumed methadone; follow-up at the methadone clinic.  Cessation counseling reiterated.     Acute kidney injury: Resolving  Scr on admission 1.1, peaked at 3.9.  Trended down to 2.0 with IVF.  Resume IV fluids if patient agreeable to stay.     Rhabdomyolysis: CK 7,785.    Continue IVF and monitor CK levels daily.  Trended down.    Lactic acidosis: 13.9 on admission.  Resolved with IV hydration.     Leukocytosis: Likely reactive.  Resolved.  Urinalysis and chest x-ray unremarkable.  Blood cultures NGTD.  Monitor off antibiotics for now.     Elevated troponin:  History of drug-induced cardiomyopathy:  TTE 2025: LVEF 55 to 60%.  HS-troponin trend not c/w ACS.  Continue aspirin and Lipitor.  On 
    V2.0    [facility] Progress Note      Name:  Adam Trujillo /Age/Sex: 1990  (35 y.o. male)   MRN & CSN:  [id number] & [id number] Encounter Date/Time: 2025 10:24 AM EDT   Location:  [facility]390/3904-01 PCP: No primary care provider on file.     Attending:Meg Mayes MD       Hospital Day: 3    Assessment and Recommendations   Adam Trujillo is a 35 y.o. male with pmh of with a pmh of polysubstance abuse including IV drug use, chronic hepatitis C, indeterminate HIV status, seizure disorder, NICM, multiple admissions to [facility] for AMS and each time leaving AGAINST MEDICAL ADVICE, found down in the parking lot of the methadone clinic confused and agitated.  Admitted for further management of Toxic metabolic encephalopathy.      Plan:     TME:  CT head negative for acute abnormality.  Mental status evaluation when off propofol and Versed.     Breakthrough seizures:   Seizure precautions. Resumed AEDs.     Mechanical intubation:  Intubated due to extreme agitation.  Tensive is following; extubated today.     Active polysubstance & IV drug abuse:   Urine toxicology positive for cocaine, methadone, fentanyl and cannabinoid.  Follows up at the methadone clinic.     Acute kidney injury:  Scr on admission 1.1, trended up to 2.4.  Continue IVF, Wu catheter.  Strict I's and O's, avoid nephrotoxins.  Nephrology consult.     Rhabdomyolysis: CK 7,785.    Continue IVF and monitor CK levels daily.     Lactic acidosis: 13.9 on admission.    Resolved with IV hydration.     Leukocytosis: ?  Reactive  Urinalysis and chest x-ray unremarkable.  Follow-up blood cultures.  Monitor off antibiotics for now.     Elevated troponin:  History of drug-induced cardiomyopathy:  TTE 2025: LVEF 55 to 60%.  HS-troponin trend not c/w ACS.  Continue aspirin and Lipitor.  On Aldactone, Entresto, Jardiance and Toprol-XL outpatient.  Continue Imdur for possible coronary vasospasm due to cocaine abuse.  Cardiology consult 
   07/16/25 2105   Patient Observation   Pulse 100   Respirations 18   SpO2 96 %   ETCO2 (mmHg) 24 mmHg   Breath Sounds   Respiratory Pattern Regular   Breath Sounds Bilateral Diminished   Vent Information   Ventilator Day(s) 1   Ventilator ID [id number]   Vent Mode AC/PRVC   Ventilator Settings   Vt (Set, mL) 600 mL   Resp Rate (Set) 14 bpm   PEEP/CPAP (cmH2O) 5   FiO2  40 %   Insp Time (sec) 1.07 sec   Vent Patient Data (Readings)   Vt (Measured) 656 mL   Peak Inspiratory Pressure (cmH2O) 23 cmH2O   Rate Measured 18 br/min   Minute Volume (L/min) 11.2 Liters   Mean Airway Pressure (cmH2O) 11 cmH20   I:E Ratio 1:1.9   Backup Apnea On   Backup Rate 14 Breaths Per Minute   Backup Vt 600   Vent Alarm Settings   High Pressure (cmH2O) 40 cmH2O   Low Minute Volume (lpm) 2 L/min   High Minute Volume (lpm) 20 L/min   Low Exhaled Vt (ml) 200 mL   High Exhaled Vt (ml) 1000 mL   RR High (bpm) 40 br/min   ETCO2/TCM Max 50 mmHg   ETCO2/TCM Min 20 mmHg   Additional Respiratoray Assessments   Humidification Source HME   Circuit Condensation Not drained   Ambu Bag With Mask At Bedside Yes   Airway Clearance   Suction ET Tube   Subglottic Suction Done Yes   Suction Device Koby;Inline suction catheter   Sputum Method Obtained Endotracheal   Sputum Amount Scant   Sputum Color/Odor Clear   Sputum Consistency Thick;Thin   ETT    Placement Date/Time: 07/16/25 1520   Present on Admission/Arrival: Yes  Placed By: Licensed provider  Placement Verified By: Auscultation;Colorimetric ETCO2 device;Chest X-ray  Preoxygenation: Yes  Mask Ventilation: Ventilated by mask (1)  Technique: ...   Secured At 23 cm   Measured From Lips   ETT Placement Right   Secured By Commercial tube george   Site Assessment Dry   Cuff Pressure 28 cm H2O   Ventilator Associated Pneumonia Bundle   Elevation of Head of Bed to 30-45 Degrees  Yes   Oral Care Performed Mouthwash with chlorhexidine   Oral Suctioning Yes   ETT/Trach Suctioning Yes       
   07/17/25 0046   Patient Observation   Pulse 93   Respirations 17   SpO2 97 %   ETCO2 (mmHg) 24 mmHg   Breath Sounds   Respiratory Pattern Regular   Breath Sounds Bilateral Diminished   Vent Information   Ventilator Day(s) 2   Ventilator ID [id number]   Equipment Changed HME   Vent Mode AC/PRVC   Ventilator Settings   Vt (Set, mL) 600 mL   Resp Rate (Set) 14 bpm   PEEP/CPAP (cmH2O) 5   FiO2  40 %   Insp Time (sec) 1.07 sec   Vent Patient Data (Readings)   Vt (Measured) 550 mL   Peak Inspiratory Pressure (cmH2O) 21 cmH2O   Rate Measured 16 br/min   Minute Volume (L/min) 11.8 Liters   Mean Airway Pressure (cmH2O) 9 cmH20   I:E Ratio 1:1.7   Backup Apnea On   Backup Rate 14 Breaths Per Minute   Backup Vt 600   Vent Alarm Settings   High Pressure (cmH2O) 40 cmH2O   Low Minute Volume (lpm) 2 L/min   High Minute Volume (lpm) 20 L/min   Low Exhaled Vt (ml) 200 mL   High Exhaled Vt (ml) 1000 mL   RR High (bpm) 40 br/min   ETCO2/TCM Max 50 mmHg   ETCO2/TCM Min 20 mmHg   Additional Respiratoray Assessments   Humidification Source HME   Circuit Condensation Drained   Ambu Bag With Mask At Bedside Yes   Airway Clearance   Suction ET Tube   Subglottic Suction Done Yes   Suction Device Koby;Inline suction catheter   Sputum Method Obtained Endotracheal   Sputum Amount Scant   Sputum Color/Odor Clear   Sputum Consistency Thin;Thick   ETT    Placement Date/Time: 07/16/25 1520   Present on Admission/Arrival: Yes  Placed By: Licensed provider  Placement Verified By: Auscultation;Colorimetric ETCO2 device;Chest X-ray  Preoxygenation: Yes  Mask Ventilation: Ventilated by mask (1)  Technique: ...   Secured At 23 cm   Measured From Lips   ETT Placement Left   Secured By Commercial tube george   Site Assessment Dry   Cuff Pressure 28 cm H2O   Ventilator Associated Pneumonia Bundle   Elevation of Head of Bed to 30-45 Degrees  Yes   Oral Care Performed Mouthwash with chlorhexidine   Oral Suctioning Yes   ETT/Trach Suctioning Yes       
  Nephrology progress Note  [phone]  [phone]   [email or web address]       Patient:  Adam Trujillo   : 1990    Brief HPI  Mr. Trujillo is a 35-year-old male with history of substance abuse including IV drug abuse, chronic hepatitis C, negative HIV status and 2024, seizure disorder, nonischemic cardiomyopathy, multiple admissions to [facility] for altered mental status and leaving AMA  Brought to the ER on  after a witnessed seizure while in the parking lot of methadone clinic while waiting to receive methadone  Per reports, relapse of drug use about a week prior to admission  On presentation to the ER, he had to be intubated for airway protection and was quickly extubated  His urine toxicology screen was positive for marijuana, cocaine, fentanyl and methadone  We were consulted for WILBERT and rhabdomyolysis      Subjective/interval history  Patient seen and examined  No labs yet today  Wanting to leave AMA      Meds:  Scheduled Meds:   methadone  160 mg Oral Daily    heparin (porcine)  5,000 Units SubCUTAneous 3 times per day    nicotine  1 patch TransDERmal Daily    melatonin  3 mg Oral Nightly    aspirin  81 mg Oral Daily    atorvastatin  40 mg Oral Nightly    OXcarbazepine  1,200 mg Oral BID    nitroglycerin  0.5 inch Topical 4 times per day    isosorbide mononitrate  30 mg Oral Daily    sodium chloride flush  5-40 mL IntraVENous 2 times per day    thiamine  200 mg IntraVENous Daily    folic acid  1 mg Oral Daily    lactulose  20 g Oral TID     Continuous Infusions:   lactated ringers 100 mL/hr at 25 0237    dextrose      sodium chloride       PRN Meds:.prochlorperazine, LORazepam, methocarbamol, hydrOXYzine pamoate, dextrose bolus **OR** dextrose bolus, glucagon (rDNA), dextrose, sodium chloride flush, sodium chloride, potassium chloride **OR** potassium chloride, magnesium sulfate, ondansetron **OR** ondansetron, polyethylene glycol, acetaminophen **OR** 
  Nephrology progress Note  [phone]  [phone]   [facility] [employer]       Patient:  Adam Trujillo   : 1990    Brief HPI  Mr. Trujillo is a 35-year-old male with history of substance abuse including IV drug abuse, chronic hepatitis C, negative HIV status and 2024, seizure disorder, nonischemic cardiomyopathy, multiple admissions to [facility] for altered mental status and leaving AMA  Brought to the ER on  after a witnessed seizure while in the parking lot of methadone clinic while waiting to receive methadone  Per reports, relapse of drug use about a week prior to admission  On presentation to the ER, he had to be intubated for airway protection and was quickly extubated  His urine toxicology screen was positive for marijuana, cocaine, fentanyl and methadone  We were consulted for WILBERT and rhabdomyolysis      Subjective/interval history  Patient seen and examined  States he may be withdrawing from methadone, c/o being anxious  Creatinine better  CK trending down  Blood pressure is maintained  Has been afebrile  On room air  Good urine output      Meds:  Scheduled Meds:   methadone  160 mg Oral Daily    heparin (porcine)  5,000 Units SubCUTAneous 3 times per day    nicotine  1 patch TransDERmal Daily    melatonin  3 mg Oral Nightly    aspirin  81 mg Oral Daily    atorvastatin  40 mg Oral Nightly    OXcarbazepine  1,200 mg Oral BID    nitroglycerin  0.5 inch Topical 4 times per day    isosorbide mononitrate  30 mg Oral Daily    sodium chloride flush  5-40 mL IntraVENous 2 times per day    thiamine  200 mg IntraVENous Daily    folic acid  1 mg Oral Daily    lactulose  20 g Oral TID     Continuous Infusions:   lactated ringers 200 mL/hr at 25 0437    dextrose      sodium chloride       PRN Meds:.methocarbamol, hydrOXYzine pamoate, dextrose bolus **OR** dextrose bolus, glucagon (rDNA), dextrose, sodium chloride flush, sodium chloride, potassium chloride **OR** potassium chloride, magnesium 
  Nephrology progress Note  [phone]  [phone]   [employer]       Patient:  Adam Trujillo   : 1990    Brief HPI  Mr. Trujillo is a 35-year-old male with history of substance abuse including IV drug abuse, chronic hepatitis C, negative HIV status and 2024, seizure disorder, nonischemic cardiomyopathy, multiple admissions to [facility] for altered mental status and leaving AMA  Brought to the ER on  after a witnessed seizure while in the parking lot of methadone clinic while waiting to receive methadone  Per reports, relapse of drug use about a week prior to admission  On presentation to the ER, he had to be intubated for airway protection and was quickly extubated  His urine toxicology screen was positive for marijuana, cocaine, fentanyl and methadone  We were consulted for WILBERT and rhabdomyolysis      Subjective/interval history  Patient seen and examined  No labs yet today  Wanting to leave AMA      Meds:  Scheduled Meds:   methadone  160 mg Oral Daily    heparin (porcine)  5,000 Units SubCUTAneous 3 times per day    nicotine  1 patch TransDERmal Daily    melatonin  3 mg Oral Nightly    aspirin  81 mg Oral Daily    atorvastatin  40 mg Oral Nightly    OXcarbazepine  1,200 mg Oral BID    nitroglycerin  0.5 inch Topical 4 times per day    isosorbide mononitrate  30 mg Oral Daily    sodium chloride flush  5-40 mL IntraVENous 2 times per day    thiamine  200 mg IntraVENous Daily    folic acid  1 mg Oral Daily    lactulose  20 g Oral TID     Continuous Infusions:   lactated ringers 100 mL/hr at 25 0237    dextrose      sodium chloride       PRN Meds:.prochlorperazine, LORazepam, methocarbamol, hydrOXYzine pamoate, dextrose bolus **OR** dextrose bolus, glucagon (rDNA), dextrose, sodium chloride flush, sodium chloride, potassium chloride **OR** potassium chloride, magnesium sulfate, ondansetron **OR** ondansetron, polyethylene glycol, acetaminophen **OR** 
  Physician Progress Note      PATIENT:               DELONTE CARDENAS  CSN #:                  [id number]  :                       1990  ADMIT DATE:       2025 12:55 PM  DISCH DATE:  RESPONDING  PROVIDER #:        Meg Mayes MD          QUERY TEXT:    Rhabdomyolysis is documented in the medical record .  Please specify the   type:    The clinical indicators include:  -- Pt  found down in the parking lot of the methadone clinic confused and   agitated. History of drug use, and seizures.  -- H&P   \"Rhabdomyolysis: CK 7,785.  Continue IVF and monitor CK levels daily.\"  -- IVF, serial labs, supportive care  Options provided:  -- Traumatic rhabdomyolysis  -- Nontraumatic rhabdomyolysis  -- Other - I will add my own diagnosis  -- Disagree - Not applicable / Not valid  -- Disagree - Clinically unable to determine / Unknown  -- Refer to Clinical Documentation Reviewer    PROVIDER RESPONSE TEXT:    This patient has nontraumatic rhabdomyolysis.    Query created by: Kristine Yu on 2025 8:27 AM      Electronically signed by:  Meg Mayes MD 2025 10:28 AM          
4 Eyes Skin Assessment     NAME:  Adam Trujillo  YOB: 1990  MEDICAL RECORD NUMBER:  [id number]    The patient is being assessed for  Admission    I agree that at least one RN has performed a thorough Head to Toe Skin Assessment on the patient. ALL assessment sites listed below have been assessed.      Areas assessed by both nurses:    Head, Face, Ears, Shoulders, Back, Chest, Arms, Elbows, Hands, Sacrum. Buttock, Coccyx, Ischium, Legs. Feet and Heels, and Under Medical Devices         Does the Patient have a Wound? Yes wound(s) were present on assessment. LDA wound assessment was Initiated and completed by RN    Scars scattered       Luke Prevention initiated by RN: Yes  Wound Care Orders initiated by RN: No    For hospital-acquired stage 1 & 2 and ALL Stage 3,4, Unstageable, DTI, NWPT, and Complex wounds: place order “IP Wound Care/Ostomy Nurse Eval and Treat” by RN under : NA    New Ostomies, if present place, Ostomy referral order under : No     Nurse 1 eSignature: Electronically signed by Stephen Mortensen RN on 7/16/25 at 10:53 PM EDT    **SHARE this note so that the co-signing nurse can place an eSignature**    Nurse 2 eSignature: {Esignature:[id number]}    
CLINICAL PHARMACY NOTE: MEDS TO BEDS    Total # of Prescriptions Filled: 9   The following medications were delivered to the patient:  Methocarbamol 750 mg  Isosorbide Mononitrate ER 30  Folic Acid 1 MG  Metoprolol Succinate 25 mg  aspirin 81 mg chew  Enulose 10GM/15 ML sol  Ondansetron 4 mg ODT  Hydroxyzine Pamoate 50 mg  Atorvastatin Calcium 40 mg    Additional Documentation: Timoteo hayden to deliver medication to patient room=signed  Alyssa Alexis CPhT  
Family at bedside and updated on POC.   
Giuliana NORRIS notified of possible abnormal T wave. No further orders at this time.  
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

## 2025-07-22 LAB
MYELOPEROXIDASE AB SER-ACNC: 0 AU/ML (ref 0–19)
PROTEINASE3 AB SER-ACNC: 0 AU/ML (ref 0–19)

## 2025-07-22 NOTE — PROGRESS NOTES
[facility]   Cardiac Follow-up    Primary Care Doctor:  No primary care provider on file.    Chief Complaint   Patient presents with    Follow-Up from Hospital    Chest Pain    Other     Very anxious      History of Present Illness:   I had the pleasure of seeing Adam Trujillo in follow up for stress-induced cardiomyopathy      Admitted 2/21/2021-2/27/2021 with seizures, acute encephalopathy, septic shock, respiratory failure, NSTEMI, aspiration, stress-induced cardiomyopathy with LVEF 25% requiring dobutamine support, status post left heart cath showed normal coronaries.  Repeat echocardiogram 7/2021 showed recovered EF 55%  Amitted 1/12/23-1/20/2023 with status epilepticus, septic shock, pneumonia, markedly elevated troponin, wide complex tachycardia, repeat echocardiogram completed showed LVEF down to less than 20% with severe diffuse hypokinesis, and possible questionable apical thrombus. He had cardiac MRI showing improved LVEF to 59% and mild myocardial edema suggestive of recovering stress induced cardiomyopathy   Admitted 8/8/2023-8/16/2023 with seizures and cardiac arrest, required CPR x2 and transvenous pacer.  LVEF 20%. Also treated for bacteremia     Since last visit, admitted 6/3/25-6/4/25   He had 2 drug relapses in the last 2 months. He had relapsed with fentanyl.  Only took 2 hits of cocaine in the past 3 days prior to admission   Admitted 7/16/2025-7/21/2025- went down at the methadone clinic.   Treated for WILBERT and rhabdomylosis. Echo showed normal LVEF 55% on 7/18/2025  Restarted on entresto at discharge along with jardiance.   Having urinary leaking for a few years.     He is having issues with the depression and anxiety- has appt scheduled with psychiatry  Living with mom    Adam Trujillo describes symptoms including dizziness, fatigue but denies lower extremity edema.    Past Medical History:   has a past medical history of Acute respiratory failure with hypoxia and hypercapnia  Admission Reconciliation is Completed  Discharge Reconciliation is Completed

## 2025-07-23 ENCOUNTER — OFFICE VISIT (OUTPATIENT)
Dept: CARDIOLOGY CLINIC | Age: 35
End: 2025-07-23
Payer: MEDICAID

## 2025-07-23 VITALS
HEIGHT: 71 IN | WEIGHT: 124.5 LBS | HEART RATE: 93 BPM | OXYGEN SATURATION: 97 % | SYSTOLIC BLOOD PRESSURE: 160 MMHG | BODY MASS INDEX: 17.43 KG/M2 | DIASTOLIC BLOOD PRESSURE: 108 MMHG

## 2025-07-23 DIAGNOSIS — I10 HYPERTENSION, UNSPECIFIED TYPE: ICD-10-CM

## 2025-07-23 DIAGNOSIS — I50.22 SYSTOLIC CHF, CHRONIC (HCC): Primary | ICD-10-CM

## 2025-07-23 DIAGNOSIS — I51.81 STRESS-INDUCED CARDIOMYOPATHY: ICD-10-CM

## 2025-07-23 DIAGNOSIS — N17.9 AKI (ACUTE KIDNEY INJURY): ICD-10-CM

## 2025-07-23 PROCEDURE — G2211 COMPLEX E/M VISIT ADD ON: HCPCS | Performed by: NURSE PRACTITIONER

## 2025-07-23 PROCEDURE — 3077F SYST BP >= 140 MM HG: CPT | Performed by: NURSE PRACTITIONER

## 2025-07-23 PROCEDURE — 99214 OFFICE O/P EST MOD 30 MIN: CPT | Performed by: NURSE PRACTITIONER

## 2025-07-23 PROCEDURE — 3080F DIAST BP >= 90 MM HG: CPT | Performed by: NURSE PRACTITIONER

## 2025-07-23 NOTE — PATIENT INSTRUCTIONS
Check b/p when you experience dizziness. Call the office if you have low b/p less than 110/50's   metoprolol to 1/2 tab daily - continue current dose  Continue the imdur for now   Continue entresto 1 tab twice a day- continue current dose   Hold off on the jardiance for now due to urinary issues   Repeat labs Friday  to monitor kidney labs   Follow up in 8 weeks

## 2025-07-24 LAB — CRYOGLOB SER QL: NORMAL

## 2025-08-01 DIAGNOSIS — I51.81 STRESS-INDUCED CARDIOMYOPATHY: ICD-10-CM

## 2025-08-01 DIAGNOSIS — I50.22 SYSTOLIC CHF, CHRONIC (HCC): ICD-10-CM

## 2025-08-02 LAB
ANION GAP SERPL CALCULATED.3IONS-SCNC: 15 MMOL/L (ref 3–16)
BUN SERPL-MCNC: 12 MG/DL (ref 7–20)
CALCIUM SERPL-MCNC: 9.7 MG/DL (ref 8.3–10.6)
CHLORIDE SERPL-SCNC: 103 MMOL/L (ref 99–110)
CO2 SERPL-SCNC: 23 MMOL/L (ref 21–32)
CREAT SERPL-MCNC: 0.9 MG/DL (ref 0.9–1.3)
GFR SERPLBLD CREATININE-BSD FMLA CKD-EPI: >90 ML/MIN/{1.73_M2}
GLUCOSE SERPL-MCNC: 81 MG/DL (ref 70–99)
POTASSIUM SERPL-SCNC: 4.8 MMOL/L (ref 3.5–5.1)
SODIUM SERPL-SCNC: 141 MMOL/L (ref 136–145)

## (undated) DEVICE — LINER,SOFT,SUCTION CANISTER,1500CC: Brand: MEDLINE

## (undated) DEVICE — TUBING, SUCTION, 3/16" X 12', STRAIGHT: Brand: MEDLINE

## (undated) DEVICE — SYRINGE MED 10ML SLIP TIP BLNT FILL AND LUERLOCK DISP

## (undated) DEVICE — TUBING, SUCTION, 3/16" X 6', STRAIGHT: Brand: MEDLINE

## (undated) DEVICE — CONMED SCOPE SAVER BITE BLOCK, 20X27 MM: Brand: SCOPE SAVER

## (undated) DEVICE — BOWL MED M 16OZ PLAS CAP GRAD

## (undated) DEVICE — SYRINGE MED 50ML LUERSLIP TIP

## (undated) DEVICE — TRAP,MUCUS SPECIMEN, 80CC: Brand: MEDLINE

## (undated) DEVICE — Z DISCONTINUED USE 2276105 GOWN PROTCT UNIV CHST W28IN L49IN SL 24IN BLU SPUNBOND FLM

## (undated) DEVICE — YANKAUER,BULB TIP,W/O VENT,RIGID,STERILE: Brand: MEDLINE

## (undated) DEVICE — SINGLE USE SUCTION VALVE MAJ-209: Brand: SINGLE USE SUCTION VALVE (STERILE)

## (undated) DEVICE — ENDO CARRY-ON PROCEDURE KIT INCLUDES SUCTION TUBING, LUBRICANT, GAUZE, BIOHAZARD STICKER, TRANSPORT PAD AND INTERCEPT BEDSIDE KIT.: Brand: ENDO CARRY-ON PROCEDURE KIT

## (undated) DEVICE — SINGLE USE BIOPSY VALVE MAJ-210: Brand: SINGLE USE BIOPSY VALVE (STERILE)

## (undated) DEVICE — ADAPTER TBNG DIA15MM SWVL FBROPT BRONCHSCP TERM 2 AXIS PEEP